# Patient Record
Sex: FEMALE | Race: BLACK OR AFRICAN AMERICAN | NOT HISPANIC OR LATINO | Employment: OTHER | URBAN - METROPOLITAN AREA
[De-identification: names, ages, dates, MRNs, and addresses within clinical notes are randomized per-mention and may not be internally consistent; named-entity substitution may affect disease eponyms.]

---

## 2017-01-10 ENCOUNTER — HOSPITAL ENCOUNTER (EMERGENCY)
Facility: HOSPITAL | Age: 42
Discharge: HOME | End: 2017-01-10
Attending: EMERGENCY MEDICINE
Payer: MEDICAID

## 2017-01-10 VITALS
HEART RATE: 91 BPM | DIASTOLIC BLOOD PRESSURE: 99 MMHG | WEIGHT: 293 LBS | SYSTOLIC BLOOD PRESSURE: 182 MMHG | BODY MASS INDEX: 41.02 KG/M2 | TEMPERATURE: 98 F | RESPIRATION RATE: 18 BRPM | OXYGEN SATURATION: 97 % | HEIGHT: 71 IN

## 2017-01-10 DIAGNOSIS — K04.7 DENTAL ABSCESS: Primary | ICD-10-CM

## 2017-01-10 PROCEDURE — 63600175 PHARM REV CODE 636 W HCPCS: Performed by: EMERGENCY MEDICINE

## 2017-01-10 PROCEDURE — 99283 EMERGENCY DEPT VISIT LOW MDM: CPT | Mod: 25

## 2017-01-10 PROCEDURE — 96372 THER/PROPH/DIAG INJ SC/IM: CPT

## 2017-01-10 PROCEDURE — 25000003 PHARM REV CODE 250: Performed by: EMERGENCY MEDICINE

## 2017-01-10 RX ORDER — HYDROCODONE BITARTRATE AND ACETAMINOPHEN 5; 325 MG/1; MG/1
1 TABLET ORAL EVERY 4 HOURS PRN
Qty: 12 TABLET | Refills: 0 | Status: SHIPPED | OUTPATIENT
Start: 2017-01-10 | End: 2017-01-20

## 2017-01-10 RX ORDER — HYDROCODONE BITARTRATE AND ACETAMINOPHEN 10; 325 MG/1; MG/1
1 TABLET ORAL
Status: COMPLETED | OUTPATIENT
Start: 2017-01-10 | End: 2017-01-10

## 2017-01-10 RX ORDER — CLINDAMYCIN HYDROCHLORIDE 150 MG/1
300 CAPSULE ORAL 4 TIMES DAILY
Qty: 80 CAPSULE | Refills: 0 | Status: SHIPPED | OUTPATIENT
Start: 2017-01-10 | End: 2017-01-20

## 2017-01-10 RX ADMIN — PENICILLIN G BENZATHINE AND PENICILLIN G PROCAINE 2.4 MILLION UNITS: 600000; 600000 INJECTION, SUSPENSION INTRAMUSCULAR at 04:01

## 2017-01-10 RX ADMIN — HYDROCODONE BITARTRATE AND ACETAMINOPHEN 1 TABLET: 10; 325 TABLET ORAL at 04:01

## 2017-01-10 NOTE — ED AVS SNAPSHOT
[facility]  [address] [facility] [address]               Jaswant Yang   1/10/2017  3:50 PM   ED    Description:  Female : 1975   Department:  [facility]            Your Care was Coordinated By:     Provider Role From To    Bryan Morales Jr., MD Attending Provider 01/10/17 1549 --      Reason for Visit     Dental Pain           Diagnoses this Visit        Comments    Dental abscess    -  Primary       ED Disposition     ED Disposition Condition Comment    Discharge             To Do List           Follow-up Information     Follow up with Your Dentist. Call in 2 days.       These Medications        Disp Refills Start End    clindamycin (CLEOCIN) 150 MG capsule 80 capsule 0 1/10/2017 2017    Take 2 capsules (300 mg total) by mouth 4 (four) times daily. - Oral    hydrocodone-acetaminophen 5-325mg (NORCO) 5-325 mg per tablet 12 tablet 0 1/10/2017 2017    Take 1 tablet by mouth every 4 (four) hours as needed. - Oral      [facility]     [facility] -  Assistance  Registered nurses in the [facility] provide clinical advisement, health education, appointment booking, and other advisory services.  Call for this free service at [phone].             Medications           Message regarding Medications     Verify the changes and/or additions to your medication regime listed below are the same as discussed with your clinician today.  If any of these changes or additions are incorrect, please notify your healthcare provider.        START taking these NEW medications        Refills    clindamycin (CLEOCIN) 150 MG capsule 0    Sig: Take 2 capsules (300 mg total) by mouth 4 (four) times daily.    Class: Print    Route: Oral    hydrocodone-acetaminophen 5-325mg (NORCO) 5-325 mg per tablet 0    Sig: Take 1 tablet by mouth every 4 (four) hours as needed.    Class: Print    Route: Oral      These  "medications were administered today        Dose Freq    penicillin G procaine-penicillin G benzathine (BICILLIN-CR) injection 2.4 Million Units 2.4 Million Units ED 1 Time    Sig: Inject 4 mLs (2.4 Million Units total) into the muscle ED 1 Time.    Class: Normal    Route: Intramuscular    hydrocodone-acetaminophen 10-325mg per tablet 1 tablet 1 tablet ED 1 Time    Sig: Take 1 tablet by mouth ED 1 Time.    Class: Normal    Route: Oral           Verify that the below list of medications is an accurate representation of the medications you are currently taking.  If none reported, the list may be blank. If incorrect, please contact your healthcare provider. Carry this list with you in case of emergency.           Current Medications     acetaminophen (TYLENOL) 500 MG tablet Take 2 tablets (1,000 mg total) by mouth 3 (three) times daily as needed for Pain.    clindamycin (CLEOCIN) 150 MG capsule Take 2 capsules (300 mg total) by mouth 4 (four) times daily.    diclofenac (VOLTAREN) 50 MG EC tablet Take 1 tablet (50 mg total) by mouth 3 (three) times daily as needed.    fluticasone (FLONASE) 50 mcg/actuation nasal spray 1 spray by Each Nare route 2 (two) times daily as needed for Rhinitis.    hydrocodone-acetaminophen 10-325mg per tablet 1 tablet Take 1 tablet by mouth ED 1 Time.    hydrocodone-acetaminophen 5-325mg (NORCO) 5-325 mg per tablet Take 1 tablet by mouth every 4 (four) hours as needed.    ibuprofen (ADVIL,MOTRIN) 200 MG tablet Take 2 tablets (400 mg total) by mouth every 6 (six) hours as needed for Pain.    penicillin G procaine-penicillin G benzathine (BICILLIN-CR) injection 2.4 Million Units Inject 4 mLs (2.4 Million Units total) into the muscle ED 1 Time.           Clinical Reference Information           Your Vitals Were     BP Pulse Temp Resp Height Weight    195/115 (BP Location: Right arm, Patient Position: Sitting) 96 98.1 °F (36.7 °C) (Oral) 16 5' 11" (1.803 m) 147.4 kg (325 lb)    Last Period SpO2 BMI "          11/24/2016 (Approximate) 96% 45.33 kg/m2        Allergies as of 1/10/2017     No Known Allergies      Immunizations Administered on Date of Encounter - 1/10/2017     None      ED Micro, Lab, POCT     None      ED Imaging Orders     None        Discharge Instructions         Dental Abscess  An abscess is a sac of pus. A dental abscess forms when a tooth or the tissue around it becomes infected with bacteria. The bacteria can enter through a cavity or a crack in a tooth. It can also infect the gum tissue or bone around a tooth. An untreated abscess can cause the loss of the tooth. It can even spread to other parts of the body and become life-threatening.    Symptoms of a dental abscess   Signs of a dental abscess include:  · Toothache, often severe  · Tooth pain with hot, cold, or pressure  · Pain in the gums, cheek, or jaw  · Bad breath or bitter taste in the mouth  · Trouble swallowing or opening the mouth  · Fever  · Swollen or enlarged glands in the neck  Diagnosing a dental abscess  An abscess is diagnosed by looking at your teeth and gums. You will be told if any tests, like dental X-rays, are needed.  Treating a dental abscess  Treatments for a dental abscess may include the following:  · Antibiotic medicines to treat the underlying infection.  · Pain relievers to help you feel more comfortable. Your health care provider may prescribe a medicine for you. Or, use over-the-counter pain relievers, like acetaminophen or ibuprofen.  · Warm saltwater rinses to soothe discomfort and help clear away pus.  · Root canal surgery if needed to save the tooth. With a root canal, the infected part of the tooth is removed. A special substance is then used to fill the empty space in the tooth.  · Drainage of the abscess if needed. Incisions are made to allow the infected material to drain from the tooth.  · Removal of the tooth in cases of severe infection that cant be treated another way.  If the infection is severe,  has spread, or doesnt respond to treatment, you may need to be admitted to a hospital.        When to call the dentist  Call your dentist right away if you have any of the following:  · Fever of 100.4°F (38°C) or higher  · Increased pain, redness, drainage, or swelling in the treated area  · Swelling of the face or jawbone  · Pain that cannot be controlled with medicines   Preventing dental abscess  To prevent another abscess in the future, keep your teeth clean and healthy. Brush twice a day and floss at least once daily. See your dentist for regular tooth cleanings. And avoid sugary foods and drinks that can lead to tooth decay.  © 7883-6960 [employer]. [address] [place] [address], [place] [address]. All rights reserved. This information is not intended as a substitute for professional medical care. Always follow your healthcare professional's instructions.          [facility] Sign-Up     Activating your [facility] account is as easy as 1-2-3!     1) Visit [email or web address], select Sign Up Now, enter this activation code and your date of birth, then select Next.  [email or web address]  Expires: 2/5/2017  8:41 PM      2) Create a username and password to use when you visit [facility] in the future and select a security question in case you lose your password and select Next.    3) Enter your e-mail address and click Sign Up!    Additional Information  If you have questions, please e-mail [email or web address] or call [phone] to talk to our [facility] staff. Remember, [facility] is NOT to be used for urgent needs. For medical emergencies, dial 911.          [facility] complies with applicable Federal civil rights laws and does not discriminate on the basis of race, color, national origin, age, disability, or sex.        Language Assistance Services     ATTENTION: Language assistance services are available, free of charge. Please call [phone].      ATENCIÓN: corie Alaniz  disposición servicios gratuitos de asistencia lingüística. Llcasey al [phone].     SENA Ý: N?u b?n nói Ti?ng Vi?t, có các d?ch v? h? tr? ngôn ng? mi?n phí dành cho b?n. G?i s? [phone].

## 2017-01-10 NOTE — DISCHARGE INSTRUCTIONS
Dental Abscess  An abscess is a sac of pus. A dental abscess forms when a tooth or the tissue around it becomes infected with bacteria. The bacteria can enter through a cavity or a crack in a tooth. It can also infect the gum tissue or bone around a tooth. An untreated abscess can cause the loss of the tooth. It can even spread to other parts of the body and become life-threatening.    Symptoms of a dental abscess   Signs of a dental abscess include:  · Toothache, often severe  · Tooth pain with hot, cold, or pressure  · Pain in the gums, cheek, or jaw  · Bad breath or bitter taste in the mouth  · Trouble swallowing or opening the mouth  · Fever  · Swollen or enlarged glands in the neck  Diagnosing a dental abscess  An abscess is diagnosed by looking at your teeth and gums. You will be told if any tests, like dental X-rays, are needed.  Treating a dental abscess  Treatments for a dental abscess may include the following:  · Antibiotic medicines to treat the underlying infection.  · Pain relievers to help you feel more comfortable. Your health care provider may prescribe a medicine for you. Or, use over-the-counter pain relievers, like acetaminophen or ibuprofen.  · Warm saltwater rinses to soothe discomfort and help clear away pus.  · Root canal surgery if needed to save the tooth. With a root canal, the infected part of the tooth is removed. A special substance is then used to fill the empty space in the tooth.  · Drainage of the abscess if needed. Incisions are made to allow the infected material to drain from the tooth.  · Removal of the tooth in cases of severe infection that cant be treated another way.  If the infection is severe, has spread, or doesnt respond to treatment, you may need to be admitted to a hospital.        When to call the dentist  Call your dentist right away if you have any of the following:  · Fever of 100.4°F (38°C) or higher  · Increased pain, redness, drainage, or swelling in the  treated area  · Swelling of the face or jawbone  · Pain that cannot be controlled with medicines   Preventing dental abscess  To prevent another abscess in the future, keep your teeth clean and healthy. Brush twice a day and floss at least once daily. See your dentist for regular tooth cleanings. And avoid sugary foods and drinks that can lead to tooth decay.  © 0358-4434 [employer]. [address] [place] [address], [place] [address]. All rights reserved. This information is not intended as a substitute for professional medical care. Always follow your healthcare professional's instructions.

## 2017-01-10 NOTE — ED PROVIDER NOTES
SCRIBE #1 NOTE: I, Elsa Cabello, am scribing for, and in the presence of, Bryan Morales Jr., MD. I have scribed the entire note.      History      Chief Complaint   Patient presents with    Dental Pain     top left tooth broke off with facial swelling. called dentist and they said to come here, has appmt on        Review of patient's allergies indicates:  No Known Allergies     HPI   HPI    1/10/2017, 3:50 PM   History obtained from the patient      History of Present Illness: Jaswant Yang is a 41 y.o. female patient who presents to the Emergency Department for left upper dental pain which onset gradually several days ago. Symptoms are constant and mild in severity. Pt reports no relief with OTC medications. Pt does not report any factors that exacerbate or improve the symptoms. Associated sxs include facial swelling. Patient denies fever, chills, difficulty swallowing, HA, drooling, and all other sxs at this time. Patient reports that she has an appointment with the dentist next month but that she is unable to present to the dentist sooner. No further complaints or concerns at this time.       Arrival mode: Personal vehicle    PCP: Primary Doctor No       Past Medical History:  History reviewed. No pertinent past medical history.    Past Surgical History:  Past Surgical History   Procedure Laterality Date     section, classic           Family History:  History reviewed. No pertinent family history.    Social History:  Social History     Social History Main Topics    Smoking status: Never Smoker    Smokeless tobacco: Unknown    Alcohol use No    Drug use: No    Sexual activity: Unknown       ROS   Review of Systems   Constitutional: Negative for chills and fever.   HENT: Positive for dental problem and facial swelling. Negative for drooling, sore throat and trouble swallowing.    Respiratory: Negative for shortness of breath.    Cardiovascular: Negative for chest pain.   Gastrointestinal:  Negative for nausea.   Genitourinary: Negative for dysuria.   Musculoskeletal: Negative for back pain.   Skin: Negative for rash.   Neurological: Negative for weakness and headaches.   Hematological: Does not bruise/bleed easily.   All other systems reviewed and are negative.      Physical Exam    Initial Vitals   BP Pulse Resp Temp SpO2   01/10/17 1546 01/10/17 1546 01/10/17 1546 01/10/17 1546 01/10/17 1546   195/115 96 16 98.1 °F (36.7 °C) 96 %      Physical Exam  Nursing Notes and Vital Signs Reviewed.  Constitutional: Patient is in no acute distress. Awake and alert. Well-developed and well-nourished.  Head: Atraumatic. Normocephalic.  Eyes: PERRL. EOM intact. Conjunctivae are not pale. No scleral icterus.  ENT: Mucous membranes are moist. Oropharynx is clear and symmetric.    Mouth/Throat: L upper maxilla premolars necrotic down to the bone with associated erythema. Mild facial swelling to L cheek. Patient handles secretions normally. No trismus.   Neck: Supple. Full ROM. No lymphadenopathy.  Cardiovascular: Regular rate. Regular rhythm. No murmurs, rubs, or gallops. Distal pulses are 2+ and symmetric.  Pulmonary/Chest: No respiratory distress. Clear to auscultation bilaterally. No wheezing, rales, or rhonchi.  Abdominal: Soft and non-distended.  There is no tenderness.  No rebound, guarding, or rigidity.  Good bowel sounds.    Genitourinary: No CVA tenderness  Musculoskeletal: Moves all extremities. No obvious deformities. No edema. No calf tenderness.  Skin: Warm and dry.  Neurological:  Alert, awake, and appropriate.  Normal speech.  No acute focal neurological deficits are appreciated.  Psychiatric: Normal affect. Good eye contact. Appropriate in content.    ED Course    Procedures  ED Vital Signs:  Vitals:    01/10/17 1546 01/10/17 1709   BP: (!) 195/115 (!) 182/99   Pulse: 96 91   Resp: 16 18   Temp: 98.1 °F (36.7 °C) 98.2 °F (36.8 °C)   TempSrc: Oral Oral   SpO2: 96% 97%   Weight: (!) 147.4 kg (325 lb)   "  Height: 5' 11" (1.803 m)      The Emergency Provider reviewed the vital signs and test results, which are outlined above.    ED Discussion     4:25 PM: Reassessed pt at this time. Pt states her condition has imprved at this time. Discussed pt dx and plan of tx. Informed pt to follow up with dentist.  All questions and concerns were addressed at this time. Pt expresses understanding of information and instructions, and is comfortable with plan to discharge. Pt is stable for discharge.    I discussed with patient that evaluation in the ED does not suggest any emergent or life threatening medical conditions requiring immediate intervention beyond what was provided in the ED, and I believe patient is safe for discharge.  Regardless, an unremarkable evaluation in the ED does not preclude the development or presence of a serious of life threatening condition. As such, patient was instructed to return immediately for any worsening or change in current symptoms.      ED Medication(s):  Medications   penicillin G procaine-penicillin G benzathine (BICILLIN-CR) injection 2.4 Million Units (2.4 Million Units Intramuscular Given 1/10/17 1424)   hydrocodone-acetaminophen 10-325mg per tablet 1 tablet (1 tablet Oral Given 1/10/17 8733)       Discharge Medication List as of 1/10/2017  4:20 PM      START taking these medications    Details   clindamycin (CLEOCIN) 150 MG capsule Take 2 capsules (300 mg total) by mouth 4 (four) times daily., Starting 1/10/2017, Until Fri 1/20/17, Print             Follow-up Information     Follow up with Your Dentist. Call in 2 days.           Medical Decision Making              Scribe Attestation:   Scribe #1: I performed the above scribed service and the documentation accurately describes the services I performed. I attest to the accuracy of the note.    Attending:   Physician Attestation Statement for Scribe #1: I, Bryan Morales Jr., MD, personally performed the services described in this " documentation, as scribed by Elsa Cabello, in my presence, and it is both accurate and complete.          Clinical Impression       ICD-10-CM ICD-9-CM   1. Dental abscess K04.7 522.5       Disposition:   Disposition: Discharged  Condition: Stable         Bryan Morales Jr., MD  01/10/17 190

## 2017-04-04 ENCOUNTER — HOSPITAL ENCOUNTER (EMERGENCY)
Facility: HOSPITAL | Age: 42
Discharge: HOME | End: 2017-04-04
Attending: EMERGENCY MEDICINE
Payer: MEDICAID

## 2017-04-04 VITALS
HEART RATE: 84 BPM | HEIGHT: 71 IN | WEIGHT: 293 LBS | TEMPERATURE: 98 F | OXYGEN SATURATION: 100 % | DIASTOLIC BLOOD PRESSURE: 88 MMHG | BODY MASS INDEX: 41.02 KG/M2 | SYSTOLIC BLOOD PRESSURE: 168 MMHG | RESPIRATION RATE: 18 BRPM

## 2017-04-04 DIAGNOSIS — K02.9 PAIN DUE TO DENTAL CARIES: Primary | ICD-10-CM

## 2017-04-04 PROCEDURE — 99283 EMERGENCY DEPT VISIT LOW MDM: CPT

## 2017-04-04 RX ORDER — PENICILLIN V POTASSIUM 500 MG/1
500 TABLET, FILM COATED ORAL EVERY 8 HOURS
Qty: 21 TABLET | Refills: 0 | Status: SHIPPED | OUTPATIENT
Start: 2017-04-04 | End: 2017-04-11

## 2017-04-04 RX ORDER — TRAMADOL HYDROCHLORIDE 50 MG/1
50 TABLET ORAL EVERY 6 HOURS PRN
Qty: 20 TABLET | Refills: 0 | Status: SHIPPED | OUTPATIENT
Start: 2017-04-04 | End: 2017-04-14

## 2017-04-04 NOTE — ED AVS SNAPSHOT
[facility]  [address] [facility] [address]               Jaswant Yang   2017  8:28 PM   ED    Description:  Female : 1975   Department:  [facility]            Your Care was Coordinated By:     Provider Role From To    Subhash Cabello Jr., MD Attending Provider 17 --    FLAKITA Jaquez Physician Assistant 17      Reason for Visit     Dental Pain           Diagnoses this Visit        Comments    Pain due to dental caries    -  Primary       ED Disposition     None           To Do List           Follow-up Information     Follow up with [facility]. Call in 1 day.    Why:  Call to see if you can schedule appt with dentist sooner than      Contact information:    [facility] [facility] [id number] [phone]         These Medications        Disp Refills Start End    penicillin v potassium (VEETID) 500 MG tablet 21 tablet 0 2017    Take 1 tablet (500 mg total) by mouth every 8 (eight) hours. - Oral    tramadol (ULTRAM) 50 mg tablet 20 tablet 0 2017    Take 1 tablet (50 mg total) by mouth every 6 (six) hours as needed for Pain. - Oral      [facility]     [facility] On [facility] Nurse Care Line -  Assistance  Unless otherwise directed by your provider, please contact [facility], our nurse care line that is available for  assistance.     Registered nurses in the [facility] provide: appointment scheduling, clinical advisement, health education, and other advisory services.  Call: [phone] (toll free)               Medications           Message regarding Medications     Verify the changes and/or additions to your medication regime listed below are the same as discussed with your clinician today.  If any of these changes or additions are incorrect, please notify your healthcare provider.        START taking  "these NEW medications        Refills    penicillin v potassium (VEETID) 500 MG tablet 0    Sig: Take 1 tablet (500 mg total) by mouth every 8 (eight) hours.    Class: Print    Route: Oral    tramadol (ULTRAM) 50 mg tablet 0    Sig: Take 1 tablet (50 mg total) by mouth every 6 (six) hours as needed for Pain.    Class: Print    Route: Oral           Verify that the below list of medications is an accurate representation of the medications you are currently taking.  If none reported, the list may be blank. If incorrect, please contact your healthcare provider. Carry this list with you in case of emergency.           Current Medications     acetaminophen (TYLENOL) 500 MG tablet Take 2 tablets (1,000 mg total) by mouth 3 (three) times daily as needed for Pain.    diclofenac (VOLTAREN) 50 MG EC tablet Take 1 tablet (50 mg total) by mouth 3 (three) times daily as needed.    fluticasone (FLONASE) 50 mcg/actuation nasal spray 1 spray by Each Nare route 2 (two) times daily as needed for Rhinitis.    ibuprofen (ADVIL,MOTRIN) 200 MG tablet Take 2 tablets (400 mg total) by mouth every 6 (six) hours as needed for Pain.    penicillin v potassium (VEETID) 500 MG tablet Take 1 tablet (500 mg total) by mouth every 8 (eight) hours.    tramadol (ULTRAM) 50 mg tablet Take 1 tablet (50 mg total) by mouth every 6 (six) hours as needed for Pain.           Clinical Reference Information           Your Vitals Were     BP Pulse Temp Resp Height Weight    168/88 (BP Location: Right arm, Patient Position: Sitting) 84 98.1 °F (36.7 °C) (Oral) 18 5' 11" (1.803 m) 143.8 kg (317 lb)    SpO2 BMI             100% 44.21 kg/m2         Allergies as of 4/4/2017     No Known Allergies      Immunizations Administered on Date of Encounter - 4/4/2017     None      ED Micro, Lab, POCT     None      ED Imaging Orders     None        Discharge Instructions         Dental Cavity    A dental cavity is a pit or crater in the surface of a tooth. This exposes the " "sensitive inner layer of the tooth and causes pain. If the cavity isnt treated, it will get bigger. It may enter the pulp and cause an infection or abscess in the bone at the root end (apex) of the tooth. An infection in the tooth is a much more serious problem than a cavity. If the tooth gets infected, you will need a root canal or the entire tooth taken out (extraction).  The pain in your tooth may be made worse by eating sweets or drinking hot or cold beverages. It may spread from the tooth to your ear or the area of your jaw on the same side.  Home care  Follow these tips when caring for yourself at home:  · Avoid sweets and hot and cold foods and drinks. Your tooth may be sensitive to changes in temperature.  · If your tooth is chipped or cracked, or if there is a large open cavity, put oil of cloves directly on the tooth to relieve pain. You can buy oil of cloves at drugstores. Some pharmacies carry an over-the-counter "toothache kit." This contains a paste that you can put on the exposed tooth to make it less sensitive.  · Put a cold pack on your jaw over the sore area to help reduce pain.  · You may use over-the-counter medicine to ease pain, unless another medicine was prescribed. If you have chronic liver or kidney disease, talk with your healthcare provider before using acetaminophen or ibuprofen. Also talk with your provider if youve had a stomach ulcer or GI bleeding.  · If you have signs of an infection, you will be given an antibiotic. Take it as directed.  Follow-up care  Follow up with your dentist, or as advised. Your pain may go away with the treatment given today. But only a dentist can fully look at and treat this problem to prevent further tooth damage.  Call 911  Call 911 if any of these occur:  · Difficulty swallowing or breathing  · Weakness or fainting  · Unusual drowsiness  · Headache or stiff neck  When to seek medical advice  Call your healthcare provider right away if any of these " occur:  · Redness or swelling of the face  · Pain gets worse or spreads to your neck  · Fever of 100.5 °F (38°C) or higher, or as directed by your healthcare provider  · Pus drains from the tooth or gum  Date Last Reviewed: 10/1/2016  © 6110-8232 [employer]. [address] [place] [address]. All rights reserved. This information is not intended as a substitute for professional medical care. Always follow your healthcare professional's instructions.          [facility] Sign-Up     Activating your [facility] account is as easy as 1-2-3!     1) Visit [email or web address], select Sign Up Now, enter this activation code and your date of birth, then select Next.  [email or web address]  Expires: 5/19/2017  8:40 PM      2) Create a username and password to use when you visit [facility] in the future and select a security question in case you lose your password and select Next.    3) Enter your e-mail address and click Sign Up!    Additional Information  If you have questions, please e-mail [email or web address] or call [phone] to talk to our [facility] staff. Remember, [facility] is NOT to be used for urgent needs. For medical emergencies, dial 911.          [facility] complies with applicable Federal civil rights laws and does not discriminate on the basis of race, color, national origin, age, disability, or sex.        Language Assistance Services     ATTENTION: Language assistance services are available, free of charge. Please call [phone].      ATENCIÓN: Si habla español, tiene a reyes disposición servicios gratuitos de asistencia lingüística. Llame al [phone].     CHÚ Ý: N?u b?n nói Ti?ng Vi?t, có các d?ch v? h? tr? ngôn ng? mi?n phí dành cho b?n. G?i s? [phone].

## 2017-04-05 NOTE — DISCHARGE INSTRUCTIONS
"  Dental Cavity    A dental cavity is a pit or crater in the surface of a tooth. This exposes the sensitive inner layer of the tooth and causes pain. If the cavity isnt treated, it will get bigger. It may enter the pulp and cause an infection or abscess in the bone at the root end (apex) of the tooth. An infection in the tooth is a much more serious problem than a cavity. If the tooth gets infected, you will need a root canal or the entire tooth taken out (extraction).  The pain in your tooth may be made worse by eating sweets or drinking hot or cold beverages. It may spread from the tooth to your ear or the area of your jaw on the same side.  Home care  Follow these tips when caring for yourself at home:  · Avoid sweets and hot and cold foods and drinks. Your tooth may be sensitive to changes in temperature.  · If your tooth is chipped or cracked, or if there is a large open cavity, put oil of cloves directly on the tooth to relieve pain. You can buy oil of cloves at drugstores. Some pharmacies carry an over-the-counter "toothache kit." This contains a paste that you can put on the exposed tooth to make it less sensitive.  · Put a cold pack on your jaw over the sore area to help reduce pain.  · You may use over-the-counter medicine to ease pain, unless another medicine was prescribed. If you have chronic liver or kidney disease, talk with your healthcare provider before using acetaminophen or ibuprofen. Also talk with your provider if youve had a stomach ulcer or GI bleeding.  · If you have signs of an infection, you will be given an antibiotic. Take it as directed.  Follow-up care  Follow up with your dentist, or as advised. Your pain may go away with the treatment given today. But only a dentist can fully look at and treat this problem to prevent further tooth damage.  Call 911  Call 911 if any of these occur:  · Difficulty swallowing or breathing  · Weakness or fainting  · Unusual drowsiness  · Headache or " stiff neck  When to seek medical advice  Call your healthcare provider right away if any of these occur:  · Redness or swelling of the face  · Pain gets worse or spreads to your neck  · Fever of 100.5 °F (38°C) or higher, or as directed by your healthcare provider  · Pus drains from the tooth or gum  Date Last Reviewed: 10/1/2016  © 5963-8228 [employer]. [address] [place] [address], [place] [address]. All rights reserved. This information is not intended as a substitute for professional medical care. Always follow your healthcare professional's instructions.

## 2017-04-05 NOTE — ED PROVIDER NOTES
SCRIBE #1 NOTE: I, Lesley Cabello, am scribing for, and in the presence of, Subhash Cabello Jr., MD. I have scribed the entire note.      History      Chief Complaint   Patient presents with    Dental Pain       Review of patient's allergies indicates:  No Known Allergies     HPI   HPI    2017, 8:40 PM   History obtained from the patient      History of Present Illness: Jaswant Yang is a 41 y.o. female patient who presents to the Emergency Department for left upper dental pain which is chronic. Symptoms are constant and moderate in severity. No mitigating or exacerbating factors reported. No associated sxs reported. Patient denies any fever,chills, n/v/d, drooling, facial swelling, jaw pain, dysphagia, and all other sxs at this time. No prior Tx reported. Pt has an appointment to see her dentist on 2017. No further complaints or concerns at this time.         Arrival mode: Personal vehicle     PCP: Primary Doctor No       Past Medical History:  No past medical history on file.    Past Surgical History:  Past Surgical History:   Procedure Laterality Date     SECTION, CLASSIC           Family History:  No family history on file.    Social History:  Social History     Social History Main Topics    Smoking status: Never Smoker    Smokeless tobacco: Not on file    Alcohol use No    Drug use: No    Sexual activity: Not on file       ROS   Review of Systems   Constitutional: Negative for chills and fever.   HENT: Positive for dental problem (left upper pain). Negative for drooling, facial swelling, sore throat and trouble swallowing.         (-) jaw pain   Respiratory: Negative for shortness of breath.    Cardiovascular: Negative for chest pain.   Gastrointestinal: Negative for diarrhea, nausea and vomiting.   Genitourinary: Negative for dysuria.   Musculoskeletal: Negative for back pain.   Skin: Negative for rash.   Neurological: Negative for weakness.   Hematological: Does not bruise/bleed  "easily.   All other systems reviewed and are negative.      Physical Exam    Initial Vitals   BP Pulse Resp Temp SpO2   04/04/17 2003 04/04/17 2003 04/04/17 2003 04/04/17 2003 04/04/17 2003   168/88 84 18 98.1 °F (36.7 °C) 100 %      Physical Exam  Nursing Notes and Vital Signs Reviewed.  Constitutional: Patient is in no acute distress. Awake and alert. Well-developed and well-nourished.  Head: Atraumatic. Normocephalic.  Eyes: PERRL. EOM intact. Conjunctivae are not pale. No scleral icterus.  ENT: Mucous membranes are moist. Oropharynx is clear and symmetric.   Mouth/Throat: No evident facial swelling. Positive for left upper canine dental caries with swelling. Negative for gingival edema. No palpable fluctuance. No evidence of periodontal or periapical abscess. No trismus.    Neck: Supple. Full ROM. No lymphadenopathy.  Cardiovascular: Regular rate. Regular rhythm. No murmurs, rubs, or gallops. Distal pulses are 2+ and symmetric.  Pulmonary/Chest: No respiratory distress. Clear to auscultation bilaterally. No wheezing, rales, or rhonchi.  Abdominal: Soft and non-distended.  There is no tenderness.  No rebound, guarding, or rigidity.  Musculoskeletal: Moves all extremities. No obvious deformities. No edema. No calf tenderness.  Skin: Warm and dry.  Neurological:  Alert, awake, and appropriate.  Normal speech.  No acute focal neurological deficits are appreciated.  Psychiatric: Normal affect. Good eye contact. Appropriate in content.    ED Course    Procedures  ED Vital Signs:  Vitals:    04/04/17 2003   BP: (!) 168/88   Pulse: 84   Resp: 18   Temp: 98.1 °F (36.7 °C)   TempSrc: Oral   SpO2: 100%   Weight: (!) 143.8 kg (317 lb)   Height: 5' 11" (1.803 m)            The Emergency Provider reviewed the vital signs and test results, which are outlined above.    ED Discussion     8:53 PM: Initial assessment of pt.  Pt is awake, alert, and in NAD at this time. Discussed with pt all pertinent ED information and results. " Discussed pt dx and plan of tx. Gave pt all f/u and return to the ED instructions. All questions and concerns were addressed at this time. Pt expresses understanding of information and instructions, and is comfortable with plan to discharge. Pt is stable for discharge.    Pre-hypertension/Hypertension: The pt has been informed that they may have pre-hypertension or hypertension based on a blood pressure reading in the ED. I recommend that the pt call the PCP listed on their discharge instructions or a physician of their choice this week to arrange f/u for further evaluation of possible pre-hypertension or hypertension.       ED Medication(s):  Medications - No data to display    New Prescriptions    PENICILLIN V POTASSIUM (VEETID) 500 MG TABLET    Take 1 tablet (500 mg total) by mouth every 8 (eight) hours.    TRAMADOL (ULTRAM) 50 MG TABLET    Take 1 tablet (50 mg total) by mouth every 6 (six) hours as needed for Pain.       Follow-up Information     Follow up with [facility]. Call in 1 day.    Why:  Call to see if you can schedule appt with dentist sooner than April 13     Contact information:    [address] [facility] [id number] [phone]              Medical Decision Making              Scribe Attestation:   Scribe #1: I performed the above scribed service and the documentation accurately describes the services I performed. I attest to the accuracy of the note.    Attending:   Physician Attestation Statement for Scribe #1: I, Subhash Cabello Jr., MD, personally performed the services described in this documentation, as scribed by Lesley Cabello, in my presence, and it is both accurate and complete.          Clinical Impression       ICD-10-CM ICD-9-CM   1. Pain due to dental caries K02.9 521.00       Disposition:   Disposition: Discharged  Condition: Stable         Subhash Cabello Jr., MD  04/05/17 8179

## 2017-07-17 VITALS
TEMPERATURE: 98 F | HEART RATE: 85 BPM | RESPIRATION RATE: 20 BRPM | DIASTOLIC BLOOD PRESSURE: 78 MMHG | BODY MASS INDEX: 41.02 KG/M2 | OXYGEN SATURATION: 100 % | WEIGHT: 293 LBS | SYSTOLIC BLOOD PRESSURE: 170 MMHG | HEIGHT: 71 IN

## 2017-07-17 PROCEDURE — 99283 EMERGENCY DEPT VISIT LOW MDM: CPT

## 2017-07-18 ENCOUNTER — HOSPITAL ENCOUNTER (EMERGENCY)
Facility: HOSPITAL | Age: 42
Discharge: HOME | End: 2017-07-18
Attending: EMERGENCY MEDICINE
Payer: MEDICAID

## 2017-07-18 DIAGNOSIS — K02.9 DENTAL CARIES: ICD-10-CM

## 2017-07-18 DIAGNOSIS — K05.30 PERIODONTITIS: Primary | ICD-10-CM

## 2017-07-18 RX ORDER — HYDROCODONE BITARTRATE AND ACETAMINOPHEN 5; 325 MG/1; MG/1
1 TABLET ORAL EVERY 4 HOURS PRN
Qty: 18 TABLET | Refills: 0 | Status: SHIPPED | OUTPATIENT
Start: 2017-07-18 | End: 2018-08-16

## 2017-07-18 RX ORDER — PENICILLIN V POTASSIUM 500 MG/1
500 TABLET, FILM COATED ORAL 4 TIMES DAILY
Qty: 40 TABLET | Refills: 0 | Status: SHIPPED | OUTPATIENT
Start: 2017-07-18 | End: 2017-07-25

## 2017-07-18 NOTE — DISCHARGE INSTRUCTIONS
DENTAL RESOURCES      [facility] [place] [facility]       [phone]     [facility] 8-4 Monday - Friday       [phone]     [facility] [place] [facility] Comp[facility]       [phone]     [facility] [place] [facility]                                 0-6 years                                                                                             7-13 years     [phone]     [facility]    [facility]     [phone]     [facility]       [phone]     [facility]    [address] [facility] [address], Mon-Fri not on Wed  8am-4pm    Over 60 years old living in [place]           [phone]     [facility]    [address] [facility] [place]     [phone]     [facility] [place]       [phone]     [facility] for HIV Patients  [address] [facility] [facility] [address] [facility]       [phone]     [facility]       [phone]     Advised patient to make an appointment with dentist for examination and treatment of their dental pain, as well as routine cleaning and disease prevention.  For prevention, patient was encouraged to: perform oral hygiene daily; have regular professional cleaning; brush teeth at least twice a day; floss daily; avoid constant sipping of sugary drinks or frequent sucking on candy and mints; and use toothpaste containing fluoride. Encouraged patient to gargle with warm salt water several times a day, continue to floss after eating, and complete full course of antibiotics.

## 2017-07-18 NOTE — ED PROVIDER NOTES
SCRIBE #1 NOTE: I, Augustina Graf/Garry Marie, am scribing for, and in the presence of, Rubén Kat Jr., MD. I have scribed the entire note.      History      Chief Complaint   Patient presents with    Dental Pain     to R lower tooth that is cracked    Back Pain     lower back pain that started after lifting a pt       Review of patient's allergies indicates:  No Known Allergies     HPI   HPI    2017, 1:07 AM   History obtained from the patient      History of Present Illness: Jaswant Yang is a 41 y.o. female patient who presents to the Emergency Department for right dental pain which onset gradually yesterday. Symptoms are constant and moderate in severity.  No mitigating or exacerbating factors reported. Associated sxs include difficulty chewing and jaw pain. Patient denies any fever, chills, difficulty swallowing, SOB, drooling, n/v, and all other sxs at this time. Patient has chronic back pain, but does not want examined because patient says it was a pulled muscle.  No further complaints or concerns at this time.         Arrival mode: Personal vehicle    PCP: Primary Doctor No       Past Medical History:  Past medical history reviewed not relevant      Past Surgical History:  Past surgical history reviewed not relevant      Family History:  Family history reviewed not relevant    Past Surgical History:  Past Surgical History:   Procedure Laterality Date     SECTION, CLASSIC           Social History:  Social History     Social History Main Topics    Smoking status: Never Smoker    Smokeless tobacco: Not given    Alcohol use No    Drug use: No    Sexual activity: Not given       ROS   Review of Systems   Constitutional: Negative for chills and fever.   HENT: Positive for dental problem. Negative for drooling and sore throat.    Respiratory: Negative for shortness of breath.    Cardiovascular: Negative for chest pain.   Gastrointestinal: Negative for nausea and vomiting.  "  Genitourinary: Negative for dysuria.   Musculoskeletal: Positive for back pain (chronic).   Skin: Negative for rash.   Neurological: Negative for weakness.   Hematological: Does not bruise/bleed easily.   All other systems reviewed and are negative.    Physical Exam      Initial Vitals [07/17/17 2333]   BP Pulse Resp Temp SpO2   (!) 170/78 85 20 98.4 °F (36.9 °C) 100 %      MAP       108.67          Physical Exam  Nursing Notes and Vital Signs Reviewed.  Constitutional: Patient is in no apparent distress. Well-developed and well-nourished.  Head: Atraumatic. Normocephalic.  Eyes: PERRL. EOM intact. Conjunctivae are not pale. No scleral icterus.  ENT: Mucous membranes are moist. Oropharynx is clear and symmetric.    Neck: Supple. Full ROM. No lymphadenopathy.  Cardiovascular: Regular rate. Regular rhythm. No murmurs, rubs, or gallops. Distal pulses are 2+ and symmetric.  Pulmonary/Chest: No respiratory distress. Clear to auscultation bilaterally. No wheezing, rales, or rhonchi.  Abdominal: Soft and non-distended.  There is no tenderness.  No rebound, guarding, or rigidity. Good bowel sounds.  Genitourinary: No CVA tenderness  Musculoskeletal: Moves all extremities. No obvious deformities. No edema. No calf tenderness.  Skin: Warm and dry.  Neurological:  Alert, awake, and appropriate.  Normal speech.  No acute focal neurological deficits are appreciated.  Psychiatric: Normal affect. Good eye contact. Appropriate in content.  Mouth/Throat: No evident facial swelling. Patient handles secretions normally. negative for dental caries. negative for gingival edema. Positive for #31 dental erythema of gum. No palpable fluctuance. No evidence of periodontal or periapical abscess. No trismus.         ED Course    Procedures  ED Vital Signs:  Vitals:    07/17/17 2333   BP: (!) 170/78   Pulse: 85   Resp: 20   Temp: 98.4 °F (36.9 °C)   TempSrc: Oral   SpO2: 100%   Weight: (!) 138.3 kg (305 lb)   Height: 5' 11" (1.803 m) "               The Emergency Provider reviewed the vital signs and test results, which are outlined above.    ED Discussion     1:27 AM: Reassessed pt at this time.  Pt states her condition has  at this time. Discussed with pt all pertinent ED information and results. Discussed pt dx and plan of tx. Gave pt all f/u and return to the ED instructions. All questions and concerns were addressed at this time. Pt expresses understanding of information and instructions, and is comfortable with plan to discharge. Pt is stable for discharge.    I discussed with patient and/or family/caretaker that evaluation in the ED does not suggest any emergent or life threatening medical conditions requiring immediate intervention beyond what was provided in the ED, and I believe patient is safe for discharge.  Regardless, an unremarkable evaluation in the ED does not preclude the development or presence of a serious of life threatening condition. As such, patient was instructed to return immediately for any worsening or change in current symptoms.    Advised patient to make an appointment with dentist for examination and treatment of their dental pain, as well as routine cleaning and disease prevention.  For prevention, patient was encouraged to: perform oral hygiene daily; have regular professional cleaning; brush teeth at least twice a day; floss daily; avoid constant sipping of sugary drinks or frequent sucking on candy and mints; and use toothpaste containing fluoride. Encouraged patient to gargle with warm salt water several times a day, continue to floss after eating, and complete full course of antibiotics.    Driving or other activities under influence of medications - Patient and/or family/caretaker was given a prescription for, or instructed to use a medicine that may impair ability to drive, operate machinery, or participate in other potentially dangerous activities.  Patient was instructed not to participate in these  activities while under the influence of these medications.      ED Medication(s):  Medications - No data to display    Discharge Medication List as of 7/18/2017 12:47 AM      START taking these medications    Details   hydrocodone-acetaminophen 5-325mg (NORCO) 5-325 mg per tablet Take 1 tablet by mouth every 4 (four) hours as needed for Pain., Starting Tue 7/18/2017, Print      penicillin v potassium (VEETID) 500 MG tablet Take 1 tablet (500 mg total) by mouth 4 (four) times daily., Starting Tue 7/18/2017, Until Tue 7/25/2017, Print             Follow-up Information     [facility]. Schedule an appointment as soon as possible for a visit in 1 week.    Why:  or with your dentits  Contact information:  [address] [facility] [address] [phone]             [facility] - .    Specialty:  Emergency Medicine  Why:  As needed, If symptoms worsen  Contact information:  [address] [facility] [address] [phone]                   Medical Decision Making              Scribe Attestation:   Scribe #1: I performed the above scribed service and the documentation accurately describes the services I performed. I attest to the accuracy of the note.    Attending:   Physician Attestation Statement for Scribe #1: I, Rubén Kat Jr., MD, personally performed the services described in this documentation, as scribed by Augustina Graf/ Garry Marie, in my presence, and it is both accurate and complete.          Clinical Impression       ICD-10-CM ICD-9-CM   1. Periodontitis K05.30 523.40   2. Dental caries K02.9 521.00       Disposition:   Disposition: Discharged  Condition: Stable         Rubén Kat Jr., MD  07/19/17 4536

## 2018-01-18 ENCOUNTER — HOSPITAL ENCOUNTER (EMERGENCY)
Facility: HOSPITAL | Age: 43
Discharge: HOME | End: 2018-01-18
Payer: MEDICAID

## 2018-01-18 VITALS
DIASTOLIC BLOOD PRESSURE: 86 MMHG | RESPIRATION RATE: 18 BRPM | BODY MASS INDEX: 41.02 KG/M2 | HEART RATE: 78 BPM | TEMPERATURE: 99 F | WEIGHT: 293 LBS | SYSTOLIC BLOOD PRESSURE: 160 MMHG | OXYGEN SATURATION: 100 % | HEIGHT: 71 IN

## 2018-01-18 DIAGNOSIS — K08.89 PAIN, DENTAL: Primary | ICD-10-CM

## 2018-01-18 PROCEDURE — 99283 EMERGENCY DEPT VISIT LOW MDM: CPT

## 2018-01-18 RX ORDER — PENICILLIN V POTASSIUM 500 MG/1
500 TABLET, FILM COATED ORAL 4 TIMES DAILY
Qty: 40 TABLET | Refills: 0 | Status: SHIPPED | OUTPATIENT
Start: 2018-01-18 | End: 2018-01-28

## 2018-01-18 RX ORDER — DICLOFENAC SODIUM 75 MG/1
75 TABLET, DELAYED RELEASE ORAL 2 TIMES DAILY
Qty: 20 TABLET | Refills: 0 | Status: SHIPPED | OUTPATIENT
Start: 2018-01-18 | End: 2018-08-16

## 2018-01-19 NOTE — ED PROVIDER NOTES
"SCRIBE #1 NOTE: I, Irineo Vang, am scribing for, and in the presence of, FLAKITA Garrido. I have scribed the entire note.      History      Chief Complaint   Patient presents with    Dental Pain     R lower dental pain; pt reports she has a dentist appointment 2/15 but is in too much pain to wait       Review of patient's allergies indicates:  No Known Allergies     HPI   HPI    2018, 8:59 PM   History obtained from the patient       History of Present Illness: Jaswant Yang is a 42 y.o. female patient who presents to the Emergency Department for right lower dental pain which onset gradually " a while ago." Symptoms are constant and moderate in severity.  No mitigating or exacerbating factors reported. No other associated sxs reported. Pt states that she has a dentist appointment on 2/15, but states that the pain is not tolerable at this time. Patient denies any fever, chills, n/v/d, sore throat, dysphagia, drooling, facial swelling, and all other sxs at this time. No further complaints or concerns at this time.         Arrival mode: Personal vehicle    PCP: Primary Doctor No       Past Medical History:  Hx reviewed, not pertinent    Past Surgical History:  Past Surgical History:   Procedure Laterality Date     SECTION, CLASSIC           Family History:  Hx reviewed, not pertinent    Social History:  Social History     Social History Main Topics    Smoking status: Never Smoker    Smokeless tobacco: Unknown    Alcohol use No    Drug use: No    Sexual activity: Unknown       ROS   Review of Systems   Constitutional: Negative for chills and fever.   HENT: Positive for dental problem. Negative for drooling, facial swelling and sore throat.    Respiratory: Negative for shortness of breath.    Cardiovascular: Negative for chest pain.   Gastrointestinal: Negative for abdominal pain, diarrhea, nausea and vomiting.   Genitourinary: Negative for difficulty urinating and urgency. " "  Neurological: Negative for dizziness, weakness, light-headedness and headaches.   All other systems reviewed and are negative.      Physical Exam      Initial Vitals [01/18/18 2017]   BP Pulse Resp Temp SpO2   (!) 160/86 78 18 99 °F (37.2 °C) 100 %      MAP       110.67          Physical Exam  Nursing Notes and Vital Signs Reviewed.  Constitutional: Patient is in no apparent distress. Well-developed and well-nourished.  Head: Atraumatic. Normocephalic.  Eyes: PERRL. EOM intact. Conjunctivae are not pale. No scleral icterus.  Mouth/Throat: Tenderness to tooth number 32. Patient handles secretions normally. No palpable fluctuance. No evidence of periodontal or periapical abscess. No trismus.   ENT: Mucous membranes are moist.  Neck: Supple. Full ROM. No lymphadenopathy.  Cardiovascular: Regular rate. Regular rhythm.   Pulmonary/Chest: No respiratory distress.  Abdominal: Soft and non-distended.    Musculoskeletal: Moves all extremities. No obvious deformities. No edema.   Skin: Warm and dry.  Neurological:  Alert, awake, and appropriate.  Normal speech.  No acute focal neurological deficits are appreciated.  Psychiatric: Normal affect. Good eye contact. Appropriate in content.    ED Course    Procedures  ED Vital Signs:  Vitals:    01/18/18 2017   BP: (!) 160/86   Pulse: 78   Resp: 18   Temp: 99 °F (37.2 °C)   TempSrc: Oral   SpO2: 100%   Weight: (!) 137.8 kg (303 lb 12.7 oz)   Height: 5' 11" (1.803 m)              The Emergency Provider reviewed the vital signs and test results, which are outlined above.    ED Discussion     9:06 PM: Discussed with pt all pertinent ED information and results. Discussed pt dx and plan of tx. Gave pt all f/u and return to the ED instructions. All questions and concerns were addressed at this time. Pt expresses understanding of information and instructions, and is comfortable with plan to discharge. Pt is stable for discharge.    I discussed with patient and/or family/caretaker that " evaluation in the ED does not suggest any emergent or life threatening medical conditions requiring immediate intervention beyond what was provided in the ED, and I believe patient is safe for discharge.  Regardless, an unremarkable evaluation in the ED does not preclude the development or presence of a serious of life threatening condition. As such, patient was instructed to return immediately for any worsening or change in current symptoms.      ED Medication(s):  Medications - No data to display    New Prescriptions    DICLOFENAC (VOLTAREN) 75 MG EC TABLET    Take 1 tablet (75 mg total) by mouth 2 (two) times daily.    PENICILLIN V POTASSIUM (VEETID) 500 MG TABLET    Take 1 tablet (500 mg total) by mouth 4 (four) times daily.       Follow-up Information     Primary Doctor No. Go in 2 days.                   Medical Decision Making              Scribe Attestation:   Scribe #1: I performed the above scribed service and the documentation accurately describes the services I performed. I attest to the accuracy of the note.    Attending:   Physician Attestation Statement for Scribe #1: I, FLAKITA Garrido personally performed the services described in this documentation, as scribed by Irineo Vang, in my presence, and it is both accurate and complete.          Clinical Impression       ICD-10-CM ICD-9-CM   1. Pain, dental K08.89 525.9       Disposition:   Disposition: Discharged  Condition: Stable         FLAKITA Garrido  01/24/18 1114

## 2018-08-16 ENCOUNTER — HOSPITAL ENCOUNTER (EMERGENCY)
Facility: HOSPITAL | Age: 43
Discharge: HOME | End: 2018-08-16
Attending: EMERGENCY MEDICINE
Payer: MEDICAID

## 2018-08-16 VITALS
BODY MASS INDEX: 41.02 KG/M2 | HEART RATE: 71 BPM | OXYGEN SATURATION: 100 % | TEMPERATURE: 98 F | HEIGHT: 71 IN | WEIGHT: 293 LBS | SYSTOLIC BLOOD PRESSURE: 119 MMHG | DIASTOLIC BLOOD PRESSURE: 54 MMHG | RESPIRATION RATE: 17 BRPM

## 2018-08-16 DIAGNOSIS — D72.819 LEUKOPENIA, UNSPECIFIED TYPE: ICD-10-CM

## 2018-08-16 DIAGNOSIS — R07.9 CHEST PAIN: ICD-10-CM

## 2018-08-16 DIAGNOSIS — D64.9 ANEMIA, UNSPECIFIED TYPE: Primary | ICD-10-CM

## 2018-08-16 DIAGNOSIS — R05.9 COUGH: ICD-10-CM

## 2018-08-16 LAB
ALBUMIN SERPL BCP-MCNC: 4.1 G/DL
ALP SERPL-CCNC: 36 U/L
ALT SERPL W/O P-5'-P-CCNC: 7 U/L
ANION GAP SERPL CALC-SCNC: 7 MMOL/L
ANISOCYTOSIS BLD QL SMEAR: SLIGHT
AST SERPL-CCNC: 12 U/L
BASOPHILS NFR BLD: 0 %
BILIRUB SERPL-MCNC: 1.2 MG/DL
BUN SERPL-MCNC: 17 MG/DL
CALCIUM SERPL-MCNC: 9.2 MG/DL
CHLORIDE SERPL-SCNC: 106 MMOL/L
CO2 SERPL-SCNC: 25 MMOL/L
CREAT SERPL-MCNC: 1.4 MG/DL
DACRYOCYTES BLD QL SMEAR: ABNORMAL
DIFFERENTIAL METHOD: ABNORMAL
EOSINOPHIL NFR BLD: 3 %
ERYTHROCYTE [DISTWIDTH] IN BLOOD BY AUTOMATED COUNT: 20.1 %
EST. GFR  (AFRICAN AMERICAN): 54 ML/MIN/1.73 M^2
EST. GFR  (NON AFRICAN AMERICAN): 47 ML/MIN/1.73 M^2
GLUCOSE SERPL-MCNC: 106 MG/DL
HCT VFR BLD AUTO: 27.5 %
HGB BLD-MCNC: 8.6 G/DL
HYPOCHROMIA BLD QL SMEAR: ABNORMAL
LYMPHOCYTES NFR BLD: 59 %
MCH RBC QN AUTO: 28.9 PG
MCHC RBC AUTO-ENTMCNC: 31.3 G/DL
MCV RBC AUTO: 92 FL
MONOCYTES NFR BLD: 1 %
NEUTROPHILS NFR BLD: 37 %
OVALOCYTES BLD QL SMEAR: ABNORMAL
PLATELET # BLD AUTO: 82 K/UL
PLATELET BLD QL SMEAR: ABNORMAL
PMV BLD AUTO: 8.8 FL
POIKILOCYTOSIS BLD QL SMEAR: SLIGHT
POLYCHROMASIA BLD QL SMEAR: ABNORMAL
POTASSIUM SERPL-SCNC: 4.6 MMOL/L
PROT SERPL-MCNC: 6.9 G/DL
RBC # BLD AUTO: 2.98 M/UL
SODIUM SERPL-SCNC: 138 MMOL/L
SPHEROCYTES BLD QL SMEAR: ABNORMAL
STOMATOCYTES BLD QL SMEAR: PRESENT
TROPONIN I SERPL DL<=0.01 NG/ML-MCNC: <0.006 NG/ML
WBC # BLD AUTO: 1.76 K/UL

## 2018-08-16 PROCEDURE — 84484 ASSAY OF TROPONIN QUANT: CPT

## 2018-08-16 PROCEDURE — 99284 EMERGENCY DEPT VISIT MOD MDM: CPT | Mod: 25

## 2018-08-16 PROCEDURE — 85007 BL SMEAR W/DIFF WBC COUNT: CPT

## 2018-08-16 PROCEDURE — 93010 ELECTROCARDIOGRAM REPORT: CPT | Mod: ,,, | Performed by: INTERNAL MEDICINE

## 2018-08-16 PROCEDURE — 85027 COMPLETE CBC AUTOMATED: CPT

## 2018-08-16 PROCEDURE — 93005 ELECTROCARDIOGRAM TRACING: CPT

## 2018-08-16 PROCEDURE — 80053 COMPREHEN METABOLIC PANEL: CPT

## 2018-08-16 RX ORDER — BENZONATATE 200 MG/1
200 CAPSULE ORAL 3 TIMES DAILY PRN
Qty: 15 CAPSULE | Refills: 0 | Status: SHIPPED | OUTPATIENT
Start: 2018-08-16 | End: 2018-08-21

## 2018-08-16 RX ORDER — GUAIFENESIN 400 MG/1
400 TABLET ORAL EVERY 4 HOURS PRN
Qty: 20 TABLET | Refills: 0 | Status: SHIPPED | OUTPATIENT
Start: 2018-08-16 | End: 2018-08-21

## 2018-08-16 NOTE — ED PROVIDER NOTES
SCRIBE #1 NOTE: I, Elsa Cabello, am scribing for, and in the presence of, Hiren Armando MD. I have scribed the entire note.      History      Chief Complaint   Patient presents with    Chest Pain     left sided chest pain worse with inhale x 1 week. non releived by tamadol ot tylenol       Review of patient's allergies indicates:  No Known Allergies     HPI   HPI    2018, 7:54 AM   History obtained from the patient      History of Present Illness: Jaswant Yang is a 42 y.o. female patient who presents to the Emergency Department for L sided CP and cough which onset gradually 1.5 weeks ago. Symptoms are intermittent, moderate in severity, and worse with deep breathing. The pain is described as tightness that she can also feel in her back. No mitigating factors reported.. Patient denies fever, chills, leg pain/swelling, palpitations, SOB, N/V, abd pain, indigestion, dizziness, extremity weakness, and all other sxs at this time. No alcohol/drug use. She is not a smoker. Pt wants to make sure she does not have lung infection, because she knows someone who had similar symptoms and was dx with a lung infection (That person has a trach collar). She said last time she had these symptoms she was dx with bronchitis. No further complaints or concerns at this time.       Arrival mode: Personal vehicle    PCP: Primary Doctor No       Past Medical History:  Past Medical History:   Diagnosis Date    Anemia        Past Surgical History:  Past Surgical History:   Procedure Laterality Date     SECTION, CLASSIC           Family History:  History reviewed. No pertinent family history.    Social History:  Social History     Tobacco Use    Smoking status: Never Smoker    Smokeless tobacco: Never Used   Substance and Sexual Activity    Alcohol use: Yes     Comment: occasional    Drug use: No    Sexual activity: Yes     Birth control/protection: Condom       ROS   Review of Systems   Constitutional: Negative for  chills, diaphoresis and fever.   HENT: Negative for sore throat.    Respiratory: Positive for cough. Negative for shortness of breath.    Cardiovascular: Positive for chest pain. Negative for palpitations and leg swelling.   Gastrointestinal: Negative for abdominal pain, nausea and vomiting.   Genitourinary: Negative for dysuria.   Musculoskeletal: Negative for back pain.   Skin: Negative for rash.   Neurological: Negative for dizziness, weakness, numbness and headaches.   Hematological: Does not bruise/bleed easily.   All other systems reviewed and are negative.      Physical Exam      Initial Vitals [08/16/18 0749]   BP Pulse Resp Temp SpO2   (!) 146/73 70 18 98.2 °F (36.8 °C) 100 %      MAP       --          Physical Exam  Nursing Notes and Vital Signs Reviewed.  Constitutional: Patient is in no acute distress. Awake and alert. Very well appearing. Well-developed and well-nourished.  Head: Atraumatic. Normocephalic.  Eyes: PERRL. EOM intact. Conjunctivae are not pale. No scleral icterus.  ENT: Mucous membranes are moist. Oropharynx is clear and symmetric.    Neck: Supple. Full ROM. No lymphadenopathy.  Cardiovascular: Regular rate. Regular rhythm. No murmurs, rubs, or gallops. Distal pulses are 2+ and symmetric.  Pulmonary/Chest: No respiratory distress. Clear to auscultation bilaterally. No wheezing, rales, or rhonchi.  Abdominal: Soft and non-distended.  There is no tenderness.  No rebound, guarding, or rigidity.  Good bowel sounds.    Musculoskeletal: Moves all extremities. No obvious deformities. No edema. No calf tenderness.  Skin: Warm and dry.  Neurological:  Alert, awake, and appropriate.  Normal speech.  No acute focal neurological deficits are appreciated.  Psychiatric: Normal affect. Good eye contact. Appropriate in content.    ED Course    Procedures  ED Vital Signs:  Vitals:    08/16/18 0749   BP: (!) 146/73   Pulse: 70   Resp: 18   Temp: 98.2 °F (36.8 °C)   TempSrc: Oral   SpO2: 100%   Weight: 136  "kg (299 lb 13.2 oz)   Height: 5' 11" (1.803 m)       Abnormal Lab Results:  Labs Reviewed   CBC W/ AUTO DIFFERENTIAL - Abnormal; Notable for the following components:       Result Value    WBC 1.76 (*)     RBC 2.98 (*)     Hemoglobin 8.6 (*)     Hematocrit 27.5 (*)     MCHC 31.3 (*)     RDW 20.1 (*)     Platelets 82 (*)     MPV 8.8 (*)     Gran% 37.0 (*)     Lymph% 59.0 (*)     Mono% 1.0 (*)     Platelet Estimate Decreased (*)     All other components within normal limits    Narrative:      WBC  critical result(s) called and verbal readback obtained from   Lynn Gonzalez RN, 08/16/2018 10:01   COMPREHENSIVE METABOLIC PANEL - Abnormal; Notable for the following components:    Total Bilirubin 1.2 (*)     Alkaline Phosphatase 36 (*)     ALT 7 (*)     Anion Gap 7 (*)     eGFR if  54 (*)     eGFR if non  47 (*)     All other components within normal limits   TROPONIN I        All Lab Results:  Results for orders placed or performed during the hospital encounter of 08/16/18   Troponin I   Result Value Ref Range    Troponin I <0.006 0.000 - 0.026 ng/mL   CBC auto differential   Result Value Ref Range    WBC 1.76 (LL) 3.90 - 12.70 K/uL    RBC 2.98 (L) 4.00 - 5.40 M/uL    Hemoglobin 8.6 (L) 12.0 - 16.0 g/dL    Hematocrit 27.5 (L) 37.0 - 48.5 %    MCV 92 82 - 98 fL    MCH 28.9 27.0 - 31.0 pg    MCHC 31.3 (L) 32.0 - 36.0 g/dL    RDW 20.1 (H) 11.5 - 14.5 %    Platelets 82 (L) 150 - 350 K/uL    MPV 8.8 (L) 9.2 - 12.9 fL    Gran% 37.0 (L) 38.0 - 73.0 %    Lymph% 59.0 (H) 18.0 - 48.0 %    Mono% 1.0 (L) 4.0 - 15.0 %    Eosinophil% 3.0 0.0 - 8.0 %    Basophil% 0.0 0.0 - 1.9 %    Platelet Estimate Decreased (A)     Aniso Slight     Poik Slight     Poly Occasional     Hypo Occasional     Ovalocytes Occasional     Tear Drop Cells Occasional     Stomatocytes Present     Spherocytes Occasional     Differential Method Manual    Comprehensive metabolic panel   Result Value Ref Range    Sodium 138 136 - " 145 mmol/L    Potassium 4.6 3.5 - 5.1 mmol/L    Chloride 106 95 - 110 mmol/L    CO2 25 23 - 29 mmol/L    Glucose 106 70 - 110 mg/dL    BUN, Bld 17 6 - 20 mg/dL    Creatinine 1.4 0.5 - 1.4 mg/dL    Calcium 9.2 8.7 - 10.5 mg/dL    Total Protein 6.9 6.0 - 8.4 g/dL    Albumin 4.1 3.5 - 5.2 g/dL    Total Bilirubin 1.2 (H) 0.1 - 1.0 mg/dL    Alkaline Phosphatase 36 (L) 55 - 135 U/L    AST 12 10 - 40 U/L    ALT 7 (L) 10 - 44 U/L    Anion Gap 7 (L) 8 - 16 mmol/L    eGFR if African American 54 (A) >60 mL/min/1.73 m^2    eGFR if non African American 47 (A) >60 mL/min/1.73 m^2     Imaging Results:  Imaging Results          X-Ray Chest PA And Lateral (Final result)  Result time 08/16/18 08:34:43    Final result by LEANA Covington Sr., MD (08/16/18 08:34:43)                 Impression:      Normal study.      Electronically signed by: Leonel Covington MD  Date:    08/16/2018  Time:    08:34             Narrative:    EXAMINATION:  XR CHEST PA AND LATERAL    CLINICAL HISTORY:  Cough    COMPARISON:  None    FINDINGS:  The size and contour of the heart are normal. The lungs are clear. There is no pneumothorax or pleural effusion.                               The EKG was ordered, reviewed, and independently interpreted by the ED provider.  Interpretation time: 7:59  Rate: 70 BPM  Rhythm: normal sinus rhythm  Interpretation: Normal interval, ST segments, and axis. No STEMI.    The Emergency Provider reviewed the vital signs and test results, which are outlined above.    ED Discussion     11:00 AM: Reassessed pt at this time. Patient is resting comfortably in NAD. Informed patient of her low hemoglobin and of her low WBC count, which patient reports is chronic. Pt states that she does not take her iron pills and she was advised to start taking them again. She says taht she was recently tested for HIV and it was negative and denies being involved in high risk HIV behavior. Discussed pt dx and plan to tx cough with cough medicine and a  mucolytic. Informed pt to follow up with PCP about low hemoglobin and WBC count. All questions and concerns were addressed at this time. Pt expresses understanding of information and instructions, and is comfortable with plan to discharge. Pt is stable for discharge.    I have discussed with patient and/or family/caretaker chest pain precautions, specifically to return for worsening chest pain, shortness of breath, fever, or any concern.  I have low suspicion for cardiopulmonary, vascular, infectious, respiratory, or other emergent medical condition based on my evaluation in the ED.    ED Medication(s):  Medications - No data to display    New Prescriptions    BENZONATATE (TESSALON) 200 MG CAPSULE    Take 1 capsule (200 mg total) by mouth 3 (three) times daily as needed for Cough.    GUAIFENESIN (HUMIBID E) 400 MG TAB    Take 1 tablet (400 mg total) by mouth every 4 (four) hours as needed.       Follow-up Information     Primary Care Physician In 2 days.           [facility] - .    Specialty:  Emergency Medicine  Why:  As needed, If symptoms worsen  Contact information:  [address] [facility] [address] [phone]                  Medical Decision Making    Medical Decision Making:   Clinical Tests:   Lab Tests: Ordered and Reviewed  Radiological Study: Ordered and Reviewed  Medical Tests: Ordered and Reviewed           Scribe Attestation:   Scribe #1: I performed the above scribed service and the documentation accurately describes the services I performed. I attest to the accuracy of the note.    Attending:   Physician Attestation Statement for Scribe #1: I, Hiren Armando MD, personally performed the services described in this documentation, as scribed by Elsa Cabello, in my presence, and it is both accurate and complete.          Clinical Impression       ICD-10-CM ICD-9-CM   1. Anemia, unspecified type D64.9 285.9   2. Chest pain R07.9 786.50   3. Cough R05 786.2    4. Leukopenia, unspecified type D72.819 288.50       Disposition:   Disposition: Discharged  Condition: Stable         Hiren Armando MD  08/17/18 1793

## 2019-12-17 ENCOUNTER — HOSPITAL ENCOUNTER (EMERGENCY)
Facility: HOSPITAL | Age: 44
Discharge: HOME | End: 2019-12-17
Attending: EMERGENCY MEDICINE
Payer: MEDICAID

## 2019-12-17 VITALS
DIASTOLIC BLOOD PRESSURE: 89 MMHG | OXYGEN SATURATION: 99 % | HEIGHT: 71 IN | HEART RATE: 79 BPM | SYSTOLIC BLOOD PRESSURE: 161 MMHG | WEIGHT: 282.63 LBS | BODY MASS INDEX: 39.57 KG/M2 | TEMPERATURE: 99 F | RESPIRATION RATE: 16 BRPM

## 2019-12-17 DIAGNOSIS — L02.412 ABSCESS OF LEFT AXILLA: Primary | ICD-10-CM

## 2019-12-17 DIAGNOSIS — K02.9 PAIN DUE TO DENTAL CARIES: ICD-10-CM

## 2019-12-17 PROCEDURE — 99284 EMERGENCY DEPT VISIT MOD MDM: CPT

## 2019-12-17 RX ORDER — DICLOFENAC SODIUM 75 MG/1
75 TABLET, DELAYED RELEASE ORAL 2 TIMES DAILY
Qty: 20 TABLET | Refills: 0 | Status: ON HOLD | OUTPATIENT
Start: 2019-12-17 | End: 2020-07-06

## 2019-12-17 RX ORDER — TRAMADOL HYDROCHLORIDE 50 MG/1
50 TABLET ORAL EVERY 6 HOURS PRN
Qty: 12 TABLET | Refills: 0 | Status: SHIPPED | OUTPATIENT
Start: 2019-12-17 | End: 2020-06-11

## 2019-12-17 RX ORDER — CLINDAMYCIN HYDROCHLORIDE 300 MG/1
300 CAPSULE ORAL EVERY 6 HOURS
Qty: 28 CAPSULE | Refills: 0 | Status: SHIPPED | OUTPATIENT
Start: 2019-12-17 | End: 2019-12-24

## 2019-12-18 NOTE — ED PROVIDER NOTES
SCRIBE #1 NOTE: I, Edgar Ashraf, am scribing for, and in the presence of, Subhash Silverio Jr., QUINTENP. I have scribed the entire note.       History     Chief Complaint   Patient presents with    Dental Pain     States toothache adn infection.    Abscess     States knots under left arm.     Review of patient's allergies indicates:  No Known Allergies      History of Present Illness     HPI    2019, 8:01 PM  History obtained from the patient      History of Present Illness: Jaswant Yang is a 44 y.o. female patient with a PMHx of anemia who presents to the Emergency Department for evaluation of dental pain which onset gradually 1 month PTA. Symptoms are constant and moderate in severity. No mitigating or exacerbating factors reported. Associated sxs include 3 abscesses under her L armpit. Patient denies any fever, chills, sore throat, cough, n/v/d, CP, SOB, HA, syncope, weakness, drooling, trouble swallowing, sore throat, and all other sxs at this time. Pt states that she has an appointment with her dentist scheduled for February. No further complaints or concerns at this time.         Arrival mode: Personal vehicle    PCP: Primary Doctor No        Past Medical History:  Past Medical History:   Diagnosis Date    Anemia        Past Surgical History:  Past Surgical History:   Procedure Laterality Date     SECTION, CLASSIC           Family History:  History reviewed. No pertinent family history.      Social History:  Social History     Tobacco Use    Smoking status: Never Smoker    Smokeless tobacco: Never Used   Substance and Sexual Activity    Alcohol use: Yes     Comment: occasional    Drug use: No    Sexual activity: Yes     Birth control/protection: Condom        Review of Systems     Review of Systems   Constitutional: Negative for activity change, appetite change, chills, diaphoresis and fever.   HENT: Positive for dental problem. Negative for congestion, drooling, ear pain, mouth  sores, rhinorrhea, sinus pain, sore throat and trouble swallowing.    Eyes: Negative for pain and discharge.   Respiratory: Negative for cough, chest tightness, shortness of breath, wheezing and stridor.    Cardiovascular: Negative for chest pain, palpitations and leg swelling.   Gastrointestinal: Negative for abdominal distention, abdominal pain, blood in stool, constipation, diarrhea, nausea and vomiting.   Genitourinary: Negative for difficulty urinating, dysuria, flank pain, frequency, hematuria and urgency.   Musculoskeletal: Negative for arthralgias, back pain and myalgias.   Skin: Negative for pallor, rash and wound.        (+) abscesses under L armpit     Neurological: Negative for dizziness, syncope, weakness, light-headedness and numbness.   All other systems reviewed and are negative.       Physical Exam     Initial Vitals [12/17/19 1938]   BP Pulse Resp Temp SpO2   (!) 161/89 79 16 98.7 °F (37.1 °C) 99 %      MAP       --          Physical Exam  Nursing Notes and Vital Signs Reviewed.  Constitutional: Patient is in no acute distress. Well-developed and well-nourished.  Head: Atraumatic. Normocephalic.  Eyes: PERRL. EOM intact. Conjunctivae are not pale. No scleral icterus.  ENT: Mucous membranes are moist. Oropharynx is clear and symmetric.    Mouth: No evident facial swelling. Patient handles secretions normally. Positive for dental caries. Positive for gingival edema around 1st and 2nd L upper molars. No palpable fluctuance. No evidence of periodontal or periapical abscess. No trismus.   Neck: Supple. Full ROM. No lymphadenopathy.  Cardiovascular: Regular rate. Regular rhythm. No murmurs, rubs, or gallops. Distal pulses are 2+ and symmetric.  Pulmonary/Chest: No respiratory distress. Clear to auscultation bilaterally. No wheezing or rales.  Abdominal: Soft and non-distended.  There is no tenderness.  No rebound, guarding, or rigidity.  Genitourinary: No CVA tenderness  Musculoskeletal: Moves all  "extremities. No obvious deformities. No edema. No calf tenderness.  Skin: Warm and dry. 3 small 1 cm abscesses noted under L axilla. Tender to touch. No fluctuance.   Neurological:  Alert, awake, and appropriate.  Normal speech.  No acute focal neurological deficits are appreciated.  Psychiatric: Normal affect. Good eye contact. Appropriate in content.     ED Course   Procedures  ED Vital Signs:  Vitals:    12/17/19 1938   BP: (!) 161/89   Pulse: 79   Resp: 16   Temp: 98.7 °F (37.1 °C)   TempSrc: Oral   SpO2: 99%   Weight: 128.2 kg (282 lb 10.1 oz)   Height: 5' 11" (1.803 m)              The Emergency Provider reviewed the vital signs and test results, which are outlined above.     ED Discussion       8:11 PM:  Discussed with pt all pertinent ED information and results. Discussed pt dx and plan of tx. Gave pt all f/u and return to the ED instructions. All questions and concerns were addressed at this time. Pt expresses understanding of information and instructions, and is comfortable with plan to discharge. Pt is stable for discharge.    I discussed with patient and/or family/caretaker that evaluation in the ED does not suggest any emergent or life threatening medical conditions requiring immediate intervention beyond what was provided in the ED, and I believe patient is safe for discharge.  Regardless, an unremarkable evaluation in the ED does not preclude the development or presence of a serious of life threatening condition. As such, patient was instructed to return immediately for any worsening or change in current symptoms.                   ED Medication(s):  Medications - No data to display    New Prescriptions    CLINDAMYCIN (CLEOCIN) 300 MG CAPSULE    Take 1 capsule (300 mg total) by mouth every 6 (six) hours. for 7 days    DICLOFENAC (VOLTAREN) 75 MG EC TABLET    Take 1 tablet (75 mg total) by mouth 2 (two) times daily.    TRAMADOL (ULTRAM) 50 MG TABLET    Take 1 tablet (50 mg total) by mouth every 6 (six) " hours as needed for Pain.       Follow-up Information     [facility]    Specialty:  Emergency Medicine  Why:  If symptoms worsen  Contact information:  [address]  [facility] [address] [phone]                     Scribe Attestation:   Scribe #1: I performed the above scribed service and the documentation accurately describes the services I performed. I attest to the accuracy of the note.     Attending:   Physician Attestation Statement for Scribe #1: I, JATINDER Lara Jr., personally performed the services described in this documentation, as scribed by Edgar Ashraf, in my presence, and it is both accurate and complete.           Clinical Impression       ICD-10-CM ICD-9-CM   1. Abscess of left axilla L02.412 682.3   2. Pain due to dental caries K02.9 521.00               JATINDER Lara Jr.  12/18/19 1144

## 2020-01-16 ENCOUNTER — HOSPITAL ENCOUNTER (EMERGENCY)
Facility: HOSPITAL | Age: 45
Discharge: HOME | End: 2020-01-16
Attending: EMERGENCY MEDICINE
Payer: MEDICAID

## 2020-01-16 VITALS
DIASTOLIC BLOOD PRESSURE: 81 MMHG | HEART RATE: 75 BPM | RESPIRATION RATE: 18 BRPM | OXYGEN SATURATION: 100 % | SYSTOLIC BLOOD PRESSURE: 143 MMHG | BODY MASS INDEX: 40.74 KG/M2 | TEMPERATURE: 99 F | HEIGHT: 71 IN | WEIGHT: 291 LBS

## 2020-01-16 DIAGNOSIS — R05.9 COUGH: ICD-10-CM

## 2020-01-16 DIAGNOSIS — J20.9 ACUTE BRONCHITIS, UNSPECIFIED ORGANISM: Primary | ICD-10-CM

## 2020-01-16 PROCEDURE — 94761 N-INVAS EAR/PLS OXIMETRY MLT: CPT

## 2020-01-16 PROCEDURE — 25000003 PHARM REV CODE 250: Performed by: NURSE PRACTITIONER

## 2020-01-16 PROCEDURE — 99284 EMERGENCY DEPT VISIT MOD MDM: CPT | Mod: 25

## 2020-01-16 RX ORDER — ALBUTEROL SULFATE 90 UG/1
1-2 AEROSOL, METERED RESPIRATORY (INHALATION) EVERY 6 HOURS PRN
Qty: 1 INHALER | Refills: 0 | Status: SHIPPED | OUTPATIENT
Start: 2020-01-16 | End: 2021-01-15

## 2020-01-16 RX ORDER — AZITHROMYCIN 250 MG/1
250 TABLET, FILM COATED ORAL DAILY
Qty: 6 TABLET | Refills: 0 | Status: ON HOLD | OUTPATIENT
Start: 2020-01-16 | End: 2020-05-16 | Stop reason: HOSPADM

## 2020-01-16 RX ORDER — METHYLPREDNISOLONE 4 MG/1
TABLET ORAL
Qty: 1 PACKAGE | Refills: 0 | Status: ON HOLD | OUTPATIENT
Start: 2020-01-16 | End: 2020-05-16 | Stop reason: HOSPADM

## 2020-01-16 RX ORDER — PROMETHAZINE HYDROCHLORIDE AND CODEINE PHOSPHATE 6.25; 1 MG/5ML; MG/5ML
5 SOLUTION ORAL
Status: COMPLETED | OUTPATIENT
Start: 2020-01-16 | End: 2020-01-16

## 2020-01-16 RX ORDER — PROMETHAZINE HYDROCHLORIDE AND DEXTROMETHORPHAN HYDROBROMIDE 6.25; 15 MG/5ML; MG/5ML
5 SYRUP ORAL 3 TIMES DAILY PRN
Qty: 180 ML | Refills: 0 | Status: SHIPPED | OUTPATIENT
Start: 2020-01-16 | End: 2020-01-26

## 2020-01-16 RX ADMIN — PROMETHAZINE HYDROCHLORIDE AND CODEINE PHOSPHATE 5 ML: 6.25; 1 SOLUTION ORAL at 10:01

## 2020-01-17 NOTE — ED PROVIDER NOTES
SCRIBE #1 NOTE: I, Emi Small, am scribing for, and in the presence of, Lavon Moore NP. I have scribed the entire note.      History      Chief Complaint   Patient presents with    Chest Congestion     pt reports cough, wheezing and chest pain from coughing. Pt reports she is unable to sleep due to the coughing       Review of patient's allergies indicates:  No Known Allergies     HPI   HPI    2020, 10:39 PM   History obtained from the patient      History of Present Illness: Jaswant Yang is a 44 y.o. female patient who presents to the Emergency Department for chest congestion which onset 10 days PTA. Symptoms are constant and moderate in severity. Coughing causes pain to worsen. Associated sxs include cough, wheezing. Patient denies any fever, chills, sore throat, rhinorrhea, weakness, numbness , and all other sxs at this time. Prior Tx includes Mucinex . No further complaints or concerns at this time.       Arrival mode: Personal vehicle    PCP: Primary Doctor No       Past Medical History:  Past Medical History:   Diagnosis Date    Anemia        Past Surgical History:  Past Surgical History:   Procedure Laterality Date     SECTION, CLASSIC           Family History:  History reviewed. No pertinent family history.     Social History:  Social History     Tobacco Use    Smoking status: Never Smoker    Smokeless tobacco: Never Used   Substance and Sexual Activity    Alcohol use: Yes     Comment: occasional    Drug use: No    Sexual activity: Yes     Birth control/protection: Condom       ROS   Review of Systems   Constitutional: Negative for chills and fever.   HENT: Negative for rhinorrhea and sore throat.    Respiratory: Positive for cough and wheezing. Negative for shortness of breath.         (+) chest congestion     Cardiovascular: Negative for chest pain.   Gastrointestinal: Negative for nausea.   Genitourinary: Negative for dysuria.   Musculoskeletal: Negative for back pain.  "  Skin: Negative for rash.   Neurological: Negative for weakness and numbness.   Hematological: Does not bruise/bleed easily.   All other systems reviewed and are negative.    Physical Exam      Initial Vitals [01/16/20 2233]   BP Pulse Resp Temp SpO2   (!) 143/81 74 19 98.7 °F (37.1 °C) 100 %      MAP       --          Physical Exam  Nursing Notes and Vital Signs Reviewed.  Constitutional: Patient is in no acute distress. Well-developed and well-nourished.  Head: Atraumatic. Normocephalic.  Eyes: PERRL. EOM intact. Conjunctivae are not pale. No scleral icterus.  ENT: Mucous membranes are moist. Oropharynx is clear and symmetric. Post nasal drip.  Neck: Supple. Full ROM. No lymphadenopathy.  Cardiovascular: Regular rate. Regular rhythm. No murmurs, rubs, or gallops. Distal pulses are 2+ and symmetric.  Pulmonary/Chest: No respiratory distress. Late expiratory wheezing bilaterally posterior.   Abdominal: Soft and non-distended.  There is no tenderness.  No rebound, guarding, or rigidity. Good bowel sounds.  Genitourinary: No CVA tenderness  Musculoskeletal: Moves all extremities. No obvious deformities. No edema. No calf tenderness.  Skin: Warm and dry.  Neurological:  Alert, awake, and appropriate.  Normal speech.  No acute focal neurological deficits are appreciated.  Psychiatric: Normal affect. Good eye contact. Appropriate in content.    ED Course    Procedures  ED Vital Signs:  Vitals:    01/16/20 2233 01/16/20 2252   BP: (!) 143/81    Pulse: 74 75   Resp: 19 18   Temp: 98.7 °F (37.1 °C)    TempSrc: Oral    SpO2: 100% 100%   Weight: 132 kg (291 lb 0.1 oz)    Height: 5' 11" (1.803 m)        Abnormal Lab Results:  Labs Reviewed - No data to display     All Lab Results:    Imaging Results:  Imaging Results          X-Ray Chest PA And Lateral (Final result)  Result time 01/16/20 23:12:57    Final result by Damion Bailey MD (01/16/20 23:12:57)                 Impression:      Negative chest " x-ray.      Electronically signed by: Damion Bailey MD  Date:    01/16/2020  Time:    23:12             Narrative:    EXAMINATION:  XR CHEST PA AND LATERAL    CLINICAL HISTORY:  Cough    FINDINGS:  Comparison study 08/16/2018.  No change.  Normal size heart.  Mild aortic tortuosity.  No congestion.  Lungs are clear.                                        The Emergency Provider reviewed the vital signs and test results, which are outlined above.    ED Discussion     I discussed with patient and/or family/caretaker that evaluation in the ED does not suggest any emergent or life threatening medical conditions requiring immediate intervention beyond what was provided in the ED, and I believe patient is safe for discharge. Regardless, an unremarkable evaluation in the ED does not preclude the development or presence of a serious of life threatening condition. As such, patient was instructed to return immediately for any worsening or change in current symptoms.    Regarding BRONCHITIS, for treatment, I encouraged patient to refrain from smoking, drink plenty of fluids, rest, take medications as prescribed, and to use a humidifier or steam in the bathroom. Patient instructed to notify primary care provider if: they have a cough most days or have a cough that returns frequently; are coughing up blood; have a high fever or shaking chills; have a low-grade fever for three or more days; develop thick, greenish mucus, especially if it has a bad smell; and feel short of breath or have chest pain. For prevention, discussed with patient the importance of not smoking, getting annual influenza vaccines, reducing exposure to air pollution, and to frequently wash hands to avoid spread of infection.  Instructed patient to return to emergency department if they experience chest pain or shortness of breath and to follow up with primary care provider for re-evaluation.         ED Medication(s):  Medications   promethazine-codeine 6.25-10  mg/5 ml syrup 5 mL (5 mLs Oral Given 1/16/20 7142)       Follow-up Information     [facility]    Specialty:  Emergency Medicine  Why:  As needed, If symptoms worsen  Contact information:  [address] [facility] [address] [phone]           Schedule an appointment as soon as possible for a visit  with PCP.                   Medical Decision Making    Medical Decision Making:   Clinical Tests:   Radiological Study: Ordered and Reviewed           Scribe Attestation:   Scribe #1: I performed the above scribed service and the documentation accurately describes the services I performed. I attest to the accuracy of the note.    Attending:   Physician Attestation Statement for Scribe #1: I, Lavon Moore NP, personally performed the services described in this documentation, as scribed by Emi Small, in my presence, and it is both accurate and complete.          Clinical Impression       ICD-10-CM ICD-9-CM   1. Acute bronchitis, unspecified organism J20.9 466.0   2. Cough R05 786.2       Disposition:   Disposition: Discharged  Condition: Stable         Lavon Moore NP  01/17/20 8611

## 2020-05-10 PROCEDURE — 99285 EMERGENCY DEPT VISIT HI MDM: CPT

## 2020-05-11 ENCOUNTER — ANESTHESIA (OUTPATIENT)
Dept: CARDIOLOGY | Facility: HOSPITAL | Age: 45
DRG: 800 | End: 2020-05-11
Payer: MEDICAID

## 2020-05-11 ENCOUNTER — HOSPITAL ENCOUNTER (INPATIENT)
Facility: HOSPITAL | Age: 45
LOS: 5 days | Discharge: HOME | DRG: 800 | End: 2020-05-16
Attending: EMERGENCY MEDICINE | Admitting: FAMILY MEDICINE
Payer: MEDICAID

## 2020-05-11 ENCOUNTER — ANESTHESIA EVENT (OUTPATIENT)
Dept: CARDIOLOGY | Facility: HOSPITAL | Age: 45
DRG: 800 | End: 2020-05-11
Payer: MEDICAID

## 2020-05-11 DIAGNOSIS — S36.029A SPLEEN HEMATOMA, INITIAL ENCOUNTER: ICD-10-CM

## 2020-05-11 DIAGNOSIS — S36.00XA INJURY OF SPLEEN, INITIAL ENCOUNTER: ICD-10-CM

## 2020-05-11 DIAGNOSIS — E88.09 HYPOALBUMINEMIA: ICD-10-CM

## 2020-05-11 DIAGNOSIS — R10.9 ABDOMINAL PAIN: ICD-10-CM

## 2020-05-11 DIAGNOSIS — D73.5: ICD-10-CM

## 2020-05-11 DIAGNOSIS — J90 PLEURAL EFFUSION ON LEFT: ICD-10-CM

## 2020-05-11 DIAGNOSIS — R10.9 LEFT SIDED ABDOMINAL PAIN: ICD-10-CM

## 2020-05-11 DIAGNOSIS — R18.8 OTHER ASCITES: Primary | ICD-10-CM

## 2020-05-11 DIAGNOSIS — I31.39 PERICARDIAL EFFUSION: ICD-10-CM

## 2020-05-11 DIAGNOSIS — R79.89 ELEVATED SERUM CREATININE: ICD-10-CM

## 2020-05-11 DIAGNOSIS — J90 PLEURAL EFFUSION: ICD-10-CM

## 2020-05-11 PROBLEM — D64.9 ANEMIA: Status: ACTIVE | Noted: 2020-05-11

## 2020-05-11 PROBLEM — N18.30 CKD (CHRONIC KIDNEY DISEASE) STAGE 3, GFR 30-59 ML/MIN: Status: ACTIVE | Noted: 2020-05-11

## 2020-05-11 PROBLEM — K59.00 CONSTIPATION: Status: ACTIVE | Noted: 2020-05-11

## 2020-05-11 PROBLEM — Z98.890 POST-OPERATIVE STATE: Status: ACTIVE | Noted: 2020-05-11

## 2020-05-11 LAB
ABO + RH BLD: NORMAL
ACANTHOCYTES BLD QL SMEAR: PRESENT
ALBUMIN SERPL BCP-MCNC: 4.4 G/DL (ref 3.5–5.2)
ALLENS TEST: ABNORMAL
ALP SERPL-CCNC: 36 U/L (ref 55–135)
ALT SERPL W/O P-5'-P-CCNC: 7 U/L (ref 10–44)
ANION GAP SERPL CALC-SCNC: 16 MMOL/L (ref 8–16)
ANISOCYTOSIS BLD QL SMEAR: SLIGHT
APTT BLDCRRT: 26.5 SEC (ref 21–32)
AST SERPL-CCNC: 13 U/L (ref 10–40)
B-HCG UR QL: NEGATIVE
BACTERIA #/AREA URNS HPF: ABNORMAL /HPF
BASOPHILS # BLD AUTO: 0.01 K/UL (ref 0–0.2)
BASOPHILS NFR BLD: 0 % (ref 0–1.9)
BASOPHILS NFR BLD: 0.3 % (ref 0–1.9)
BILIRUB SERPL-MCNC: 1.4 MG/DL (ref 0.1–1)
BILIRUB UR QL STRIP: NEGATIVE
BLD GP AB SCN CELLS X3 SERPL QL: NORMAL
BLD PROD TYP BPU: NORMAL
BLD PROD TYP BPU: NORMAL
BLOOD UNIT EXPIRATION DATE: NORMAL
BLOOD UNIT EXPIRATION DATE: NORMAL
BLOOD UNIT TYPE CODE: 5100
BLOOD UNIT TYPE CODE: 5100
BLOOD UNIT TYPE: NORMAL
BLOOD UNIT TYPE: NORMAL
BUN SERPL-MCNC: 14 MG/DL (ref 6–20)
BURR CELLS BLD QL SMEAR: ABNORMAL
CALCIUM SERPL-MCNC: 9.7 MG/DL (ref 8.7–10.5)
CHLORIDE SERPL-SCNC: 101 MMOL/L (ref 95–110)
CLARITY UR: CLEAR
CO2 SERPL-SCNC: 19 MMOL/L (ref 23–29)
CODING SYSTEM: NORMAL
CODING SYSTEM: NORMAL
COLOR UR: YELLOW
CREAT SERPL-MCNC: 1.7 MG/DL (ref 0.5–1.4)
DACRYOCYTES BLD QL SMEAR: ABNORMAL
DELSYS: ABNORMAL
DIFFERENTIAL METHOD: ABNORMAL
DIFFERENTIAL METHOD: ABNORMAL
DISPENSE STATUS: NORMAL
DISPENSE STATUS: NORMAL
EOSINOPHIL # BLD AUTO: 0 K/UL (ref 0–0.5)
EOSINOPHIL NFR BLD: 0.7 % (ref 0–8)
EOSINOPHIL NFR BLD: 2 % (ref 0–8)
ERYTHROCYTE [DISTWIDTH] IN BLOOD BY AUTOMATED COUNT: 18.9 % (ref 11.5–14.5)
ERYTHROCYTE [DISTWIDTH] IN BLOOD BY AUTOMATED COUNT: 19.9 % (ref 11.5–14.5)
ERYTHROCYTE [SEDIMENTATION RATE] IN BLOOD BY WESTERGREN METHOD: 12 MM/H
EST. GFR  (AFRICAN AMERICAN): 42 ML/MIN/1.73 M^2
EST. GFR  (NON AFRICAN AMERICAN): 36 ML/MIN/1.73 M^2
FIO2: 100
GLUCOSE SERPL-MCNC: 121 MG/DL (ref 70–110)
GLUCOSE SERPL-MCNC: 121 MG/DL (ref 70–110)
GLUCOSE UR QL STRIP: NEGATIVE
HCO3 UR-SCNC: 21.5 MMOL/L (ref 24–28)
HCT VFR BLD AUTO: 29.6 % (ref 37–48.5)
HCT VFR BLD AUTO: 30.5 % (ref 37–48.5)
HCT VFR BLD AUTO: 32 % (ref 37–48.5)
HCT VFR BLD AUTO: 40.3 % (ref 37–48.5)
HCT VFR BLD AUTO: 40.7 % (ref 37–48.5)
HCT VFR BLD CALC: 25 %PCV (ref 36–54)
HETEROPH AB SERPL QL IA: NEGATIVE
HGB BLD-MCNC: 12.2 G/DL (ref 12–16)
HGB BLD-MCNC: 13.4 G/DL (ref 12–16)
HGB BLD-MCNC: 9 G/DL (ref 12–16)
HGB BLD-MCNC: 9.5 G/DL (ref 12–16)
HGB BLD-MCNC: 9.6 G/DL (ref 12–16)
HGB UR QL STRIP: ABNORMAL
HYALINE CASTS #/AREA URNS LPF: 0 /LPF
HYPOCHROMIA BLD QL SMEAR: ABNORMAL
IMM GRANULOCYTES # BLD AUTO: 0.02 K/UL (ref 0–0.04)
IMM GRANULOCYTES # BLD AUTO: ABNORMAL K/UL (ref 0–0.04)
IMM GRANULOCYTES NFR BLD AUTO: 0.7 % (ref 0–0.5)
IMM GRANULOCYTES NFR BLD AUTO: ABNORMAL % (ref 0–0.5)
INR PPP: 1.1 (ref 0.8–1.2)
KETONES UR QL STRIP: NEGATIVE
LACTATE SERPL-SCNC: 2.1 MMOL/L (ref 0.5–2.2)
LEUKOCYTE ESTERASE UR QL STRIP: ABNORMAL
LIPASE SERPL-CCNC: 16 U/L (ref 4–60)
LYMPHOCYTES # BLD AUTO: 0.9 K/UL (ref 1–4.8)
LYMPHOCYTES NFR BLD: 29.2 % (ref 18–48)
LYMPHOCYTES NFR BLD: 38 % (ref 18–48)
MCH RBC QN AUTO: 29 PG (ref 27–31)
MCH RBC QN AUTO: 29.5 PG (ref 27–31)
MCHC RBC AUTO-ENTMCNC: 30 G/DL (ref 32–36)
MCHC RBC AUTO-ENTMCNC: 31.1 G/DL (ref 32–36)
MCV RBC AUTO: 95 FL (ref 82–98)
MCV RBC AUTO: 97 FL (ref 82–98)
MICROSCOPIC COMMENT: ABNORMAL
MODE: ABNORMAL
MONOCYTES # BLD AUTO: 0 K/UL (ref 0.3–1)
MONOCYTES NFR BLD: 1.3 % (ref 4–15)
MONOCYTES NFR BLD: 6 % (ref 4–15)
NEUTROPHILS # BLD AUTO: 2 K/UL (ref 1.8–7.7)
NEUTROPHILS NFR BLD: 37 % (ref 38–73)
NEUTROPHILS NFR BLD: 67.8 % (ref 38–73)
NEUTS BAND NFR BLD MANUAL: 17 %
NITRITE UR QL STRIP: NEGATIVE
NRBC BLD-RTO: 4 /100 WBC
NRBC BLD-RTO: 9 /100 WBC
NUM UNITS TRANS PACKED RBC: NORMAL
NUM UNITS TRANS PACKED RBC: NORMAL
OVALOCYTES BLD QL SMEAR: ABNORMAL
PCO2 BLDA: 41.2 MMHG (ref 35–45)
PH SMN: 7.33 [PH] (ref 7.35–7.45)
PH UR STRIP: 6 [PH] (ref 5–8)
PLATELET # BLD AUTO: 142 K/UL (ref 150–350)
PLATELET # BLD AUTO: 84 K/UL (ref 150–350)
PLATELET BLD QL SMEAR: ABNORMAL
PMV BLD AUTO: 9.6 FL (ref 9.2–12.9)
PMV BLD AUTO: 9.6 FL (ref 9.2–12.9)
PO2 BLDA: 263 MMHG (ref 80–100)
POC BE: -5 MMOL/L
POC IONIZED CALCIUM: 1.21 MMOL/L (ref 1.06–1.42)
POC SATURATED O2: 100 % (ref 95–100)
POIKILOCYTOSIS BLD QL SMEAR: SLIGHT
POLYCHROMASIA BLD QL SMEAR: ABNORMAL
POTASSIUM BLD-SCNC: 4.8 MMOL/L (ref 3.5–5.1)
POTASSIUM SERPL-SCNC: 3.9 MMOL/L (ref 3.5–5.1)
PROT SERPL-MCNC: 7.6 G/DL (ref 6–8.4)
PROT UR QL STRIP: ABNORMAL
PROTHROMBIN TIME: 11.3 SEC (ref 9–12.5)
RBC # BLD AUTO: 3.22 M/UL (ref 4–5.4)
RBC # BLD AUTO: 4.2 M/UL (ref 4–5.4)
RBC #/AREA URNS HPF: 0 /HPF (ref 0–4)
SAMPLE: ABNORMAL
SARS-COV-2 RDRP RESP QL NAA+PROBE: NEGATIVE
SCHISTOCYTES BLD QL SMEAR: PRESENT
SITE: ABNORMAL
SODIUM BLD-SCNC: 134 MMOL/L (ref 136–145)
SODIUM SERPL-SCNC: 136 MMOL/L (ref 136–145)
SP GR UR STRIP: 1.02 (ref 1–1.03)
SQUAMOUS #/AREA URNS HPF: 10 /HPF
TARGETS BLD QL SMEAR: ABNORMAL
URN SPEC COLLECT METH UR: ABNORMAL
UROBILINOGEN UR STRIP-ACNC: NEGATIVE EU/DL
VT: 575
WBC # BLD AUTO: 1.5 K/UL (ref 3.9–12.7)
WBC # BLD AUTO: 3.01 K/UL (ref 3.9–12.7)
WBC #/AREA URNS HPF: 10 /HPF (ref 0–5)
WBC CLUMPS URNS QL MICRO: ABNORMAL
YEAST URNS QL MICRO: ABNORMAL

## 2020-05-11 PROCEDURE — 99233 SBSQ HOSP IP/OBS HIGH 50: CPT | Mod: ,,, | Performed by: INTERNAL MEDICINE

## 2020-05-11 PROCEDURE — 63600175 PHARM REV CODE 636 W HCPCS: Performed by: ANESTHESIOLOGY

## 2020-05-11 PROCEDURE — 82330 ASSAY OF CALCIUM: CPT

## 2020-05-11 PROCEDURE — 25000003 PHARM REV CODE 250: Performed by: NURSE ANESTHETIST, CERTIFIED REGISTERED

## 2020-05-11 PROCEDURE — 83690 ASSAY OF LIPASE: CPT

## 2020-05-11 PROCEDURE — 25500020 PHARM REV CODE 255: Performed by: RADIOLOGY

## 2020-05-11 PROCEDURE — C1769 GUIDE WIRE: HCPCS | Performed by: RADIOLOGY

## 2020-05-11 PROCEDURE — 88184 FLOWCYTOMETRY/ TC 1 MARKER: CPT | Mod: 59 | Performed by: PATHOLOGY

## 2020-05-11 PROCEDURE — 25000003 PHARM REV CODE 250: Performed by: FAMILY MEDICINE

## 2020-05-11 PROCEDURE — 88331 PATH CONSLTJ SURG 1 BLK 1SPC: CPT | Mod: 26,,, | Performed by: PATHOLOGY

## 2020-05-11 PROCEDURE — 88189 FLOWCYTOMETRY/READ 16 & >: CPT | Mod: 59,,, | Performed by: PATHOLOGY

## 2020-05-11 PROCEDURE — 81025 URINE PREGNANCY TEST: CPT

## 2020-05-11 PROCEDURE — P9040 RBC LEUKOREDUCED IRRADIATED: HCPCS

## 2020-05-11 PROCEDURE — 71000033 HC RECOVERY, INTIAL HOUR: Performed by: SURGERY

## 2020-05-11 PROCEDURE — 80053 COMPREHEN METABOLIC PANEL: CPT

## 2020-05-11 PROCEDURE — 88189 PR  FLOWCYTOMETRY/READ, 16 & > MARKERS: ICD-10-PCS | Mod: 59,,, | Performed by: PATHOLOGY

## 2020-05-11 PROCEDURE — 87206 SMEAR FLUORESCENT/ACID STAI: CPT

## 2020-05-11 PROCEDURE — 88342 IMHCHEM/IMCYTCHM 1ST ANTB: CPT | Mod: 26,59,, | Performed by: PATHOLOGY

## 2020-05-11 PROCEDURE — 63600175 PHARM REV CODE 636 W HCPCS: Performed by: RADIOLOGY

## 2020-05-11 PROCEDURE — 88341 IMHCHEM/IMCYTCHM EA ADD ANTB: CPT | Mod: 59 | Performed by: PATHOLOGY

## 2020-05-11 PROCEDURE — 11000001 HC ACUTE MED/SURG PRIVATE ROOM

## 2020-05-11 PROCEDURE — 88112 CYTOPATH CELL ENHANCE TECH: CPT | Mod: 26,,, | Performed by: PATHOLOGY

## 2020-05-11 PROCEDURE — 36415 COLL VENOUS BLD VENIPUNCTURE: CPT

## 2020-05-11 PROCEDURE — 71000039 HC RECOVERY, EACH ADD'L HOUR: Performed by: SURGERY

## 2020-05-11 PROCEDURE — 88365 PR  TISSUE HYBRIDIZATION: ICD-10-PCS | Mod: 26,,, | Performed by: PATHOLOGY

## 2020-05-11 PROCEDURE — 63600175 PHARM REV CODE 636 W HCPCS: Performed by: EMERGENCY MEDICINE

## 2020-05-11 PROCEDURE — 82042 OTHER SOURCE ALBUMIN QUAN EA: CPT

## 2020-05-11 PROCEDURE — 85014 HEMATOCRIT: CPT

## 2020-05-11 PROCEDURE — 88112 PR  CYTOPATH, CELL ENHANCE TECH: ICD-10-PCS | Mod: 26,,, | Performed by: PATHOLOGY

## 2020-05-11 PROCEDURE — 88365 INSITU HYBRIDIZATION (FISH): CPT | Performed by: PATHOLOGY

## 2020-05-11 PROCEDURE — 87070 CULTURE OTHR SPECIMN AEROBIC: CPT

## 2020-05-11 PROCEDURE — 88184 FLOWCYTOMETRY/ TC 1 MARKER: CPT | Performed by: PATHOLOGY

## 2020-05-11 PROCEDURE — 88364 INSITU HYBRIDIZATION (FISH): CPT | Mod: 59 | Performed by: PATHOLOGY

## 2020-05-11 PROCEDURE — 38100 REMOVAL OF SPLEEN TOTAL: CPT | Mod: ,,, | Performed by: SURGERY

## 2020-05-11 PROCEDURE — 82150 ASSAY OF AMYLASE: CPT

## 2020-05-11 PROCEDURE — 88305 TISSUE EXAM BY PATHOLOGIST: CPT | Mod: 59 | Performed by: PATHOLOGY

## 2020-05-11 PROCEDURE — 85027 COMPLETE CBC AUTOMATED: CPT

## 2020-05-11 PROCEDURE — 63600175 PHARM REV CODE 636 W HCPCS: Performed by: SURGERY

## 2020-05-11 PROCEDURE — 37799 UNLISTED PX VASCULAR SURGERY: CPT

## 2020-05-11 PROCEDURE — 88305 TISSUE EXAM BY PATHOLOGIST: CPT | Mod: 26,,, | Performed by: PATHOLOGY

## 2020-05-11 PROCEDURE — 85730 THROMBOPLASTIN TIME PARTIAL: CPT

## 2020-05-11 PROCEDURE — U0002 COVID-19 LAB TEST NON-CDC: HCPCS

## 2020-05-11 PROCEDURE — 88342 CHG IMMUNOCYTOCHEMISTRY: ICD-10-PCS | Mod: 26,59,, | Performed by: PATHOLOGY

## 2020-05-11 PROCEDURE — 25000003 PHARM REV CODE 250: Performed by: ANESTHESIOLOGY

## 2020-05-11 PROCEDURE — 88305 TISSUE EXAM BY PATHOLOGIST: CPT | Mod: 26,59,, | Performed by: PATHOLOGY

## 2020-05-11 PROCEDURE — 84157 ASSAY OF PROTEIN OTHER: CPT

## 2020-05-11 PROCEDURE — C9290 INJ, BUPIVACAINE LIPOSOME: HCPCS | Performed by: SURGERY

## 2020-05-11 PROCEDURE — 63600175 PHARM REV CODE 636 W HCPCS: Performed by: NURSE ANESTHETIST, CERTIFIED REGISTERED

## 2020-05-11 PROCEDURE — 86850 RBC ANTIBODY SCREEN: CPT

## 2020-05-11 PROCEDURE — 37000008 HC ANESTHESIA 1ST 15 MINUTES: Performed by: SURGERY

## 2020-05-11 PROCEDURE — 84311 SPECTROPHOTOMETRY: CPT

## 2020-05-11 PROCEDURE — 88112 CYTOPATH CELL ENHANCE TECH: CPT | Performed by: PATHOLOGY

## 2020-05-11 PROCEDURE — 36000708 HC OR TIME LEV III 1ST 15 MIN: Performed by: SURGERY

## 2020-05-11 PROCEDURE — 86920 COMPATIBILITY TEST SPIN: CPT

## 2020-05-11 PROCEDURE — 25000003 PHARM REV CODE 250: Performed by: EMERGENCY MEDICINE

## 2020-05-11 PROCEDURE — 38100 PR REMOVAL SPLEEN, TOTAL: ICD-10-PCS | Mod: ,,, | Performed by: SURGERY

## 2020-05-11 PROCEDURE — 81000 URINALYSIS NONAUTO W/SCOPE: CPT

## 2020-05-11 PROCEDURE — C1894 INTRO/SHEATH, NON-LASER: HCPCS | Performed by: RADIOLOGY

## 2020-05-11 PROCEDURE — 86308 HETEROPHILE ANTIBODY SCREEN: CPT

## 2020-05-11 PROCEDURE — 88305 TISSUE EXAM BY PATHOLOGIST: ICD-10-PCS | Mod: 26,59,, | Performed by: PATHOLOGY

## 2020-05-11 PROCEDURE — 82945 GLUCOSE OTHER FLUID: CPT

## 2020-05-11 PROCEDURE — 85007 BL SMEAR W/DIFF WBC COUNT: CPT

## 2020-05-11 PROCEDURE — 87205 SMEAR GRAM STAIN: CPT

## 2020-05-11 PROCEDURE — A4216 STERILE WATER/SALINE, 10 ML: HCPCS | Performed by: ANESTHESIOLOGY

## 2020-05-11 PROCEDURE — 85018 HEMOGLOBIN: CPT | Mod: 91

## 2020-05-11 PROCEDURE — 83615 LACTATE (LD) (LDH) ENZYME: CPT

## 2020-05-11 PROCEDURE — 88342 IMHCHEM/IMCYTCHM 1ST ANTB: CPT | Mod: 91 | Performed by: PATHOLOGY

## 2020-05-11 PROCEDURE — 88185 FLOWCYTOMETRY/TC ADD-ON: CPT | Mod: 59 | Performed by: PATHOLOGY

## 2020-05-11 PROCEDURE — 85610 PROTHROMBIN TIME: CPT

## 2020-05-11 PROCEDURE — 63600175 PHARM REV CODE 636 W HCPCS: Performed by: NURSE PRACTITIONER

## 2020-05-11 PROCEDURE — 99233 PR SUBSEQUENT HOSPITAL CARE,LEVL III: ICD-10-PCS | Mod: ,,, | Performed by: INTERNAL MEDICINE

## 2020-05-11 PROCEDURE — 85014 HEMATOCRIT: CPT | Mod: 91

## 2020-05-11 PROCEDURE — 87075 CULTR BACTERIA EXCEPT BLOOD: CPT

## 2020-05-11 PROCEDURE — 82803 BLOOD GASES ANY COMBINATION: CPT

## 2020-05-11 PROCEDURE — 38100 PR REMOVAL SPLEEN, TOTAL: ICD-10-PCS | Mod: 80,,, | Performed by: SURGERY

## 2020-05-11 PROCEDURE — 88341 IMHCHEM/IMCYTCHM EA ADD ANTB: CPT | Mod: 26,59,, | Performed by: PATHOLOGY

## 2020-05-11 PROCEDURE — 99232 PR SUBSEQUENT HOSPITAL CARE,LEVL II: ICD-10-PCS | Mod: 57,,, | Performed by: SURGERY

## 2020-05-11 PROCEDURE — 88189 PR  FLOWCYTOMETRY/READ, 16 & > MARKERS: ICD-10-PCS | Mod: ,,, | Performed by: PATHOLOGY

## 2020-05-11 PROCEDURE — C1760 CLOSURE DEV, VASC: HCPCS | Performed by: RADIOLOGY

## 2020-05-11 PROCEDURE — 85018 HEMOGLOBIN: CPT

## 2020-05-11 PROCEDURE — 88341 PR IHC OR ICC EACH ADD'L SINGLE ANTIBODY  STAINPR: ICD-10-PCS | Mod: 26,59,, | Performed by: PATHOLOGY

## 2020-05-11 PROCEDURE — 25000003 PHARM REV CODE 250: Performed by: SURGERY

## 2020-05-11 PROCEDURE — 87076 CULTURE ANAEROBE IDENT EACH: CPT

## 2020-05-11 PROCEDURE — 88189 FLOWCYTOMETRY/READ 16 & >: CPT | Mod: ,,, | Performed by: PATHOLOGY

## 2020-05-11 PROCEDURE — 27201423 OPTIME MED/SURG SUP & DEVICES STERILE SUPPLY: Performed by: SURGERY

## 2020-05-11 PROCEDURE — 38100 REMOVAL OF SPLEEN TOTAL: CPT | Mod: 80,,, | Performed by: SURGERY

## 2020-05-11 PROCEDURE — 88331 PR  PATH CONSULT IN SURG,W FRZ SEC: ICD-10-PCS | Mod: 26,,, | Performed by: PATHOLOGY

## 2020-05-11 PROCEDURE — C1887 CATHETER, GUIDING: HCPCS | Performed by: RADIOLOGY

## 2020-05-11 PROCEDURE — 96375 TX/PRO/DX INJ NEW DRUG ADDON: CPT | Performed by: EMERGENCY MEDICINE

## 2020-05-11 PROCEDURE — 84295 ASSAY OF SERUM SODIUM: CPT

## 2020-05-11 PROCEDURE — 87116 MYCOBACTERIA CULTURE: CPT

## 2020-05-11 PROCEDURE — 96361 HYDRATE IV INFUSION ADD-ON: CPT

## 2020-05-11 PROCEDURE — 88342 IMHCHEM/IMCYTCHM 1ST ANTB: CPT | Performed by: PATHOLOGY

## 2020-05-11 PROCEDURE — 99900035 HC TECH TIME PER 15 MIN (STAT)

## 2020-05-11 PROCEDURE — 36000709 HC OR TIME LEV III EA ADD 15 MIN: Performed by: SURGERY

## 2020-05-11 PROCEDURE — 96376 TX/PRO/DX INJ SAME DRUG ADON: CPT | Performed by: EMERGENCY MEDICINE

## 2020-05-11 PROCEDURE — 96365 THER/PROPH/DIAG IV INF INIT: CPT | Performed by: EMERGENCY MEDICINE

## 2020-05-11 PROCEDURE — 83605 ASSAY OF LACTIC ACID: CPT

## 2020-05-11 PROCEDURE — 96375 TX/PRO/DX INJ NEW DRUG ADDON: CPT

## 2020-05-11 PROCEDURE — 88364 INSITU HYBRIDIZATION (FISH): CPT | Mod: 26,,, | Performed by: PATHOLOGY

## 2020-05-11 PROCEDURE — 88364 CHG INSITU HYBRIDIZATION (FISH: ICD-10-PCS | Mod: 26,,, | Performed by: PATHOLOGY

## 2020-05-11 PROCEDURE — 99232 SBSQ HOSP IP/OBS MODERATE 35: CPT | Mod: 57,,, | Performed by: SURGERY

## 2020-05-11 PROCEDURE — 88305 TISSUE EXAM BY PATHOLOGIST: ICD-10-PCS | Mod: 26,,, | Performed by: PATHOLOGY

## 2020-05-11 PROCEDURE — 84132 ASSAY OF SERUM POTASSIUM: CPT

## 2020-05-11 PROCEDURE — 89051 BODY FLUID CELL COUNT: CPT

## 2020-05-11 PROCEDURE — C1729 CATH, DRAINAGE: HCPCS | Performed by: SURGERY

## 2020-05-11 PROCEDURE — 96376 TX/PRO/DX INJ SAME DRUG ADON: CPT

## 2020-05-11 PROCEDURE — 37000009 HC ANESTHESIA EA ADD 15 MINS: Performed by: SURGERY

## 2020-05-11 PROCEDURE — 88305 TISSUE EXAM BY PATHOLOGIST: CPT | Performed by: PATHOLOGY

## 2020-05-11 PROCEDURE — 88331 PATH CONSLTJ SURG 1 BLK 1SPC: CPT | Performed by: PATHOLOGY

## 2020-05-11 PROCEDURE — 88365 INSITU HYBRIDIZATION (FISH): CPT | Mod: 26,,, | Performed by: PATHOLOGY

## 2020-05-11 PROCEDURE — 85025 COMPLETE CBC W/AUTO DIFF WBC: CPT

## 2020-05-11 RX ORDER — SUCCINYLCHOLINE CHLORIDE 20 MG/ML
INJECTION INTRAMUSCULAR; INTRAVENOUS
Status: DISCONTINUED | OUTPATIENT
Start: 2020-05-11 | End: 2020-05-11

## 2020-05-11 RX ORDER — NEOSTIGMINE METHYLSULFATE 1 MG/ML
INJECTION, SOLUTION INTRAVENOUS
Status: DISCONTINUED | OUTPATIENT
Start: 2020-05-11 | End: 2020-05-11

## 2020-05-11 RX ORDER — INDOMETHACIN 25 MG/1
CAPSULE ORAL
Status: DISCONTINUED | OUTPATIENT
Start: 2020-05-11 | End: 2020-05-11

## 2020-05-11 RX ORDER — SODIUM CHLORIDE, SODIUM LACTATE, POTASSIUM CHLORIDE, CALCIUM CHLORIDE 600; 310; 30; 20 MG/100ML; MG/100ML; MG/100ML; MG/100ML
INJECTION, SOLUTION INTRAVENOUS CONTINUOUS PRN
Status: DISCONTINUED | OUTPATIENT
Start: 2020-05-11 | End: 2020-05-11

## 2020-05-11 RX ORDER — FENTANYL CITRATE 50 UG/ML
INJECTION, SOLUTION INTRAMUSCULAR; INTRAVENOUS
Status: COMPLETED | OUTPATIENT
Start: 2020-05-11 | End: 2020-05-11

## 2020-05-11 RX ORDER — MORPHINE SULFATE 4 MG/ML
4 INJECTION, SOLUTION INTRAMUSCULAR; INTRAVENOUS ONCE
Status: DISCONTINUED | OUTPATIENT
Start: 2020-05-11 | End: 2020-05-16 | Stop reason: HOSPADM

## 2020-05-11 RX ORDER — ONDANSETRON 2 MG/ML
4 INJECTION INTRAMUSCULAR; INTRAVENOUS EVERY 8 HOURS PRN
Status: DISCONTINUED | OUTPATIENT
Start: 2020-05-11 | End: 2020-05-16 | Stop reason: HOSPADM

## 2020-05-11 RX ORDER — MIDAZOLAM HYDROCHLORIDE 1 MG/ML
INJECTION, SOLUTION INTRAMUSCULAR; INTRAVENOUS
Status: COMPLETED | OUTPATIENT
Start: 2020-05-11 | End: 2020-05-11

## 2020-05-11 RX ORDER — ONDANSETRON 2 MG/ML
INJECTION INTRAMUSCULAR; INTRAVENOUS
Status: DISCONTINUED | OUTPATIENT
Start: 2020-05-11 | End: 2020-05-11

## 2020-05-11 RX ORDER — HYDROMORPHONE HYDROCHLORIDE 2 MG/ML
0.2 INJECTION, SOLUTION INTRAMUSCULAR; INTRAVENOUS; SUBCUTANEOUS EVERY 5 MIN PRN
Status: DISCONTINUED | OUTPATIENT
Start: 2020-05-11 | End: 2020-05-11 | Stop reason: HOSPADM

## 2020-05-11 RX ORDER — DIPHENHYDRAMINE HYDROCHLORIDE 50 MG/ML
25 INJECTION INTRAMUSCULAR; INTRAVENOUS EVERY 6 HOURS PRN
Status: DISCONTINUED | OUTPATIENT
Start: 2020-05-11 | End: 2020-05-16 | Stop reason: HOSPADM

## 2020-05-11 RX ORDER — PROPOFOL 10 MG/ML
VIAL (ML) INTRAVENOUS
Status: DISCONTINUED | OUTPATIENT
Start: 2020-05-11 | End: 2020-05-11

## 2020-05-11 RX ORDER — MORPHINE SULFATE 4 MG/ML
2 INJECTION, SOLUTION INTRAMUSCULAR; INTRAVENOUS
Status: COMPLETED | OUTPATIENT
Start: 2020-05-11 | End: 2020-05-11

## 2020-05-11 RX ORDER — MORPHINE SULFATE 2 MG/ML
2 INJECTION, SOLUTION INTRAMUSCULAR; INTRAVENOUS EVERY 6 HOURS PRN
Status: DISCONTINUED | OUTPATIENT
Start: 2020-05-11 | End: 2020-05-12

## 2020-05-11 RX ORDER — GLYCOPYRROLATE 0.2 MG/ML
INJECTION INTRAMUSCULAR; INTRAVENOUS
Status: DISCONTINUED | OUTPATIENT
Start: 2020-05-11 | End: 2020-05-11

## 2020-05-11 RX ORDER — SODIUM CHLORIDE 9 MG/ML
INJECTION, SOLUTION INTRAVENOUS CONTINUOUS PRN
Status: DISCONTINUED | OUTPATIENT
Start: 2020-05-11 | End: 2020-05-11

## 2020-05-11 RX ORDER — LIDOCAINE HYDROCHLORIDE 10 MG/ML
INJECTION, SOLUTION EPIDURAL; INFILTRATION; INTRACAUDAL; PERINEURAL
Status: DISCONTINUED | OUTPATIENT
Start: 2020-05-11 | End: 2020-05-11

## 2020-05-11 RX ORDER — METOCLOPRAMIDE HYDROCHLORIDE 5 MG/ML
10 INJECTION INTRAMUSCULAR; INTRAVENOUS EVERY 10 MIN PRN
Status: DISCONTINUED | OUTPATIENT
Start: 2020-05-11 | End: 2020-05-11 | Stop reason: HOSPADM

## 2020-05-11 RX ORDER — BUPIVACAINE HYDROCHLORIDE 2.5 MG/ML
INJECTION, SOLUTION EPIDURAL; INFILTRATION; INTRACAUDAL
Status: DISCONTINUED | OUTPATIENT
Start: 2020-05-11 | End: 2020-05-11 | Stop reason: HOSPADM

## 2020-05-11 RX ORDER — MORPHINE SULFATE 4 MG/ML
4 INJECTION, SOLUTION INTRAMUSCULAR; INTRAVENOUS
Status: COMPLETED | OUTPATIENT
Start: 2020-05-11 | End: 2020-05-11

## 2020-05-11 RX ORDER — ONDANSETRON 2 MG/ML
4 INJECTION INTRAMUSCULAR; INTRAVENOUS
Status: COMPLETED | OUTPATIENT
Start: 2020-05-11 | End: 2020-05-11

## 2020-05-11 RX ORDER — MEPERIDINE HYDROCHLORIDE 25 MG/ML
12.5 INJECTION INTRAMUSCULAR; INTRAVENOUS; SUBCUTANEOUS ONCE AS NEEDED
Status: COMPLETED | OUTPATIENT
Start: 2020-05-11 | End: 2020-05-11

## 2020-05-11 RX ORDER — ROCURONIUM BROMIDE 10 MG/ML
INJECTION, SOLUTION INTRAVENOUS
Status: DISCONTINUED | OUTPATIENT
Start: 2020-05-11 | End: 2020-05-11

## 2020-05-11 RX ORDER — PHENYLEPHRINE HYDROCHLORIDE 10 MG/ML
INJECTION INTRAVENOUS
Status: DISCONTINUED | OUTPATIENT
Start: 2020-05-11 | End: 2020-05-11

## 2020-05-11 RX ORDER — SODIUM CHLORIDE 0.9 % (FLUSH) 0.9 %
3 SYRINGE (ML) INJECTION EVERY 8 HOURS
Status: DISCONTINUED | OUTPATIENT
Start: 2020-05-11 | End: 2020-05-13

## 2020-05-11 RX ADMIN — FENTANYL CITRATE 25 MCG: 50 INJECTION, SOLUTION INTRAMUSCULAR; INTRAVENOUS at 10:05

## 2020-05-11 RX ADMIN — IOHEXOL 70 ML: 240 INJECTION, SOLUTION INTRATHECAL; INTRAVASCULAR; INTRAVENOUS; ORAL at 10:05

## 2020-05-11 RX ADMIN — SODIUM BICARBONATE 50 MEQ: 84 INJECTION, SOLUTION INTRAVENOUS at 12:05

## 2020-05-11 RX ADMIN — PROPOFOL 150 MG: 10 INJECTION, EMULSION INTRAVENOUS at 11:05

## 2020-05-11 RX ADMIN — ROCURONIUM BROMIDE 30 MG: 10 INJECTION, SOLUTION INTRAVENOUS at 12:05

## 2020-05-11 RX ADMIN — MIDAZOLAM HYDROCHLORIDE 0.5 MG: 1 INJECTION, SOLUTION INTRAMUSCULAR; INTRAVENOUS at 10:05

## 2020-05-11 RX ADMIN — Medication 3 ML: at 09:05

## 2020-05-11 RX ADMIN — HYDROMORPHONE HYDROCHLORIDE 0.2 MG: 2 INJECTION INTRAMUSCULAR; INTRAVENOUS; SUBCUTANEOUS at 02:05

## 2020-05-11 RX ADMIN — MORPHINE SULFATE 4 MG: 4 INJECTION, SOLUTION INTRAMUSCULAR; INTRAVENOUS at 03:05

## 2020-05-11 RX ADMIN — PHENYLEPHRINE HYDROCHLORIDE 100 MCG: 10 INJECTION INTRAVENOUS at 11:05

## 2020-05-11 RX ADMIN — SODIUM CHLORIDE: 0.9 INJECTION, SOLUTION INTRAVENOUS at 12:05

## 2020-05-11 RX ADMIN — MORPHINE SULFATE 2 MG: 2 INJECTION, SOLUTION INTRAMUSCULAR; INTRAVENOUS at 11:05

## 2020-05-11 RX ADMIN — MORPHINE SULFATE 2 MG: 2 INJECTION, SOLUTION INTRAMUSCULAR; INTRAVENOUS at 05:05

## 2020-05-11 RX ADMIN — HYDROMORPHONE HYDROCHLORIDE 0.2 MG: 2 INJECTION INTRAMUSCULAR; INTRAVENOUS; SUBCUTANEOUS at 03:05

## 2020-05-11 RX ADMIN — GLYCOPYRROLATE 0.8 MG: 0.2 INJECTION INTRAMUSCULAR; INTRAVENOUS at 01:05

## 2020-05-11 RX ADMIN — CEFTRIAXONE 2 G: 2 INJECTION, SOLUTION INTRAVENOUS at 06:05

## 2020-05-11 RX ADMIN — SUCCINYLCHOLINE CHLORIDE 140 MG: 20 INJECTION, SOLUTION INTRAMUSCULAR; INTRAVENOUS at 11:05

## 2020-05-11 RX ADMIN — ROCURONIUM BROMIDE 25 MG: 10 INJECTION, SOLUTION INTRAVENOUS at 11:05

## 2020-05-11 RX ADMIN — PHENYLEPHRINE HYDROCHLORIDE 100 MCG: 10 INJECTION INTRAVENOUS at 12:05

## 2020-05-11 RX ADMIN — MORPHINE SULFATE 4 MG: 4 INJECTION, SOLUTION INTRAMUSCULAR; INTRAVENOUS at 01:05

## 2020-05-11 RX ADMIN — AZITHROMYCIN MONOHYDRATE 500 MG: 500 INJECTION, POWDER, LYOPHILIZED, FOR SOLUTION INTRAVENOUS at 07:05

## 2020-05-11 RX ADMIN — ROCURONIUM BROMIDE 10 MG: 10 INJECTION, SOLUTION INTRAVENOUS at 12:05

## 2020-05-11 RX ADMIN — ONDANSETRON 4 MG: 2 INJECTION INTRAMUSCULAR; INTRAVENOUS at 01:05

## 2020-05-11 RX ADMIN — NEOSTIGMINE METHYLSULFATE 5 MG: 1 INJECTION INTRAVENOUS at 01:05

## 2020-05-11 RX ADMIN — LIDOCAINE HYDROCHLORIDE 50 MG: 10 INJECTION, SOLUTION EPIDURAL; INFILTRATION; INTRACAUDAL; PERINEURAL at 11:05

## 2020-05-11 RX ADMIN — BUPIVACAINE 266 MG: 13.3 INJECTION, SUSPENSION, LIPOSOMAL INFILTRATION at 12:05

## 2020-05-11 RX ADMIN — SODIUM CHLORIDE, SODIUM LACTATE, POTASSIUM CHLORIDE, AND CALCIUM CHLORIDE: 600; 310; 30; 20 INJECTION, SOLUTION INTRAVENOUS at 11:05

## 2020-05-11 RX ADMIN — MIDAZOLAM HYDROCHLORIDE 1 MG: 1 INJECTION, SOLUTION INTRAMUSCULAR; INTRAVENOUS at 10:05

## 2020-05-11 RX ADMIN — ROCURONIUM BROMIDE 5 MG: 10 INJECTION, SOLUTION INTRAVENOUS at 11:05

## 2020-05-11 RX ADMIN — SODIUM CHLORIDE 1000 ML: 0.9 INJECTION, SOLUTION INTRAVENOUS at 09:05

## 2020-05-11 RX ADMIN — SODIUM CHLORIDE: 0.9 INJECTION, SOLUTION INTRAVENOUS at 11:05

## 2020-05-11 RX ADMIN — SODIUM CHLORIDE 1000 ML: 0.9 INJECTION, SOLUTION INTRAVENOUS at 01:05

## 2020-05-11 RX ADMIN — MORPHINE SULFATE 2 MG: 4 INJECTION, SOLUTION INTRAMUSCULAR; INTRAVENOUS at 06:05

## 2020-05-11 RX ADMIN — ROCURONIUM BROMIDE 10 MG: 10 INJECTION, SOLUTION INTRAVENOUS at 11:05

## 2020-05-11 RX ADMIN — MEPERIDINE HYDROCHLORIDE 12.5 MG: 25 INJECTION INTRAMUSCULAR; INTRAVENOUS; SUBCUTANEOUS at 02:05

## 2020-05-11 RX ADMIN — FENTANYL CITRATE 50 MCG: 50 INJECTION, SOLUTION INTRAMUSCULAR; INTRAVENOUS at 10:05

## 2020-05-11 NOTE — NURSING
Patient to room from ER via bed.Alert and oreinted x4. Transferred to bed w assist x 1. Ambulating

## 2020-05-11 NOTE — SUBJECTIVE & OBJECTIVE
Past Medical History:   Diagnosis Date    Anemia     Anemia     Back pain        Past Surgical History:   Procedure Laterality Date     SECTION, CLASSIC      TUBAL LIGATION         Review of patient's allergies indicates:  No Known Allergies    No current facility-administered medications on file prior to encounter.      Current Outpatient Medications on File Prior to Encounter   Medication Sig    albuterol (PROVENTIL/VENTOLIN HFA) 90 mcg/actuation inhaler Inhale 1-2 puffs into the lungs every 6 (six) hours as needed for Wheezing. Rescue    ibuprofen (ADVIL,MOTRIN) 200 MG tablet Take 2 tablets (400 mg total) by mouth every 6 (six) hours as needed for Pain.    traMADol (ULTRAM) 50 mg tablet Take 1 tablet (50 mg total) by mouth every 6 (six) hours as needed for Pain.    azithromycin (Z-MAURILIO) 250 MG tablet Take 1 tablet (250 mg total) by mouth once daily. Take first 2 tablets together, then 1 every day until finished.    diclofenac (VOLTAREN) 75 MG EC tablet Take 1 tablet (75 mg total) by mouth 2 (two) times daily.    methylPREDNISolone (MEDROL DOSEPACK) 4 mg tablet As directed     Family History     None        Tobacco Use    Smoking status: Current Every Day Smoker     Packs/day: 0.25    Smokeless tobacco: Never Used   Substance and Sexual Activity    Alcohol use: Yes     Comment: occasional    Drug use: No    Sexual activity: Yes     Birth control/protection: Condom     Review of Systems   Constitutional: Positive for activity change, appetite change and fatigue. Negative for chills and fever.   HENT: Negative for congestion, rhinorrhea and sinus pressure.    Respiratory: Negative for apnea, cough, choking, chest tightness, shortness of breath, wheezing and stridor.    Cardiovascular: Negative for chest pain, palpitations and leg swelling.   Gastrointestinal: Positive for abdominal distention, abdominal pain and constipation. Negative for diarrhea, nausea and vomiting.   Endocrine: Negative  for cold intolerance and heat intolerance.   Genitourinary: Positive for flank pain. Negative for difficulty urinating and hematuria.   Musculoskeletal: Positive for back pain. Negative for arthralgias and joint swelling.   Skin: Negative for color change, pallor and rash.   Neurological: Positive for weakness. Negative for dizziness, seizures, numbness and headaches.   Psychiatric/Behavioral: Negative for agitation. The patient is not nervous/anxious.      Objective:     Vital Signs (Most Recent):  Temp: 98 °F (36.7 °C) (05/11/20 0810)  Pulse: 107 (05/11/20 0810)  Resp: 20 (05/11/20 0810)  BP: (!) 108/56 (05/11/20 0810)  SpO2: (!) 94 % (05/11/20 0810) Vital Signs (24h Range):  Temp:  [98 °F (36.7 °C)-98.3 °F (36.8 °C)] 98 °F (36.7 °C)  Pulse:  [107-141] 107  Resp:  [18-22] 20  SpO2:  [94 %-99 %] 94 %  BP: (104-141)/(56-87) 108/56     Weight: 127.4 kg (280 lb 13.9 oz)  Body mass index is 39.17 kg/m².    Physical Exam   Constitutional: She is oriented to person, place, and time.   Neck: No JVD present.   Cardiovascular: Tachycardia present. Exam reveals no gallop and no friction rub.   No murmur heard.  Pulmonary/Chest: No stridor. No respiratory distress. She has decreased breath sounds in the left middle field and the left lower field. She has no wheezes. She has no rales. She exhibits no tenderness.   Abdominal: She exhibits distension. Bowel sounds are decreased. There is tenderness in the epigastric area and left upper quadrant.   Neurological: She is alert and oriented to person, place, and time.   Skin: She is not diaphoretic.   Nursing note and vitals reviewed.          Significant Labs:   CBC:   Recent Labs   Lab 05/11/20 0027   WBC 3.01*   HGB 12.2   HCT 40.7   *     CMP:   Recent Labs   Lab 05/11/20 0027      K 3.9      CO2 19*   *   BUN 14   CREATININE 1.7*   CALCIUM 9.7   PROT 7.6   ALBUMIN 4.4   BILITOT 1.4*   ALKPHOS 36*   AST 13   ALT 7*   ANIONGAP 16   EGFRNONAA 36*      Cardiac Markers: No results for input(s): CKMB, MYOGLOBIN, BNP, TROPISTAT in the last 48 hours.  Lactic Acid:   Recent Labs   Lab 05/11/20  0324   LACTATE 2.1     Lipase:   Recent Labs   Lab 05/11/20  0027   LIPASE 16       Significant Imaging:     Imaging Results          X-Ray Chest AP Portable (Final result)  Result time 05/11/20 07:49:56    Final result by Hiren Cabrera MD (05/11/20 07:49:56)                 Impression:      Large left-sided pleural effusion with left-sided atelectasis or airspace disease.      Electronically signed by: Hiren Cabrera MD  Date:    05/11/2020  Time:    07:49             Narrative:    EXAMINATION:  XR CHEST AP PORTABLE    CLINICAL HISTORY:  pleural effusion;    FINDINGS:  Single view of the chest.  Comparison 01/16/2020    Cardiac silhouette is enlarged.  Large left-sided pleural effusion with left-sided atelectasis or airspace disease.  Patient rotation limits evaluation for mediastinal shift.  Minimal atelectasis right lung base..  No evidence of pleural effusion or pneumothorax.  Bones appear intact.                               CT Abdomen Pelvis  Without Contrast (Final result)  Result time 05/11/20 08:43:43   Procedure changed from CT Abdomen Pelvis With Contrast     Final result by Hiren Cabrera MD (05/11/20 08:43:43)                 Impression:      Area of decreased attenuation within the lower portion of the spleen concerning for splenic laceration and surrounding perisplenic hematoma. High attenuating fluid surrounds the spleen. Active bleed is not excluded on noncontrast imaging. CTA can be obtained as clinically warranted.    Splenomegaly.    Scattered fluid within the pelvis likely reflects mild hemoperitoneum.    Stranding is seen within the mesentery of the left upper quadrant which could reflect local inflammation or reflect traumatic injury.    All CT scans at this facility use dose modulation, iterative reconstruction, and/or weight based dosing when  appropriate to reduce radiation dose to as low as reasonable achievable.      Electronically signed by: Hiren Cabrera MD  Date:    05/11/2020  Time:    08:43             Narrative:    EXAMINATION:  CT ABDOMEN PELVIS WITHOUT CONTRAST    CLINICAL HISTORY:  Abd pain, fever, abscess suspected;    TECHNIQUE:  Low dose axial images, sagittal and coronal reformations were obtained from the lung bases to the pubic symphysis.  Oral contrast was not administered.    COMPARISON:  05/11/2020    FINDINGS:  Heart: Mild cardiomegaly..  Trace effusion.    Lung Bases: Large left pleural effusion with left lower lobe atelectasis or airspace disease.    Liver: Normal size and attenuation. No focal lesions.    Gallbladder: No calcified gallstones.    Bile Ducts: No dilatation.    Pancreas: No obvious mass. No peripancreatic fat stranding.    Spleen: Splenomegaly measuring up to 17 cm..  Area of decreased attenuation within the lower portion of the spleen concerning for splenic laceration and surrounding perisplenic hematoma.  High attenuating fluid surrounds the spleen.  Active bleed is not excluded on noncontrast imaging.  CTA can be obtained as clinically warranted.    Adrenals: Normal.    Kidneys/Ureters: No mass, hydroureteronephrosis, or nephroureterolithiasis.  4 cm hypodensity within the interpolar region right kidney consistent with a cyst.    Bladder: No wall thickening.    Reproductive organs: Enlarged myomatous uterus is suspected.    GI Tract/Mesentery: No evidence of bowel obstruction or inflammation.  Stranding is seen within the mesentery of the left upper quadrant which could reflect local inflammation or posttraumatic injury.  No evidence of bowel obstruction.  Moderate constipation noted.    Peritoneal Space: Scattered fluid is seen within the pelvis anterior to the uterus as well as within the cul-de-sac which likely reflects hemoperitoneum.    Retroperitoneum: Small amount of fluid is seen tracking along the left  pericolic gutter likely reflecting extension of perisplenic hematoma.  Small amount of fluid is seen within the cul-de-sac.  Small amount of fluid also suspected within the lorrie hepatis region.    Abdominal wall: Normal.    Vasculature: No aneurysm.    Bones: No acute fracture.  No rib fractures are seen.  No suspicious lytic or sclerotic lesions.                               X-Ray Abdomen Flat And Erect (Final result)  Result time 05/11/20 07:48:12    Final result by Hiren Cabrera MD (05/11/20 07:48:12)                 Impression:      See findings above      Electronically signed by: Hiren Cabrera MD  Date:    05/11/2020  Time:    07:48             Narrative:    EXAMINATION:  XR ABDOMEN FLAT AND ERECT    CLINICAL HISTORY:  Unspecified abdominal pain    COMPARISON:  None    FINDINGS:  Large left pleural effusion left lower lobe atelectasis or consolidation.    Nonobstructive bowel gas pattern is noted. No radiopaque kidney calculus is identified.  Moderate constipation.  Multiple pelvic phleboliths.  No evidence of organomegaly.  The bones demonstrate mild degenerative changes within the lower lumbar region.  The bones are otherwise intact.

## 2020-05-11 NOTE — ED NOTES
Pt lying in bed. Pt is alert and orientated x 4. Pt has clear bilateral lung sounds. Pt has audible and active bowel sounds x 4. Pt has no edema noted. Pt c/o abdominal pain.

## 2020-05-11 NOTE — SUBJECTIVE & OBJECTIVE
No current facility-administered medications on file prior to encounter.      Current Outpatient Medications on File Prior to Encounter   Medication Sig    albuterol (PROVENTIL/VENTOLIN HFA) 90 mcg/actuation inhaler Inhale 1-2 puffs into the lungs every 6 (six) hours as needed for Wheezing. Rescue    ibuprofen (ADVIL,MOTRIN) 200 MG tablet Take 2 tablets (400 mg total) by mouth every 6 (six) hours as needed for Pain.    traMADol (ULTRAM) 50 mg tablet Take 1 tablet (50 mg total) by mouth every 6 (six) hours as needed for Pain.    azithromycin (Z-MAURILIO) 250 MG tablet Take 1 tablet (250 mg total) by mouth once daily. Take first 2 tablets together, then 1 every day until finished.    diclofenac (VOLTAREN) 75 MG EC tablet Take 1 tablet (75 mg total) by mouth 2 (two) times daily.    methylPREDNISolone (MEDROL DOSEPACK) 4 mg tablet As directed       Review of patient's allergies indicates:  No Known Allergies    Past Medical History:   Diagnosis Date    Anemia     Anemia     Back pain      Past Surgical History:   Procedure Laterality Date     SECTION, CLASSIC      TUBAL LIGATION       Family History     None        Tobacco Use    Smoking status: Current Every Day Smoker     Packs/day: 0.25    Smokeless tobacco: Never Used   Substance and Sexual Activity    Alcohol use: Yes     Comment: occasional    Drug use: No    Sexual activity: Yes     Birth control/protection: Condom     Review of Systems   Constitutional: Positive for fatigue. Negative for unexpected weight change.   HENT: Negative for congestion.    Eyes: Negative for visual disturbance.   Respiratory: Negative for shortness of breath.    Cardiovascular: Negative for chest pain.   Gastrointestinal: Positive for abdominal pain.   Genitourinary: Negative for difficulty urinating.   Skin: Negative for rash.   Allergic/Immunologic: Negative for immunocompromised state.   Neurological: Negative for weakness.   Psychiatric/Behavioral: The patient is  not nervous/anxious.      Objective:     Vital Signs (Most Recent):  Temp: 98.8 °F (37.1 °C) (05/11/20 0917)  Pulse: 103 (05/11/20 0953)  Resp: 19 (05/11/20 0953)  BP: 108/66 (05/11/20 0953)  SpO2: 95 % (05/11/20 0953) Vital Signs (24h Range):  Temp:  [98 °F (36.7 °C)-98.8 °F (37.1 °C)] 98.8 °F (37.1 °C)  Pulse:  [102-141] 103  Resp:  [18-22] 19  SpO2:  [92 %-99 %] 95 %  BP: (104-141)/(54-87) 108/66     Weight: 127.4 kg (280 lb 13.9 oz)  Body mass index is 39.17 kg/m².    Physical Exam   Constitutional: She is oriented to person, place, and time. She appears well-developed and well-nourished. No distress.   HENT:   Head: Normocephalic and atraumatic.   Eyes: EOM are normal.   Neck: Neck supple.   Cardiovascular: Normal rate and regular rhythm.   Pulmonary/Chest: Effort normal.   Abdominal: Soft. She exhibits no distension. There is tenderness.   Musculoskeletal: Normal range of motion.   Neurological: She is alert and oriented to person, place, and time.   Skin: Skin is warm.   Vitals reviewed.      Significant Labs:  CBC:   Recent Labs   Lab 05/11/20 0027 05/11/20 0914   WBC 3.01*  --    RBC 4.20  --    HGB 12.2 9.6*   HCT 40.7 32.0*   *  --    MCV 97  --    MCH 29.0  --    MCHC 30.0*  --      BMP:   Recent Labs   Lab 05/11/20 0027   *      K 3.9      CO2 19*   BUN 14   CREATININE 1.7*   CALCIUM 9.7       Significant Diagnostics:  I have reviewed and interpreted all pertinent imaging results/findings within the past 24 hours.     She proceeded to the cath lab for a mesenteric angiogram.  No definitive bleed was identified under fluoroscopy.

## 2020-05-11 NOTE — ASSESSMENT & PLAN NOTE
Abdominal X ray showed moderate constipation   Laxative or stool softness as need   Limit narcotics if possible

## 2020-05-11 NOTE — ED PROVIDER NOTES
SCRIBE #1 NOTE: I, Ericka Miranda, am scribing for, and in the presence of, Hiren Armando MD. I have scribed the entire note.       History     Chief Complaint   Patient presents with    Flank Pain     patient reports pain to left flank area that travels to front of abdomen for 2 days     Review of patient's allergies indicates:  No Known Allergies      History of Present Illness     HPI    2020, 12:53 AM  History obtained from the patient      History of Present Illness: Jaswant Yang is a 44 y.o. female patient with PMHx  section and tubal ligation who presents to the Emergency Department for evaluation of epigastric abdominal pain which onset gradually 2 days ago. Pain radiates around to L side of abdomen and L flank. Symptoms are constant and moderate in severity. No mitigating or exacerbating factors reported. Associated sxs include abdominal distention, mild dysuria, and urinary frequency. Patient denies any fever, chills, hematuria, n/v/d, constipation, blood in stool, diaphoresis, CP, SOB, and all other sxs at this time. Last BM was today. Denies alcohol abuse. No prior Tx. No further complaints or concerns at this time.       Arrival mode: Personal vehicle    PCP: Primary Doctor No        Past Medical History:  Past Medical History:   Diagnosis Date    Anemia        Past Surgical History:  Past Surgical History:   Procedure Laterality Date     SECTION, CLASSIC           Family History:  History reviewed. No pertinent family history.    Social History:  Social History     Tobacco Use    Smoking status: Never Smoker    Smokeless tobacco: Never Used   Substance and Sexual Activity    Alcohol use: Yes     Comment: occasional    Drug use: No    Sexual activity: Yes     Birth control/protection: Condom        Review of Systems     Review of Systems   Constitutional: Negative for activity change, appetite change, chills, diaphoresis, fatigue and fever.   HENT: Negative for  congestion, ear pain, nosebleeds, rhinorrhea, sinus pain, sore throat and trouble swallowing.    Eyes: Negative for pain and discharge.   Respiratory: Negative for cough, chest tightness, shortness of breath, wheezing and stridor.    Cardiovascular: Negative for chest pain, palpitations and leg swelling.   Gastrointestinal: Positive for abdominal distention and abdominal pain (epigastric). Negative for blood in stool, constipation, diarrhea, nausea and vomiting.   Genitourinary: Positive for dysuria and frequency. Negative for difficulty urinating, hematuria and urgency.   Musculoskeletal: Negative for arthralgias, back pain, myalgias and neck pain.   Skin: Negative for pallor, rash and wound.   Neurological: Negative for dizziness, syncope, weakness, light-headedness, numbness and headaches.   Hematological: Does not bruise/bleed easily.   Psychiatric/Behavioral: Negative for confusion and self-injury.   All other systems reviewed and are negative.     Physical Exam     Initial Vitals [05/11/20 0014]   BP Pulse Resp Temp SpO2   124/82 (!) 141 (!) 22 98.2 °F (36.8 °C) 97 %      MAP       --          Physical Exam  Nursing Notes and Vital Signs Reviewed.  Constitutional: Patient is in moderate distress secondary to pain. Well-developed and well-nourished.  Head: Atraumatic. Normocephalic.  Eyes: PERRL. EOM intact. Conjunctivae are not pale. No scleral icterus.  ENT: Mucous membranes are moist. Oropharynx is clear and symmetric.    Neck: Supple. Full ROM. No lymphadenopathy.  Cardiovascular: Tachycardic. Regular rhythm. No murmurs, rubs, or gallops. Distal pulses are 2+ and symmetric.  Pulmonary/Chest: No respiratory distress. Decreased breath sounds on left  Abdominal: Soft and non-distended.  There is epigastric tenderness and LUQ tenderness.  No rebound, guarding, or rigidity. Good bowel sounds.  Genitourinary: L CVA tenderness  Musculoskeletal: Moves all extremities. No obvious deformities. No edema. No calf  "tenderness.  Skin: Warm and dry.  Neurological:  Alert, awake, and appropriate.  Normal speech.  No acute focal neurological deficits are appreciated.  Psychiatric: Normal affect. Good eye contact. Appropriate in content.     ED Course   Procedures  ED Vital Signs:  Vitals:    05/11/20 0014 05/11/20 0033 05/11/20 0141 05/11/20 0232   BP: 124/82  134/75 135/87   Pulse: (!) 141 (!) 134 (!) 121 (!) 119   Resp: (!) 22 20 18 18   Temp: 98.2 °F (36.8 °C)      TempSrc: Oral      SpO2: 97% 99% 99% 99%   Weight: 127.4 kg (280 lb 13.9 oz)      Height: 5' 11" (1.803 m)       05/11/20 0300 05/11/20 0400 05/11/20 0501   BP: (!) 141/77 112/72 104/64   Pulse: (!) 117 (!) 117 109   Resp: 18 18    Temp: 98.3 °F (36.8 °C)     TempSrc: Oral     SpO2: 97% 98% 95%   Weight:      Height:          Abnormal Lab Results:  Labs Reviewed   CBC W/ AUTO DIFFERENTIAL - Abnormal; Notable for the following components:       Result Value    WBC 3.01 (*)     Mean Corpuscular Hemoglobin Conc 30.0 (*)     RDW 19.9 (*)     Platelets 142 (*)     Immature Granulocytes 0.7 (*)     Lymph # 0.9 (*)     Mono # 0.0 (*)     nRBC 4 (*)     Mono% 1.3 (*)     All other components within normal limits   COMPREHENSIVE METABOLIC PANEL - Abnormal; Notable for the following components:    CO2 19 (*)     Glucose 121 (*)     Creatinine 1.7 (*)     Total Bilirubin 1.4 (*)     Alkaline Phosphatase 36 (*)     ALT 7 (*)     eGFR if  42 (*)     eGFR if non  36 (*)     All other components within normal limits   URINALYSIS, REFLEX TO URINE CULTURE - Abnormal; Notable for the following components:    Protein, UA 1+ (*)     Occult Blood UA Trace (*)     Leukocytes, UA 1+ (*)     All other components within normal limits    Narrative:     Preferred Collection Type->Urine, Clean Catch   URINALYSIS MICROSCOPIC - Abnormal; Notable for the following components:    WBC, UA 10 (*)     Bacteria Many (*)     All other components within normal limits    " Narrative:     Preferred Collection Type->Urine, Clean Catch   PREGNANCY TEST, URINE RAPID   LIPASE   LIPASE   LACTIC ACID, PLASMA        All Lab Results:  Results for orders placed or performed during the hospital encounter of 05/11/20   CBC auto differential   Result Value Ref Range    WBC 3.01 (L) 3.90 - 12.70 K/uL    RBC 4.20 4.00 - 5.40 M/uL    Hemoglobin 12.2 12.0 - 16.0 g/dL    Hematocrit 40.7 37.0 - 48.5 %    Mean Corpuscular Volume 97 82 - 98 fL    Mean Corpuscular Hemoglobin 29.0 27.0 - 31.0 pg    Mean Corpuscular Hemoglobin Conc 30.0 (L) 32.0 - 36.0 g/dL    RDW 19.9 (H) 11.5 - 14.5 %    Platelets 142 (L) 150 - 350 K/uL    MPV 9.6 9.2 - 12.9 fL    Immature Granulocytes 0.7 (H) 0.0 - 0.5 %    Gran # (ANC) 2.0 1.8 - 7.7 K/uL    Immature Grans (Abs) 0.02 0.00 - 0.04 K/uL    Lymph # 0.9 (L) 1.0 - 4.8 K/uL    Mono # 0.0 (L) 0.3 - 1.0 K/uL    Eos # 0.0 0.0 - 0.5 K/uL    Baso # 0.01 0.00 - 0.20 K/uL    nRBC 4 (A) 0 /100 WBC    Gran% 67.8 38.0 - 73.0 %    Lymph% 29.2 18.0 - 48.0 %    Mono% 1.3 (L) 4.0 - 15.0 %    Eosinophil% 0.7 0.0 - 8.0 %    Basophil% 0.3 0.0 - 1.9 %    Differential Method Automated    Comprehensive metabolic panel   Result Value Ref Range    Sodium 136 136 - 145 mmol/L    Potassium 3.9 3.5 - 5.1 mmol/L    Chloride 101 95 - 110 mmol/L    CO2 19 (L) 23 - 29 mmol/L    Glucose 121 (H) 70 - 110 mg/dL    BUN, Bld 14 6 - 20 mg/dL    Creatinine 1.7 (H) 0.5 - 1.4 mg/dL    Calcium 9.7 8.7 - 10.5 mg/dL    Total Protein 7.6 6.0 - 8.4 g/dL    Albumin 4.4 3.5 - 5.2 g/dL    Total Bilirubin 1.4 (H) 0.1 - 1.0 mg/dL    Alkaline Phosphatase 36 (L) 55 - 135 U/L    AST 13 10 - 40 U/L    ALT 7 (L) 10 - 44 U/L    Anion Gap 16 8 - 16 mmol/L    eGFR if African American 42 (A) >60 mL/min/1.73 m^2    eGFR if non African American 36 (A) >60 mL/min/1.73 m^2   Urinalysis, Reflex to Urine Culture Urine, Clean Catch   Result Value Ref Range    Specimen UA Urine, Clean Catch     Color, UA Yellow Yellow, Straw, Lissette     Appearance, UA Clear Clear    pH, UA 6.0 5.0 - 8.0    Specific Gravity, UA 1.020 1.005 - 1.030    Protein, UA 1+ (A) Negative    Glucose, UA Negative Negative    Ketones, UA Negative Negative    Bilirubin (UA) Negative Negative    Occult Blood UA Trace (A) Negative    Nitrite, UA Negative Negative    Urobilinogen, UA Negative <2.0 EU/dL    Leukocytes, UA 1+ (A) Negative   Pregnancy, urine rapid (UPT)   Result Value Ref Range    Preg Test, Ur Negative    Urinalysis Microscopic   Result Value Ref Range    RBC, UA 0 0 - 4 /hpf    WBC, UA 10 (H) 0 - 5 /hpf    WBC Clumps, UA Rare None-Rare    Bacteria Many (A) None-Occ /hpf    Yeast, UA None None    Squam Epithel, UA 10 /hpf    Hyaline Casts, UA 0 0-1/lpf /lpf    Microscopic Comment SEE COMMENT    Lipase   Result Value Ref Range    Lipase 16 4 - 60 U/L   Lactic acid, plasma   Result Value Ref Range    Lactate (Lactic Acid) 2.1 0.5 - 2.2 mmol/L         Imaging Results:  Abdomen and CXR interpreted by ED provider as large left pleural effusion     STATRAD impression of abdominal CT:  Cardiomegaly with mild pericardial effusion.  Severe left pleural effusion with compressive atelectasis of the left lower lobe.  Mild to moderate ascites, especially around spleen and pelvis.  Splenomegaly the spleen is somewhat displaced medially due to surrounding fluid.  Large lobulated uterus with likely representing fibroid uterus         The Emergency Provider reviewed the vital signs and test results, which are outlined above.     ED Discussion     6:00 AM: Discussed case with Dr. Herbert ([facility]). Dr. Warren agrees with current care and management of pt and accepts admission.   Admitting Service: hospitals medicine  Admitting Physician: Dr. Warren  Admit to: obs med tele    6:01 AM: Re-evaluated pt. I have discussed test results, shared treatment plan, and the need for admission with patient at bedside. Pt expresses understanding at this time and agreeswith all information. All  questions answered. Pt has no further questions or concerns at this time. Pt is ready for admit.         Medical Decision Making:   Clinical Tests:   Lab Tests: Ordered and Reviewed  Radiological Study: Ordered and Reviewed  44-year-old female complaining of some upper left-sided abdominal pain; imaging reveals ascites and large left pleural effusion; patient admitted to Medicine for thoracentesis and continued evaluation/management           ED Medication(s):  Medications   cefTRIAXone (ROCEPHIN) 2 g/50 mL D5W IVPB (has no administration in time range)   azithromycin 500 mg in dextrose 5 % 250 mL IVPB (ready to mix system) (has no administration in time range)   morphine injection 4 mg (4 mg Intravenous Given 5/11/20 0113)   ondansetron injection 4 mg (4 mg Intravenous Given 5/11/20 0114)   sodium chloride 0.9% bolus 1,000 mL (0 mLs Intravenous Stopped 5/11/20 0309)   morphine injection 4 mg (4 mg Intravenous Given 5/11/20 0319)       Scribe Attestation:   Scribe #1: I performed the above scribed service and the documentation accurately describes the services I performed. I attest to the accuracy of the note.     Attending:   Physician Attestation Statement for Scribe #1: I, Hiren Armando MD, personally performed the services described in this documentation, as scribed by Ericka Miranda, in my presence, and it is both accurate and complete.           Clinical Impression     1.  Pleural effusion  2.  Left-sided abdominal pain  3.  Ascites  4.  Pericardial effusion  5.  Elevated serum creatinine  6.  Hypoalbuminemia    Disposition:   Disposition: Placed in Observation  Condition: Fair         Hiren Armando MD  05/11/20 6545

## 2020-05-11 NOTE — ASSESSMENT & PLAN NOTE
S/P Exploratory Laparotomy / Splenectomy.  No splenic bleed or hemoperitoneum noted .   Pathology pending.

## 2020-05-11 NOTE — H&P
[facility] Hospital Medicine  History & Physical    Patient Name: Jaswant Yang  MRN: [id number]  Admission Date: 2020  Attending Physician: Geoff Warren MD   Primary Care Provider: Primary Doctor No         Patient information was obtained from patient and ER records.     Subjective:     Principal Problem:Pleural effusion on left    Chief Complaint:   Chief Complaint   Patient presents with    Flank Pain     patient reports pain to left flank area that travels to front of abdomen for 2 days        HPI: Ms Yang is a 44 year female with PMHx Anemia who presented to [facility] Emergency Room with complaints of Left side abdominal pain that started two days prior to arrival. Associated symptoms include constipation, RAO, left flank and back pain. She denies any recent trauma.  Denies associated symptoms of chest pain, fever, chill, nausea, vomiting or diaphoresis. CXR showed large left-sided pleural effusion with left-sided atelectasis or airspace disease. Abdominal X ray showed nonobstructive bowel gas pattern is noted, moderate constipation. Case reviewed with pulmonary, Dr Crouch, O 2 sats stable, plan for Left  thoracentesis today. Patient is a full code, mother Elsa Yang is surrogate decision maker. She is being placed in Observation under the care of [facility].     Past Medical History:   Diagnosis Date    Anemia     Anemia     Back pain        Past Surgical History:   Procedure Laterality Date     SECTION, CLASSIC      TUBAL LIGATION         Review of patient's allergies indicates:  No Known Allergies    No current facility-administered medications on file prior to encounter.      Current Outpatient Medications on File Prior to Encounter   Medication Sig    albuterol (PROVENTIL/VENTOLIN HFA) 90 mcg/actuation inhaler Inhale 1-2 puffs into the lungs every 6 (six) hours as needed for Wheezing. Rescue    ibuprofen (ADVIL,MOTRIN) 200 MG tablet Take 2 tablets  (400 mg total) by mouth every 6 (six) hours as needed for Pain.    traMADol (ULTRAM) 50 mg tablet Take 1 tablet (50 mg total) by mouth every 6 (six) hours as needed for Pain.    azithromycin (Z-MAURILIO) 250 MG tablet Take 1 tablet (250 mg total) by mouth once daily. Take first 2 tablets together, then 1 every day until finished.    diclofenac (VOLTAREN) 75 MG EC tablet Take 1 tablet (75 mg total) by mouth 2 (two) times daily.    methylPREDNISolone (MEDROL DOSEPACK) 4 mg tablet As directed     Family History     None        Tobacco Use    Smoking status: Current Every Day Smoker     Packs/day: 0.25    Smokeless tobacco: Never Used   Substance and Sexual Activity    Alcohol use: Yes     Comment: occasional    Drug use: No    Sexual activity: Yes     Birth control/protection: Condom     Review of Systems   Constitutional: Positive for activity change, appetite change and fatigue. Negative for chills and fever.   HENT: Negative for congestion, rhinorrhea and sinus pressure.    Respiratory: Negative for apnea, cough, choking, chest tightness, shortness of breath, wheezing and stridor.    Cardiovascular: Negative for chest pain, palpitations and leg swelling.   Gastrointestinal: Positive for abdominal distention, abdominal pain and constipation. Negative for diarrhea, nausea and vomiting.   Endocrine: Negative for cold intolerance and heat intolerance.   Genitourinary: Positive for flank pain. Negative for difficulty urinating and hematuria.   Musculoskeletal: Positive for back pain. Negative for arthralgias and joint swelling.   Skin: Negative for color change, pallor and rash.   Neurological: Positive for weakness. Negative for dizziness, seizures, numbness and headaches.   Psychiatric/Behavioral: Negative for agitation. The patient is not nervous/anxious.      Objective:     Vital Signs (Most Recent):  Temp: 98 °F (36.7 °C) (05/11/20 0810)  Pulse: 107 (05/11/20 0810)  Resp: 20 (05/11/20 0810)  BP: (!) 108/56  (05/11/20 0810)  SpO2: (!) 94 % (05/11/20 0810) Vital Signs (24h Range):  Temp:  [98 °F (36.7 °C)-98.3 °F (36.8 °C)] 98 °F (36.7 °C)  Pulse:  [107-141] 107  Resp:  [18-22] 20  SpO2:  [94 %-99 %] 94 %  BP: (104-141)/(56-87) 108/56     Weight: 127.4 kg (280 lb 13.9 oz)  Body mass index is 39.17 kg/m².    Physical Exam   Constitutional: She is oriented to person, place, and time.   Neck: No JVD present.   Cardiovascular: Tachycardia present. Exam reveals no gallop and no friction rub.   No murmur heard.  Pulmonary/Chest: No stridor. No respiratory distress. She has decreased breath sounds in the left middle field and the left lower field. She has no wheezes. She has no rales. She exhibits no tenderness.   Abdominal: She exhibits distension. Bowel sounds are decreased. There is tenderness in the epigastric area and left upper quadrant.   Neurological: She is alert and oriented to person, place, and time.   Skin: She is not diaphoretic.   Nursing note and vitals reviewed.          Significant Labs:   CBC:   Recent Labs   Lab 05/11/20  0027   WBC 3.01*   HGB 12.2   HCT 40.7   *     CMP:   Recent Labs   Lab 05/11/20  0027      K 3.9      CO2 19*   *   BUN 14   CREATININE 1.7*   CALCIUM 9.7   PROT 7.6   ALBUMIN 4.4   BILITOT 1.4*   ALKPHOS 36*   AST 13   ALT 7*   ANIONGAP 16   EGFRNONAA 36*     Cardiac Markers: No results for input(s): CKMB, MYOGLOBIN, BNP, TROPISTAT in the last 48 hours.  Lactic Acid:   Recent Labs   Lab 05/11/20  0324   LACTATE 2.1     Lipase:   Recent Labs   Lab 05/11/20  0027   LIPASE 16       Significant Imaging:     Imaging Results          X-Ray Chest AP Portable (Final result)  Result time 05/11/20 07:49:56    Final result by Hiren Cabrera MD (05/11/20 07:49:56)                 Impression:      Large left-sided pleural effusion with left-sided atelectasis or airspace disease.      Electronically signed by: Hiren Cabrera MD  Date:    05/11/2020  Time:    07:49              Narrative:    EXAMINATION:  XR CHEST AP PORTABLE    CLINICAL HISTORY:  pleural effusion;    FINDINGS:  Single view of the chest.  Comparison 01/16/2020    Cardiac silhouette is enlarged.  Large left-sided pleural effusion with left-sided atelectasis or airspace disease.  Patient rotation limits evaluation for mediastinal shift.  Minimal atelectasis right lung base..  No evidence of pleural effusion or pneumothorax.  Bones appear intact.                               CT Abdomen Pelvis  Without Contrast (Final result)  Result time 05/11/20 08:43:43   Procedure changed from CT Abdomen Pelvis With Contrast     Final result by Hiren Cabrera MD (05/11/20 08:43:43)                 Impression:      Area of decreased attenuation within the lower portion of the spleen concerning for splenic laceration and surrounding perisplenic hematoma. High attenuating fluid surrounds the spleen. Active bleed is not excluded on noncontrast imaging. CTA can be obtained as clinically warranted.    Splenomegaly.    Scattered fluid within the pelvis likely reflects mild hemoperitoneum.    Stranding is seen within the mesentery of the left upper quadrant which could reflect local inflammation or reflect traumatic injury.    All CT scans at this facility use dose modulation, iterative reconstruction, and/or weight based dosing when appropriate to reduce radiation dose to as low as reasonable achievable.      Electronically signed by: Hiren Cabrera MD  Date:    05/11/2020  Time:    08:43             Narrative:    EXAMINATION:  CT ABDOMEN PELVIS WITHOUT CONTRAST    CLINICAL HISTORY:  Abd pain, fever, abscess suspected;    TECHNIQUE:  Low dose axial images, sagittal and coronal reformations were obtained from the lung bases to the pubic symphysis.  Oral contrast was not administered.    COMPARISON:  05/11/2020    FINDINGS:  Heart: Mild cardiomegaly..  Trace effusion.    Lung Bases: Large left pleural effusion with left lower lobe atelectasis or  airspace disease.    Liver: Normal size and attenuation. No focal lesions.    Gallbladder: No calcified gallstones.    Bile Ducts: No dilatation.    Pancreas: No obvious mass. No peripancreatic fat stranding.    Spleen: Splenomegaly measuring up to 17 cm..  Area of decreased attenuation within the lower portion of the spleen concerning for splenic laceration and surrounding perisplenic hematoma.  High attenuating fluid surrounds the spleen.  Active bleed is not excluded on noncontrast imaging.  CTA can be obtained as clinically warranted.    Adrenals: Normal.    Kidneys/Ureters: No mass, hydroureteronephrosis, or nephroureterolithiasis.  4 cm hypodensity within the interpolar region right kidney consistent with a cyst.    Bladder: No wall thickening.    Reproductive organs: Enlarged myomatous uterus is suspected.    GI Tract/Mesentery: No evidence of bowel obstruction or inflammation.  Stranding is seen within the mesentery of the left upper quadrant which could reflect local inflammation or posttraumatic injury.  No evidence of bowel obstruction.  Moderate constipation noted.    Peritoneal Space: Scattered fluid is seen within the pelvis anterior to the uterus as well as within the cul-de-sac which likely reflects hemoperitoneum.    Retroperitoneum: Small amount of fluid is seen tracking along the left pericolic gutter likely reflecting extension of perisplenic hematoma.  Small amount of fluid is seen within the cul-de-sac.  Small amount of fluid also suspected within the lorrie hepatis region.    Abdominal wall: Normal.    Vasculature: No aneurysm.    Bones: No acute fracture.  No rib fractures are seen.  No suspicious lytic or sclerotic lesions.                               X-Ray Abdomen Flat And Erect (Final result)  Result time 05/11/20 07:48:12    Final result by Hiren Cabrera MD (05/11/20 07:48:12)                 Impression:      See findings above      Electronically signed by: Hiren Cabrera  MD  Date:    05/11/2020  Time:    07:48             Narrative:    EXAMINATION:  XR ABDOMEN FLAT AND ERECT    CLINICAL HISTORY:  Unspecified abdominal pain    COMPARISON:  None    FINDINGS:  Large left pleural effusion left lower lobe atelectasis or consolidation.    Nonobstructive bowel gas pattern is noted. No radiopaque kidney calculus is identified.  Moderate constipation.  Multiple pelvic phleboliths.  No evidence of organomegaly.  The bones demonstrate mild degenerative changes within the lower lumbar region.  The bones are otherwise intact.                              Assessment/Plan:     * Pleural effusion on left  Place in observation   CXR shows large left pleural effusion   Consult pulmonary   Rocephin and Azithromycin given in ED   Supplemental O 2           Spleen injury  CT of abdomin concerning for splenic laceration and surrounding perisplenic hematoma.   Consult General Surgery   Transfer to ICU   Serial H & H   Type and Screen,   Ttransfuse PRBC if needed      CKD (chronic kidney disease) stage 3, GFR 30-59 ml/min    Creatinine 1.7 today   GFR 54,  one year ago   Avoid nephrotoxic agents   Monitor     Abdominal pain  Probable related to possible splenic laceration and surrounding perisplenic hematoma.   Pain and nausea control   General surgery following         Constipation  Abdominal X ray showed moderate constipation   Laxative or stool softness as need   Limit narcotics if possible       VTE Risk Mitigation (From admission, onward)         Ordered     Place sequential compression device  Until discontinued      05/11/20 6144                   Joselito Wood NP  Department of Hospital Medicine   [facility]

## 2020-05-11 NOTE — SUBJECTIVE & OBJECTIVE
Past Medical History:   Diagnosis Date    Anemia     Anemia     Back pain        Past Surgical History:   Procedure Laterality Date     SECTION, CLASSIC      TUBAL LIGATION         Review of patient's allergies indicates:  No Known Allergies          Medications:  Continuous Infusions:  Scheduled Meds:   morphine  4 mg Intravenous Once    sodium chloride 0.9%  3 mL Intravenous Q8H     PRN Meds:diphenhydrAMINE, HYDROmorphone, HYDROmorphone, metoclopramide HCl, morphine, ondansetron, promethazine (PHENERGAN) IVPB     Family History     None        Tobacco Use    Smoking status: Current Every Day Smoker     Packs/day: 0.25    Smokeless tobacco: Never Used   Substance and Sexual Activity    Alcohol use: Yes     Comment: occasional    Drug use: No    Sexual activity: Yes     Birth control/protection: Condom         Review of Systems   Constitutional: Positive for activity change, appetite change and fatigue. Negative for chills and fever.   HENT: Negative for congestion, rhinorrhea and sinus pressure.    Respiratory: Negative for apnea, cough, choking, chest tightness, shortness of breath, wheezing and stridor.    Cardiovascular: Negative for chest pain, palpitations and leg swelling.   Gastrointestinal: Positive for abdominal distention, abdominal pain and constipation. Negative for diarrhea, nausea and vomiting.   Endocrine: Negative for cold intolerance and heat intolerance.   Genitourinary: Positive for flank pain. Negative for difficulty urinating and hematuria.   Musculoskeletal: Positive for back pain. Negative for arthralgias and joint swelling.   Skin: Negative for color change, pallor and rash.   Neurological: Positive for weakness. Negative for dizziness, seizures, numbness and headaches.   Psychiatric/Behavioral: Negative for agitation. The patient is not nervous/anxious.      Objective:     Vital Signs (Most Recent):  Temp: 97 °F (36.1 °C) (20 1345)  Pulse: 77 (20 1430)  Resp:  14 (05/11/20 1430)  BP: (!) 102/59 (05/11/20 1430)  SpO2: 97 % (05/11/20 1430) Vital Signs (24h Range):  Temp:  [97 °F (36.1 °C)-98.8 °F (37.1 °C)] 97 °F (36.1 °C)  Pulse:  [] 77  Resp:  [14-43] 14  SpO2:  [92 %-100 %] 97 %  BP: ()/(54-87) 102/59     Weight: 127.4 kg (280 lb 13.9 oz)  Body mass index is 39.17 kg/m².      Intake/Output Summary (Last 24 hours) at 5/11/2020 1433  Last data filed at 5/11/2020 1346  Gross per 24 hour   Intake 3750 ml   Output 200 ml   Net 3550 ml       Physical Exam   Constitutional: She is oriented to person, place, and time.   Neck: No JVD present.   Cardiovascular: Tachycardia present. Exam reveals no gallop and no friction rub.   No murmur heard.  Pulmonary/Chest: No stridor. No respiratory distress. She has decreased breath sounds in the left middle field and the left lower field. She has no wheezes. She has no rales. She exhibits no tenderness.   Abdominal: She exhibits no distension. Bowel sounds are decreased. There is tenderness in the epigastric area and left upper quadrant.   Midline laparotomy incision CDI   Neurological: She is alert and oriented to person, place, and time.   Skin: She is not diaphoretic.   Nursing note and vitals reviewed.      Vents:  Oxygen Concentration (%): 98 (05/11/20 1415)    Lines/Drains/Airways     Drain                 Closed/Suction Drain 05/11/20 1300 Abdomen Bulb 19 Fr. less than 1 day         Urethral Catheter 05/11/20 1130 Latex;Straight-tip 16 Fr. less than 1 day          Peripheral Intravenous Line                 Peripheral IV - Single Lumen 05/11/20 0750 20 G Left Hand less than 1 day                Significant Labs:    CBC/Anemia Profile:  Recent Labs   Lab 05/11/20  0027 05/11/20  0914 05/11/20  1105 05/11/20  1228   WBC 3.01*  --   --   --    HGB 12.2 9.6* 9.0*  --    HCT 40.7 32.0* 29.6* 25*   *  --   --   --    MCV 97  --   --   --    RDW 19.9*  --   --   --         Chemistries:  Recent Labs   Lab 05/11/20  0027       K 3.9      CO2 19*   BUN 14   CREATININE 1.7*   CALCIUM 9.7   ALBUMIN 4.4   PROT 7.6   BILITOT 1.4*   ALKPHOS 36*   ALT 7*   AST 13     ABGs:   Recent Labs   Lab 05/11/20  1228   PH 7.325*   PCO2 41.2   HCO3 21.5*   POCSATURATED 100   BE -5     Bilirubin:   Recent Labs   Lab 05/11/20  0027   BILITOT 1.4*     Lactic Acid:   Recent Labs   Lab 05/11/20  0324   LACTATE 2.1     Urine Studies:   Recent Labs   Lab 05/11/20  0027   COLORU Yellow   APPEARANCEUA Clear   PHUR 6.0   SPECGRAV 1.020   PROTEINUA 1+*   GLUCUA Negative   KETONESU Negative   BILIRUBINUA Negative   OCCULTUA Trace*   NITRITE Negative   UROBILINOGEN Negative   LEUKOCYTESUR 1+*   RBCUA 0   WBCUA 10*   BACTERIA Many*   SQUAMEPITHEL 10   HYALINECASTS 0     All pertinent labs within the past 24 hours have been reviewed.    Significant Imaging:     CXR: 05/11/20.    Single view of the chest.  Comparison 01/16/2020    Cardiac silhouette is enlarged.  Large left-sided pleural effusion with left-sided atelectasis or airspace disease.  Patient rotation limits evaluation for mediastinal shift.  Minimal atelectasis right lung base..  No evidence of pleural effusion or pneumothorax.  Bones appear intact.

## 2020-05-11 NOTE — ASSESSMENT & PLAN NOTE
Place in observation   CXR shows large left pleural effusion   Consult pulmonary   Rocephin and Azithromycin given in ED   Supplemental O 2

## 2020-05-11 NOTE — OP NOTE
Pre Op Diagnosis: left pleural effusion     Post Op Diagnosis: same     Procedure:  Left thoracentesis     Procedure performed by: Rick JOHNSON, Blayne PANTOJA     Written Informed Consent Obtained: Yes     Specimen Removed:  yes     Estimated Blood Loss:  minimal     Findings: Local anesthesia and moderate sedation were used.     The patient tolerated the procedure well and there were no complications.      Sterile technique was performed in the lateral left thorax, lidocaine was used as a local anesthetic.  1300 ccs of jacqui fluid removed and sent to lab.  Pt tolerated the procedure well without immediate complications.  Please see radiologist report for details. F/u with PCP and/or ordering physician.

## 2020-05-11 NOTE — HPI
44-year-old female with evidence of splenic laceration with infarction and questionable bleeding on CT abdomen and pelvis obtained by the ER for evaluation of epigastric pain.  She was admitted to the medicine service noted to have anemia and continued pain.  Surgery was consulted for evaluation.  She denies any trauma.  She does report some malaise over the last 2 weeks.  She has COVID negative.

## 2020-05-11 NOTE — ASSESSMENT & PLAN NOTE
In successful embolization of splenic bleed.  Given that this is not traumatic rupture and has evidence of ongoing bleeding with drop in H&H, will proceed to the OR for emergent exploratory laparotomy with splenectomy.

## 2020-05-11 NOTE — TRANSFER OF CARE
"Anesthesia Transfer of Care Note    Patient: Jaswant Yang    Procedure(s) Performed: Procedure(s) (LRB):  Spleenic Bleed (N/A)    Patient location: PACU    Anesthesia Type: general    Transport from OR: Transported from OR on room air with adequate spontaneous ventilation    Post pain: adequate analgesia    Post assessment: no apparent anesthetic complications and tolerated procedure well    Post vital signs: stable    Level of consciousness: awake    Nausea/Vomiting: no nausea/vomiting    Complications: none    Transfer of care protocol was followed      Last vitals:   Visit Vitals  BP (!) 114/56 (BP Location: Left arm, Patient Position: Lying)   Pulse 99   Temp 37.1 °C (98.8 °F)   Resp (!) 21   Ht 5' 11" (1.803 m)   Wt 127.4 kg (280 lb 13.9 oz)   SpO2 (!) 94%   Breastfeeding? No   BMI 39.17 kg/m²     "

## 2020-05-11 NOTE — PLAN OF CARE
Pt remains free from falls/injuries this shift. Safety precautions maintained. Pain managed with IV meds. De León in place post op. CHRISTI drain to L mid abdomen. No s/s of acute distress noted. Will continue to monitor. Chart check completed.

## 2020-05-11 NOTE — NURSING
Patient prepped for procedure. Alert and oriented. remains on room air vitals stable see flow chart

## 2020-05-11 NOTE — OR NURSING
Patient arrived from cath lab to PACU at 10:57am. Patient was hooked up to the monitor. Patient was awake and alert. Patient had a right groin puncture site from angiogram procedure. Patient had labs drawn along with type and screen. Patient's son was called and a phone consent was received for exploratory lap. Patient was taken to surgery around 11:16am.

## 2020-05-11 NOTE — H&P
Inpatient Radiology Pre-procedure Note    History of Present Illness:  Jaswant Yang is a 44 y.o. female who presents for splenic angiogram and possible embolization for splenic laceration and hemoperitoneum.  Admission H&P reviewed.  Past Medical History:   Diagnosis Date    Anemia     Anemia     Back pain      Past Surgical History:   Procedure Laterality Date     SECTION, CLASSIC      TUBAL LIGATION         Review of Systems:   As documented in primary team H&P    Home Meds:   Prior to Admission medications    Medication Sig Start Date End Date Taking? Authorizing Provider   albuterol (PROVENTIL/VENTOLIN HFA) 90 mcg/actuation inhaler Inhale 1-2 puffs into the lungs every 6 (six) hours as needed for Wheezing. Rescue 1/16/20 1/15/21 Yes Lavon Moore NP   ibuprofen (ADVIL,MOTRIN) 200 MG tablet Take 2 tablets (400 mg total) by mouth every 6 (six) hours as needed for Pain. 16  Yes FLAKITA Garrido   traMADol (ULTRAM) 50 mg tablet Take 1 tablet (50 mg total) by mouth every 6 (six) hours as needed for Pain. 19  Yes Subhash Silverio Jr., JATINDER   azithromycin (Z-MAURILIO) 250 MG tablet Take 1 tablet (250 mg total) by mouth once daily. Take first 2 tablets together, then 1 every day until finished. 20   Lavon Moore NP   diclofenac (VOLTAREN) 75 MG EC tablet Take 1 tablet (75 mg total) by mouth 2 (two) times daily. 19   JATINDER Lara Jr.   methylPREDNISolone (MEDROL DOSEPACK) 4 mg tablet As directed 20   Lavon Moore NP     Scheduled Meds:    morphine  4 mg Intravenous Once    sodium chloride 0.9%  1,000 mL Intravenous Once     Continuous Infusions:   PRN Meds:morphine, ondansetron  Anticoagulants/Antiplatelets: no anticoagulation    Allergies: Review of patient's allergies indicates:  No Known Allergies  Sedation Hx: have not been any systemic reactions    Labs:  No results for input(s): INR in the last 168 hours.    Invalid input(s):  PT,  PTT    Recent  Labs   Lab 05/11/20 0027 05/11/20 0914   WBC 3.01*  --    HGB 12.2 9.6*   HCT 40.7 32.0*   MCV 97  --    *  --       Recent Labs   Lab 05/11/20 0027   *      K 3.9      CO2 19*   BUN 14   CREATININE 1.7*   CALCIUM 9.7   ALT 7*   AST 13   ALBUMIN 4.4   BILITOT 1.4*         Vitals:  Temp: 98.8 °F (37.1 °C) (05/11/20 0917)  Pulse: 103 (05/11/20 0953)  Resp: 19 (05/11/20 0953)  BP: 108/66 (05/11/20 0953)  SpO2: 95 % (05/11/20 0953)     Physical Exam:    General: no acute distress  Mental Status: alert and oriented to person, place and time  HEENT: normocephalic, atraumatic  Chest: unlabored breathing  Heart: regular heart rate  Abdomen: nondistended  Extremity: moves all extremities    Plan:  splenic angiogram and possible embolization for splenic laceration  Sedation Plan: moderate    Hiren Cabrera MD  Staff Radiologist  Department of Radiology  Pager: [phone]

## 2020-05-11 NOTE — Clinical Note
40 ml injected throughout the case. 110 mL total wasted during the case. 150 mL total used in the case.

## 2020-05-11 NOTE — HPI
Ms Yang is a 44 year female with PMHx Anemia who presented to [facility] Emergency Room with complaints of Left side abdominal pain that started two days prior to arrival. Associated symptoms include constipation, RAO, left flank and back pain. She denies any recent trauma.  Denies associated symptoms of chest pain, fever, chill, nausea, vomiting or diaphoresis. CXR showed large left-sided pleural effusion with left-sided atelectasis or airspace disease. Abdominal X ray showed nonobstructive bowel gas pattern is noted, moderate constipation. Case reviewed with pulmonary, Dr Crouch, O 2 sats stable, plan for Left  thoracentesis today. Patient is a full code, mother Elsa Yang is surrogate decision maker. She is being placed in Observation under the care of Hospital Medicine.

## 2020-05-11 NOTE — HPI
Ms Yang is a 44 year female with PMHx Anemia who presented to [facility] Emergency Room with complaints of Left side abdominal pain that started two days prior to arrival. Associated symptoms include constipation, RAO, left flank and back pain. She denies any recent trauma.  Denies associated symptoms of chest pain, fever, chill, nausea, vomiting or diaphoresis. CXR showed large left-sided pleural effusion with left-sided atelectasis or airspace disease. Abdominal X ray showed nonobstructive bowel gas pattern is noted, moderate constipation. Case reviewed with pulmonary, Dr Crouch, O 2 sats stable, plan for Left  thoracentesis today. IR consulted for splenic angiogram and possible embolization for splenic laceration and hemoperitoneum. Procedure unsuccessful.     Transferred to the OR from ED. Underwent Exploratory Laparotomy and Splenectomy. Findings: Splenic rupture with capsular tear at anterior superior border of the spleen. Reactive ascites. No massive hemoperitoneum. Abnormal tissue extending to the hilum, sent for frozen section. Unable to definitively determine if lymphoma on frozen section. Splenectomy was performed. No other disease noted within the abdomen. IR US guided thoracentesis performed after the operation.     Patient is a full code, mother Elsa Yang is surrogate decision maker.

## 2020-05-11 NOTE — HOSPITAL COURSE
05/11: Seen and examined at bedside in PACU. Hospital chart reviewed. Successfully extubated post op. Stable vital signs. She reports that she is in pain.  5/12 reports pain in abdomen  5/13 less discomfort today but still unable to take a deep breath  5/14 slow improvement, still not taking deep breath, needs encouragement  5/15 slow improvment

## 2020-05-11 NOTE — CONSULTS
[facility] -   General Surgery  Consult Note    Patient Name: Jaswant Yang  MRN: [id number]  Code Status: No Order  Admission Date: 5/11/2020  Hospital Length of Stay: 0 days  Attending Physician: Geoff Warren MD  Primary Care Provider: Primary Doctor No    Patient information was obtained from patient and ER records.     Inpatient consult to General Surgery  Consult performed by: Yumi Ferguson MD  Consult ordered by: Geoff Warren MD        Subjective:     Principal Problem: Pleural effusion on left    History of Present Illness: 44-year-old female with evidence of splenic laceration with infarction and questionable bleeding on CT abdomen and pelvis obtained by the ER for evaluation of epigastric pain.  She was admitted to the medicine service noted to have anemia and continued pain.  Surgery was consulted for evaluation.  She denies any trauma.  She does report some malaise over the last 2 weeks.  She has COVID negative.    No current facility-administered medications on file prior to encounter.      Current Outpatient Medications on File Prior to Encounter   Medication Sig    albuterol (PROVENTIL/VENTOLIN HFA) 90 mcg/actuation inhaler Inhale 1-2 puffs into the lungs every 6 (six) hours as needed for Wheezing. Rescue    ibuprofen (ADVIL,MOTRIN) 200 MG tablet Take 2 tablets (400 mg total) by mouth every 6 (six) hours as needed for Pain.    traMADol (ULTRAM) 50 mg tablet Take 1 tablet (50 mg total) by mouth every 6 (six) hours as needed for Pain.    azithromycin (Z-MAURILIO) 250 MG tablet Take 1 tablet (250 mg total) by mouth once daily. Take first 2 tablets together, then 1 every day until finished.    diclofenac (VOLTAREN) 75 MG EC tablet Take 1 tablet (75 mg total) by mouth 2 (two) times daily.    methylPREDNISolone (MEDROL DOSEPACK) 4 mg tablet As directed       Review of patient's allergies indicates:  No Known Allergies    Past Medical History:   Diagnosis Date    Anemia      Anemia     Back pain      Past Surgical History:   Procedure Laterality Date     SECTION, CLASSIC      TUBAL LIGATION       Family History     None        Tobacco Use    Smoking status: Current Every Day Smoker     Packs/day: 0.25    Smokeless tobacco: Never Used   Substance and Sexual Activity    Alcohol use: Yes     Comment: occasional    Drug use: No    Sexual activity: Yes     Birth control/protection: Condom     Review of Systems   Constitutional: Positive for fatigue. Negative for unexpected weight change.   HENT: Negative for congestion.    Eyes: Negative for visual disturbance.   Respiratory: Negative for shortness of breath.    Cardiovascular: Negative for chest pain.   Gastrointestinal: Positive for abdominal pain.   Genitourinary: Negative for difficulty urinating.   Skin: Negative for rash.   Allergic/Immunologic: Negative for immunocompromised state.   Neurological: Negative for weakness.   Psychiatric/Behavioral: The patient is not nervous/anxious.      Objective:     Vital Signs (Most Recent):  Temp: 98.8 °F (37.1 °C) (20)  Pulse: 103 (20)  Resp: 19 (20)  BP: 108/66 (20)  SpO2: 95 % (20) Vital Signs (24h Range):  Temp:  [98 °F (36.7 °C)-98.8 °F (37.1 °C)] 98.8 °F (37.1 °C)  Pulse:  [102-141] 103  Resp:  [18-22] 19  SpO2:  [92 %-99 %] 95 %  BP: (104-141)/(54-87) 108/66     Weight: 127.4 kg (280 lb 13.9 oz)  Body mass index is 39.17 kg/m².    Physical Exam   Constitutional: She is oriented to person, place, and time. She appears well-developed and well-nourished. No distress.   HENT:   Head: Normocephalic and atraumatic.   Eyes: EOM are normal.   Neck: Neck supple.   Cardiovascular: Normal rate and regular rhythm.   Pulmonary/Chest: Effort normal.   Abdominal: Soft. She exhibits no distension. There is tenderness.   Musculoskeletal: Normal range of motion.   Neurological: She is alert and oriented to person, place, and time.   Skin:  Skin is warm.   Vitals reviewed.      Significant Labs:  CBC:   Recent Labs   Lab 05/11/20  0027 05/11/20  0914   WBC 3.01*  --    RBC 4.20  --    HGB 12.2 9.6*   HCT 40.7 32.0*   *  --    MCV 97  --    MCH 29.0  --    MCHC 30.0*  --      BMP:   Recent Labs   Lab 05/11/20  0027   *      K 3.9      CO2 19*   BUN 14   CREATININE 1.7*   CALCIUM 9.7       Significant Diagnostics:  I have reviewed and interpreted all pertinent imaging results/findings within the past 24 hours.     She proceeded to the cath lab for a mesenteric angiogram.  No definitive bleed was identified under fluoroscopy.    Assessment/Plan:     Spleen injury  In successful embolization of splenic bleed.  Given that this is not traumatic rupture and has evidence of ongoing bleeding with drop in H&H, will proceed to the OR for emergent exploratory laparotomy with splenectomy.         VTE Risk Mitigation (From admission, onward)         Ordered     Place sequential compression device  Until discontinued      05/11/20 0946                Thank you for your consult. I will follow-up with patient. Please contact us if you have any additional questions.    Yumi Ferguson MD  General Surgery  [facility] -

## 2020-05-11 NOTE — ANESTHESIA PREPROCEDURE EVALUATION
05/11/2020  Jaswant Yang is a 44 y.o., female.    Anesthesia Evaluation    I have reviewed the Patient Summary Reports.    I have reviewed the Nursing Notes.   I have reviewed the Medications.     Review of Systems  Anesthesia Hx:  No problems with previous Anesthesia  History of prior surgery of interest to airway management or planning: Previous anesthesia: General  Denies Personal Hx of Anesthesia complications.   Social:  Non-Smoker    Hematology/Oncology:  Hematology Normal   Oncology Normal     EENT/Dental:EENT/Dental Normal   Cardiovascular:  Cardiovascular Normal  ECG has been reviewed.    Pulmonary:  Pulmonary Normal    Renal/:   Chronic Renal Disease, CRI    Hepatic/GI:  Hepatic/GI Normal    Musculoskeletal:  Musculoskeletal Normal    Neurological:  Neurology Normal    Endocrine:  Endocrine Normal    Dermatological:  Skin Normal    Psych:  Psychiatric Normal           Physical Exam  General:  Well nourished, Obesity    Airway/Jaw/Neck:  Airway Findings: Mouth Opening: Normal Tongue: Normal  Mallampati: III      Dental:  Dental Findings: In tact   Chest/Lungs:  Chest/Lungs Clear    Heart/Vascular:  Heart Findings: Normal Heart murmur: negative       Mental Status:  Mental Status Findings:  Cooperative, Alert and Oriented         Anesthesia Plan  Type of Anesthesia, risks & benefits discussed:  Anesthesia Type:  general  Patient's Preference:   Intra-op Monitoring Plan: standard ASA monitors  Intra-op Monitoring Plan Comments:   Post Op Pain Control Plan: multimodal analgesia  Post Op Pain Control Plan Comments:   Induction:   IV  Beta Blocker:  Patient is not currently on a Beta-Blocker (No further documentation required).       Informed Consent: Patient understands risks and agrees with Anesthesia plan.  Questions answered. Anesthesia consent signed with patient.  ASA Score: 4  emergent   Day  of Surgery Review of History & Physical: I have interviewed and examined the patient. I have reviewed the patient's H&P dated:    H&P update referred to the surgeon.  H&P completed by Anesthesiologist.       Ready For Surgery From Anesthesia Perspective.

## 2020-05-11 NOTE — CONSULTS
[facility]  Pulmonology  Consult Note    Patient Name: Jaswant Yang  MRN: [id number]  Admission Date: 2020  Hospital Length of Stay: 0 days  Code Status: Full Code  Attending Physician: Geoff Warren MD  Primary Care Provider: Primary Doctor No   Principal Problem: Pleural effusion on left      Subjective:     HPI:  Ms Yang is a 44 year female with PMHx Anemia who presented to [facility] Emergency Room with complaints of Left side abdominal pain that started two days prior to arrival. Associated symptoms include constipation, RAO, left flank and back pain. She denies any recent trauma.  Denies associated symptoms of chest pain, fever, chill, nausea, vomiting or diaphoresis. CXR showed large left-sided pleural effusion with left-sided atelectasis or airspace disease. Abdominal X ray showed nonobstructive bowel gas pattern is noted, moderate constipation. Case reviewed with pulmonary, Dr Crouch, O 2 sats stable, plan for Left  thoracentesis today. IR consulted for splenic angiogram and possible embolization for splenic laceration and hemoperitoneum. Procedure unsuccessful.     Transferred to the OR from ED. Underwent Exploratory Laparotomy and Splenectomy. Findings: Splenic rupture with capsular tear at anterior superior border of the spleen. Reactive ascites. No massive hemoperitoneum. Abnormal tissue extending to the hilum, sent for frozen section. Unable to definitively determine if lymphoma on frozen section. Splenectomy was performed. No other disease noted within the abdomen. IR US guided thoracentesis performed after the operation.     Patient is a full code, mother Elsa Yang is surrogate decision maker.     Past Medical History:   Diagnosis Date    Anemia     Anemia     Back pain        Past Surgical History:   Procedure Laterality Date     SECTION, CLASSIC      TUBAL LIGATION         Review of patient's allergies indicates:  No Known  Allergies          Medications:  Continuous Infusions:  Scheduled Meds:   morphine  4 mg Intravenous Once    sodium chloride 0.9%  3 mL Intravenous Q8H     PRN Meds:diphenhydrAMINE, HYDROmorphone, HYDROmorphone, metoclopramide HCl, morphine, ondansetron, promethazine (PHENERGAN) IVPB     Family History     None        Tobacco Use    Smoking status: Current Every Day Smoker     Packs/day: 0.25    Smokeless tobacco: Never Used   Substance and Sexual Activity    Alcohol use: Yes     Comment: occasional    Drug use: No    Sexual activity: Yes     Birth control/protection: Condom         Review of Systems   Constitutional: Positive for activity change, appetite change and fatigue. Negative for chills and fever.   HENT: Negative for congestion, rhinorrhea and sinus pressure.    Respiratory: Negative for apnea, cough, choking, chest tightness, shortness of breath, wheezing and stridor.    Cardiovascular: Negative for chest pain, palpitations and leg swelling.   Gastrointestinal: Positive for abdominal distention, abdominal pain and constipation. Negative for diarrhea, nausea and vomiting.   Endocrine: Negative for cold intolerance and heat intolerance.   Genitourinary: Positive for flank pain. Negative for difficulty urinating and hematuria.   Musculoskeletal: Positive for back pain. Negative for arthralgias and joint swelling.   Skin: Negative for color change, pallor and rash.   Neurological: Positive for weakness. Negative for dizziness, seizures, numbness and headaches.   Psychiatric/Behavioral: Negative for agitation. The patient is not nervous/anxious.      Objective:     Vital Signs (Most Recent):  Temp: 97 °F (36.1 °C) (05/11/20 1345)  Pulse: 77 (05/11/20 1430)  Resp: 14 (05/11/20 1430)  BP: (!) 102/59 (05/11/20 1430)  SpO2: 97 % (05/11/20 1430) Vital Signs (24h Range):  Temp:  [97 °F (36.1 °C)-98.8 °F (37.1 °C)] 97 °F (36.1 °C)  Pulse:  [] 77  Resp:  [14-43] 14  SpO2:  [92 %-100 %] 97 %  BP:  ()/(54-87) 102/59     Weight: 127.4 kg (280 lb 13.9 oz)  Body mass index is 39.17 kg/m².      Intake/Output Summary (Last 24 hours) at 5/11/2020 1433  Last data filed at 5/11/2020 1346  Gross per 24 hour   Intake 3750 ml   Output 200 ml   Net 3550 ml       Physical Exam   Constitutional: She is oriented to person, place, and time.   Neck: No JVD present.   Cardiovascular: Tachycardia present. Exam reveals no gallop and no friction rub.   No murmur heard.  Pulmonary/Chest: No stridor. No respiratory distress. She has decreased breath sounds in the left middle field and the left lower field. She has no wheezes. She has no rales. She exhibits no tenderness.   Abdominal: She exhibits no distension. Bowel sounds are decreased. There is tenderness in the epigastric area and left upper quadrant.   Midline laparotomy incision CDI   Neurological: She is alert and oriented to person, place, and time.   Skin: She is not diaphoretic.   Nursing note and vitals reviewed.      Vents:  Oxygen Concentration (%): 98 (05/11/20 1415)    Lines/Drains/Airways     Drain                 Closed/Suction Drain 05/11/20 1300 Abdomen Bulb 19 Fr. less than 1 day         Urethral Catheter 05/11/20 1130 Latex;Straight-tip 16 Fr. less than 1 day          Peripheral Intravenous Line                 Peripheral IV - Single Lumen 05/11/20 0750 20 G Left Hand less than 1 day                Significant Labs:    CBC/Anemia Profile:  Recent Labs   Lab 05/11/20  0027 05/11/20  0914 05/11/20  1105 05/11/20  1228   WBC 3.01*  --   --   --    HGB 12.2 9.6* 9.0*  --    HCT 40.7 32.0* 29.6* 25*   *  --   --   --    MCV 97  --   --   --    RDW 19.9*  --   --   --         Chemistries:  Recent Labs   Lab 05/11/20  0027      K 3.9      CO2 19*   BUN 14   CREATININE 1.7*   CALCIUM 9.7   ALBUMIN 4.4   PROT 7.6   BILITOT 1.4*   ALKPHOS 36*   ALT 7*   AST 13     ABGs:   Recent Labs   Lab 05/11/20  1228   PH 7.325*   PCO2 41.2   HCO3 21.5*    POCSATURATED 100   BE -5     Bilirubin:   Recent Labs   Lab 05/11/20  0027   BILITOT 1.4*     Lactic Acid:   Recent Labs   Lab 05/11/20  0324   LACTATE 2.1     Urine Studies:   Recent Labs   Lab 05/11/20  0027   COLORU Yellow   APPEARANCEUA Clear   PHUR 6.0   SPECGRAV 1.020   PROTEINUA 1+*   GLUCUA Negative   KETONESU Negative   BILIRUBINUA Negative   OCCULTUA Trace*   NITRITE Negative   UROBILINOGEN Negative   LEUKOCYTESUR 1+*   RBCUA 0   WBCUA 10*   BACTERIA Many*   SQUAMEPITHEL 10   HYALINECASTS 0     All pertinent labs within the past 24 hours have been reviewed.    Significant Imaging:     CXR: 05/11/20.    Single view of the chest.  Comparison 01/16/2020    Cardiac silhouette is enlarged.  Large left-sided pleural effusion with left-sided atelectasis or airspace disease.  Patient rotation limits evaluation for mediastinal shift.  Minimal atelectasis right lung base..  No evidence of pleural effusion or pneumothorax.  Bones appear intact.        ABG  Recent Labs   Lab 05/11/20  1228   PH 7.325*   PO2 263*   PCO2 41.2   HCO3 21.5*   BE -5     Assessment/Plan:     * Pleural effusion on left  S/ P thoracentesis by IR.   1300 ccs of jacqui fluid removed and sent to lab  Follow results.     Post-operative state  General Surgery consulted and assisting with management.   Appreciate input.     Routine post operative care per general surgery.        Anemia  Transfused 1 unit post op. Monitor hemogram. Transfuse as needed.    Nontraumatic splenic rupture  S/P Exploratory Laparotomy / Splenectomy.  No splenic bleed or hemoperitoneum noted .   Pathology pending.       Neuro  Neurochecks per routine.          Thank you for your consult. I will sign off. Please contact us if you have any additional questions.     Joseph Crouch MD  Pulmonology  [facility]

## 2020-05-11 NOTE — ASSESSMENT & PLAN NOTE
General Surgery consulted and assisting with management.   Appreciate input.     Routine post operative care per general surgery.

## 2020-05-11 NOTE — ASSESSMENT & PLAN NOTE
CT of abdomin concerning for splenic laceration and surrounding perisplenic hematoma.   Consult General Surgery   Transfer to ICU   Serial H & H

## 2020-05-11 NOTE — ASSESSMENT & PLAN NOTE
Probable related to possible splenic laceration and surrounding perisplenic hematoma.   Pain and nausea control   General surgery following

## 2020-05-11 NOTE — NURSING
Patient lying supine in bed. Resting with eyes closed. No s/s of distress noted. Vital remain stable see flow chart

## 2020-05-12 PROBLEM — D73.5: Status: ACTIVE | Noted: 2020-05-12

## 2020-05-12 LAB
ALBUMIN FLD-MCNC: 3 G/DL
ALBUMIN SERPL BCP-MCNC: 2.7 G/DL (ref 3.5–5.2)
ALP SERPL-CCNC: 29 U/L (ref 55–135)
ALT SERPL W/O P-5'-P-CCNC: 10 U/L (ref 10–44)
AMYLASE, BODY FLUID: 35 U/L
ANION GAP SERPL CALC-SCNC: 11 MMOL/L (ref 8–16)
ANISOCYTOSIS BLD QL SMEAR: SLIGHT
APPEARANCE FLD: NORMAL
AST SERPL-CCNC: 23 U/L (ref 10–40)
BASOPHILS # BLD AUTO: 0.01 K/UL (ref 0–0.2)
BASOPHILS # BLD AUTO: 0.01 K/UL (ref 0–0.2)
BASOPHILS NFR BLD: 0.2 % (ref 0–1.9)
BASOPHILS NFR BLD: 0.2 % (ref 0–1.9)
BILIRUB SERPL-MCNC: 1.8 MG/DL (ref 0.1–1)
BNP SERPL-MCNC: 30 PG/ML (ref 0–99)
BODY FLD TYPE: NORMAL
BODY FLUID SOURCE AMYLASE: NORMAL
BODY FLUID SOURCE, LDH: NORMAL
BUN SERPL-MCNC: 19 MG/DL (ref 6–20)
CALCIUM SERPL-MCNC: 8.5 MG/DL (ref 8.7–10.5)
CHLORIDE SERPL-SCNC: 106 MMOL/L (ref 95–110)
CO2 SERPL-SCNC: 21 MMOL/L (ref 23–29)
COLOR FLD: YELLOW
CREAT SERPL-MCNC: 1.5 MG/DL (ref 0.5–1.4)
DIFFERENTIAL METHOD: ABNORMAL
DIFFERENTIAL METHOD: ABNORMAL
EOSINOPHIL # BLD AUTO: 0 K/UL (ref 0–0.5)
EOSINOPHIL # BLD AUTO: 0 K/UL (ref 0–0.5)
EOSINOPHIL NFR BLD: 0.2 % (ref 0–8)
EOSINOPHIL NFR BLD: 0.8 % (ref 0–8)
ERYTHROCYTE [DISTWIDTH] IN BLOOD BY AUTOMATED COUNT: 20.2 % (ref 11.5–14.5)
ERYTHROCYTE [DISTWIDTH] IN BLOOD BY AUTOMATED COUNT: 21.1 % (ref 11.5–14.5)
EST. GFR  (AFRICAN AMERICAN): 49 ML/MIN/1.73 M^2
EST. GFR  (NON AFRICAN AMERICAN): 42 ML/MIN/1.73 M^2
FLOW CYTOMETRY ANTIBODIES ANALYZED - FLUID: NORMAL
FLOW CYTOMETRY COMMENT - FLUID: NORMAL
FLOW CYTOMETRY INTERPRETATION - FLUID: NORMAL
FLUID TYPE: NORMAL
GLUCOSE FLD-MCNC: 102 MG/DL
GLUCOSE SERPL-MCNC: 107 MG/DL (ref 70–110)
GRAM STN SPEC: NORMAL
GRAM STN SPEC: NORMAL
HCT VFR BLD AUTO: 32.1 % (ref 37–48.5)
HCT VFR BLD AUTO: 34.2 % (ref 37–48.5)
HCT VFR BLD AUTO: 36.4 % (ref 37–48.5)
HGB BLD-MCNC: 10.2 G/DL (ref 12–16)
HGB BLD-MCNC: 10.8 G/DL (ref 12–16)
HGB BLD-MCNC: 12.2 G/DL (ref 12–16)
HYPOCHROMIA BLD QL SMEAR: ABNORMAL
IMM GRANULOCYTES # BLD AUTO: 0.02 K/UL (ref 0–0.04)
IMM GRANULOCYTES # BLD AUTO: 0.04 K/UL (ref 0–0.04)
IMM GRANULOCYTES NFR BLD AUTO: 0.4 % (ref 0–0.5)
IMM GRANULOCYTES NFR BLD AUTO: 0.8 % (ref 0–0.5)
LDH FLD L TO P-CCNC: 197 U/L
LYMPHOCYTES # BLD AUTO: 0.5 K/UL (ref 1–4.8)
LYMPHOCYTES # BLD AUTO: 0.6 K/UL (ref 1–4.8)
LYMPHOCYTES NFR BLD: 12 % (ref 18–48)
LYMPHOCYTES NFR BLD: 9.5 % (ref 18–48)
LYMPHOCYTES NFR FLD MANUAL: 1 %
MCH RBC QN AUTO: 29.7 PG (ref 27–31)
MCH RBC QN AUTO: 29.8 PG (ref 27–31)
MCHC RBC AUTO-ENTMCNC: 31.6 G/DL (ref 32–36)
MCHC RBC AUTO-ENTMCNC: 31.8 G/DL (ref 32–36)
MCV RBC AUTO: 94 FL (ref 82–98)
MCV RBC AUTO: 94 FL (ref 82–98)
MONOCYTES # BLD AUTO: 0.1 K/UL (ref 0.3–1)
MONOCYTES # BLD AUTO: 0.1 K/UL (ref 0.3–1)
MONOCYTES NFR BLD: 1.3 % (ref 4–15)
MONOCYTES NFR BLD: 2.3 % (ref 4–15)
MONOS+MACROS NFR FLD MANUAL: 6 %
NEUTROPHILS # BLD AUTO: 4 K/UL (ref 1.8–7.7)
NEUTROPHILS # BLD AUTO: 4.2 K/UL (ref 1.8–7.7)
NEUTROPHILS NFR BLD: 85.9 % (ref 38–73)
NEUTROPHILS NFR BLD: 86.4 % (ref 38–73)
NEUTROPHILS NFR FLD MANUAL: 93 %
NRBC BLD-RTO: 16 /100 WBC
NRBC BLD-RTO: 21 /100 WBC
OVALOCYTES BLD QL SMEAR: ABNORMAL
PATH INTERP FLD-IMP: NORMAL
PLATELET # BLD AUTO: 121 K/UL (ref 150–350)
PLATELET # BLD AUTO: 125 K/UL (ref 150–350)
PLATELET BLD QL SMEAR: ABNORMAL
PLATELET BLD QL SMEAR: ABNORMAL
PMV BLD AUTO: 10.2 FL (ref 9.2–12.9)
PMV BLD AUTO: 9.9 FL (ref 9.2–12.9)
POIKILOCYTOSIS BLD QL SMEAR: SLIGHT
POLYCHROMASIA BLD QL SMEAR: ABNORMAL
POTASSIUM SERPL-SCNC: 4.2 MMOL/L (ref 3.5–5.1)
PROT FLD-MCNC: 4.4 G/DL
PROT SERPL-MCNC: 5.8 G/DL (ref 6–8.4)
RBC # BLD AUTO: 3.43 M/UL (ref 4–5.4)
RBC # BLD AUTO: 3.63 M/UL (ref 4–5.4)
SODIUM SERPL-SCNC: 138 MMOL/L (ref 136–145)
SPECIMEN SOURCE: NORMAL
TARGETS BLD QL SMEAR: ABNORMAL
WBC # BLD AUTO: 4.68 K/UL (ref 3.9–12.7)
WBC # BLD AUTO: 4.84 K/UL (ref 3.9–12.7)
WBC # FLD: 6988 /CU MM

## 2020-05-12 PROCEDURE — 63600175 PHARM REV CODE 636 W HCPCS: Performed by: FAMILY MEDICINE

## 2020-05-12 PROCEDURE — 99024 POSTOP FOLLOW-UP VISIT: CPT | Mod: ,,, | Performed by: SURGERY

## 2020-05-12 PROCEDURE — 83880 ASSAY OF NATRIURETIC PEPTIDE: CPT

## 2020-05-12 PROCEDURE — 94799 UNLISTED PULMONARY SVC/PX: CPT

## 2020-05-12 PROCEDURE — 99232 SBSQ HOSP IP/OBS MODERATE 35: CPT | Mod: ,,, | Performed by: INTERNAL MEDICINE

## 2020-05-12 PROCEDURE — 97161 PT EVAL LOW COMPLEX 20 MIN: CPT

## 2020-05-12 PROCEDURE — 94761 N-INVAS EAR/PLS OXIMETRY MLT: CPT

## 2020-05-12 PROCEDURE — 99024 PR POST-OP FOLLOW-UP VISIT: ICD-10-PCS | Mod: ,,, | Performed by: SURGERY

## 2020-05-12 PROCEDURE — A4216 STERILE WATER/SALINE, 10 ML: HCPCS | Performed by: ANESTHESIOLOGY

## 2020-05-12 PROCEDURE — 25000003 PHARM REV CODE 250: Performed by: SURGERY

## 2020-05-12 PROCEDURE — 94640 AIRWAY INHALATION TREATMENT: CPT

## 2020-05-12 PROCEDURE — 80053 COMPREHEN METABOLIC PANEL: CPT

## 2020-05-12 PROCEDURE — 36415 COLL VENOUS BLD VENIPUNCTURE: CPT

## 2020-05-12 PROCEDURE — 25000003 PHARM REV CODE 250: Performed by: ANESTHESIOLOGY

## 2020-05-12 PROCEDURE — 63600175 PHARM REV CODE 636 W HCPCS: Performed by: NURSE PRACTITIONER

## 2020-05-12 PROCEDURE — 25000003 PHARM REV CODE 250: Performed by: FAMILY MEDICINE

## 2020-05-12 PROCEDURE — 25000242 PHARM REV CODE 250 ALT 637 W/ HCPCS: Performed by: SURGERY

## 2020-05-12 PROCEDURE — 99232 PR SUBSEQUENT HOSPITAL CARE,LEVL II: ICD-10-PCS | Mod: ,,, | Performed by: INTERNAL MEDICINE

## 2020-05-12 PROCEDURE — 85025 COMPLETE CBC W/AUTO DIFF WBC: CPT | Mod: 91

## 2020-05-12 PROCEDURE — 11000001 HC ACUTE MED/SURG PRIVATE ROOM

## 2020-05-12 PROCEDURE — 97530 THERAPEUTIC ACTIVITIES: CPT

## 2020-05-12 RX ORDER — AZITHROMYCIN 250 MG/1
250 TABLET, FILM COATED ORAL DAILY
Status: COMPLETED | OUTPATIENT
Start: 2020-05-13 | End: 2020-05-16

## 2020-05-12 RX ORDER — HYDROCODONE BITARTRATE AND ACETAMINOPHEN 10; 325 MG/1; MG/1
1 TABLET ORAL EVERY 6 HOURS PRN
Status: DISCONTINUED | OUTPATIENT
Start: 2020-05-12 | End: 2020-05-16 | Stop reason: HOSPADM

## 2020-05-12 RX ORDER — IPRATROPIUM BROMIDE AND ALBUTEROL SULFATE 2.5; .5 MG/3ML; MG/3ML
3 SOLUTION RESPIRATORY (INHALATION) EVERY 6 HOURS
Status: COMPLETED | OUTPATIENT
Start: 2020-05-12 | End: 2020-05-13

## 2020-05-12 RX ORDER — HYDROCODONE BITARTRATE AND ACETAMINOPHEN 5; 325 MG/1; MG/1
1 TABLET ORAL EVERY 6 HOURS PRN
Status: DISCONTINUED | OUTPATIENT
Start: 2020-05-12 | End: 2020-05-16 | Stop reason: HOSPADM

## 2020-05-12 RX ORDER — MORPHINE SULFATE 2 MG/ML
2 INJECTION, SOLUTION INTRAMUSCULAR; INTRAVENOUS EVERY 6 HOURS PRN
Status: DISCONTINUED | OUTPATIENT
Start: 2020-05-12 | End: 2020-05-16 | Stop reason: HOSPADM

## 2020-05-12 RX ORDER — DEXTROSE MONOHYDRATE, SODIUM CHLORIDE, AND POTASSIUM CHLORIDE 50; 1.49; 4.5 G/1000ML; G/1000ML; G/1000ML
INJECTION, SOLUTION INTRAVENOUS CONTINUOUS
Status: DISCONTINUED | OUTPATIENT
Start: 2020-05-12 | End: 2020-05-13

## 2020-05-12 RX ADMIN — IPRATROPIUM BROMIDE AND ALBUTEROL SULFATE 3 ML: .5; 3 SOLUTION RESPIRATORY (INHALATION) at 01:05

## 2020-05-12 RX ADMIN — MORPHINE SULFATE 2 MG: 2 INJECTION, SOLUTION INTRAMUSCULAR; INTRAVENOUS at 05:05

## 2020-05-12 RX ADMIN — HYDROCODONE BITARTRATE AND ACETAMINOPHEN 1 TABLET: 10; 325 TABLET ORAL at 10:05

## 2020-05-12 RX ADMIN — IPRATROPIUM BROMIDE AND ALBUTEROL SULFATE 3 ML: .5; 3 SOLUTION RESPIRATORY (INHALATION) at 08:05

## 2020-05-12 RX ADMIN — POTASSIUM CHLORIDE, DEXTROSE MONOHYDRATE AND SODIUM CHLORIDE: 150; 5; 450 INJECTION, SOLUTION INTRAVENOUS at 09:05

## 2020-05-12 RX ADMIN — MORPHINE SULFATE 2 MG: 2 INJECTION, SOLUTION INTRAMUSCULAR; INTRAVENOUS at 11:05

## 2020-05-12 RX ADMIN — POTASSIUM CHLORIDE, DEXTROSE MONOHYDRATE AND SODIUM CHLORIDE: 150; 5; 450 INJECTION, SOLUTION INTRAVENOUS at 08:05

## 2020-05-12 RX ADMIN — Medication 3 ML: at 05:05

## 2020-05-12 RX ADMIN — HYDROCODONE BITARTRATE AND ACETAMINOPHEN 1 TABLET: 10; 325 TABLET ORAL at 04:05

## 2020-05-12 RX ADMIN — CEFTRIAXONE 1 G: 1 INJECTION, SOLUTION INTRAVENOUS at 11:05

## 2020-05-12 RX ADMIN — AZITHROMYCIN MONOHYDRATE 500 MG: 500 INJECTION, POWDER, LYOPHILIZED, FOR SOLUTION INTRAVENOUS at 12:05

## 2020-05-12 RX ADMIN — HYDROCODONE BITARTRATE AND ACETAMINOPHEN 1 TABLET: 10; 325 TABLET ORAL at 09:05

## 2020-05-12 NOTE — PLAN OF CARE
Pt remained free of injury during shift, pain adequately controlled, no acute distress, and will continue to monitor. 24hr chart check performed.

## 2020-05-12 NOTE — PROGRESS NOTES
"[facility] Hospital Medicine  Progress Note    Patient Name: Jaswant Yang  MRN: [id number]  Patient Class: IP- Inpatient   Admission Date: 5/11/2020  Length of Stay: 1 days  Attending Physician: Geoff Warren MD  Primary Care Provider: Primary Doctor No        Subjective:     Principal Problem:<principal problem not specified>        HPI:  Ms Yang is a 44 year female with PMHx Anemia who presented to [facility] Emergency Room with complaints of Left side abdominal pain that started two days prior to arrival. Associated symptoms include constipation, RAO, left flank and back pain. She denies any recent trauma.  Denies associated symptoms of chest pain, fever, chill, nausea, vomiting or diaphoresis. CXR showed large left-sided pleural effusion with left-sided atelectasis or airspace disease. Abdominal X ray showed nonobstructive bowel gas pattern is noted, moderate constipation. Case reviewed with pulmonary, Dr Crouch, O 2 sats stable, plan for Left  thoracentesis today. Patient is a full code, mother Elsa Yang is surrogate decision maker. She is being placed in Observation under the care of [facility].     Overview/Hospital Course:  5/12/20  Post Op day 1  Patient is significant pain.  She does report feeling somewhat better.  Reviewed xray post thoracentesis done yesterday -  5/11/20 - "  1300 ccs of jacqui fluid removed and sent to lab"  Noted patchy infiltrates - denies significant SOB.    Interval History: pod 1 - removed spleen - awaiting pathology - surgery following. Post thoracentesis day 1 .    Patient slightly improved.    Review of Systems   Constitutional: Negative for chills and fever.   HENT: Negative for trouble swallowing.    Eyes: Negative for visual disturbance.   Respiratory: Positive for shortness of breath.    Cardiovascular: Positive for chest pain.        Diffuse abdomina/ anterior chest pain after surgery   Gastrointestinal: Positive for abdominal pain. "   Genitourinary: Negative for difficulty urinating.   Skin: Negative for color change and pallor.   Neurological: Negative for dizziness.   Psychiatric/Behavioral: Negative for confusion.     Objective:     Vital Signs (Most Recent):  Temp: 100.3 °F (37.9 °C) (05/12/20 0725)  Pulse: 101 (05/12/20 0825)  Resp: 16 (05/12/20 0825)  BP: 129/70 (05/12/20 0725)  SpO2: (!) 94 % (05/12/20 0825) Vital Signs (24h Range):  Temp:  [97 °F (36.1 °C)-100.3 °F (37.9 °C)] 100.3 °F (37.9 °C)  Pulse:  [] 101  Resp:  [12-43] 16  SpO2:  [93 %-100 %] 94 %  BP: ()/(56-78) 129/70     Weight: 127.4 kg (280 lb 13.9 oz)  Body mass index is 39.17 kg/m².    Intake/Output Summary (Last 24 hours) at 5/12/2020 1015  Last data filed at 5/12/2020 0559  Gross per 24 hour   Intake 2700 ml   Output 1330 ml   Net 1370 ml      Physical Exam   Constitutional: She is oriented to person, place, and time. She appears well-developed.   Slightly diaphoretic   Eyes: Conjunctivae are normal.   Cardiovascular: Normal rate, regular rhythm and normal heart sounds.   Pulmonary/Chest: Effort normal and breath sounds normal.   Difficult to position for full auscultation of lung fields given pain limiting patients cooperation. Air movement appreciated - no respiratory distress.   Abdominal: Soft. There is tenderness.   Surgical dressing / drain in place.   Musculoskeletal: She exhibits no edema.   Lymphadenopathy:     She has no cervical adenopathy.   Neurological: She is alert and oriented to person, place, and time.   Skin: No pallor.   Psychiatric: She has a normal mood and affect. Her behavior is normal. Thought content normal.       Significant Labs: All pertinent labs within the past 24 hours have been reviewed.    Significant Imaging: I have reviewed and interpreted all pertinent imaging results/findings within the past 24 hours.      Assessment/Plan:      Non-traumatic rupture of spleen  Pod 1  Spleen removed  Cause unknown - concern of lymphoma  given op report  Path pending  Monospot negative.      Injury to spleen  Post splenectomy      Post-operative state    Pain control      Anemia    5/12/20  Continue to trend  1 unit given during surgery  Monitor closely  Repeat late today and again in am.  Transfuse as needed.    Constipation  Abdominal X ray showed moderate constipation   Laxative or stool softness as need   Limit narcotics if possible     5/12/20  monitor      Abdominal pain  Probable related to possible splenic laceration and surrounding perisplenic hematoma.   Pain and nausea control   General surgery following     5/12/20  Pod 1 -  General surgery following.  Monitor       CKD (chronic kidney disease) stage 3, GFR 30-59 ml/min    Creatinine 1.7 today   GFR 54,  one year ago   Avoid nephrotoxic agents   Monitor     5/12/20  Continue to monitor.  Slight improvement overnight.     Other ascites    Monitor -  Concern of lympoma given op report    Pleural effusion on left  Place in observation   CXR shows large left pleural effusion   Consult pulmonary   Rocephin and Azithromycin given in ED   Supplemental O 2     5/12/20  Uncertain of cause - awaiting surgery path report.  Improved xray yesterday given post thoracentesis - however still with everardo infiltrate.  BNP to be obtained to assess for CHF component.  No WBC or true fever at present - infectious cause less likely - but must be considered especially in setting of potential immunocompromised state if lymphoma present - continue rocephin and azithromycin.  Monitor and repeat CXR later today. Along with BNP - if indicated fluid overload plan to diuresis  Pulm following.             VTE Risk Mitigation (From admission, onward)         Ordered     Place sequential compression device  Until discontinued      05/11/20 2901                      Geoff Warren MD  Department of Hospital Medicine   [facility]

## 2020-05-12 NOTE — ASSESSMENT & PLAN NOTE
S/ P thoracentesis by IR.   1300 ccs of jacqui fluid removed and sent to lab  Follow results.   5/12 culture negative, post traumatic effusion   A-T Advancement Flap Text: The defect edges were debeveled with a #15 scalpel blade.  Given the location of the defect, shape of the defect and the proximity to free margins an A-T advancement flap was deemed most appropriate.  Using a sterile surgical marker, an appropriate advancement flap was drawn incorporating the defect and placing the expected incisions within the relaxed skin tension lines where possible.    The area thus outlined was incised deep to adipose tissue with a #15 scalpel blade.  The skin margins were undermined to an appropriate distance in all directions utilizing iris scissors.

## 2020-05-12 NOTE — SUBJECTIVE & OBJECTIVE
Interval History:  Complaint this incisional soreness overnight.  Low-grade temps of 100.3 this morning.  Minimal mobilization.     Medications:  Continuous Infusions:   dextrose 5 % and 0.45 % NaCl with KCl 20 mEq 50 mL/hr at 05/12/20 0837     Scheduled Meds:   morphine  4 mg Intravenous Once    sodium chloride 0.9%  3 mL Intravenous Q8H     PRN Meds:diphenhydrAMINE, HYDROcodone-acetaminophen, HYDROcodone-acetaminophen, morphine, ondansetron     Review of patient's allergies indicates:  No Known Allergies  Objective:     Vital Signs (Most Recent):  Temp: 100.3 °F (37.9 °C) (05/12/20 0725)  Pulse: 101 (05/12/20 0825)  Resp: 16 (05/12/20 0825)  BP: 129/70 (05/12/20 0725)  SpO2: (!) 94 % (05/12/20 0825) Vital Signs (24h Range):  Temp:  [97 °F (36.1 °C)-100.3 °F (37.9 °C)] 100.3 °F (37.9 °C)  Pulse:  [] 101  Resp:  [12-43] 16  SpO2:  [93 %-100 %] 94 %  BP: ()/(56-78) 129/70     Weight: 127.4 kg (280 lb 13.9 oz)  Body mass index is 39.17 kg/m².    Intake/Output - Last 3 Shifts       05/10 0700 - 05/11 0659 05/11 0700 - 05/12 0659 05/12 0700 - 05/13 0659    P.O.  0     I.V. (mL/kg)  2000 (15.7)     Blood  700     IV Piggyback 1000 50     Total Intake(mL/kg) 1000 (7.8) 2750 (21.6)     Urine (mL/kg/hr)  950 (0.3)     Drains  180     Blood  200     Total Output  1330     Net +1000 +1420                  Physical Exam   Constitutional: She is oriented to person, place, and time. She appears well-developed and well-nourished. No distress.   HENT:   Head: Normocephalic and atraumatic.   Eyes: EOM are normal.   Neck: Neck supple.   Cardiovascular: Normal rate and regular rhythm.   Pulmonary/Chest: Effort normal.   Abdominal: Soft. Bowel sounds are normal. She exhibits distension. There is tenderness (  Incisional, appropriate).   Dressing clean dry and intact   Musculoskeletal: Normal range of motion.   Neurological: She is alert and oriented to person, place, and time.   Skin: Skin is warm.   Vitals  reviewed.      Significant Labs:  CBC:   Recent Labs   Lab 05/12/20  0746   WBC 4.68   RBC 3.63*   HGB 10.8*   HCT 34.2*   *   MCV 94   MCH 29.8   MCHC 31.6*     BMP:   Recent Labs   Lab 05/12/20  0746         K 4.2      CO2 21*   BUN 19   CREATININE 1.5*   CALCIUM 8.5*       Significant Diagnostics:  I have reviewed and interpreted all pertinent imaging results/findings within the past 24 hours.   Chest x-ray improved post thoracentesis, no pneumothorax

## 2020-05-12 NOTE — ANESTHESIA POSTPROCEDURE EVALUATION
Anesthesia Post Evaluation    Patient: Jaswant Yang    Procedure(s) Performed: Procedure(s) (LRB):  Spleenic Bleed (N/A)    Final Anesthesia Type: general    Patient location during evaluation: PACU  Patient participation: Yes- Able to Participate  Level of consciousness: awake and alert  Post-procedure vital signs: reviewed and stable  Pain management: adequate  Airway patency: patent  JACKLYN mitigation strategies: Extubation while patient is awake  PONV status at discharge: No PONV  Anesthetic complications: no      Cardiovascular status: hemodynamically stable  Respiratory status: spontaneous ventilation  Hydration status: euvolemic  Follow-up not needed.          Vitals Value Taken Time   /73 5/12/2020 12:14 PM   Temp 36.9 °C (98.4 °F) 5/12/2020 12:14 PM   Pulse 94 5/12/2020  1:50 PM   Resp 18 5/12/2020  1:50 PM   SpO2 93 % 5/12/2020  1:50 PM         No case tracking events are documented in the log.      Pain/Pooja Score: Pain Rating Prior to Med Admin: 9 (5/12/2020 11:43 AM)  Pain Rating Post Med Admin: 6 (5/12/2020 12:13 PM)  Pooja Score: 8 (5/11/2020  3:15 PM)

## 2020-05-12 NOTE — HOSPITAL COURSE
Patient was admitted to [facility] for non-traumatic spleen rupture under the care of hospital medicine. Surgery was consulted and patient had splenectomy on 5/11/20. Patient was started on PRN analgesia and had thoracentesis on 5/11 with 1300cc's jacqui fluid removed and sent to lab. Patient had patchy infiltrates but denies SOB. Pulmonology following. Patient slowly improved and was encouraged to walk. Pain is controlled. Xray on 5/14 showed no interval worsening of lungs. Patient continues to improve, is walking, had BM last night and is urinating independently. Discussed with surgery who is okay with discharge today. Patient will be given flu, pneumonia, and meningococcal vaccines before discharge. She will f/u with surgery and her PCP in 2 weeks.

## 2020-05-12 NOTE — PROGRESS NOTES
[facility] -   General Surgery  Progress Note    Subjective:     History of Present Illness:  44-year-old female with evidence of splenic laceration with infarction and questionable bleeding on CT abdomen and pelvis obtained by the ER for evaluation of epigastric pain.  She was admitted to the medicine service noted to have anemia and continued pain.  Surgery was consulted for evaluation.  She denies any trauma.  She does report some malaise over the last 2 weeks.  She has COVID negative.    Post-Op Info:  Procedure(s) (LRB):  LAPAROTOMY, EXPLORATORY (N/A)  SPLENECTOMY (N/A)  THORACENTESIS (Left)   1 Day Post-Op     Interval History:  Complaint this incisional soreness overnight.  Low-grade temps of 100.3 this morning.  Minimal mobilization.     Medications:  Continuous Infusions:   dextrose 5 % and 0.45 % NaCl with KCl 20 mEq 50 mL/hr at 05/12/20 0837     Scheduled Meds:   morphine  4 mg Intravenous Once    sodium chloride 0.9%  3 mL Intravenous Q8H     PRN Meds:diphenhydrAMINE, HYDROcodone-acetaminophen, HYDROcodone-acetaminophen, morphine, ondansetron     Review of patient's allergies indicates:  No Known Allergies  Objective:     Vital Signs (Most Recent):  Temp: 100.3 °F (37.9 °C) (05/12/20 0725)  Pulse: 101 (05/12/20 0825)  Resp: 16 (05/12/20 0825)  BP: 129/70 (05/12/20 0725)  SpO2: (!) 94 % (05/12/20 0825) Vital Signs (24h Range):  Temp:  [97 °F (36.1 °C)-100.3 °F (37.9 °C)] 100.3 °F (37.9 °C)  Pulse:  [] 101  Resp:  [12-43] 16  SpO2:  [93 %-100 %] 94 %  BP: ()/(56-78) 129/70     Weight: 127.4 kg (280 lb 13.9 oz)  Body mass index is 39.17 kg/m².    Intake/Output - Last 3 Shifts       05/10 0700 - 05/11 0659 05/11 0700 - 05/12 0659 05/12 0700 - 05/13 0659    P.O.  0     I.V. (mL/kg)  2000 (15.7)     Blood  700     IV Piggyback 1000 50     Total Intake(mL/kg) 1000 (7.8) 2750 (21.6)     Urine (mL/kg/hr)  950 (0.3)     Drains  180     Blood  200     Total Output  1330     Net +1000  +1420                  Physical Exam   Constitutional: She is oriented to person, place, and time. She appears well-developed and well-nourished. No distress.   HENT:   Head: Normocephalic and atraumatic.   Eyes: EOM are normal.   Neck: Neck supple.   Cardiovascular: Normal rate and regular rhythm.   Pulmonary/Chest: Effort normal.   Abdominal: Soft. Bowel sounds are normal. She exhibits distension. There is tenderness (  Incisional, appropriate).   Dressing clean dry and intact   Musculoskeletal: Normal range of motion.   Neurological: She is alert and oriented to person, place, and time.   Skin: Skin is warm.   Vitals reviewed.      Significant Labs:  CBC:   Recent Labs   Lab 05/12/20  0746   WBC 4.68   RBC 3.63*   HGB 10.8*   HCT 34.2*   *   MCV 94   MCH 29.8   MCHC 31.6*     BMP:   Recent Labs   Lab 05/12/20  0746         K 4.2      CO2 21*   BUN 19   CREATININE 1.5*   CALCIUM 8.5*       Significant Diagnostics:  I have reviewed and interpreted all pertinent imaging results/findings within the past 24 hours.   Chest x-ray improved post thoracentesis, no pneumothorax    Assessment/Plan:     * Pleural effusion on left  Improved x-ray this morning    Anemia  H/H remains stable postop    Nontraumatic splenic rupture  Postop day 1 status post exploratory laparotomy with splenectomy    Clear liquid diet as tolerated  Physical therapy to mobilize  Incentive spirometry, aggressive pulmonary toilet given pleural effusion prior to thoracentesis  Transition oral analgesia  DC De León  SCDs and subcutaneous Lovenox for DVT prophylaxis          Yumi Ferguson MD  General Surgery  [facility] -

## 2020-05-12 NOTE — ASSESSMENT & PLAN NOTE
General Surgery consulted and assisting with management.   Appreciate input.     Routine post operative care per general surgery.  5/12 pain control

## 2020-05-12 NOTE — ASSESSMENT & PLAN NOTE
Postop day 1 status post exploratory laparotomy with splenectomy    Clear liquid diet as tolerated  Physical therapy to mobilize  Incentive spirometry, aggressive pulmonary toilet given pleural effusion prior to thoracentesis  Transition oral analgesia  DC De León  SCDs and subcutaneous Lovenox for DVT prophylaxis

## 2020-05-12 NOTE — ASSESSMENT & PLAN NOTE
Abdominal X ray showed moderate constipation   Laxative or stool softness as need   Limit narcotics if possible     5/12/20  monitor

## 2020-05-12 NOTE — PLAN OF CARE
Fall precautions maintained. IV fluids initiated. Pain is controlled with prn med. Incision intact and dry, CHRISTI drain intact and draining. De León removed still due to void. Will continue to monitor.

## 2020-05-12 NOTE — SUBJECTIVE & OBJECTIVE
Interval History: post op , extubated, s/p thoracentesis    Objective:     Vital Signs (Most Recent):  Temp: 98.2 °F (36.8 °C) (05/12/20 1618)  Pulse: 99 (05/12/20 1618)  Resp: 18 (05/12/20 1618)  BP: 126/75 (05/12/20 1618)  SpO2: 95 % (05/12/20 1618) Vital Signs (24h Range):  Temp:  [98.2 °F (36.8 °C)-100.3 °F (37.9 °C)] 98.2 °F (36.8 °C)  Pulse:  [] 99  Resp:  [16-18] 18  SpO2:  [91 %-96 %] 95 %  BP: (113-129)/(58-75) 126/75     Weight: 127.4 kg (280 lb 13.9 oz)  Body mass index is 39.17 kg/m².      Intake/Output Summary (Last 24 hours) at 5/12/2020 1708  Last data filed at 5/12/2020 1400  Gross per 24 hour   Intake 900 ml   Output 1275 ml   Net -375 ml       Physical Exam   Constitutional: She appears well-developed and well-nourished. She appears distressed.   HENT:   Head: Normocephalic.   Eyes: Pupils are equal, round, and reactive to light.   Neck: Normal range of motion.   Cardiovascular: Normal rate.   Pulmonary/Chest: Effort normal. She has decreased breath sounds in the left lower field.   Abdominal: There is tenderness.   Musculoskeletal: Normal range of motion.   Neurological: She is alert.   Skin: Skin is warm. Capillary refill takes less than 2 seconds.   Nursing note and vitals reviewed.      Vents:  Oxygen Concentration (%): 28 (05/12/20 1350)    Lines/Drains/Airways     Central Venous Catheter Line                 Percutaneous Central Line Insertion/Assessment - single lumen  05/11/20 1700 left internal jugular 1 day          Drain                 Closed/Suction Drain 05/11/20 1300 Abdomen Bulb 19 Fr. 1 day                Significant Labs:    CBC/Anemia Profile:  Recent Labs   Lab 05/11/20  0027  05/11/20  1509 05/11/20  1842 05/11/20  2355 05/12/20  0746   WBC 3.01*  --  1.50*  --   --  4.68   HGB 12.2   < > 9.5* 13.4 12.2 10.8*   HCT 40.7   < > 30.5* 40.3 36.4* 34.2*   *  --  84*  --   --  121*   MCV 97  --  95  --   --  94   RDW 19.9*  --  18.9*  --   --  21.1*    < > = values in  this interval not displayed.        Chemistries:  Recent Labs   Lab 05/11/20  0027 05/12/20  0746    138   K 3.9 4.2    106   CO2 19* 21*   BUN 14 19   CREATININE 1.7* 1.5*   CALCIUM 9.7 8.5*   ALBUMIN 4.4 2.7*   PROT 7.6 5.8*   BILITOT 1.4* 1.8*   ALKPHOS 36* 29*   ALT 7* 10   AST 13 23       BMP:   Recent Labs   Lab 05/12/20  0746         K 4.2      CO2 21*   BUN 19   CREATININE 1.5*   CALCIUM 8.5*     CMP:   Recent Labs   Lab 05/11/20  0027 05/12/20  0746    138   K 3.9 4.2    106   CO2 19* 21*   * 107   BUN 14 19   CREATININE 1.7* 1.5*   CALCIUM 9.7 8.5*   PROT 7.6 5.8*   ALBUMIN 4.4 2.7*   BILITOT 1.4* 1.8*   ALKPHOS 36* 29*   AST 13 23   ALT 7* 10   ANIONGAP 16 11   EGFRNONAA 36* 42*     All pertinent labs within the past 24 hours have been reviewed.    Significant Imaging:  I have reviewed all pertinent imaging results/findings within the past 24 hours.  I have reviewed and interpreted all pertinent imaging results/findings within the past 24 hours.

## 2020-05-12 NOTE — ASSESSMENT & PLAN NOTE
Creatinine 1.7 today   GFR 54,  one year ago   Avoid nephrotoxic agents   Monitor     5/12/20  Continue to monitor.  Slight improvement overnight.

## 2020-05-12 NOTE — ASSESSMENT & PLAN NOTE
Place in observation   CXR shows large left pleural effusion   Consult pulmonary   Rocephin and Azithromycin given in ED   Supplemental O 2     5/12/20  Uncertain of cause - awaiting surgery path report.  Improved xray yesterday given post thoracentesis - however still with everardo infiltrate.  BNP to be obtained to assess for CHF component.  No WBC or true fever at present - infectious cause less likely - but must be considered especially in setting of potential immunocompromised state if lymphoma present - continue rocephin and azithromycin.  Monitor and repeat CXR later today. Along with BNP - if indicated fluid overload plan to diuresis  Pulm following.

## 2020-05-12 NOTE — ASSESSMENT & PLAN NOTE
5/12/20  Continue to trend  1 unit given during surgery  Monitor closely  Repeat late today and again in am.  Transfuse as needed.

## 2020-05-12 NOTE — SUBJECTIVE & OBJECTIVE
Interval History: pod 1 - removed spleen - awaiting pathology - surgery following. Post thoracentesis day 1 .    Patient slightly improved.    Review of Systems   Constitutional: Negative for chills and fever.   HENT: Negative for trouble swallowing.    Eyes: Negative for visual disturbance.   Respiratory: Positive for shortness of breath.    Cardiovascular: Positive for chest pain.        Diffuse abdomina/ anterior chest pain after surgery   Gastrointestinal: Positive for abdominal pain.   Genitourinary: Negative for difficulty urinating.   Skin: Negative for color change and pallor.   Neurological: Negative for dizziness.   Psychiatric/Behavioral: Negative for confusion.     Objective:     Vital Signs (Most Recent):  Temp: 100.3 °F (37.9 °C) (05/12/20 0725)  Pulse: 101 (05/12/20 0825)  Resp: 16 (05/12/20 0825)  BP: 129/70 (05/12/20 0725)  SpO2: (!) 94 % (05/12/20 0825) Vital Signs (24h Range):  Temp:  [97 °F (36.1 °C)-100.3 °F (37.9 °C)] 100.3 °F (37.9 °C)  Pulse:  [] 101  Resp:  [12-43] 16  SpO2:  [93 %-100 %] 94 %  BP: ()/(56-78) 129/70     Weight: 127.4 kg (280 lb 13.9 oz)  Body mass index is 39.17 kg/m².    Intake/Output Summary (Last 24 hours) at 5/12/2020 1015  Last data filed at 5/12/2020 0559  Gross per 24 hour   Intake 2700 ml   Output 1330 ml   Net 1370 ml      Physical Exam   Constitutional: She is oriented to person, place, and time. She appears well-developed.   Slightly diaphoretic   Eyes: Conjunctivae are normal.   Cardiovascular: Normal rate, regular rhythm and normal heart sounds.   Pulmonary/Chest: Effort normal and breath sounds normal.   Difficult to position for full auscultation of lung fields given pain limiting patients cooperation. Air movement appreciated - no respiratory distress.   Abdominal: Soft. There is tenderness.   Surgical dressing / drain in place.   Musculoskeletal: She exhibits no edema.   Lymphadenopathy:     She has no cervical adenopathy.   Neurological: She is  alert and oriented to person, place, and time.   Skin: No pallor.   Psychiatric: She has a normal mood and affect. Her behavior is normal. Thought content normal.       Significant Labs: All pertinent labs within the past 24 hours have been reviewed.    Significant Imaging: I have reviewed and interpreted all pertinent imaging results/findings within the past 24 hours.

## 2020-05-12 NOTE — ASSESSMENT & PLAN NOTE
Pod 1  Spleen removed  Cause unknown - concern of lymphoma given op report  Path pending  Monospot negative.

## 2020-05-12 NOTE — ASSESSMENT & PLAN NOTE
Probable related to possible splenic laceration and surrounding perisplenic hematoma.   Pain and nausea control   General surgery following     5/12/20  Pod 1 -  General surgery following.  Monitor

## 2020-05-12 NOTE — PT/OT/SLP EVAL
Physical Therapy Evaluation    Patient Name:  Jaswant Yang   MRN:  [id number]    Recommendations:     Discharge Recommendations:  home   Discharge Equipment Recommendations: none   Barriers to discharge: None    Assessment:     Jaswant Yang is a 44 y.o. female admitted with a medical diagnosis of <principal problem not specified>.  She presents with the following impairments/functional limitations:  weakness, impaired endurance, impaired self care skills, impaired balance, gait instability, impaired functional mobilty, pain.    Rehab Prognosis: Good; patient would benefit from acute skilled PT services to address these deficits and reach maximum level of function.    Recent Surgery: Procedure(s) (LRB):  LAPAROTOMY, EXPLORATORY (N/A)  SPLENECTOMY (N/A)  THORACENTESIS (Left)  BLOCK, TRANSVERSUS ABDOMINIS PLANE (N/A) 1 Day Post-Op    Plan:     During this hospitalization, patient to be seen 5 x/week to address the identified rehab impairments via gait training, therapeutic activities, therapeutic exercises and progress toward the following goals:    · Plan of Care Expires:  05/19/20    Subjective     Chief Complaint: Pain  Patient/Family Comments/goals: Decrease pain and increase mobility  Pain/Comfort:  · Pain Rating 1: 8/10  · Location 1: abdomen  · Pain Addressed 1: Nurse notified, Reposition, Distraction, Cessation of Activity  · Pain Rating Post-Intervention 1: 8/10(pain meds due soon; nurse aware)    Patients cultural, spiritual, [employer] conflicts given the current situation:      Living Environment:  Patient lives home with four children in a one-story house with no stairs/steps.  Prior to admission, patients level of function was indep with amb, ADLs, driving.  Equipment used at home: none.  DME owned (not currently used): none.  Upon discharge, patient will have assistance from her children.    Objective:     Communicated with [facility] and nurse Damon prior to session.  Patient found HOB elevated with CHRISTI  drain, peripheral IV  upon PT entry to room.    General Precautions: Standard, fall   Orthopedic Precautions:N/A   Braces: N/A     Exams:  · Cognitive Exam:  Patient is oriented to Person, Place, Time and Situation  · Gross Motor Coordination:  WFL  · Postural Exam:  Patient presented with the following abnormalities:    · -       No postural abnormalities identified  · Skin Integrity/Edema:      · -       Edema: Moderate B lower legs  · RLE ROM: WFL  · RLE Strength: WFL  · LLE ROM: WFL  · LLE Strength: WFL    Functional Mobility:  · Bed Mobility:     · Rolling Right: moderate assistance  · Scooting: moderate assistance  · Supine to Sit: maximal assistance  · Sit to Supine: maximal assistance  · Transfers:     · Sit to Stand:  minimum assistance with rolling walker  · Gait: Patient able to take several steps with RW along side of bed with min assist.  Verbal/tactile cues for postural control.  · Balance: Good sitting balance; Fair standing balance      Therapeutic Activities and Exercises:  Patient participated in bed mobility training and transfer training.  Patient with significant pain and moves very slowly (nurse aware, but pain meds not yet due).  Patient able to tolerate static standing with RW for approximately 90 seconds with verbal/tactile cues for improved upright posture.    AM-PAC 6 CLICK MOBILITY  Total Score:15     Patient left HOB elevated with all lines intact, call button in reach and nurse Marisol present.    GOALS:   Multidisciplinary Problems     Physical Therapy Goals        Problem: Physical Therapy Goal    Goal Priority Disciplines Outcome Goal Variances Interventions   Physical Therapy Goal     PT, PT/OT Ongoing, Progressing     Description:  LTGs to be met within 7 days (5/19/20):  1.  Patient will perform bed mobility with min assist  2.  Patient will perform functional transfers with SPV  3.  Patient will ambulate 150 feet without AD with SBA and no gross LOB                    History:      Past Medical History:   Diagnosis Date    Anemia     Anemia     Back pain        Past Surgical History:   Procedure Laterality Date     SECTION, CLASSIC      FLUOROSCOPY N/A 2020    Procedure: Spleenic Bleed;  Surgeon: Hiren Cabrera MD;  Location: [facility] CATH LAB;  Service: General;  Laterality: N/A;    TUBAL LIGATION         Time Tracking:     PT Received On: 20  PT Start Time: 1123     PT Stop Time: 1146  PT Total Time (min): 23 min     Billable Minutes: Evaluation 14 min and Therapeutic Activity 9 min      Candy Ocasio, PT, DPT  2020

## 2020-05-12 NOTE — PLAN OF CARE
Patient currently requires max assist with bed mobility, min assist with RW transfers, and min assist to take steps with RW.

## 2020-05-12 NOTE — PROGRESS NOTES
[facility] -   Pulmonology  Progress Note    Patient Name: Jaswant Yang  MRN: [id number]  Admission Date: 5/11/2020  Hospital Length of Stay: 1 days  Code Status: Full Code  Attending Provider: Geoff Warren MD  Primary Care Provider: Primary Doctor No   Principal Problem: Non-traumatic rupture of spleen    Subjective: still in pain     Interval History: post op , extubated, s/p thoracentesis    Objective:     Vital Signs (Most Recent):  Temp: 98.2 °F (36.8 °C) (05/12/20 1618)  Pulse: 99 (05/12/20 1618)  Resp: 18 (05/12/20 1618)  BP: 126/75 (05/12/20 1618)  SpO2: 95 % (05/12/20 1618) Vital Signs (24h Range):  Temp:  [98.2 °F (36.8 °C)-100.3 °F (37.9 °C)] 98.2 °F (36.8 °C)  Pulse:  [] 99  Resp:  [16-18] 18  SpO2:  [91 %-96 %] 95 %  BP: (113-129)/(58-75) 126/75     Weight: 127.4 kg (280 lb 13.9 oz)  Body mass index is 39.17 kg/m².      Intake/Output Summary (Last 24 hours) at 5/12/2020 1708  Last data filed at 5/12/2020 1400  Gross per 24 hour   Intake 900 ml   Output 1275 ml   Net -375 ml       Physical Exam   Constitutional: She appears well-developed and well-nourished. She appears distressed.   HENT:   Head: Normocephalic.   Eyes: Pupils are equal, round, and reactive to light.   Neck: Normal range of motion.   Cardiovascular: Normal rate.   Pulmonary/Chest: Effort normal. She has decreased breath sounds in the left lower field.   Abdominal: There is tenderness.   Musculoskeletal: Normal range of motion.   Neurological: She is alert.   Skin: Skin is warm. Capillary refill takes less than 2 seconds.   Nursing note and vitals reviewed.      Vents:  Oxygen Concentration (%): 28 (05/12/20 1350)    Lines/Drains/Airways     Central Venous Catheter Line                 Percutaneous Central Line Insertion/Assessment - single lumen  05/11/20 1700 left internal jugular 1 day          Drain                 Closed/Suction Drain 05/11/20 1300 Abdomen Bulb 19 Fr. 1 day                Significant  Labs:    CBC/Anemia Profile:  Recent Labs   Lab 05/11/20  0027  05/11/20  1509 05/11/20  1842 05/11/20  2355 05/12/20  0746   WBC 3.01*  --  1.50*  --   --  4.68   HGB 12.2   < > 9.5* 13.4 12.2 10.8*   HCT 40.7   < > 30.5* 40.3 36.4* 34.2*   *  --  84*  --   --  121*   MCV 97  --  95  --   --  94   RDW 19.9*  --  18.9*  --   --  21.1*    < > = values in this interval not displayed.        Chemistries:  Recent Labs   Lab 05/11/20  0027 05/12/20  0746    138   K 3.9 4.2    106   CO2 19* 21*   BUN 14 19   CREATININE 1.7* 1.5*   CALCIUM 9.7 8.5*   ALBUMIN 4.4 2.7*   PROT 7.6 5.8*   BILITOT 1.4* 1.8*   ALKPHOS 36* 29*   ALT 7* 10   AST 13 23       BMP:   Recent Labs   Lab 05/12/20  0746         K 4.2      CO2 21*   BUN 19   CREATININE 1.5*   CALCIUM 8.5*     CMP:   Recent Labs   Lab 05/11/20  0027 05/12/20  0746    138   K 3.9 4.2    106   CO2 19* 21*   * 107   BUN 14 19   CREATININE 1.7* 1.5*   CALCIUM 9.7 8.5*   PROT 7.6 5.8*   ALBUMIN 4.4 2.7*   BILITOT 1.4* 1.8*   ALKPHOS 36* 29*   AST 13 23   ALT 7* 10   ANIONGAP 16 11   EGFRNONAA 36* 42*     All pertinent labs within the past 24 hours have been reviewed.    Significant Imaging:  I have reviewed all pertinent imaging results/findings within the past 24 hours.  I have reviewed and interpreted all pertinent imaging results/findings within the past 24 hours.      ABG  Recent Labs   Lab 05/11/20  1228   PH 7.325*   PO2 263*   PCO2 41.2   HCO3 21.5*   BE -5     Assessment/Plan:     Post-operative state  General Surgery consulted and assisting with management.   Appreciate input.     Routine post operative care per general surgery.  5/12 pain control        Anemia  Transfused 1 unit post op. Monitor hemogram. Transfuse as needed  5/12 monitor.    Pleural effusion on left  S/ P thoracentesis by IR.   1300 ccs of jacqui fluid removed and sent to lab  Follow results.   5/12 culture negative, post traumatic  effusion           Jakub Pinedo MD  Pulmonology  [facility]

## 2020-05-13 LAB
ALBUMIN SERPL BCP-MCNC: 2.4 G/DL (ref 3.5–5.2)
ALP SERPL-CCNC: 55 U/L (ref 55–135)
ALT SERPL W/O P-5'-P-CCNC: 11 U/L (ref 10–44)
ANION GAP SERPL CALC-SCNC: 8 MMOL/L (ref 8–16)
AST SERPL-CCNC: 25 U/L (ref 10–40)
BASOPHILS # BLD AUTO: 0.01 K/UL (ref 0–0.2)
BASOPHILS NFR BLD: 0.2 % (ref 0–1.9)
BILIRUB SERPL-MCNC: 0.7 MG/DL (ref 0.1–1)
BUN SERPL-MCNC: 13 MG/DL (ref 6–20)
CALCIUM SERPL-MCNC: 8.7 MG/DL (ref 8.7–10.5)
CHLORIDE SERPL-SCNC: 104 MMOL/L (ref 95–110)
CO2 SERPL-SCNC: 22 MMOL/L (ref 23–29)
CREAT SERPL-MCNC: 1 MG/DL (ref 0.5–1.4)
DACRYOCYTES BLD QL SMEAR: ABNORMAL
DIFFERENTIAL METHOD: ABNORMAL
EOSINOPHIL # BLD AUTO: 0.1 K/UL (ref 0–0.5)
EOSINOPHIL NFR BLD: 1 % (ref 0–8)
ERYTHROCYTE [DISTWIDTH] IN BLOOD BY AUTOMATED COUNT: 20 % (ref 11.5–14.5)
EST. GFR  (AFRICAN AMERICAN): >60 ML/MIN/1.73 M^2
EST. GFR  (NON AFRICAN AMERICAN): >60 ML/MIN/1.73 M^2
GLUCOSE SERPL-MCNC: 107 MG/DL (ref 70–110)
HCT VFR BLD AUTO: 31.4 % (ref 37–48.5)
HCV AB SERPL QL IA: NEGATIVE
HGB BLD-MCNC: 9.5 G/DL (ref 12–16)
IMM GRANULOCYTES # BLD AUTO: 0.06 K/UL (ref 0–0.04)
IMM GRANULOCYTES NFR BLD AUTO: 1.2 % (ref 0–0.5)
LYMPHOCYTES # BLD AUTO: 0.4 K/UL (ref 1–4.8)
LYMPHOCYTES NFR BLD: 7.9 % (ref 18–48)
MCH RBC QN AUTO: 28.8 PG (ref 27–31)
MCHC RBC AUTO-ENTMCNC: 30.3 G/DL (ref 32–36)
MCV RBC AUTO: 95 FL (ref 82–98)
MONOCYTES # BLD AUTO: 0.1 K/UL (ref 0.3–1)
MONOCYTES NFR BLD: 2.4 % (ref 4–15)
NEUTROPHILS # BLD AUTO: 4.3 K/UL (ref 1.8–7.7)
NEUTROPHILS NFR BLD: 87.3 % (ref 38–73)
NRBC BLD-RTO: 12 /100 WBC
OVALOCYTES BLD QL SMEAR: ABNORMAL
PLATELET # BLD AUTO: 126 K/UL (ref 150–350)
PMV BLD AUTO: 9.3 FL (ref 9.2–12.9)
POIKILOCYTOSIS BLD QL SMEAR: SLIGHT
POTASSIUM SERPL-SCNC: 4.6 MMOL/L (ref 3.5–5.1)
PROT SERPL-MCNC: 5.6 G/DL (ref 6–8.4)
RBC # BLD AUTO: 3.3 M/UL (ref 4–5.4)
SODIUM SERPL-SCNC: 134 MMOL/L (ref 136–145)
WBC # BLD AUTO: 4.94 K/UL (ref 3.9–12.7)

## 2020-05-13 PROCEDURE — 80053 COMPREHEN METABOLIC PANEL: CPT

## 2020-05-13 PROCEDURE — 97116 GAIT TRAINING THERAPY: CPT

## 2020-05-13 PROCEDURE — 94640 AIRWAY INHALATION TREATMENT: CPT

## 2020-05-13 PROCEDURE — 63600175 PHARM REV CODE 636 W HCPCS: Performed by: FAMILY MEDICINE

## 2020-05-13 PROCEDURE — 99232 SBSQ HOSP IP/OBS MODERATE 35: CPT | Mod: ,,, | Performed by: INTERNAL MEDICINE

## 2020-05-13 PROCEDURE — 99232 PR SUBSEQUENT HOSPITAL CARE,LEVL II: ICD-10-PCS | Mod: ,,, | Performed by: INTERNAL MEDICINE

## 2020-05-13 PROCEDURE — 25000003 PHARM REV CODE 250: Performed by: SURGERY

## 2020-05-13 PROCEDURE — 99024 POSTOP FOLLOW-UP VISIT: CPT | Mod: ,,, | Performed by: SURGERY

## 2020-05-13 PROCEDURE — 94761 N-INVAS EAR/PLS OXIMETRY MLT: CPT

## 2020-05-13 PROCEDURE — 63600175 PHARM REV CODE 636 W HCPCS: Performed by: SURGERY

## 2020-05-13 PROCEDURE — 85025 COMPLETE CBC W/AUTO DIFF WBC: CPT

## 2020-05-13 PROCEDURE — 11000001 HC ACUTE MED/SURG PRIVATE ROOM

## 2020-05-13 PROCEDURE — 25000242 PHARM REV CODE 250 ALT 637 W/ HCPCS: Performed by: SURGERY

## 2020-05-13 PROCEDURE — 99024 PR POST-OP FOLLOW-UP VISIT: ICD-10-PCS | Mod: ,,, | Performed by: SURGERY

## 2020-05-13 PROCEDURE — 97530 THERAPEUTIC ACTIVITIES: CPT

## 2020-05-13 PROCEDURE — 36415 COLL VENOUS BLD VENIPUNCTURE: CPT

## 2020-05-13 PROCEDURE — 86803 HEPATITIS C AB TEST: CPT

## 2020-05-13 PROCEDURE — 63700000 PHARM REV CODE 250 ALT 637 W/O HCPCS: Performed by: FAMILY MEDICINE

## 2020-05-13 RX ORDER — PANTOPRAZOLE SODIUM 40 MG/1
40 TABLET, DELAYED RELEASE ORAL DAILY
Status: DISCONTINUED | OUTPATIENT
Start: 2020-05-13 | End: 2020-05-16 | Stop reason: HOSPADM

## 2020-05-13 RX ADMIN — MORPHINE SULFATE 2 MG: 2 INJECTION, SOLUTION INTRAMUSCULAR; INTRAVENOUS at 12:05

## 2020-05-13 RX ADMIN — IPRATROPIUM BROMIDE AND ALBUTEROL SULFATE 3 ML: .5; 3 SOLUTION RESPIRATORY (INHALATION) at 12:05

## 2020-05-13 RX ADMIN — HYDROCODONE BITARTRATE AND ACETAMINOPHEN 1 TABLET: 10; 325 TABLET ORAL at 04:05

## 2020-05-13 RX ADMIN — AZITHROMYCIN MONOHYDRATE 250 MG: 250 TABLET ORAL at 08:05

## 2020-05-13 RX ADMIN — MORPHINE SULFATE 2 MG: 2 INJECTION, SOLUTION INTRAMUSCULAR; INTRAVENOUS at 06:05

## 2020-05-13 RX ADMIN — CEFTRIAXONE 1 G: 1 INJECTION, SOLUTION INTRAVENOUS at 11:05

## 2020-05-13 RX ADMIN — CEFTRIAXONE 1 G: 1 INJECTION, SOLUTION INTRAVENOUS at 12:05

## 2020-05-13 RX ADMIN — PANTOPRAZOLE SODIUM 40 MG: 40 TABLET, DELAYED RELEASE ORAL at 10:05

## 2020-05-13 RX ADMIN — HYDROCODONE BITARTRATE AND ACETAMINOPHEN 1 TABLET: 10; 325 TABLET ORAL at 10:05

## 2020-05-13 RX ADMIN — ONDANSETRON 4 MG: 2 INJECTION INTRAMUSCULAR; INTRAVENOUS at 06:05

## 2020-05-13 NOTE — ASSESSMENT & PLAN NOTE
Probable related to possible splenic laceration and surrounding perisplenic hematoma.   Pain and nausea control   General surgery following     5/12/20  Pod 1 -  General surgery following.  Monitor     5/13/20  Improving  Prn pain meds

## 2020-05-13 NOTE — PROGRESS NOTES
[facility] -   Pulmonology  Progress Note    Patient Name: Jaswant Yang  MRN: [id number]  Admission Date: 5/11/2020  Hospital Length of Stay: 2 days  Code Status: Full Code  Attending Provider: Geoff Warren MD  Primary Care Provider: Primary Doctor No   Principal Problem: Non-traumatic rupture of spleen    Subjective: feels a little better     Interval History: slow improvement    Objective:     Vital Signs (Most Recent):  Temp: 98.5 °F (36.9 °C) (05/13/20 1708)  Pulse: 81 (05/13/20 1708)  Resp: 20 (05/13/20 1708)  BP: 135/76 (05/13/20 1708)  SpO2: (!) 94 % (05/13/20 1708) Vital Signs (24h Range):  Temp:  [97.7 °F (36.5 °C)-99.1 °F (37.3 °C)] 98.5 °F (36.9 °C)  Pulse:  [80-98] 81  Resp:  [] 20  SpO2:  [94 %-98 %] 94 %  BP: (119-143)/(70-76) 135/76     Weight: 127.4 kg (280 lb 13.9 oz)  Body mass index is 39.17 kg/m².      Intake/Output Summary (Last 24 hours) at 5/13/2020 1818  Last data filed at 5/13/2020 1600  Gross per 24 hour   Intake 961.67 ml   Output 760 ml   Net 201.67 ml       Physical Exam   Constitutional: She is oriented to person, place, and time. She appears well-developed and well-nourished.   HENT:   Head: Normocephalic and atraumatic.   Eyes: Pupils are equal, round, and reactive to light. Conjunctivae are normal.   Neck: Neck supple. No JVD present. No tracheal deviation present. No thyromegaly present.   Cardiovascular: Normal rate, regular rhythm and normal heart sounds.   Pulmonary/Chest: Effort normal. No respiratory distress. She has decreased breath sounds in the left lower field. She has no wheezes. She has rales in the left lower field. She exhibits no tenderness.   Abdominal: Soft. Bowel sounds are normal. There is tenderness.   Musculoskeletal: Normal range of motion. She exhibits no edema.   Lymphadenopathy:     She has no cervical adenopathy.   Neurological: She is alert and oriented to person, place, and time.   Skin: Skin is warm and dry.   Nursing note and  vitals reviewed.      Vents:  Oxygen Concentration (%): 28 (05/13/20 0051)    Lines/Drains/Airways     Drain                 Closed/Suction Drain 05/11/20 1300 Abdomen Bulb 19 Fr. 2 days    Female External Urinary Catheter 05/12/20 2345 less than 1 day          Peripheral Intravenous Line                 Peripheral IV - Single Lumen 05/12/20 1700 18 G 1 day                Significant Labs:    CBC/Anemia Profile:  Recent Labs   Lab 05/12/20  0746 05/12/20  1835 05/13/20  0752   WBC 4.68 4.84 4.94   HGB 10.8* 10.2* 9.5*   HCT 34.2* 32.1* 31.4*   * 125* 126*   MCV 94 94 95   RDW 21.1* 20.2* 20.0*        Chemistries:  Recent Labs   Lab 05/12/20  0746 05/13/20  0752    134*   K 4.2 4.6    104   CO2 21* 22*   BUN 19 13   CREATININE 1.5* 1.0   CALCIUM 8.5* 8.7   ALBUMIN 2.7* 2.4*   PROT 5.8* 5.6*   BILITOT 1.8* 0.7   ALKPHOS 29* 55   ALT 10 11   AST 23 25       BMP:   Recent Labs   Lab 05/13/20  0752      *   K 4.6      CO2 22*   BUN 13   CREATININE 1.0   CALCIUM 8.7     CMP:   Recent Labs   Lab 05/12/20  0746 05/13/20  0752    134*   K 4.2 4.6    104   CO2 21* 22*    107   BUN 19 13   CREATININE 1.5* 1.0   CALCIUM 8.5* 8.7   PROT 5.8* 5.6*   ALBUMIN 2.7* 2.4*   BILITOT 1.8* 0.7   ALKPHOS 29* 55   AST 23 25   ALT 10 11   ANIONGAP 11 8   EGFRNONAA 42* >60     All pertinent labs within the past 24 hours have been reviewed.    Significant Imaging:  I have reviewed all pertinent imaging results/findings within the past 24 hours.  I have reviewed and interpreted all pertinent imaging results/findings within the past 24 hours.      ABG  Recent Labs   Lab 05/11/20  1228   PH 7.325*   PO2 263*   PCO2 41.2   HCO3 21.5*   BE -5     Assessment/Plan:     Pleural effusion on left  S/ P thoracentesis by IR.   1300 ccs of jacqui fluid removed and sent to lab  Follow results.   5/12 culture negative, post traumatic effusion  5/13 culture negative, continue incentive spirometry             Jakub Pinedo MD  Pulmonology  [facility]

## 2020-05-13 NOTE — SUBJECTIVE & OBJECTIVE
Interval History: No acute issues. Reports reflux. Tolerating clears. Pain controled with prn analgesics  (still using IV)    Medications:  Continuous Infusions:  Scheduled Meds:   azithromycin  250 mg Oral Daily    cefTRIAXone (ROCEPHIN) IVPB  1 g Intravenous Q12H    morphine  4 mg Intravenous Once    pantoprazole  40 mg Oral Daily     PRN Meds:diphenhydrAMINE, HYDROcodone-acetaminophen, HYDROcodone-acetaminophen, morphine, ondansetron     Review of patient's allergies indicates:  No Known Allergies  Objective:     Vital Signs (Most Recent):  Temp: 98.7 °F (37.1 °C) (05/13/20 0721)  Pulse: 83 (05/13/20 0839)  Resp: 16 (05/13/20 0839)  BP: 128/75 (05/13/20 0721)  SpO2: 96 % (05/13/20 0839) Vital Signs (24h Range):  Temp:  [98.2 °F (36.8 °C)-99.1 °F (37.3 °C)] 98.7 °F (37.1 °C)  Pulse:  [80-99] 83  Resp:  [] 16  SpO2:  [91 %-98 %] 96 %  BP: (116-143)/(71-76) 128/75     Weight: 127.4 kg (280 lb 13.9 oz)  Body mass index is 39.17 kg/m².    Intake/Output - Last 3 Shifts       05/11 0700 - 05/12 0659 05/12 0700 - 05/13 0659 05/13 0700 - 05/14 0659    P.O. 0 1310     I.V. (mL/kg) 2000 (15.7) 1017.5 (8)     Blood 700      IV Piggyback 50 350     Total Intake(mL/kg) 2750 (21.6) 2677.5 (21)     Urine (mL/kg/hr) 950 (0.3) 775 (0.3)     Drains 180 190     Blood 200      Total Output 1330 965     Net +1420 +1712.5            Urine Occurrence   1 x          Physical Exam   Constitutional: She is oriented to person, place, and time. She appears well-developed and well-nourished. No distress.   HENT:   Head: Normocephalic and atraumatic.   Eyes: EOM are normal.   Neck: Neck supple.   Cardiovascular: Normal rate and regular rhythm.   Pulmonary/Chest: Effort normal.   Abdominal: Soft. She exhibits no distension. There is no tenderness (appropriate incisional, improved from yesterday).   Incision intact with staples, no signs of infection  CHRISTI more serous   Musculoskeletal: Normal range of motion.   Neurological: She is  alert and oriented to person, place, and time.   Skin: Skin is warm.   Vitals reviewed.      Significant Labs:  CBC:   Recent Labs   Lab 05/13/20  0752   WBC 4.94   RBC 3.30*   HGB 9.5*   HCT 31.4*   *   MCV 95   MCH 28.8   MCHC 30.3*     BMP:   Recent Labs   Lab 05/13/20  0752      *   K 4.6      CO2 22*   BUN 13   CREATININE 1.0   CALCIUM 8.7       Significant Diagnostics:  I have reviewed and interpreted all pertinent imaging results/findings within the past 24 hours.

## 2020-05-13 NOTE — ASSESSMENT & PLAN NOTE
Place in observation   CXR shows large left pleural effusion   Consult pulmonary   Rocephin and Azithromycin given in ED   Supplemental O 2     5/12/20  Uncertain of cause - awaiting surgery path report.  Improved xray yesterday given post thoracentesis - however still with everardo infiltrate.  BNP to be obtained to assess for CHF component.  No WBC or true fever at present - infectious cause less likely - but must be considered especially in setting of potential immunocompromised state if lymphoma present - continue rocephin and azithromycin.  Monitor and repeat CXR later today. Along with BNP - if indicated fluid overload plan to diuresis  Pulm following.     5/13/20  Respiratory status improved.   Continue abx for now for potential coverage.  At present no repeated CXR ordered given clinically seems much improved.  Will monitor closely  Suspected reactive effusion  pulm following

## 2020-05-13 NOTE — ASSESSMENT & PLAN NOTE
Pod 1  Spleen removed  Cause unknown - concern of lymphoma given op report  Path pending  Monospot negative.    POD 2  Feeling improved  Awaiting pathology.  Will need outpatient follow up for cause evaluation/ potential lymphoma evaluation.  Discussing vaccine selection with pharmacy - will need vaccinations prior to discharge.

## 2020-05-13 NOTE — PLAN OF CARE
Fall precautions maintained, pt free from injuries/fall.  Repositions independtly  Ambulates w asssit x1  C/o abdominal sx incisinal pain. Controlled w ordered meds  POC and meds discussed, both verbalized understanding.  Side rails x2, bed locked and low, phone and call light w/in reach.  Chart check done. Will cont to monitor.

## 2020-05-13 NOTE — PROGRESS NOTES
[facility] -   General Surgery  Progress Note    Subjective:     History of Present Illness:  44-year-old female with evidence of splenic laceration with infarction and questionable bleeding on CT abdomen and pelvis obtained by the ER for evaluation of epigastric pain.  She was admitted to the medicine service noted to have anemia and continued pain.  Surgery was consulted for evaluation.  She denies any trauma.  She does report some malaise over the last 2 weeks.  She has COVID negative.    Post-Op Info:  Procedure(s) (LRB):  LAPAROTOMY, EXPLORATORY (N/A)  SPLENECTOMY (N/A)  THORACENTESIS (Left)  BLOCK, TRANSVERSUS ABDOMINIS PLANE (N/A)   2 Days Post-Op     Interval History: No acute issues. Reports reflux. Tolerating clears. Pain controled with prn analgesics  (still using IV)    Medications:  Continuous Infusions:  Scheduled Meds:   azithromycin  250 mg Oral Daily    cefTRIAXone (ROCEPHIN) IVPB  1 g Intravenous Q12H    morphine  4 mg Intravenous Once    pantoprazole  40 mg Oral Daily     PRN Meds:diphenhydrAMINE, HYDROcodone-acetaminophen, HYDROcodone-acetaminophen, morphine, ondansetron     Review of patient's allergies indicates:  No Known Allergies  Objective:     Vital Signs (Most Recent):  Temp: 98.7 °F (37.1 °C) (05/13/20 0721)  Pulse: 83 (05/13/20 0839)  Resp: 16 (05/13/20 0839)  BP: 128/75 (05/13/20 0721)  SpO2: 96 % (05/13/20 0839) Vital Signs (24h Range):  Temp:  [98.2 °F (36.8 °C)-99.1 °F (37.3 °C)] 98.7 °F (37.1 °C)  Pulse:  [80-99] 83  Resp:  [] 16  SpO2:  [91 %-98 %] 96 %  BP: (116-143)/(71-76) 128/75     Weight: 127.4 kg (280 lb 13.9 oz)  Body mass index is 39.17 kg/m².    Intake/Output - Last 3 Shifts       05/11 0700 - 05/12 0659 05/12 0700 - 05/13 0659 05/13 0700 - 05/14 0659    P.O. 0 1310     I.V. (mL/kg) 2000 (15.7) 1017.5 (8)     Blood 700      IV Piggyback 50 350     Total Intake(mL/kg) 2750 (21.6) 2677.5 (21)     Urine (mL/kg/hr) 950 (0.3) 775 (0.3)     Drains 180  190     Blood 200      Total Output 1330 965     Net +1420 +1712.5            Urine Occurrence   1 x          Physical Exam   Constitutional: She is oriented to person, place, and time. She appears well-developed and well-nourished. No distress.   HENT:   Head: Normocephalic and atraumatic.   Eyes: EOM are normal.   Neck: Neck supple.   Cardiovascular: Normal rate and regular rhythm.   Pulmonary/Chest: Effort normal.   Abdominal: Soft. She exhibits no distension. There is no tenderness (appropriate incisional, improved from yesterday).   Incision intact with staples, no signs of infection  CHRISTI more serous   Musculoskeletal: Normal range of motion.   Neurological: She is alert and oriented to person, place, and time.   Skin: Skin is warm.   Vitals reviewed.      Significant Labs:  CBC:   Recent Labs   Lab 05/13/20  0752   WBC 4.94   RBC 3.30*   HGB 9.5*   HCT 31.4*   *   MCV 95   MCH 28.8   MCHC 30.3*     BMP:   Recent Labs   Lab 05/13/20  0752      *   K 4.6      CO2 22*   BUN 13   CREATININE 1.0   CALCIUM 8.7       Significant Diagnostics:  I have reviewed and interpreted all pertinent imaging results/findings within the past 24 hours.    Assessment/Plan:     * Non-traumatic rupture of spleen  POD#2 s/p ex lap, splenectomy    Advance to regular diet  HLIV  Encouraged only PO analgesia use  Up with PT  SCDs  Vaccines prior to discharge    Anemia  H/H remains stable postop    Pleural effusion on left  Pulmonology on board        Yumi Ferguson MD  General Surgery  [facility]

## 2020-05-13 NOTE — SUBJECTIVE & OBJECTIVE
Interval History: slow improvement    Objective:     Vital Signs (Most Recent):  Temp: 98.5 °F (36.9 °C) (05/13/20 1708)  Pulse: 81 (05/13/20 1708)  Resp: 20 (05/13/20 1708)  BP: 135/76 (05/13/20 1708)  SpO2: (!) 94 % (05/13/20 1708) Vital Signs (24h Range):  Temp:  [97.7 °F (36.5 °C)-99.1 °F (37.3 °C)] 98.5 °F (36.9 °C)  Pulse:  [80-98] 81  Resp:  [] 20  SpO2:  [94 %-98 %] 94 %  BP: (119-143)/(70-76) 135/76     Weight: 127.4 kg (280 lb 13.9 oz)  Body mass index is 39.17 kg/m².      Intake/Output Summary (Last 24 hours) at 5/13/2020 1818  Last data filed at 5/13/2020 1600  Gross per 24 hour   Intake 961.67 ml   Output 760 ml   Net 201.67 ml       Physical Exam   Constitutional: She is oriented to person, place, and time. She appears well-developed and well-nourished.   HENT:   Head: Normocephalic and atraumatic.   Eyes: Pupils are equal, round, and reactive to light. Conjunctivae are normal.   Neck: Neck supple. No JVD present. No tracheal deviation present. No thyromegaly present.   Cardiovascular: Normal rate, regular rhythm and normal heart sounds.   Pulmonary/Chest: Effort normal. No respiratory distress. She has decreased breath sounds in the left lower field. She has no wheezes. She has rales in the left lower field. She exhibits no tenderness.   Abdominal: Soft. Bowel sounds are normal. There is tenderness.   Musculoskeletal: Normal range of motion. She exhibits no edema.   Lymphadenopathy:     She has no cervical adenopathy.   Neurological: She is alert and oriented to person, place, and time.   Skin: Skin is warm and dry.   Nursing note and vitals reviewed.      Vents:  Oxygen Concentration (%): 28 (05/13/20 0051)    Lines/Drains/Airways     Drain                 Closed/Suction Drain 05/11/20 1300 Abdomen Bulb 19 Fr. 2 days    Female External Urinary Catheter 05/12/20 2345 less than 1 day          Peripheral Intravenous Line                 Peripheral IV - Single Lumen 05/12/20 1700 18 G 1 day                 Significant Labs:    CBC/Anemia Profile:  Recent Labs   Lab 05/12/20  0746 05/12/20  1835 05/13/20  0752   WBC 4.68 4.84 4.94   HGB 10.8* 10.2* 9.5*   HCT 34.2* 32.1* 31.4*   * 125* 126*   MCV 94 94 95   RDW 21.1* 20.2* 20.0*        Chemistries:  Recent Labs   Lab 05/12/20  0746 05/13/20  0752    134*   K 4.2 4.6    104   CO2 21* 22*   BUN 19 13   CREATININE 1.5* 1.0   CALCIUM 8.5* 8.7   ALBUMIN 2.7* 2.4*   PROT 5.8* 5.6*   BILITOT 1.8* 0.7   ALKPHOS 29* 55   ALT 10 11   AST 23 25       BMP:   Recent Labs   Lab 05/13/20  0752      *   K 4.6      CO2 22*   BUN 13   CREATININE 1.0   CALCIUM 8.7     CMP:   Recent Labs   Lab 05/12/20  0746 05/13/20  0752    134*   K 4.2 4.6    104   CO2 21* 22*    107   BUN 19 13   CREATININE 1.5* 1.0   CALCIUM 8.5* 8.7   PROT 5.8* 5.6*   ALBUMIN 2.7* 2.4*   BILITOT 1.8* 0.7   ALKPHOS 29* 55   AST 23 25   ALT 10 11   ANIONGAP 11 8   EGFRNONAA 42* >60     All pertinent labs within the past 24 hours have been reviewed.    Significant Imaging:  I have reviewed all pertinent imaging results/findings within the past 24 hours.  I have reviewed and interpreted all pertinent imaging results/findings within the past 24 hours.

## 2020-05-13 NOTE — PROGRESS NOTES
Clinical Pharmacy Brief Med Info Note     © 2020 [employer] and/or its affiliates. All Rights Reserved.  Vaccinations for adults (age >19 years) with impaired splenic function and adults undergoing splenectomy in the [place]*  Vaccine Primary series Revaccination (booster)   Pneumococcal vaccine  PCV13 (Prevnar) plus PPSV23 (Pneumovax) 1 dose of PCV13 followed by 1 dose of PPSV23 ?8 weeks later¶ PPSV23 every 5 years??   Haemophilus influenzae type b vaccine  Hib 1 dose§ Not applicable   Meningococcal serotype ACWY vaccine  MenACWY (Menactra or Menveo) 2 doses at least 8 weeks apart¥ Every 5 years   Meningococcal serotype B  MenB-FHbp (Trumemba) or MenB-4C (Bexsero) 2 doses of MenB-4C at least 1 month apart or 3 doses of MenB-FHbp at 0, 1 to 2, and 6 months Not applicable   Seasonal influenza virus 1 dose annually at the start of influenza season Repeat annually at start of influenza season   This table should be used in conjunction with the [facility] topic on prevention of sepsis in patients with impaired splenic function. In addition to the vaccines above, patients with impaired splenic function should also receive all routinely recommended age-appropriate vaccines. For patients undergoing elective splenectomy, vaccinations should be given at least 14 days prior to the procedure and ideally 10 to 12 weeks prior. For patients undergoing emergency splenectomy, vaccine series should be started 14 days after splenectomy. For patients with nonsurgical asplenia or hyposplenism, vaccinations should be given as soon as impaired splenic function is recognized.  * Available vaccine formulations and recommendations may differ outside of the [place].  ¶ If primary series already received (eg, due to concurrent immunocompromising condition or age >65 years), revaccinate with PPSV23 every 5 years. If only a single vaccine in the series has been received, refer to the [facility] topic on prevention of sepsis in  patients with impaired splenic function.  ? Some experts prefer to give the second dose of PPSV23 3 years after the first dose of PPSV in patients with sickle cell disease.  ? This recommendation differs from the Advisory Committee on Immunization Practices, which recommends a single revaccination dose of PPSV23 5 years after the first PPSV23 dose.  § Hib vaccination is recommended for all adults who have not been previously vaccinated or if vaccination status is unknown.  ¥ If primary series already received, continue revaccinating every 5 years.   If not already received.

## 2020-05-13 NOTE — PT/OT/SLP PROGRESS
Physical Therapy  Treatment    Jaswant Yang   MRN: [id number]   Admitting Diagnosis: Non-traumatic rupture of spleen    PT Received On: 05/13/20  PT Start Time: 0845     PT Stop Time: 0910    PT Total Time (min): 25 min       Billable Minutes:  Gait Training 15 and Therapeutic Activity 10    Treatment Type: Treatment  PT/PTA: PT     PTA Visit Number: 0       General Precautions: Standard, fall  Orthopedic Precautions: N/A   Braces: N/A         Subjective:  Communicated with NURSE IVORY AND Epic CHART REVIEW  prior to session.   PT AGREED TO TX     Pain/Comfort  Pain Rating 1: 3/10  Location 1: abdomen  Pain Rating Post-Intervention 1: 3/10    Objective:   Patient found with: peripheral IV, CHRISTI drain    Functional Mobility:  PT MET IN RM SUP.SIT EOB WITH ANDREW . PT SCOOTED TO EOB AND STOOD WITH RW AND CGA. PT GT TRAINED X 120' WITH CGA >SBA. PT RETURNED TO RM T/F TO COMMODE WITH SBA. PT TOILETED IND. PT RETURNED TO RM AND T/F TO CHAIR WITH RW AND SBA. PT LEFT SEATED WITH ALL NEEDS MET AND CALL BELL IN REACH.      AM-PAC 6 CLICK MOBILITY  How much help from another person does this patient currently need?   1 = Unable, Total/Dependent Assistance  2 = A lot, Maximum/Moderate Assistance  3 = A little, Minimum/Contact Guard/Supervision  4 = None, Modified Jones/Independent    Turning over in bed (including adjusting bedclothes, sheets and blankets)?: 3  Sitting down on and standing up from a chair with arms (e.g., wheelchair, bedside commode, etc.): 3  Moving from lying on back to sitting on the side of the bed?: 3  Moving to and from a bed to a chair (including a wheelchair)?: 3  Need to walk in hospital room?: 3  Climbing 3-5 steps with a railing?: 1  Basic Mobility Total Score: 16    AM-PAC Raw Score CMS G-Code Modifier Level of Impairment Assistance   6 % Total / Unable   7 - 9 CM 80 - 100% Maximal Assist   10 - 14 CL 60 - 80% Moderate Assist   15 - 19 CK 40 - 60% Moderate Assist   20 - 22 CJ 20 - 40%  Minimal Assist   23 CI 1-20% SBA / CGA   24 CH 0% Independent/ Mod I     Patient left up in chair with call button in reach and chair alarm on.    Assessment:  PT PROGRESSING WITH GT.     Rehab identified problem list/impairments: Rehab identified problem list/impairments: weakness, gait instability, impaired balance, impaired endurance, impaired functional mobilty, decreased lower extremity function, impaired self care skills, pain    Rehab potential is good.    Activity tolerance: Good    Discharge recommendations: Discharge Facility/Level of Care Needs: home     Barriers to discharge:      Equipment recommendations: Equipment Needed After Discharge: none     GOALS:   Multidisciplinary Problems     Physical Therapy Goals        Problem: Physical Therapy Goal    Goal Priority Disciplines Outcome Goal Variances Interventions   Physical Therapy Goal     PT, PT/OT Ongoing, Progressing     Description:  LTGs to be met within 7 days (5/19/20):  1.  Patient will perform bed mobility with min assist  2.  Patient will perform functional transfers with SPV  3.  Patient will ambulate 150 feet without AD with SBA and no gross LOB                    PLAN:    Patient to be seen 5 x/week  to address the above listed problems via gait training, therapeutic activities, therapeutic exercises  Plan of Care expires: 05/19/20  Plan of Care reviewed with: patient         Romina Mak, PT  05/13/2020

## 2020-05-13 NOTE — PROGRESS NOTES
"[facility] Hospital Medicine  Progress Note    Patient Name: Jaswant Yang  MRN: [id number]  Patient Class: IP- Inpatient   Admission Date: 5/11/2020  Length of Stay: 2 days  Attending Physician: Geoff Warren MD  Primary Care Provider: Primary Doctor No        Subjective:     Principal Problem:Non-traumatic rupture of spleen        HPI:  Ms Yang is a 44 year female with PMHx Anemia who presented to [facility] Emergency Room with complaints of Left side abdominal pain that started two days prior to arrival. Associated symptoms include constipation, RAO, left flank and back pain. She denies any recent trauma.  Denies associated symptoms of chest pain, fever, chill, nausea, vomiting or diaphoresis. CXR showed large left-sided pleural effusion with left-sided atelectasis or airspace disease. Abdominal X ray showed nonobstructive bowel gas pattern is noted, moderate constipation. Case reviewed with pulmonary, Dr Crouch, O 2 sats stable, plan for Left  thoracentesis today. Patient is a full code, mother Elsa Yang is surrogate decision maker. She is being placed in Observation under the care of [facility].     Overview/Hospital Course:  5/12/20  Post Op day 1  Patient is significant pain.  She does report feeling somewhat better.  Reviewed xray post thoracentesis done yesterday -  5/11/20 - "  1300 ccs of jacqui fluid removed and sent to lab"  Noted patchy infiltrates - denies significant SOB.    5/13/20  POD 2  Doing much better.  More alert, awake, breathing room air - no respiratory distress, no struggles speaking.  Pain controlled.      Interval History: no significant change    Review of Systems   Constitutional: Negative for chills and fever.   HENT: Negative for trouble swallowing.    Eyes: Negative for visual disturbance.   Respiratory: Negative for shortness of breath.    Cardiovascular: Negative for chest pain.   Gastrointestinal: Positive for abdominal pain.   Skin: Negative for " color change and pallor.   Neurological: Negative for dizziness.   Psychiatric/Behavioral: Negative for confusion.     Objective:     Vital Signs (Most Recent):  Temp: 98.7 °F (37.1 °C) (05/13/20 0721)  Pulse: 83 (05/13/20 0839)  Resp: 16 (05/13/20 0839)  BP: 128/75 (05/13/20 0721)  SpO2: 96 % (05/13/20 0839) Vital Signs (24h Range):  Temp:  [98.2 °F (36.8 °C)-99.1 °F (37.3 °C)] 98.7 °F (37.1 °C)  Pulse:  [80-99] 83  Resp:  [] 16  SpO2:  [91 %-98 %] 96 %  BP: (116-143)/(71-76) 128/75     Weight: 127.4 kg (280 lb 13.9 oz)  Body mass index is 39.17 kg/m².    Intake/Output Summary (Last 24 hours) at 5/13/2020 1129  Last data filed at 5/13/2020 0458  Gross per 24 hour   Intake 2387.5 ml   Output 915 ml   Net 1472.5 ml      Physical Exam   Constitutional: She is oriented to person, place, and time. She appears well-developed.   HENT:   Head: Normocephalic and atraumatic.   Eyes: Conjunctivae are normal.   Cardiovascular: Normal rate, regular rhythm and normal heart sounds.   Pulmonary/Chest: Effort normal and breath sounds normal. No respiratory distress.   Breathing room air   Musculoskeletal: She exhibits no edema.   Lymphadenopathy:     She has no cervical adenopathy.   Neurological: She is alert and oriented to person, place, and time.   Skin: Skin is warm. No pallor.   Psychiatric: She has a normal mood and affect. Her behavior is normal.       Significant Labs: All pertinent labs within the past 24 hours have been reviewed.    Significant Imaging: I have reviewed and interpreted all pertinent imaging results/findings within the past 24 hours.      Assessment/Plan:      * Non-traumatic rupture of spleen  Pod 1  Spleen removed  Cause unknown - concern of lymphoma given op report  Path pending  Monospot negative.    5/13/20  POD 2  Feeling improved  Awaiting pathology.  Will need outpatient follow up for cause evaluation/ potential lymphoma evaluation.  Discussing vaccine selection with pharmacy - will need  vaccinations prior to discharge.    Addendum to this point:  Discussed with Pharmacy. Vaccines to be given 14 days post surgery. Will need to be arranged as outpatient.       Injury to spleen  Post splenectomy      Post-operative state    Pain control      Anemia    5/12/20  Continue to trend  1 unit given during surgery  Monitor closely  Repeat late today and again in am.  Transfuse as needed.    5/13/20  Continue to monitor    Constipation  Abdominal X ray showed moderate constipation   Laxative or stool softness as need   Limit narcotics if possible     5/12/20  monitor      Abdominal pain  Probable related to possible splenic laceration and surrounding perisplenic hematoma.   Pain and nausea control   General surgery following     5/12/20  Pod 1 -  General surgery following.  Monitor     5/13/20  Improving  Prn pain meds      CKD (chronic kidney disease) stage 3, GFR 30-59 ml/min    Creatinine 1.7 today   GFR 54,  one year ago   Avoid nephrotoxic agents   Monitor     5/12/20  Continue to monitor.  Slight improvement overnight.     5/13/20   Renal function normalized.      Other ascites    Monitor -  Concern of lympoma given op report    Pleural effusion on left  Place in observation   CXR shows large left pleural effusion   Consult pulmonary   Rocephin and Azithromycin given in ED   Supplemental O 2     5/12/20  Uncertain of cause - awaiting surgery path report.  Improved xray yesterday given post thoracentesis - however still with everardo infiltrate.  BNP to be obtained to assess for CHF component.  No WBC or true fever at present - infectious cause less likely - but must be considered especially in setting of potential immunocompromised state if lymphoma present - continue rocephin and azithromycin.  Monitor and repeat CXR later today. Along with BNP - if indicated fluid overload plan to diuresis  Pulm following.     5/13/20  Respiratory status improved.   Continue abx for now for potential coverage.  At present  no repeated CXR ordered given clinically seems much improved.  Will monitor closely  Suspected reactive effusion  pulm following            VTE Risk Mitigation (From admission, onward)         Ordered     Place sequential compression device  Until discontinued      05/11/20 3069                      Geoff Warren MD  Department of Hospital Medicine   [facility]

## 2020-05-13 NOTE — PLAN OF CARE
SW spoke with patient by phone to assess for discharge planning. Patient sounded alert and oriented.  Patient denied the use of any medical/respiratory assistive equipment or home health services before coming to the hospital.  Patient reported that she is now living with her son, and identified him as her help at home.  Patient stated that she manages her own healthcare.  Patient denied the need for any assistive equipment, home health services, but stated that she needed assistance finding her a primary care physican.  Patient anticipates discharging with no addditional needs. SW discussed information on advanced directives, information on pharmacy bedside delivery, and discharge planning begins on admission with contact information for any needs/questions.     D/C Plan:  Home  PCP:  None  Preferred Pharmacy:  [facility] ([address]  Discharge transportation:  Son  [facility]:  Pharmacy Bedside Delivery: Yes       05/13/20 1025   Discharge Assessment   Assessment Type Discharge Planning Assessment   Confirmed/corrected address and phone number on facesheet? Yes   Assessment information obtained from? Patient   Expected Length of Stay (days)   (TBD)   Communicated expected length of stay with patient/caregiver yes   Prior to hospitilization cognitive status: Alert/Oriented   Prior to hospitalization functional status: Independent   Current cognitive status: Alert/Oriented   Current Functional Status: Independent   Facility Arrived From: Home   Lives With child(isaias), adult   Able to Return to Prior Arrangements yes   Is patient able to care for self after discharge? Yes   Who are your caregiver(s) and their phone number(s)? Elsa Yang (mother) [phone] and Dmitri Yang (son) [phone]   Patient's perception of discharge disposition home or selfcare   Readmission Within the Last 30 Days no previous admission in last 30 days   Patient currently being followed by outpatient case management? No    Patient currently receives any other outside agency services? No   Equipment Currently Used at Home none   Do you have any problems affording any of your prescribed medications? No   Is the patient taking medications as prescribed? yes   Does the patient have transportation home? Yes   Transportation Anticipated family or friend will provide  (son will tranport patient home)   Dialysis Name and Scheduled days N/A   Does the patient receive services at the Coumadin Clinic? No   Discharge Plan A Home with family   Discharge Plan B Home with family   DME Needed Upon Discharge  none   Patient/Family in Agreement with Plan yes

## 2020-05-13 NOTE — ASSESSMENT & PLAN NOTE
Creatinine 1.7 today   GFR 54,  one year ago   Avoid nephrotoxic agents   Monitor     5/12/20  Continue to monitor.  Slight improvement overnight.     5/13/20   Renal function normalized.

## 2020-05-13 NOTE — ASSESSMENT & PLAN NOTE
5/12/20  Continue to trend  1 unit given during surgery  Monitor closely  Repeat late today and again in am.  Transfuse as needed.    5/13/20  Continue to monitor

## 2020-05-13 NOTE — ASSESSMENT & PLAN NOTE
POD#2 s/p ex lap, splenectomy    Advance to regular diet  HLIV  Encouraged only PO analgesia use  Up with PT  SCDs  Vaccines prior to discharge

## 2020-05-13 NOTE — ASSESSMENT & PLAN NOTE
S/ P thoracentesis by IR.   1300 ccs of jacqui fluid removed and sent to lab  Follow results.   5/12 culture negative, post traumatic effusion  5/13 culture negative, continue incentive spirometry

## 2020-05-13 NOTE — SUBJECTIVE & OBJECTIVE
Interval History: no significant change    Review of Systems   Constitutional: Negative for chills and fever.   HENT: Negative for trouble swallowing.    Eyes: Negative for visual disturbance.   Respiratory: Negative for shortness of breath.    Cardiovascular: Negative for chest pain.   Gastrointestinal: Positive for abdominal pain.   Skin: Negative for color change and pallor.   Neurological: Negative for dizziness.   Psychiatric/Behavioral: Negative for confusion.     Objective:     Vital Signs (Most Recent):  Temp: 98.7 °F (37.1 °C) (05/13/20 0721)  Pulse: 83 (05/13/20 0839)  Resp: 16 (05/13/20 0839)  BP: 128/75 (05/13/20 0721)  SpO2: 96 % (05/13/20 0839) Vital Signs (24h Range):  Temp:  [98.2 °F (36.8 °C)-99.1 °F (37.3 °C)] 98.7 °F (37.1 °C)  Pulse:  [80-99] 83  Resp:  [] 16  SpO2:  [91 %-98 %] 96 %  BP: (116-143)/(71-76) 128/75     Weight: 127.4 kg (280 lb 13.9 oz)  Body mass index is 39.17 kg/m².    Intake/Output Summary (Last 24 hours) at 5/13/2020 1129  Last data filed at 5/13/2020 0458  Gross per 24 hour   Intake 2387.5 ml   Output 915 ml   Net 1472.5 ml      Physical Exam   Constitutional: She is oriented to person, place, and time. She appears well-developed.   HENT:   Head: Normocephalic and atraumatic.   Eyes: Conjunctivae are normal.   Cardiovascular: Normal rate, regular rhythm and normal heart sounds.   Pulmonary/Chest: Effort normal and breath sounds normal. No respiratory distress.   Breathing room air   Musculoskeletal: She exhibits no edema.   Lymphadenopathy:     She has no cervical adenopathy.   Neurological: She is alert and oriented to person, place, and time.   Skin: Skin is warm. No pallor.   Psychiatric: She has a normal mood and affect. Her behavior is normal.       Significant Labs: All pertinent labs within the past 24 hours have been reviewed.    Significant Imaging: I have reviewed and interpreted all pertinent imaging results/findings within the past 24 hours.

## 2020-05-14 PROBLEM — K21.9 GERD (GASTROESOPHAGEAL REFLUX DISEASE): Status: ACTIVE | Noted: 2020-05-14

## 2020-05-14 LAB
ANION GAP SERPL CALC-SCNC: 7 MMOL/L (ref 8–16)
BASOPHILS # BLD AUTO: 0.01 K/UL (ref 0–0.2)
BASOPHILS NFR BLD: 0.4 % (ref 0–1.9)
BUN SERPL-MCNC: 13 MG/DL (ref 6–20)
CALCIUM SERPL-MCNC: 8.7 MG/DL (ref 8.7–10.5)
CHLORIDE SERPL-SCNC: 103 MMOL/L (ref 95–110)
CHOLEST FLD-MCNC: 83 MG/DL
CO2 SERPL-SCNC: 25 MMOL/L (ref 23–29)
CREAT SERPL-MCNC: 0.9 MG/DL (ref 0.5–1.4)
DIFFERENTIAL METHOD: ABNORMAL
EOSINOPHIL # BLD AUTO: 0.1 K/UL (ref 0–0.5)
EOSINOPHIL NFR BLD: 3.3 % (ref 0–8)
ERYTHROCYTE [DISTWIDTH] IN BLOOD BY AUTOMATED COUNT: 18.9 % (ref 11.5–14.5)
EST. GFR  (AFRICAN AMERICAN): >60 ML/MIN/1.73 M^2
EST. GFR  (NON AFRICAN AMERICAN): >60 ML/MIN/1.73 M^2
FINAL PATHOLOGIC DIAGNOSIS: NORMAL
GLUCOSE SERPL-MCNC: 89 MG/DL (ref 70–110)
HCT VFR BLD AUTO: 31.8 % (ref 37–48.5)
HGB BLD-MCNC: 9.9 G/DL (ref 12–16)
IMM GRANULOCYTES # BLD AUTO: 0.09 K/UL (ref 0–0.04)
IMM GRANULOCYTES NFR BLD AUTO: 3.3 % (ref 0–0.5)
LYMPHOCYTES # BLD AUTO: 0.5 K/UL (ref 1–4.8)
LYMPHOCYTES NFR BLD: 19.6 % (ref 18–48)
MCH RBC QN AUTO: 29.3 PG (ref 27–31)
MCHC RBC AUTO-ENTMCNC: 31.1 G/DL (ref 32–36)
MCV RBC AUTO: 94 FL (ref 82–98)
MONOCYTES # BLD AUTO: 0.1 K/UL (ref 0.3–1)
MONOCYTES NFR BLD: 3.6 % (ref 4–15)
NEUTROPHILS # BLD AUTO: 1.9 K/UL (ref 1.8–7.7)
NEUTROPHILS NFR BLD: 69.8 % (ref 38–73)
NRBC BLD-RTO: 13 /100 WBC
PLATELET # BLD AUTO: 144 K/UL (ref 150–350)
PMV BLD AUTO: 9.9 FL (ref 9.2–12.9)
POTASSIUM SERPL-SCNC: 4.1 MMOL/L (ref 3.5–5.1)
RBC # BLD AUTO: 3.38 M/UL (ref 4–5.4)
SODIUM SERPL-SCNC: 135 MMOL/L (ref 136–145)
SPECIMEN SOURCE: NORMAL
WBC # BLD AUTO: 2.75 K/UL (ref 3.9–12.7)

## 2020-05-14 PROCEDURE — 80048 BASIC METABOLIC PNL TOTAL CA: CPT

## 2020-05-14 PROCEDURE — 11000001 HC ACUTE MED/SURG PRIVATE ROOM

## 2020-05-14 PROCEDURE — 99024 POSTOP FOLLOW-UP VISIT: CPT | Mod: ,,, | Performed by: SURGERY

## 2020-05-14 PROCEDURE — 85025 COMPLETE CBC W/AUTO DIFF WBC: CPT

## 2020-05-14 PROCEDURE — 97530 THERAPEUTIC ACTIVITIES: CPT

## 2020-05-14 PROCEDURE — 36415 COLL VENOUS BLD VENIPUNCTURE: CPT

## 2020-05-14 PROCEDURE — 99231 SBSQ HOSP IP/OBS SF/LOW 25: CPT | Mod: ,,, | Performed by: INTERNAL MEDICINE

## 2020-05-14 PROCEDURE — 63700000 PHARM REV CODE 250 ALT 637 W/O HCPCS: Performed by: FAMILY MEDICINE

## 2020-05-14 PROCEDURE — 99231 PR SUBSEQUENT HOSPITAL CARE,LEVL I: ICD-10-PCS | Mod: ,,, | Performed by: INTERNAL MEDICINE

## 2020-05-14 PROCEDURE — 99024 PR POST-OP FOLLOW-UP VISIT: ICD-10-PCS | Mod: ,,, | Performed by: SURGERY

## 2020-05-14 PROCEDURE — 63600175 PHARM REV CODE 636 W HCPCS: Performed by: FAMILY MEDICINE

## 2020-05-14 PROCEDURE — 97116 GAIT TRAINING THERAPY: CPT

## 2020-05-14 PROCEDURE — 25000003 PHARM REV CODE 250: Performed by: SURGERY

## 2020-05-14 RX ADMIN — HYDROCODONE BITARTRATE AND ACETAMINOPHEN 1 TABLET: 10; 325 TABLET ORAL at 05:05

## 2020-05-14 RX ADMIN — AZITHROMYCIN MONOHYDRATE 250 MG: 250 TABLET ORAL at 09:05

## 2020-05-14 RX ADMIN — MORPHINE SULFATE 2 MG: 2 INJECTION, SOLUTION INTRAMUSCULAR; INTRAVENOUS at 01:05

## 2020-05-14 RX ADMIN — MORPHINE SULFATE 2 MG: 2 INJECTION, SOLUTION INTRAMUSCULAR; INTRAVENOUS at 12:05

## 2020-05-14 RX ADMIN — HYDROCODONE BITARTRATE AND ACETAMINOPHEN 1 TABLET: 10; 325 TABLET ORAL at 11:05

## 2020-05-14 RX ADMIN — HYDROCODONE BITARTRATE AND ACETAMINOPHEN 1 TABLET: 10; 325 TABLET ORAL at 04:05

## 2020-05-14 RX ADMIN — MORPHINE SULFATE 2 MG: 2 INJECTION, SOLUTION INTRAMUSCULAR; INTRAVENOUS at 07:05

## 2020-05-14 RX ADMIN — MORPHINE SULFATE 2 MG: 2 INJECTION, SOLUTION INTRAMUSCULAR; INTRAVENOUS at 06:05

## 2020-05-14 RX ADMIN — PANTOPRAZOLE SODIUM 40 MG: 40 TABLET, DELAYED RELEASE ORAL at 09:05

## 2020-05-14 RX ADMIN — CEFTRIAXONE 1 G: 1 INJECTION, SOLUTION INTRAVENOUS at 11:05

## 2020-05-14 NOTE — ASSESSMENT & PLAN NOTE
5/12/20  Continue to trend  1 unit given during surgery  Monitor closely  Repeat late today and again in am.  Transfuse as needed.    5/13/20  Continue to monitor    5/14/20  Stable.

## 2020-05-14 NOTE — ASSESSMENT & PLAN NOTE
POD#3 s/p ex lap, splenectomy    Continue diet as tolerated  Encouraged only PO analgesia use  Up with PT  SCDs  Continue CHRISTI drain  Vaccines prior to discharge

## 2020-05-14 NOTE — PROGRESS NOTES
[facility] -   General Surgery  Progress Note    Subjective:     History of Present Illness:  44-year-old female with evidence of splenic laceration with infarction and questionable bleeding on CT abdomen and pelvis obtained by the ER for evaluation of epigastric pain.  She was admitted to the medicine service noted to have anemia and continued pain.  Surgery was consulted for evaluation.  She denies any trauma.  She does report some malaise over the last 2 weeks.  She has COVID negative.    Post-Op Info:  Procedure(s) (LRB):  LAPAROTOMY, EXPLORATORY (N/A)  SPLENECTOMY (N/A)  THORACENTESIS (Left)  BLOCK, TRANSVERSUS ABDOMINIS PLANE (N/A)   3 Days Post-Op     Interval History: Doing well. Still with incisional pain. CHRISTI output downtrending. Up with PT this AM. No flatus or BM yet. Tolerating regular diet.     Medications:  Continuous Infusions:  Scheduled Meds:   azithromycin  250 mg Oral Daily    cefTRIAXone (ROCEPHIN) IVPB  1 g Intravenous Q12H    morphine  4 mg Intravenous Once    pantoprazole  40 mg Oral Daily     PRN Meds:diphenhydrAMINE, HYDROcodone-acetaminophen, HYDROcodone-acetaminophen, morphine, ondansetron     Review of patient's allergies indicates:  No Known Allergies  Objective:     Vital Signs (Most Recent):  Temp: 97.7 °F (36.5 °C) (05/14/20 0726)  Pulse: 81 (05/14/20 0726)  Resp: 16 (05/14/20 0726)  BP: 139/74 (05/14/20 0726)  SpO2: 95 % (05/14/20 0726) Vital Signs (24h Range):  Temp:  [97.7 °F (36.5 °C)-98.9 °F (37.2 °C)] 97.7 °F (36.5 °C)  Pulse:  [81-90] 81  Resp:  [16-20] 16  SpO2:  [92 %-96 %] 95 %  BP: (119-140)/(67-78) 139/74     Weight: 127.4 kg (280 lb 13.9 oz)  Body mass index is 39.17 kg/m².    Intake/Output - Last 3 Shifts       05/12 0700 - 05/13 0659 05/13 0700 - 05/14 0659 05/14 0700 - 05/15 0659    P.O. 1310 480     I.V. (mL/kg) 1017.5 (8)      Blood       IV Piggyback 350 100     Total Intake(mL/kg) 2677.5 (21) 580 (4.6)     Urine (mL/kg/hr) 775 (0.3) 700 (0.2)      Drains 190 60     Blood       Total Output 965 760     Net +1712.5 -180            Urine Occurrence  1 x           Physical Exam   Constitutional: She is oriented to person, place, and time. She appears well-developed and well-nourished. No distress.   HENT:   Head: Normocephalic and atraumatic.   Eyes: EOM are normal.   Neck: Neck supple.   Cardiovascular: Normal rate and regular rhythm.   Pulmonary/Chest: Effort normal.   Abdominal: Soft. She exhibits no distension. There is tenderness (appropriate incisional).   Incision healing, no signs of infection   Musculoskeletal: Normal range of motion.   Neurological: She is alert and oriented to person, place, and time.   Skin: Skin is warm.   Vitals reviewed.      Significant Labs:  CBC:   Recent Labs   Lab 05/14/20  0521   WBC 2.75*   RBC 3.38*   HGB 9.9*   HCT 31.8*   *   MCV 94   MCH 29.3   MCHC 31.1*     BMP:   Recent Labs   Lab 05/14/20  0521   GLU 89   *   K 4.1      CO2 25   BUN 13   CREATININE 0.9   CALCIUM 8.7       Significant Diagnostics:  I have reviewed and interpreted all pertinent imaging results/findings within the past 24 hours. AM CXR pending    Assessment/Plan:     * Non-traumatic rupture of spleen  POD#3 s/p ex lap, splenectomy    Continue diet as tolerated  Encouraged only PO analgesia use  Up with PT  SCDs  Continue CHRISTI drain  Vaccines prior to discharge    Anemia  H/H remains stable postop    Pleural effusion on left  Pulmonology on board        Yumi Ferguson MD  General Surgery  [facility]

## 2020-05-14 NOTE — PLAN OF CARE
Problem: Adult Inpatient Plan of Care  Goal: Plan of Care Review  Outcome: Ongoing, Progressing  Flowsheets (Taken 5/14/2020 0234)  Plan of Care Reviewed With: patient   Pt had no adverse events during shift. Pt free of falls. Call light in reach. Side rails x 2. Pain well controlled w/ prn meds. Pt repositions independently. IV abx administered as ordered. Incision CDI. Encouraged mobility, I/S use, turn cough and deep breathe. Pt very resistant and wants to rest. VSS. Chart reviewed, will continue to monitor.

## 2020-05-14 NOTE — SUBJECTIVE & OBJECTIVE
Interval History: Doing well. Still with incisional pain. CHRISTI output downtrending. Up with PT this AM. No flatus or BM yet. Tolerating regular diet.     Medications:  Continuous Infusions:  Scheduled Meds:   azithromycin  250 mg Oral Daily    cefTRIAXone (ROCEPHIN) IVPB  1 g Intravenous Q12H    morphine  4 mg Intravenous Once    pantoprazole  40 mg Oral Daily     PRN Meds:diphenhydrAMINE, HYDROcodone-acetaminophen, HYDROcodone-acetaminophen, morphine, ondansetron     Review of patient's allergies indicates:  No Known Allergies  Objective:     Vital Signs (Most Recent):  Temp: 97.7 °F (36.5 °C) (05/14/20 0726)  Pulse: 81 (05/14/20 0726)  Resp: 16 (05/14/20 0726)  BP: 139/74 (05/14/20 0726)  SpO2: 95 % (05/14/20 0726) Vital Signs (24h Range):  Temp:  [97.7 °F (36.5 °C)-98.9 °F (37.2 °C)] 97.7 °F (36.5 °C)  Pulse:  [81-90] 81  Resp:  [16-20] 16  SpO2:  [92 %-96 %] 95 %  BP: (119-140)/(67-78) 139/74     Weight: 127.4 kg (280 lb 13.9 oz)  Body mass index is 39.17 kg/m².    Intake/Output - Last 3 Shifts       05/12 0700 - 05/13 0659 05/13 0700 - 05/14 0659 05/14 0700 - 05/15 0659    P.O. 1310 480     I.V. (mL/kg) 1017.5 (8)      Blood       IV Piggyback 350 100     Total Intake(mL/kg) 2677.5 (21) 580 (4.6)     Urine (mL/kg/hr) 775 (0.3) 700 (0.2)     Drains 190 60     Blood       Total Output 965 760     Net +1712.5 -180            Urine Occurrence  1 x           Physical Exam   Constitutional: She is oriented to person, place, and time. She appears well-developed and well-nourished. No distress.   HENT:   Head: Normocephalic and atraumatic.   Eyes: EOM are normal.   Neck: Neck supple.   Cardiovascular: Normal rate and regular rhythm.   Pulmonary/Chest: Effort normal.   Abdominal: Soft. She exhibits no distension. There is tenderness (appropriate incisional).   Incision healing, no signs of infection   Musculoskeletal: Normal range of motion.   Neurological: She is alert and oriented to person, place, and time.   Skin:  Skin is warm.   Vitals reviewed.      Significant Labs:  CBC:   Recent Labs   Lab 05/14/20  0521   WBC 2.75*   RBC 3.38*   HGB 9.9*   HCT 31.8*   *   MCV 94   MCH 29.3   MCHC 31.1*     BMP:   Recent Labs   Lab 05/14/20  0521   GLU 89   *   K 4.1      CO2 25   BUN 13   CREATININE 0.9   CALCIUM 8.7       Significant Diagnostics:  I have reviewed and interpreted all pertinent imaging results/findings within the past 24 hours. AM CXR pending

## 2020-05-14 NOTE — ASSESSMENT & PLAN NOTE
Probable related to possible splenic laceration and surrounding perisplenic hematoma.   Pain and nausea control   General surgery following     5/12/20  Pod 1 -  General surgery following.  Monitor     5/13/20  Improving  Prn pain meds      5/14/20  PRN pain med

## 2020-05-14 NOTE — SUBJECTIVE & OBJECTIVE
Interval History: no significant change.      Review of Systems   Constitutional: Negative for chills and fever.   HENT: Negative for trouble swallowing.    Eyes: Negative for visual disturbance.   Respiratory: Negative for shortness of breath.    Cardiovascular: Negative for chest pain.   Gastrointestinal: Positive for abdominal pain.   Skin: Negative for color change and pallor.   Neurological: Negative for dizziness.   Psychiatric/Behavioral: Negative for confusion.     Objective:     Vital Signs (Most Recent):  Temp: 98.1 °F (36.7 °C) (05/14/20 1224)  Pulse: 83 (05/14/20 1224)  Resp: 18 (05/14/20 1224)  BP: (!) 145/87 (05/14/20 1224)  SpO2: 98 % (05/14/20 1224) Vital Signs (24h Range):  Temp:  [97.7 °F (36.5 °C)-98.9 °F (37.2 °C)] 98.1 °F (36.7 °C)  Pulse:  [81-90] 83  Resp:  [16-20] 18  SpO2:  [92 %-98 %] 98 %  BP: (120-145)/(67-87) 145/87     Weight: 127.4 kg (280 lb 13.9 oz)  Body mass index is 39.17 kg/m².    Intake/Output Summary (Last 24 hours) at 5/14/2020 1312  Last data filed at 5/14/2020 0600  Gross per 24 hour   Intake 580 ml   Output 760 ml   Net -180 ml      Physical Exam   Constitutional: She is oriented to person, place, and time. She appears well-developed.   HENT:   Head: Normocephalic and atraumatic.   Eyes: Conjunctivae are normal.   Cardiovascular: Normal rate, regular rhythm and normal heart sounds.   Pulmonary/Chest: Effort normal and breath sounds normal. No respiratory distress.   Breathing room air   Musculoskeletal: She exhibits no edema.   Lymphadenopathy:     She has no cervical adenopathy.   Neurological: She is alert and oriented to person, place, and time.   Skin: Skin is warm. No pallor.   Psychiatric: She has a normal mood and affect. Her behavior is normal.       Significant Labs: All pertinent labs within the past 24 hours have been reviewed.    Significant Imaging: I have reviewed and interpreted all pertinent imaging results/findings within the past 24 hours.

## 2020-05-14 NOTE — PROGRESS NOTES
[facility] -   Pulmonology  Progress Note    Patient Name: Jaswant Yang  MRN: [id number]  Admission Date: 5/11/2020  Hospital Length of Stay: 3 days  Code Status: Full Code  Attending Provider: Geoff Warren MD  Primary Care Provider: Primary Doctor No   Principal Problem: Non-traumatic rupture of spleen    Subjective:     Interval History: slow improvement    Objective:     Vital Signs (Most Recent):  Temp: 98.1 °F (36.7 °C) (05/14/20 1224)  Pulse: 83 (05/14/20 1224)  Resp: 18 (05/14/20 1224)  BP: (!) 145/87 (05/14/20 1224)  SpO2: 98 % (05/14/20 1224) Vital Signs (24h Range):  Temp:  [97.7 °F (36.5 °C)-98.9 °F (37.2 °C)] 98.1 °F (36.7 °C)  Pulse:  [81-90] 83  Resp:  [16-20] 18  SpO2:  [92 %-98 %] 98 %  BP: (120-145)/(67-87) 145/87     Weight: 127.4 kg (280 lb 13.9 oz)  Body mass index is 39.17 kg/m².      Intake/Output Summary (Last 24 hours) at 5/14/2020 1621  Last data filed at 5/14/2020 0600  Gross per 24 hour   Intake 580 ml   Output 730 ml   Net -150 ml       Physical Exam   Constitutional: She is oriented to person, place, and time. She appears well-developed and well-nourished.   HENT:   Head: Normocephalic and atraumatic.   Eyes: Pupils are equal, round, and reactive to light. Conjunctivae are normal.   Neck: Neck supple. No JVD present. No tracheal deviation present. No thyromegaly present.   Cardiovascular: Normal rate, regular rhythm and normal heart sounds.   Pulmonary/Chest: Effort normal. No respiratory distress. She has decreased breath sounds in the left lower field. She has no wheezes. She has rales in the left lower field. She exhibits no tenderness.   Abdominal: Soft. Bowel sounds are normal. There is tenderness.   Musculoskeletal: Normal range of motion. She exhibits no edema.   Lymphadenopathy:     She has no cervical adenopathy.   Neurological: She is alert and oriented to person, place, and time.   Skin: Skin is warm and dry.   Nursing note and vitals  reviewed.      Vents:  Oxygen Concentration (%): 28 (05/13/20 0051)    Lines/Drains/Airways     Drain                 Closed/Suction Drain 05/11/20 1300 Abdomen Bulb 19 Fr. 3 days          Peripheral Intravenous Line                 Peripheral IV - Single Lumen 05/13/20 1900 Left  less than 1 day                Significant Labs:    CBC/Anemia Profile:  Recent Labs   Lab 05/12/20  1835 05/13/20  0752 05/14/20  0521   WBC 4.84 4.94 2.75*   HGB 10.2* 9.5* 9.9*   HCT 32.1* 31.4* 31.8*   * 126* 144*   MCV 94 95 94   RDW 20.2* 20.0* 18.9*        Chemistries:  Recent Labs   Lab 05/13/20  0752 05/14/20  0521   * 135*   K 4.6 4.1    103   CO2 22* 25   BUN 13 13   CREATININE 1.0 0.9   CALCIUM 8.7 8.7   ALBUMIN 2.4*  --    PROT 5.6*  --    BILITOT 0.7  --    ALKPHOS 55  --    ALT 11  --    AST 25  --        BMP:   Recent Labs   Lab 05/14/20  0521   GLU 89   *   K 4.1      CO2 25   BUN 13   CREATININE 0.9   CALCIUM 8.7     CMP:   Recent Labs   Lab 05/13/20  0752 05/14/20  0521   * 135*   K 4.6 4.1    103   CO2 22* 25    89   BUN 13 13   CREATININE 1.0 0.9   CALCIUM 8.7 8.7   PROT 5.6*  --    ALBUMIN 2.4*  --    BILITOT 0.7  --    ALKPHOS 55  --    AST 25  --    ALT 11  --    ANIONGAP 8 7*   EGFRNONAA >60 >60     All pertinent labs within the past 24 hours have been reviewed.    Significant Imaging:  I have reviewed all pertinent imaging results/findings within the past 24 hours.  I have reviewed and interpreted all pertinent imaging results/findings within the past 24 hours.      ABG  Recent Labs   Lab 05/11/20  1228   PH 7.325*   PO2 263*   PCO2 41.2   HCO3 21.5*   BE -5     Assessment/Plan:     * Non-traumatic rupture of spleen  5/14/2020 awaiting path    Pleural effusion on left  S/ P thoracentesis by IR.   1300 ccs of jacqui fluid removed and sent to lab  Follow results.   5/12 culture negative, post traumatic effusion  5/13 culture negative, continue incentive spirometry    5/14 stable on Chest X Ray - small effusion           Jakub Pinedo MD  Pulmonology  [facility]

## 2020-05-14 NOTE — ASSESSMENT & PLAN NOTE
Pod 1  Spleen removed  Cause unknown - concern of lymphoma given op report  Path pending  Monospot negative.    POD 2  Feeling improved  Awaiting pathology.  Will need outpatient follow up for cause evaluation/ potential lymphoma evaluation.  Discussing vaccine selection with pharmacy - will need vaccinations prior to discharge.    POD 3  Pharmacy reviewed and discussed with me - vaccines to be given 14 days post emergent splenectomy.  Reviewed up - to - date.   Discussed and emphasized importance of obtaining on day 14 post op.    Will arrange with patient. She will need to establish with a PCP.

## 2020-05-14 NOTE — PT/OT/SLP PROGRESS
Physical Therapy  Treatment    Jaswant Yang   MRN: [id number]   Admitting Diagnosis: Non-traumatic rupture of spleen    PT Received On: 05/14/20  PT Start Time: 0745     PT Stop Time: 0810    PT Total Time (min): 25 min       Billable Minutes:  Gait Training 15 and Therapeutic Activity 10    Treatment Type: Treatment  PT/PTA: PT     PTA Visit Number: 0       General Precautions: Standard, fall  Orthopedic Precautions: N/A   Braces: N/A         Subjective:  Communicated with NURSE MEDLEY AND Epic CHART REVIEW  prior to session.  PT AGREED TO TX    Pain/Comfort  Pain Rating 1: 3/10  Location 1: abdomen  Pain Rating Post-Intervention 1: 3/10    Objective:   Patient found with: peripheral IV, CHRISTI drain    Functional Mobility:  PT MET IN RM LOG ROLLED TO LEFT AND SEATED EOB WITH SBA PT SCOOTED AND STOOD WITH RW AND SBA. PT GT TRAINED TO BATHROOM FOR TOILETING WITH S ON AND OFF COMMODE. PT GT TRAINED X 80' WITH STEP TO GT WITH SLOW PACE. PT RETURNED TO RM T/F TO EOB AND SUP IN BED WITH SBA. PT LEFT WITH HOB ELEVATED AND ALL NEEDS MET     AM-PAC 6 CLICK MOBILITY  How much help from another person does this patient currently need?   1 = Unable, Total/Dependent Assistance  2 = A lot, Maximum/Moderate Assistance  3 = A little, Minimum/Contact Guard/Supervision  4 = None, Modified [place]/Independent    Turning over in bed (including adjusting bedclothes, sheets and blankets)?: 4  Sitting down on and standing up from a chair with arms (e.g., wheelchair, bedside commode, etc.): 4  Moving from lying on back to sitting on the side of the bed?: 4  Moving to and from a bed to a chair (including a wheelchair)?: 4  Need to walk in hospital room?: 4  Climbing 3-5 steps with a railing?: 1  Basic Mobility Total Score: 21    AM-PAC Raw Score CMS G-Code Modifier Level of Impairment Assistance   6 % Total / Unable   7 - 9 CM 80 - 100% Maximal Assist   10 - 14 CL 60 - 80% Moderate Assist   15 - 19 CK 40 - 60% Moderate Assist    20 - 22 CJ 20 - 40% Minimal Assist   23 CI 1-20% SBA / CGA   24 CH 0% Independent/ Mod I     Patient left HOB elevated with call button in reach and chair alarm on.    Assessment:  PT RON MIN TX WITH INC FATIGUE NOTED TODAY.     Rehab identified problem list/impairments: Rehab identified problem list/impairments: weakness, gait instability, impaired endurance, impaired balance, impaired functional mobilty, decreased upper extremity function, decreased lower extremity function, decreased ROM    Rehab potential is good.    Activity tolerance: Fair    Discharge recommendations: Discharge Facility/Level of Care Needs: home     Barriers to discharge:      Equipment recommendations: Equipment Needed After Discharge: none     GOALS:   Multidisciplinary Problems     Physical Therapy Goals        Problem: Physical Therapy Goal    Goal Priority Disciplines Outcome Goal Variances Interventions   Physical Therapy Goal     PT, PT/OT Ongoing, Progressing     Description:  LTGs to be met within 7 days (5/19/20):  1.  Patient will perform bed mobility with min assist  2.  Patient will perform functional transfers with SPV  3.  Patient will ambulate 150 feet without AD with SBA and no gross LOB                    PLAN:    Patient to be seen 5 x/week  to address the above listed problems via gait training, therapeutic activities, therapeutic exercises  Plan of Care expires: 05/19/20  Plan of Care reviewed with: patient         Romina Mak, PT  05/14/2020

## 2020-05-14 NOTE — PROGRESS NOTES
"[facility] Hospital Medicine  Progress Note    Patient Name: Jaswant Yang  MRN: [id number]  Patient Class: IP- Inpatient   Admission Date: 5/11/2020  Length of Stay: 3 days  Attending Physician: Geoff Warren MD  Primary Care Provider: Primary Doctor No        Subjective:     Principal Problem:Non-traumatic rupture of spleen        HPI:  Ms Yang is a 44 year female with PMHx Anemia who presented to [facility] Emergency Room with complaints of Left side abdominal pain that started two days prior to arrival. Associated symptoms include constipation, RAO, left flank and back pain. She denies any recent trauma.  Denies associated symptoms of chest pain, fever, chill, nausea, vomiting or diaphoresis. CXR showed large left-sided pleural effusion with left-sided atelectasis or airspace disease. Abdominal X ray showed nonobstructive bowel gas pattern is noted, moderate constipation. Case reviewed with pulmonary, Dr Crouch, O 2 sats stable, plan for Left  thoracentesis today. Patient is a full code, mother Elsa Yang is surrogate decision maker. She is being placed in Observation under the care of [facility].     Overview/Hospital Course:  5/12/20  Post Op day 1  Patient is significant pain.  She does report feeling somewhat better.  Reviewed xray post thoracentesis done yesterday -  5/11/20 - "  1300 ccs of jacqui fluid removed and sent to lab"  Noted patchy infiltrates - denies significant SOB.    5/13/20  POD 2  Doing much better.  More alert, awake, breathing room air - no respiratory distress, no struggles speaking.  Pain controlled.    5/14/20  POD 3  Continues to improve.  Breathing comfortable.  Xray obtained this am - no interval worsening.        Interval History: no significant change.      Review of Systems   Constitutional: Negative for chills and fever.   HENT: Negative for trouble swallowing.    Eyes: Negative for visual disturbance.   Respiratory: Negative for shortness of " breath.    Cardiovascular: Negative for chest pain.   Gastrointestinal: Positive for abdominal pain.   Skin: Negative for color change and pallor.   Neurological: Negative for dizziness.   Psychiatric/Behavioral: Negative for confusion.     Objective:     Vital Signs (Most Recent):  Temp: 98.1 °F (36.7 °C) (05/14/20 1224)  Pulse: 83 (05/14/20 1224)  Resp: 18 (05/14/20 1224)  BP: (!) 145/87 (05/14/20 1224)  SpO2: 98 % (05/14/20 1224) Vital Signs (24h Range):  Temp:  [97.7 °F (36.5 °C)-98.9 °F (37.2 °C)] 98.1 °F (36.7 °C)  Pulse:  [81-90] 83  Resp:  [16-20] 18  SpO2:  [92 %-98 %] 98 %  BP: (120-145)/(67-87) 145/87     Weight: 127.4 kg (280 lb 13.9 oz)  Body mass index is 39.17 kg/m².    Intake/Output Summary (Last 24 hours) at 5/14/2020 1312  Last data filed at 5/14/2020 0600  Gross per 24 hour   Intake 580 ml   Output 760 ml   Net -180 ml      Physical Exam   Constitutional: She is oriented to person, place, and time. She appears well-developed.   HENT:   Head: Normocephalic and atraumatic.   Eyes: Conjunctivae are normal.   Cardiovascular: Normal rate, regular rhythm and normal heart sounds.   Pulmonary/Chest: Effort normal and breath sounds normal. No respiratory distress.   Breathing room air   Musculoskeletal: She exhibits no edema.   Lymphadenopathy:     She has no cervical adenopathy.   Neurological: She is alert and oriented to person, place, and time.   Skin: Skin is warm. No pallor.   Psychiatric: She has a normal mood and affect. Her behavior is normal.       Significant Labs: All pertinent labs within the past 24 hours have been reviewed.    Significant Imaging: I have reviewed and interpreted all pertinent imaging results/findings within the past 24 hours.      Assessment/Plan:      * Non-traumatic rupture of spleen  Pod 1  Spleen removed  Cause unknown - concern of lymphoma given op report  Path pending  Monospot negative.    POD 2  Feeling improved  Awaiting pathology.  Will need outpatient follow up  for cause evaluation/ potential lymphoma evaluation.  Discussing vaccine selection with pharmacy - will need vaccinations prior to discharge.    POD 3  Pharmacy reviewed and discussed with me - vaccines to be given 14 days post emergent splenectomy.  Reviewed up - to - date.   Discussed and emphasized importance of obtaining on day 14 post op.    Will arrange with patient. She will need to establish with a PCP.      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  Transfuse as needed.    5/13/20  Continue to monitor    5/14/20  Stable.      Constipation  Abdominal X ray showed moderate constipation   Laxative or stool softness as need   Limit narcotics if possible     5/12/20  monitor      Abdominal pain  Probable related to possible splenic laceration and surrounding perisplenic hematoma.   Pain and nausea control   General surgery following     5/12/20  Pod 1 -  General surgery following.  Monitor     5/13/20  Improving  Prn pain meds      5/14/20  PRN pain med    CKD (chronic kidney disease) stage 3, GFR 30-59 ml/min    Creatinine 1.7 today   GFR 54,  one year ago   Avoid nephrotoxic agents   Monitor     5/12/20  Continue to monitor.  Slight improvement overnight.     5/13/20  Renal function normal.      5/13/20   Renal function normalized.        Other ascites    Monitor -  Concern of lympoma given op report    Pleural effusion on left  Place in observation   CXR shows large left pleural effusion   Consult pulmonary   Rocephin and Azithromycin given in ED   Supplemental O 2     5/12/20  Uncertain of cause - awaiting surgery path report.  Improved xray yesterday given post thoracentesis - however still with everardo infiltrate.  BNP to be obtained to assess for CHF component.  No WBC or true fever at  present - infectious cause less likely - but must be considered especially in setting of potential immunocompromised state if lymphoma present - continue rocephin and azithromycin.  Monitor and repeat CXR later today. Along with BNP - if indicated fluid overload plan to diuresis  Pulm following.     5/13/20  Respiratory status improved.   Continue abx for now for potential coverage.  At present no repeated CXR ordered given clinically seems much improved.  Will monitor closely  Suspected reactive effusion  pulm following    5/14/20  No interval worsening  Continue abx for now  Continue to monitor            VTE Risk Mitigation (From admission, onward)         Ordered     Place sequential compression device  Until discontinued      05/11/20 0925                      Geoff Warren MD  Department of Hospital Medicine   [facility]

## 2020-05-14 NOTE — ASSESSMENT & PLAN NOTE
Place in observation   CXR shows large left pleural effusion   Consult pulmonary   Rocephin and Azithromycin given in ED   Supplemental O 2     5/12/20  Uncertain of cause - awaiting surgery path report.  Improved xray yesterday given post thoracentesis - however still with everardo infiltrate.  BNP to be obtained to assess for CHF component.  No WBC or true fever at present - infectious cause less likely - but must be considered especially in setting of potential immunocompromised state if lymphoma present - continue rocephin and azithromycin.  Monitor and repeat CXR later today. Along with BNP - if indicated fluid overload plan to diuresis  Pulm following.     5/13/20  Respiratory status improved.   Continue abx for now for potential coverage.  At present no repeated CXR ordered given clinically seems much improved.  Will monitor closely  Suspected reactive effusion  pulm following    5/14/20  No interval worsening  Continue abx for now  Continue to monitor

## 2020-05-14 NOTE — PLAN OF CARE
CHRISTI drain to ABD, draining serosanguinous fluid. Max encouragement to ambulate and to get OOB. Ambulated in membreno x2. Staples to midline incision. PRN pain meds adm to manage pain level. Remains free from injury/incident. Call light in reach.

## 2020-05-14 NOTE — ASSESSMENT & PLAN NOTE
S/ P thoracentesis by IR.   1300 ccs of jacqui fluid removed and sent to lab  Follow results.   5/12 culture negative, post traumatic effusion  5/13 culture negative, continue incentive spirometry   5/14 stable on Chest X Ray - small effusion

## 2020-05-14 NOTE — ASSESSMENT & PLAN NOTE
Creatinine 1.7 today   GFR 54,  one year ago   Avoid nephrotoxic agents   Monitor     5/12/20  Continue to monitor.  Slight improvement overnight.     5/13/20  Renal function normal.      5/13/20   Renal function normalized.

## 2020-05-14 NOTE — SUBJECTIVE & OBJECTIVE
Interval History: slow improvement    Objective:     Vital Signs (Most Recent):  Temp: 98.1 °F (36.7 °C) (05/14/20 1224)  Pulse: 83 (05/14/20 1224)  Resp: 18 (05/14/20 1224)  BP: (!) 145/87 (05/14/20 1224)  SpO2: 98 % (05/14/20 1224) Vital Signs (24h Range):  Temp:  [97.7 °F (36.5 °C)-98.9 °F (37.2 °C)] 98.1 °F (36.7 °C)  Pulse:  [81-90] 83  Resp:  [16-20] 18  SpO2:  [92 %-98 %] 98 %  BP: (120-145)/(67-87) 145/87     Weight: 127.4 kg (280 lb 13.9 oz)  Body mass index is 39.17 kg/m².      Intake/Output Summary (Last 24 hours) at 5/14/2020 1621  Last data filed at 5/14/2020 0600  Gross per 24 hour   Intake 580 ml   Output 730 ml   Net -150 ml       Physical Exam   Constitutional: She is oriented to person, place, and time. She appears well-developed and well-nourished.   HENT:   Head: Normocephalic and atraumatic.   Eyes: Pupils are equal, round, and reactive to light. Conjunctivae are normal.   Neck: Neck supple. No JVD present. No tracheal deviation present. No thyromegaly present.   Cardiovascular: Normal rate, regular rhythm and normal heart sounds.   Pulmonary/Chest: Effort normal. No respiratory distress. She has decreased breath sounds in the left lower field. She has no wheezes. She has rales in the left lower field. She exhibits no tenderness.   Abdominal: Soft. Bowel sounds are normal. There is tenderness.   Musculoskeletal: Normal range of motion. She exhibits no edema.   Lymphadenopathy:     She has no cervical adenopathy.   Neurological: She is alert and oriented to person, place, and time.   Skin: Skin is warm and dry.   Nursing note and vitals reviewed.      Vents:  Oxygen Concentration (%): 28 (05/13/20 0051)    Lines/Drains/Airways     Drain                 Closed/Suction Drain 05/11/20 1300 Abdomen Bulb 19 Fr. 3 days          Peripheral Intravenous Line                 Peripheral IV - Single Lumen 05/13/20 1900 Left  less than 1 day                Significant Labs:    CBC/Anemia Profile:  Recent Labs    Lab 05/12/20  1835 05/13/20 0752 05/14/20  0521   WBC 4.84 4.94 2.75*   HGB 10.2* 9.5* 9.9*   HCT 32.1* 31.4* 31.8*   * 126* 144*   MCV 94 95 94   RDW 20.2* 20.0* 18.9*        Chemistries:  Recent Labs   Lab 05/13/20 0752 05/14/20  0521   * 135*   K 4.6 4.1    103   CO2 22* 25   BUN 13 13   CREATININE 1.0 0.9   CALCIUM 8.7 8.7   ALBUMIN 2.4*  --    PROT 5.6*  --    BILITOT 0.7  --    ALKPHOS 55  --    ALT 11  --    AST 25  --        BMP:   Recent Labs   Lab 05/14/20  0521   GLU 89   *   K 4.1      CO2 25   BUN 13   CREATININE 0.9   CALCIUM 8.7     CMP:   Recent Labs   Lab 05/13/20 0752 05/14/20 0521   * 135*   K 4.6 4.1    103   CO2 22* 25    89   BUN 13 13   CREATININE 1.0 0.9   CALCIUM 8.7 8.7   PROT 5.6*  --    ALBUMIN 2.4*  --    BILITOT 0.7  --    ALKPHOS 55  --    AST 25  --    ALT 11  --    ANIONGAP 8 7*   EGFRNONAA >60 >60     All pertinent labs within the past 24 hours have been reviewed.    Significant Imaging:  I have reviewed all pertinent imaging results/findings within the past 24 hours.  I have reviewed and interpreted all pertinent imaging results/findings within the past 24 hours.

## 2020-05-15 LAB
ACANTHOCYTES BLD QL SMEAR: PRESENT
ALBUMIN SERPL BCP-MCNC: 2.5 G/DL (ref 3.5–5.2)
ALP SERPL-CCNC: 53 U/L (ref 55–135)
ALT SERPL W/O P-5'-P-CCNC: 16 U/L (ref 10–44)
ANION GAP SERPL CALC-SCNC: 9 MMOL/L (ref 8–16)
ANISOCYTOSIS BLD QL SMEAR: SLIGHT
AST SERPL-CCNC: 20 U/L (ref 10–40)
BACTERIA SPEC AEROBE CULT: NO GROWTH
BASOPHILS # BLD AUTO: ABNORMAL K/UL (ref 0–0.2)
BASOPHILS NFR BLD: 0 % (ref 0–1.9)
BILIRUB SERPL-MCNC: 0.4 MG/DL (ref 0.1–1)
BUN SERPL-MCNC: 13 MG/DL (ref 6–20)
BURR CELLS BLD QL SMEAR: ABNORMAL
CALCIUM SERPL-MCNC: 8.9 MG/DL (ref 8.7–10.5)
CHLORIDE SERPL-SCNC: 103 MMOL/L (ref 95–110)
CO2 SERPL-SCNC: 26 MMOL/L (ref 23–29)
CREAT SERPL-MCNC: 0.9 MG/DL (ref 0.5–1.4)
DACRYOCYTES BLD QL SMEAR: ABNORMAL
DIFFERENTIAL METHOD: ABNORMAL
EOSINOPHIL # BLD AUTO: ABNORMAL K/UL (ref 0–0.5)
EOSINOPHIL NFR BLD: 2 % (ref 0–8)
ERYTHROCYTE [DISTWIDTH] IN BLOOD BY AUTOMATED COUNT: 18.2 % (ref 11.5–14.5)
EST. GFR  (AFRICAN AMERICAN): >60 ML/MIN/1.73 M^2
EST. GFR  (NON AFRICAN AMERICAN): >60 ML/MIN/1.73 M^2
GLUCOSE SERPL-MCNC: 97 MG/DL (ref 70–110)
HCT VFR BLD AUTO: 31.8 % (ref 37–48.5)
HGB BLD-MCNC: 9.9 G/DL (ref 12–16)
HYPOCHROMIA BLD QL SMEAR: ABNORMAL
IMM GRANULOCYTES # BLD AUTO: ABNORMAL K/UL (ref 0–0.04)
IMM GRANULOCYTES NFR BLD AUTO: ABNORMAL % (ref 0–0.5)
LYMPHOCYTES # BLD AUTO: ABNORMAL K/UL (ref 1–4.8)
LYMPHOCYTES NFR BLD: 35 % (ref 18–48)
MCH RBC QN AUTO: 29.2 PG (ref 27–31)
MCHC RBC AUTO-ENTMCNC: 31.1 G/DL (ref 32–36)
MCV RBC AUTO: 94 FL (ref 82–98)
METAMYELOCYTES NFR BLD MANUAL: 2 %
MONOCYTES # BLD AUTO: ABNORMAL K/UL (ref 0.3–1)
MONOCYTES NFR BLD: 8 % (ref 4–15)
NEUTROPHILS NFR BLD: 42 % (ref 38–73)
NEUTS BAND NFR BLD MANUAL: 11 %
NRBC BLD-RTO: 17 /100 WBC
OVALOCYTES BLD QL SMEAR: ABNORMAL
PLATELET # BLD AUTO: 186 K/UL (ref 150–350)
PLATELET BLD QL SMEAR: ABNORMAL
PMV BLD AUTO: 10 FL (ref 9.2–12.9)
POIKILOCYTOSIS BLD QL SMEAR: SLIGHT
POLYCHROMASIA BLD QL SMEAR: ABNORMAL
POTASSIUM SERPL-SCNC: 4 MMOL/L (ref 3.5–5.1)
PROT SERPL-MCNC: 5.7 G/DL (ref 6–8.4)
RBC # BLD AUTO: 3.39 M/UL (ref 4–5.4)
SCHISTOCYTES BLD QL SMEAR: PRESENT
SODIUM SERPL-SCNC: 138 MMOL/L (ref 136–145)
TARGETS BLD QL SMEAR: ABNORMAL
WBC # BLD AUTO: 2.01 K/UL (ref 3.9–12.7)

## 2020-05-15 PROCEDURE — 85027 COMPLETE CBC AUTOMATED: CPT

## 2020-05-15 PROCEDURE — 97530 THERAPEUTIC ACTIVITIES: CPT

## 2020-05-15 PROCEDURE — 99231 PR SUBSEQUENT HOSPITAL CARE,LEVL I: ICD-10-PCS | Mod: ,,, | Performed by: INTERNAL MEDICINE

## 2020-05-15 PROCEDURE — 25000003 PHARM REV CODE 250: Performed by: FAMILY MEDICINE

## 2020-05-15 PROCEDURE — 85007 BL SMEAR W/DIFF WBC COUNT: CPT

## 2020-05-15 PROCEDURE — 63700000 PHARM REV CODE 250 ALT 637 W/O HCPCS: Performed by: FAMILY MEDICINE

## 2020-05-15 PROCEDURE — 25000003 PHARM REV CODE 250: Performed by: SURGERY

## 2020-05-15 PROCEDURE — 99024 POSTOP FOLLOW-UP VISIT: CPT | Mod: ,,, | Performed by: SURGERY

## 2020-05-15 PROCEDURE — 97116 GAIT TRAINING THERAPY: CPT

## 2020-05-15 PROCEDURE — 36415 COLL VENOUS BLD VENIPUNCTURE: CPT

## 2020-05-15 PROCEDURE — 80053 COMPREHEN METABOLIC PANEL: CPT

## 2020-05-15 PROCEDURE — 99024 PR POST-OP FOLLOW-UP VISIT: ICD-10-PCS | Mod: ,,, | Performed by: SURGERY

## 2020-05-15 PROCEDURE — 11000001 HC ACUTE MED/SURG PRIVATE ROOM

## 2020-05-15 PROCEDURE — 99231 SBSQ HOSP IP/OBS SF/LOW 25: CPT | Mod: ,,, | Performed by: INTERNAL MEDICINE

## 2020-05-15 PROCEDURE — 63600175 PHARM REV CODE 636 W HCPCS: Performed by: FAMILY MEDICINE

## 2020-05-15 RX ORDER — POLYETHYLENE GLYCOL 3350 17 G/17G
17 POWDER, FOR SOLUTION ORAL DAILY
Status: DISCONTINUED | OUTPATIENT
Start: 2020-05-15 | End: 2020-05-16 | Stop reason: HOSPADM

## 2020-05-15 RX ADMIN — PANTOPRAZOLE SODIUM 40 MG: 40 TABLET, DELAYED RELEASE ORAL at 08:05

## 2020-05-15 RX ADMIN — HYDROCODONE BITARTRATE AND ACETAMINOPHEN 1 TABLET: 10; 325 TABLET ORAL at 05:05

## 2020-05-15 RX ADMIN — HYDROCODONE BITARTRATE AND ACETAMINOPHEN 1 TABLET: 10; 325 TABLET ORAL at 06:05

## 2020-05-15 RX ADMIN — HYDROCODONE BITARTRATE AND ACETAMINOPHEN 1 TABLET: 10; 325 TABLET ORAL at 11:05

## 2020-05-15 RX ADMIN — MORPHINE SULFATE 2 MG: 2 INJECTION, SOLUTION INTRAMUSCULAR; INTRAVENOUS at 01:05

## 2020-05-15 RX ADMIN — MORPHINE SULFATE 2 MG: 2 INJECTION, SOLUTION INTRAMUSCULAR; INTRAVENOUS at 09:05

## 2020-05-15 RX ADMIN — POLYETHYLENE GLYCOL 3350 17 G: 17 POWDER, FOR SOLUTION ORAL at 11:05

## 2020-05-15 RX ADMIN — AZITHROMYCIN MONOHYDRATE 250 MG: 250 TABLET ORAL at 08:05

## 2020-05-15 RX ADMIN — CEFTRIAXONE 1 G: 1 INJECTION, SOLUTION INTRAVENOUS at 11:05

## 2020-05-15 RX ADMIN — MORPHINE SULFATE 2 MG: 2 INJECTION, SOLUTION INTRAMUSCULAR; INTRAVENOUS at 07:05

## 2020-05-15 RX ADMIN — MORPHINE SULFATE 2 MG: 2 INJECTION, SOLUTION INTRAMUSCULAR; INTRAVENOUS at 03:05

## 2020-05-15 NOTE — PLAN OF CARE
Encouraged pt to ambulate in membreno during shift. Pt reported walking in membreno during daytime. VSS. Incision c,d,I. Pt ambulated to bathroom during shift. Pain controlled with PO pain meds and Morphine for breakthrough.   Fall precautions in place. Call light and personal items within reach. Educated patient on side effects of medication administered. Pt verbalized understanding. 24 hour order check done.  Will continue to monitor.

## 2020-05-15 NOTE — ASSESSMENT & PLAN NOTE
POD#4 s/p ex lap, splenectomy    Continue diet as tolerated  Encouraged only PO analgesia use  Bowel regimen  Up with PT  SCDs  Discontinue CHRISTI drain  Vaccines prior to discharge

## 2020-05-15 NOTE — SUBJECTIVE & OBJECTIVE
Interval History: No acute issues. AVFSS. Still without BM. C/o abdominal soreness overnight. CHRISTI output minimal.  Tolerating PO without nausea or emesis.     Medications:  Continuous Infusions:  Scheduled Meds:   azithromycin  250 mg Oral Daily    cefTRIAXone (ROCEPHIN) IVPB  1 g Intravenous Q12H    morphine  4 mg Intravenous Once    pantoprazole  40 mg Oral Daily    polyethylene glycol  17 g Oral Daily     PRN Meds:diphenhydrAMINE, HYDROcodone-acetaminophen, HYDROcodone-acetaminophen, morphine, ondansetron     Review of patient's allergies indicates:  No Known Allergies  Objective:     Vital Signs (Most Recent):  Temp: 98.3 °F (36.8 °C) (05/15/20 0717)  Pulse: 81 (05/15/20 0717)  Resp: 19 (05/15/20 0717)  BP: (!) 150/83 (05/15/20 0717)  SpO2: 95 % (05/15/20 0717) Vital Signs (24h Range):  Temp:  [97.9 °F (36.6 °C)-98.3 °F (36.8 °C)] 98.3 °F (36.8 °C)  Pulse:  [76-83] 81  Resp:  [16-19] 19  SpO2:  [92 %-98 %] 95 %  BP: (130-158)/(73-90) 150/83     Weight: 127.4 kg (280 lb 13.9 oz)  Body mass index is 39.17 kg/m².    Intake/Output - Last 3 Shifts       05/13 0700 - 05/14 0659 05/14 0700 - 05/15 0659 05/15 0700 - 05/16 0659    P.O. 480 480 180    I.V. (mL/kg)       IV Piggyback 100 100     Total Intake(mL/kg) 580 (4.6) 580 (4.6) 180 (1.4)    Urine (mL/kg/hr) 700 (0.2) 0 (0)     Drains 60 30     Total Output 760 30     Net -180 +550 +180           Urine Occurrence 1 x 2 x 1 x    Stool Occurrence   0 x          Physical Exam   Constitutional: She is oriented to person, place, and time. She appears well-developed and well-nourished. No distress.   HENT:   Head: Normocephalic and atraumatic.   Eyes: EOM are normal.   Neck: Neck supple.   Cardiovascular: Normal rate and regular rhythm.   Pulmonary/Chest: Effort normal.   Abdominal: Soft. She exhibits no distension. There is no tenderness (approrpiate incisional).   Incision intact with staples, no signs of infection  CHRISTI with minimal serous drainage   Musculoskeletal:  Normal range of motion.   Neurological: She is alert and oriented to person, place, and time.   Skin: Skin is warm.   Vitals reviewed.      Significant Labs:  CBC:   Recent Labs   Lab 05/15/20  0729   WBC 2.01*   RBC 3.39*   HGB 9.9*   HCT 31.8*      MCV 94   MCH 29.2   MCHC 31.1*     BMP:   Recent Labs   Lab 05/15/20  0729   GLU 97      K 4.0      CO2 26   BUN 13   CREATININE 0.9   CALCIUM 8.9       Significant Diagnostics:  I have reviewed and interpreted all pertinent imaging results/findings within the past 24 hours.

## 2020-05-15 NOTE — PROGRESS NOTES
[facility] -   Pulmonology  Progress Note    Patient Name: Jaswant Yang  MRN: [id number]  Admission Date: 5/11/2020  Hospital Length of Stay: 4 days  Code Status: Full Code  Attending Provider: Geoff Warren MD  Primary Care Provider: Primary Doctor No   Principal Problem: Non-traumatic rupture of spleen    Subjective:     Interval History: slow improvement    Objective:     Vital Signs (Most Recent):  Temp: 98.4 °F (36.9 °C) (05/15/20 1604)  Pulse: 76 (05/15/20 1604)  Resp: 18 (05/15/20 1604)  BP: 137/76 (05/15/20 1604)  SpO2: (!) 94 % (05/15/20 1604) Vital Signs (24h Range):  Temp:  [98 °F (36.7 °C)-98.5 °F (36.9 °C)] 98.4 °F (36.9 °C)  Pulse:  [76-82] 76  Resp:  [16-19] 18  SpO2:  [92 %-96 %] 94 %  BP: (130-158)/(73-83) 137/76     Weight: 127.4 kg (280 lb 13.9 oz)  Body mass index is 39.17 kg/m².      Intake/Output Summary (Last 24 hours) at 5/15/2020 1747  Last data filed at 5/15/2020 1403  Gross per 24 hour   Intake 1120 ml   Output 60 ml   Net 1060 ml       Physical Exam   Constitutional: She is oriented to person, place, and time. She appears well-developed and well-nourished.   HENT:   Head: Normocephalic and atraumatic.   Eyes: Pupils are equal, round, and reactive to light. Conjunctivae are normal.   Neck: Neck supple. No JVD present. No tracheal deviation present. No thyromegaly present.   Cardiovascular: Normal rate, regular rhythm and normal heart sounds.   Pulmonary/Chest: Effort normal. No respiratory distress. She has decreased breath sounds in the left lower field. She has no wheezes. She has rales in the left lower field. She exhibits no tenderness.   Abdominal: Soft. Bowel sounds are normal. There is tenderness.   Musculoskeletal: Normal range of motion. She exhibits no edema.   Lymphadenopathy:     She has no cervical adenopathy.   Neurological: She is alert and oriented to person, place, and time.   Skin: Skin is warm and dry.   Nursing note and vitals  reviewed.      Vents:  Oxygen Concentration (%): 28 (05/13/20 0051)    Lines/Drains/Airways     Peripheral Intravenous Line                 Peripheral IV - Single Lumen 05/13/20 1900 Left  1 day                Significant Labs:    CBC/Anemia Profile:  Recent Labs   Lab 05/14/20  0521 05/15/20  0729   WBC 2.75* 2.01*   HGB 9.9* 9.9*   HCT 31.8* 31.8*   * 186   MCV 94 94   RDW 18.9* 18.2*        Chemistries:  Recent Labs   Lab 05/14/20  0521 05/15/20  0729   * 138   K 4.1 4.0    103   CO2 25 26   BUN 13 13   CREATININE 0.9 0.9   CALCIUM 8.7 8.9   ALBUMIN  --  2.5*   PROT  --  5.7*   BILITOT  --  0.4   ALKPHOS  --  53*   ALT  --  16   AST  --  20       BMP:   Recent Labs   Lab 05/15/20  0729   GLU 97      K 4.0      CO2 26   BUN 13   CREATININE 0.9   CALCIUM 8.9     CMP:   Recent Labs   Lab 05/14/20  0521 05/15/20  0729   * 138   K 4.1 4.0    103   CO2 25 26   GLU 89 97   BUN 13 13   CREATININE 0.9 0.9   CALCIUM 8.7 8.9   PROT  --  5.7*   ALBUMIN  --  2.5*   BILITOT  --  0.4   ALKPHOS  --  53*   AST  --  20   ALT  --  16   ANIONGAP 7* 9   EGFRNONAA >60 >60     All pertinent labs within the past 24 hours have been reviewed.    Significant Imaging:  I have reviewed all pertinent imaging results/findings within the past 24 hours.  I have reviewed and interpreted all pertinent imaging results/findings within the past 24 hours.      ABG  Recent Labs   Lab 05/11/20  1228   PH 7.325*   PO2 263*   PCO2 41.2   HCO3 21.5*   BE -5     Assessment/Plan:     Post-operative state  General Surgery consulted and assisting with management.   Appreciate input.     Routine post operative care per general surgery.  5/12 pain control  5/14 pain control and incentive spirometry          Pleural effusion on left  S/ P thoracentesis by IR.   1300 ccs of jacqui fluid removed and sent to lab  Follow results.   5/12 culture negative, post traumatic effusion  5/13 culture negative, continue incentive  spirometry   5/14 stable on Chest X Ray - small effusion  5/15 stable      Will sign off  Thanks     Jakub Pinedo MD  Pulmonology  [facility]

## 2020-05-15 NOTE — ASSESSMENT & PLAN NOTE
Place in observation   CXR shows large left pleural effusion   Consult pulmonary   Rocephin and Azithromycin given in ED   Supplemental O 2     5/12/20  Uncertain of cause - awaiting surgery path report.  Improved xray yesterday given post thoracentesis - however still with everardo infiltrate.  BNP to be obtained to assess for CHF component.  No WBC or true fever at present - infectious cause less likely - but must be considered especially in setting of potential immunocompromised state if lymphoma present - continue rocephin and azithromycin.  Monitor and repeat CXR later today. Along with BNP - if indicated fluid overload plan to diuresis  Pulm following.     5/13/20  Respiratory status improved.   Continue abx for now for potential coverage.  At present no repeated CXR ordered given clinically seems much improved.  Will monitor closely  Suspected reactive effusion  pulm following    5/14/20  No interval worsening  Continue abx for now  Continue to monitor    5/15/20  No worsening in respiratory status.  Continue to monitor.  No repeat imaging planned given clinical status  pulm following

## 2020-05-15 NOTE — ASSESSMENT & PLAN NOTE
S/ P thoracentesis by IR.   1300 ccs of jacqui fluid removed and sent to lab  Follow results.   5/12 culture negative, post traumatic effusion  5/13 culture negative, continue incentive spirometry   5/14 stable on Chest X Ray - small effusion  5/15 stable

## 2020-05-15 NOTE — ASSESSMENT & PLAN NOTE
Abdominal X ray showed moderate constipation   Laxative or stool softness as need   Limit narcotics if possible     5/12/20  monitor      5/15/20  Reports no BM, but credits to not having eaten much.  No pain, distress, distension  Will prescribe miralax

## 2020-05-15 NOTE — SUBJECTIVE & OBJECTIVE
Interval History: slow improvement    Objective:     Vital Signs (Most Recent):  Temp: 98.4 °F (36.9 °C) (05/15/20 1604)  Pulse: 76 (05/15/20 1604)  Resp: 18 (05/15/20 1604)  BP: 137/76 (05/15/20 1604)  SpO2: (!) 94 % (05/15/20 1604) Vital Signs (24h Range):  Temp:  [98 °F (36.7 °C)-98.5 °F (36.9 °C)] 98.4 °F (36.9 °C)  Pulse:  [76-82] 76  Resp:  [16-19] 18  SpO2:  [92 %-96 %] 94 %  BP: (130-158)/(73-83) 137/76     Weight: 127.4 kg (280 lb 13.9 oz)  Body mass index is 39.17 kg/m².      Intake/Output Summary (Last 24 hours) at 5/15/2020 1747  Last data filed at 5/15/2020 1403  Gross per 24 hour   Intake 1120 ml   Output 60 ml   Net 1060 ml       Physical Exam   Constitutional: She is oriented to person, place, and time. She appears well-developed and well-nourished.   HENT:   Head: Normocephalic and atraumatic.   Eyes: Pupils are equal, round, and reactive to light. Conjunctivae are normal.   Neck: Neck supple. No JVD present. No tracheal deviation present. No thyromegaly present.   Cardiovascular: Normal rate, regular rhythm and normal heart sounds.   Pulmonary/Chest: Effort normal. No respiratory distress. She has decreased breath sounds in the left lower field. She has no wheezes. She has rales in the left lower field. She exhibits no tenderness.   Abdominal: Soft. Bowel sounds are normal. There is tenderness.   Musculoskeletal: Normal range of motion. She exhibits no edema.   Lymphadenopathy:     She has no cervical adenopathy.   Neurological: She is alert and oriented to person, place, and time.   Skin: Skin is warm and dry.   Nursing note and vitals reviewed.      Vents:  Oxygen Concentration (%): 28 (05/13/20 0051)    Lines/Drains/Airways     Peripheral Intravenous Line                 Peripheral IV - Single Lumen 05/13/20 1900 Left  1 day                Significant Labs:    CBC/Anemia Profile:  Recent Labs   Lab 05/14/20  0521 05/15/20  0729   WBC 2.75* 2.01*   HGB 9.9* 9.9*   HCT 31.8* 31.8*   * 186    MCV 94 94   RDW 18.9* 18.2*        Chemistries:  Recent Labs   Lab 05/14/20  0521 05/15/20  0729   * 138   K 4.1 4.0    103   CO2 25 26   BUN 13 13   CREATININE 0.9 0.9   CALCIUM 8.7 8.9   ALBUMIN  --  2.5*   PROT  --  5.7*   BILITOT  --  0.4   ALKPHOS  --  53*   ALT  --  16   AST  --  20       BMP:   Recent Labs   Lab 05/15/20  0729   GLU 97      K 4.0      CO2 26   BUN 13   CREATININE 0.9   CALCIUM 8.9     CMP:   Recent Labs   Lab 05/14/20  0521 05/15/20  0729   * 138   K 4.1 4.0    103   CO2 25 26   GLU 89 97   BUN 13 13   CREATININE 0.9 0.9   CALCIUM 8.7 8.9   PROT  --  5.7*   ALBUMIN  --  2.5*   BILITOT  --  0.4   ALKPHOS  --  53*   AST  --  20   ALT  --  16   ANIONGAP 7* 9   EGFRNONAA >60 >60     All pertinent labs within the past 24 hours have been reviewed.    Significant Imaging:  I have reviewed all pertinent imaging results/findings within the past 24 hours.  I have reviewed and interpreted all pertinent imaging results/findings within the past 24 hours.

## 2020-05-15 NOTE — ASSESSMENT & PLAN NOTE
5/12/20  Continue to trend  1 unit given during surgery  Monitor closely  Repeat late today and again in am.  Transfuse as needed.    5/13/20  Continue to monitor    5/14/20  Stable.    5/15/20  Stable  Chronic  Will ensure has oncology/ hematology follow up.

## 2020-05-15 NOTE — ASSESSMENT & PLAN NOTE
General Surgery consulted and assisting with management.   Appreciate input.     Routine post operative care per general surgery.  5/12 pain control  5/14 pain control and incentive spirometry

## 2020-05-15 NOTE — PLAN OF CARE
CHRISTI drain removed from ABD, tolerated well.  Max encouragement to ambulate and to get OOB. Staples to midline incision. IV ABT therapy ADM as ordered. No adverse reactions noted, remains afebrile. PRN pain meds adm to manage pain level. Remains free from injury/incident. Call light in reach.

## 2020-05-15 NOTE — ASSESSMENT & PLAN NOTE
Probable related to possible splenic laceration and surrounding perisplenic hematoma.   Pain and nausea control   General surgery following     5/12/20  Pod 1 -  General surgery following.  Monitor     5/13/20  Improving  Prn pain meds      5/14/20  PRN pain med    5/15/20  Able to do PT with improved effort  Continue PRN meds

## 2020-05-15 NOTE — PROGRESS NOTES
"[facility] Hospital Medicine  Progress Note    Patient Name: Jaswant Yang  MRN: [id number]  Patient Class: IP- Inpatient   Admission Date: 5/11/2020  Length of Stay: 4 days  Attending Physician: Geoff Warren MD  Primary Care Provider: Primary Doctor No        Subjective:     Principal Problem:Non-traumatic rupture of spleen        HPI:  Ms Yang is a 44 year female with PMHx Anemia who presented to [facility] Emergency Room with complaints of Left side abdominal pain that started two days prior to arrival. Associated symptoms include constipation, RAO, left flank and back pain. She denies any recent trauma.  Denies associated symptoms of chest pain, fever, chill, nausea, vomiting or diaphoresis. CXR showed large left-sided pleural effusion with left-sided atelectasis or airspace disease. Abdominal X ray showed nonobstructive bowel gas pattern is noted, moderate constipation. Case reviewed with pulmonary, Dr Crouch, O 2 sats stable, plan for Left  thoracentesis today. Patient is a full code, mother Elsa Yang is surrogate decision maker. She is being placed in Observation under the care of Hospital Medicine.     Overview/Hospital Course:  5/12/20  Post Op day 1  Patient is significant pain.  She does report feeling somewhat better.  Reviewed xray post thoracentesis done yesterday -  5/11/20 - "  1300 ccs of jacqui fluid removed and sent to lab"  Noted patchy infiltrates - denies significant SOB.    5/13/20  POD 2  Doing much better.  More alert, awake, breathing room air - no respiratory distress, no struggles speaking.  Pain controlled.    5/14/20  POD 3  Continues to improve.  Breathing comfortable.  Xray obtained this am - no interval worsening.        No new subjective & objective note has been filed under this hospital service since the last note was generated.      Assessment/Plan:      * Non-traumatic rupture of spleen  Pod 1  Spleen removed  Cause unknown - concern of lymphoma given " op report  Path pending  Monospot negative.    POD 2  Feeling improved  Awaiting pathology.  Will need outpatient follow up for cause evaluation/ potential lymphoma evaluation.  Discussing vaccine selection with pharmacy - will need vaccinations prior to discharge.    POD 3  Pharmacy reviewed and discussed with me - vaccines to be given 14 days post emergent splenectomy.  Reviewed up - to - date.   Discussed and emphasized importance of obtaining on day 14 post op.    Will arrange with patient. She will need to establish with a PCP.    POD 4  Patient aware of need for vaccines. Will set her up with PCP upon discharge.  Consider outpatient PT to get strength back upon discharge.      GERD (gastroesophageal reflux disease)  5/14/20  Continue PPI      Injury to spleen  Post splenectomy      Post-operative state    Pain control  Will discuss w/ surgery discharge planning    Anemia    5/12/20  Continue to trend  1 unit given during surgery  Monitor closely  Repeat late today and again in am.  Transfuse as needed.    5/13/20  Continue to monitor    5/14/20  Stable.    5/15/20  Stable  Chronic  Will ensure has oncology/ hematology follow up.           Constipation  Abdominal X ray showed moderate constipation   Laxative or stool softness as need   Limit narcotics if possible     5/12/20  monitor      5/15/20  Reports no BM, but credits to not having eaten much.  No pain, distress, distension  Will prescribe miralax  Would like to see BM prior to discharge.     Abdominal pain  Probable related to possible splenic laceration and surrounding perisplenic hematoma.   Pain and nausea control   General surgery following    5/12/20  Pod 1 -  General surgery following.  Monitor     5/13/20  Improving  Prn pain meds      5/14/20  PRN pain med    5/15/20  Able to do PT with improved effort  Continue PRN meds      CKD (chronic kidney disease) stage 3, GFR 30-59 ml/min    Creatinine 1.7 today   GFR 54,  one year ago   Avoid nephrotoxic  agents   Monitor     5/12/20  Continue to monitor.  Slight improvement overnight.     5/13/20  Renal function normal.      5/13/20   Renal function normalized.        Other ascites    Monitor -  Concern of lympoma given op report    Pleural effusion on left  Place in observation   CXR shows large left pleural effusion   Consult pulmonary   Rocephin and Azithromycin given in ED   Supplemental O 2     5/12/20  Uncertain of cause - awaiting surgery path report.  Improved xray yesterday given post thoracentesis - however still with everardo infiltrate.  BNP to be obtained to assess for CHF component.  No WBC or true fever at present - infectious cause less likely - but must be considered especially in setting of potential immunocompromised state if lymphoma present - continue rocephin and azithromycin.  Monitor and repeat CXR later today. Along with BNP - if indicated fluid overload plan to diuresis  Pulm following.     5/13/20  Respiratory status improved.   Continue abx for now for potential coverage.  At present no repeated CXR ordered given clinically seems much improved.  Will monitor closely  Suspected reactive effusion  pulm following    5/14/20  No interval worsening  Continue abx for now  Continue to monitor    5/15/20  No worsening in respiratory status.  Continue to monitor.  No repeat imaging planned given clinical status  pulm following          VTE Risk Mitigation (From admission, onward)         Ordered     Place sequential compression device  Until discontinued      05/11/20 4961                      Geoff Warren MD  Department of Hospital Medicine   [facility]

## 2020-05-15 NOTE — PT/OT/SLP PROGRESS
Physical Therapy  Treatment    Jaswant Yang   MRN: [id number]   Admitting Diagnosis: Non-traumatic rupture of spleen    PT Received On: 05/15/20  PT Start Time: 0900     PT Stop Time: 0925    PT Total Time (min): 25 min       Billable Minutes:  Gait Training 15 and Therapeutic Activity 10    Treatment Type: Treatment  PT/PTA: PT     PTA Visit Number: 0       General Precautions: Standard, fall  Orthopedic Precautions: N/A   Braces: N/A         Subjective:  Communicated with NURSE GALINDO AND Epic CHART REVIEW  prior to session.   PT AGREED TO TX    Pain/Comfort  Pain Rating 1: 2/10  Location 1: abdomen  Pain Rating Post-Intervention 1: 2/10    Objective:   Patient found with: peripheral IV, CHRISTI drain    Functional Mobility:  PT MET IN RM SUP>SIT EOB IND. PT STOOD WITH NO AD AND GT TRAINED X 150' WITH NO LOB. PT RETURNED TO RM T/F ON AND OFF COMMODE IND. PT TO BEDSIDE SUP IN BED IND. PT LEFT WITH ALL NEEDS MET PT WILL BE D/C TO MOBILITY PROGRAM FOR MAINTENANCE WALKING.     AM-PAC 6 CLICK MOBILITY  How much help from another person does this patient currently need?   1 = Unable, Total/Dependent Assistance  2 = A lot, Maximum/Moderate Assistance  3 = A little, Minimum/Contact Guard/Supervision  4 = None, Modified [place]/Independent    Turning over in bed (including adjusting bedclothes, sheets and blankets)?: 4  Sitting down on and standing up from a chair with arms (e.g., wheelchair, bedside commode, etc.): 4  Moving from lying on back to sitting on the side of the bed?: 4  Moving to and from a bed to a chair (including a wheelchair)?: 4  Need to walk in hospital room?: 4  Climbing 3-5 steps with a railing?: 1  Basic Mobility Total Score: 21    AM-PAC Raw Score CMS G-Code Modifier Level of Impairment Assistance   6 % Total / Unable   7 - 9 CM 80 - 100% Maximal Assist   10 - 14 CL 60 - 80% Moderate Assist   15 - 19 CK 40 - 60% Moderate Assist   20 - 22 CJ 20 - 40% Minimal Assist   23 CI 1-20% SBA / CGA    24 CH 0% Independent/ Mod I     Patient left HOB elevated with call button in reach.    Assessment:  PT GOALS MET AND WILL BE D/C TO MOBILITY PROGRAM FOR MAINTENANCE.     Rehab identified problem list/impairments: Rehab identified problem list/impairments: weakness, gait instability, impaired endurance, impaired balance, impaired functional mobilty, decreased upper extremity function, decreased lower extremity function, decreased ROM    Discharge recommendations: Discharge Facility/Level of Care Needs: home     Barriers to discharge:      Equipment recommendations: Equipment Needed After Discharge: none     GOALS:   Multidisciplinary Problems     Physical Therapy Goals     Not on file          Multidisciplinary Problems (Resolved)        Problem: Physical Therapy Goal    Goal Priority Disciplines Outcome Goal Variances Interventions   Physical Therapy Goal   (Resolved)     PT, PT/OT Met     Description:  LTGs to be met within 7 days (5/19/20):  1.  Patient will perform bed mobility with min assist  2.  Patient will perform functional transfers with SPV  3.  Patient will ambulate 150 feet without AD with SBA and no gross LOB                    PLAN:    PT WILL BE D/C FROM P.T. TO MOBILITY PROGRAM  Plan of Care reviewed with: patient         Romina Mak, PT  05/15/2020

## 2020-05-15 NOTE — ASSESSMENT & PLAN NOTE
Pod 1  Spleen removed  Cause unknown - concern of lymphoma given op report  Path pending  Monospot negative.    POD 2  Feeling improved  Awaiting pathology.  Will need outpatient follow up for cause evaluation/ potential lymphoma evaluation.  Discussing vaccine selection with pharmacy - will need vaccinations prior to discharge.    POD 3  Pharmacy reviewed and discussed with me - vaccines to be given 14 days post emergent splenectomy.  Reviewed up - to - date.   Discussed and emphasized importance of obtaining on day 14 post op.    Will arrange with patient. She will need to establish with a PCP.    POD 4  Patient aware of need for vaccines. Will set her up with PCP upon discharge.  Consider outpatient PT to get strength back upon discharge.

## 2020-05-16 VITALS
TEMPERATURE: 98 F | HEART RATE: 80 BPM | SYSTOLIC BLOOD PRESSURE: 136 MMHG | BODY MASS INDEX: 39.32 KG/M2 | HEIGHT: 71 IN | OXYGEN SATURATION: 98 % | WEIGHT: 280.88 LBS | DIASTOLIC BLOOD PRESSURE: 73 MMHG | RESPIRATION RATE: 16 BRPM

## 2020-05-16 PROBLEM — D64.9 ANEMIA: Status: RESOLVED | Noted: 2020-05-11 | Resolved: 2020-05-16

## 2020-05-16 PROBLEM — J90 PLEURAL EFFUSION ON LEFT: Status: RESOLVED | Noted: 2020-05-11 | Resolved: 2020-05-16

## 2020-05-16 PROBLEM — D73.5: Status: RESOLVED | Noted: 2020-05-12 | Resolved: 2020-05-16

## 2020-05-16 PROBLEM — R18.8 OTHER ASCITES: Status: RESOLVED | Noted: 2020-05-11 | Resolved: 2020-05-16

## 2020-05-16 PROBLEM — K59.00 CONSTIPATION: Status: RESOLVED | Noted: 2020-05-11 | Resolved: 2020-05-16

## 2020-05-16 PROBLEM — Z98.890 POST-OPERATIVE STATE: Status: RESOLVED | Noted: 2020-05-11 | Resolved: 2020-05-16

## 2020-05-16 PROBLEM — R10.9 ABDOMINAL PAIN: Status: RESOLVED | Noted: 2020-05-11 | Resolved: 2020-05-16

## 2020-05-16 LAB
ALBUMIN SERPL BCP-MCNC: 2.8 G/DL (ref 3.5–5.2)
ALP SERPL-CCNC: 72 U/L (ref 55–135)
ALT SERPL W/O P-5'-P-CCNC: 18 U/L (ref 10–44)
ANION GAP SERPL CALC-SCNC: 7 MMOL/L (ref 8–16)
ANISOCYTOSIS BLD QL SMEAR: SLIGHT
AST SERPL-CCNC: 24 U/L (ref 10–40)
BASOPHILS NFR BLD: 0 % (ref 0–1.9)
BILIRUB SERPL-MCNC: 0.4 MG/DL (ref 0.1–1)
BUN SERPL-MCNC: 10 MG/DL (ref 6–20)
CALCIUM SERPL-MCNC: 8.8 MG/DL (ref 8.7–10.5)
CHLORIDE SERPL-SCNC: 103 MMOL/L (ref 95–110)
CO2 SERPL-SCNC: 28 MMOL/L (ref 23–29)
CREAT SERPL-MCNC: 0.9 MG/DL (ref 0.5–1.4)
DACRYOCYTES BLD QL SMEAR: ABNORMAL
DIFFERENTIAL METHOD: ABNORMAL
EOSINOPHIL NFR BLD: 3 % (ref 0–8)
ERYTHROCYTE [DISTWIDTH] IN BLOOD BY AUTOMATED COUNT: 17.7 % (ref 11.5–14.5)
EST. GFR  (AFRICAN AMERICAN): >60 ML/MIN/1.73 M^2
EST. GFR  (NON AFRICAN AMERICAN): >60 ML/MIN/1.73 M^2
GLUCOSE SERPL-MCNC: 96 MG/DL (ref 70–110)
HCT VFR BLD AUTO: 32.5 % (ref 37–48.5)
HGB BLD-MCNC: 10.3 G/DL (ref 12–16)
HYPOCHROMIA BLD QL SMEAR: ABNORMAL
IMM GRANULOCYTES # BLD AUTO: ABNORMAL K/UL (ref 0–0.04)
IMM GRANULOCYTES NFR BLD AUTO: ABNORMAL % (ref 0–0.5)
LYMPHOCYTES NFR BLD: 24 % (ref 18–48)
MCH RBC QN AUTO: 29.6 PG (ref 27–31)
MCHC RBC AUTO-ENTMCNC: 31.7 G/DL (ref 32–36)
MCV RBC AUTO: 93 FL (ref 82–98)
MONOCYTES NFR BLD: 8 % (ref 4–15)
NEUTROPHILS NFR BLD: 60 % (ref 38–73)
NEUTS BAND NFR BLD MANUAL: 5 %
NRBC BLD-RTO: 17 /100 WBC
OVALOCYTES BLD QL SMEAR: ABNORMAL
PLATELET # BLD AUTO: 230 K/UL (ref 150–350)
PMV BLD AUTO: 9.9 FL (ref 9.2–12.9)
POIKILOCYTOSIS BLD QL SMEAR: SLIGHT
POLYCHROMASIA BLD QL SMEAR: ABNORMAL
POTASSIUM SERPL-SCNC: 3.7 MMOL/L (ref 3.5–5.1)
PROT SERPL-MCNC: 5.9 G/DL (ref 6–8.4)
RBC # BLD AUTO: 3.48 M/UL (ref 4–5.4)
SODIUM SERPL-SCNC: 138 MMOL/L (ref 136–145)
STOMATOCYTES BLD QL SMEAR: PRESENT
TARGETS BLD QL SMEAR: ABNORMAL
WBC # BLD AUTO: 2.78 K/UL (ref 3.9–12.7)

## 2020-05-16 PROCEDURE — 99024 PR POST-OP FOLLOW-UP VISIT: ICD-10-PCS | Mod: ,,, | Performed by: SURGERY

## 2020-05-16 PROCEDURE — 63700000 PHARM REV CODE 250 ALT 637 W/O HCPCS: Performed by: FAMILY MEDICINE

## 2020-05-16 PROCEDURE — 80053 COMPREHEN METABOLIC PANEL: CPT

## 2020-05-16 PROCEDURE — 90686 IIV4 VACC NO PRSV 0.5 ML IM: CPT | Performed by: NURSE PRACTITIONER

## 2020-05-16 PROCEDURE — 85027 COMPLETE CBC AUTOMATED: CPT

## 2020-05-16 PROCEDURE — 85007 BL SMEAR W/DIFF WBC COUNT: CPT

## 2020-05-16 PROCEDURE — 90471 IMMUNIZATION ADMIN: CPT | Performed by: NURSE PRACTITIONER

## 2020-05-16 PROCEDURE — 63600175 PHARM REV CODE 636 W HCPCS: Performed by: FAMILY MEDICINE

## 2020-05-16 PROCEDURE — 96372 THER/PROPH/DIAG INJ SC/IM: CPT

## 2020-05-16 PROCEDURE — 99024 POSTOP FOLLOW-UP VISIT: CPT | Mod: ,,, | Performed by: SURGERY

## 2020-05-16 PROCEDURE — 36415 COLL VENOUS BLD VENIPUNCTURE: CPT

## 2020-05-16 PROCEDURE — 25000003 PHARM REV CODE 250: Performed by: FAMILY MEDICINE

## 2020-05-16 PROCEDURE — 63600175 PHARM REV CODE 636 W HCPCS: Performed by: NURSE PRACTITIONER

## 2020-05-16 PROCEDURE — 90734 MENACWYD/MENACWYCRM VACC IM: CPT | Performed by: NURSE PRACTITIONER

## 2020-05-16 PROCEDURE — 90670 PCV13 VACCINE IM: CPT | Performed by: NURSE PRACTITIONER

## 2020-05-16 PROCEDURE — 90472 IMMUNIZATION ADMIN EACH ADD: CPT | Performed by: NURSE PRACTITIONER

## 2020-05-16 PROCEDURE — 25000003 PHARM REV CODE 250: Performed by: SURGERY

## 2020-05-16 RX ORDER — HYDROCODONE BITARTRATE AND ACETAMINOPHEN 10; 325 MG/1; MG/1
1 TABLET ORAL EVERY 6 HOURS PRN
Qty: 20 TABLET | Refills: 0 | Status: SHIPPED | OUTPATIENT
Start: 2020-05-16 | End: 2020-05-25 | Stop reason: SDUPTHER

## 2020-05-16 RX ADMIN — NEISSERIA MENINGITIDIS GROUP A CAPSULAR POLYSACCHARIDE DIPHTHERIA TOXOID CONJUGATE ANTIGEN, NEISSERIA MENINGITIDIS GROUP C CAPSULAR POLYSACCHARIDE DIPHTHERIA TOXOID CONJUGATE ANTIGEN, NEISSERIA MENINGITIDIS GROUP Y CAPSULAR POLYSACCHARIDE DIPHTHERIA TOXOID CONJUGATE ANTIGEN, AND NEISSERIA MENINGITIDIS GROUP W-135 CAPSULAR POLYSACCHARIDE DIPHTHERIA TOXOID CONJUGATE ANTIGEN 0.5 ML: 4; 4; 4; 4 INJECTION, SOLUTION INTRAMUSCULAR at 01:05

## 2020-05-16 RX ADMIN — INFLUENZA VIRUS VACCINE 0.5 ML: 15; 15; 15; 15 SUSPENSION INTRAMUSCULAR at 01:05

## 2020-05-16 RX ADMIN — HYDROCODONE BITARTRATE AND ACETAMINOPHEN 1 TABLET: 10; 325 TABLET ORAL at 07:05

## 2020-05-16 RX ADMIN — PNEUMOCOCCAL 13-VALENT CONJUGATE VACCINE 0.5 ML: 2.2; 2.2; 2.2; 2.2; 2.2; 4.4; 2.2; 2.2; 2.2; 2.2; 2.2; 2.2; 2.2 INJECTION, SUSPENSION INTRAMUSCULAR at 01:05

## 2020-05-16 RX ADMIN — AZITHROMYCIN MONOHYDRATE 250 MG: 250 TABLET ORAL at 08:05

## 2020-05-16 RX ADMIN — POLYETHYLENE GLYCOL 3350 17 G: 17 POWDER, FOR SOLUTION ORAL at 08:05

## 2020-05-16 RX ADMIN — PANTOPRAZOLE SODIUM 40 MG: 40 TABLET, DELAYED RELEASE ORAL at 08:05

## 2020-05-16 RX ADMIN — MORPHINE SULFATE 2 MG: 2 INJECTION, SOLUTION INTRAMUSCULAR; INTRAVENOUS at 04:05

## 2020-05-16 RX ADMIN — HYDROCODONE BITARTRATE AND ACETAMINOPHEN 1 TABLET: 10; 325 TABLET ORAL at 12:05

## 2020-05-16 RX ADMIN — HYDROCODONE BITARTRATE AND ACETAMINOPHEN 1 TABLET: 10; 325 TABLET ORAL at 01:05

## 2020-05-16 RX ADMIN — CEFTRIAXONE 1 G: 1 INJECTION, SOLUTION INTRAVENOUS at 12:05

## 2020-05-16 NOTE — PLAN OF CARE
Problem: Adult Inpatient Plan of Care  Goal: Plan of Care Review  Outcome: Ongoing, Progressing     Pt remained free of injury during shift, stable condition, pain adequately controlled with prn medication, NAD, VSS, receiving antibiotics per orders, abdominal incision clean and dry, staples intact, pt states she passed one small BM during shift, passing flatus, ambulating with assist, and will continue to monitor. 24hr chart review performed.

## 2020-05-16 NOTE — DISCHARGE SUMMARY
[facility] Hospital Medicine  Discharge Summary      Patient Name: Jaswant Yang  MRN: [id number]  Admission Date: 5/11/2020  Hospital Length of Stay: 5 days  Discharge Date and Time:  05/16/2020 12:03 PM  Attending Physician: Rohit Rodriguez, *   Discharging Provider: VIKTORIA Green  Primary Care Provider: Primary Doctor No      HPI:   Ms Yang is a 44 year female with PMHx Anemia who presented to [facility] Emergency Room with complaints of Left side abdominal pain that started two days prior to arrival. Associated symptoms include constipation, RAO, left flank and back pain. She denies any recent trauma.  Denies associated symptoms of chest pain, fever, chill, nausea, vomiting or diaphoresis. CXR showed large left-sided pleural effusion with left-sided atelectasis or airspace disease. Abdominal X ray showed nonobstructive bowel gas pattern is noted, moderate constipation. Case reviewed with pulmonary, Dr Crouch, O 2 sats stable, plan for Left  thoracentesis today. Patient is a full code, mother Elsa Yang is surrogate decision maker. She is being placed in Observation under the care of [facility].     Procedure(s) (LRB):  LAPAROTOMY, EXPLORATORY (N/A)  SPLENECTOMY (N/A)  THORACENTESIS (Left)  BLOCK, TRANSVERSUS ABDOMINIS PLANE (N/A)      Hospital Course:   Patient was admitted to [facility]/S for non-traumatic spleen rupture under the care of [facility] medicine. Surgery was consulted and patient had splenectomy on 5/11/20. Patient was started on PRN analgesia and had thoracentesis on 5/11 with 1300cc's jacqui fluid removed and sent to lab. Patient had patchy infiltrates but denies SOB. Pulmonology following. Patient slowly improved and was encouraged to walk. Pain is controlled. Xray on 5/14 showed no interval worsening of lungs. Patient continues to improve, is walking, had BM last night and is urinating independently. Discussed with surgery who is okay with discharge today.  Patient will be given flu, pneumonia, and meningococcal vaccines before discharge. She will f/u with surgery and her PCP in 2 weeks.       Consults:   Consults (From admission, onward)        Status Ordering Provider     Inpatient consult to General Surgery  Once     Provider:  Yumi Ferguson MD    Completed ISAAC JAFFE     Inpatient consult to Pulmonology  Once     Provider:  Joseph Crouch MD    Acknowledged MAYI MONTENEGRO     Inpatient consult to Pulmonology  Once     Provider:  (Not yet assigned)    Acknowledged MAYI MONTENEGRO     Inpatient consult to Respiratory Care  Once     Provider:  (Not yet assigned)    Acknowledged YUMI FERGUSON new Assessment & Plan notes have been filed under this hospital service since the last note was generated.  Service: Hospital Medicine    Final Active Diagnoses:    Diagnosis Date Noted POA    GERD (gastroesophageal reflux disease) [K21.9] 05/14/2020 Unknown    CKD (chronic kidney disease) stage 3, GFR 30-59 ml/min [N18.3] 05/11/2020 Yes      Problems Resolved During this Admission:    Diagnosis Date Noted Date Resolved POA    PRINCIPAL PROBLEM:  Non-traumatic rupture of spleen [D73.5] 05/12/2020 05/16/2020 Yes    Injury to spleen [S36.00XA]  05/16/2020 Yes    Pleural effusion on left [J90] 05/11/2020 05/16/2020 Yes    Abdominal pain [R10.9] 05/11/2020 05/16/2020 Yes    Constipation [K59.00] 05/11/2020 05/16/2020 Yes    Other ascites [R18.8] 05/11/2020 05/16/2020 Yes    Anemia [D64.9] 05/11/2020 05/16/2020 Yes    Post-operative state [Z98.890] 05/11/2020 05/16/2020 Not Applicable       Discharged Condition: stable    Disposition: Home or Self Care    Follow Up:  Follow-up Information     Yumi Ferguson MD In 2 weeks.    Specialty:  General Surgery  Why:  For wound re-check  Contact information:  [id number]  [place] [address] [phone]             Primary Doctor No In 2 weeks.               Patient Instructions:       Diet Adult Regular     Notify your health care provider if you experience any of the following:  temperature >100.4     Notify your health care provider if you experience any of the following:  severe uncontrolled pain     Notify your health care provider if you experience any of the following:  redness, tenderness, or signs of infection (pain, swelling, redness, odor or green/yellow discharge around incision site)     No dressing needed     Activity as tolerated       Significant Diagnostic Studies: Labs:   BMP:   Recent Labs   Lab 05/15/20  0729 05/16/20  0855   GLU 97 96    138   K 4.0 3.7    103   CO2 26 28   BUN 13 10   CREATININE 0.9 0.9   CALCIUM 8.9 8.8   , CMP   Recent Labs   Lab 05/15/20  0729 05/16/20  0855    138   K 4.0 3.7    103   CO2 26 28   GLU 97 96   BUN 13 10   CREATININE 0.9 0.9   CALCIUM 8.9 8.8   PROT 5.7* 5.9*   ALBUMIN 2.5* 2.8*   BILITOT 0.4 0.4   ALKPHOS 53* 72   AST 20 24   ALT 16 18   ANIONGAP 9 7*   ESTGFRAFRICA >60 >60   EGFRNONAA >60 >60   , CBC   Recent Labs   Lab 05/15/20  0729 05/16/20  0855   WBC 2.01* 2.78*   HGB 9.9* 10.3*   HCT 31.8* 32.5*    230    and All labs within the past 24 hours have been reviewed    Pending Diagnostic Studies:     Procedure Component Value Units Date/Time    Cytology, Pulmonary [[id number]] Collected:  05/11/20 1412    Order Status:  Sent Lab Status:  In process Updated:  05/12/20 1548    Specimen to Pathology, Surgery General Surgery [[id number]] Collected:  05/11/20 1226    Order Status:  Sent Lab Status:  In process Updated:  05/11/20 1404         Medications:  Reconciled Home Medications:      Medication List      CONTINUE taking these medications    albuterol 90 mcg/actuation inhaler  Commonly known as:  PROVENTIL/VENTOLIN HFA  Inhale 1-2 puffs into the lungs every 6 (six) hours as needed for Wheezing. Rescue     diclofenac 75 MG EC tablet  Commonly known as:  VOLTAREN  Take 1 tablet (75 mg total) by mouth 2 (two)  times daily.     ibuprofen 200 MG tablet  Commonly known as:  ADVIL,MOTRIN  Take 2 tablets (400 mg total) by mouth every 6 (six) hours as needed for Pain.     traMADoL 50 mg tablet  Commonly known as:  ULTRAM  Take 1 tablet (50 mg total) by mouth every 6 (six) hours as needed for Pain.        STOP taking these medications    azithromycin 250 MG tablet  Commonly known as:  Z-MAURILIO     methylPREDNISolone 4 mg tablet  Commonly known as:  MEDROL DOSEPACK            Indwelling Lines/Drains at time of discharge:   Lines/Drains/Airways     None                 Time spent on the discharge of patient: 50 minutes  Patient was seen and examined on the date of discharge and determined to be suitable for discharge.         JATINDER Green-ALTA  Department of Hospital Medicine  [facility]

## 2020-05-16 NOTE — SUBJECTIVE & OBJECTIVE
Interval History: AFVSS. No acute issues. Tolerating diet. + BM and flatus. No nausea or emesis.     Medications:  Continuous Infusions:  Scheduled Meds:   cefTRIAXone (ROCEPHIN) IVPB  1 g Intravenous Q12H    morphine  4 mg Intravenous Once    pantoprazole  40 mg Oral Daily    polyethylene glycol  17 g Oral Daily     PRN Meds:diphenhydrAMINE, HYDROcodone-acetaminophen, HYDROcodone-acetaminophen, morphine, ondansetron     Review of patient's allergies indicates:  No Known Allergies  Objective:     Vital Signs (Most Recent):  Temp: 98.4 °F (36.9 °C) (05/16/20 0715)  Pulse: 80 (05/16/20 0715)  Resp: 16 (05/16/20 0715)  BP: 136/73 (05/16/20 0715)  SpO2: 98 % (05/16/20 0715) Vital Signs (24h Range):  Temp:  [98.4 °F (36.9 °C)-98.7 °F (37.1 °C)] 98.4 °F (36.9 °C)  Pulse:  [69-82] 80  Resp:  [16-20] 16  SpO2:  [94 %-98 %] 98 %  BP: (136-159)/(73-89) 136/73     Weight: 127.4 kg (280 lb 13.9 oz)  Body mass index is 39.17 kg/m².    Intake/Output - Last 3 Shifts       05/14 0700 - 05/15 0659 05/15 0700 - 05/16 0659 05/16 0700 - 05/17 0659    P.O. 480 960 180    IV Piggyback 100 100     Total Intake(mL/kg) 580 (4.6) 1060 (8.3) 180 (1.4)    Urine (mL/kg/hr) 0 (0) 0 (0)     Drains 30 30     Stool  0     Total Output 30 30     Net +550 +1030 +180           Urine Occurrence 2 x 2 x 1 x    Stool Occurrence  2 x           Physical Exam   Constitutional: She is oriented to person, place, and time. She appears well-developed and well-nourished. No distress.   HENT:   Head: Normocephalic and atraumatic.   Eyes: EOM are normal.   Neck: Neck supple.   Cardiovascular: Normal rate and regular rhythm.   Pulmonary/Chest: Effort normal.   Abdominal: Soft. She exhibits no distension. There is tenderness (appropriate incisional).   Incision healing, staples intact, no signs of infection   Musculoskeletal: Normal range of motion.   Neurological: She is alert and oriented to person, place, and time.   Skin: Skin is warm.   Vitals  reviewed.      Significant Labs:  CBC:   Recent Labs   Lab 05/16/20  0855   WBC 2.78*   RBC 3.48*   HGB 10.3*   HCT 32.5*      MCV 93   MCH 29.6   MCHC 31.7*     BMP:   Recent Labs   Lab 05/16/20  0855   GLU 96      K 3.7      CO2 28   BUN 10   CREATININE 0.9   CALCIUM 8.8       Significant Diagnostics:  I have reviewed and interpreted all pertinent imaging results/findings within the past 24 hours.

## 2020-05-16 NOTE — ASSESSMENT & PLAN NOTE
POD#5 s/p ex lap, splenectomy    OK for discharge from surgical standpoint  Vaccines prior to discharge recommended  F/u in 2 weeks with me

## 2020-05-16 NOTE — PROGRESS NOTES
[facility]   General Surgery  Progress Note    Subjective:     History of Present Illness:  44-year-old female with evidence of splenic laceration with infarction and questionable bleeding on CT abdomen and pelvis obtained by the ER for evaluation of epigastric pain.  She was admitted to the medicine service noted to have anemia and continued pain.  Surgery was consulted for evaluation.  She denies any trauma.  She does report some malaise over the last 2 weeks.  She has COVID negative.    Post-Op Info:  Procedure(s) (LRB):  LAPAROTOMY, EXPLORATORY (N/A)  SPLENECTOMY (N/A)  THORACENTESIS (Left)  BLOCK, TRANSVERSUS ABDOMINIS PLANE (N/A)   5 Days Post-Op     Interval History: AFVSS. No acute issues. Tolerating diet. + BM and flatus. No nausea or emesis.     Medications:  Continuous Infusions:  Scheduled Meds:   cefTRIAXone (ROCEPHIN) IVPB  1 g Intravenous Q12H    morphine  4 mg Intravenous Once    pantoprazole  40 mg Oral Daily    polyethylene glycol  17 g Oral Daily     PRN Meds:diphenhydrAMINE, HYDROcodone-acetaminophen, HYDROcodone-acetaminophen, morphine, ondansetron     Review of patient's allergies indicates:  No Known Allergies  Objective:     Vital Signs (Most Recent):  Temp: 98.4 °F (36.9 °C) (05/16/20 0715)  Pulse: 80 (05/16/20 0715)  Resp: 16 (05/16/20 0715)  BP: 136/73 (05/16/20 0715)  SpO2: 98 % (05/16/20 0715) Vital Signs (24h Range):  Temp:  [98.4 °F (36.9 °C)-98.7 °F (37.1 °C)] 98.4 °F (36.9 °C)  Pulse:  [69-82] 80  Resp:  [16-20] 16  SpO2:  [94 %-98 %] 98 %  BP: (136-159)/(73-89) 136/73     Weight: 127.4 kg (280 lb 13.9 oz)  Body mass index is 39.17 kg/m².    Intake/Output - Last 3 Shifts       05/14 0700 - 05/15 0659 05/15 0700 - 05/16 0659 05/16 0700 - 05/17 0659    P.O. 480 960 180    IV Piggyback 100 100     Total Intake(mL/kg) 580 (4.6) 1060 (8.3) 180 (1.4)    Urine (mL/kg/hr) 0 (0) 0 (0)     Drains 30 30     Stool  0     Total Output 30 30     Net +550 +1030 +180            Urine Occurrence 2 x 2 x 1 x    Stool Occurrence  2 x           Physical Exam   Constitutional: She is oriented to person, place, and time. She appears well-developed and well-nourished. No distress.   HENT:   Head: Normocephalic and atraumatic.   Eyes: EOM are normal.   Neck: Neck supple.   Cardiovascular: Normal rate and regular rhythm.   Pulmonary/Chest: Effort normal.   Abdominal: Soft. She exhibits no distension. There is tenderness (appropriate incisional).   Incision healing, staples intact, no signs of infection   Musculoskeletal: Normal range of motion.   Neurological: She is alert and oriented to person, place, and time.   Skin: Skin is warm.   Vitals reviewed.      Significant Labs:  CBC:   Recent Labs   Lab 05/16/20  0855   WBC 2.78*   RBC 3.48*   HGB 10.3*   HCT 32.5*      MCV 93   MCH 29.6   MCHC 31.7*     BMP:   Recent Labs   Lab 05/16/20  0855   GLU 96      K 3.7      CO2 28   BUN 10   CREATININE 0.9   CALCIUM 8.8       Significant Diagnostics:  I have reviewed and interpreted all pertinent imaging results/findings within the past 24 hours.    Assessment/Plan:     * Non-traumatic rupture of spleen  POD#5 s/p ex lap, splenectomy    OK for discharge from surgical standpoint  Vaccines prior to discharge recommended  F/u in 2 weeks with me    Anemia  H/H remains stable postop    Pleural effusion on left  Pulmonology on board        Yumi Ferguson MD  General Surgery  [facility]

## 2020-05-16 NOTE — PLAN OF CARE
05/16/20 1238   Final Note   Assessment Type Final Discharge Note   Anticipated Discharge Disposition Home   Right Care Referral Info   Post Acute Recommendation No Care   Post-Acute Status   Discharge Delays None known at this time

## 2020-05-18 LAB — BACTERIA SPEC ANAEROBE CULT: NORMAL

## 2020-05-21 LAB
FLOW CYTOMETRY ANTIBODIES ANALYZED - TISSUE: NORMAL
FLOW CYTOMETRY COMMENT - TISSUE: NORMAL
FLOW CYTOMETRY INTERPRETATION - TISSUE: NORMAL
SPECIMEN TYPE - TISSUE: NORMAL

## 2020-05-22 ENCOUNTER — TELEPHONE (OUTPATIENT)
Dept: INTERNAL MEDICINE | Facility: CLINIC | Age: 45
End: 2020-05-22

## 2020-05-22 DIAGNOSIS — C91.40 HAIRY CELL LEUKEMIA NOT HAVING ACHIEVED REMISSION: Primary | ICD-10-CM

## 2020-05-22 LAB
FINAL PATHOLOGIC DIAGNOSIS: NORMAL
FROZEN SECTION DIAGNOSIS: NORMAL
GROSS: NORMAL
Lab: NORMAL
SUPPLEMENTAL DIAGNOSIS: NORMAL

## 2020-05-22 NOTE — TELEPHONE ENCOUNTER
----- Message from Geoff Warren MD sent at 5/22/2020  4:42 PM CDT -----  Please arrange for Oncology ASAP  This is a patient I saw in hospital

## 2020-05-25 ENCOUNTER — OFFICE VISIT (OUTPATIENT)
Dept: HEMATOLOGY/ONCOLOGY | Facility: CLINIC | Age: 45
End: 2020-05-25
Payer: MEDICAID

## 2020-05-25 ENCOUNTER — LAB VISIT (OUTPATIENT)
Dept: LAB | Facility: HOSPITAL | Age: 45
End: 2020-05-25
Attending: INTERNAL MEDICINE
Payer: MEDICAID

## 2020-05-25 ENCOUNTER — TELEPHONE (OUTPATIENT)
Dept: SURGERY | Facility: CLINIC | Age: 45
End: 2020-05-25

## 2020-05-25 VITALS
TEMPERATURE: 98 F | DIASTOLIC BLOOD PRESSURE: 92 MMHG | HEART RATE: 82 BPM | HEIGHT: 71 IN | BODY MASS INDEX: 37.69 KG/M2 | OXYGEN SATURATION: 98 % | SYSTOLIC BLOOD PRESSURE: 143 MMHG | WEIGHT: 269.19 LBS

## 2020-05-25 DIAGNOSIS — C91.40 HAIRY CELL LEUKEMIA NOT HAVING ACHIEVED REMISSION: Primary | ICD-10-CM

## 2020-05-25 DIAGNOSIS — D64.9 ANEMIA, UNSPECIFIED TYPE: ICD-10-CM

## 2020-05-25 DIAGNOSIS — D70.8 OTHER NEUTROPENIA: ICD-10-CM

## 2020-05-25 DIAGNOSIS — C91.40 HAIRY CELL LEUKEMIA NOT HAVING ACHIEVED REMISSION: ICD-10-CM

## 2020-05-25 LAB
BASOPHILS # BLD AUTO: 0 K/UL (ref 0–0.2)
BASOPHILS NFR BLD: 0 % (ref 0–1.9)
DIFFERENTIAL METHOD: ABNORMAL
EOSINOPHIL # BLD AUTO: 0.1 K/UL (ref 0–0.5)
EOSINOPHIL NFR BLD: 3.3 % (ref 0–8)
ERYTHROCYTE [DISTWIDTH] IN BLOOD BY AUTOMATED COUNT: 18.2 % (ref 11.5–14.5)
FERRITIN SERPL-MCNC: 71 NG/ML (ref 20–300)
HCT VFR BLD AUTO: 37 % (ref 37–48.5)
HGB BLD-MCNC: 11.3 G/DL (ref 12–16)
IMM GRANULOCYTES # BLD AUTO: 0.02 K/UL (ref 0–0.04)
IMM GRANULOCYTES NFR BLD AUTO: 0.7 % (ref 0–0.5)
IRON SERPL-MCNC: 119 UG/DL (ref 30–160)
LYMPHOCYTES # BLD AUTO: 1.1 K/UL (ref 1–4.8)
LYMPHOCYTES NFR BLD: 41.4 % (ref 18–48)
MCH RBC QN AUTO: 29.4 PG (ref 27–31)
MCHC RBC AUTO-ENTMCNC: 30.5 G/DL (ref 32–36)
MCV RBC AUTO: 96 FL (ref 82–98)
MONOCYTES # BLD AUTO: 0.1 K/UL (ref 0.3–1)
MONOCYTES NFR BLD: 2.6 % (ref 4–15)
NEUTROPHILS # BLD AUTO: 1.4 K/UL (ref 1.8–7.7)
NEUTROPHILS NFR BLD: 52 % (ref 38–73)
NRBC BLD-RTO: 4 /100 WBC
PATH REV BLD -IMP: NORMAL
PLATELET # BLD AUTO: 476 K/UL (ref 150–350)
PMV BLD AUTO: 9.2 FL (ref 9.2–12.9)
RBC # BLD AUTO: 3.84 M/UL (ref 4–5.4)
SATURATED IRON: 31 % (ref 20–50)
TOTAL IRON BINDING CAPACITY: 389 UG/DL (ref 250–450)
TRANSFERRIN SERPL-MCNC: 263 MG/DL (ref 200–375)
WBC # BLD AUTO: 2.73 K/UL (ref 3.9–12.7)

## 2020-05-25 PROCEDURE — 99214 OFFICE O/P EST MOD 30 MIN: CPT | Mod: PBBFAC | Performed by: INTERNAL MEDICINE

## 2020-05-25 PROCEDURE — 82728 ASSAY OF FERRITIN: CPT

## 2020-05-25 PROCEDURE — 99999 PR PBB SHADOW E&M-EST. PATIENT-LVL IV: CPT | Mod: PBBFAC,,, | Performed by: INTERNAL MEDICINE

## 2020-05-25 PROCEDURE — 36415 COLL VENOUS BLD VENIPUNCTURE: CPT

## 2020-05-25 PROCEDURE — 83540 ASSAY OF IRON: CPT

## 2020-05-25 PROCEDURE — 99999 PR PBB SHADOW E&M-EST. PATIENT-LVL IV: ICD-10-PCS | Mod: PBBFAC,,, | Performed by: INTERNAL MEDICINE

## 2020-05-25 PROCEDURE — 85025 COMPLETE CBC W/AUTO DIFF WBC: CPT

## 2020-05-25 PROCEDURE — 99205 PR OFFICE/OUTPT VISIT, NEW, LEVL V, 60-74 MIN: ICD-10-PCS | Mod: S$PBB,,, | Performed by: INTERNAL MEDICINE

## 2020-05-25 PROCEDURE — 99205 OFFICE O/P NEW HI 60 MIN: CPT | Mod: S$PBB,,, | Performed by: INTERNAL MEDICINE

## 2020-05-25 RX ORDER — HYDROCODONE BITARTRATE AND ACETAMINOPHEN 10; 325 MG/1; MG/1
1 TABLET ORAL EVERY 6 HOURS PRN
Qty: 20 TABLET | Refills: 0 | Status: SHIPPED | OUTPATIENT
Start: 2020-05-25 | End: 2020-05-28 | Stop reason: SDUPTHER

## 2020-05-25 RX ORDER — CYCLOBENZAPRINE HCL 5 MG
1 TABLET ORAL 4 TIMES DAILY
COMMUNITY
Start: 2018-09-18 | End: 2020-05-25

## 2020-05-25 NOTE — PATIENT INSTRUCTIONS
- labs today  - bmbx in [facility]  - rv 1 week after bmbx with checking for results day prior

## 2020-05-25 NOTE — H&P (VIEW-ONLY)
"  Subjective:      DATE OF VISIT: 5/25/20     ?  Patient ID:?Jaswant Yang is a 44 y.o. female.?? MR#: [id number]   ?   REFERRING PROVIDER: Geoff Warren MD  [address] [place] [id number]     ? Primary Care Providers:  Primary Doctor No (General)     CHIEF COMPLAINT: ??  Newly diagnosed hairy cell leukemia??   ?   ONCOLOGIC DIAGNOSIS:  Hairy cell leukemia  ?   CURRENT TREATMENT: TBD    PAST TREATMENT:  05/11/2020 splenectomy  ?   ONCOLOGIC HISTORY:   ?   Ms Yang is a 44 year old woman with obesity and longstanding history of anemia, without treatment or further evaluation.  She does not follow with primary care and no other known diagnoses.  She presented to emergency room with left-sided abdominal pain for 2 days which she thought initially due to gas/indigestion.  She denies any associated symptoms.  CT scan revealed splenomegaly to 17 cm and concern for splenic laceration.  She was taken to the operating room on 05/11/20 where she was noted to have "Splenic rupture with capsular tear at anterior superior border of the spleen. Reactive ascites. No massive hemoperitoneum. Abnormal tissue extending to the hilum, sent for frozen section. Unable to definitively determine if lymphoma on frozen section. Splenectomy performed. No other disease noted within the abdomen."    05/11/2020 splenic lymph node excisional biopsy with pathology months consistent with hairy cell leukemia.    HPI    During her visit me today she notes incisional pain in central abdomen and some anxiety about new diagnosis but otherwise asymptomatic.  She denies any fevers, chills, night sweats, unintentional weight loss, rash, edema, nausea, vomiting, diarrhea, constipation.  No history of infections.    Review of Systems    ?   A comprehensive 14-point review of systems was reviewed with patient and was negative other than as specified above.   ?   PAST MEDICAL HISTORY:   Past Medical History:   Diagnosis Date    Anemia "     Anemia     Back pain     GERD (gastroesophageal reflux disease) 2020    ?     PAST SURGICAL HISTORY:   Past Surgical History:   Procedure Laterality Date     SECTION, CLASSIC      FLUOROSCOPY N/A 2020    Procedure: Spleenic Bleed;  Surgeon: Hiren Cabrera MD;  Location: [facility] CATH LAB;  Service: General;  Laterality: N/A;    INJECTION OF ANESTHETIC AGENT INTO TISSUE PLANE DEFINED BY TRANSVERSUS ABDOMINIS MUSCLE N/A 2020    Procedure: BLOCK, TRANSVERSUS ABDOMINIS PLANE;  Surgeon: Yumi Ferguson MD;  Location: [facility] OR;  Service: General;  Laterality: N/A;    SPLENECTOMY N/A 2020    Procedure: SPLENECTOMY;  Surgeon: Yumi Ferguson MD;  Location: [facility] OR;  Service: General;  Laterality: N/A;    THORACENTESIS Left 2020    Procedure: THORACENTESIS;  Surgeon: Yumi Ferguson MD;  Location: [facility] OR;  Service: General;  Laterality: Left;    TUBAL LIGATION        ?   ALLERGIES:   Allergies as of 2020    (No Known Allergies)      ?   MEDICATIONS:?   Outpatient Medications Marked as Taking for the 20 encounter (Office Visit) with Guerda Hernandez MD   Medication Sig Dispense Refill    [DISCONTINUED] cyclobenzaprine (FLEXERIL) 5 MG tablet Take 1 tablet by mouth 4 (four) times daily.        ?   SOCIAL HISTORY:?   Social History     Tobacco Use    Smoking status: Current Every Day Smoker     Packs/day: 0.25    Smokeless tobacco: Never Used   Substance Use Topics    Alcohol use: Yes     Comment: occasional      ?      ?   FAMILY HISTORY:   family history is not on file.   ?   She notes several family members with cancer but does not know details.      Objective:      Physical Exam      ?   Vitals:    20 1528   BP: (!) 143/92   Pulse: 82   Temp: 98 °F (36.7 °C)      ?   ECOG:?0   General appearance: Generally well appearing, in no acute distress.   Head, eyes, ears, nose, and throat: Pupils round and equally reactive to light. Oropharynx clear with  moist mucous membranes.   Lymph: No palpable cervical or supraclavicular lymphadenopathy.   Cardiovascular: Regular rate and rhythm, S1, S2, no audible murmurs.   Respiratory: Lungs clear to auscultation bilaterally.   Abdomen:  Obese, vertical midline incisional scar with staples, tender, did not palpate.  Extremities: Warm, without edema.   Neurologic: Alert and oriented. Grossly normal strength, coordination, and gait.   Skin: No rashes, ecchymoses or petechial lesion.  Healed excoriations on forearms.  ?   Laboratory:  ?   Lab Visit on 05/25/2020   Component Date Value Ref Range Status    WBC 05/25/2020 2.73* 3.90 - 12.70 K/uL Final    RBC 05/25/2020 3.84* 4.00 - 5.40 M/uL Final    Hemoglobin 05/25/2020 11.3* 12.0 - 16.0 g/dL Final    Hematocrit 05/25/2020 37.0  37.0 - 48.5 % Final    Mean Corpuscular Volume 05/25/2020 96  82 - 98 fL Final    Mean Corpuscular Hemoglobin 05/25/2020 29.4  27.0 - 31.0 pg Final    Mean Corpuscular Hemoglobin Conc 05/25/2020 30.5* 32.0 - 36.0 g/dL Final    RDW 05/25/2020 18.2* 11.5 - 14.5 % Final    Platelets 05/25/2020 476* 150 - 350 K/uL Final    MPV 05/25/2020 9.2  9.2 - 12.9 fL Final    Immature Granulocytes 05/25/2020 0.7* 0.0 - 0.5 % Final    Gran # (ANC) 05/25/2020 1.4* 1.8 - 7.7 K/uL Final    Immature Grans (Abs) 05/25/2020 0.02  0.00 - 0.04 K/uL Final    Lymph # 05/25/2020 1.1  1.0 - 4.8 K/uL Final    Mono # 05/25/2020 0.1* 0.3 - 1.0 K/uL Final    Eos # 05/25/2020 0.1  0.0 - 0.5 K/uL Final    Baso # 05/25/2020 0.00  0.00 - 0.20 K/uL Final    nRBC 05/25/2020 4* 0 /100 WBC Final    Gran% 05/25/2020 52.0  38.0 - 73.0 % Final    Lymph% 05/25/2020 41.4  18.0 - 48.0 % Final    Mono% 05/25/2020 2.6* 4.0 - 15.0 % Final    Eosinophil% 05/25/2020 3.3  0.0 - 8.0 % Final    Basophil% 05/25/2020 0.0  0.0 - 1.9 % Final    Differential Method 05/25/2020 Automated   Final    Pathologist Review 05/25/2020 Review required   Final      ?     Chemistry        Component  Value Date/Time     05/16/2020 0855    K 3.7 05/16/2020 0855     05/16/2020 0855    CO2 28 05/16/2020 0855    BUN 10 05/16/2020 0855    CREATININE 0.9 05/16/2020 0855    GLU 96 05/16/2020 0855        Component Value Date/Time    CALCIUM 8.8 05/16/2020 0855    ALKPHOS 72 05/16/2020 0855    AST 24 05/16/2020 0855    ALT 18 05/16/2020 0855    BILITOT 0.4 05/16/2020 0855    ESTGFRAFRICA >60 05/16/2020 0855    EGFRNONAA >60 05/16/2020 0855          ?   ?   Imaging:  ?  5/11/20  CT ABDOMEN PELVIS WITHOUT CONTRAST    CLINICAL HISTORY:  Abd pain, fever, abscess suspected;    TECHNIQUE:  Low dose axial images, sagittal and coronal reformations were obtained from the lung bases to the pubic symphysis.  Oral contrast was not administered.    COMPARISON:  05/11/2020    FINDINGS:  Heart: Mild cardiomegaly..  Trace effusion.    Lung Bases: Large left pleural effusion with left lower lobe atelectasis or airspace disease.    Liver: Normal size and attenuation. No focal lesions.    Gallbladder: No calcified gallstones.    Bile Ducts: No dilatation.    Pancreas: No obvious mass. No peripancreatic fat stranding.    Spleen: Splenomegaly measuring up to 17 cm..  Area of decreased attenuation within the lower portion of the spleen concerning for splenic laceration and surrounding perisplenic hematoma.  High attenuating fluid surrounds the spleen.  Active bleed is not excluded on noncontrast imaging.  CTA can be obtained as clinically warranted.    Adrenals: Normal.    Kidneys/Ureters: No mass, hydroureteronephrosis, or nephroureterolithiasis.  4 cm hypodensity within the interpolar region right kidney consistent with a cyst.    Bladder: No wall thickening.    Reproductive organs: Enlarged myomatous uterus is suspected.    GI Tract/Mesentery: No evidence of bowel obstruction or inflammation.  Stranding is seen within the mesentery of the left upper quadrant which could reflect local inflammation or posttraumatic injury.  No  evidence of bowel obstruction.  Moderate constipation noted.    Peritoneal Space: Scattered fluid is seen within the pelvis anterior to the uterus as well as within the cul-de-sac which likely reflects hemoperitoneum.    Retroperitoneum: Small amount of fluid is seen tracking along the left pericolic gutter likely reflecting extension of perisplenic hematoma.  Small amount of fluid is seen within the cul-de-sac.  Small amount of fluid also suspected within the lorrie hepatis region.    Abdominal wall: Normal.    Vasculature: No aneurysm.    Bones: No acute fracture.  No rib fractures are seen.  No suspicious lytic or sclerotic lesions.      Impression       Area of decreased attenuation within the lower portion of the spleen concerning for splenic laceration and surrounding perisplenic hematoma. High attenuating fluid surrounds the spleen. Active bleed is not excluded on noncontrast imaging. CTA can be obtained as clinically warranted.    Splenomegaly.    Scattered fluid within the pelvis likely reflects mild hemoperitoneum.    Stranding is seen within the mesentery of the left upper quadrant which could reflect local inflammation or reflect traumatic injury.           Pathology:    1.  Splenic lymph node (excision):   - Negative for carcinoma   - See separately submitted flow cytometry report   - Review by a hematopathologist will be performed and results issued in a   supplemental report   2.  Spleen (splenectomy):   - Benign spleen with hemorrhage and associated reactive changes  VC       Comment: Interpreted by: Jose Silva M.D., Signed on 05/22/2020 at 11:49   Supplemental Diagnosis 1. SPLENIC LYMPH NODE, EXCISION:   --HAIRY CELL LEUKEMIA (SEE COMMENT).   2. SPLEEN, SPLENECTOMY:   --HAIRY CELL LEUKEMIA (SEE COMMENT).   COMMENT:   1.  Splenic lymph node:   Histologic sections of the specimen show lymph node partially effaced by   diffuse infiltrates of atypical lymphoid cells displaying abundant cytoplasm   and  "irregular nuclear contour.  Lymphoid follicles are seen.   Flow cytometric analysis of the lymph node detects a kappa light chain   restricted B lymphocyte population expressing CD19, CD20, .   CD10 and   CD5 are negative. Flow differential:  Lymphocytes 70.2%, Monocytes 13.4%,   Granulocytes  12.9%, Blast  0.3%, Debris/nRBC 1.9%,  Viability 84.5%.   2.  Spleen:   Histologic sections of the spleen show atypical lymphoid infiltrates in the   right pulp.  The lymphoid cells display abundant cytoplasm and irregular   nuclear contour.  Red blood cell lakes are seen.  A lymph node is identified,   displaying morphologic features cellular to part 1.   Immunohistochemical stains are performed with adequate controls for greater   sensitivity and further architectural assessment.   1).  The atypical lymphoid cells in the lymph nodes are positive for CD20,   BCL2, and cyclin D1 (weak); negative for CD5, CD10, CD23, CD30, and BCL6.   Proliferation index (Ki-67) is moderate.  CD3-positive T lymphocytes are   seen.  -positive plasma cells are polytypic by immunoglobulin kappa and   lambda light chain in situ hybridization study.   2).  The atypical lymphoid cells in the spleen are positive for CD20, cyclin   D1 (weak),  ([facility]), CD25 ([facility]),   BRAF V600E ([facility]); negative for CD5, CD10, and CD23.   Proliferation index (Ki-67) is moderate.  CD3-positive T lymphocytes are seen.   Taken together, the overall findings are consistent with hairy cell leukemia   involving spleen and lymph node.  VC      Gross 1:  Surgery ID:  [id number];  Pathology ID:  [id number]   1.  Received fresh for frozen section labeled "splenic lymph node" is a 1.5 x   0.3 x 0.3 cm piece of yellow fatty tissue.  A portion is submitted in RPMI   for flow cytometry, and a portion is submitted for frozen section diagnosis.   Frozen section remnant is embedded in cassette 1790, 1 FSA.  The " "remainder is   entirely embedded in cassette 1790, 1B.   Grossed by: Ricardo Wilson    2: Surgery ID:  [id number];  Pathology ID:  [id number]   2.  Received in formalin labeled "spleen" is an 892 g, 17.8 by 12 x 6.5 cm   spleen.  The capsule is purple blue, smooth, and loosely adhered to spleen,   with moderate amounts of adhered clotted blood. There is a 16 x 13.5 region   on the diaphragmatic surface without capsule. Sectioning reveals subcapsular   hemorrhage, which impacts the splenic parenchyma. The parenchyma displays a   beefy red cut surface.  Within the hilar fat is a 0.7 cm candidate lymph   node.  No distinct masses grossly identified.  Representative sections are   embedded in cassette 1790, 2A-2D.   2A-2B:  Splenic parenchyma with capsule and subcapsular hemorrhage   2C:  Hilar fat and splenic parenchyma without capsule   2D:  Lymph node, bisected            ?   Assessment/Plan:       1. Hairy cell leukemia not having achieved remission    2. Other neutropenia    3. Anemia, unspecified type          Plan:     # Hairy cell leukemia:  Acute splenic rupture but otherwise asymptomatic.  Labs notable for progressive leukopenia.  She does have some anemia which is mild and may be chronic, see below.  I discussed natural history of hairy cell leukemia which typically has a more indolent course with indications for treatment based on symptomatology, constitutional symptoms, symptomatic splenomegaly/lymph node enlargement for cytopenias.  While she did have splenomegaly she is now status post splenectomy and without current symptoms or cytopenia of note aside from neutropenia ANC 1.4.  She denies infection complications.  I recommended further workup at this time including bone marrow biopsy and flow cytometry.    # anemia:  Notes longstanding history of anemia and menorrhagia possible iron deficiency.  Will evaluate labs today and replete p.r.n.        Follow-Up:   Bone marrow biopsy, peripheral blood smear, flow " cytometry, iron indices  Revisit after bone marrow biopsy pathology returns.

## 2020-05-25 NOTE — PROGRESS NOTES
"  Subjective:      DATE OF VISIT: 5/25/20     ?  Patient ID:?Jaswant Yang is a 44 y.o. female.?? MR#: [id number]   ?   REFERRING PROVIDER: Geoff Warren MD  [address] [place] [id number]     ? Primary Care Providers:  Primary Doctor No (General)     CHIEF COMPLAINT: ??  Newly diagnosed hairy cell leukemia??   ?   ONCOLOGIC DIAGNOSIS:  Hairy cell leukemia  ?   CURRENT TREATMENT: TBD    PAST TREATMENT:  05/11/2020 splenectomy  ?   ONCOLOGIC HISTORY:   ?   Ms Yang is a 44 year old woman with obesity and longstanding history of anemia, without treatment or further evaluation.  She does not follow with primary care and no other known diagnoses.  She presented to emergency room with left-sided abdominal pain for 2 days which she thought initially due to gas/indigestion.  She denies any associated symptoms.  CT scan revealed splenomegaly to 17 cm and concern for splenic laceration.  She was taken to the operating room on 05/11/20 where she was noted to have "Splenic rupture with capsular tear at anterior superior border of the spleen. Reactive ascites. No massive hemoperitoneum. Abnormal tissue extending to the hilum, sent for frozen section. Unable to definitively determine if lymphoma on frozen section. Splenectomy performed. No other disease noted within the abdomen."    05/11/2020 splenic lymph node excisional biopsy with pathology months consistent with hairy cell leukemia.    HPI    During her visit me today she notes incisional pain in central abdomen and some anxiety about new diagnosis but otherwise asymptomatic.  She denies any fevers, chills, night sweats, unintentional weight loss, rash, edema, nausea, vomiting, diarrhea, constipation.  No history of infections.    Review of Systems    ?   A comprehensive 14-point review of systems was reviewed with patient and was negative other than as specified above.   ?   PAST MEDICAL HISTORY:   Past Medical History:   Diagnosis Date    Anemia "     Anemia     Back pain     GERD (gastroesophageal reflux disease) 2020    ?     PAST SURGICAL HISTORY:   Past Surgical History:   Procedure Laterality Date     SECTION, CLASSIC      FLUOROSCOPY N/A 2020    Procedure: Spleenic Bleed;  Surgeon: Hiren Cabrera MD;  Location: [facility] CATH LAB;  Service: General;  Laterality: N/A;    INJECTION OF ANESTHETIC AGENT INTO TISSUE PLANE DEFINED BY TRANSVERSUS ABDOMINIS MUSCLE N/A 2020    Procedure: BLOCK, TRANSVERSUS ABDOMINIS PLANE;  Surgeon: Yumi Ferguson MD;  Location: [facility] OR;  Service: General;  Laterality: N/A;    SPLENECTOMY N/A 2020    Procedure: SPLENECTOMY;  Surgeon: Yumi Ferguson MD;  Location: [facility] OR;  Service: General;  Laterality: N/A;    THORACENTESIS Left 2020    Procedure: THORACENTESIS;  Surgeon: Yumi Ferguson MD;  Location: [facility] OR;  Service: General;  Laterality: Left;    TUBAL LIGATION        ?   ALLERGIES:   Allergies as of 2020    (No Known Allergies)      ?   MEDICATIONS:?   Outpatient Medications Marked as Taking for the 20 encounter (Office Visit) with Guerda Hernandez MD   Medication Sig Dispense Refill    [DISCONTINUED] cyclobenzaprine (FLEXERIL) 5 MG tablet Take 1 tablet by mouth 4 (four) times daily.        ?   SOCIAL HISTORY:?   Social History     Tobacco Use    Smoking status: Current Every Day Smoker     Packs/day: 0.25    Smokeless tobacco: Never Used   Substance Use Topics    Alcohol use: Yes     Comment: occasional      ?      ?   FAMILY HISTORY:   family history is not on file.   ?   She notes several family members with cancer but does not know details.      Objective:      Physical Exam      ?   Vitals:    20 1528   BP: (!) 143/92   Pulse: 82   Temp: 98 °F (36.7 °C)      ?   ECOG:?0   General appearance: Generally well appearing, in no acute distress.   Head, eyes, ears, nose, and throat: Pupils round and equally reactive to light. Oropharynx clear with  moist mucous membranes.   Lymph: No palpable cervical or supraclavicular lymphadenopathy.   Cardiovascular: Regular rate and rhythm, S1, S2, no audible murmurs.   Respiratory: Lungs clear to auscultation bilaterally.   Abdomen:  Obese, vertical midline incisional scar with staples, tender, did not palpate.  Extremities: Warm, without edema.   Neurologic: Alert and oriented. Grossly normal strength, coordination, and gait.   Skin: No rashes, ecchymoses or petechial lesion.  Healed excoriations on forearms.  ?   Laboratory:  ?   Lab Visit on 05/25/2020   Component Date Value Ref Range Status    WBC 05/25/2020 2.73* 3.90 - 12.70 K/uL Final    RBC 05/25/2020 3.84* 4.00 - 5.40 M/uL Final    Hemoglobin 05/25/2020 11.3* 12.0 - 16.0 g/dL Final    Hematocrit 05/25/2020 37.0  37.0 - 48.5 % Final    Mean Corpuscular Volume 05/25/2020 96  82 - 98 fL Final    Mean Corpuscular Hemoglobin 05/25/2020 29.4  27.0 - 31.0 pg Final    Mean Corpuscular Hemoglobin Conc 05/25/2020 30.5* 32.0 - 36.0 g/dL Final    RDW 05/25/2020 18.2* 11.5 - 14.5 % Final    Platelets 05/25/2020 476* 150 - 350 K/uL Final    MPV 05/25/2020 9.2  9.2 - 12.9 fL Final    Immature Granulocytes 05/25/2020 0.7* 0.0 - 0.5 % Final    Gran # (ANC) 05/25/2020 1.4* 1.8 - 7.7 K/uL Final    Immature Grans (Abs) 05/25/2020 0.02  0.00 - 0.04 K/uL Final    Lymph # 05/25/2020 1.1  1.0 - 4.8 K/uL Final    Mono # 05/25/2020 0.1* 0.3 - 1.0 K/uL Final    Eos # 05/25/2020 0.1  0.0 - 0.5 K/uL Final    Baso # 05/25/2020 0.00  0.00 - 0.20 K/uL Final    nRBC 05/25/2020 4* 0 /100 WBC Final    Gran% 05/25/2020 52.0  38.0 - 73.0 % Final    Lymph% 05/25/2020 41.4  18.0 - 48.0 % Final    Mono% 05/25/2020 2.6* 4.0 - 15.0 % Final    Eosinophil% 05/25/2020 3.3  0.0 - 8.0 % Final    Basophil% 05/25/2020 0.0  0.0 - 1.9 % Final    Differential Method 05/25/2020 Automated   Final    Pathologist Review 05/25/2020 Review required   Final      ?     Chemistry        Component  Value Date/Time     05/16/2020 0855    K 3.7 05/16/2020 0855     05/16/2020 0855    CO2 28 05/16/2020 0855    BUN 10 05/16/2020 0855    CREATININE 0.9 05/16/2020 0855    GLU 96 05/16/2020 0855        Component Value Date/Time    CALCIUM 8.8 05/16/2020 0855    ALKPHOS 72 05/16/2020 0855    AST 24 05/16/2020 0855    ALT 18 05/16/2020 0855    BILITOT 0.4 05/16/2020 0855    ESTGFRAFRICA >60 05/16/2020 0855    EGFRNONAA >60 05/16/2020 0855          ?   ?   Imaging:  ?  5/11/20  CT ABDOMEN PELVIS WITHOUT CONTRAST    CLINICAL HISTORY:  Abd pain, fever, abscess suspected;    TECHNIQUE:  Low dose axial images, sagittal and coronal reformations were obtained from the lung bases to the pubic symphysis.  Oral contrast was not administered.    COMPARISON:  05/11/2020    FINDINGS:  Heart: Mild cardiomegaly..  Trace effusion.    Lung Bases: Large left pleural effusion with left lower lobe atelectasis or airspace disease.    Liver: Normal size and attenuation. No focal lesions.    Gallbladder: No calcified gallstones.    Bile Ducts: No dilatation.    Pancreas: No obvious mass. No peripancreatic fat stranding.    Spleen: Splenomegaly measuring up to 17 cm..  Area of decreased attenuation within the lower portion of the spleen concerning for splenic laceration and surrounding perisplenic hematoma.  High attenuating fluid surrounds the spleen.  Active bleed is not excluded on noncontrast imaging.  CTA can be obtained as clinically warranted.    Adrenals: Normal.    Kidneys/Ureters: No mass, hydroureteronephrosis, or nephroureterolithiasis.  4 cm hypodensity within the interpolar region right kidney consistent with a cyst.    Bladder: No wall thickening.    Reproductive organs: Enlarged myomatous uterus is suspected.    GI Tract/Mesentery: No evidence of bowel obstruction or inflammation.  Stranding is seen within the mesentery of the left upper quadrant which could reflect local inflammation or posttraumatic injury.  No  evidence of bowel obstruction.  Moderate constipation noted.    Peritoneal Space: Scattered fluid is seen within the pelvis anterior to the uterus as well as within the cul-de-sac which likely reflects hemoperitoneum.    Retroperitoneum: Small amount of fluid is seen tracking along the left pericolic gutter likely reflecting extension of perisplenic hematoma.  Small amount of fluid is seen within the cul-de-sac.  Small amount of fluid also suspected within the lorrie hepatis region.    Abdominal wall: Normal.    Vasculature: No aneurysm.    Bones: No acute fracture.  No rib fractures are seen.  No suspicious lytic or sclerotic lesions.      Impression       Area of decreased attenuation within the lower portion of the spleen concerning for splenic laceration and surrounding perisplenic hematoma. High attenuating fluid surrounds the spleen. Active bleed is not excluded on noncontrast imaging. CTA can be obtained as clinically warranted.    Splenomegaly.    Scattered fluid within the pelvis likely reflects mild hemoperitoneum.    Stranding is seen within the mesentery of the left upper quadrant which could reflect local inflammation or reflect traumatic injury.           Pathology:    1.  Splenic lymph node (excision):   - Negative for carcinoma   - See separately submitted flow cytometry report   - Review by a hematopathologist will be performed and results issued in a   supplemental report   2.  Spleen (splenectomy):   - Benign spleen with hemorrhage and associated reactive changes  VC       Comment: Interpreted by: Jose Silva M.D., Signed on 05/22/2020 at 11:49   Supplemental Diagnosis 1. SPLENIC LYMPH NODE, EXCISION:   --HAIRY CELL LEUKEMIA (SEE COMMENT).   2. SPLEEN, SPLENECTOMY:   --HAIRY CELL LEUKEMIA (SEE COMMENT).   COMMENT:   1.  Splenic lymph node:   Histologic sections of the specimen show lymph node partially effaced by   diffuse infiltrates of atypical lymphoid cells displaying abundant cytoplasm   and  "irregular nuclear contour.  Lymphoid follicles are seen.   Flow cytometric analysis of the lymph node detects a kappa light chain   restricted B lymphocyte population expressing CD19, CD20, .   CD10 and   CD5 are negative. Flow differential:  Lymphocytes 70.2%, Monocytes 13.4%,   Granulocytes  12.9%, Blast  0.3%, Debris/nRBC 1.9%,  Viability 84.5%.   2.  Spleen:   Histologic sections of the spleen show atypical lymphoid infiltrates in the   right pulp.  The lymphoid cells display abundant cytoplasm and irregular   nuclear contour.  Red blood cell lakes are seen.  A lymph node is identified,   displaying morphologic features cellular to part 1.   Immunohistochemical stains are performed with adequate controls for greater   sensitivity and further architectural assessment.   1).  The atypical lymphoid cells in the lymph nodes are positive for CD20,   BCL2, and cyclin D1 (weak); negative for CD5, CD10, CD23, CD30, and BCL6.   Proliferation index (Ki-67) is moderate.  CD3-positive T lymphocytes are   seen.  -positive plasma cells are polytypic by immunoglobulin kappa and   lambda light chain in situ hybridization study.   2).  The atypical lymphoid cells in the spleen are positive for CD20, cyclin   D1 (weak),  ([facility]), CD25 ([facility]),   BRAF V600E ([facility]); negative for CD5, CD10, and CD23.   Proliferation index (Ki-67) is moderate.  CD3-positive T lymphocytes are seen.   Taken together, the overall findings are consistent with hairy cell leukemia   involving spleen and lymph node.  VC      Gross 1:  Surgery ID:  [id number];  Pathology ID:  [id number]   1.  Received fresh for frozen section labeled "splenic lymph node" is a 1.5 x   0.3 x 0.3 cm piece of yellow fatty tissue.  A portion is submitted in RPMI   for flow cytometry, and a portion is submitted for frozen section diagnosis.   Frozen section remnant is embedded in cassette 1790, 1 FSA.  The " "remainder is   entirely embedded in cassette 1790, 1B.   Grossed by: Rciardo Wilson    2: Surgery ID:  [id number];  Pathology ID:  [id number]   2.  Received in formalin labeled "spleen" is an 892 g, 17.8 by 12 x 6.5 cm   spleen.  The capsule is purple blue, smooth, and loosely adhered to spleen,   with moderate amounts of adhered clotted blood. There is a 16 x 13.5 region   on the diaphragmatic surface without capsule. Sectioning reveals subcapsular   hemorrhage, which impacts the splenic parenchyma. The parenchyma displays a   beefy red cut surface.  Within the hilar fat is a 0.7 cm candidate lymph   node.  No distinct masses grossly identified.  Representative sections are   embedded in cassette 1790, 2A-2D.   2A-2B:  Splenic parenchyma with capsule and subcapsular hemorrhage   2C:  Hilar fat and splenic parenchyma without capsule   2D:  Lymph node, bisected            ?   Assessment/Plan:       1. Hairy cell leukemia not having achieved remission    2. Other neutropenia    3. Anemia, unspecified type          Plan:     # Hairy cell leukemia:  Acute splenic rupture but otherwise asymptomatic.  Labs notable for progressive leukopenia.  She does have some anemia which is mild and may be chronic, see below.  I discussed natural history of hairy cell leukemia which typically has a more indolent course with indications for treatment based on symptomatology, constitutional symptoms, symptomatic splenomegaly/lymph node enlargement for cytopenias.  While she did have splenomegaly she is now status post splenectomy and without current symptoms or cytopenia of note aside from neutropenia ANC 1.4.  She denies infection complications.  I recommended further workup at this time including bone marrow biopsy and flow cytometry.    # anemia:  Notes longstanding history of anemia and menorrhagia possible iron deficiency.  Will evaluate labs today and replete p.r.n.        Follow-Up:   Bone marrow biopsy, peripheral blood smear, flow " cytometry, iron indices  Revisit after bone marrow biopsy pathology returns.

## 2020-05-25 NOTE — LETTER
May 25, 2020      Geoff Warren MD  [address] [facility] [address]            [facility] Oncology  [address] [facility] [address]  Phone: [phone]  Fax: [phone]          Patient: Jaswant Yang   MR Number: [id number]   YOB: 1975   Date of Visit: 5/25/2020       Dear Dr. Geoff Warren:    Thank you for referring Jaswant Yang to me for evaluation. Attached you will find relevant portions of my assessment and plan of care.    If you have questions, please do not hesitate to call me. I look forward to following Jaswant Yang along with you.    Sincerely,    Guerda Hernandez MD    Enclosure  CC:  No Recipients    If you would like to receive this communication electronically, please contact [email or web address] or [phone] to request more information on [employer] access.    For providers and/or their staff who would like to refer a patient to [facility], please contact us through our one-stop-shop provider referral line, [facility], at [phone].    If you feel you have received this communication in error or would no longer like to receive these types of communications, please e-mail [email or web address]

## 2020-05-25 NOTE — TELEPHONE ENCOUNTER
Tried multiple times today to reach pt to notify her Dr Ferguson sent her script in to her pharmacy and to confirm her appt for Thur. - There has been no answer each time and voicemail is not set up to leave a message    ----- Message from Yumi Ferguson MD sent at 5/25/2020 10:43 AM CDT -----  Contact: pt  Can you call her and tell her I refilled hte medication, please make sure she is coming to her appt thursday  ----- Message -----  From: Carole Tolliver LPN  Sent: 5/22/2020  12:32 PM CDT  To: Yumi Ferguson MD        ----- Message -----  From: Krista Sahu  Sent: 5/22/2020  11:35 AM CDT  To: Marty Eason Staff    Type:  RX Refill Request    Who Called: pt  Refill or New Rx:refill  RX Name and Strength:HYDROcodone-acetaminophen (NORCO)  mg per tablet  How is the patient currently taking it? (ex. 1XDay):every 6 hours as needed  Is this a 30 day or 90 day RX:n/a  Preferred Pharmacy with phone number:  [id number] - [place], [place] - [address] [facility] [address]  Phone: [phone] Fax: [phone]  Local or Mail Order:local   Ordering Provider:marty  Would the patient rather a call back or a response via [facility]? Call back   Best Call Back Number:[phone] (home)   Additional Information: patient stated that she is still in pain after her surgery

## 2020-05-28 ENCOUNTER — OFFICE VISIT (OUTPATIENT)
Dept: SURGERY | Facility: CLINIC | Age: 45
End: 2020-05-28
Payer: MEDICAID

## 2020-05-28 VITALS
SYSTOLIC BLOOD PRESSURE: 113 MMHG | HEART RATE: 85 BPM | TEMPERATURE: 98 F | WEIGHT: 272.94 LBS | DIASTOLIC BLOOD PRESSURE: 82 MMHG | BODY MASS INDEX: 38.07 KG/M2

## 2020-05-28 DIAGNOSIS — Z90.81 S/P SPLENECTOMY: ICD-10-CM

## 2020-05-28 DIAGNOSIS — C91.40 HAIRY CELL LEUKEMIA OF SPLEEN: Primary | ICD-10-CM

## 2020-05-28 PROBLEM — K02.9 DENTAL CARIES: Status: RESOLVED | Noted: 2017-07-18 | Resolved: 2020-05-28

## 2020-05-28 PROBLEM — K05.30 PERIODONTITIS: Status: RESOLVED | Noted: 2017-07-18 | Resolved: 2020-05-28

## 2020-05-28 PROBLEM — N18.30 CKD (CHRONIC KIDNEY DISEASE) STAGE 3, GFR 30-59 ML/MIN: Status: RESOLVED | Noted: 2020-05-11 | Resolved: 2020-05-28

## 2020-05-28 PROCEDURE — 99213 OFFICE O/P EST LOW 20 MIN: CPT | Mod: PBBFAC | Performed by: SURGERY

## 2020-05-28 PROCEDURE — 99999 PR PBB SHADOW E&M-EST. PATIENT-LVL III: CPT | Mod: PBBFAC,,, | Performed by: SURGERY

## 2020-05-28 PROCEDURE — 99024 POSTOP FOLLOW-UP VISIT: CPT | Mod: ,,, | Performed by: SURGERY

## 2020-05-28 PROCEDURE — 99999 PR PBB SHADOW E&M-EST. PATIENT-LVL III: ICD-10-PCS | Mod: PBBFAC,,, | Performed by: SURGERY

## 2020-05-28 PROCEDURE — 99024 PR POST-OP FOLLOW-UP VISIT: ICD-10-PCS | Mod: ,,, | Performed by: SURGERY

## 2020-05-28 RX ORDER — HYDROCODONE BITARTRATE AND ACETAMINOPHEN 10; 325 MG/1; MG/1
1 TABLET ORAL EVERY 6 HOURS PRN
Qty: 20 TABLET | Refills: 0 | Status: SHIPPED | OUTPATIENT
Start: 2020-05-28 | End: 2020-06-11 | Stop reason: SDUPTHER

## 2020-05-28 RX ORDER — HYDROCORTISONE 25 MG/G
CREAM TOPICAL 2 TIMES DAILY
Qty: 28 G | Refills: 0 | Status: SHIPPED | OUTPATIENT
Start: 2020-05-28 | End: 2020-07-06

## 2020-05-28 NOTE — PROGRESS NOTES
General Surgery     Postop Visit Note      Jaswant Yang  44 y.o.    Review of patient's allergies indicates:  No Known Allergies    Vitals:    05/28/20 1349   BP: 113/82   Pulse: 85   Temp: 98.3 °F (36.8 °C)       SUBJECTIVE:  44 y.o. female presents s/p splenectomy for spontaneous rupture of the spleen secondary to hairy cell leukemia.  Reports continued generalized malaise.  Received vaccinations prior to discharge as an inpatient.  Has been evaluated by Oncology.  Pending bone marrow biopsy.    OBJECTIVE:  Upper midline incision healing well, no erythema, no drainage, no tenderness with palpation.   Staples removed    Pathology- reviewed; consistent with hairy cell leukemia    ASSESSMENT:  Hairy cell leukemia of spleen    S/P splenectomy    Other orders  -     hydrocortisone 2.5 % cream; Apply topically 2 (two) times daily.  Dispense: 28 g; Refill: 0  -     HYDROcodone-acetaminophen (NORCO)  mg per tablet; Take 1 tablet by mouth every 6 (six) hours as needed.  Dispense: 20 tablet; Refill: 0     Pain medication refilled  Topical cortisone to incision for itching  Follow up with Dr. Hernandez after bone marrow biopsy  Establish care with Dr. Sy    Follow up for Return if determined to need [facility] by Oncology.    Yumi Ferguson

## 2020-06-01 DIAGNOSIS — C91.40 HAIRY CELL LEUKEMIA NOT HAVING ACHIEVED REMISSION: Primary | ICD-10-CM

## 2020-06-02 ENCOUNTER — TELEPHONE (OUTPATIENT)
Dept: RADIOLOGY | Facility: HOSPITAL | Age: 45
End: 2020-06-02

## 2020-06-02 ENCOUNTER — DOCUMENTATION ONLY (OUTPATIENT)
Dept: HEMATOLOGY/ONCOLOGY | Facility: CLINIC | Age: 45
End: 2020-06-02

## 2020-06-02 NOTE — PROGRESS NOTES
Nurse called pt regarding her CT Bx. Pt is aware of date/time/location/ she understands not to eat after midnight, and arrive to [facility] on [address] for 7:30am she knows to contact this office with any additional questions

## 2020-06-03 ENCOUNTER — TELEPHONE (OUTPATIENT)
Dept: SURGERY | Facility: CLINIC | Age: 45
End: 2020-06-03

## 2020-06-03 ENCOUNTER — HOSPITAL ENCOUNTER (OUTPATIENT)
Dept: RADIOLOGY | Facility: HOSPITAL | Age: 45
Discharge: HOME | End: 2020-06-03
Attending: INTERNAL MEDICINE
Payer: MEDICAID

## 2020-06-03 VITALS
HEART RATE: 61 BPM | RESPIRATION RATE: 16 BRPM | WEIGHT: 262 LBS | BODY MASS INDEX: 36.68 KG/M2 | SYSTOLIC BLOOD PRESSURE: 114 MMHG | DIASTOLIC BLOOD PRESSURE: 77 MMHG | HEIGHT: 71 IN | OXYGEN SATURATION: 100 %

## 2020-06-03 DIAGNOSIS — C91.40 HAIRY CELL LEUKEMIA NOT HAVING ACHIEVED REMISSION: ICD-10-CM

## 2020-06-03 PROCEDURE — 88342 IMHCHEM/IMCYTCHM 1ST ANTB: CPT | Mod: 26,59,, | Performed by: PATHOLOGY

## 2020-06-03 PROCEDURE — 85097 BONE MARROW INTERPRETATION: CPT | Mod: ,,, | Performed by: PATHOLOGY

## 2020-06-03 PROCEDURE — 88342 IMHCHEM/IMCYTCHM 1ST ANTB: CPT | Mod: 59 | Performed by: PATHOLOGY

## 2020-06-03 PROCEDURE — 88311 DECALCIFY TISSUE: CPT | Performed by: PATHOLOGY

## 2020-06-03 PROCEDURE — 88341 IMHCHEM/IMCYTCHM EA ADD ANTB: CPT | Performed by: PATHOLOGY

## 2020-06-03 PROCEDURE — 88313 PR  SPECIAL STAINS,GROUP II: ICD-10-PCS | Mod: 26,,, | Performed by: PATHOLOGY

## 2020-06-03 PROCEDURE — 88184 FLOWCYTOMETRY/ TC 1 MARKER: CPT | Performed by: PATHOLOGY

## 2020-06-03 PROCEDURE — 88313 SPECIAL STAINS GROUP 2: CPT | Mod: 26,,, | Performed by: PATHOLOGY

## 2020-06-03 PROCEDURE — 88185 FLOWCYTOMETRY/TC ADD-ON: CPT | Mod: 59 | Performed by: PATHOLOGY

## 2020-06-03 PROCEDURE — 88305 TISSUE EXAM BY PATHOLOGIST: CPT | Mod: 26,,, | Performed by: PATHOLOGY

## 2020-06-03 PROCEDURE — 88305 TISSUE EXAM BY PATHOLOGIST: ICD-10-PCS | Mod: 26,,, | Performed by: PATHOLOGY

## 2020-06-03 PROCEDURE — 63600175 PHARM REV CODE 636 W HCPCS: Performed by: RADIOLOGY

## 2020-06-03 PROCEDURE — 88237 TISSUE CULTURE BONE MARROW: CPT

## 2020-06-03 PROCEDURE — 88189 PR  FLOWCYTOMETRY/READ, 16 & > MARKERS: ICD-10-PCS | Mod: ,,, | Performed by: PATHOLOGY

## 2020-06-03 PROCEDURE — 88311 DECALCIFY TISSUE: CPT | Mod: 26,,, | Performed by: PATHOLOGY

## 2020-06-03 PROCEDURE — 88341 IMHCHEM/IMCYTCHM EA ADD ANTB: CPT | Mod: 26,59,, | Performed by: PATHOLOGY

## 2020-06-03 PROCEDURE — 88341 PR IHC OR ICC EACH ADD'L SINGLE ANTIBODY  STAINPR: ICD-10-PCS | Mod: 26,59,, | Performed by: PATHOLOGY

## 2020-06-03 PROCEDURE — 88342 IMHCHEM/IMCYTCHM 1ST ANTB: CPT | Mod: 91 | Performed by: PATHOLOGY

## 2020-06-03 PROCEDURE — 88189 FLOWCYTOMETRY/READ 16 & >: CPT | Mod: ,,, | Performed by: PATHOLOGY

## 2020-06-03 PROCEDURE — 88311 PR  DECALCIFY TISSUE: ICD-10-PCS | Mod: 26,,, | Performed by: PATHOLOGY

## 2020-06-03 PROCEDURE — 77012 CT SCAN FOR NEEDLE BIOPSY: CPT | Mod: TC

## 2020-06-03 PROCEDURE — 88305 TISSUE EXAM BY PATHOLOGIST: CPT | Performed by: PATHOLOGY

## 2020-06-03 PROCEDURE — 88313 SPECIAL STAINS GROUP 2: CPT | Mod: 59 | Performed by: PATHOLOGY

## 2020-06-03 PROCEDURE — 88342 CHG IMMUNOCYTOCHEMISTRY: ICD-10-PCS | Mod: 26,59,, | Performed by: PATHOLOGY

## 2020-06-03 PROCEDURE — 85097 PR  BONE MARROW,SMEAR INTERPRETATION: ICD-10-PCS | Mod: ,,, | Performed by: PATHOLOGY

## 2020-06-03 RX ORDER — MIDAZOLAM HYDROCHLORIDE 1 MG/ML
INJECTION INTRAMUSCULAR; INTRAVENOUS CODE/TRAUMA/SEDATION MEDICATION
Status: DISCONTINUED | OUTPATIENT
Start: 2020-06-03 | End: 2020-06-04 | Stop reason: HOSPADM

## 2020-06-03 RX ORDER — FENTANYL CITRATE 50 UG/ML
INJECTION, SOLUTION INTRAMUSCULAR; INTRAVENOUS CODE/TRAUMA/SEDATION MEDICATION
Status: DISCONTINUED | OUTPATIENT
Start: 2020-06-03 | End: 2020-06-04 | Stop reason: HOSPADM

## 2020-06-03 RX ORDER — HYDROCODONE BITARTRATE AND ACETAMINOPHEN 10; 325 MG/1; MG/1
1 TABLET ORAL EVERY 6 HOURS PRN
Qty: 20 TABLET | Refills: 0 | Status: CANCELLED | OUTPATIENT
Start: 2020-06-03

## 2020-06-03 RX ORDER — HYDROCODONE BITARTRATE AND ACETAMINOPHEN 10; 325 MG/1; MG/1
1 TABLET ORAL EVERY 6 HOURS PRN
Qty: 20 TABLET | Refills: 0 | OUTPATIENT
Start: 2020-06-03

## 2020-06-03 RX ADMIN — FENTANYL CITRATE 50 MCG: 50 INJECTION, SOLUTION INTRAMUSCULAR; INTRAVENOUS at 09:06

## 2020-06-03 RX ADMIN — MIDAZOLAM HYDROCHLORIDE 1 MG: 1 INJECTION, SOLUTION INTRAMUSCULAR; INTRAVENOUS at 08:06

## 2020-06-03 RX ADMIN — FENTANYL CITRATE 50 MCG: 50 INJECTION, SOLUTION INTRAMUSCULAR; INTRAVENOUS at 08:06

## 2020-06-03 RX ADMIN — MIDAZOLAM HYDROCHLORIDE 1 MG: 1 INJECTION, SOLUTION INTRAMUSCULAR; INTRAVENOUS at 09:06

## 2020-06-03 NOTE — DISCHARGE INSTRUCTIONS
Recovery After Procedural Sedation (Adult)   You have been given medicine by vein to make you sleep during your surgery. This may have included both a pain medicine and sleeping medicine. Most of the effects have worn off. But you may still have some drowsiness for the next 6 to 8 hours.  Home care  Follow these guidelines when you get home:  · For the next 8 hours, you should be watched by a responsible adult. This person should make sure your condition is not getting worse.  · Don't drink any alcohol for the next 24 hours.  · Don't drive, operate dangerous machinery, or make important business or personal decisions during the next 24 hours.  · To prevent injury or falls, use caution when standing and walking for at least 24 hours after your procedure.  Note: Your healthcare provider may tell you not to take any medicine by mouth for pain or sleep in the next 4 hours. These medicines may react with the medicines you were given in the hospital. This could cause a much stronger response than usual.  Follow-up care  Follow up with your healthcare provider if you are not alert and back to your usual level of activity within 12 hours.  When to seek medical advice  Call your healthcare provider right away if any of these occur:  · Drowsiness gets worse  · Weakness or dizziness gets worse  · Repeated vomiting  · You can't be awakened  · Fever  · New rash  [employer] last reviewed this educational content on 9/1/2019  © 7257-6240 [employer] [address] [place] [address]. All rights reserved. This information is not intended as a substitute for professional medical care. Always follow your healthcare professional's instructions.        Bone Marrow Aspiration and Biopsy    Bone marrow is the soft, spongy part inside bones. It makes most of the bodys blood cells. Aspiration and biopsy are procedures done to take a sample of bone marrow out of the body for examination. To perform either procedure,  a needle is inserted into one of your bones, usually the back of the hip bone. Then a sample of bone marrow is removed.   If a sample of fluid and cells is taken, it is called bone marrow aspiration. If a solid sample of bone marrow tissue is removed, it is called bone marrow biopsy. In either case, the samples are sent to a lab and studied. The procedures can be done alone, but are most often done together. This sheet tells you more about what to expect.  Why the procedures are done  The procedures may be done for a number of reasons. They can help diagnose certain blood or bone marrow disorders or infections. They may help find certain cancers, such as leukemia. They can show if cancer in other areas of the body has spread to the bone marrow. They can be used during cancer treatment, such as chemotherapy, to monitor treatment progress. And they may be done before certain treatments, such as a stem cell transplant, which require a bone marrow sample. Your healthcare provider will explain why you need the procedure and answer any questions you have.  Preparing for the procedure  Prepare for the procedure as told. In addition:  · Tell your healthcare provider:  ¨ What medicines you take. This includes blood thinners, such as aspirin. This also includes over-the-counter medicines, herbs, and other supplements. You may need to stop taking some or all of them before the procedure.  ¨ If you are allergic to any medicines. Also mention if you have ever had a reaction to medicines used during other tests or procedures in the past.  ¨ If you have a history of bleeding problems.  ¨ If you are pregnant or may be pregnant.  · Follow any directions youre given for not eating or drinking before the procedure.  The day of the procedure  The procedures can be done at a hospital, clinic, or healthcare providers office. They are performed by a healthcare provider or trained healthcare provider. Whether youre having one or both  procedures, plan to be at the facility for 1 to 2 hours. Youll likely go home the same day.  Before the procedure begins  What to expect before the procedure:  · Youll change into a patient gown.  · An IV line may be put into a vein in your arm or hand. This line supplies fluids and medicines.  · Youll be given a sedative to help you relax, if needed. This medicine is given by pill, injection, or through the IV line.  During the procedure  What to expect during the procedure:  · Youll lie on your side or your stomach.  · The site to be used for the bone marrow samples is marked and cleaned. The most common site is the back of the hip bone. Less common sites include the front of the hip bone or breastbone.  · Numbing medicine (local anesthesia) is injected at the site.  · A small cut is made through the numbed skin.  · One or both procedures are then done. Be sure to lie still for each procedure. It is normal to feel some pressure or pain during each procedure.  ¨ For the bone marrow aspiration, a thin needle is put through the cut and into the bone. A syringe is then attached to the needle and used to remove a sample of bone marrow fluid and cells.  ¨ For the bone marrow biopsy, a different needle is put through the same cut and into the bone. A small amount of bone marrow tissue is then removed.  · The samples are sent to a lab to be evaluated.  · When the procedure is complete, pressure is applied to the site for 10 to 15 minutes to help stop bleeding. The site is then bandaged.  After the procedure  Most people can go home after a short period of observation. If you need it, youll be given medicine to manage pain. If you were given a sedative, you may be taken to a recovery room to rest until the medicine wears off. An adult family member or friend must drive you home afterward.  Recovering at home  Once at home, follow any instructions youre given. Be sure to:  · Take all medicines as directed.  · Care for  the procedure site as instructed.  · Check for signs of infection at the procedure site (see below).  · Avoid getting the procedure site wet. Do not bathe or shower until your healthcare providers say it is OK to do so. If you wish, you may wash with a sponge or washcloth.  · Avoid heavy lifting and other strenuous activities as directed.     Call the healthcare provider  Call your healthcare provider if you have any of the following:  · Fever of 100.4 ºF (38 ºC) or higher, or as directed by your healthcare provider  · Signs of infection at the procedure site, such as increased redness or swelling, warmth, worsening pain, bleeding, or foul-smelling drainage   Follow-up  Your healthcare provider will discuss the results with you when they are ready. This is usually within a week after the procedure.     Risks and possible complications of these procedures  These include:  · Severe bleeding or bruising at the procedure site  · Infection at the procedure site  · Bone fracture  · Bone infection   Date Last Reviewed: 10/8/2015  © 8973-8190 [employer]. [address] [place] [address]. All rights reserved. This information is not intended as a substitute for professional medical care. Always follow your healthcare professional's instructions.

## 2020-06-03 NOTE — TELEPHONE ENCOUNTER
Pt arrived at the , 2nd floor [facility], this morning stating she just had Sx with Dr Ferguson and she is in severe pain -  Stated Dr Ferguson refilled her pain medication on Thursday but she had to double up taking it due to the amount of pain she is in - Requested Dr Ferguson refill her NORCO  again - I explained to pt that Dr Ferguson is not in clinic today, that she is at the hospital in procedures today but a message would be sent to her and I would reach out to her as soon as I hear back -   After talking to Dr Ferguson I called and spoke to pt - Advised her per Dr Ferguson since her NORCO was just refilled on Thursday she will need to be assessed by someone - Advised I can make her an appt to see Dr Ferguson tomorrow or she can go to ED today - Pt declined making an appt with Dr Ferguson for tomorrow or going to the ED today - She stated she will call back tomorrow if she changes her mind

## 2020-06-03 NOTE — PLAN OF CARE
Pt d/c home in stable condition via wheelchair with mother.  Verbalized understanding of d/c instructions,handout given.  Encouraged to f/up with ordering provider.  Pt voiced no complaints at this time.

## 2020-06-03 NOTE — DISCHARGE SUMMARY
Pre Op Diagnosis: CLL     Post Op Diagnosis: same     Procedure:  Bone marrow biopsy     Procedure performed by: Rick JOHNSON, Blayne PANTOJA     Written Informed Consent Obtained: Yes     Specimen Removed:  yes     Estimated Blood Loss:  minimal     Findings: Local anesthesia and moderate sedation were used.     The patient tolerated the procedure well and there were no complications.      Sterile technique was performed in the posterior right iliac, lidocaine was used as a local anesthetic.  Multiple samples taken from the right iliac bone.  Pt tolerated the procedure well without immediate complications.  Please see radiologist report for details. F/u with PCP and/or ordering physician.

## 2020-06-03 NOTE — SEDATION DOCUMENTATION
Pt in ct on table prone with bilateral arms above head, cm in place, vss. Pt verbalized understanding of procedure.

## 2020-06-03 NOTE — SEDATION DOCUMENTATION
Procedure complete, pt tolerated well.  Vss.  Band aid placed to R posterior iliac crest puncture site,site WDL.  Pt transferred to stretcher supine with hob elevated.  Pt awake and able to follow simple commands.  Pt denied pain or discomfort at this time.

## 2020-06-08 LAB
BODY SITE - BONE MARROW: NORMAL
CLINICAL DIAGNOSIS - BONE MARROW: NORMAL
FLOW CYTOMETRY ANTIBODIES ANALYZED - BONE MARROW: NORMAL
FLOW CYTOMETRY COMMENT - BONE MARROW: NORMAL
FLOW CYTOMETRY INTERPRETATION - BONE MARROW: NORMAL

## 2020-06-09 ENCOUNTER — TELEPHONE (OUTPATIENT)
Dept: HEMATOLOGY/ONCOLOGY | Facility: CLINIC | Age: 45
End: 2020-06-09

## 2020-06-09 NOTE — TELEPHONE ENCOUNTER
----- Message from Mckenzie Bowie sent at 6/9/2020  1:03 PM CDT -----  Contact: Patient  Patient would like Concepción to give her a call back at Ph .[phone] (home)

## 2020-06-09 NOTE — TELEPHONE ENCOUNTER
----- Message from Usha Finley sent at 6/9/2020 12:17 PM CDT -----  Contact: [phone]  Patient is returning a phone call.  Who left a message for the patient: Mirian  Does patient know what this is regarding:  Mirian  Comments:

## 2020-06-10 ENCOUNTER — TELEPHONE (OUTPATIENT)
Dept: HEMATOLOGY/ONCOLOGY | Facility: CLINIC | Age: 45
End: 2020-06-10

## 2020-06-10 NOTE — TELEPHONE ENCOUNTER
----- Message from Fatih Francis sent at 6/10/2020  9:34 AM CDT -----  Contact: self, [phone]  Patient called in returning your call about appointment tomorrow. Please advise.

## 2020-06-10 NOTE — TELEPHONE ENCOUNTER
----- Message from Guerda Hernandez MD sent at 6/9/2020  5:23 PM CDT -----  If possible would prefer earlier follow-up since I have results back.  Can you please arrange?

## 2020-06-10 NOTE — TELEPHONE ENCOUNTER
Nurse spoke with pt regardng a phone call to come in tomorrow from [facility], apt was scheduled by this nurse for tomorrow with dr marina. Pt is aware of date/time/location. No other questions or concerns prior to call ending.

## 2020-06-10 NOTE — TELEPHONE ENCOUNTER
"Called patient about moving appt up to this week. Explained that Dr. Hernandez is at the [place] location this week. Patient states "I don't think I can go there." Discussed option of virtual visit to allow her to have appt sooner without travel. She requests I send her the address and my contact information so she can decide what she wants to do. Held spot at 1pm tomorrow for her, explained I will hold spot just for the morning then release it if I don't hear from her. Verbalized understanding.   "

## 2020-06-11 ENCOUNTER — LAB VISIT (OUTPATIENT)
Dept: LAB | Facility: HOSPITAL | Age: 45
End: 2020-06-11
Attending: INTERNAL MEDICINE
Payer: MEDICAID

## 2020-06-11 ENCOUNTER — OFFICE VISIT (OUTPATIENT)
Dept: HEMATOLOGY/ONCOLOGY | Facility: CLINIC | Age: 45
End: 2020-06-11
Payer: MEDICAID

## 2020-06-11 VITALS
WEIGHT: 274.94 LBS | TEMPERATURE: 98 F | BODY MASS INDEX: 38.49 KG/M2 | RESPIRATION RATE: 18 BRPM | HEIGHT: 71 IN | OXYGEN SATURATION: 98 % | DIASTOLIC BLOOD PRESSURE: 99 MMHG | SYSTOLIC BLOOD PRESSURE: 144 MMHG | HEART RATE: 72 BPM

## 2020-06-11 DIAGNOSIS — D64.9 ANEMIA, UNSPECIFIED TYPE: ICD-10-CM

## 2020-06-11 DIAGNOSIS — C91.40 HAIRY CELL LEUKEMIA NOT HAVING ACHIEVED REMISSION: Primary | ICD-10-CM

## 2020-06-11 DIAGNOSIS — C91.40 HAIRY CELL LEUKEMIA NOT HAVING ACHIEVED REMISSION: ICD-10-CM

## 2020-06-11 DIAGNOSIS — D70.8 OTHER NEUTROPENIA: ICD-10-CM

## 2020-06-11 DIAGNOSIS — R10.32 LEFT LOWER QUADRANT ABDOMINAL PAIN: ICD-10-CM

## 2020-06-11 LAB
ALBUMIN SERPL BCP-MCNC: 4.3 G/DL (ref 3.5–5.2)
ALP SERPL-CCNC: 52 U/L (ref 55–135)
ALT SERPL W/O P-5'-P-CCNC: 11 U/L (ref 10–44)
ANION GAP SERPL CALC-SCNC: 8 MMOL/L (ref 8–16)
ANISOCYTOSIS BLD QL SMEAR: ABNORMAL
AST SERPL-CCNC: 13 U/L (ref 10–40)
BASOPHILS # BLD AUTO: 0.02 K/UL (ref 0–0.2)
BASOPHILS NFR BLD: 0.5 % (ref 0–1.9)
BILIRUB SERPL-MCNC: 0.6 MG/DL (ref 0.1–1)
BUN SERPL-MCNC: 13 MG/DL (ref 6–20)
CALCIUM SERPL-MCNC: 9.2 MG/DL (ref 8.7–10.5)
CHLORIDE SERPL-SCNC: 107 MMOL/L (ref 95–110)
CHROM BANDING METHOD: NORMAL
CHROMOSOME ANALYSIS BM ADDITIONAL INFORMATION: NORMAL
CHROMOSOME ANALYSIS BM RELEASED BY: NORMAL
CHROMOSOME ANALYSIS BM RESULT SUMMARY: NORMAL
CLINICAL CYTOGENETICIST REVIEW: NORMAL
CO2 SERPL-SCNC: 24 MMOL/L (ref 23–29)
CREAT SERPL-MCNC: 0.9 MG/DL (ref 0.5–1.4)
DACRYOCYTES BLD QL SMEAR: ABNORMAL
DIFFERENTIAL METHOD: ABNORMAL
EOSINOPHIL # BLD AUTO: 0.1 K/UL (ref 0–0.5)
EOSINOPHIL NFR BLD: 2.4 % (ref 0–8)
ERYTHROCYTE [DISTWIDTH] IN BLOOD BY AUTOMATED COUNT: 19.5 % (ref 11.5–14.5)
EST. GFR  (AFRICAN AMERICAN): >60 ML/MIN/1.73 M^2
EST. GFR  (NON AFRICAN AMERICAN): >60 ML/MIN/1.73 M^2
GLUCOSE SERPL-MCNC: 115 MG/DL (ref 70–110)
HCT VFR BLD AUTO: 36.6 % (ref 37–48.5)
HGB BLD-MCNC: 11.5 G/DL (ref 12–16)
HYPOCHROMIA BLD QL SMEAR: ABNORMAL
IMM GRANULOCYTES # BLD AUTO: 0.01 K/UL (ref 0–0.04)
IMM GRANULOCYTES NFR BLD AUTO: 0.3 % (ref 0–0.5)
KARYOTYP MAR: NORMAL
LYMPHOCYTES # BLD AUTO: 1.5 K/UL (ref 1–4.8)
LYMPHOCYTES NFR BLD: 40.1 % (ref 18–48)
MCH RBC QN AUTO: 29.8 PG (ref 27–31)
MCHC RBC AUTO-ENTMCNC: 31.4 G/DL (ref 32–36)
MCV RBC AUTO: 95 FL (ref 82–98)
MONOCYTES # BLD AUTO: 0.1 K/UL (ref 0.3–1)
MONOCYTES NFR BLD: 3.8 % (ref 4–15)
NEUTROPHILS # BLD AUTO: 2 K/UL (ref 1.8–7.7)
NEUTROPHILS NFR BLD: 53.2 % (ref 38–73)
NRBC BLD-RTO: 2 /100 WBC
PLATELET # BLD AUTO: 338 K/UL (ref 150–350)
PLATELET BLD QL SMEAR: ABNORMAL
PMV BLD AUTO: 9.1 FL (ref 9.2–12.9)
POIKILOCYTOSIS BLD QL SMEAR: SLIGHT
POLYCHROMASIA BLD QL SMEAR: ABNORMAL
POTASSIUM SERPL-SCNC: 4.2 MMOL/L (ref 3.5–5.1)
PROT SERPL-MCNC: 7.4 G/DL (ref 6–8.4)
RBC # BLD AUTO: 3.86 M/UL (ref 4–5.4)
REASON FOR REFERRAL (NARRATIVE): NORMAL
REF LAB TEST METHOD: NORMAL
SODIUM SERPL-SCNC: 139 MMOL/L (ref 136–145)
SPECIMEN SOURCE: NORMAL
SPECIMEN: NORMAL
WBC # BLD AUTO: 3.69 K/UL (ref 3.9–12.7)

## 2020-06-11 PROCEDURE — 85025 COMPLETE CBC W/AUTO DIFF WBC: CPT

## 2020-06-11 PROCEDURE — 99999 PR PBB SHADOW E&M-EST. PATIENT-LVL IV: ICD-10-PCS | Mod: PBBFAC,,, | Performed by: INTERNAL MEDICINE

## 2020-06-11 PROCEDURE — 88189 FLOWCYTOMETRY/READ 16 & >: CPT | Mod: ,,, | Performed by: PATHOLOGY

## 2020-06-11 PROCEDURE — 88185 FLOWCYTOMETRY/TC ADD-ON: CPT | Mod: 59 | Performed by: PATHOLOGY

## 2020-06-11 PROCEDURE — 99214 OFFICE O/P EST MOD 30 MIN: CPT | Mod: PBBFAC | Performed by: INTERNAL MEDICINE

## 2020-06-11 PROCEDURE — 88189 PR  FLOWCYTOMETRY/READ, 16 & > MARKERS: ICD-10-PCS | Mod: ,,, | Performed by: PATHOLOGY

## 2020-06-11 PROCEDURE — 80053 COMPREHEN METABOLIC PANEL: CPT

## 2020-06-11 PROCEDURE — 99999 PR PBB SHADOW E&M-EST. PATIENT-LVL IV: CPT | Mod: PBBFAC,,, | Performed by: INTERNAL MEDICINE

## 2020-06-11 PROCEDURE — 88184 FLOWCYTOMETRY/ TC 1 MARKER: CPT | Performed by: PATHOLOGY

## 2020-06-11 PROCEDURE — 99215 OFFICE O/P EST HI 40 MIN: CPT | Mod: S$PBB,,, | Performed by: INTERNAL MEDICINE

## 2020-06-11 PROCEDURE — 99215 PR OFFICE/OUTPT VISIT, EST, LEVL V, 40-54 MIN: ICD-10-PCS | Mod: S$PBB,,, | Performed by: INTERNAL MEDICINE

## 2020-06-11 PROCEDURE — 36415 COLL VENOUS BLD VENIPUNCTURE: CPT

## 2020-06-11 RX ORDER — HYDROCODONE BITARTRATE AND ACETAMINOPHEN 10; 325 MG/1; MG/1
1 TABLET ORAL EVERY 6 HOURS PRN
Qty: 20 TABLET | Refills: 0 | Status: SHIPPED | OUTPATIENT
Start: 2020-06-11 | End: 2020-06-16 | Stop reason: SDUPTHER

## 2020-06-11 NOTE — PROGRESS NOTES
"  Subjective:      DATE OF VISIT: 6/11/20     ?  Patient ID:?Jaswant Yang is a 44 y.o. female.?? MR#: [id number]   ?   REFERRING PROVIDER: No referring provider defined for this encounter.     ? Primary Care Providers:  Primary Doctor No (General)     CHIEF COMPLAINT: ??  Newly diagnosed hairy cell leukemia??   ?   ONCOLOGIC DIAGNOSIS:  Hairy cell leukemia  ?   CURRENT TREATMENT: TBD    PAST TREATMENT:  05/11/2020 splenectomy  ?   ONCOLOGIC HISTORY:   ?   Ms Yang is a 44 year old woman with obesity and longstanding history of anemia, without treatment or further evaluation.  She does not follow with primary care and no other known diagnoses.  She presented to emergency room with left-sided abdominal pain for 2 days which she thought initially due to gas/indigestion.  She denies any associated symptoms.  CT scan revealed splenomegaly to 17 cm and concern for splenic laceration.  She was taken to the operating room on 05/11/20 where she was noted to have "Splenic rupture with capsular tear at anterior superior border of the spleen. Reactive ascites. No massive hemoperitoneum. Abnormal tissue extending to the hilum, sent for frozen section. Unable to definitively determine if lymphoma on frozen section. Splenectomy performed. No other disease noted within the abdomen."    05/11/2020 splenic lymph node excisional biopsy with pathology months consistent with hairy cell leukemia.    6/3/20 bone marrow biopsy/aspirate with hypercellular marrow % with extensive involvement by hairy cell leukemia    HPI    She presents today with her mother to discuss results of bone marrow biopsy and next steps in treatment planning.  She continues to have left lower quadrant abdominal pain using every 6 hr Norco but has run out of medication ask for refill today.  She notes scant serosanguineous drainage from left lower quadrant abdominal wound per no fevers or chills.  She denies night sweats or unintentional weight loss.  She " does note possible dental issue but has yet to see dentist.    Review of Systems    ?   A comprehensive 14-point review of systems was reviewed with patient and was negative other than as specified above.   ?   PAST MEDICAL HISTORY:   Past Medical History:   Diagnosis Date    Anemia     Anemia     Back pain     GERD (gastroesophageal reflux disease) 2020    ?     PAST SURGICAL HISTORY:   Past Surgical History:   Procedure Laterality Date     SECTION, CLASSIC      FLUOROSCOPY N/A 2020    Procedure: Spleenic Bleed;  Surgeon: Hiren Cabrera MD;  Location: [facility] CATH LAB;  Service: General;  Laterality: N/A;    INJECTION OF ANESTHETIC AGENT INTO TISSUE PLANE DEFINED BY TRANSVERSUS ABDOMINIS MUSCLE N/A 2020    Procedure: BLOCK, TRANSVERSUS ABDOMINIS PLANE;  Surgeon: Yumi Ferguson MD;  Location: [facility] OR;  Service: General;  Laterality: N/A;    SPLENECTOMY N/A 2020    Procedure: SPLENECTOMY;  Surgeon: Yumi Ferguson MD;  Location: [facility] OR;  Service: General;  Laterality: N/A;    THORACENTESIS Left 2020    Procedure: THORACENTESIS;  Surgeon: Yumi Ferguson MD;  Location: [facility] OR;  Service: General;  Laterality: Left;    TUBAL LIGATION        ?   ALLERGIES:   Allergies as of 2020    (No Known Allergies)      ?   MEDICATIONS:?   Outpatient Medications Marked as Taking for the 20 encounter (Office Visit) with Guerda Hernandez MD   Medication Sig Dispense Refill    albuterol (PROVENTIL/VENTOLIN HFA) 90 mcg/actuation inhaler Inhale 1-2 puffs into the lungs every 6 (six) hours as needed for Wheezing. Rescue 1 Inhaler 0    diclofenac (VOLTAREN) 75 MG EC tablet Take 1 tablet (75 mg total) by mouth 2 (two) times daily. 20 tablet 0    HYDROcodone-acetaminophen (NORCO)  mg per tablet Take 1 tablet by mouth every 6 (six) hours as needed. 20 tablet 0    hydrocortisone 2.5 % cream Apply topically 2 (two) times daily. 28 g 0    [DISCONTINUED]  HYDROcodone-acetaminophen (NORCO)  mg per tablet Take 1 tablet by mouth every 6 (six) hours as needed. 20 tablet 0      ?   SOCIAL HISTORY:?   Social History     Tobacco Use    Smoking status: Former Smoker     Packs/day: 0.25     Types: Cigarettes    Smokeless tobacco: Never Used    Tobacco comment: no longer smoker   Substance Use Topics    Alcohol use: Yes     Comment: occasional      ?      ?   FAMILY HISTORY:   family history is not on file.   ?   She notes several family members with cancer but does not know details.      Objective:      Physical Exam      ?   Vitals:    06/11/20 1101   BP: (!) 144/99   Pulse: 72   Resp: 18   Temp: 98.3 °F (36.8 °C)      ?   ECOG:?0   General appearance: Generally well appearing, in no acute distress.   Head, eyes, ears, nose, and throat: Pupils round and equally reactive to light. Oropharynx clear with moist mucous membranes.   Lymph: No palpable cervical or supraclavicular lymphadenopathy.   Cardiovascular: Regular rate and rhythm, S1, S2, no audible murmurs.   Respiratory: Lungs clear to auscultation bilaterally.   Abdomen:  Obese, vertical midline incisional scar well-healing, left lower quadrant with small drainage serosanguineous fluid, tenderness palpation, no notable erythema or fluctuance.  Extremities: Warm, without edema.   Neurologic: Alert and oriented. Grossly normal strength, coordination, and gait.   Skin: No rashes, ecchymoses or petechial lesion.  Healed excoriations on forearms.  ?   Laboratory:  ?   Lab Results   Component Value Date    WBC 2.73 (L) 05/25/2020    HGB 11.3 (L) 05/25/2020    HCT 37.0 05/25/2020    MCV 96 05/25/2020     (H) 05/25/2020         Lab Visit on 06/11/2020   Component Date Value Ref Range Status    WBC 06/11/2020 3.69* 3.90 - 12.70 K/uL Final    RBC 06/11/2020 3.86* 4.00 - 5.40 M/uL Final    Hemoglobin 06/11/2020 11.5* 12.0 - 16.0 g/dL Final    Hematocrit 06/11/2020 36.6* 37.0 - 48.5 % Final    Mean Corpuscular  Volume 06/11/2020 95  82 - 98 fL Final    Mean Corpuscular Hemoglobin 06/11/2020 29.8  27.0 - 31.0 pg Final    Mean Corpuscular Hemoglobin Conc 06/11/2020 31.4* 32.0 - 36.0 g/dL Final    RDW 06/11/2020 19.5* 11.5 - 14.5 % Final    Platelets 06/11/2020 338  150 - 350 K/uL Final    MPV 06/11/2020 9.1* 9.2 - 12.9 fL Final    Sodium 06/11/2020 139  136 - 145 mmol/L Final    Potassium 06/11/2020 4.2  3.5 - 5.1 mmol/L Final    Chloride 06/11/2020 107  95 - 110 mmol/L Final    CO2 06/11/2020 24  23 - 29 mmol/L Final    Glucose 06/11/2020 115* 70 - 110 mg/dL Final    BUN, Bld 06/11/2020 13  6 - 20 mg/dL Final    Creatinine 06/11/2020 0.9  0.5 - 1.4 mg/dL Final    Calcium 06/11/2020 9.2  8.7 - 10.5 mg/dL Final    Total Protein 06/11/2020 7.4  6.0 - 8.4 g/dL Final    Albumin 06/11/2020 4.3  3.5 - 5.2 g/dL Final    Total Bilirubin 06/11/2020 0.6  0.1 - 1.0 mg/dL Final    Alkaline Phosphatase 06/11/2020 52* 55 - 135 U/L Final    AST 06/11/2020 13  10 - 40 U/L Final    ALT 06/11/2020 11  10 - 44 U/L Final    Anion Gap 06/11/2020 8  8 - 16 mmol/L Final    eGFR if African American 06/11/2020 >60  >60 mL/min/1.73 m^2 Final    eGFR if non African American 06/11/2020 >60  >60 mL/min/1.73 m^2 Final      ?     Chemistry        Component Value Date/Time     06/11/2020 1230    K 4.2 06/11/2020 1230     06/11/2020 1230    CO2 24 06/11/2020 1230    BUN 13 06/11/2020 1230    CREATININE 0.9 06/11/2020 1230     (H) 06/11/2020 1230        Component Value Date/Time    CALCIUM 9.2 06/11/2020 1230    ALKPHOS 52 (L) 06/11/2020 1230    AST 13 06/11/2020 1230    ALT 11 06/11/2020 1230    BILITOT 0.6 06/11/2020 1230    ESTGFRAFRICA >60 06/11/2020 1230    EGFRNONAA >60 06/11/2020 1230          ?   ?   Imaging:  ?  5/11/20  CT ABDOMEN PELVIS WITHOUT CONTRAST    CLINICAL HISTORY:  Abd pain, fever, abscess suspected;    TECHNIQUE:  Low dose axial images, sagittal and coronal reformations were obtained from the  lung bases to the pubic symphysis.  Oral contrast was not administered.    COMPARISON:  05/11/2020    FINDINGS:  Heart: Mild cardiomegaly..  Trace effusion.    Lung Bases: Large left pleural effusion with left lower lobe atelectasis or airspace disease.    Liver: Normal size and attenuation. No focal lesions.    Gallbladder: No calcified gallstones.    Bile Ducts: No dilatation.    Pancreas: No obvious mass. No peripancreatic fat stranding.    Spleen: Splenomegaly measuring up to 17 cm..  Area of decreased attenuation within the lower portion of the spleen concerning for splenic laceration and surrounding perisplenic hematoma.  High attenuating fluid surrounds the spleen.  Active bleed is not excluded on noncontrast imaging.  CTA can be obtained as clinically warranted.    Adrenals: Normal.    Kidneys/Ureters: No mass, hydroureteronephrosis, or nephroureterolithiasis.  4 cm hypodensity within the interpolar region right kidney consistent with a cyst.    Bladder: No wall thickening.    Reproductive organs: Enlarged myomatous uterus is suspected.    GI Tract/Mesentery: No evidence of bowel obstruction or inflammation.  Stranding is seen within the mesentery of the left upper quadrant which could reflect local inflammation or posttraumatic injury.  No evidence of bowel obstruction.  Moderate constipation noted.    Peritoneal Space: Scattered fluid is seen within the pelvis anterior to the uterus as well as within the cul-de-sac which likely reflects hemoperitoneum.    Retroperitoneum: Small amount of fluid is seen tracking along the left pericolic gutter likely reflecting extension of perisplenic hematoma.  Small amount of fluid is seen within the cul-de-sac.  Small amount of fluid also suspected within the lorrie hepatis region.    Abdominal wall: Normal.    Vasculature: No aneurysm.    Bones: No acute fracture.  No rib fractures are seen.  No suspicious lytic or sclerotic lesions.      Impression       Area of  decreased attenuation within the lower portion of the spleen concerning for splenic laceration and surrounding perisplenic hematoma. High attenuating fluid surrounds the spleen. Active bleed is not excluded on noncontrast imaging. CTA can be obtained as clinically warranted.    Splenomegaly.    Scattered fluid within the pelvis likely reflects mild hemoperitoneum.    Stranding is seen within the mesentery of the left upper quadrant which could reflect local inflammation or reflect traumatic injury.           Pathology:    1.  Splenic lymph node (excision):   - Negative for carcinoma   - See separately submitted flow cytometry report   - Review by a hematopathologist will be performed and results issued in a   supplemental report   2.  Spleen (splenectomy):   - Benign spleen with hemorrhage and associated reactive changes  VC       Comment: Interpreted by: Jose Silva M.D., Signed on 05/22/2020 at 11:49   Supplemental Diagnosis 1. SPLENIC LYMPH NODE, EXCISION:   --HAIRY CELL LEUKEMIA (SEE COMMENT).   2. SPLEEN, SPLENECTOMY:   --HAIRY CELL LEUKEMIA (SEE COMMENT).   COMMENT:   1.  Splenic lymph node:   Histologic sections of the specimen show lymph node partially effaced by   diffuse infiltrates of atypical lymphoid cells displaying abundant cytoplasm   and irregular nuclear contour.  Lymphoid follicles are seen.   Flow cytometric analysis of the lymph node detects a kappa light chain   restricted B lymphocyte population expressing CD19, CD20, .   CD10 and   CD5 are negative. Flow differential:  Lymphocytes 70.2%, Monocytes 13.4%,   Granulocytes  12.9%, Blast  0.3%, Debris/nRBC 1.9%,  Viability 84.5%.   2.  Spleen:   Histologic sections of the spleen show atypical lymphoid infiltrates in the   right pulp.  The lymphoid cells display abundant cytoplasm and irregular   nuclear contour.  Red blood cell lakes are seen.  A lymph node is identified,   displaying morphologic features cellular to part 1.  "  Immunohistochemical stains are performed with adequate controls for greater   sensitivity and further architectural assessment.   1).  The atypical lymphoid cells in the lymph nodes are positive for CD20,   BCL2, and cyclin D1 (weak); negative for CD5, CD10, CD23, CD30, and BCL6.   Proliferation index (Ki-67) is moderate.  CD3-positive T lymphocytes are   seen.  -positive plasma cells are polytypic by immunoglobulin kappa and   lambda light chain in situ hybridization study.   2).  The atypical lymphoid cells in the spleen are positive for CD20, cyclin   D1 (weak),  ([facility]), CD25 ([facility]),   BRAF V600E ([facility]); negative for CD5, CD10, and CD23.   Proliferation index (Ki-67) is moderate.  CD3-positive T lymphocytes are seen.   Taken together, the overall findings are consistent with hairy cell leukemia   involving spleen and lymph node.  VC      Gross 1:  Surgery ID:  [id number];  Pathology ID:  [id number]   1.  Received fresh for frozen section labeled "splenic lymph node" is a 1.5 x   0.3 x 0.3 cm piece of yellow fatty tissue.  A portion is submitted in RPMI   for flow cytometry, and a portion is submitted for frozen section diagnosis.   Frozen section remnant is embedded in cassette 1790, 1 FSA.  The remainder is   entirely embedded in cassette 1790, 1B.   Grossed by: Ricardo Wilson    2: Surgery ID:  [id number];  Pathology ID:  [id number]   2.  Received in formalin labeled "spleen" is an 892 g, 17.8 by 12 x 6.5 cm   spleen.  The capsule is purple blue, smooth, and loosely adhered to spleen,   with moderate amounts of adhered clotted blood. There is a 16 x 13.5 region   on the diaphragmatic surface without capsule. Sectioning reveals subcapsular   hemorrhage, which impacts the splenic parenchyma. The parenchyma displays a   beefy red cut surface.  Within the hilar fat is a 0.7 cm candidate lymph   node.  No distinct masses grossly identified.  Representative " sections are   embedded in cassette 1790, 2A-2D.   2A-2B:  Splenic parenchyma with capsule and subcapsular hemorrhage   2C:  Hilar fat and splenic parenchyma without capsule   2D:  Lymph node, bisected            ?   Assessment/Plan:       1. Hairy cell leukemia not having achieved remission    2. Left lower quadrant abdominal pain    3. Other neutropenia    4. Anemia, unspecified type          Plan:     # Hairy cell leukemia:  Bone marrow hypercellular (%) with extensive involvement by hairy cell leukemia; flow cytometry from bone marrow consistent with this diagnosis.  I discussed at length natural history of her cell leukemia.  Given her presentation with splenic rupture and extensive involvement with bone marrow with borderline cytopenias (ANC 1.4, hgb 11, plt 120K) I dicussed recommendation to initiate treatment at this time. Per recent study there was demonstrated benefit to addition of rituximab to cladribine (Tacho et al. J Clin Oncol 3.2020) and will pursue this regimen: cladribine 0.15 mg/kg by 2 hour intravenous infusion days 1-5 concurrently with rituximab 375 mg/m2/wk × 8.   - I have briefly reviewed potential toxicities but will arrange for formal chemotherapy teaching/consent with nurse practitioner  - note: discussed with pharmacy and no port necessary for this regimen  -  Flow cytometry on peripheral blood pending and would like this has baseline prior to initiation of treatment.  - would plan on repeat flow cytometry to assess for response in 4 weeks and quarterly for 1st year.  May consider 2nd round of rituximab pending response at 6 months per trial.    # dental issue:  Recommended she follow up as soon as possible with dentist to clear of infection prior to initiation of therapy    # Left lower quadrant abdominal pain, wound drainage, no signs or symptoms of active infection but did recommend she follow-up with surgery and if necessary will also need to discuss port placement per above.   I refilled limited supply of Norco although postsurgical pain should be improving.    # anemia:  Likely disease associated.  Iron indices within normal limits.  Continue to monitor.      Follow-Up:   Follow-up with Dr. Ferguson, note: no port needed for this regimen  Ensure dental follow-up arranged  Chemotherapy teaching/consent; please get hep B test and peripheral blood flow cytometry at this time  RV on C1D1 chemotherapy with CBC and CMP

## 2020-06-11 NOTE — Clinical Note
Want to start he on cladribine and wanted to know if you have experience with 5 vs 7 day CI formulation and your recommendation.

## 2020-06-12 ENCOUNTER — DOCUMENTATION ONLY (OUTPATIENT)
Dept: HEMATOLOGY/ONCOLOGY | Facility: CLINIC | Age: 45
End: 2020-06-12

## 2020-06-12 DIAGNOSIS — Z03.818 ENCNTR FOR OBS FOR SUSP EXPSR TO OTH BIOLG AGENTS RULED OUT: Primary | ICD-10-CM

## 2020-06-12 DIAGNOSIS — Z51.11 ENCOUNTER FOR ANTINEOPLASTIC CHEMOTHERAPY: ICD-10-CM

## 2020-06-12 NOTE — NURSING
Spoke with patient over the phone today and introduced myself as her nurse navigator and gave her my direct contact information as well as the after hours numbers. We reviewed all upcoming appts to go along with her new chemotherapy start.  She will have chemotherapy education with NP 6/16/20 along with lab appt for Hep B screening. She has already seen Dr. Ferguson in follow up from her recent surgery and was told to call if she needed anything in the future. She will not need a mediport for this treatment per dr. Hernandez's notes. She will have her covid 19 screening test 6/20/20 and her chemo labs that day as well.  Her MD and chemo appts are set for 6/22/20-6/26/20 at the cancer center. She is to call her dentist to arrange for appt prior to starting chemo and will call me back with that appt date. I have notified social work team, , and pharmacy of new start . I have also notified the referral center to process her insurance authorization. I will follow this and her dental appt. Prior to her chemo appt. She will call me with any other concerns as well. Pt will need rx for acyclovir at md appt 6/22  Oncology Navigation   Intake  Cancer Type: Malignant hem (hairy cell leukemia)  MD Assigned: laina  Initial Nurse Navigator Contact: 06/11/20  Contact Method: Pool basket  Date Worked: 06/12/20  Multiple appointments: Yes  Start of Treatment: 06/22/20  Reason for Treatment Delay: Dental needs     Treatment  Current Status: Active  Pending: Other  Other Pending: dental care  Treatment Type(s): Chemotherapy  Chemotherapy Regimen: Rituxan cladribine x 5 days           General Referrals: Dentist;Social work  Dentist Referral Date: 06/11/20  Social Work Referral Date: 06/12/20    Barriers of Care: Other  Other Barriers: dental needs      Acuity  Systemic Treatment - predicted or initiated: Chemotherapy regimen with multiple drugs (+1)  Treatment Tolerability: Has not started treatment yet/treatment  fully completed and side effects resolved  ECO  Comorbidities in Medical History: 1   Needed: 0  Verbalizes the need for more education: 1  Navigation Acuity: 6     Follow Up  Follow up in about 1 week (around 2020).

## 2020-06-15 LAB
COMMENT: NORMAL
FINAL PATHOLOGIC DIAGNOSIS: NORMAL
FLOW CYTOMETRY ANTIBODIES ANALYZED - BLOOD: NORMAL
FLOW CYTOMETRY COMMENT - BLOOD: NORMAL
FLOW CYTOMETRY INTERPRETATION - BLOOD: NORMAL
GROSS: NORMAL
Lab: NORMAL
MICROSCOPIC EXAM: NORMAL
SUPPLEMENTAL DIAGNOSIS: NORMAL

## 2020-06-16 ENCOUNTER — LAB VISIT (OUTPATIENT)
Dept: LAB | Facility: HOSPITAL | Age: 45
End: 2020-06-16
Attending: INTERNAL MEDICINE
Payer: MEDICAID

## 2020-06-16 ENCOUNTER — OFFICE VISIT (OUTPATIENT)
Dept: HEMATOLOGY/ONCOLOGY | Facility: CLINIC | Age: 45
End: 2020-06-16
Payer: MEDICAID

## 2020-06-16 DIAGNOSIS — Z90.81 S/P SPLENECTOMY: ICD-10-CM

## 2020-06-16 DIAGNOSIS — C91.40 HAIRY CELL LEUKEMIA NOT HAVING ACHIEVED REMISSION: ICD-10-CM

## 2020-06-16 DIAGNOSIS — C91.40 HAIRY CELL LEUKEMIA NOT HAVING ACHIEVED REMISSION: Primary | ICD-10-CM

## 2020-06-16 DIAGNOSIS — C91.40 HAIRY CELL LEUKEMIA OF SPLEEN: ICD-10-CM

## 2020-06-16 DIAGNOSIS — R10.32 LEFT LOWER QUADRANT ABDOMINAL PAIN: ICD-10-CM

## 2020-06-16 DIAGNOSIS — K21.9 GASTROESOPHAGEAL REFLUX DISEASE, ESOPHAGITIS PRESENCE NOT SPECIFIED: ICD-10-CM

## 2020-06-16 PROCEDURE — 86704 HEP B CORE ANTIBODY TOTAL: CPT

## 2020-06-16 PROCEDURE — 87340 HEPATITIS B SURFACE AG IA: CPT

## 2020-06-16 PROCEDURE — 99215 OFFICE O/P EST HI 40 MIN: CPT | Mod: S$PBB,,, | Performed by: NURSE PRACTITIONER

## 2020-06-16 PROCEDURE — 99215 PR OFFICE/OUTPT VISIT, EST, LEVL V, 40-54 MIN: ICD-10-PCS | Mod: S$PBB,,, | Performed by: NURSE PRACTITIONER

## 2020-06-16 PROCEDURE — 86706 HEP B SURFACE ANTIBODY: CPT

## 2020-06-16 PROCEDURE — 99999 PR PBB SHADOW E&M-EST. PATIENT-LVL II: ICD-10-PCS | Mod: PBBFAC,,, | Performed by: NURSE PRACTITIONER

## 2020-06-16 PROCEDURE — 36415 COLL VENOUS BLD VENIPUNCTURE: CPT

## 2020-06-16 PROCEDURE — 99212 OFFICE O/P EST SF 10 MIN: CPT | Mod: PBBFAC | Performed by: NURSE PRACTITIONER

## 2020-06-16 PROCEDURE — 99999 PR PBB SHADOW E&M-EST. PATIENT-LVL II: CPT | Mod: PBBFAC,,, | Performed by: NURSE PRACTITIONER

## 2020-06-16 RX ORDER — LOPERAMIDE HYDROCHLORIDE 2 MG/1
2 CAPSULE ORAL 4 TIMES DAILY PRN
Qty: 30 CAPSULE | Refills: 2 | Status: SHIPPED | OUTPATIENT
Start: 2020-06-16 | End: 2020-06-26

## 2020-06-16 RX ORDER — PROCHLORPERAZINE MALEATE 10 MG
10 TABLET ORAL EVERY 6 HOURS PRN
Qty: 30 TABLET | Refills: 1 | Status: SHIPPED | OUTPATIENT
Start: 2020-06-16 | End: 2020-06-17 | Stop reason: SDUPTHER

## 2020-06-16 RX ORDER — HYDROCODONE BITARTRATE AND ACETAMINOPHEN 10; 325 MG/1; MG/1
1 TABLET ORAL EVERY 6 HOURS PRN
Qty: 30 TABLET | Refills: 0 | Status: SHIPPED | OUTPATIENT
Start: 2020-06-16 | End: 2020-06-22 | Stop reason: SDUPTHER

## 2020-06-16 RX ORDER — POLYETHYLENE GLYCOL 3350 17 G/17G
17 POWDER, FOR SOLUTION ORAL DAILY
Qty: 510 G | Refills: 3 | Status: SHIPPED | OUTPATIENT
Start: 2020-06-16

## 2020-06-16 RX ORDER — HYDROCODONE BITARTRATE AND ACETAMINOPHEN 10; 325 MG/1; MG/1
1 TABLET ORAL EVERY 6 HOURS PRN
Qty: 30 TABLET | Refills: 0 | Status: CANCELLED | OUTPATIENT
Start: 2020-06-16

## 2020-06-16 RX ORDER — HYDROCODONE BITARTRATE AND ACETAMINOPHEN 10; 325 MG/1; MG/1
1 TABLET ORAL EVERY 6 HOURS PRN
Qty: 20 TABLET | Refills: 0 | Status: CANCELLED | OUTPATIENT
Start: 2020-06-16

## 2020-06-16 RX ORDER — HYDROCODONE BITARTRATE AND ACETAMINOPHEN 10; 325 MG/1; MG/1
1 TABLET ORAL EVERY 6 HOURS PRN
Qty: 20 TABLET | Refills: 0 | Status: SHIPPED | OUTPATIENT
Start: 2020-06-16 | End: 2020-06-16 | Stop reason: SDUPTHER

## 2020-06-16 RX ORDER — ONDANSETRON 8 MG/1
8 TABLET, ORALLY DISINTEGRATING ORAL EVERY 12 HOURS PRN
Qty: 30 TABLET | Refills: 1 | Status: SHIPPED | OUTPATIENT
Start: 2020-06-16 | End: 2020-11-19 | Stop reason: SDUPTHER

## 2020-06-16 NOTE — PATIENT INSTRUCTIONS
COVID-19 and Cancer Patients    What is COVID-19?  COVID-19 is a new type of virus that can cause mild to severe infections in the lungs. Like other viruses, it can lead to serious infections for people with weakened immune systems. COVID-19 may cause more severe infections than other viruses. We do not have a vaccine to help control its spread, but experts are working to make a vaccine.    How does COVID-19 spread?  The virus can spread easily, just like the common cold or flu. It spreads when an infected person coughs or sneezes droplets that can get into the eyes, nose, or mouth of people nearby. Droplets also land on surfaces that people touch before touching their own eyes, nose, or mouth.    How can I protect myself?  These are some of the best ways to protect yourself and others from the virus:  - Wash your hands often with soap and water for at least 20 seconds.  - Use hand  with 60% or more alcohol until you can wash your hands with soap and water.  - Avoid touching your eyes, nose, and mouth without washing your hands first.  - Clean and disinfect surfaces often. Regular household wipes and sprays will kill the virus. Be sure to  clean places that people touch a lot, such as door handles, phones, keyboards, and light switches.  - Avoid handshakes, hugging, and standing or sitting close to people who are coughing or sneezing.  - Be as healthy as you can. Get plenty of sleep, eat healthy, exercise, and manage your stress.  If you are sick, follow these steps:  - Stay home.  - Cover your nose and mouth when you cough or sneeze. If you use a tissue, put it in the garbage right  away. If you do not have a tissue, cough or sneeze into your elbow crease.  - Call before going to your medical appointments. Let them know about recent travel or if you have had  contact with a person with COVID-19.    What should I do if I have cancer?  Some people with cancer might have a higher risk of getting COVID-19 or  having a serious infection from it. Do  your best to follow the steps listed above to protect yourself. Ask your doctor or nurse if they have special  recommendations based on your health or type of treatment.  Call your doctor right away if any of these happen to you:  - You have a fever higher than 100.4 degrees F.  - You feel short of breath.  - You develop a cough, runny nose, or congestion.    Call anytime [phone]--day, night, or weekend.     As of 3/12/2020  Should I wear a mask?  Experts don't believe that wearing a mask is helpful for the general public. Some people should use certain types of masks because of their own health or the type of work they do. Talk to your doctor or nurse to see if you would benefit from wearing a mask. If you arrive at the hospital with respiratory symptoms, please ask for a mask.    What if I care for or live with a cancer patient?  If you are caring for or living with someone with cancer, do your best to keep them from getting the virus.  Follow the steps to protect yourself listed on this sheet.  If you become sick yourself, call your doctor to see what more you should do to protect your loved one.    What about people visiting?   If you are sick or have been exposed to COVID-19, we ask that you not come to [facility].    If patients have symptoms, call the [facility] Covid-19 Line ([phone])    We ask that you find someone who isn't sick to join your loved one at their appointments.  To protect all patients, we may limit the number of people who can visit someone staying in the hospital.  Please check with your care team.    How will [facility] protect me from getting COVID-19?  Our hospital and clinics are taking steps to keep infected patients separate from those who may be at risk. At every appointment, your care team will ask questions about overall health and recent travel. We may ask some patients to wait in a separate room or to  reschedule until they are feeling better if they have symptoms.  We are also taking extra steps to clean and disinfect surfaces throughout our hospital and clinics.     Will you still care for me if I get sick?  Yes. Your care is our top priority. Although we may change some ways we care for you, we will never put your care or health at risk.            CALL BEFORE YOU ACT   Dial 211 or Text keyword LACOVID to [phone] for general questions and information.   If patients have symptoms, call the [facility] Covid[facility] Line ([phone])               [facility] ([email or web address])    [email or web address]

## 2020-06-16 NOTE — PROGRESS NOTES
43 y/o female for chemotherapy teaching. Pt given the Navigation Notebook. Explained how to use notebook. Discussed with pt  rationale for chemotherapy, how it works, the process of treatment, potential side effects and symptoms to report.     Reviewed the specific medication and gave them a handout describing the side effects of Cladribine, Rituxan.     Dx: Hairy Cell Leukemia     Discussed potential side effects such as:  Nausea and vomiting  Myelosuppression  Fatigue  Anorexia  Alopecia  Stomatitis  Diarrhea  Cystitis  Gastritis  Fever > 100.5  Antiemetics instructions  Skin care  Constipation  Rash  Hyperpigmentation  Rash  Photosensitivity  Sunscreen  Small freq meals  Increased protein  Increased calories  Vitamin support   Taste alterations  Neuropathys  Hydration  Renal toxicity  [facility] resources  Thrombocytopenia precautions  Hand and foot syndrome- symptoms and self care tips  Importance of monitoring blood sugars if diabetic and reporting elevations or lows    Face to Face time spent with patient: >60  Minutes, time spent in education and support.     Advance Care Planning   Patient would prefer her mother be HCPOA, documents given to patient to fill out and return to clinic for filing. Referall placed for PM for ACP and for pain management.

## 2020-06-16 NOTE — TELEPHONE ENCOUNTER
Returned pt's call back. Informed her that I received her refill request for norco, and I sent to Dr. Hernandez to auth. Pt verbalized understanding      ----- Message from Winter Alejo sent at 6/16/2020  2:32 PM CDT -----  Type:  RX Refill Request    Who Called: Jaswant  Refill or New Rx:refill  RX Name and Strength:HYDROcodone-acetaminophen (NORCO)  mg per tablet  How is the patient currently taking it? (ex. 1XDay):  Is this a 30 day or 90 day RX:  Preferred Pharmacy with phone number:  [id number] - [place], [place] - [address] AT [address] [facility]  [address] [address]  Phone: [phone] Fax: [phone]      Local or Mail Order:local  Ordering Provider:Mary  Would the patient rather a call back or a response via [facility]? call  Best Call Back Number:[phone]    Additional Information:

## 2020-06-17 DIAGNOSIS — C91.40 HAIRY CELL LEUKEMIA NOT HAVING ACHIEVED REMISSION: ICD-10-CM

## 2020-06-17 LAB — HBV SURFACE AB SER-ACNC: POSITIVE M[IU]/ML

## 2020-06-17 RX ORDER — PROCHLORPERAZINE MALEATE 10 MG
10 TABLET ORAL EVERY 6 HOURS PRN
Qty: 30 TABLET | Refills: 1 | Status: SHIPPED | OUTPATIENT
Start: 2020-06-17 | End: 2021-06-17

## 2020-06-19 LAB
HEPATITIS B CORE  AB, DONOR EVAL, (BLOOD CENTER): NORMAL
HEPATITIS B SURFACE AG, DONOR EVAL, (BLOOD CENTER): NORMAL

## 2020-06-20 ENCOUNTER — LAB VISIT (OUTPATIENT)
Dept: LAB | Facility: HOSPITAL | Age: 45
End: 2020-06-20
Attending: INTERNAL MEDICINE
Payer: MEDICAID

## 2020-06-20 DIAGNOSIS — C91.40 HAIRY CELL LEUKEMIA NOT HAVING ACHIEVED REMISSION: ICD-10-CM

## 2020-06-20 LAB
ALBUMIN SERPL BCP-MCNC: 3.8 G/DL (ref 3.5–5.2)
ALP SERPL-CCNC: 56 U/L (ref 55–135)
ALT SERPL W/O P-5'-P-CCNC: 16 U/L (ref 10–44)
ANION GAP SERPL CALC-SCNC: 8 MMOL/L (ref 8–16)
ANISOCYTOSIS BLD QL SMEAR: ABNORMAL
AST SERPL-CCNC: 17 U/L (ref 10–40)
BASOPHILS # BLD AUTO: 0.01 K/UL (ref 0–0.2)
BASOPHILS NFR BLD: 0.3 % (ref 0–1.9)
BILIRUB SERPL-MCNC: 0.6 MG/DL (ref 0.1–1)
BUN SERPL-MCNC: 15 MG/DL (ref 6–20)
CALCIUM SERPL-MCNC: 8.9 MG/DL (ref 8.7–10.5)
CHLORIDE SERPL-SCNC: 105 MMOL/L (ref 95–110)
CO2 SERPL-SCNC: 25 MMOL/L (ref 23–29)
CREAT SERPL-MCNC: 0.9 MG/DL (ref 0.5–1.4)
DACRYOCYTES BLD QL SMEAR: ABNORMAL
DIFFERENTIAL METHOD: ABNORMAL
EOSINOPHIL # BLD AUTO: 0.1 K/UL (ref 0–0.5)
EOSINOPHIL NFR BLD: 3.8 % (ref 0–8)
ERYTHROCYTE [DISTWIDTH] IN BLOOD BY AUTOMATED COUNT: 19.7 % (ref 11.5–14.5)
EST. GFR  (AFRICAN AMERICAN): >60 ML/MIN/1.73 M^2
EST. GFR  (NON AFRICAN AMERICAN): >60 ML/MIN/1.73 M^2
GLUCOSE SERPL-MCNC: 97 MG/DL (ref 70–110)
HCT VFR BLD AUTO: 35.4 % (ref 37–48.5)
HGB BLD-MCNC: 11 G/DL (ref 12–16)
HOWELL-JOLLY BOD BLD QL SMEAR: ABNORMAL
IMM GRANULOCYTES # BLD AUTO: 0.01 K/UL (ref 0–0.04)
IMM GRANULOCYTES NFR BLD AUTO: 0.3 % (ref 0–0.5)
LYMPHOCYTES # BLD AUTO: 1.6 K/UL (ref 1–4.8)
LYMPHOCYTES NFR BLD: 54.1 % (ref 18–48)
MCH RBC QN AUTO: 29.8 PG (ref 27–31)
MCHC RBC AUTO-ENTMCNC: 31.1 G/DL (ref 32–36)
MCV RBC AUTO: 96 FL (ref 82–98)
MONOCYTES # BLD AUTO: 0.2 K/UL (ref 0.3–1)
MONOCYTES NFR BLD: 5.1 % (ref 4–15)
NEUTROPHILS # BLD AUTO: 1.1 K/UL (ref 1.8–7.7)
NEUTROPHILS NFR BLD: 36.7 % (ref 38–73)
NRBC BLD-RTO: 2 /100 WBC
PLATELET # BLD AUTO: 423 K/UL (ref 150–350)
PLATELET BLD QL SMEAR: ABNORMAL
PMV BLD AUTO: 8.7 FL (ref 9.2–12.9)
POIKILOCYTOSIS BLD QL SMEAR: ABNORMAL
POLYCHROMASIA BLD QL SMEAR: ABNORMAL
POTASSIUM SERPL-SCNC: 4.2 MMOL/L (ref 3.5–5.1)
PROT SERPL-MCNC: 7 G/DL (ref 6–8.4)
RBC # BLD AUTO: 3.69 M/UL (ref 4–5.4)
SODIUM SERPL-SCNC: 138 MMOL/L (ref 136–145)
TARGETS BLD QL SMEAR: ABNORMAL
WBC # BLD AUTO: 2.92 K/UL (ref 3.9–12.7)

## 2020-06-20 PROCEDURE — 80053 COMPREHEN METABOLIC PANEL: CPT

## 2020-06-20 PROCEDURE — 85025 COMPLETE CBC W/AUTO DIFF WBC: CPT

## 2020-06-20 PROCEDURE — 36415 COLL VENOUS BLD VENIPUNCTURE: CPT

## 2020-06-22 ENCOUNTER — INFUSION (OUTPATIENT)
Dept: INFUSION THERAPY | Facility: HOSPITAL | Age: 45
End: 2020-06-22
Attending: INTERNAL MEDICINE
Payer: MEDICAID

## 2020-06-22 ENCOUNTER — OFFICE VISIT (OUTPATIENT)
Dept: HEMATOLOGY/ONCOLOGY | Facility: CLINIC | Age: 45
End: 2020-06-22
Payer: MEDICAID

## 2020-06-22 VITALS
TEMPERATURE: 98 F | RESPIRATION RATE: 18 BRPM | HEART RATE: 73 BPM | DIASTOLIC BLOOD PRESSURE: 96 MMHG | SYSTOLIC BLOOD PRESSURE: 148 MMHG | OXYGEN SATURATION: 98 %

## 2020-06-22 VITALS
WEIGHT: 276.44 LBS | TEMPERATURE: 98 F | HEIGHT: 71 IN | HEART RATE: 86 BPM | DIASTOLIC BLOOD PRESSURE: 98 MMHG | BODY MASS INDEX: 38.7 KG/M2 | SYSTOLIC BLOOD PRESSURE: 144 MMHG

## 2020-06-22 DIAGNOSIS — C91.40 HAIRY CELL LEUKEMIA OF SPLEEN: ICD-10-CM

## 2020-06-22 DIAGNOSIS — Z51.11 ENCOUNTER FOR ANTINEOPLASTIC CHEMOTHERAPY: ICD-10-CM

## 2020-06-22 DIAGNOSIS — C91.40 HAIRY CELL LEUKEMIA NOT HAVING ACHIEVED REMISSION: Primary | ICD-10-CM

## 2020-06-22 DIAGNOSIS — Z90.81 S/P SPLENECTOMY: ICD-10-CM

## 2020-06-22 DIAGNOSIS — C91.40 HAIRY CELL LEUKEMIA NOT HAVING ACHIEVED REMISSION: ICD-10-CM

## 2020-06-22 PROCEDURE — 99999 PR PBB SHADOW E&M-EST. PATIENT-LVL III: ICD-10-PCS | Mod: PBBFAC,,, | Performed by: INTERNAL MEDICINE

## 2020-06-22 PROCEDURE — 25000003 PHARM REV CODE 250: Performed by: INTERNAL MEDICINE

## 2020-06-22 PROCEDURE — 96367 TX/PROPH/DG ADDL SEQ IV INF: CPT

## 2020-06-22 PROCEDURE — 63600175 PHARM REV CODE 636 W HCPCS: Mod: JG | Performed by: INTERNAL MEDICINE

## 2020-06-22 PROCEDURE — 99999 PR PBB SHADOW E&M-EST. PATIENT-LVL III: CPT | Mod: PBBFAC,,, | Performed by: INTERNAL MEDICINE

## 2020-06-22 PROCEDURE — 99215 PR OFFICE/OUTPT VISIT, EST, LEVL V, 40-54 MIN: ICD-10-PCS | Mod: 25,S$PBB,, | Performed by: INTERNAL MEDICINE

## 2020-06-22 PROCEDURE — S0028 INJECTION, FAMOTIDINE, 20 MG: HCPCS | Performed by: INTERNAL MEDICINE

## 2020-06-22 PROCEDURE — 96415 CHEMO IV INFUSION ADDL HR: CPT

## 2020-06-22 PROCEDURE — 96413 CHEMO IV INFUSION 1 HR: CPT

## 2020-06-22 PROCEDURE — 99213 OFFICE O/P EST LOW 20 MIN: CPT | Mod: PBBFAC,25 | Performed by: INTERNAL MEDICINE

## 2020-06-22 PROCEDURE — 96375 TX/PRO/DX INJ NEW DRUG ADDON: CPT

## 2020-06-22 PROCEDURE — 99215 OFFICE O/P EST HI 40 MIN: CPT | Mod: 25,S$PBB,, | Performed by: INTERNAL MEDICINE

## 2020-06-22 RX ORDER — MEPERIDINE HYDROCHLORIDE 50 MG/ML
25 INJECTION INTRAMUSCULAR; INTRAVENOUS; SUBCUTANEOUS
Status: CANCELLED | OUTPATIENT
Start: 2020-06-22

## 2020-06-22 RX ORDER — SODIUM CHLORIDE 0.9 % (FLUSH) 0.9 %
10 SYRINGE (ML) INJECTION
Status: CANCELLED | OUTPATIENT
Start: 2020-06-23

## 2020-06-22 RX ORDER — ACETAMINOPHEN 325 MG/1
650 TABLET ORAL
Status: COMPLETED | OUTPATIENT
Start: 2020-06-22 | End: 2020-06-22

## 2020-06-22 RX ORDER — HYDROCODONE BITARTRATE AND ACETAMINOPHEN 10; 325 MG/1; MG/1
1 TABLET ORAL EVERY 6 HOURS PRN
Qty: 40 TABLET | Refills: 0 | OUTPATIENT
Start: 2020-06-22 | End: 2020-06-22

## 2020-06-22 RX ORDER — ACETAMINOPHEN 325 MG/1
650 TABLET ORAL
Status: CANCELLED | OUTPATIENT
Start: 2020-06-22

## 2020-06-22 RX ORDER — SODIUM CHLORIDE 0.9 % (FLUSH) 0.9 %
10 SYRINGE (ML) INJECTION
Status: CANCELLED | OUTPATIENT
Start: 2020-06-22

## 2020-06-22 RX ORDER — SODIUM CHLORIDE 0.9 % (FLUSH) 0.9 %
10 SYRINGE (ML) INJECTION
Status: CANCELLED | OUTPATIENT
Start: 2020-06-25

## 2020-06-22 RX ORDER — SODIUM CHLORIDE 0.9 % (FLUSH) 0.9 %
10 SYRINGE (ML) INJECTION
Status: CANCELLED | OUTPATIENT
Start: 2020-06-26

## 2020-06-22 RX ORDER — HEPARIN 100 UNIT/ML
500 SYRINGE INTRAVENOUS
Status: CANCELLED | OUTPATIENT
Start: 2020-06-23

## 2020-06-22 RX ORDER — HEPARIN 100 UNIT/ML
500 SYRINGE INTRAVENOUS
Status: CANCELLED | OUTPATIENT
Start: 2020-06-25

## 2020-06-22 RX ORDER — FAMOTIDINE 10 MG/ML
20 INJECTION INTRAVENOUS
Status: CANCELLED | OUTPATIENT
Start: 2020-06-22

## 2020-06-22 RX ORDER — AMOXICILLIN 500 MG/1
CAPSULE ORAL
COMMUNITY
Start: 2020-06-17 | End: 2020-06-20

## 2020-06-22 RX ORDER — SODIUM CHLORIDE 0.9 % (FLUSH) 0.9 %
10 SYRINGE (ML) INJECTION
Status: CANCELLED | OUTPATIENT
Start: 2020-06-24

## 2020-06-22 RX ORDER — HYDROCODONE BITARTRATE AND ACETAMINOPHEN 10; 325 MG/1; MG/1
1 TABLET ORAL EVERY 6 HOURS PRN
Qty: 30 TABLET | Refills: 0 | Status: CANCELLED | OUTPATIENT
Start: 2020-06-22

## 2020-06-22 RX ORDER — ONDANSETRON 8 MG/1
8 TABLET, ORALLY DISINTEGRATING ORAL ONCE
Status: CANCELLED
Start: 2020-06-26

## 2020-06-22 RX ORDER — HEPARIN 100 UNIT/ML
500 SYRINGE INTRAVENOUS
Status: CANCELLED | OUTPATIENT
Start: 2020-06-22

## 2020-06-22 RX ORDER — HEPARIN 100 UNIT/ML
500 SYRINGE INTRAVENOUS
Status: CANCELLED | OUTPATIENT
Start: 2020-06-26

## 2020-06-22 RX ORDER — HYDROCODONE BITARTRATE AND ACETAMINOPHEN 10; 325 MG/1; MG/1
1 TABLET ORAL EVERY 6 HOURS PRN
Qty: 40 TABLET | Refills: 0 | OUTPATIENT
Start: 2020-06-22

## 2020-06-22 RX ORDER — ONDANSETRON 8 MG/1
8 TABLET, ORALLY DISINTEGRATING ORAL ONCE
Status: CANCELLED
Start: 2020-06-25

## 2020-06-22 RX ORDER — ONDANSETRON 8 MG/1
8 TABLET, ORALLY DISINTEGRATING ORAL ONCE
Status: DISCONTINUED | OUTPATIENT
Start: 2020-06-22 | End: 2020-06-22 | Stop reason: HOSPADM

## 2020-06-22 RX ORDER — ONDANSETRON 8 MG/1
8 TABLET, ORALLY DISINTEGRATING ORAL ONCE
Status: CANCELLED
Start: 2020-06-24

## 2020-06-22 RX ORDER — CYCLOBENZAPRINE HCL 10 MG
TABLET ORAL
COMMUNITY
Start: 2020-06-17 | End: 2020-06-22

## 2020-06-22 RX ORDER — ONDANSETRON 8 MG/1
8 TABLET, ORALLY DISINTEGRATING ORAL ONCE
Status: CANCELLED
Start: 2020-06-23

## 2020-06-22 RX ORDER — HEPARIN 100 UNIT/ML
500 SYRINGE INTRAVENOUS
Status: CANCELLED | OUTPATIENT
Start: 2020-06-24

## 2020-06-22 RX ORDER — FAMOTIDINE 10 MG/ML
20 INJECTION INTRAVENOUS
Status: COMPLETED | OUTPATIENT
Start: 2020-06-22 | End: 2020-06-22

## 2020-06-22 RX ADMIN — ACETAMINOPHEN 650 MG: 325 TABLET ORAL at 08:06

## 2020-06-22 RX ADMIN — FAMOTIDINE 20 MG: 10 INJECTION, SOLUTION INTRAVENOUS at 08:06

## 2020-06-22 RX ADMIN — RITUXIMAB 900 MG: 10 INJECTION, SOLUTION INTRAVENOUS at 09:06

## 2020-06-22 RX ADMIN — Medication 50 MG: at 08:06

## 2020-06-22 NOTE — PROGRESS NOTES
Pharmacist Patient Education Note    Patient and her mother were counseled and given information regarding the chemotherapy regimen: Cladribine + rituximab  Patient has already received counseling from NP - pharmacy education for reinforcement on day of therapy.      Chemotherapy regimen was explained in detail.  Medication specific and supportive care hand outs were provided to the patient.  The chemotherapy medications and how they will be administered, relevant labs, and take home medications were reviewed.       The following side effects were discussed:  Nausea/vomiting  Diarrhea  Constipation  Fever  Neutropenia  Infectious risk  Anemia  Thrombocytopenia  Nephropathy  Kidney dysfunction (and importance of hydration)       The patient's home medications were reviewed for drug interactions.  The following drug interactions were found: no new interactions       The patient concerns of fever likelihood were addressed and all current questions were answered.  Pharmacy will be available throughout the patient's treatment for further questions.     Isaura Pagan PharmMarlenD., [facility]  Pharmacy Clinical Specialist  - Hematology/Oncology  [facility]  Ext: [phone]

## 2020-06-22 NOTE — PROGRESS NOTES
Subjective:       Patient ID: Jaswant Yang is a 44 y.o. female.    Chief Complaint: Results and Chemotherapy    HPI 44-year-old female newly diagnosed hairy cell leukemia initiation of systemic therapy.  Reviewed recent note by primary oncologist patient's has seen a dentist states that tooth does not need to be pulled the present time ECOG status 1 request refill of pain medicine for pain in abdomen with large spleen    Past Medical History:   Diagnosis Date    Anemia     Anemia     Back pain     GERD (gastroesophageal reflux disease) 5/14/2020     History reviewed. No pertinent family history.  Social History     Socioeconomic History    Marital status: Single     Spouse name: Not on file    Number of children: Not on file    Years of education: Not on file    Highest education level: Not on file   Occupational History    Not on file   Social Needs    Financial resource strain: Not on file    Food insecurity     Worry: Not on file     Inability: Not on file    Transportation needs     Medical: Not on file     Non-medical: Not on file   Tobacco Use    Smoking status: Former Smoker     Packs/day: 0.25     Types: Cigarettes    Smokeless tobacco: Never Used    Tobacco comment: no longer smoker   Substance and Sexual Activity    Alcohol use: Yes     Comment: occasional    Drug use: No    Sexual activity: Yes     Birth control/protection: Condom   Lifestyle    Physical activity     Days per week: Not on file     Minutes per session: Not on file    Stress: Not on file   Relationships    Social connections     Talks on phone: Not on file     Gets together: Not on file     Attends Latter day service: Not on file     Active member of club or organization: Not on file     Attends meetings of clubs or organizations: Not on file     Relationship status: Not on file   Other Topics Concern    Not on file   Social History Narrative    Not on file     Past Surgical History:   Procedure Laterality Date      SECTION, CLASSIC      FLUOROSCOPY N/A 2020    Procedure: Spleenic Bleed;  Surgeon: Hiren Cabrera MD;  Location: [facility] CATH LAB;  Service: General;  Laterality: N/A;    INJECTION OF ANESTHETIC AGENT INTO TISSUE PLANE DEFINED BY TRANSVERSUS ABDOMINIS MUSCLE N/A 2020    Procedure: BLOCK, TRANSVERSUS ABDOMINIS PLANE;  Surgeon: Yumi Ferguson MD;  Location: [facility] OR;  Service: General;  Laterality: N/A;    SPLENECTOMY N/A 2020    Procedure: SPLENECTOMY;  Surgeon: Yumi Ferguson MD;  Location: [facility] OR;  Service: General;  Laterality: N/A;    THORACENTESIS Left 2020    Procedure: THORACENTESIS;  Surgeon: Yumi Ferguson MD;  Location: [facility] OR;  Service: General;  Laterality: Left;    TUBAL LIGATION         Labs:  Lab Results   Component Value Date    WBC 2.92 (L) 2020    HGB 11.0 (L) 2020    HCT 35.4 (L) 2020    MCV 96 2020     (H) 2020     BMP  Lab Results   Component Value Date     2020    K 4.2 2020     2020    CO2 25 2020    BUN 15 2020    CREATININE 0.9 2020    CALCIUM 8.9 2020    ANIONGAP 8 2020    ESTGFRAFRICA >60 2020    EGFRNONAA >60 2020     Lab Results   Component Value Date    ALT 16 2020    AST 17 2020    ALKPHOS 56 2020    BILITOT 0.6 2020       Lab Results   Component Value Date    IRON 119 2020    TIBC 389 2020    FERRITIN 71 2020     No results found for: OTOFROCH17  No results found for: FOLATE  No results found for: TSH      Review of Systems   Constitutional: Positive for fatigue. Negative for activity change, appetite change, chills, diaphoresis, fever and unexpected weight change.   HENT: Positive for dental problem. Negative for congestion, drooling, ear discharge, ear pain, facial swelling, hearing loss, mouth sores, nosebleeds, postnasal drip, rhinorrhea, sinus pressure, sneezing, sore throat,  tinnitus, trouble swallowing and voice change.    Eyes: Negative for photophobia, pain, discharge, redness, itching and visual disturbance.   Respiratory: Negative for cough, choking, chest tightness, shortness of breath, wheezing and stridor.    Cardiovascular: Negative for chest pain, palpitations and leg swelling.   Gastrointestinal: Positive for abdominal pain. Negative for abdominal distention, anal bleeding, blood in stool, constipation, diarrhea, nausea, rectal pain and vomiting.   Endocrine: Negative for cold intolerance, heat intolerance, polydipsia, polyphagia and polyuria.   Genitourinary: Negative for decreased urine volume, difficulty urinating, dyspareunia, dysuria, enuresis, flank pain, frequency, genital sores, hematuria, menstrual problem, pelvic pain, urgency, vaginal bleeding, vaginal discharge and vaginal pain.   Musculoskeletal: Negative for arthralgias, back pain, gait problem, joint swelling, myalgias, neck pain and neck stiffness.   Skin: Negative for color change, pallor and rash.   Allergic/Immunologic: Negative for environmental allergies, food allergies and immunocompromised state.   Neurological: Positive for weakness. Negative for dizziness, tremors, seizures, syncope, facial asymmetry, speech difficulty, light-headedness, numbness and headaches.   Hematological: Negative for adenopathy. Does not bruise/bleed easily.   Psychiatric/Behavioral: Positive for dysphoric mood. Negative for agitation, behavioral problems, confusion, decreased concentration, hallucinations, self-injury, sleep disturbance and suicidal ideas. The patient is nervous/anxious. The patient is not hyperactive.        Objective:      Physical Exam  Vitals signs reviewed.   Constitutional:       General: She is not in acute distress.     Appearance: She is well-developed. She is ill-appearing. She is not diaphoretic.   HENT:      Head: Normocephalic and atraumatic.      Right Ear: External ear normal.      Left Ear:  External ear normal.      Nose: Nose normal.      Right Sinus: No maxillary sinus tenderness or frontal sinus tenderness.      Left Sinus: No maxillary sinus tenderness or frontal sinus tenderness.      Mouth/Throat:      Pharynx: No oropharyngeal exudate.   Eyes:      General: Lids are normal. No scleral icterus.        Right eye: No discharge.         Left eye: No discharge.      Conjunctiva/sclera: Conjunctivae normal.      Right eye: Right conjunctiva is not injected. No hemorrhage.     Left eye: Left conjunctiva is not injected. No hemorrhage.     Pupils: Pupils are equal, round, and reactive to light.   Neck:      Musculoskeletal: Normal range of motion and neck supple.      Thyroid: No thyromegaly.      Vascular: No JVD.      Trachea: No tracheal deviation.   Cardiovascular:      Rate and Rhythm: Normal rate.   Pulmonary:      Effort: Pulmonary effort is normal. No respiratory distress.      Breath sounds: No stridor.   Chest:      Chest wall: No tenderness.   Abdominal:      General: Bowel sounds are normal. There is no distension.      Palpations: Abdomen is soft. There is no hepatomegaly, splenomegaly or mass.      Tenderness: There is no abdominal tenderness. There is no rebound.   Musculoskeletal: Normal range of motion.         General: No tenderness.   Lymphadenopathy:      Cervical: No cervical adenopathy.      Upper Body:      Right upper body: No supraclavicular adenopathy.      Left upper body: No supraclavicular adenopathy.   Skin:     General: Skin is dry.      Findings: No erythema or rash.   Neurological:      Mental Status: She is alert and oriented to person, place, and time.      Cranial Nerves: No cranial nerve deficit.      Coordination: Coordination normal.   Psychiatric:         Behavior: Behavior normal.         Thought Content: Thought content normal.         Judgment: Judgment normal.             Assessment:      1. Hairy cell leukemia not having achieved remission    2. Hairy cell  leukemia of spleen    3. S/P splenectomy    4. Encounter for antineoplastic chemotherapy           Plan:     Initiation of cycle 1 day 1 of systemic chemotherapy orders written reviewed fever precautions reviewed discussed dental care issues.  At this point will proceed with her cycle of treatment will be seen in 10 days with repeat CBC CMP patient is instructed reviewed precautions if temperature greater than 100.5 to he has been seen and evaluated emergency room [facility]; 1045 cold to the infusion suite patient states that she has been having lower abdominal pain she states that she had above movement but is having lower cramping pain discontinue her Rituxan at present time and will observe to see whether not can resume if not will may be need to be seen in the emergency room for further evaluation; 1100 patient had Rituxan discontinued increasing abdominal pain for the spoke tumors room physician Dr. Corey Pino who will see and evaluate the patient for progressive lower abdominal pain        Cirilo Whittaker Jr, MD FACP

## 2020-06-22 NOTE — NURSING
1043: Dr. Whittaker at chairside assessing patient. Patient c/o abdominal pain, feeling flush, sweating, and feeling lightheaded. /96, HR 73, RR 18, pulse ox 98%. Dr. Whittaker gave orders to hold Rituxan for 30 minutes to see if condition will improve. Will continue to monitor.

## 2020-06-22 NOTE — NURSING
1103: Patient reports worsening abdominal pain and would like to go the ER. Dr. Whittaker ok to discharge patient to ER. Patient accompanied by nurse and family to ER. Report given to BECKI Whipple, charge nurse.

## 2020-06-22 NOTE — DISCHARGE INSTRUCTIONS
[facility]  [address]  [address]  [phone] phone     [phone] fax  Hours of Operation: Monday- Friday 8:00am- 5:00pm  After hours phone  [phone]  Hematology / Oncology Physicians on call      HILLARY Pierce Dr., Dr., Dr., Dr., NP Sydney Prescott, NP Tyesha Taylor, NP    Please call with any concerns regarding your appointment today.    HOME CARE AFTER CHEMOTHERAPY   Meals   Many patients feel sick and lose their appetites during treatment. Eat small meals several times a day. Choose bland foods with little taste or smell if you have problems with nausea. Be sure to cook all food thoroughly. This kills bacteria and helps you avoid intestinal infection. Soft foods are easier to swallow and digest.   Activity   Exercise keeps you strong and keeps your heart and lungs active. Talk to your doctor about an appropriate exercise program for you.   Skin Care   To prevent a skin infection, bathe or shower once a day. Use a moisturizing soap and wash with warm water. Avoid very hot or cold water. Chemotherapy can make your skin dry . Apply moisturizing lotion to help relieve dry skin. Some drugs used in high doses can cause slight burns to appear (like sunburn). Ask for a special cream to help relieve the burn and protect your skin.   Prevent Mouth Sores   During chemotherapy, many people get mouth sores. Do the following to help prevent mouth sores or to ease discomfort.   Brush your teeth with a soft-bristle toothbrush after every meal.  Don't use dental floss if your platelet count is below 50,000. Your doctor or nurse will tell you if this is the case.  Use an oral swab or special soft toothbrush if your gums bleed during regular brushing.  Use mouthwash as directed. If you can't tolerate commercial mouthwash, use salt and baking soda to clean your mouth. Mix 1 teaspoon of salt and 1  teaspoon of baking soda into a glass of water. Swish and spit.  Call your doctor or return to this facility if you develop any of the following:   Sore throat   White patches in the mouth or throat   Fever of 100.4ºF (38ºC) or higher, or as directed by your healthcare provider  © 2000-2011 [facility], [address] [place] [address]. All rights reserved. This information is not intended as a substitute for professional medical care. Always follow your healthcare professional's   FALL PREVENTION   Falls often occur due to slipping, tripping or losing your balance. Here are ways to reduce your risk of falling again.   Was there anything that caused your fall that can be fixed, removed or replaced?   Make your home safe by keeping walkways clear of objects you may trip over.   Use non-slip pads under rugs.   Do not walk in poorly lit areas.   Do not stand on chairs or wobbly ladders.   Use caution when reaching overhead or looking upward. This position can cause a loss of balance.   Be sure your shoes fit properly, have non-slip bottoms and are in good condition.   Be cautious when going up and down stairs, curbs, and when walking on uneven sidewalks.   If your balance is poor, consider using a cane or walker.   If your fall was related to alcohol use, stop or limit alcohol intake.   If your fall was related to use of sleeping medicines, talk to your doctor about this. You may need to reduce your dosage at bedtime if you awaken during the night to go to the bathroom.   To reduce the need for nighttime bathroom trips:   Avoid drinking fluids for several hours before going to bed   Empty your bladder before going to bed   Men can keep a urinal at the bedside   © 2000-2011 [facility], [address] [place] [address]. All rights reserved. This information is not intended as a substitute for professional medical care. Always follow your healthcare professional's instructions.  WAYS TO HELP  PREVENT INFECTION         WASH YOUR HANDS OFTEN DURING THE DAY, ESPECIALLY BEFORE YOU EAT, AFTER USING THE BATHROOM, AND AFTER TOUCHING ANIMALS     STAY AWAY FROM PEOPLE WHO HAVE ILLNESSES YOU CAN CATCH; SUCH AS COLDS, FLU, CHICKEN POX     TRY TO AVOID CROWDS     STAY AWAY FROM CHILDREN WHO RECENTLY HAVE RECEIVED LIVE VIRUS VACCINES     MAINTAIN GOOD MOUTH CARE     DO NOT SQUEEZE OR SCRATCH PIMPLES     CLEAN CUTS & SCRAPES RIGHT AWAY AND DAILY UNTIL HEALED WITH WARM WATER, SOAP & AN ANTISEPTIC     AVOID CONTACT WITH LITTER BOXES, BIRD CAGES, & FISH TANKS     AVOID STANDING WATER, IE., BIRD BATHS, FLOWER POTS/VASES, OR HUMIDIFIERS     WEAR GLOVES WHEN GARDENING OR CLEANING UP AFTER OTHERS, ESPECIALLY BABIES & SMALL CHILDREN     DO NOT EAT RAW FISH, SEAFOOD, MEAT, OR EGGS

## 2020-06-23 ENCOUNTER — INFUSION (OUTPATIENT)
Dept: INFUSION THERAPY | Facility: HOSPITAL | Age: 45
End: 2020-06-23
Attending: INTERNAL MEDICINE
Payer: MEDICAID

## 2020-06-23 ENCOUNTER — SOCIAL WORK (OUTPATIENT)
Dept: HEMATOLOGY/ONCOLOGY | Facility: CLINIC | Age: 45
End: 2020-06-23

## 2020-06-23 VITALS
HEART RATE: 73 BPM | RESPIRATION RATE: 16 BRPM | TEMPERATURE: 98 F | OXYGEN SATURATION: 98 % | SYSTOLIC BLOOD PRESSURE: 126 MMHG | DIASTOLIC BLOOD PRESSURE: 82 MMHG

## 2020-06-23 DIAGNOSIS — C91.40 HAIRY CELL LEUKEMIA NOT HAVING ACHIEVED REMISSION: Primary | ICD-10-CM

## 2020-06-23 DIAGNOSIS — C91.40 HAIRY CELL LEUKEMIA OF SPLEEN: ICD-10-CM

## 2020-06-23 PROCEDURE — 63600175 PHARM REV CODE 636 W HCPCS: Mod: JW,JG | Performed by: INTERNAL MEDICINE

## 2020-06-23 PROCEDURE — 25000003 PHARM REV CODE 250: Performed by: INTERNAL MEDICINE

## 2020-06-23 PROCEDURE — 96413 CHEMO IV INFUSION 1 HR: CPT

## 2020-06-23 PROCEDURE — 96415 CHEMO IV INFUSION ADDL HR: CPT

## 2020-06-23 RX ORDER — ONDANSETRON 8 MG/1
8 TABLET, ORALLY DISINTEGRATING ORAL ONCE
Status: COMPLETED | OUTPATIENT
Start: 2020-06-23 | End: 2020-06-23

## 2020-06-23 RX ADMIN — ONDANSETRON 8 MG: 8 TABLET, ORALLY DISINTEGRATING ORAL at 01:06

## 2020-06-23 RX ADMIN — CLADRIBINE 23.4 MG: 1 INJECTION, SOLUTION INTRAVENOUS at 02:06

## 2020-06-23 NOTE — PROGRESS NOTES
SW met with pt as she was a new start to chemo. SW provided brief introduction to oncology social work. SW provided pt with the  supportive care package. SW provided explanations on the  services list. SW introduced external resources such as [employer] and ACS. Pt explained that she had suffered a reaction to the Rituxan. SW listened and provided emotional support.Pt provided written consent for [employer]. Pt explained that she would try to apply for disability on her own and would enlist in SW help if needed. SW informed pt of [facility] Covid-19 assistance and pt verbalized consent.     F/u as treatment continues.

## 2020-06-23 NOTE — DISCHARGE INSTRUCTIONS
[facility]  [address]  [address]  [phone] phone     [phone] fax  Hours of Operation: Monday- Friday 8:00am- 5:00pm  After hours phone  [phone]  Hematology / Oncology Physicians on call      HILLARY Pierce Dr., Dr., Dr., Dr., NP Sydney Prescott, NP Tyesha Taylor, NP    Please call with any concerns regarding your appointment today.    HOME CARE AFTER CHEMOTHERAPY   Meals   Many patients feel sick and lose their appetites during treatment. Eat small meals several times a day. Choose bland foods with little taste or smell if you have problems with nausea. Be sure to cook all food thoroughly. This kills bacteria and helps you avoid intestinal infection. Soft foods are easier to swallow and digest.   Activity   Exercise keeps you strong and keeps your heart and lungs active. Talk to your doctor about an appropriate exercise program for you.   Skin Care   To prevent a skin infection, bathe or shower once a day. Use a moisturizing soap and wash with warm water. Avoid very hot or cold water. Chemotherapy can make your skin dry . Apply moisturizing lotion to help relieve dry skin. Some drugs used in high doses can cause slight burns to appear (like sunburn). Ask for a special cream to help relieve the burn and protect your skin.   Prevent Mouth Sores   During chemotherapy, many people get mouth sores. Do the following to help prevent mouth sores or to ease discomfort.   Brush your teeth with a soft-bristle toothbrush after every meal.  Don't use dental floss if your platelet count is below 50,000. Your doctor or nurse will tell you if this is the case.  Use an oral swab or special soft toothbrush if your gums bleed during regular brushing.  Use mouthwash as directed. If you can't tolerate commercial mouthwash, use salt and baking soda to clean your mouth. Mix 1 teaspoon of salt and 1  teaspoon of baking soda into a glass of water. Swish and spit.  Call your doctor or return to this facility if you develop any of the following:   Sore throat   White patches in the mouth or throat   Fever of 100.4ºF (38ºC) or higher, or as directed by your healthcare provider  © 2000-2011 [facility], [address] [place] [address]. All rights reserved. This information is not intended as a substitute for professional medical care. Always follow your healthcare professional's   FALL PREVENTION   Falls often occur due to slipping, tripping or losing your balance. Here are ways to reduce your risk of falling again.   Was there anything that caused your fall that can be fixed, removed or replaced?   Make your home safe by keeping walkways clear of objects you may trip over.   Use non-slip pads under rugs.   Do not walk in poorly lit areas.   Do not stand on chairs or wobbly ladders.   Use caution when reaching overhead or looking upward. This position can cause a loss of balance.   Be sure your shoes fit properly, have non-slip bottoms and are in good condition.   Be cautious when going up and down stairs, curbs, and when walking on uneven sidewalks.   If your balance is poor, consider using a cane or walker.   If your fall was related to alcohol use, stop or limit alcohol intake.   If your fall was related to use of sleeping medicines, talk to your doctor about this. You may need to reduce your dosage at bedtime if you awaken during the night to go to the bathroom.   To reduce the need for nighttime bathroom trips:   Avoid drinking fluids for several hours before going to bed   Empty your bladder before going to bed   Men can keep a urinal at the bedside   © 2000-2011 [facility], [address] [place] [address]. All rights reserved. This information is not intended as a substitute for professional medical care. Always follow your healthcare professional's instructions.  WAYS TO HELP  PREVENT INFECTION         WASH YOUR HANDS OFTEN DURING THE DAY, ESPECIALLY BEFORE YOU EAT, AFTER USING THE BATHROOM, AND AFTER TOUCHING ANIMALS     STAY AWAY FROM PEOPLE WHO HAVE ILLNESSES YOU CAN CATCH; SUCH AS COLDS, FLU, CHICKEN POX     TRY TO AVOID CROWDS     STAY AWAY FROM CHILDREN WHO RECENTLY HAVE RECEIVED LIVE VIRUS VACCINES     MAINTAIN GOOD MOUTH CARE     DO NOT SQUEEZE OR SCRATCH PIMPLES     CLEAN CUTS & SCRAPES RIGHT AWAY AND DAILY UNTIL HEALED WITH WARM WATER, SOAP & AN ANTISEPTIC     AVOID CONTACT WITH LITTER BOXES, BIRD CAGES, & FISH TANKS     AVOID STANDING WATER, IE., BIRD BATHS, FLOWER POTS/VASES, OR HUMIDIFIERS     WEAR GLOVES WHEN GARDENING OR CLEANING UP AFTER OTHERS, ESPECIALLY BABIES & SMALL CHILDREN     DO NOT EAT RAW FISH, SEAFOOD, MEAT, OR EGGS

## 2020-06-24 ENCOUNTER — INFUSION (OUTPATIENT)
Dept: INFUSION THERAPY | Facility: HOSPITAL | Age: 45
End: 2020-06-24
Attending: INTERNAL MEDICINE
Payer: MEDICAID

## 2020-06-24 VITALS
OXYGEN SATURATION: 98 % | SYSTOLIC BLOOD PRESSURE: 123 MMHG | DIASTOLIC BLOOD PRESSURE: 85 MMHG | TEMPERATURE: 98 F | HEART RATE: 77 BPM | RESPIRATION RATE: 18 BRPM

## 2020-06-24 DIAGNOSIS — C91.40 HAIRY CELL LEUKEMIA OF SPLEEN: ICD-10-CM

## 2020-06-24 DIAGNOSIS — C91.40 HAIRY CELL LEUKEMIA NOT HAVING ACHIEVED REMISSION: Primary | ICD-10-CM

## 2020-06-24 PROCEDURE — A4216 STERILE WATER/SALINE, 10 ML: HCPCS | Performed by: INTERNAL MEDICINE

## 2020-06-24 PROCEDURE — 25000003 PHARM REV CODE 250: Performed by: INTERNAL MEDICINE

## 2020-06-24 PROCEDURE — 96413 CHEMO IV INFUSION 1 HR: CPT

## 2020-06-24 PROCEDURE — 96415 CHEMO IV INFUSION ADDL HR: CPT

## 2020-06-24 PROCEDURE — 63600175 PHARM REV CODE 636 W HCPCS: Mod: JG | Performed by: INTERNAL MEDICINE

## 2020-06-24 RX ORDER — ONDANSETRON 8 MG/1
8 TABLET, ORALLY DISINTEGRATING ORAL ONCE
Status: COMPLETED | OUTPATIENT
Start: 2020-06-24 | End: 2020-06-24

## 2020-06-24 RX ORDER — SODIUM CHLORIDE 0.9 % (FLUSH) 0.9 %
10 SYRINGE (ML) INJECTION
Status: DISCONTINUED | OUTPATIENT
Start: 2020-06-24 | End: 2020-06-24 | Stop reason: HOSPADM

## 2020-06-24 RX ORDER — HYDROCODONE BITARTRATE AND ACETAMINOPHEN 10; 325 MG/1; MG/1
10 TABLET ORAL 4 TIMES DAILY
Status: ON HOLD | COMMUNITY
Start: 2020-06-22 | End: 2020-07-18 | Stop reason: HOSPADM

## 2020-06-24 RX ADMIN — ONDANSETRON 8 MG: 8 TABLET, ORALLY DISINTEGRATING ORAL at 01:06

## 2020-06-24 RX ADMIN — Medication 10 ML: at 03:06

## 2020-06-24 RX ADMIN — CLADRIBINE 23.4 MG: 1 INJECTION INTRAVENOUS at 01:06

## 2020-06-24 NOTE — PLAN OF CARE
"  Problem: Adult Inpatient Plan of Care  Goal: Plan of Care Review  Outcome: Ongoing, Progressing  Flowsheets (Taken 6/24/2020 1340)  Plan of Care Reviewed With: patient  Goal: Patient-Specific Goal (Individualization)  Outcome: Ongoing, Progressing  Flowsheets (Taken 6/24/2020 1340)  Individualized Care Needs: likes blanket, feet elevated, IV in right ac  Anxieties, Fears or Concerns: none  Patient-Specific Goals (Include Timeframe): Patient will tolerate Cladribine infusion today.     Problem: Anemia (Chemotherapy Effects)  Goal: Anemia Symptom Improvement  Outcome: Ongoing, Progressing     Problem: Urinary Bleeding Risk or Actual (Chemotherapy Effects)  Goal: Absence of Hematuria  Outcome: Ongoing, Progressing     Problem: Nausea and Vomiting (Chemotherapy Effects)  Goal: Fluid and Electrolyte Balance  Outcome: Ongoing, Progressing     Problem: Neurotoxicity (Chemotherapy Effects)  Goal: Neurotoxicity Symptom Control  Outcome: Ongoing, Progressing     Problem: Neutropenia (Chemotherapy Effects)  Goal: Absence of Infection  Outcome: Ongoing, Progressing     Problem: Oral Mucositis (Chemotherapy Effects)  Goal: Improved Oral Mucous Membrane Integrity  Outcome: Ongoing, Progressing     Problem: Thrombocytopenia Bleeding Risk (Chemotherapy Effects)  Goal: Absence of Bleeding  Outcome: Ongoing, Progressing    Patient states, "I feel good today." Patient denies having nausea after yesterday's infusion, also denies pain today. Patient stable. Received Cladribine today.      "

## 2020-06-24 NOTE — DISCHARGE INSTRUCTIONS
[facility]  [address] [facility]  [address] [address]  [phone] phone     [phone] fax  Hours of Operation: Monday- Friday 8:00am- 5:00pm  After hours phone  [phone]  Hematology / Oncology Physicians on call      Dr. Panfilo Tran, HILLARY Neumann NP Tyesha Taylor, NP    Please call with any concerns regarding your appointment today.          FALL PREVENTION   Falls often occur due to slipping, tripping or losing your balance. Here are ways to reduce your risk of falling again.   Was there anything that caused your fall that can be fixed, removed or replaced?   Make your home safe by keeping walkways clear of objects you may trip over.   Use non-slip pads under rugs.   Do not walk in poorly lit areas.   Do not stand on chairs or wobbly ladders.   Use caution when reaching overhead or looking upward. This position can cause a loss of balance.   Be sure your shoes fit properly, have non-slip bottoms and are in good condition.   Be cautious when going up and down stairs, curbs, and when walking on uneven sidewalks.   If your balance is poor, consider using a cane or walker.   If your fall was related to alcohol use, stop or limit alcohol intake.   If your fall was related to use of sleeping medicines, talk to your doctor about this. You may need to reduce your dosage at bedtime if you awaken during the night to go to the bathroom.   To reduce the need for nighttime bathroom trips:   Avoid drinking fluids for several hours before going to bed   Empty your bladder before going to bed   Men can keep a urinal at the bedside   © 4030-4530 [facility], [address] [place] [address], [place] [address]. All rights reserved. This information is not intended as a substitute for professional medical care. Always follow your healthcare professional's instructions.

## 2020-06-25 ENCOUNTER — INFUSION (OUTPATIENT)
Dept: INFUSION THERAPY | Facility: HOSPITAL | Age: 45
End: 2020-06-25
Attending: INTERNAL MEDICINE
Payer: MEDICAID

## 2020-06-25 VITALS
RESPIRATION RATE: 18 BRPM | HEART RATE: 73 BPM | SYSTOLIC BLOOD PRESSURE: 125 MMHG | DIASTOLIC BLOOD PRESSURE: 80 MMHG | TEMPERATURE: 98 F | OXYGEN SATURATION: 99 %

## 2020-06-25 DIAGNOSIS — C91.40 HAIRY CELL LEUKEMIA OF SPLEEN: ICD-10-CM

## 2020-06-25 DIAGNOSIS — C91.40 HAIRY CELL LEUKEMIA NOT HAVING ACHIEVED REMISSION: Primary | ICD-10-CM

## 2020-06-25 PROCEDURE — 96415 CHEMO IV INFUSION ADDL HR: CPT

## 2020-06-25 PROCEDURE — 25000003 PHARM REV CODE 250: Performed by: INTERNAL MEDICINE

## 2020-06-25 PROCEDURE — 63600175 PHARM REV CODE 636 W HCPCS: Mod: JG | Performed by: INTERNAL MEDICINE

## 2020-06-25 PROCEDURE — 96413 CHEMO IV INFUSION 1 HR: CPT

## 2020-06-25 RX ORDER — ONDANSETRON 8 MG/1
8 TABLET, ORALLY DISINTEGRATING ORAL ONCE
Status: COMPLETED | OUTPATIENT
Start: 2020-06-25 | End: 2020-06-25

## 2020-06-25 RX ADMIN — ONDANSETRON 8 MG: 8 TABLET, ORALLY DISINTEGRATING ORAL at 01:06

## 2020-06-25 RX ADMIN — CLADRIBINE 23.4 MG: 1 INJECTION INTRAVENOUS at 01:06

## 2020-06-25 NOTE — PLAN OF CARE
Problem: Anemia (Chemotherapy Effects)  Goal: Anemia Symptom Improvement  Outcome: Ongoing, Progressing   Pt reported a little tingling on her left foot . She verbalized feeling tired today .

## 2020-06-26 ENCOUNTER — TELEPHONE (OUTPATIENT)
Dept: PALLIATIVE MEDICINE | Facility: CLINIC | Age: 45
End: 2020-06-26

## 2020-06-26 ENCOUNTER — INFUSION (OUTPATIENT)
Dept: INFUSION THERAPY | Facility: HOSPITAL | Age: 45
End: 2020-06-26
Attending: INTERNAL MEDICINE
Payer: MEDICAID

## 2020-06-26 VITALS
RESPIRATION RATE: 20 BRPM | OXYGEN SATURATION: 99 % | TEMPERATURE: 98 F | DIASTOLIC BLOOD PRESSURE: 84 MMHG | SYSTOLIC BLOOD PRESSURE: 128 MMHG | HEART RATE: 81 BPM

## 2020-06-26 DIAGNOSIS — C91.40 HAIRY CELL LEUKEMIA OF SPLEEN: ICD-10-CM

## 2020-06-26 DIAGNOSIS — C91.40 HAIRY CELL LEUKEMIA NOT HAVING ACHIEVED REMISSION: Primary | ICD-10-CM

## 2020-06-26 PROCEDURE — 96413 CHEMO IV INFUSION 1 HR: CPT

## 2020-06-26 PROCEDURE — 96415 CHEMO IV INFUSION ADDL HR: CPT

## 2020-06-26 PROCEDURE — 25000003 PHARM REV CODE 250: Performed by: INTERNAL MEDICINE

## 2020-06-26 PROCEDURE — 63600175 PHARM REV CODE 636 W HCPCS: Mod: JG | Performed by: INTERNAL MEDICINE

## 2020-06-26 RX ORDER — ONDANSETRON 8 MG/1
8 TABLET, ORALLY DISINTEGRATING ORAL ONCE
Status: COMPLETED | OUTPATIENT
Start: 2020-06-26 | End: 2020-06-26

## 2020-06-26 RX ADMIN — ONDANSETRON 8 MG: 8 TABLET, ORALLY DISINTEGRATING ORAL at 01:06

## 2020-06-26 RX ADMIN — CLADRIBINE 23.4 MG: 1 INJECTION INTRAVENOUS at 01:06

## 2020-06-26 NOTE — PLAN OF CARE
Problem: Adult Inpatient Plan of Care  Goal: Plan of Care Review  Flowsheets (Taken 6/26/2020 1303)  Plan of Care Reviewed With: patient   Pt reported feeling weak and tired today . She denies any pain on her left foot.

## 2020-06-26 NOTE — TELEPHONE ENCOUNTER
[facility] - Palliative Care  Medical Specialty       Patient Name: Jaswant Yang  MRN: [id number]  Primary Care Physician: Primary Doctor No    Spoke to patient about Palliative Med referral and schedule appt for 7/21.      Consuelo Lacey, MSW, LCSW  [phone]

## 2020-06-26 NOTE — DISCHARGE INSTRUCTIONS
[facility]  [address] [facility] [address]  [address]  [phone] phone     [phone] fax  Hours of Operation: Monday- Friday 8:00am- 5:00pm  After hours phone  [phone]  Hematology / Oncology Physicians on call      HILLARY Pierce Dr., Dr., Dr., Dr., NP Sydney Prescott, NP Tyesha Taylor, NP    Please call with any concerns regarding your appointment today.HOME CARE AFTER CHEMOTHERAPY   Meals   Many patients feel sick and lose their appetites during treatment. Eat small meals several times a day. Choose bland foods with little taste or smell if you have problems with nausea. Be sure to cook all food thoroughly. This kills bacteria and helps you avoid intestinal infection. Soft foods are easier to swallow and digest.   Activity   Exercise keeps you strong and keeps your heart and lungs active. Talk to your doctor about an appropriate exercise program for you.   Skin Care   To prevent a skin infection, bathe or shower once a day. Use a moisturizing soap and wash with warm water. Avoid very hot or cold water. Chemotherapy can make your skin dry . Apply moisturizing lotion to help relieve dry skin. Some drugs used in high doses can cause slight burns to appear (like sunburn). Ask for a special cream to help relieve the burn and protect your skin.   Prevent Mouth Sores   During chemotherapy, many people get mouth sores. Do the following to help prevent mouth sores or to ease discomfort.   Brush your teeth with a soft-bristle toothbrush after every meal.  Don't use dental floss if your platelet count is below 50,000. Your doctor or nurse will tell you if this is the case.  Use an oral swab or special soft toothbrush if your gums bleed during regular brushing.  Use mouthwash as directed. If you can't tolerate commercial mouthwash, use salt and baking soda to clean your mouth. Mix 1 teaspoon of salt and 1  teaspoon of baking soda into a glass of water. Swish and spit.  Call your doctor or return to this facility if you develop any of the following:   Sore throat   White patches in the mouth or throat   Fever of 100.4ºF (38ºC) or higher, or as directed by your healthcare provider  © 2000-2011 [facility], [address] [place] [address]. All rights reserved. This information is not intended as a substitute for professional medical care. Always follow your healthcare professional's   FALL PREVENTION   Falls often occur due to slipping, tripping or losing your balance. Here are ways to reduce your risk of falling again.   Was there anything that caused your fall that can be fixed, removed or replaced?   Make your home safe by keeping walkways clear of objects you may trip over.   Use non-slip pads under rugs.   Do not walk in poorly lit areas.   Do not stand on chairs or wobbly ladders.   Use caution when reaching overhead or looking upward. This position can cause a loss of balance.   Be sure your shoes fit properly, have non-slip bottoms and are in good condition.   Be cautious when going up and down stairs, curbs, and when walking on uneven sidewalks.   If your balance is poor, consider using a cane or walker.   If your fall was related to alcohol use, stop or limit alcohol intake.   If your fall was related to use of sleeping medicines, talk to your doctor about this. You may need to reduce your dosage at bedtime if you awaken during the night to go to the bathroom.   To reduce the need for nighttime bathroom trips:   Avoid drinking fluids for several hours before going to bed   Empty your bladder before going to bed   Men can keep a urinal at the bedside   © 2000-2011 [facility], [address] [place] [address]. All rights reserved. This information is not intended as a substitute for professional medical care. Always follow your healthcare professional's instructions.

## 2020-07-06 ENCOUNTER — HOSPITAL ENCOUNTER (INPATIENT)
Facility: HOSPITAL | Age: 45
LOS: 12 days | Discharge: HOME | DRG: 809 | End: 2020-07-18
Attending: EMERGENCY MEDICINE | Admitting: EMERGENCY MEDICINE
Payer: MEDICAID

## 2020-07-06 ENCOUNTER — OFFICE VISIT (OUTPATIENT)
Dept: HEMATOLOGY/ONCOLOGY | Facility: CLINIC | Age: 45
DRG: 809 | End: 2020-07-06
Payer: MEDICAID

## 2020-07-06 VITALS
HEIGHT: 71 IN | WEIGHT: 273.56 LBS | BODY MASS INDEX: 38.3 KG/M2 | HEART RATE: 112 BPM | OXYGEN SATURATION: 98 % | DIASTOLIC BLOOD PRESSURE: 114 MMHG | TEMPERATURE: 100 F | SYSTOLIC BLOOD PRESSURE: 174 MMHG

## 2020-07-06 DIAGNOSIS — A59.9 TRICHOMONAS INFECTION: ICD-10-CM

## 2020-07-06 DIAGNOSIS — K04.7 DENTAL INFECTION: ICD-10-CM

## 2020-07-06 DIAGNOSIS — E87.5 HYPERKALEMIA: ICD-10-CM

## 2020-07-06 DIAGNOSIS — N18.6 ESRD (END STAGE RENAL DISEASE): ICD-10-CM

## 2020-07-06 DIAGNOSIS — C91.40 HAIRY CELL LEUKEMIA OF SPLEEN: Primary | ICD-10-CM

## 2020-07-06 DIAGNOSIS — C91.40 HAIRY CELL LEUKEMIA NOT HAVING ACHIEVED REMISSION: Primary | ICD-10-CM

## 2020-07-06 DIAGNOSIS — T45.1X5A CHEMOTHERAPY-INDUCED NEUTROPENIA: ICD-10-CM

## 2020-07-06 DIAGNOSIS — D70.1 CHEMOTHERAPY-INDUCED NEUTROPENIA: ICD-10-CM

## 2020-07-06 DIAGNOSIS — N17.9 AKI (ACUTE KIDNEY INJURY): ICD-10-CM

## 2020-07-06 DIAGNOSIS — Z51.11 ENCOUNTER FOR ANTINEOPLASTIC CHEMOTHERAPY: ICD-10-CM

## 2020-07-06 DIAGNOSIS — E87.20 METABOLIC ACIDOSIS: ICD-10-CM

## 2020-07-06 PROBLEM — E87.6 HYPOKALEMIA: Status: ACTIVE | Noted: 2020-07-06

## 2020-07-06 PROBLEM — R50.81 NEUTROPENIC FEVER: Status: ACTIVE | Noted: 2020-07-06

## 2020-07-06 PROBLEM — D70.9 NEUTROPENIC FEVER: Status: ACTIVE | Noted: 2020-07-06

## 2020-07-06 LAB
ALBUMIN SERPL BCP-MCNC: 4.3 G/DL (ref 3.5–5.2)
ALP SERPL-CCNC: 51 U/L (ref 55–135)
ALT SERPL W/O P-5'-P-CCNC: 18 U/L (ref 10–44)
ANION GAP SERPL CALC-SCNC: 9 MMOL/L (ref 8–16)
AST SERPL-CCNC: 17 U/L (ref 10–40)
BASOPHILS # BLD AUTO: 0 K/UL (ref 0–0.2)
BASOPHILS NFR BLD: 0 % (ref 0–1.9)
BILIRUB SERPL-MCNC: 0.9 MG/DL (ref 0.1–1)
BUN SERPL-MCNC: 7 MG/DL (ref 6–20)
CALCIUM SERPL-MCNC: 8.8 MG/DL (ref 8.7–10.5)
CHLORIDE SERPL-SCNC: 105 MMOL/L (ref 95–110)
CO2 SERPL-SCNC: 23 MMOL/L (ref 23–29)
CREAT SERPL-MCNC: 0.8 MG/DL (ref 0.5–1.4)
DACRYOCYTES BLD QL SMEAR: ABNORMAL
DIFFERENTIAL METHOD: ABNORMAL
EOSINOPHIL # BLD AUTO: 0 K/UL (ref 0–0.5)
EOSINOPHIL NFR BLD: 5 % (ref 0–8)
ERYTHROCYTE [DISTWIDTH] IN BLOOD BY AUTOMATED COUNT: 18.7 % (ref 11.5–14.5)
EST. GFR  (AFRICAN AMERICAN): >60 ML/MIN/1.73 M^2
EST. GFR  (NON AFRICAN AMERICAN): >60 ML/MIN/1.73 M^2
GLUCOSE SERPL-MCNC: 107 MG/DL (ref 70–110)
HCT VFR BLD AUTO: 34 % (ref 37–48.5)
HGB BLD-MCNC: 11.4 G/DL (ref 12–16)
IMM GRANULOCYTES # BLD AUTO: 0.01 K/UL (ref 0–0.04)
IMM GRANULOCYTES NFR BLD AUTO: 1.3 % (ref 0–0.5)
LYMPHOCYTES # BLD AUTO: 0.3 K/UL (ref 1–4.8)
LYMPHOCYTES NFR BLD: 40 % (ref 18–48)
MCH RBC QN AUTO: 30.4 PG (ref 27–31)
MCHC RBC AUTO-ENTMCNC: 33.5 G/DL (ref 32–36)
MCV RBC AUTO: 91 FL (ref 82–98)
MONOCYTES # BLD AUTO: 0 K/UL (ref 0.3–1)
MONOCYTES NFR BLD: 2.5 % (ref 4–15)
NEUTROPHILS # BLD AUTO: 0.4 K/UL (ref 1.8–7.7)
NEUTROPHILS NFR BLD: 51.2 % (ref 38–73)
NRBC BLD-RTO: 5 /100 WBC
OVALOCYTES BLD QL SMEAR: ABNORMAL
PLATELET # BLD AUTO: 248 K/UL (ref 150–350)
PMV BLD AUTO: 9.9 FL (ref 9.2–12.9)
POLYCHROMASIA BLD QL SMEAR: ABNORMAL
POTASSIUM SERPL-SCNC: 3.4 MMOL/L (ref 3.5–5.1)
PROT SERPL-MCNC: 7.8 G/DL (ref 6–8.4)
RBC # BLD AUTO: 3.75 M/UL (ref 4–5.4)
SARS-COV-2 RDRP RESP QL NAA+PROBE: NEGATIVE
SODIUM SERPL-SCNC: 137 MMOL/L (ref 136–145)
STOMATOCYTES BLD QL SMEAR: PRESENT
TARGETS BLD QL SMEAR: ABNORMAL
WBC # BLD AUTO: 0.8 K/UL (ref 3.9–12.7)

## 2020-07-06 PROCEDURE — 36415 COLL VENOUS BLD VENIPUNCTURE: CPT

## 2020-07-06 PROCEDURE — 99213 OFFICE O/P EST LOW 20 MIN: CPT | Mod: PBBFAC | Performed by: INTERNAL MEDICINE

## 2020-07-06 PROCEDURE — 63600175 PHARM REV CODE 636 W HCPCS: Performed by: EMERGENCY MEDICINE

## 2020-07-06 PROCEDURE — 99999 PR PBB SHADOW E&M-EST. PATIENT-LVL III: CPT | Mod: PBBFAC,,, | Performed by: INTERNAL MEDICINE

## 2020-07-06 PROCEDURE — 99999 PR PBB SHADOW E&M-EST. PATIENT-LVL III: ICD-10-PCS | Mod: PBBFAC,,, | Performed by: INTERNAL MEDICINE

## 2020-07-06 PROCEDURE — 87040 BLOOD CULTURE FOR BACTERIA: CPT | Mod: 59

## 2020-07-06 PROCEDURE — 99215 OFFICE O/P EST HI 40 MIN: CPT | Mod: S$PBB,,, | Performed by: INTERNAL MEDICINE

## 2020-07-06 PROCEDURE — 80053 COMPREHEN METABOLIC PANEL: CPT | Mod: 91

## 2020-07-06 PROCEDURE — U0002 COVID-19 LAB TEST NON-CDC: HCPCS

## 2020-07-06 PROCEDURE — 85025 COMPLETE CBC W/AUTO DIFF WBC: CPT

## 2020-07-06 PROCEDURE — 11000001 HC ACUTE MED/SURG PRIVATE ROOM

## 2020-07-06 PROCEDURE — 99291 CRITICAL CARE FIRST HOUR: CPT | Mod: 25

## 2020-07-06 PROCEDURE — 25000003 PHARM REV CODE 250: Performed by: EMERGENCY MEDICINE

## 2020-07-06 PROCEDURE — 63600175 PHARM REV CODE 636 W HCPCS: Performed by: NURSE PRACTITIONER

## 2020-07-06 PROCEDURE — 25000003 PHARM REV CODE 250: Performed by: NURSE PRACTITIONER

## 2020-07-06 PROCEDURE — 96365 THER/PROPH/DIAG IV INF INIT: CPT

## 2020-07-06 PROCEDURE — 96375 TX/PRO/DX INJ NEW DRUG ADDON: CPT

## 2020-07-06 PROCEDURE — 99215 PR OFFICE/OUTPT VISIT, EST, LEVL V, 40-54 MIN: ICD-10-PCS | Mod: S$PBB,,, | Performed by: INTERNAL MEDICINE

## 2020-07-06 RX ORDER — PROMETHAZINE HYDROCHLORIDE 25 MG/1
25 TABLET ORAL EVERY 6 HOURS PRN
Status: DISCONTINUED | OUTPATIENT
Start: 2020-07-06 | End: 2020-07-18 | Stop reason: HOSPADM

## 2020-07-06 RX ORDER — DIPHENHYDRAMINE HCL 25 MG
25 CAPSULE ORAL EVERY 6 HOURS PRN
Status: DISCONTINUED | OUTPATIENT
Start: 2020-07-06 | End: 2020-07-07

## 2020-07-06 RX ORDER — DIPHENHYDRAMINE HYDROCHLORIDE 50 MG/ML
25 INJECTION INTRAMUSCULAR; INTRAVENOUS EVERY 6 HOURS PRN
Status: DISCONTINUED | OUTPATIENT
Start: 2020-07-06 | End: 2020-07-06

## 2020-07-06 RX ORDER — MORPHINE SULFATE 2 MG/ML
2 INJECTION, SOLUTION INTRAMUSCULAR; INTRAVENOUS EVERY 6 HOURS PRN
Status: DISCONTINUED | OUTPATIENT
Start: 2020-07-06 | End: 2020-07-07

## 2020-07-06 RX ORDER — HYDROCODONE BITARTRATE AND ACETAMINOPHEN 10; 325 MG/1; MG/1
1 TABLET ORAL EVERY 6 HOURS PRN
Status: DISCONTINUED | OUTPATIENT
Start: 2020-07-06 | End: 2020-07-07

## 2020-07-06 RX ORDER — MORPHINE SULFATE 4 MG/ML
4 INJECTION, SOLUTION INTRAMUSCULAR; INTRAVENOUS
Status: COMPLETED | OUTPATIENT
Start: 2020-07-06 | End: 2020-07-06

## 2020-07-06 RX ORDER — POTASSIUM CHLORIDE 20 MEQ/1
40 TABLET, EXTENDED RELEASE ORAL ONCE
Status: COMPLETED | OUTPATIENT
Start: 2020-07-06 | End: 2020-07-06

## 2020-07-06 RX ORDER — ONDANSETRON 2 MG/ML
4 INJECTION INTRAMUSCULAR; INTRAVENOUS EVERY 8 HOURS PRN
Status: DISCONTINUED | OUTPATIENT
Start: 2020-07-06 | End: 2020-07-18 | Stop reason: HOSPADM

## 2020-07-06 RX ORDER — CEFEPIME HYDROCHLORIDE 1 G/50ML
2 INJECTION, SOLUTION INTRAVENOUS
Status: DISCONTINUED | OUTPATIENT
Start: 2020-07-06 | End: 2020-07-07

## 2020-07-06 RX ORDER — CLINDAMYCIN PHOSPHATE 900 MG/50ML
900 INJECTION, SOLUTION INTRAVENOUS
Status: DISCONTINUED | OUTPATIENT
Start: 2020-07-06 | End: 2020-07-06

## 2020-07-06 RX ORDER — DIPHENHYDRAMINE HCL 25 MG
50 CAPSULE ORAL
Status: CANCELLED
Start: 2020-07-06

## 2020-07-06 RX ORDER — SODIUM CHLORIDE 0.9 % (FLUSH) 0.9 %
10 SYRINGE (ML) INJECTION
Status: CANCELLED | OUTPATIENT
Start: 2020-07-06

## 2020-07-06 RX ORDER — POLYETHYLENE GLYCOL 3350 17 G/17G
17 POWDER, FOR SOLUTION ORAL DAILY PRN
Status: DISCONTINUED | OUTPATIENT
Start: 2020-07-06 | End: 2020-07-07

## 2020-07-06 RX ORDER — THIAMINE HCL 100 MG
100 TABLET ORAL DAILY
Status: DISCONTINUED | OUTPATIENT
Start: 2020-07-06 | End: 2020-07-18 | Stop reason: HOSPADM

## 2020-07-06 RX ORDER — HEPARIN 100 UNIT/ML
500 SYRINGE INTRAVENOUS
Status: CANCELLED | OUTPATIENT
Start: 2020-07-06

## 2020-07-06 RX ORDER — MEPERIDINE HYDROCHLORIDE 50 MG/ML
25 INJECTION INTRAMUSCULAR; INTRAVENOUS; SUBCUTANEOUS
Status: CANCELLED | OUTPATIENT
Start: 2020-07-06

## 2020-07-06 RX ORDER — ACETAMINOPHEN 325 MG/1
650 TABLET ORAL
Status: CANCELLED | OUTPATIENT
Start: 2020-07-06

## 2020-07-06 RX ORDER — ACETAMINOPHEN 325 MG/1
650 TABLET ORAL EVERY 6 HOURS PRN
Status: DISCONTINUED | OUTPATIENT
Start: 2020-07-06 | End: 2020-07-18 | Stop reason: HOSPADM

## 2020-07-06 RX ORDER — FAMOTIDINE 10 MG/ML
20 INJECTION INTRAVENOUS
Status: CANCELLED | OUTPATIENT
Start: 2020-07-06

## 2020-07-06 RX ADMIN — MORPHINE SULFATE 2 MG: 2 INJECTION, SOLUTION INTRAMUSCULAR; INTRAVENOUS at 09:07

## 2020-07-06 RX ADMIN — MORPHINE SULFATE 4 MG: 4 INJECTION INTRAVENOUS at 01:07

## 2020-07-06 RX ADMIN — POTASSIUM CHLORIDE 40 MEQ: 1500 TABLET, EXTENDED RELEASE ORAL at 05:07

## 2020-07-06 RX ADMIN — Medication 100 MG: at 04:07

## 2020-07-06 RX ADMIN — PIPERACILLIN AND TAZOBACTAM 4.5 G: 4; .5 INJECTION, POWDER, LYOPHILIZED, FOR SOLUTION INTRAVENOUS; PARENTERAL at 01:07

## 2020-07-06 RX ADMIN — MORPHINE SULFATE 4 MG: 4 INJECTION INTRAVENOUS at 03:07

## 2020-07-06 RX ADMIN — THERA TABS 1 TABLET: TAB at 04:07

## 2020-07-06 RX ADMIN — VANCOMYCIN HYDROCHLORIDE 2500 MG: 100 INJECTION, POWDER, LYOPHILIZED, FOR SOLUTION INTRAVENOUS at 01:07

## 2020-07-06 RX ADMIN — DIPHENHYDRAMINE HYDROCHLORIDE 25 MG: 25 CAPSULE ORAL at 05:07

## 2020-07-06 RX ADMIN — CEFEPIME HYDROCHLORIDE 2 G: 2 INJECTION, SOLUTION INTRAVENOUS at 09:07

## 2020-07-06 NOTE — HPI
"45 y/o AA F with hx of hairy cell leukemia (diagnosed 05/2020), anemia, chronic pain to ED from Dr. Whittaker' office after being found to have a low grade temperature (99.6) with a visible dental infection in the R lower jaw and L upper jaw, in the setting of chemotherapy induced neutropenia (WBC 0.89); mild hypokalemia (3.4) also noted in ED, but labs otherwise largely unremarkable - admission for IV abx; BC x2 pending. Pt reports associated severe stabbing pain in the affected jaw areas, and mild facial edema, worsening over the past 2 days but states no drainage/bleeding. Pt's last chemo cycle ended on 06/26/20 - reports pain improved with IV morphine in ED, states she feels "a little better now" - palliative care consulted per ED physician at patient's request, to assist with symptom management and pain control. Denies CP, edema, palpitations, SOB, wheezing, orthopnea, PND, abdominal pain, N/V/D, dysuria, flank pain, HA, dizziness, fever, cough, chills, falls/trauma, blurred vision, focal deficits. No recent dietary changes, travel, sick contacts or viral illness - pt is typically active and independent with ADLs and ambulation at home. Pt is full code. Surrogate decision makers are son (Dmitri Yang) and Mother (Elsa Yang). Admitted for chemotherapy induced neutropenic fever with a jaw/dental infection.   "

## 2020-07-06 NOTE — PLAN OF CARE
Fall precautions maintained. Pain is well controlled. On neutropenic isolation. All needs met. Will continue to monitor.

## 2020-07-06 NOTE — H&P
"[facility] Hospital Medicine  History & Physical    Patient Name: Jaswant Yang  MRN: [id number]  Admission Date: 7/6/2020  Attending Physician: Shantel Carlson MD   Primary Care Provider: Primary Doctor No         Patient information was obtained from patient, relative(s), past medical records and ER records.     Subjective:     Principal Problem:Dental infection    Chief Complaint:   Chief Complaint   Patient presents with    jaw infection     pt sent from Dr. Whittaker for jaw infection and IV antibiotics        HPI: 43 y/o AA F with hx of hairy cell leukemia (diagnosed 05/2020), anemia, chronic pain to ED from Dr. Whittaker' office after being found to have a low grade temperature (99.6) with a visible dental infection in the R lower jaw and L upper jaw, in the setting of chemotherapy induced neutropenia (WBC 0.89); mild hypokalemia (3.4) also noted in ED, but labs otherwise largely unremarkable - admission for IV abx; BC x2 pending. Pt reports associated severe stabbing pain in the affected jaw areas, and mild facial edema, worsening over the past 2 days but states no drainage/bleeding. Pt's last chemo cycle ended on 06/26/20 - reports pain improved with IV morphine in ED, states she feels "a little better now" - palliative care consulted per ED physician at patient's request, to assist with symptom management and pain control. Denies CP, edema, palpitations, SOB, wheezing, orthopnea, PND, abdominal pain, N/V/D, dysuria, flank pain, HA, dizziness, fever, cough, chills, falls/trauma, blurred vision, focal deficits. No recent dietary changes, travel, sick contacts or viral illness - pt is typically active and independent with ADLs and ambulation at home. Pt is full code. Surrogate decision makers are son (Dmitri Yang) and Mother (Elsa Yang). Admitted for chemotherapy induced neutropenic fever with a jaw/dental infection.     No new subjective & objective note has been filed under this " hospital service since the last note was generated.    Assessment/Plan:     * Dental infection  ID consulted per hem/onc request  Continue IV vanc/zosyn for now  Follow fever curve  BC x2 pending  Dental soft diet for now  Morphine/Norco prn for pain         Neutropenic fever  Consult hematology/oncology  Maintain neutropenic precautions  Tylenol prn fever  UA with reflex culture pending        Chemotherapy-induced neutropenia  Maintain neutropenic precautions  Labs in AM  Continue supplemental thiamine, Folbic, MVI    Hypokalemia  Replace K+  Recheck in AM  Replete as needed      Hairy cell leukemia of spleen  Consult hematology/oncology   Consult palliative care for symptom/pain management        VTE Risk Mitigation (From admission, onward)         Ordered     Place sequential compression device  Until discontinued      07/06/20 4835                   Radha Wilson NP  Department of Hospital Medicine   [facility]

## 2020-07-06 NOTE — ASSESSMENT & PLAN NOTE
Consult hematology/oncology  Maintain neutropenic precautions  Tylenol prn fever  UA with reflex culture pending

## 2020-07-06 NOTE — ED NOTES
Pt lying in bed comfortably. AAO x 4. Skin warm and dry. Resp even and unlabored with equal chest rise and fall. Pt complains of bilateral jaw pain. Dr. Pino aware of patient's pain. Will continue to monitor.

## 2020-07-06 NOTE — ED PROVIDER NOTES
SCRIBE #1 NOTE: I, Azam Covington, am scribing for, and in the presence of, Corey Pino MD. I have scribed the entire note.       History     Chief Complaint   Patient presents with    jaw infection     pt sent from Dr. Whittaker for jaw infection and IV antibiotics     Review of patient's allergies indicates:   Allergen Reactions    Tramadol Hives         History of Present Illness     HPI    2020, 11:33 AM  History obtained from the patient      History of Present Illness: Jaswant Yang is a 44 y.o. female patient with a history of hairy cell leukemia who presents to the Emergency Department for evaluation of bilateral jaw pain which onset several days ago. Symptoms are constant and moderate in severity. No mitigating or exacerbating factors reported. Associated sxs include none. Patient denies any fever, chills, trouble swallowing, drooling, SOB, and all other sxs at this time. Prior Tx includes none. No further complaints or concerns at this time. Patient referred to ED by Dr. Whittaker (Hem/Onc) for low WBC to ED for IV abx.      Arrival mode: Personal transportation      PCP: Primary Doctor No      Past Medical History:  Past Medical History:   Diagnosis Date    Anemia     Anemia     Back pain     GERD (gastroesophageal reflux disease) 2020       Past Surgical History:  Past Surgical History:   Procedure Laterality Date     SECTION, CLASSIC      FLUOROSCOPY N/A 2020    Procedure: Spleenic Bleed;  Surgeon: Hiren Cabrera MD;  Location: [facility] CATH LAB;  Service: General;  Laterality: N/A;    INJECTION OF ANESTHETIC AGENT INTO TISSUE PLANE DEFINED BY TRANSVERSUS ABDOMINIS MUSCLE N/A 2020    Procedure: BLOCK, TRANSVERSUS ABDOMINIS PLANE;  Surgeon: Yumi Ferguson MD;  Location: [facility] OR;  Service: General;  Laterality: N/A;    SPLENECTOMY N/A 2020    Procedure: SPLENECTOMY;  Surgeon: Yumi Ferguson MD;  Location: [facility] OR;  Service: General;  Laterality: N/A;     THORACENTESIS Left 5/11/2020    Procedure: THORACENTESIS;  Surgeon: Yumi Ferguson MD;  Location: [facility] OR;  Service: General;  Laterality: Left;    TUBAL LIGATION           Family History:  History reviewed. No pertinent family history.       Social History:   Social History     Tobacco Use    Smoking status: Former Smoker     Packs/day: 0.25     Types: Cigarettes    Smokeless tobacco: Never Used    Tobacco comment: no longer smoker   Substance and Sexual Activity    Alcohol use: Yes     Comment: occasional    Drug use: No    Sexual activity: Yes     Birth control/protection: Condom        Review of Systems     Review of Systems   Constitutional: Negative for chills and fever.   HENT: Negative for drooling, sore throat and trouble swallowing.         + jaw pain   Respiratory: Negative for shortness of breath.    Cardiovascular: Negative for chest pain.   Gastrointestinal: Negative for nausea.   Genitourinary: Negative for dysuria.   Musculoskeletal: Negative for back pain.   Skin: Negative for rash.   Neurological: Negative for weakness.   Hematological: Does not bruise/bleed easily.   All other systems reviewed and are negative.       Physical Exam     Initial Vitals   BP Pulse Resp Temp SpO2   07/06/20 1316 07/06/20 1316 07/06/20 1309 07/06/20 1515 07/06/20 1316   (!) 140/81 90 20 98.6 °F (37 °C) 100 %      MAP       --                 Physical Exam  Nursing Notes and Vital Signs Reviewed.  Constitutional: Well-developed and well-nourished. Patient is in NAD.  Head: Atraumatic. Normocephalic.  Eyes: PERRL. EOM intact. Conjunctivae are not pale. No scleral icterus.  ENT: Mucous membranes are moist. Oropharynx is clear and symmetric.    Neck: Supple. Full ROM. No lymphadenopathy.  Cardiovascular: Regular rate. Regular rhythm. No murmurs, rubs, or gallops. Distal pulses are 2+ and symmetric.  Pulmonary/Chest: No respiratory distress. Clear to auscultation bilaterally. No wheezing or rales.  Abdominal:  Soft and non-distended.  There is no tenderness.  No rebound, guarding, or rigidity. Good bowel sounds.  Genitourinary: No CVA tenderness  Musculoskeletal: Moves all extremities. No obvious deformities. No calf tenderness.  Skin: Warm and dry.  Neurological:  Alert, awake, and appropriate.  Normal speech.  No acute focal neurological deficits are appreciated.  Psychiatric: Normal affect. Good eye contact. Appropriate in content.  Mouth: Poor dentition. Mild gum swelling and erythema to right lower jaw. Multiple missing teeth. Patient handles secretions normally. No trismus.        ED Course   Critical Care    Date/Time: 7/6/2020 3:05 PM  Performed by: Corey Pino MD  Authorized by: Corey Pino MD   Direct patient critical care time: 25 minutes  Additional history critical care time: 5 minutes  Ordering / reviewing critical care time: 10 minutes  Documentation critical care time: 5 minutes  Total critical care time (exclusive of procedural time) : 45 minutes  Critical care time was exclusive of separately billable procedures and treating other patients and teaching time.  Critical care was necessary to treat or prevent imminent or life-threatening deterioration of the following conditions: neutropenia.  Critical care was time spent personally by me on the following activities: blood draw for specimens, development of treatment plan with patient or surrogate, interpretation of cardiac output measurements, evaluation of patient's response to treatment, examination of patient, obtaining history from patient or surrogate, ordering and performing treatments and interventions, ordering and review of laboratory studies, pulse oximetry, re-evaluation of patient's condition and review of old charts.        ED Vital Signs:  Vitals:    07/06/20 1140 07/06/20 1309 07/06/20 1316 07/06/20 1346   BP:   (!) 140/81 (!) 143/82   Pulse:   90 84   Resp:  20 (!) 23 18   Temp:       TempSrc:       SpO2:   100% 100%   Weight:  124.3 kg (274 lb)       07/06/20 1515 07/06/20 1520   BP:     Pulse:     Resp:  16   Temp: 98.6 °F (37 °C)    TempSrc: Oral    SpO2:     Weight:         Abnormal Lab Results:  Labs Reviewed   CBC W/ AUTO DIFFERENTIAL - Abnormal; Notable for the following components:       Result Value    WBC 0.80 (*)     RBC 3.75 (*)     Hemoglobin 11.4 (*)     Hematocrit 34.0 (*)     RDW 18.7 (*)     Immature Granulocytes 1.3 (*)     Gran # (ANC) 0.4 (*)     Lymph # 0.3 (*)     Mono # 0.0 (*)     nRBC 5 (*)     Mono% 2.5 (*)     All other components within normal limits    Narrative:     WBC critical result(s) called and verbal readback obtained from Alina Evans by KATIE 07/06/2020 13:15   COMPREHENSIVE METABOLIC PANEL - Abnormal; Notable for the following components:    Potassium 3.4 (*)     Alkaline Phosphatase 51 (*)     All other components within normal limits   CULTURE, BLOOD   CULTURE, BLOOD   SARS-COV-2 RNA AMPLIFICATION, QUAL   URINALYSIS, REFLEX TO URINE CULTURE        All Lab Results:  Results for orders placed or performed during the hospital encounter of 07/06/20   CBC auto differential   Result Value Ref Range    WBC 0.80 (LL) 3.90 - 12.70 K/uL    RBC 3.75 (L) 4.00 - 5.40 M/uL    Hemoglobin 11.4 (L) 12.0 - 16.0 g/dL    Hematocrit 34.0 (L) 37.0 - 48.5 %    Mean Corpuscular Volume 91 82 - 98 fL    Mean Corpuscular Hemoglobin 30.4 27.0 - 31.0 pg    Mean Corpuscular Hemoglobin Conc 33.5 32.0 - 36.0 g/dL    RDW 18.7 (H) 11.5 - 14.5 %    Platelets 248 150 - 350 K/uL    MPV 9.9 9.2 - 12.9 fL    Immature Granulocytes 1.3 (H) 0.0 - 0.5 %    Gran # (ANC) 0.4 (L) 1.8 - 7.7 K/uL    Immature Grans (Abs) 0.01 0.00 - 0.04 K/uL    Lymph # 0.3 (L) 1.0 - 4.8 K/uL    Mono # 0.0 (L) 0.3 - 1.0 K/uL    Eos # 0.0 0.0 - 0.5 K/uL    Baso # 0.00 0.00 - 0.20 K/uL    nRBC 5 (A) 0 /100 WBC    Gran% 51.2 38.0 - 73.0 %    Lymph% 40.0 18.0 - 48.0 %    Mono% 2.5 (L) 4.0 - 15.0 %    Eosinophil% 5.0 0.0 - 8.0 %    Basophil% 0.0 0.0 - 1.9 %    Poly  Occasional     Ovalocytes Occasional     Target Cells Occasional     Tear Drop Cells Occasional     Stomatocytes Present     Differential Method Automated    Comprehensive metabolic panel   Result Value Ref Range    Sodium 137 136 - 145 mmol/L    Potassium 3.4 (L) 3.5 - 5.1 mmol/L    Chloride 105 95 - 110 mmol/L    CO2 23 23 - 29 mmol/L    Glucose 107 70 - 110 mg/dL    BUN, Bld 7 6 - 20 mg/dL    Creatinine 0.8 0.5 - 1.4 mg/dL    Calcium 8.8 8.7 - 10.5 mg/dL    Total Protein 7.8 6.0 - 8.4 g/dL    Albumin 4.3 3.5 - 5.2 g/dL    Total Bilirubin 0.9 0.1 - 1.0 mg/dL    Alkaline Phosphatase 51 (L) 55 - 135 U/L    AST 17 10 - 40 U/L    ALT 18 10 - 44 U/L    Anion Gap 9 8 - 16 mmol/L    eGFR if African American >60 >60 mL/min/1.73 m^2    eGFR if non African American >60 >60 mL/min/1.73 m^2   COVID-19 Rapid Screening   Result Value Ref Range    SARS-CoV-2 RNA, Amplification, Qual Negative Negative              The Emergency Provider reviewed the vital signs and test results, which are outlined above.     ED Discussion       3:05 PM: Discussed case with Jada Herman NP (Hospital Medicine). Dr. Carlson agrees with current care and management of pt and accepts admission.   Admitting Service: Hospital Medicine  Admit to: inpatient med surg    Re-evaluated pt. I have discussed test results, shared treatment plan, and the need for admission with patient and family at bedside. Pt and family express understanding at this time and agree with all information. All questions answered. Pt and family have no further questions or concerns at this time. Pt is ready for admit.       MDM     Contains critical data CBC auto differential  Order: [id number]  Status:  Final result   Visible to patient:  Yes (Patient Portal) Next appt:  07/21/2020 at 01:00 PM in Palliative Medicine (Savi Pino MD) Dx:  Encounter for antineoplastic chemothe...   Ref Range & Units 09:34  (7/6/20) 2wk ago  (6/22/20) 2wk ago  (6/20/20)   WBC 3.90 - 12.70 K/uL  0.89Low Panic   2.58Low   2.92Low     Comment: WBC critical result(s) called and verbal readback obtained from   Mally Tayler by JL2 07/06/2020 09:58    RBC 4.00 - 5.40 M/uL 3.84Low   3.94Low   3.69Low     Hemoglobin 12.0 - 16.0 g/dL 11.7Low   11.7Low   11.0Low     Hematocrit 37.0 - 48.5 % 35.4Low   36.9Low   35.4Low     Mean Corpuscular Volume 82 - 98 fL 92  94  96    Mean Corpuscular Hemoglobin 27.0 - 31.0 pg 30.5  29.7  29.8    Mean Corpuscular Hemoglobin Conc 32.0 - 36.0 g/dL 33.1  31.7Low   31.1Low     RDW 11.5 - 14.5 % 19.7High   19.0High   19.7High     Platelets 150 - 350 K/uL 238  398High   423High     MPV 9.2 - 12.9 fL 8.5Low   8.9Low   8.7Low     Immature Granulocytes 0.0 - 0.5 % CANCELED  0.4  0.3    Comment: Result canceled by the ancillary.   Immature Grans (Abs) 0.00 - 0.04 K/uL CANCELED  0.01 CM  0.01 CM    Comment: Mild elevation in immature granulocytes is non specific and   can be seen in a variety of conditions including stress response,   acute inflammation, trauma and pregnancy. Correlation with other   laboratory and clinical findings is essential.     Result canceled by the ancillary.    nRBC 0 /100 WBC 3Abnormal   3Abnormal   2Abnormal     Gran% 38.0 - 73.0 % 36.0Low   91.8High   36.7Low     Lymph% 18.0 - 48.0 % 44.0  7.0Low   54.1High     Mono% 4.0 - 15.0 % 8.0  0.4Low   5.1    Eosinophil% 0.0 - 8.0 % 8.0  0.4  3.8    Basophil% 0.0 - 1.9 % 0.0  0.0  0.3    Metamyelocytes % 4.0      Platelet Estimate  Appears normal   IncreasedAbnormal     Aniso  Moderate   Moderate    Poik  Moderate   Moderate    Poly  Occasional   Moderate    Target Cells  Occasional   Occasional    Tear Drop Cells  Occasional   Occasional    Differential Method  Manual  Automated  Automated    Gran # (ANC)   2.4 R  1.1Low  R    Lymph #   0.2Low  R  1.6 R    Mono #   0.0Low  R  0.2Low  R    Eos #   0.0 R  0.1 R    Baso #   0.00 R  0.01 R    Maik-Neli Cronin    Occasional    Resulting Agency  [facility]       Narrative  Performed by: [facility]     WBC critical result(s) called and verbal readback obtained from   Mally Lester by [email or web address] 07/06/2020 09:58                Medical Decision Making:   Clinical Tests:   Lab Tests: Ordered and Reviewed           ED Medication(s):  Medications   vancomycin - pharmacy to dose (has no administration in time range)   vancomycin (VANCOCIN) 2,500 mg in dextrose 5 % 500 mL IVPB (2,500 mg Intravenous New Bag 7/6/20 1357)   piperacillin-tazobactam 4.5 g in dextrose 5 % 100 mL IVPB (ready to mix system) (0 g Intravenous Stopped 7/6/20 1340)   morphine injection 4 mg (4 mg Intravenous Given 7/6/20 1309)   morphine injection 4 mg (4 mg Intravenous Given 7/6/20 1520)       New Prescriptions    No medications on file               Scribe Attestation:   Scribe #1: I performed the above scribed service and the documentation accurately describes the services I performed. I attest to the accuracy of the note.     Attending:   Physician Attestation Statement for Scribe #1: I, Corey Pino MD, personally performed the services described in this documentation, as scribed by Azam Covington, in my presence, and it is both accurate and complete.           Clinical Impression       ICD-10-CM ICD-9-CM   1. Chemotherapy-induced neutropenia  D70.1 288.03    T45.1X5A E933.1   2. Dental infection  K04.7 522.4       Disposition:   Disposition: Admitted  Condition: Fair         Corey Pino MD  07/06/20 1521

## 2020-07-06 NOTE — ASSESSMENT & PLAN NOTE
Continue IV vanc/zosyn for now  Follow fever curve  BC x2 pending  Dental soft diet for now  Morphine prn for pain

## 2020-07-06 NOTE — PROGRESS NOTES
Subjective:       Patient ID: Jaswant Yang is a 44 y.o. female.    Chief Complaint: Results, Chemotherapy, and Leukemia    HPI 44-year-old female presents cycle 1 day 5 of cladarabine for hairy cell leukemia patient is temperature of 99.6° and swollen right jaw..  White count 0.89.  Patient was requesting pain medicines in states that she was going to see a dentist ECOG status 2    Past Medical History:   Diagnosis Date    Anemia     Anemia     Back pain     GERD (gastroesophageal reflux disease) 2020     History reviewed. No pertinent family history.  Social History     Socioeconomic History    Marital status: Single     Spouse name: Not on file    Number of children: Not on file    Years of education: Not on file    Highest education level: Not on file   Occupational History    Not on file   Social Needs    Financial resource strain: Not on file    Food insecurity     Worry: Not on file     Inability: Not on file    Transportation needs     Medical: Not on file     Non-medical: Not on file   Tobacco Use    Smoking status: Former Smoker     Packs/day: 0.25     Types: Cigarettes    Smokeless tobacco: Never Used    Tobacco comment: no longer smoker   Substance and Sexual Activity    Alcohol use: Yes     Comment: occasional    Drug use: No    Sexual activity: Yes     Birth control/protection: Condom   Lifestyle    Physical activity     Days per week: Not on file     Minutes per session: Not on file    Stress: Not on file   Relationships    Social connections     Talks on phone: Not on file     Gets together: Not on file     Attends Religion service: Not on file     Active member of club or organization: Not on file     Attends meetings of clubs or organizations: Not on file     Relationship status: Not on file   Other Topics Concern    Not on file   Social History Narrative    Not on file     Past Surgical History:   Procedure Laterality Date     SECTION, CLASSIC       FLUOROSCOPY N/A 5/11/2020    Procedure: Spleenic Bleed;  Surgeon: Hiren Cabrera MD;  Location: [facility] CATH LAB;  Service: General;  Laterality: N/A;    INJECTION OF ANESTHETIC AGENT INTO TISSUE PLANE DEFINED BY TRANSVERSUS ABDOMINIS MUSCLE N/A 5/11/2020    Procedure: BLOCK, TRANSVERSUS ABDOMINIS PLANE;  Surgeon: Yumi Ferguson MD;  Location: [facility] OR;  Service: General;  Laterality: N/A;    SPLENECTOMY N/A 5/11/2020    Procedure: SPLENECTOMY;  Surgeon: Yumi Ferguson MD;  Location: [facility] OR;  Service: General;  Laterality: N/A;    THORACENTESIS Left 5/11/2020    Procedure: THORACENTESIS;  Surgeon: Yumi Ferguson MD;  Location: [facility] OR;  Service: General;  Laterality: Left;    TUBAL LIGATION         Labs:  Lab Results   Component Value Date    WBC 0.89 (LL) 07/06/2020    HGB 11.7 (L) 07/06/2020    HCT 35.4 (L) 07/06/2020    MCV 92 07/06/2020     07/06/2020     BMP  Lab Results   Component Value Date     07/06/2020    K 3.6 07/06/2020     07/06/2020    CO2 24 07/06/2020    BUN 7 07/06/2020    CREATININE 0.9 07/06/2020    CALCIUM 9.1 07/06/2020    ANIONGAP 7 (L) 07/06/2020    ESTGFRAFRICA >60 07/06/2020    EGFRNONAA >60 07/06/2020     Lab Results   Component Value Date    ALT 18 07/06/2020    AST 19 07/06/2020    ALKPHOS 53 (L) 07/06/2020    BILITOT 1.0 07/06/2020       Lab Results   Component Value Date    IRON 119 05/25/2020    TIBC 389 05/25/2020    FERRITIN 71 05/25/2020     No results found for: CNSRYDSK39  No results found for: FOLATE  No results found for: TSH      Review of Systems   Constitutional: Negative for activity change, appetite change, chills, diaphoresis, fatigue, fever and unexpected weight change.   HENT: Positive for dental problem and facial swelling. Negative for congestion, drooling, ear discharge, ear pain, hearing loss, mouth sores, nosebleeds, postnasal drip, rhinorrhea, sinus pressure, sneezing, sore throat, tinnitus, trouble swallowing and voice change.     Eyes: Negative for photophobia, pain, discharge, redness, itching and visual disturbance.   Respiratory: Negative for cough, choking, chest tightness, shortness of breath, wheezing and stridor.    Cardiovascular: Negative for chest pain, palpitations and leg swelling.   Gastrointestinal: Negative for abdominal distention, abdominal pain, anal bleeding, blood in stool, constipation, diarrhea, nausea, rectal pain and vomiting.   Endocrine: Negative for cold intolerance, heat intolerance, polydipsia, polyphagia and polyuria.   Genitourinary: Negative for decreased urine volume, difficulty urinating, dyspareunia, dysuria, enuresis, flank pain, frequency, genital sores, hematuria, menstrual problem, pelvic pain, urgency, vaginal bleeding, vaginal discharge and vaginal pain.   Musculoskeletal: Positive for back pain. Negative for arthralgias, gait problem, joint swelling, myalgias, neck pain and neck stiffness.   Skin: Negative for color change, pallor and rash.   Allergic/Immunologic: Negative for environmental allergies, food allergies and immunocompromised state.   Neurological: Positive for weakness. Negative for dizziness, tremors, seizures, syncope, facial asymmetry, speech difficulty, light-headedness, numbness and headaches.   Hematological: Negative for adenopathy. Does not bruise/bleed easily.   Psychiatric/Behavioral: Positive for dysphoric mood. Negative for agitation, behavioral problems, confusion, decreased concentration, hallucinations, self-injury, sleep disturbance and suicidal ideas. The patient is nervous/anxious. The patient is not hyperactive.        Objective:      Physical Exam  Vitals signs reviewed.   Constitutional:       General: She is not in acute distress.     Appearance: She is well-developed. She is ill-appearing. She is not diaphoretic.   HENT:      Head: Normocephalic and atraumatic.      Right Ear: External ear normal.      Left Ear: External ear normal.      Nose: Nose normal.       Right Sinus: No maxillary sinus tenderness or frontal sinus tenderness.      Left Sinus: No maxillary sinus tenderness or frontal sinus tenderness.      Mouth/Throat:      Pharynx: No oropharyngeal exudate.   Eyes:      General: Lids are normal. No scleral icterus.        Right eye: No discharge.         Left eye: No discharge.      Conjunctiva/sclera: Conjunctivae normal.      Right eye: Right conjunctiva is not injected. No hemorrhage.     Left eye: Left conjunctiva is not injected. No hemorrhage.     Pupils: Pupils are equal, round, and reactive to light.   Neck:      Musculoskeletal: Normal range of motion and neck supple.      Thyroid: No thyromegaly.      Vascular: No JVD.      Trachea: No tracheal deviation.   Cardiovascular:      Rate and Rhythm: Normal rate.   Pulmonary:      Effort: Pulmonary effort is normal. No respiratory distress.      Breath sounds: No stridor.   Chest:      Chest wall: No tenderness.   Abdominal:      General: Bowel sounds are normal. There is no distension.      Palpations: Abdomen is soft. There is no hepatomegaly, splenomegaly or mass.      Tenderness: There is no abdominal tenderness. There is no rebound.   Musculoskeletal: Normal range of motion.         General: No tenderness.   Lymphadenopathy:      Cervical: No cervical adenopathy.      Upper Body:      Right upper body: No supraclavicular adenopathy.      Left upper body: No supraclavicular adenopathy.   Skin:     General: Skin is dry.      Findings: No erythema or rash.   Neurological:      Mental Status: She is alert and oriented to person, place, and time.      Cranial Nerves: No cranial nerve deficit.      Coordination: Coordination normal.   Psychiatric:         Behavior: Behavior normal.         Thought Content: Thought content normal.         Judgment: Judgment normal.             Assessment:      1. Hairy cell leukemia not having achieved remission    2. Encounter for antineoplastic chemotherapy           Plan:      Patient has pancytopenia with fever and  tooth abscess.  I have spoken with the patient at this particular point she was scheduled to receive Rituxan in outpatient setting but will be held would recommend that patient be admitted through the emergency room [facility] of already spoken to Eli charge nurse the patient will need broad-spectrum antibiotics with penicillin type medication as well as treatment for pancytopenia secondary to chemotherapy.  Would recommend that patient be treated with growth factor because of impending anastasiia.  Will need probably ENT consultation if none responsive.  And will ultimately need dental evaluation for extraction.        Cirilo Whittaker Jr, MD FACP

## 2020-07-06 NOTE — SUBJECTIVE & OBJECTIVE
Past Medical History:   Diagnosis Date    Anemia     Anemia     Back pain     Dental infection     GERD (gastroesophageal reflux disease) 2020    Hairy-cell leukemia of spleen        Past Surgical History:   Procedure Laterality Date     SECTION, CLASSIC      FLUOROSCOPY N/A 2020    Procedure: Spleenic Bleed;  Surgeon: Hiren Cabrera MD;  Location: [facility] CATH LAB;  Service: General;  Laterality: N/A;    INJECTION OF ANESTHETIC AGENT INTO TISSUE PLANE DEFINED BY TRANSVERSUS ABDOMINIS MUSCLE N/A 2020    Procedure: BLOCK, TRANSVERSUS ABDOMINIS PLANE;  Surgeon: Yumi Ferguson MD;  Location: [facility] OR;  Service: General;  Laterality: N/A;    SPLENECTOMY N/A 2020    Procedure: SPLENECTOMY;  Surgeon: Yumi Ferguson MD;  Location: [facility] OR;  Service: General;  Laterality: N/A;    THORACENTESIS Left 2020    Procedure: THORACENTESIS;  Surgeon: Yumi Ferguson MD;  Location: [facility] OR;  Service: General;  Laterality: Left;    TUBAL LIGATION         Review of patient's allergies indicates:   Allergen Reactions    Tramadol Hives       No current facility-administered medications on file prior to encounter.      Current Outpatient Medications on File Prior to Encounter   Medication Sig    albuterol (PROVENTIL/VENTOLIN HFA) 90 mcg/actuation inhaler Inhale 1-2 puffs into the lungs every 6 (six) hours as needed for Wheezing. Rescue    diclofenac (VOLTAREN) 75 MG EC tablet Take 1 tablet (75 mg total) by mouth 2 (two) times daily.    HYDROcodone-acetaminophen (NORCO)  mg per tablet Take 10 mg by mouth 4 (four) times daily.    ondansetron (ZOFRAN-ODT) 8 MG TbDL Take 1 tablet (8 mg total) by mouth every 12 (twelve) hours as needed.    polyethylene glycol (GLYCOLAX) 17 gram/dose powder Take 17 g by mouth once daily.    prochlorperazine (COMPAZINE) 10 MG tablet Take 1 tablet (10 mg total) by mouth every 6 (six) hours as needed.    promethazine (PHENERGAN) 25 MG tablet  "Take 1 tablet (25 mg total) by mouth every 6 (six) hours as needed for Nausea.    [DISCONTINUED] hydrocortisone 2.5 % cream Apply topically 2 (two) times daily. (Patient not taking: Reported on 7/6/2020)     Family History     None        Tobacco Use    Smoking status: Former Smoker     Packs/day: 0.25     Types: Cigarettes    Smokeless tobacco: Never Used    Tobacco comment: no longer smoker   Substance and Sexual Activity    Alcohol use: Yes     Comment: occasional    Drug use: No    Sexual activity: Yes     Birth control/protection: Condom     Review of Systems   Constitutional: Positive for fever ("low-grade"). Negative for activity change, chills, diaphoresis and fatigue.   HENT: Positive for dental problem (R lower jaw, L upper jaw) and facial swelling. Negative for congestion, trouble swallowing and voice change. Mouth sores: R lower jaw, L upper jaw.    Eyes: Negative for photophobia and discharge.   Respiratory: Negative for cough, chest tightness, shortness of breath and wheezing.    Cardiovascular: Negative for chest pain, palpitations and leg swelling.   Gastrointestinal: Negative for abdominal pain, blood in stool, constipation, diarrhea, nausea and vomiting.   Endocrine: Negative for cold intolerance, heat intolerance, polydipsia, polyphagia and polyuria.   Genitourinary: Negative for difficulty urinating, dysuria, flank pain, frequency and urgency.   Musculoskeletal: Positive for back pain (chronic). Negative for joint swelling and myalgias.   Skin: Negative for rash and wound.   Neurological: Positive for weakness (generalized). Negative for dizziness, seizures, syncope, facial asymmetry, light-headedness, numbness and headaches.   Psychiatric/Behavioral: Negative for confusion and hallucinations.     Objective:     Vital Signs (Most Recent):  Temp: 98.6 °F (37 °C) (07/06/20 1515)  Pulse: 92 (07/06/20 1603)  Resp: 20 (07/06/20 1603)  BP: 125/68 (07/06/20 1602)  SpO2: 98 % (07/06/20 1603) Vital " Signs (24h Range):  Temp:  [98.6 °F (37 °C)-99.6 °F (37.6 °C)] 98.6 °F (37 °C)  Pulse:  [] 92  Resp:  [16-23] 20  SpO2:  [97 %-100 %] 98 %  BP: (125-174)/() 125/68     Weight: 124.3 kg (274 lb)  Body mass index is 38.22 kg/m².    Physical Exam  Vitals signs reviewed.   Constitutional:       General: She is not in acute distress.     Appearance: Normal appearance. She is obese. She is not toxic-appearing.   HENT:      Head: Normocephalic and atraumatic.      Jaw: Tenderness (intra-oral R lower, L upper red/edema), swelling (gums R lower, L upper) and pain on movement (R lower, L upper) present.      Salivary Glands: Right salivary gland is not diffusely enlarged or tender. Left salivary gland is not diffusely enlarged or tender.      Nose: Nose normal.      Mouth/Throat:      Mouth: Mucous membranes are moist.   Eyes:      Pupils: Pupils are equal, round, and reactive to light.   Neck:      Musculoskeletal: Normal range of motion and neck supple.   Cardiovascular:      Rate and Rhythm: Normal rate and regular rhythm.      Pulses: Normal pulses.      Heart sounds: Normal heart sounds.   Pulmonary:      Effort: Pulmonary effort is normal. No respiratory distress.      Breath sounds: Normal breath sounds. No wheezing.   Abdominal:      General: Bowel sounds are normal. There is no distension.      Palpations: Abdomen is soft.      Tenderness: There is no abdominal tenderness.   Musculoskeletal: Normal range of motion.      Right lower leg: No edema.      Left lower leg: No edema.   Skin:     General: Skin is warm and dry.      Capillary Refill: Capillary refill takes less than 2 seconds.      Findings: Bruising (scattered BUE, BLE) present. No erythema or rash.   Neurological:      General: No focal deficit present.      Mental Status: She is alert and oriented to person, place, and time.   Psychiatric:         Mood and Affect: Mood normal.         Behavior: Behavior normal.         Thought Content: Thought  content normal.         Judgment: Judgment normal.          Significant Labs:   BMP:   Recent Labs   Lab 07/06/20  1230         K 3.4*      CO2 23   BUN 7   CREATININE 0.8   CALCIUM 8.8     CBC:   Recent Labs   Lab 07/06/20  0934 07/06/20  1230   WBC 0.89* 0.80*   HGB 11.7* 11.4*   HCT 35.4* 34.0*    248     Cardiac Markers: No results for input(s): CKMB, MYOGLOBIN, BNP, TROPISTAT in the last 48 hours.  Magnesium: No results for input(s): MG in the last 48 hours.  Troponin: No results for input(s): TROPONINI in the last 48 hours.  TSH: No results for input(s): TSH in the last 4320 hours.    Significant Imaging:   Imaging Results    None

## 2020-07-07 PROBLEM — T40.2X5A THERAPEUTIC OPIOID-INDUCED CONSTIPATION (OIC): Status: ACTIVE | Noted: 2020-07-07

## 2020-07-07 PROBLEM — G89.3 ACUTE NEOPLASM-RELATED PAIN: Status: ACTIVE | Noted: 2020-07-07

## 2020-07-07 PROBLEM — Z51.5 ENCOUNTER FOR PALLIATIVE CARE: Status: ACTIVE | Noted: 2020-07-07

## 2020-07-07 PROBLEM — K59.03 THERAPEUTIC OPIOID-INDUCED CONSTIPATION (OIC): Status: ACTIVE | Noted: 2020-07-07

## 2020-07-07 LAB
ANION GAP SERPL CALC-SCNC: 8 MMOL/L (ref 8–16)
ANISOCYTOSIS BLD QL SMEAR: ABNORMAL
BACTERIA #/AREA URNS HPF: ABNORMAL /HPF
BASOPHILS # BLD AUTO: 0 K/UL (ref 0–0.2)
BASOPHILS NFR BLD: 0 % (ref 0–1.9)
BILIRUB UR QL STRIP: NEGATIVE
BUN SERPL-MCNC: 13 MG/DL (ref 6–20)
CALCIUM SERPL-MCNC: 8.4 MG/DL (ref 8.7–10.5)
CHLORIDE SERPL-SCNC: 105 MMOL/L (ref 95–110)
CLARITY UR: CLEAR
CO2 SERPL-SCNC: 23 MMOL/L (ref 23–29)
COLOR UR: YELLOW
CREAT SERPL-MCNC: 2 MG/DL (ref 0.5–1.4)
DIFFERENTIAL METHOD: ABNORMAL
EOSINOPHIL # BLD AUTO: 0 K/UL (ref 0–0.5)
EOSINOPHIL NFR BLD: 1 % (ref 0–8)
ERYTHROCYTE [DISTWIDTH] IN BLOOD BY AUTOMATED COUNT: 19.8 % (ref 11.5–14.5)
EST. GFR  (AFRICAN AMERICAN): 34 ML/MIN/1.73 M^2
EST. GFR  (NON AFRICAN AMERICAN): 30 ML/MIN/1.73 M^2
GLUCOSE SERPL-MCNC: 102 MG/DL (ref 70–110)
GLUCOSE UR QL STRIP: NEGATIVE
HCT VFR BLD AUTO: 31.4 % (ref 37–48.5)
HGB BLD-MCNC: 10.2 G/DL (ref 12–16)
HGB UR QL STRIP: ABNORMAL
HYALINE CASTS #/AREA URNS LPF: 0 /LPF
HYPOCHROMIA BLD QL SMEAR: ABNORMAL
IMM GRANULOCYTES # BLD AUTO: 0.01 K/UL (ref 0–0.04)
IMM GRANULOCYTES NFR BLD AUTO: 1 % (ref 0–0.5)
KETONES UR QL STRIP: ABNORMAL
LEUKOCYTE ESTERASE UR QL STRIP: ABNORMAL
LYMPHOCYTES # BLD AUTO: 0.3 K/UL (ref 1–4.8)
LYMPHOCYTES NFR BLD: 26.8 % (ref 18–48)
MAGNESIUM SERPL-MCNC: 1.8 MG/DL (ref 1.6–2.6)
MCH RBC QN AUTO: 30.1 PG (ref 27–31)
MCHC RBC AUTO-ENTMCNC: 32.5 G/DL (ref 32–36)
MCV RBC AUTO: 93 FL (ref 82–98)
MICROSCOPIC COMMENT: ABNORMAL
MONOCYTES # BLD AUTO: 0 K/UL (ref 0.3–1)
MONOCYTES NFR BLD: 4.1 % (ref 4–15)
NEUTROPHILS # BLD AUTO: 0.7 K/UL (ref 1.8–7.7)
NEUTROPHILS NFR BLD: 67.1 % (ref 38–73)
NITRITE UR QL STRIP: POSITIVE
NRBC BLD-RTO: 4 /100 WBC
PH UR STRIP: 6 [PH] (ref 5–8)
PLATELET # BLD AUTO: 205 K/UL (ref 150–350)
PMV BLD AUTO: 9.6 FL (ref 9.2–12.9)
POIKILOCYTOSIS BLD QL SMEAR: ABNORMAL
POLYCHROMASIA BLD QL SMEAR: ABNORMAL
POTASSIUM SERPL-SCNC: 4.1 MMOL/L (ref 3.5–5.1)
PROT UR QL STRIP: ABNORMAL
RBC # BLD AUTO: 3.39 M/UL (ref 4–5.4)
RBC #/AREA URNS HPF: 10 /HPF (ref 0–4)
SODIUM SERPL-SCNC: 136 MMOL/L (ref 136–145)
SP GR UR STRIP: 1.02 (ref 1–1.03)
SQUAMOUS #/AREA URNS HPF: 30 /HPF
TARGETS BLD QL SMEAR: ABNORMAL
TRICHOMONAS UR QL MICRO: ABNORMAL
URN SPEC COLLECT METH UR: ABNORMAL
UROBILINOGEN UR STRIP-ACNC: NEGATIVE EU/DL
WBC # BLD AUTO: 0.97 K/UL (ref 3.9–12.7)
WBC #/AREA URNS HPF: 50 /HPF (ref 0–5)

## 2020-07-07 PROCEDURE — 63600175 PHARM REV CODE 636 W HCPCS: Performed by: PHYSICIAN ASSISTANT

## 2020-07-07 PROCEDURE — 25000003 PHARM REV CODE 250: Performed by: NURSE PRACTITIONER

## 2020-07-07 PROCEDURE — 11000001 HC ACUTE MED/SURG PRIVATE ROOM

## 2020-07-07 PROCEDURE — 80048 BASIC METABOLIC PNL TOTAL CA: CPT

## 2020-07-07 PROCEDURE — 63600175 PHARM REV CODE 636 W HCPCS: Performed by: EMERGENCY MEDICINE

## 2020-07-07 PROCEDURE — 94761 N-INVAS EAR/PLS OXIMETRY MLT: CPT

## 2020-07-07 PROCEDURE — 99233 SBSQ HOSP IP/OBS HIGH 50: CPT | Mod: ,,, | Performed by: INTERNAL MEDICINE

## 2020-07-07 PROCEDURE — 83735 ASSAY OF MAGNESIUM: CPT

## 2020-07-07 PROCEDURE — 99497 PR ADVNCD CARE PLAN 30 MIN: ICD-10-PCS | Mod: ,,, | Performed by: PHYSICIAN ASSISTANT

## 2020-07-07 PROCEDURE — 99233 SBSQ HOSP IP/OBS HIGH 50: CPT | Mod: 25,,, | Performed by: PHYSICIAN ASSISTANT

## 2020-07-07 PROCEDURE — 99233 PR SUBSEQUENT HOSPITAL CARE,LEVL III: ICD-10-PCS | Mod: ,,, | Performed by: INTERNAL MEDICINE

## 2020-07-07 PROCEDURE — 25000003 PHARM REV CODE 250: Performed by: PHYSICIAN ASSISTANT

## 2020-07-07 PROCEDURE — 99497 ADVNCD CARE PLAN 30 MIN: CPT | Mod: ,,, | Performed by: PHYSICIAN ASSISTANT

## 2020-07-07 PROCEDURE — 87086 URINE CULTURE/COLONY COUNT: CPT

## 2020-07-07 PROCEDURE — 99233 PR SUBSEQUENT HOSPITAL CARE,LEVL III: ICD-10-PCS | Mod: 25,,, | Performed by: PHYSICIAN ASSISTANT

## 2020-07-07 PROCEDURE — 63600175 PHARM REV CODE 636 W HCPCS: Performed by: NURSE PRACTITIONER

## 2020-07-07 PROCEDURE — 81000 URINALYSIS NONAUTO W/SCOPE: CPT

## 2020-07-07 PROCEDURE — 25000003 PHARM REV CODE 250: Performed by: EMERGENCY MEDICINE

## 2020-07-07 PROCEDURE — 85025 COMPLETE CBC W/AUTO DIFF WBC: CPT

## 2020-07-07 PROCEDURE — 36415 COLL VENOUS BLD VENIPUNCTURE: CPT

## 2020-07-07 RX ORDER — HYDROCODONE BITARTRATE AND ACETAMINOPHEN 10; 325 MG/1; MG/1
1 TABLET ORAL EVERY 4 HOURS PRN
Status: DISCONTINUED | OUTPATIENT
Start: 2020-07-07 | End: 2020-07-08

## 2020-07-07 RX ORDER — HYDROXYZINE HYDROCHLORIDE 25 MG/1
25 TABLET, FILM COATED ORAL 3 TIMES DAILY PRN
Status: DISCONTINUED | OUTPATIENT
Start: 2020-07-07 | End: 2020-07-16

## 2020-07-07 RX ORDER — POLYETHYLENE GLYCOL 3350 17 G/17G
17 POWDER, FOR SOLUTION ORAL DAILY
Status: DISCONTINUED | OUTPATIENT
Start: 2020-07-07 | End: 2020-07-18 | Stop reason: HOSPADM

## 2020-07-07 RX ORDER — SENNOSIDES 8.6 MG/1
8.6 TABLET ORAL NIGHTLY
Status: DISCONTINUED | OUTPATIENT
Start: 2020-07-07 | End: 2020-07-10

## 2020-07-07 RX ORDER — FENTANYL 25 UG/1
1 PATCH TRANSDERMAL
Status: DISCONTINUED | OUTPATIENT
Start: 2020-07-07 | End: 2020-07-09 | Stop reason: SDUPTHER

## 2020-07-07 RX ADMIN — SENNOSIDES 8.6 MG: 8.6 TABLET, FILM COATED ORAL at 08:07

## 2020-07-07 RX ADMIN — HYDROCODONE BITARTRATE AND ACETAMINOPHEN 1 TABLET: 10; 325 TABLET ORAL at 09:07

## 2020-07-07 RX ADMIN — POLYETHYLENE GLYCOL 3350 17 G: 17 POWDER, FOR SOLUTION ORAL at 11:07

## 2020-07-07 RX ADMIN — PIPERACILLIN AND TAZOBACTAM 4.5 G: 4; .5 INJECTION, POWDER, LYOPHILIZED, FOR SOLUTION INTRAVENOUS; PARENTERAL at 05:07

## 2020-07-07 RX ADMIN — PIPERACILLIN AND TAZOBACTAM 4.5 G: 4; .5 INJECTION, POWDER, LYOPHILIZED, FOR SOLUTION INTRAVENOUS; PARENTERAL at 08:07

## 2020-07-07 RX ADMIN — HYDROXYZINE HYDROCHLORIDE 25 MG: 25 TABLET, FILM COATED ORAL at 04:07

## 2020-07-07 RX ADMIN — MORPHINE SULFATE 2 MG: 2 INJECTION, SOLUTION INTRAMUSCULAR; INTRAVENOUS at 03:07

## 2020-07-07 RX ADMIN — FENTANYL 1 PATCH: 25 PATCH, EXTENDED RELEASE TRANSDERMAL at 11:07

## 2020-07-07 RX ADMIN — Medication 1 TABLET: at 09:07

## 2020-07-07 RX ADMIN — HYDROCODONE BITARTRATE AND ACETAMINOPHEN 1 TABLET: 10; 325 TABLET ORAL at 11:07

## 2020-07-07 RX ADMIN — VANCOMYCIN HYDROCHLORIDE 1750 MG: 750 INJECTION, POWDER, LYOPHILIZED, FOR SOLUTION INTRAVENOUS at 02:07

## 2020-07-07 RX ADMIN — PIPERACILLIN AND TAZOBACTAM 4.5 G: 4; .5 INJECTION, POWDER, LYOPHILIZED, FOR SOLUTION INTRAVENOUS; PARENTERAL at 02:07

## 2020-07-07 RX ADMIN — HYDROCODONE BITARTRATE AND ACETAMINOPHEN 1 TABLET: 10; 325 TABLET ORAL at 03:07

## 2020-07-07 RX ADMIN — DIPHENHYDRAMINE HYDROCHLORIDE 25 MG: 25 CAPSULE ORAL at 12:07

## 2020-07-07 RX ADMIN — THERA TABS 1 TABLET: TAB at 09:07

## 2020-07-07 RX ADMIN — Medication 100 MG: at 09:07

## 2020-07-07 NOTE — ASSESSMENT & PLAN NOTE
--chemotherapy will remain on hold pending resolution of current clinical situation. Will arrange outpatient follow up with Primary Oncologist upon discharge

## 2020-07-07 NOTE — NURSING
Patient c/o itching an hour into vancomycin infusion even with administering benadryl an hour before vancomycin infused. Infusion stopped. No rash noted. Denies SOB, dyspnea, difficulty swallowing. Informed S. D'Aquin. New orders noted. Will continue to monitor.

## 2020-07-07 NOTE — PROGRESS NOTES
Therapy with vancomycin complete and/or consult discontinued by provider.  Pharmacy will sign off, please re-consult as needed.    Malena Preciado [facility] 7/7/2020 7:57 AM

## 2020-07-07 NOTE — ASSESSMENT & PLAN NOTE
Continue IV vanc/zosyn for now  Follow fever curve  BC x2 pending  Dental soft diet for now  Morphine prn for pain   7/7/20 The patient reports pain is not well controlled. Palliative care was consulted. The patient remained afebrile overnight. CT maxiollofacial CT was negative for a dental abscess but showed multiple dental caries. The patient remains neutropenic, Hem/onc following. Continue current management with IV ABX.

## 2020-07-07 NOTE — H&P
"[facility] Hospital Medicine  History & Physical    Patient Name: Jaswant Yang  MRN: [id number]  Admission Date: 7/6/2020  Attending Physician: Shantel Carlson MD   Primary Care Provider: Primary Doctor No         Patient information was obtained from patient, relative(s) and ER records.     Subjective:     Principal Problem:Dental infection    Chief Complaint:   Chief Complaint   Patient presents with    jaw infection     pt sent from Dr. Whittaker for jaw infection and IV antibiotics        HPI: 45 y/o AA F with hx of hairy cell leukemia (diagnosed 05/2020), anemia, chronic pain to ED from Dr. Whittaker' office after being found to have a low grade temperature (99.6) with a visible dental infection in the R lower jaw and L upper jaw, in the setting of chemotherapy induced neutropenia (WBC 0.89); mild hypokalemia (3.4) also noted in ED, but labs otherwise largely unremarkable - admission for IV abx; BC x2 pending. Pt reports associated severe stabbing pain in the affected jaw areas, and mild facial edema, worsening over the past 2 days but states no drainage/bleeding. Pt's last chemo cycle ended on 06/26/20 - reports pain improved with IV morphine in ED, states she feels "a little better now" - palliative care consulted per ED physician at patient's request, to assist with symptom management and pain control. Denies CP, edema, palpitations, SOB, wheezing, orthopnea, PND, abdominal pain, N/V/D, dysuria, flank pain, HA, dizziness, fever, cough, chills, falls/trauma, blurred vision, focal deficits. No recent dietary changes, travel, sick contacts or viral illness - pt is typically active and independent with ADLs and ambulation at home. Pt is full code. Surrogate decision makers are son (Dmitri Yang) and Mother (Elsa Yang). Admitted for chemotherapy induced neutropenic fever with a jaw/dental infection.     Past Medical History:   Diagnosis Date    Anemia     Anemia     Back pain     Dental " infection     GERD (gastroesophageal reflux disease) 2020    Hairy-cell leukemia of spleen        Past Surgical History:   Procedure Laterality Date     SECTION, CLASSIC      FLUOROSCOPY N/A 2020    Procedure: Spleenic Bleed;  Surgeon: Hiren Cabrera MD;  Location: [facility] CATH LAB;  Service: General;  Laterality: N/A;    INJECTION OF ANESTHETIC AGENT INTO TISSUE PLANE DEFINED BY TRANSVERSUS ABDOMINIS MUSCLE N/A 2020    Procedure: BLOCK, TRANSVERSUS ABDOMINIS PLANE;  Surgeon: Yumi Ferguson MD;  Location: [facility] OR;  Service: General;  Laterality: N/A;    SPLENECTOMY N/A 2020    Procedure: SPLENECTOMY;  Surgeon: Yumi Ferguson MD;  Location: [facility] OR;  Service: General;  Laterality: N/A;    THORACENTESIS Left 2020    Procedure: THORACENTESIS;  Surgeon: Yumi Ferguson MD;  Location: [facility] OR;  Service: General;  Laterality: Left;    TUBAL LIGATION         Review of patient's allergies indicates:   Allergen Reactions    Tramadol Hives       No current facility-administered medications on file prior to encounter.      Current Outpatient Medications on File Prior to Encounter   Medication Sig    albuterol (PROVENTIL/VENTOLIN HFA) 90 mcg/actuation inhaler Inhale 1-2 puffs into the lungs every 6 (six) hours as needed for Wheezing. Rescue    HYDROcodone-acetaminophen (NORCO)  mg per tablet Take 10 mg by mouth 4 (four) times daily.    ondansetron (ZOFRAN-ODT) 8 MG TbDL Take 1 tablet (8 mg total) by mouth every 12 (twelve) hours as needed.    polyethylene glycol (GLYCOLAX) 17 gram/dose powder Take 17 g by mouth once daily.    prochlorperazine (COMPAZINE) 10 MG tablet Take 1 tablet (10 mg total) by mouth every 6 (six) hours as needed.    promethazine (PHENERGAN) 25 MG tablet Take 1 tablet (25 mg total) by mouth every 6 (six) hours as needed for Nausea.     Family History     None        Tobacco Use    Smoking status: Former Smoker     Packs/day: 0.25     Types:  "Cigarettes    Smokeless tobacco: Never Used    Tobacco comment: no longer smoker   Substance and Sexual Activity    Alcohol use: Yes     Comment: occasional    Drug use: No    Sexual activity: Yes     Birth control/protection: Condom     Review of Systems   Constitutional: Positive for fever ("low-grade"). Negative for activity change, chills, diaphoresis and fatigue.   HENT: Positive for dental problem and facial swelling (R lower jaw, L upper jaw). Negative for congestion, trouble swallowing and voice change.    Eyes: Negative for photophobia and discharge.   Respiratory: Negative for cough, chest tightness, shortness of breath and wheezing.    Cardiovascular: Negative for chest pain, palpitations and leg swelling.   Gastrointestinal: Negative for abdominal pain, blood in stool, constipation, diarrhea, nausea and vomiting.   Endocrine: Negative for cold intolerance, heat intolerance, polydipsia, polyphagia and polyuria.   Genitourinary: Negative for difficulty urinating, dysuria, flank pain, frequency and urgency.   Musculoskeletal: Positive for back pain (chronic). Negative for joint swelling and myalgias.   Skin: Negative for rash and wound.   Neurological: Positive for weakness (generalized). Negative for dizziness, seizures, syncope, facial asymmetry, light-headedness, numbness and headaches.   Psychiatric/Behavioral: Negative for confusion and hallucinations.     Objective:     Vital Signs (Most Recent):  Temp: 98.3 °F (36.8 °C) (07/07/20 1139)  Pulse: 83 (07/07/20 1139)  Resp: 16 (07/07/20 1139)  BP: (!) 116/56 (07/07/20 1139)  SpO2: 99 % (07/07/20 1139) Vital Signs (24h Range):  Temp:  [98.2 °F (36.8 °C)-98.7 °F (37.1 °C)] 98.3 °F (36.8 °C)  Pulse:  [82-97] 83  Resp:  [14-23] 16  SpO2:  [93 %-100 %] 99 %  BP: (103-143)/(54-82) 116/56     Weight: 124.3 kg (274 lb)  Body mass index is 38.22 kg/m².    Physical Exam  Vitals signs reviewed.   Constitutional:       General: She is not in acute distress.     " Appearance: Normal appearance. She is obese. She is toxic-appearing.   HENT:      Head: Normocephalic and atraumatic.      Nose: Nose normal.      Mouth/Throat:      Mouth: Mucous membranes are moist.      Comments: R lower jaw, L lower jaw redness, swelling of gums  Eyes:      Pupils: Pupils are equal, round, and reactive to light.   Neck:      Musculoskeletal: Normal range of motion and neck supple.   Cardiovascular:      Rate and Rhythm: Normal rate and regular rhythm.      Pulses: Normal pulses.      Heart sounds: Normal heart sounds.   Pulmonary:      Effort: Respiratory distress present.      Breath sounds: Normal breath sounds. No wheezing.   Abdominal:      General: Abdomen is flat. Bowel sounds are normal.      Palpations: Abdomen is soft.      Tenderness: There is no abdominal tenderness. There is no guarding or rebound.   Musculoskeletal: Normal range of motion.         General: No signs of injury.   Skin:     General: Skin is warm and dry.      Capillary Refill: Capillary refill takes less than 2 seconds.      Coloration: Skin is not jaundiced.   Neurological:      General: No focal deficit present.      Mental Status: She is alert and oriented to person, place, and time.   Psychiatric:         Mood and Affect: Mood normal.         Behavior: Behavior normal.         Thought Content: Thought content normal.         Judgment: Judgment normal.          Significant Labs:   CBC:   Recent Labs   Lab 07/06/20  0934 07/06/20  1230 07/07/20  0515   WBC 0.89* 0.80* 0.97*   HGB 11.7* 11.4* 10.2*   HCT 35.4* 34.0* 31.4*    248 205     CMP:   Recent Labs   Lab 07/06/20  0934 07/06/20  1230 07/07/20  0515    137 136   K 3.6 3.4* 4.1    105 105   CO2 24 23 23   * 107 102   BUN 7 7 13   CREATININE 0.9 0.8 2.0*   CALCIUM 9.1 8.8 8.4*   PROT 8.0 7.8  --    ALBUMIN 4.4 4.3  --    BILITOT 1.0 0.9  --    ALKPHOS 53* 51*  --    AST 19 17  --    ALT 18 18  --    ANIONGAP 7* 9 8   EGFRNONAA >60 >60 30*      Cardiac Markers: No results for input(s): CKMB, MYOGLOBIN, BNP, TROPISTAT in the last 48 hours.  Coagulation: No results for input(s): PT, INR, APTT in the last 48 hours.  Lactic Acid: No results for input(s): LACTATE in the last 48 hours.  Magnesium:   Recent Labs   Lab 07/07/20  0515   MG 1.8     Troponin: No results for input(s): TROPONINI in the last 48 hours.    Significant Imaging:   Imaging Results    None           Assessment/Plan:     * Dental infection  Continue IV vanc/zosyn for now  Follow fever curve  BC x2 pending  Dental soft diet for now  Morphine prn for pain         Neutropenic fever  Consult hematology/oncology  Maintain neutropenic precautions  Tylenol prn fever  UA with reflex culture pending        Chemotherapy-induced neutropenia  Maintain neutropenic precautions  Labs in AM  Continue supplemental thiamine, Folbic, MVI    Hypokalemia  Replace K+  Recheck in AM  Replete as needed      Hairy cell leukemia of spleen  Consult hematology/oncology   Consult palliative care for symptom/pain management        VTE Risk Mitigation (From admission, onward)         Ordered     Place sequential compression device  Until discontinued      07/06/20 4580                   Radha Wilson NP  Department of Hospital Medicine   [facility]

## 2020-07-07 NOTE — PROGRESS NOTES
Pharmacokinetic Initial Assessment: IV Vancomycin    Assessment/Plan:    Initiate intravenous vancomycin with loading dose of 2500 mg once followed by a maintenance dose of vancomycin 1750mg IV every 12 hours  Desired empiric serum trough concentration is 10 to 20 mcg/mL  Draw vancomycin trough level 30 min prior to fourth dose on 7/8 at approximately 0230  Pharmacy will continue to follow and monitor vancomycin.      Please contact pharmacy at extension [phone] with any questions regarding this assessment.     Thank you for the consult,   Lynn Watkins       Patient brief summary:  Jaswant Yang is a 44 y.o. female initiated on antimicrobial therapy with IV Vancomycin for treatment of suspected neutropenic fever    Drug Allergies:   Review of patient's allergies indicates:   Allergen Reactions    Tramadol Hives       Actual Body Weight:   124.3kg    Renal Function:   Estimated Creatinine Clearance: 132 mL/min (based on SCr of 0.8 mg/dL).,     Dialysis Method (if applicable):  N/A    CBC (last 72 hours):  Recent Labs   Lab Result Units 07/06/20  0934 07/06/20  1230   WBC K/uL 0.89* 0.80*   Hemoglobin g/dL 11.7* 11.4*   Hematocrit % 35.4* 34.0*   Platelets K/uL 238 248   Gran% % 36.0* 51.2   Lymph% % 44.0 40.0   Mono% % 8.0 2.5*   Eosinophil% % 8.0 5.0   Basophil% % 0.0 0.0   Differential Method  Manual Automated       Metabolic Panel (last 72 hours):  Recent Labs   Lab Result Units 07/06/20  0934 07/06/20  1230   Sodium mmol/L 137 137   Potassium mmol/L 3.6 3.4*   Chloride mmol/L 106 105   CO2 mmol/L 24 23   Glucose mg/dL 126* 107   BUN, Bld mg/dL 7 7   Creatinine mg/dL 0.9 0.8   Albumin g/dL 4.4 4.3   Total Bilirubin mg/dL 1.0 0.9   Alkaline Phosphatase U/L 53* 51*   AST U/L 19 17   ALT U/L 18 18       Drug levels (last 3 results):  No results for input(s): VANCOMYCINRA, VANCOMYCINPE, VANCOMYCINTR in the last 72 hours.    Microbiologic Results:  Microbiology Results (last 7 days)     Procedure Component  Value Units Date/Time    Blood culture [[id number]] Collected: 07/06/20 1238    Order Status: Sent Specimen: Blood from Peripheral, Antecubital, Right Updated: 07/06/20 1305    Blood culture [[id number]] Collected: 07/06/20 1230    Order Status: Sent Specimen: Blood from Peripheral, Antecubital, Right Updated: 07/06/20 1309

## 2020-07-07 NOTE — ASSESSMENT & PLAN NOTE
July 7, 2020  --ANC 0.7 improving. Antibiotics per hospital team. Would continue IV antibiotics until ANC > 1000 then consider PO antibiotics. Patient needs outpatient dental consult for any necessary tooth extractions. ANC improving. Patient afebrile. Hold off on G-CSF for now.

## 2020-07-07 NOTE — HOSPITAL COURSE
Pt admitted to Medical Surgical Unit in there setting of hairy cell leukemia of spleen.  Heme/Onc consulted.  Pt reports pain not well controlled. Palliative care consulted. CT maxiollofacial was negative for a dental abscess but showed multiple dental caries. The patient remains neutropenic, Hem/onc following. Continue current management with IV ABX. On 7/8/20, patient remained afebrile.  WBCs improved to 1.41K.  Hem/onc following.  Renal function worsened with Creatinine increased from 2 to 4.9 likely medication induced. IV fluids initiated, medications adjusted, and Nephrology consulted. On 7/9/20, pt remained stable with no acute issues reported.  Creatinine increased to 6.8.  ANC is improving. On 7/10/20, pt verbalized symptom improvement. WBCs continue to recover.  Creatinine worsened with increased to 8.1 with adequate UOP continued.  On 7/11/20, pt denies any complaints upon exam.  Kidney function monitored closely with Nephrology following.  On 7/12/20, kidney function continued to worsen with K of 5.5 and Co2 of 16 noted.  Chest xray is clear.  On  7/13/20,  case was d/c nephrology and Vas cath placed with initiation of HD planned.  On 7/14/2020, HD tolerated and kidney function stable.  On 7/15/20, HD not performed urgently and kidney function monitored. On 7/16/2020, creatinine trending down and kidney function improved.  Hyperkalemia stable. Blood pressure initially uncontrolled, but improved once IVFs were discontinued.  By 7/18/2020, kidney function continued to improve.  Generalized rash noted likely due to medications.  Antihistamines and hydrocortisone cream ordered.  Vas Cath removed per Vascular Surgery.  Pt denies any additional complaints with no acute issues witnessed or reported.  Pt seen and examined and deemed suitable for discharge to home accompanied by sister.  Current medications resumed with Tylenol, Folbic, Neurontin, Roxicodone, Senokot, and Thiamine prescribed.  Pt instructed to  follow up with Nephrology, Palliative Care, and Heme/Onc upon discharge for further evaluation.

## 2020-07-07 NOTE — SIGNIFICANT EVENT
Notified by Nursing that patient reported itching with Vancomycin. No rash noted. Due to dental source of infection, Vancomycin will be discontinued. Additionally, Zosyn will be used in favor of Cefepime for improved anaerobic coverage.

## 2020-07-07 NOTE — SUBJECTIVE & OBJECTIVE
Past Medical History:   Diagnosis Date    Anemia     Anemia     Back pain     Dental infection     GERD (gastroesophageal reflux disease) 2020    Hairy-cell leukemia of spleen        Past Surgical History:   Procedure Laterality Date     SECTION, CLASSIC      FLUOROSCOPY N/A 2020    Procedure: Spleenic Bleed;  Surgeon: Hiren Cabrera MD;  Location: [facility] CATH LAB;  Service: General;  Laterality: N/A;    INJECTION OF ANESTHETIC AGENT INTO TISSUE PLANE DEFINED BY TRANSVERSUS ABDOMINIS MUSCLE N/A 2020    Procedure: BLOCK, TRANSVERSUS ABDOMINIS PLANE;  Surgeon: Yumi Ferguson MD;  Location: [facility] OR;  Service: General;  Laterality: N/A;    SPLENECTOMY N/A 2020    Procedure: SPLENECTOMY;  Surgeon: Yumi Ferguson MD;  Location: [facility] OR;  Service: General;  Laterality: N/A;    THORACENTESIS Left 2020    Procedure: THORACENTESIS;  Surgeon: Yumi Ferguson MD;  Location: [facility] OR;  Service: General;  Laterality: Left;    TUBAL LIGATION         Review of patient's allergies indicates:   Allergen Reactions    Tramadol Hives       No current facility-administered medications on file prior to encounter.      Current Outpatient Medications on File Prior to Encounter   Medication Sig    albuterol (PROVENTIL/VENTOLIN HFA) 90 mcg/actuation inhaler Inhale 1-2 puffs into the lungs every 6 (six) hours as needed for Wheezing. Rescue    HYDROcodone-acetaminophen (NORCO)  mg per tablet Take 10 mg by mouth 4 (four) times daily.    ondansetron (ZOFRAN-ODT) 8 MG TbDL Take 1 tablet (8 mg total) by mouth every 12 (twelve) hours as needed.    polyethylene glycol (GLYCOLAX) 17 gram/dose powder Take 17 g by mouth once daily.    prochlorperazine (COMPAZINE) 10 MG tablet Take 1 tablet (10 mg total) by mouth every 6 (six) hours as needed.    promethazine (PHENERGAN) 25 MG tablet Take 1 tablet (25 mg total) by mouth every 6 (six) hours as needed for Nausea.     Family History      "None        Tobacco Use    Smoking status: Former Smoker     Packs/day: 0.25     Types: Cigarettes    Smokeless tobacco: Never Used    Tobacco comment: no longer smoker   Substance and Sexual Activity    Alcohol use: Yes     Comment: occasional    Drug use: No    Sexual activity: Yes     Birth control/protection: Condom     Review of Systems   Constitutional: Positive for fever ("low-grade"). Negative for activity change, chills, diaphoresis and fatigue.   HENT: Positive for dental problem and facial swelling (R lower jaw, L upper jaw). Negative for congestion, trouble swallowing and voice change.    Eyes: Negative for photophobia and discharge.   Respiratory: Negative for cough, chest tightness, shortness of breath and wheezing.    Cardiovascular: Negative for chest pain, palpitations and leg swelling.   Gastrointestinal: Negative for abdominal pain, blood in stool, constipation, diarrhea, nausea and vomiting.   Endocrine: Negative for cold intolerance, heat intolerance, polydipsia, polyphagia and polyuria.   Genitourinary: Negative for difficulty urinating, dysuria, flank pain, frequency and urgency.   Musculoskeletal: Positive for back pain (chronic). Negative for joint swelling and myalgias.   Skin: Negative for rash and wound.   Neurological: Positive for weakness (generalized). Negative for dizziness, seizures, syncope, facial asymmetry, light-headedness, numbness and headaches.   Psychiatric/Behavioral: Negative for confusion and hallucinations.     Objective:     Vital Signs (Most Recent):  Temp: 98.3 °F (36.8 °C) (07/07/20 1139)  Pulse: 83 (07/07/20 1139)  Resp: 16 (07/07/20 1139)  BP: (!) 116/56 (07/07/20 1139)  SpO2: 99 % (07/07/20 1139) Vital Signs (24h Range):  Temp:  [98.2 °F (36.8 °C)-98.7 °F (37.1 °C)] 98.3 °F (36.8 °C)  Pulse:  [82-97] 83  Resp:  [14-23] 16  SpO2:  [93 %-100 %] 99 %  BP: (103-143)/(54-82) 116/56     Weight: 124.3 kg (274 lb)  Body mass index is 38.22 kg/m².    Physical " Exam  Vitals signs reviewed.   Constitutional:       General: She is not in acute distress.     Appearance: Normal appearance. She is obese. She is toxic-appearing.   HENT:      Head: Normocephalic and atraumatic.      Nose: Nose normal.      Mouth/Throat:      Mouth: Mucous membranes are moist.      Comments: R lower jaw, L lower jaw redness, swelling of gums  Eyes:      Pupils: Pupils are equal, round, and reactive to light.   Neck:      Musculoskeletal: Normal range of motion and neck supple.   Cardiovascular:      Rate and Rhythm: Normal rate and regular rhythm.      Pulses: Normal pulses.      Heart sounds: Normal heart sounds.   Pulmonary:      Effort: Respiratory distress present.      Breath sounds: Normal breath sounds. No wheezing.   Abdominal:      General: Abdomen is flat. Bowel sounds are normal.      Palpations: Abdomen is soft.      Tenderness: There is no abdominal tenderness. There is no guarding or rebound.   Musculoskeletal: Normal range of motion.         General: No signs of injury.   Skin:     General: Skin is warm and dry.      Capillary Refill: Capillary refill takes less than 2 seconds.      Coloration: Skin is not jaundiced.   Neurological:      General: No focal deficit present.      Mental Status: She is alert and oriented to person, place, and time.   Psychiatric:         Mood and Affect: Mood normal.         Behavior: Behavior normal.         Thought Content: Thought content normal.         Judgment: Judgment normal.          Significant Labs:   CBC:   Recent Labs   Lab 07/06/20  0934 07/06/20  1230 07/07/20  0515   WBC 0.89* 0.80* 0.97*   HGB 11.7* 11.4* 10.2*   HCT 35.4* 34.0* 31.4*    248 205     CMP:   Recent Labs   Lab 07/06/20  0934 07/06/20  1230 07/07/20  0515    137 136   K 3.6 3.4* 4.1    105 105   CO2 24 23 23   * 107 102   BUN 7 7 13   CREATININE 0.9 0.8 2.0*   CALCIUM 9.1 8.8 8.4*   PROT 8.0 7.8  --    ALBUMIN 4.4 4.3  --    BILITOT 1.0 0.9  --     ALKPHOS 53* 51*  --    AST 19 17  --    ALT 18 18  --    ANIONGAP 7* 9 8   EGFRNONAA >60 >60 30*     Cardiac Markers: No results for input(s): CKMB, MYOGLOBIN, BNP, TROPISTAT in the last 48 hours.  Coagulation: No results for input(s): PT, INR, APTT in the last 48 hours.  Lactic Acid: No results for input(s): LACTATE in the last 48 hours.  Magnesium:   Recent Labs   Lab 07/07/20  0515   MG 1.8     Troponin: No results for input(s): TROPONINI in the last 48 hours.    Significant Imaging:   Imaging Results    None

## 2020-07-07 NOTE — SUBJECTIVE & OBJECTIVE
Oncology Treatment Plan:   OP CLADRIBINE (5 DAY) + RITUXIMAB - Hairy Cell Leukemia    Medications:  Continuous Infusions:  Scheduled Meds:   fentaNYL  1 patch Transdermal Q72H    folic acid-vit B6-vit B12 2.5-25-2 mg  1 tablet Oral Daily    multivitamin  1 tablet Oral Daily    piperacillin-tazobactam (ZOSYN) IVPB  4.5 g Intravenous Q8H    polyethylene glycol  17 g Oral Daily    thiamine  100 mg Oral Daily     PRN Meds:acetaminophen, HYDROcodone-acetaminophen, hydrOXYzine HCL, ondansetron, promethazine     Review of patient's allergies indicates:   Allergen Reactions    Tramadol Hives        Past Medical History:   Diagnosis Date    Anemia     Anemia     Back pain     Dental infection     GERD (gastroesophageal reflux disease) 2020    Hairy-cell leukemia of spleen      Past Surgical History:   Procedure Laterality Date     SECTION, CLASSIC      FLUOROSCOPY N/A 2020    Procedure: Spleenic Bleed;  Surgeon: Hiren Cabrera MD;  Location: [facility] CATH LAB;  Service: General;  Laterality: N/A;    INJECTION OF ANESTHETIC AGENT INTO TISSUE PLANE DEFINED BY TRANSVERSUS ABDOMINIS MUSCLE N/A 2020    Procedure: BLOCK, TRANSVERSUS ABDOMINIS PLANE;  Surgeon: Yumi Ferguson MD;  Location: [facility] OR;  Service: General;  Laterality: N/A;    SPLENECTOMY N/A 2020    Procedure: SPLENECTOMY;  Surgeon: Yumi Ferguson MD;  Location: [facility] OR;  Service: General;  Laterality: N/A;    THORACENTESIS Left 2020    Procedure: THORACENTESIS;  Surgeon: Yumi Ferguson MD;  Location: [facility];  Service: General;  Laterality: Left;    TUBAL LIGATION       Family History     None        Tobacco Use    Smoking status: Former Smoker     Packs/day: 0.25     Types: Cigarettes    Smokeless tobacco: Never Used    Tobacco comment: no longer smoker   Substance and Sexual Activity    Alcohol use: Yes     Comment: occasional    Drug use: No    Sexual activity: Yes     Birth control/protection:  Condom       Review of Systems   Constitutional: Positive for fatigue and fever. Negative for appetite change, chills and unexpected weight change.   HENT: Positive for dental problem. Negative for congestion, mouth sores, nosebleeds, sore throat, trouble swallowing and voice change.    Eyes: Negative for photophobia and visual disturbance.   Respiratory: Negative for cough, chest tightness, shortness of breath and wheezing.    Cardiovascular: Negative for chest pain, palpitations and leg swelling.   Gastrointestinal: Negative for abdominal distention, abdominal pain, anal bleeding, blood in stool, constipation, diarrhea, nausea and vomiting.   Genitourinary: Negative for difficulty urinating, dysuria and hematuria.   Musculoskeletal: Negative for arthralgias, back pain and myalgias.   Skin: Negative for pallor, rash and wound.   Allergic/Immunologic: Positive for immunocompromised state.   Neurological: Negative for dizziness, syncope, weakness and headaches.   Hematological: Negative for adenopathy. Does not bruise/bleed easily.   Psychiatric/Behavioral: The patient is not nervous/anxious.      Objective:     Vital Signs (Most Recent):  Temp: 98.3 °F (36.8 °C) (07/07/20 1139)  Pulse: 83 (07/07/20 1139)  Resp: 16 (07/07/20 1139)  BP: (!) 116/56 (07/07/20 1139)  SpO2: 99 % (07/07/20 1139) Vital Signs (24h Range):  Temp:  [98.2 °F (36.8 °C)-98.7 °F (37.1 °C)] 98.3 °F (36.8 °C)  Pulse:  [82-97] 83  Resp:  [14-23] 16  SpO2:  [93 %-100 %] 99 %  BP: (103-143)/(54-82) 116/56     Weight: 124.3 kg (274 lb)  Body mass index is 38.22 kg/m².  Body surface area is 2.5 meters squared.      Intake/Output Summary (Last 24 hours) at 7/7/2020 1229  Last data filed at 7/7/2020 0600  Gross per 24 hour   Intake 750 ml   Output --   Net 750 ml       Physical Exam  Vitals signs reviewed.   Constitutional:       Appearance: She is well-developed.   HENT:      Head: Normocephalic.      Right Ear: External ear normal.      Left Ear:  External ear normal.      Nose: Nose normal.      Mouth/Throat:      Dentition: Dental tenderness, gingival swelling and dental caries present.      Tonsils: No tonsillar exudate or tonsillar abscesses.   Eyes:      General: Lids are normal. No scleral icterus.        Right eye: No discharge.         Left eye: No discharge.      Conjunctiva/sclera: Conjunctivae normal.   Neck:      Musculoskeletal: Normal range of motion.      Thyroid: No thyroid mass.   Cardiovascular:      Rate and Rhythm: Normal rate.   Pulmonary:      Effort: Pulmonary effort is normal.   Abdominal:      Palpations: Abdomen is soft.   Genitourinary:     Comments: deferred  Musculoskeletal: Normal range of motion.   Lymphadenopathy:      Head:      Right side of head: No submandibular, preauricular or posterior auricular adenopathy.      Left side of head: No submandibular, preauricular or posterior auricular adenopathy.      Cervical:      Right cervical: No superficial cervical adenopathy.     Left cervical: No superficial cervical adenopathy.   Skin:     General: Skin is warm and dry.   Neurological:      Mental Status: She is alert and oriented to person, place, and time.   Psychiatric:         Speech: Speech normal.         Behavior: Behavior normal. Behavior is cooperative.         Thought Content: Thought content normal.         Significant Labs:   BMP:   Recent Labs   Lab 07/06/20  0934 07/06/20  1230 07/07/20  0515   * 107 102    137 136   K 3.6 3.4* 4.1    105 105   CO2 24 23 23   BUN 7 7 13   CREATININE 0.9 0.8 2.0*   CALCIUM 9.1 8.8 8.4*   MG  --   --  1.8   , CBC:   Recent Labs   Lab 07/06/20  0934 07/06/20  1230 07/07/20  0515   WBC 0.89* 0.80* 0.97*   HGB 11.7* 11.4* 10.2*   HCT 35.4* 34.0* 31.4*    248 205   , LFTs:   Recent Labs   Lab 07/06/20  0934 07/06/20  1230   ALT 18 18   AST 19 17   ALKPHOS 53* 51*   BILITOT 1.0 0.9   PROT 8.0 7.8   ALBUMIN 4.4 4.3    and All pertinent labs from the last 24 hours  have been reviewed.    Diagnostic Results:  I have reviewed all pertinent imaging results/findings within the past 24 hours.

## 2020-07-07 NOTE — ASSESSMENT & PLAN NOTE
Will do Ct scan of the maxillary region , will continue empiric zosyn for now .  Will follow cultures

## 2020-07-07 NOTE — PLAN OF CARE
Remains injury free. Pain managed with oral medication and fentanyl patch. Tolerating diet.  Receiving IV abx. No s/s acute distress.

## 2020-07-07 NOTE — CONSULTS
[facility] -   Hematology/Oncology  Consult Note    Patient Name: Jaswant Yang  MRN: [id number]  Admission Date: 2020  Hospital Length of Stay: 1 days  Code Status: Prior   Attending Provider: Shantel Carlson MD  Consulting Provider: Ayaka Middleton NP  Primary Care Physician: Primary Doctor No  Principal Problem:Dental infection    Consults  Subjective:     HPI:  44 y.o female with hairy cell lymphoma currently being treated with CLADRIBINE sent from Dr. Whittaker office on yesterday for fever and presumed right sided tooth infection. Associated symptoms include facial swelling, jaw pain, fever. Patient asked to report to ED for IV antibiotics for neutropenic fever in setting of tooth infection. Patient was started on Vancomycin but had allergic reaction and was placed on Zoysn.     Oncology Treatment Plan:   OP CLADRIBINE (5 DAY) + RITUXIMAB - Hairy Cell Leukemia    Medications:  Continuous Infusions:  Scheduled Meds:   fentaNYL  1 patch Transdermal Q72H    folic acid-vit B6-vit B12 2.5-25-2 mg  1 tablet Oral Daily    multivitamin  1 tablet Oral Daily    piperacillin-tazobactam (ZOSYN) IVPB  4.5 g Intravenous Q8H    polyethylene glycol  17 g Oral Daily    thiamine  100 mg Oral Daily     PRN Meds:acetaminophen, HYDROcodone-acetaminophen, hydrOXYzine HCL, ondansetron, promethazine     Review of patient's allergies indicates:   Allergen Reactions    Tramadol Hives        Past Medical History:   Diagnosis Date    Anemia     Anemia     Back pain     Dental infection     GERD (gastroesophageal reflux disease) 2020    Hairy-cell leukemia of spleen      Past Surgical History:   Procedure Laterality Date     SECTION, CLASSIC      FLUOROSCOPY N/A 2020    Procedure: Spleenic Bleed;  Surgeon: Hiren Cabrera MD;  Location: [facility] CATH LAB;  Service: General;  Laterality: N/A;    INJECTION OF ANESTHETIC AGENT INTO TISSUE PLANE DEFINED BY TRANSVERSUS ABDOMINIS MUSCLE N/A  5/11/2020    Procedure: BLOCK, TRANSVERSUS ABDOMINIS PLANE;  Surgeon: Yumi Ferguson MD;  Location: [facility] OR;  Service: General;  Laterality: N/A;    SPLENECTOMY N/A 5/11/2020    Procedure: SPLENECTOMY;  Surgeon: Yumi Ferguson MD;  Location: [facility] OR;  Service: General;  Laterality: N/A;    THORACENTESIS Left 5/11/2020    Procedure: THORACENTESIS;  Surgeon: Yumi Ferguson MD;  Location: [facility] OR;  Service: General;  Laterality: Left;    TUBAL LIGATION       Family History     None        Tobacco Use    Smoking status: Former Smoker     Packs/day: 0.25     Types: Cigarettes    Smokeless tobacco: Never Used    Tobacco comment: no longer smoker   Substance and Sexual Activity    Alcohol use: Yes     Comment: occasional    Drug use: No    Sexual activity: Yes     Birth control/protection: Condom       Review of Systems   Constitutional: Positive for fatigue and fever. Negative for appetite change, chills and unexpected weight change.   HENT: Positive for dental problem. Negative for congestion, mouth sores, nosebleeds, sore throat, trouble swallowing and voice change.    Eyes: Negative for photophobia and visual disturbance.   Respiratory: Negative for cough, chest tightness, shortness of breath and wheezing.    Cardiovascular: Negative for chest pain, palpitations and leg swelling.   Gastrointestinal: Negative for abdominal distention, abdominal pain, anal bleeding, blood in stool, constipation, diarrhea, nausea and vomiting.   Genitourinary: Negative for difficulty urinating, dysuria and hematuria.   Musculoskeletal: Negative for arthralgias, back pain and myalgias.   Skin: Negative for pallor, rash and wound.   Allergic/Immunologic: Positive for immunocompromised state.   Neurological: Negative for dizziness, syncope, weakness and headaches.   Hematological: Negative for adenopathy. Does not bruise/bleed easily.   Psychiatric/Behavioral: The patient is not nervous/anxious.      Objective:      Vital Signs (Most Recent):  Temp: 98.3 °F (36.8 °C) (07/07/20 1139)  Pulse: 83 (07/07/20 1139)  Resp: 16 (07/07/20 1139)  BP: (!) 116/56 (07/07/20 1139)  SpO2: 99 % (07/07/20 1139) Vital Signs (24h Range):  Temp:  [98.2 °F (36.8 °C)-98.7 °F (37.1 °C)] 98.3 °F (36.8 °C)  Pulse:  [82-97] 83  Resp:  [14-23] 16  SpO2:  [93 %-100 %] 99 %  BP: (103-143)/(54-82) 116/56     Weight: 124.3 kg (274 lb)  Body mass index is 38.22 kg/m².  Body surface area is 2.5 meters squared.      Intake/Output Summary (Last 24 hours) at 7/7/2020 1229  Last data filed at 7/7/2020 0600  Gross per 24 hour   Intake 750 ml   Output --   Net 750 ml       Physical Exam  Vitals signs reviewed.   Constitutional:       Appearance: She is well-developed.   HENT:      Head: Normocephalic.      Right Ear: External ear normal.      Left Ear: External ear normal.      Nose: Nose normal.      Mouth/Throat:      Dentition: Dental tenderness, gingival swelling and dental caries present.      Tonsils: No tonsillar exudate or tonsillar abscesses.   Eyes:      General: Lids are normal. No scleral icterus.        Right eye: No discharge.         Left eye: No discharge.      Conjunctiva/sclera: Conjunctivae normal.   Neck:      Musculoskeletal: Normal range of motion.      Thyroid: No thyroid mass.   Cardiovascular:      Rate and Rhythm: Normal rate.   Pulmonary:      Effort: Pulmonary effort is normal.   Abdominal:      Palpations: Abdomen is soft.   Genitourinary:     Comments: deferred  Musculoskeletal: Normal range of motion.   Lymphadenopathy:      Head:      Right side of head: No submandibular, preauricular or posterior auricular adenopathy.      Left side of head: No submandibular, preauricular or posterior auricular adenopathy.      Cervical:      Right cervical: No superficial cervical adenopathy.     Left cervical: No superficial cervical adenopathy.   Skin:     General: Skin is warm and dry.   Neurological:      Mental Status: She is alert and  oriented to person, place, and time.   Psychiatric:         Speech: Speech normal.         Behavior: Behavior normal. Behavior is cooperative.         Thought Content: Thought content normal.         Significant Labs:   BMP:   Recent Labs   Lab 07/06/20  0934 07/06/20  1230 07/07/20  0515   * 107 102    137 136   K 3.6 3.4* 4.1    105 105   CO2 24 23 23   BUN 7 7 13   CREATININE 0.9 0.8 2.0*   CALCIUM 9.1 8.8 8.4*   MG  --   --  1.8   , CBC:   Recent Labs   Lab 07/06/20  0934 07/06/20  1230 07/07/20  0515   WBC 0.89* 0.80* 0.97*   HGB 11.7* 11.4* 10.2*   HCT 35.4* 34.0* 31.4*    248 205   , LFTs:   Recent Labs   Lab 07/06/20  0934 07/06/20  1230   ALT 18 18   AST 19 17   ALKPHOS 53* 51*   BILITOT 1.0 0.9   PROT 8.0 7.8   ALBUMIN 4.4 4.3    and All pertinent labs from the last 24 hours have been reviewed.    Diagnostic Results:  I have reviewed all pertinent imaging results/findings within the past 24 hours.    Assessment/Plan:     Chemotherapy-induced neutropenia  July 7, 2020  --ANC 0.7 improving. Antibiotics per hospital team. Would continue IV antibiotics until ANC > 1000 then consider PO antibiotics. Patient needs outpatient dental consult for any necessary tooth extractions. ANC improving. Patient afebrile. Hold off on G-CSF for now.     Hairy cell leukemia of spleen  --chemotherapy will remain on hold pending resolution of current clinical situation. Will arrange outpatient follow up with Primary Oncologist upon discharge        Thank you for your consult. I will follow-up with patient. Please contact us if you have any additional questions.    Ayaka Middleton NP  Hematology/Oncology  [facility]

## 2020-07-07 NOTE — SUBJECTIVE & OBJECTIVE
Past Medical History:   Diagnosis Date    Anemia     Anemia     Back pain     Dental infection     GERD (gastroesophageal reflux disease) 2020    Hairy-cell leukemia of spleen        Past Surgical History:   Procedure Laterality Date     SECTION, CLASSIC      FLUOROSCOPY N/A 2020    Procedure: Spleenic Bleed;  Surgeon: Hiren Cabrera MD;  Location: [facility] CATH LAB;  Service: General;  Laterality: N/A;    INJECTION OF ANESTHETIC AGENT INTO TISSUE PLANE DEFINED BY TRANSVERSUS ABDOMINIS MUSCLE N/A 2020    Procedure: BLOCK, TRANSVERSUS ABDOMINIS PLANE;  Surgeon: Yumi Ferguson MD;  Location: [facility] OR;  Service: General;  Laterality: N/A;    SPLENECTOMY N/A 2020    Procedure: SPLENECTOMY;  Surgeon: Yumi Ferguson MD;  Location: [facility] OR;  Service: General;  Laterality: N/A;    THORACENTESIS Left 2020    Procedure: THORACENTESIS;  Surgeon: Yumi Fergusno MD;  Location: [facility] OR;  Service: General;  Laterality: Left;    TUBAL LIGATION         Review of patient's allergies indicates:   Allergen Reactions    Tramadol Hives       Medications:  Medications Prior to Admission   Medication Sig    albuterol (PROVENTIL/VENTOLIN HFA) 90 mcg/actuation inhaler Inhale 1-2 puffs into the lungs every 6 (six) hours as needed for Wheezing. Rescue    HYDROcodone-acetaminophen (NORCO)  mg per tablet Take 10 mg by mouth 4 (four) times daily.    ondansetron (ZOFRAN-ODT) 8 MG TbDL Take 1 tablet (8 mg total) by mouth every 12 (twelve) hours as needed.    polyethylene glycol (GLYCOLAX) 17 gram/dose powder Take 17 g by mouth once daily.    prochlorperazine (COMPAZINE) 10 MG tablet Take 1 tablet (10 mg total) by mouth every 6 (six) hours as needed.    promethazine (PHENERGAN) 25 MG tablet Take 1 tablet (25 mg total) by mouth every 6 (six) hours as needed for Nausea.     Antibiotics (From admission, onward)    Start     Stop Route Frequency Ordered    20 0500   piperacillin-tazobactam 4.5 g in dextrose 5 % 100 mL IVPB (ready to mix system)      -- IV Every 8 hours (non-standard times) 07/07/20 0351        Antifungals (From admission, onward)    None        Antivirals (From admission, onward)    None           Immunization History   Administered Date(s) Administered    DTP 1976, 03/04/1977, 05/06/1977, 05/26/1978, 12/05/1980    Influenza - Quadrivalent - PF (6 months and older) 09/21/2019, 05/16/2020    MMR 10/28/1977    Meningococcal Conjugate (MCV4P) 05/16/2020    Pneumococcal Conjugate - 13 Valent 05/16/2020    Poliovirus 1976, 03/04/1977, 05/06/1977, 05/26/1978, 12/05/1980       Family History     None        Social History     Socioeconomic History    Marital status: Single     Spouse name: Not on file    Number of children: Not on file    Years of education: Not on file    Highest education level: Not on file   Occupational History    Not on file   Social Needs    Financial resource strain: Not on file    Food insecurity     Worry: Not on file     Inability: Not on file    Transportation needs     Medical: Not on file     Non-medical: Not on file   Tobacco Use    Smoking status: Former Smoker     Packs/day: 0.25     Types: Cigarettes    Smokeless tobacco: Never Used    Tobacco comment: no longer smoker   Substance and Sexual Activity    Alcohol use: Yes     Comment: occasional    Drug use: No    Sexual activity: Yes     Birth control/protection: Condom   Lifestyle    Physical activity     Days per week: Not on file     Minutes per session: Not on file    Stress: Not on file   Relationships    Social connections     Talks on phone: Not on file     Gets together: Not on file     Attends Taoism service: Not on file     Active member of club or organization: Not on file     Attends meetings of clubs or organizations: Not on file     Relationship status: Not on file   Other Topics Concern    Not on file   Social History Narrative     Not on file     Review of Systems   Constitutional: Negative for chills and fatigue.   HENT: Positive for dental problem. Negative for congestion, ear pain, facial swelling, sinus pressure and sore throat.    Eyes: Negative for pain.   Respiratory: Negative for apnea, chest tightness, shortness of breath and stridor.    Cardiovascular: Negative for chest pain, palpitations and leg swelling.   Gastrointestinal: Negative for abdominal distention, abdominal pain, diarrhea and nausea.   Endocrine: Negative for polydipsia and polyphagia.   Genitourinary: Negative for decreased urine volume, difficulty urinating, frequency and genital sores.   Musculoskeletal: Negative for arthralgias and gait problem.   Neurological: Negative for light-headedness and headaches.   Hematological: Negative for adenopathy.   Psychiatric/Behavioral: Negative for agitation, confusion and decreased concentration.     Objective:     Vital Signs (Most Recent):  Temp: 98.6 °F (37 °C) (07/07/20 0745)  Pulse: 82 (07/07/20 0745)  Resp: 14 (07/07/20 0745)  BP: (!) 120/56 (07/07/20 0745)  SpO2: 99 % (07/07/20 0745) Vital Signs (24h Range):  Temp:  [98.2 °F (36.8 °C)-99.6 °F (37.6 °C)] 98.6 °F (37 °C)  Pulse:  [] 82  Resp:  [14-23] 14  SpO2:  [93 %-100 %] 99 %  BP: (103-174)/() 120/56     Weight: 124.3 kg (274 lb)  Body mass index is 38.22 kg/m².    Estimated Creatinine Clearance: 52.8 mL/min (A) (based on SCr of 2 mg/dL (H)).    Physical Exam  Vitals signs and nursing note reviewed.   Constitutional:       Appearance: She is well-developed.   HENT:      Head: Normocephalic and atraumatic.      Mouth/Throat:      Comments: Dental caries  Eyes:      Conjunctiva/sclera: Conjunctivae normal.      Pupils: Pupils are equal, round, and reactive to light.   Neck:      Musculoskeletal: Normal range of motion and neck supple.      Thyroid: No thyroid mass or thyromegaly.   Cardiovascular:      Rate and Rhythm: Normal rate.      Heart sounds: Normal  heart sounds.   Pulmonary:      Effort: Pulmonary effort is normal. No accessory muscle usage or respiratory distress.      Breath sounds: Normal breath sounds.   Abdominal:      General: Bowel sounds are normal.      Palpations: Abdomen is soft. There is no mass.      Tenderness: There is no abdominal tenderness.   Musculoskeletal: Normal range of motion.   Skin:     Findings: No rash.   Neurological:      Mental Status: She is alert and oriented to person, place, and time.         Significant Labs:   Blood Culture:   Recent Labs   Lab 07/06/20  1230 07/06/20  1238   LABBLOO No Growth to date No Growth to date     CBC:   Recent Labs   Lab 07/06/20  0934 07/06/20  1230 07/07/20  0515   WBC 0.89* 0.80* 0.97*   HGB 11.7* 11.4* 10.2*   HCT 35.4* 34.0* 31.4*    248 205     CMP:   Recent Labs   Lab 07/06/20  0934 07/06/20  1230 07/07/20  0515    137 136   K 3.6 3.4* 4.1    105 105   CO2 24 23 23   * 107 102   BUN 7 7 13   CREATININE 0.9 0.8 2.0*   CALCIUM 9.1 8.8 8.4*   PROT 8.0 7.8  --    ALBUMIN 4.4 4.3  --    BILITOT 1.0 0.9  --    ALKPHOS 53* 51*  --    AST 19 17  --    ALT 18 18  --    ANIONGAP 7* 9 8   EGFRNONAA >60 >60 30*     All pertinent labs within the past 24 hours have been reviewed.    Significant Imaging: I have reviewed all pertinent imaging results/findings within the past 24 hours.

## 2020-07-07 NOTE — PROGRESS NOTES
"[facility] Hospital Medicine  Progress Note    Patient Name: Jaswant Yang  MRN: [id number]  Patient Class: IP- Inpatient   Admission Date: 7/6/2020  Length of Stay: 1 days  Attending Physician: Shantel Carlson MD  Primary Care Provider: Primary Doctor No        Subjective:     Principal Problem:Dental infection        HPI:  45 y/o AA F with hx of hairy cell leukemia (diagnosed 05/2020), anemia, chronic pain to ED from Dr. Whittaker' office after being found to have a low grade temperature (99.6) with a visible dental infection in the R lower jaw and L upper jaw, in the setting of chemotherapy induced neutropenia (WBC 0.89); mild hypokalemia (3.4) also noted in ED, but labs otherwise largely unremarkable - admission for IV abx; BC x2 pending. Pt reports associated severe stabbing pain in the affected jaw areas, and mild facial edema, worsening over the past 2 days but states no drainage/bleeding. Pt's last chemo cycle ended on 06/26/20 - reports pain improved with IV morphine in ED, states she feels "a little better now" - palliative care consulted per ED physician at patient's request, to assist with symptom management and pain control. Denies CP, edema, palpitations, SOB, wheezing, orthopnea, PND, abdominal pain, N/V/D, dysuria, flank pain, HA, dizziness, fever, cough, chills, falls/trauma, blurred vision, focal deficits. No recent dietary changes, travel, sick contacts or viral illness - pt is typically active and independent with ADLs and ambulation at home. Pt is full code. Surrogate decision makers are son (Dmitri Yang) and Mother (Elsa Yang). Admitted for chemotherapy induced neutropenic fever with a jaw/dental infection.     Overview/Hospital Course:  7/7/20 The patient reports pain is not well controlled. Palliative care was consulted. The patient remained afebrile overnight. CT maxiollofacial CT was negative for a dental abscess but showed multiple dental caries. The patient " "remains neutropenic, Hem/onc following. Continue current management with IV ABX.     Interval History: The patient reports pain is not well controlled. Palliative care was consulted. The patient remained afebrile overnight. CT maxiollofacial CT was negative for a dental abscess but showed multiple dental caries. The patient remains neutropenic, Hem/onc following. Continue current management with IV ABX.       Review of Systems   Constitutional: Positive for fever ("low-grade"). Negative for activity change, appetite change, chills, diaphoresis, fatigue and unexpected weight change.   HENT: Positive for dental problem and facial swelling (R lower jaw, L upper jaw). Negative for congestion, drooling, rhinorrhea, sinus pressure, sneezing, sore throat, trouble swallowing and voice change.    Eyes: Negative for photophobia, discharge, redness, itching and visual disturbance.   Respiratory: Negative for apnea, cough, choking, chest tightness, shortness of breath, wheezing and stridor.    Cardiovascular: Negative for chest pain, palpitations and leg swelling.   Gastrointestinal: Negative for abdominal distention, abdominal pain, anal bleeding, blood in stool, constipation, diarrhea, nausea and vomiting.   Endocrine: Negative for cold intolerance, heat intolerance, polydipsia, polyphagia and polyuria.   Genitourinary: Negative for decreased urine volume, difficulty urinating, dysuria, flank pain, frequency, hematuria, pelvic pain, urgency, vaginal bleeding and vaginal discharge.   Musculoskeletal: Positive for back pain (chronic). Negative for arthralgias, gait problem, joint swelling, myalgias, neck pain and neck stiffness.   Skin: Negative for color change, pallor, rash and wound.   Neurological: Positive for weakness (generalized). Negative for dizziness, seizures, syncope, facial asymmetry, speech difficulty, light-headedness, numbness and headaches.   Psychiatric/Behavioral: Negative for agitation, confusion, " hallucinations and suicidal ideas.   All other systems reviewed and are negative.    Objective:     Vital Signs (Most Recent):  Temp: 98.3 °F (36.8 °C) (07/07/20 1558)  Pulse: 84 (07/07/20 1558)  Resp: 18 (07/07/20 1559)  BP: (!) 118/58 (07/07/20 1558)  SpO2: 98 % (07/07/20 1558) Vital Signs (24h Range):  Temp:  [98.3 °F (36.8 °C)-98.7 °F (37.1 °C)] 98.3 °F (36.8 °C)  Pulse:  [82-97] 84  Resp:  [14-18] 18  SpO2:  [93 %-99 %] 98 %  BP: (103-123)/(54-68) 118/58     Weight: 124.3 kg (274 lb)  Body mass index is 38.22 kg/m².    Intake/Output Summary (Last 24 hours) at 7/7/2020 1731  Last data filed at 7/7/2020 1421  Gross per 24 hour   Intake 1200 ml   Output --   Net 1200 ml      Physical Exam  Vitals signs and nursing note reviewed.   Constitutional:       General: She is not in acute distress.     Appearance: Normal appearance. She is well-developed. She is obese. She is toxic-appearing.   HENT:      Head: Normocephalic and atraumatic.      Nose: Nose normal.      Mouth/Throat:      Mouth: Mucous membranes are moist.      Comments: R lower jaw, L lower jaw redness, swelling of gums  Eyes:      Conjunctiva/sclera: Conjunctivae normal.      Pupils: Pupils are equal, round, and reactive to light.   Neck:      Musculoskeletal: Normal range of motion and neck supple.   Cardiovascular:      Rate and Rhythm: Normal rate and regular rhythm.      Pulses: Normal pulses.      Heart sounds: Normal heart sounds. No murmur.   Pulmonary:      Effort: Pulmonary effort is normal. No respiratory distress.      Breath sounds: Normal breath sounds. No wheezing.   Abdominal:      General: Abdomen is flat. Bowel sounds are normal. There is no distension.      Palpations: Abdomen is soft.      Tenderness: There is no abdominal tenderness. There is no guarding or rebound.   Musculoskeletal: Normal range of motion.         General: No tenderness, deformity or signs of injury.   Skin:     General: Skin is warm and dry.      Capillary Refill:  Capillary refill takes less than 2 seconds.      Coloration: Skin is not jaundiced.      Findings: No erythema.   Neurological:      General: No focal deficit present.      Mental Status: She is alert and oriented to person, place, and time.      Deep Tendon Reflexes: Reflexes are normal and symmetric.   Psychiatric:         Mood and Affect: Mood normal.         Behavior: Behavior normal.         Thought Content: Thought content normal.         Judgment: Judgment normal.         Significant Labs: All pertinent labs within the past 24 hours have been reviewed.    Significant Imaging:   Imaging Results    None             Assessment/Plan:      * Dental infection  Continue IV vanc/zosyn for now  Follow fever curve  BC x2 pending  Dental soft diet for now  Morphine prn for pain   7/7/20 The patient reports pain is not well controlled. Palliative care was consulted. The patient remained afebrile overnight. CT maxiollofacial CT was negative for a dental abscess but showed multiple dental caries. The patient remains neutropenic, Hem/onc following. Continue current management with IV ABX.         Acute neoplasm-related pain  - Continue PRN Analgesia - Palliative care consulted       Hypokalemia  Replace K+  Recheck in AM  Replete as needed      Neutropenic fever  Consult hematology/oncology  Maintain neutropenic precautions  Tylenol prn fever  UA with reflex culture pending  IV zosyn   Cultures pending       Chemotherapy-induced neutropenia  Maintain neutropenic precautions  Labs in AM  Continue supplemental thiamine, Folbic, MVI    Hairy cell leukemia of spleen  Consult hematology/oncology   Consult palliative care for symptom/pain management        VTE Risk Mitigation (From admission, onward)         Ordered     Place sequential compression device  Until discontinued      07/06/20 0958                      Edgar Ashraf NP  Department of Hospital Medicine   [facility]

## 2020-07-07 NOTE — HPI
44 year old  AA F with hx of hairy cell leukemia (diagnosed 05/2020), anemia, chronic pain to ED from Dr. Whittaker' office after being found to have a low grade temperature (99.6) with a visible dental infection in the R lower jaw and L upper jaw, in the setting of chemotherapy induced neutropenia (WBC 0.89).  She has prior dental infection .  All previous cultures reviewed -06/22- E coli -urine.  Blood culture -negative

## 2020-07-07 NOTE — CONSULTS
Consult Note  Palliative Medicine      Consult Requested By: Dr. BRIDGER Pino  Reason for Consult: Symptom management      SUBJECTIVE:     History of Present Illness:  Jaswant Yang is a 44 y.o. 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 Whittaker due to fever. She was complaining of jaw pain and edema and was admitted for antibiotics and monitoring. Ms. Yang has an outpatient referral for Palliative Medicine for pain and symptom management but her appointment was at the end of the month so we were asked to see while admitted. I met Ms. Yang in her room without family present. She reports her pain is under better control since starting antibiotics but still quite severe. At home she was taking hydrocodone 10mg QID with modest control of her pain however was waking up in the middle of the night to take another dose. She says she has low back and leg pain that she initially thought was related to arthritis but now thinks it might have been related to her cancer. She says that her jaw pain is worse on the left and radiates down her neck. She rates the pain 7/10 currently and the best she has achieved since admission is 3/10. She has received a pain free state at home but this is short lived. She endorses constipation but has not eaten much due to jaw pain. We discussed starting a long acting pain medication (Fentanyl in light of renal dysfunction) in addition to the hydrocodone as well as a bowel regimen. She was in favor of this regimen and understands she will follow up in clinic for ongoing management.    In the last 24hrs she has received the following pain medications (7a-7a):  - Morphine 2mg IV x 2  - Morphine 4mg IV x 2 (now d/c)  - Hydrocodone 10mg x none used    Past Medical History:   Diagnosis Date    Anemia     Anemia     Back pain     Dental infection     GERD (gastroesophageal reflux disease) 5/14/2020     Hairy-cell leukemia of spleen      Past Surgical History:   Procedure Laterality Date     SECTION, CLASSIC      FLUOROSCOPY N/A 2020    Procedure: Spleenic Bleed;  Surgeon: Hiren Cabrera MD;  Location: [facility] CATH LAB;  Service: General;  Laterality: N/A;    INJECTION OF ANESTHETIC AGENT INTO TISSUE PLANE DEFINED BY TRANSVERSUS ABDOMINIS MUSCLE N/A 2020    Procedure: BLOCK, TRANSVERSUS ABDOMINIS PLANE;  Surgeon: Yumi Ferguson MD;  Location: [facility] OR;  Service: General;  Laterality: N/A;    SPLENECTOMY N/A 2020    Procedure: SPLENECTOMY;  Surgeon: Yumi Ferguson MD;  Location: [facility] OR;  Service: General;  Laterality: N/A;    THORACENTESIS Left 2020    Procedure: THORACENTESIS;  Surgeon: Yumi Ferguson MD;  Location: [facility] OR;  Service: General;  Laterality: Left;    TUBAL LIGATION       History reviewed. No pertinent family history.  Social History     Socioeconomic History    Marital status: Single     Spouse name: Not on file    Number of children: Not on file    Years of education: Not on file    Highest education level: Not on file   Occupational History    Not on file   Social Needs    Financial resource strain: Not on file    Food insecurity     Worry: Not on file     Inability: Not on file    Transportation needs     Medical: Not on file     Non-medical: Not on file   Tobacco Use    Smoking status: Former Smoker     Packs/day: 0.25     Types: Cigarettes    Smokeless tobacco: Never Used    Tobacco comment: no longer smoker   Substance and Sexual Activity    Alcohol use: Yes     Comment: occasional    Drug use: No    Sexual activity: Yes     Birth control/protection: Condom   Lifestyle    Physical activity     Days per week: Not on file     Minutes per session: Not on file    Stress: Not on file   Relationships    Social connections     Talks on phone: Not on file     Gets together: Not on file     Attends Restorationism service: Not on file      Active member of club or organization: Not on file     Attends meetings of clubs or organizations: Not on file     Relationship status: Not on file   Other Topics Concern    Not on file   Social History Narrative    Not on file      Review of patient's allergies indicates:   Allergen Reactions    Tramadol Hives       Medications:    Current Facility-Administered Medications:     acetaminophen tablet 650 mg, 650 mg, Oral, Q6H PRN, Radha Wilson NP    fentaNYL 25 mcg/hr 1 patch, 1 patch, Transdermal, Q72H, Tatyana Gupta PA-C, 1 patch at 07/07/20 1127    folic acid-vit B6-vit B12 2.5-25-2 mg tablet 1 tablet, 1 tablet, Oral, Daily, Shantel Carlson MD, 1 tablet at 07/07/20 0943    HYDROcodone-acetaminophen  mg per tablet 1 tablet, 1 tablet, Oral, Q6H PRN, Radha Wilson NP, 1 tablet at 07/07/20 1110    hydrOXYzine HCL tablet 25 mg, 25 mg, Oral, TID PRN, FLAKITA Boswell, 25 mg at 07/07/20 0409    multivitamin tablet, 1 tablet, Oral, Daily, Shantel Carlson MD, 1 tablet at 07/07/20 0943    ondansetron injection 4 mg, 4 mg, Intravenous, Q8H PRN, Radha Wilson NP    piperacillin-tazobactam 4.5 g in dextrose 5 % 100 mL IVPB (ready to mix system), 4.5 g, Intravenous, Q8H, FLAKITA Boswell, Last Rate: 25 mL/hr at 07/07/20 1421, 4.5 g at 07/07/20 1421    polyethylene glycol packet 17 g, 17 g, Oral, Daily, Tatyana Gupta PA-C, 17 g at 07/07/20 1110    promethazine tablet 25 mg, 25 mg, Oral, Q6H PRN, Radha Wilson NP    thiamine tablet 100 mg, 100 mg, Oral, Daily, Shantel Carlson MD, 100 mg at 07/07/20 0943    ROS:  Review of Systems   Constitutional: Positive for appetite change and fever. Negative for activity change.   HENT: Positive for dental problem and facial swelling. Negative for mouth sores, sore throat and trouble swallowing.    Eyes: Negative for photophobia and visual disturbance.   Respiratory: Negative for cough and shortness of breath.    Cardiovascular:  Negative for chest pain and leg swelling.   Gastrointestinal: Positive for constipation. Negative for abdominal pain, diarrhea and nausea.   Endocrine: Negative for polydipsia and polyphagia.   Genitourinary: Negative for difficulty urinating and dysuria.   Musculoskeletal: Positive for arthralgias, myalgias and neck pain. Negative for back pain and neck stiffness.   Skin: Negative for rash and wound.   Allergic/Immunologic: Positive for immunocompromised state.   Neurological: Negative for syncope, weakness and light-headedness.   Psychiatric/Behavioral: Positive for sleep disturbance (d/t pain). Negative for confusion. The patient is not nervous/anxious.        OBJECTIVE:     Physical Exam:  Vitals: Temp: 98.3 °F (36.8 °C) (07/07/20 1139)  Pulse: 83 (07/07/20 1139)  Resp: 16 (07/07/20 1139)  BP: (!) 116/56 (07/07/20 1139)  SpO2: 99 % (07/07/20 1139)    Gen: well-developed, well-nourished, NAD  Head: atraumatic, normocephalic  Eyes: conjuctiva and sclera clear  Ears: hearing grossly intact with no external abnormality  Mouth: no mucositis, good dentition, left jaw line is edematous, unable to open her mouth fully d/t pain  Respiratory: CTAB, no wheezing or rhonchi  Heart: RRR  Abdomen: firm, NTTP, nondistended, normoactive BS  Pulses: 2+ in DP  Extremities: no edema, full ROM of all joints, no mottling  Neurologic: no focal deficits, CN II-XII grossly intact, normal coordination  Skin: no rashes or lesions  Psych: cooperative, normal mood and affect, normal attention span and concentration    Review of Symptoms    Symptom Assessment (ESAS 0-10 Scale)  Pain:  7  Dyspnea:  0  Anxiety:  0  Nausea:  0  Depression:  0  Anorexia:  5  Fatigue:  5  Insomnia:  0  Restlessness:  0  Agitation:  0     CAM / Delirium:  Negative  Constipation:  Positive  Diarrhea:  Negative    Bowel Management Plan (BMP):  Yes      Pain Assessment:  Location(s): face    Face       Face Location:  Left       Quality:  Pressure-like        Quantity:  7/10 in intensity       Chronicity:  Onset 1 week(s) ago, gradually worsening       Aggravating Factors:  Eating       Alleviating Factors (massage and rubbing alcohol topically):        Associated Symptoms:  Anorexia    ECOG Performance Status Grade:  0 - Fully Active    Advanced Directives:  Living Will: No    LaPOST: No    Do Not Resuscitate Status: No    Medical Power of : Yes     Agent's Name:  Elsa Yang.  Agent's Contact Number:  [phone]    Decision Making:  Patient answered questions    Psychosocial/Cultural:  See PM SW note      Labs:  WBC   Date Value Ref Range Status   07/07/2020 0.97 (LL) 3.90 - 12.70 K/uL Final     Comment:     wbc critical result(s) called and verbal readback obtained from marianne turner rn by KARLA 07/07/2020 06:43       Hemoglobin   Date Value Ref Range Status   07/07/2020 10.2 (L) 12.0 - 16.0 g/dL Final     POC Hematocrit   Date Value Ref Range Status   05/11/2020 25 (L) 36 - 54 %PCV Final     Hematocrit   Date Value Ref Range Status   07/07/2020 31.4 (L) 37.0 - 48.5 % Final     Mean Corpuscular Volume   Date Value Ref Range Status   07/07/2020 93 82 - 98 fL Final     Platelets   Date Value Ref Range Status   07/07/2020 205 150 - 350 K/uL Final       Lab Results   Component Value Date     07/07/2020    K 4.1 07/07/2020     07/07/2020    CO2 23 07/07/2020    BUN 13 07/07/2020    CREATININE 2.0 (H) 07/07/2020    CALCIUM 8.4 (L) 07/07/2020    ANIONGAP 8 07/07/2020    ESTGFRAFRICA 34 (A) 07/07/2020    EGFRNONAA 30 (A) 07/07/2020       Lab Results   Component Value Date    AST 17 07/06/2020    ALKPHOS 51 (L) 07/06/2020    BILITOT 0.9 07/06/2020       Albumin   Date Value Ref Range Status   07/06/2020 4.3 3.5 - 5.2 g/dL Final       Radiology:I have reviewed all pertinent imaging results/findings within the past 24 hours.  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 Differential is wide for this appearance, and includes cocaine abuse, rhinitis medicamentosa (chronic Afrin-type medication abuse), complication related to antiangiogenesis chemotherapy, sinonasal granulomatosis with polyangiitis, sinonasal sarcoid, or less likely but possible invasive fungal sinusitis.     Multiple dental caries are noted.     Exam limited by lack of IV contrast.  No appreciable oropharyngeal or dental abscess. Recommend further evaluation with direct visualization.    ASSESSMENT   Jaswant Yang is a 44 y.o. 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 Whittaker due to fever. She was complaining of jaw pain and edema and was admitted for antibiotics and monitoring. Ms. Yang has an outpatient referral for Palliative Medicine for pain and symptom management but her appointment was at the end of the month so we were asked to see while admitted.      PLAN   1. Neoplasm related pain  - In light of uncontrolled pain and frequent PRN dosing at home we will start a long acting opioid  - Due to renal dysfunction will avoid morphine  - Start Fentanyl 25 mcg/ hr patch q72hr  - Continue hydrocodone 10mg but increase frequency to q4hr PRN    2. Opioid induced constipation  - Start daily Miralax and Senna 1 tab nightly    3. Hairy cell lymphoma  - Defer to oncology  - Currently receiving chemotherapy    4. Encounter for Palliative Care  - Code status: FULL  - HCPOA: mom Elsa Yang, alternate: gilberto Yang  - PM SW assisted with ACP and scanned in to the computer  - Primary goal is to continue cancer targeted therapy  - We will manage pain medication going forth and she already has f/u on 7/21 scheduled    5. ADAMARIS  - Opioid selection driven by elevated Cr  - Continue to monitor    Discussed case and visit details with Dr. RAYSA Pino who was present during rounds.     Thank you for allowing  Palliative Medicine to be involved in the care of Jaswant Yang.       Medical decision making: HIGH based on high risk of death, untreated symptoms, high risk medications, management of more than one chronic illness in exacerbation, managing side effects of medications or polypharmacy     Plan required increased review of medication choice, interaction, dosing, frequency, and route due to patient complexity. Patient complexity increased by: Presence of renal insufficiency    16 min ACP time spent discussing HCPOA/ ACP documents and goals of therapy.      ROGELIO LinC  Palliative Medicine

## 2020-07-07 NOTE — PLAN OF CARE
Problem: Adult Inpatient Plan of Care  Goal: Plan of Care Review  Outcome: Ongoing, Progressing     POC reviewed, including indications and possible side effects of administered medications. Patient verbalized understanding and teach back. No adverse reactions noted. Patient c/o jaw pain and itching. Administered medications per order. VSS. Patient remains free of falls and injuries during shift. Will continue to monitor.    Chart check complete.

## 2020-07-07 NOTE — H&P
"[facility] Hospital Medicine  History & Physical    Patient Name: Jaswant Yang  MRN: [id number]  Admission Date: 7/6/2020  Attending Physician: Shantel Carlson MD   Primary Care Provider: Primary Doctor No         Patient information was obtained from patient, relative(s), past medical records and ER records.     Subjective:     Principal Problem:Dental infection    Chief Complaint:   Chief Complaint   Patient presents with    jaw infection     pt sent from Dr. Whittaker for jaw infection and IV antibiotics        HPI: 45 y/o AA F with hx of hairy cell leukemia (diagnosed 05/2020), anemia, chronic pain to ED from Dr. Whittaker' office after being found to have a low grade temperature (99.6) with a visible dental infection in the R lower jaw and L upper jaw, in the setting of chemotherapy induced neutropenia (WBC 0.89); mild hypokalemia (3.4) also noted in ED, but labs otherwise largely unremarkable - admission for IV abx; BC x2 pending. Pt reports associated severe stabbing pain in the affected jaw areas, and mild facial edema, worsening over the past 2 days but states no drainage/bleeding. Pt's last chemo cycle ended on 06/26/20 - reports pain improved with IV morphine in ED, states she feels "a little better now" - palliative care consulted per ED physician at patient's request, to assist with symptom management and pain control. Denies CP, edema, palpitations, SOB, wheezing, orthopnea, PND, abdominal pain, N/V/D, dysuria, flank pain, HA, dizziness, fever, cough, chills, falls/trauma, blurred vision, focal deficits. No recent dietary changes, travel, sick contacts or viral illness - pt is typically active and independent with ADLs and ambulation at home. Pt is full code. Surrogate decision makers are son (Dmitri Yang) and Mother (Elsa Yang). Admitted for chemotherapy induced neutropenic fever with a jaw/dental infection.     Past Medical History:   Diagnosis Date    Anemia     Anemia     " Back pain     Dental infection     GERD (gastroesophageal reflux disease) 2020    Hairy-cell leukemia of spleen        Past Surgical History:   Procedure Laterality Date     SECTION, CLASSIC      FLUOROSCOPY N/A 2020    Procedure: Spleenic Bleed;  Surgeon: Hiren Cabrera MD;  Location: [facility] CATH LAB;  Service: General;  Laterality: N/A;    INJECTION OF ANESTHETIC AGENT INTO TISSUE PLANE DEFINED BY TRANSVERSUS ABDOMINIS MUSCLE N/A 2020    Procedure: BLOCK, TRANSVERSUS ABDOMINIS PLANE;  Surgeon: Yumi Ferguson MD;  Location: [facility] OR;  Service: General;  Laterality: N/A;    SPLENECTOMY N/A 2020    Procedure: SPLENECTOMY;  Surgeon: Yumi Ferguson MD;  Location: [facility] OR;  Service: General;  Laterality: N/A;    THORACENTESIS Left 2020    Procedure: THORACENTESIS;  Surgeon: Yumi Ferguson MD;  Location: [facility] OR;  Service: General;  Laterality: Left;    TUBAL LIGATION         Review of patient's allergies indicates:   Allergen Reactions    Tramadol Hives       No current facility-administered medications on file prior to encounter.      Current Outpatient Medications on File Prior to Encounter   Medication Sig    albuterol (PROVENTIL/VENTOLIN HFA) 90 mcg/actuation inhaler Inhale 1-2 puffs into the lungs every 6 (six) hours as needed for Wheezing. Rescue    HYDROcodone-acetaminophen (NORCO)  mg per tablet Take 10 mg by mouth 4 (four) times daily.    ondansetron (ZOFRAN-ODT) 8 MG TbDL Take 1 tablet (8 mg total) by mouth every 12 (twelve) hours as needed.    polyethylene glycol (GLYCOLAX) 17 gram/dose powder Take 17 g by mouth once daily.    prochlorperazine (COMPAZINE) 10 MG tablet Take 1 tablet (10 mg total) by mouth every 6 (six) hours as needed.    promethazine (PHENERGAN) 25 MG tablet Take 1 tablet (25 mg total) by mouth every 6 (six) hours as needed for Nausea.     Family History     None        Tobacco Use    Smoking status: Former Smoker      "Packs/day: 0.25     Types: Cigarettes    Smokeless tobacco: Never Used    Tobacco comment: no longer smoker   Substance and Sexual Activity    Alcohol use: Yes     Comment: occasional    Drug use: No    Sexual activity: Yes     Birth control/protection: Condom     Review of Systems   Constitutional: Positive for fever ("low-grade"). Negative for activity change, chills, diaphoresis and fatigue.   HENT: Positive for dental problem and facial swelling (R lower jaw, L upper jaw). Negative for congestion, trouble swallowing and voice change.    Eyes: Negative for photophobia and discharge.   Respiratory: Negative for cough, chest tightness, shortness of breath and wheezing.    Cardiovascular: Negative for chest pain, palpitations and leg swelling.   Gastrointestinal: Negative for abdominal pain, blood in stool, constipation, diarrhea, nausea and vomiting.   Endocrine: Negative for cold intolerance, heat intolerance, polydipsia, polyphagia and polyuria.   Genitourinary: Negative for difficulty urinating, dysuria, flank pain, frequency and urgency.   Musculoskeletal: Positive for back pain (chronic). Negative for joint swelling and myalgias.   Skin: Negative for rash and wound.   Neurological: Positive for weakness (generalized). Negative for dizziness, seizures, syncope, facial asymmetry, light-headedness, numbness and headaches.   Psychiatric/Behavioral: Negative for confusion and hallucinations.     Objective:     Vital Signs (Most Recent):  Temp: 98.3 °F (36.8 °C) (07/07/20 1139)  Pulse: 83 (07/07/20 1139)  Resp: 16 (07/07/20 1139)  BP: (!) 116/56 (07/07/20 1139)  SpO2: 99 % (07/07/20 1139) Vital Signs (24h Range):  Temp:  [98.2 °F (36.8 °C)-98.7 °F (37.1 °C)] 98.3 °F (36.8 °C)  Pulse:  [82-97] 83  Resp:  [14-23] 16  SpO2:  [93 %-100 %] 99 %  BP: (103-143)/(54-82) 116/56     Weight: 124.3 kg (274 lb)  Body mass index is 38.22 kg/m².    Physical Exam  Vitals signs reviewed.   Constitutional:       General: She is " not in acute distress.     Appearance: Normal appearance. She is obese. She is toxic-appearing.   HENT:      Head: Normocephalic and atraumatic.      Nose: Nose normal.      Mouth/Throat:      Mouth: Mucous membranes are moist.      Comments: R lower jaw, L lower jaw redness, swelling of gums  Eyes:      Pupils: Pupils are equal, round, and reactive to light.   Neck:      Musculoskeletal: Normal range of motion and neck supple.   Cardiovascular:      Rate and Rhythm: Normal rate and regular rhythm.      Pulses: Normal pulses.      Heart sounds: Normal heart sounds.   Pulmonary:      Effort: No respiratory distress.      Breath sounds: Normal breath sounds. No wheezing.   Abdominal:      General: Abdomen is flat. Bowel sounds are normal.      Palpations: Abdomen is soft.      Tenderness: There is no abdominal tenderness. There is no guarding or rebound.   Musculoskeletal: Normal range of motion.         General: No signs of injury.   Skin:     General: Skin is warm and dry.      Capillary Refill: Capillary refill takes less than 2 seconds.      Coloration: Skin is not jaundiced.   Neurological:      General: No focal deficit present.      Mental Status: She is alert and oriented to person, place, and time.   Psychiatric:         Mood and Affect: Mood normal.         Behavior: Behavior normal.         Thought Content: Thought content normal.         Judgment: Judgment normal.           CRANIAL NERVES     CN III, IV, VI   Pupils are equal, round, and reactive to light.       Significant Labs:   CBC:   Recent Labs   Lab 07/06/20  0934 07/06/20  1230    WBC 0.89* 0.80*    HGB 11.7* 11.4*    HCT 35.4* 34.0*     248      CMP:   Recent Labs   Lab 07/06/20  0934 07/06/20  1230     137    K 3.6 3.4*     105    CO2 24 23    * 107    BUN 7 7    CREATININE 0.9 0.8    CALCIUM 9.1 8.8    PROT 8.0 7.8    ALBUMIN 4.4 4.3    BILITOT 1.0 0.9    ALKPHOS 53* 51*    AST 19 17    ALT 18 18    ANIONGAP 7* 9     EGFRNONAA >60 >60      Magnesium:   Recent Labs             POCT Glucose: No results for input(s): POCTGLUCOSE in the last 48 hours.  All pertinent labs within the past 24 hours have been reviewed.    Significant Imaging: I have reviewed and interpreted all pertinent imaging results/findings within the past 24 hours.    Assessment/Plan:     * Dental infection  Continue IV vanc/zosyn for now  Follow fever curve  BC x2 pending  Dental soft diet for now  Morphine prn for pain         Neutropenic fever  Consult hematology/oncology  Maintain neutropenic precautions  Tylenol prn fever  UA with reflex culture pending        Chemotherapy-induced neutropenia  Maintain neutropenic precautions  Labs in AM  Continue supplemental thiamine, Folbic, MVI    Hypokalemia  Replace K+  Recheck in AM  Replete as needed      Hairy cell leukemia of spleen  Consult hematology/oncology   Consult palliative care for symptom/pain management        VTE Risk Mitigation (From admission, onward)         Ordered     Place sequential compression device  Until discontinued      07/06/20 0375                   Radha Wilson NP  Department of Hospital Medicine   [facility]

## 2020-07-07 NOTE — CHAPLAIN
Initial visit with patient.  Provided support though listening and presence.  Pt was currently doing okay and had no spiritual needs.  I will follow up as needed.    Chaplain Irineo Gallego M.Div., [facility]

## 2020-07-07 NOTE — ASSESSMENT & PLAN NOTE
Consult hematology/oncology  Maintain neutropenic precautions  Tylenol prn fever  UA with reflex culture pending  IV zosyn   Cultures pending

## 2020-07-07 NOTE — CONSULTS
[facility]  Infectious Disease  Consult Note    Patient Name: Jaswant Yang  MRN: [id number]  Admission Date: 2020  Hospital Length of Stay: 1 days  Attending Physician: Shantel Carlson MD  Primary Care Provider: Primary Doctor No     Isolation Status: Neutropenic    Patient information was obtained from patient, past medical records and ER records.      Consults  Assessment/Plan:     * Dental infection  Will do Ct scan of the maxillary region , will continue empiric zosyn for now .  Will follow cultures     Neutropenic fever  Will continue zosyn for now     Chemotherapy-induced neutropenia  Will follow oncology     Hairy cell leukemia of spleen  Oncology follow up         Thank you for your consult. I will follow-up with patient. Please contact us if you have any additional questions.    Edgard Velasco MD  Infectious Disease  [facility]    Subjective:     Principal Problem: Dental infection    HPI:  44 year old  AA F with hx of hairy cell leukemia (diagnosed 2020), anemia, chronic pain to ED from Dr. Whittaker' office after being found to have a low grade temperature (99.6) with a visible dental infection in the R lower jaw and L upper jaw, in the setting of chemotherapy induced neutropenia (WBC 0.89).  She has prior dental infection .  All previous cultures reviewed -- E coli -urine.  Blood culture -negative    Past Medical History:   Diagnosis Date    Anemia     Anemia     Back pain     Dental infection     GERD (gastroesophageal reflux disease) 2020    Hairy-cell leukemia of spleen        Past Surgical History:   Procedure Laterality Date     SECTION, CLASSIC      FLUOROSCOPY N/A 2020    Procedure: Spleenic Bleed;  Surgeon: Hiren Cabrera MD;  Location: [facility] CATH LAB;  Service: General;  Laterality: N/A;    INJECTION OF ANESTHETIC AGENT INTO TISSUE PLANE DEFINED BY TRANSVERSUS ABDOMINIS MUSCLE N/A 2020    Procedure: BLOCK, TRANSVERSUS  ABDOMINIS PLANE;  Surgeon: Yumi Ferguson MD;  Location: [facility] OR;  Service: General;  Laterality: N/A;    SPLENECTOMY N/A 5/11/2020    Procedure: SPLENECTOMY;  Surgeon: Yumi Ferguson MD;  Location: [facility] OR;  Service: General;  Laterality: N/A;    THORACENTESIS Left 5/11/2020    Procedure: THORACENTESIS;  Surgeon: Yumi Ferguson MD;  Location: [facility] OR;  Service: General;  Laterality: Left;    TUBAL LIGATION         Review of patient's allergies indicates:   Allergen Reactions    Tramadol Hives       Medications:  Medications Prior to Admission   Medication Sig    albuterol (PROVENTIL/VENTOLIN HFA) 90 mcg/actuation inhaler Inhale 1-2 puffs into the lungs every 6 (six) hours as needed for Wheezing. Rescue    HYDROcodone-acetaminophen (NORCO)  mg per tablet Take 10 mg by mouth 4 (four) times daily.    ondansetron (ZOFRAN-ODT) 8 MG TbDL Take 1 tablet (8 mg total) by mouth every 12 (twelve) hours as needed.    polyethylene glycol (GLYCOLAX) 17 gram/dose powder Take 17 g by mouth once daily.    prochlorperazine (COMPAZINE) 10 MG tablet Take 1 tablet (10 mg total) by mouth every 6 (six) hours as needed.    promethazine (PHENERGAN) 25 MG tablet Take 1 tablet (25 mg total) by mouth every 6 (six) hours as needed for Nausea.     Antibiotics (From admission, onward)    Start     Stop Route Frequency Ordered    07/07/20 0500  piperacillin-tazobactam 4.5 g in dextrose 5 % 100 mL IVPB (ready to mix system)      -- IV Every 8 hours (non-standard times) 07/07/20 0351        Antifungals (From admission, onward)    None        Antivirals (From admission, onward)    None           Immunization History   Administered Date(s) Administered    DTP 1976, 03/04/1977, 05/06/1977, 05/26/1978, 12/05/1980    Influenza - Quadrivalent - PF (6 months and older) 09/21/2019, 05/16/2020    MMR 10/28/1977    Meningococcal Conjugate (MCV4P) 05/16/2020    Pneumococcal Conjugate - 13 Valent 05/16/2020     Poliovirus 1976, 03/04/1977, 05/06/1977, 05/26/1978, 12/05/1980       Family History     None        Social History     Socioeconomic History    Marital status: Single     Spouse name: Not on file    Number of children: Not on file    Years of education: Not on file    Highest education level: Not on file   Occupational History    Not on file   Social Needs    Financial resource strain: Not on file    Food insecurity     Worry: Not on file     Inability: Not on file    Transportation needs     Medical: Not on file     Non-medical: Not on file   Tobacco Use    Smoking status: Former Smoker     Packs/day: 0.25     Types: Cigarettes    Smokeless tobacco: Never Used    Tobacco comment: no longer smoker   Substance and Sexual Activity    Alcohol use: Yes     Comment: occasional    Drug use: No    Sexual activity: Yes     Birth control/protection: Condom   Lifestyle    Physical activity     Days per week: Not on file     Minutes per session: Not on file    Stress: Not on file   Relationships    Social connections     Talks on phone: Not on file     Gets together: Not on file     Attends Hindu service: Not on file     Active member of club or organization: Not on file     Attends meetings of clubs or organizations: Not on file     Relationship status: Not on file   Other Topics Concern    Not on file   Social History Narrative    Not on file     Review of Systems   Constitutional: Negative for chills and fatigue.   HENT: Positive for dental problem. Negative for congestion, ear pain, facial swelling, sinus pressure and sore throat.    Eyes: Negative for pain.   Respiratory: Negative for apnea, chest tightness, shortness of breath and stridor.    Cardiovascular: Negative for chest pain, palpitations and leg swelling.   Gastrointestinal: Negative for abdominal distention, abdominal pain, diarrhea and nausea.   Endocrine: Negative for polydipsia and polyphagia.   Genitourinary: Negative for  decreased urine volume, difficulty urinating, frequency and genital sores.   Musculoskeletal: Negative for arthralgias and gait problem.   Neurological: Negative for light-headedness and headaches.   Hematological: Negative for adenopathy.   Psychiatric/Behavioral: Negative for agitation, confusion and decreased concentration.     Objective:     Vital Signs (Most Recent):  Temp: 98.6 °F (37 °C) (07/07/20 0745)  Pulse: 82 (07/07/20 0745)  Resp: 14 (07/07/20 0745)  BP: (!) 120/56 (07/07/20 0745)  SpO2: 99 % (07/07/20 0745) Vital Signs (24h Range):  Temp:  [98.2 °F (36.8 °C)-99.6 °F (37.6 °C)] 98.6 °F (37 °C)  Pulse:  [] 82  Resp:  [14-23] 14  SpO2:  [93 %-100 %] 99 %  BP: (103-174)/() 120/56     Weight: 124.3 kg (274 lb)  Body mass index is 38.22 kg/m².    Estimated Creatinine Clearance: 52.8 mL/min (A) (based on SCr of 2 mg/dL (H)).    Physical Exam  Vitals signs and nursing note reviewed.   Constitutional:       Appearance: She is well-developed.   HENT:      Head: Normocephalic and atraumatic.      Mouth/Throat:      Comments: Dental caries  Eyes:      Conjunctiva/sclera: Conjunctivae normal.      Pupils: Pupils are equal, round, and reactive to light.   Neck:      Musculoskeletal: Normal range of motion and neck supple.      Thyroid: No thyroid mass or thyromegaly.   Cardiovascular:      Rate and Rhythm: Normal rate.      Heart sounds: Normal heart sounds.   Pulmonary:      Effort: Pulmonary effort is normal. No accessory muscle usage or respiratory distress.      Breath sounds: Normal breath sounds.   Abdominal:      General: Bowel sounds are normal.      Palpations: Abdomen is soft. There is no mass.      Tenderness: There is no abdominal tenderness.   Musculoskeletal: Normal range of motion.   Skin:     Findings: No rash.   Neurological:      Mental Status: She is alert and oriented to person, place, and time.         Significant Labs:   Blood Culture:   Recent Labs   Lab 07/06/20  1230  07/06/20  1238   LABBLOO No Growth to date No Growth to date     CBC:   Recent Labs   Lab 07/06/20  0934 07/06/20  1230 07/07/20  0515   WBC 0.89* 0.80* 0.97*   HGB 11.7* 11.4* 10.2*   HCT 35.4* 34.0* 31.4*    248 205     CMP:   Recent Labs   Lab 07/06/20  0934 07/06/20  1230 07/07/20  0515    137 136   K 3.6 3.4* 4.1    105 105   CO2 24 23 23   * 107 102   BUN 7 7 13   CREATININE 0.9 0.8 2.0*   CALCIUM 9.1 8.8 8.4*   PROT 8.0 7.8  --    ALBUMIN 4.4 4.3  --    BILITOT 1.0 0.9  --    ALKPHOS 53* 51*  --    AST 19 17  --    ALT 18 18  --    ANIONGAP 7* 9 8   EGFRNONAA >60 >60 30*     All pertinent labs within the past 24 hours have been reviewed.    Significant Imaging: I have reviewed all pertinent imaging results/findings within the past 24 hours.

## 2020-07-07 NOTE — SUBJECTIVE & OBJECTIVE
"Interval History: The patient reports pain is not well controlled. Palliative care was consulted. The patient remained afebrile overnight. CT maxiollofacial CT was negative for a dental abscess but showed multiple dental caries. The patient remains neutropenic, Hem/onc following. Continue current management with IV ABX.       Review of Systems   Constitutional: Positive for fever ("low-grade"). Negative for activity change, appetite change, chills, diaphoresis, fatigue and unexpected weight change.   HENT: Positive for dental problem and facial swelling (R lower jaw, L upper jaw). Negative for congestion, drooling, rhinorrhea, sinus pressure, sneezing, sore throat, trouble swallowing and voice change.    Eyes: Negative for photophobia, discharge, redness, itching and visual disturbance.   Respiratory: Negative for apnea, cough, choking, chest tightness, shortness of breath, wheezing and stridor.    Cardiovascular: Negative for chest pain, palpitations and leg swelling.   Gastrointestinal: Negative for abdominal distention, abdominal pain, anal bleeding, blood in stool, constipation, diarrhea, nausea and vomiting.   Endocrine: Negative for cold intolerance, heat intolerance, polydipsia, polyphagia and polyuria.   Genitourinary: Negative for decreased urine volume, difficulty urinating, dysuria, flank pain, frequency, hematuria, pelvic pain, urgency, vaginal bleeding and vaginal discharge.   Musculoskeletal: Positive for back pain (chronic). Negative for arthralgias, gait problem, joint swelling, myalgias, neck pain and neck stiffness.   Skin: Negative for color change, pallor, rash and wound.   Neurological: Positive for weakness (generalized). Negative for dizziness, seizures, syncope, facial asymmetry, speech difficulty, light-headedness, numbness and headaches.   Psychiatric/Behavioral: Negative for agitation, confusion, hallucinations and suicidal ideas.   All other systems reviewed and are " negative.    Objective:     Vital Signs (Most Recent):  Temp: 98.3 °F (36.8 °C) (07/07/20 1558)  Pulse: 84 (07/07/20 1558)  Resp: 18 (07/07/20 1559)  BP: (!) 118/58 (07/07/20 1558)  SpO2: 98 % (07/07/20 1558) Vital Signs (24h Range):  Temp:  [98.3 °F (36.8 °C)-98.7 °F (37.1 °C)] 98.3 °F (36.8 °C)  Pulse:  [82-97] 84  Resp:  [14-18] 18  SpO2:  [93 %-99 %] 98 %  BP: (103-123)/(54-68) 118/58     Weight: 124.3 kg (274 lb)  Body mass index is 38.22 kg/m².    Intake/Output Summary (Last 24 hours) at 7/7/2020 1731  Last data filed at 7/7/2020 1421  Gross per 24 hour   Intake 1200 ml   Output --   Net 1200 ml      Physical Exam  Vitals signs and nursing note reviewed.   Constitutional:       General: She is not in acute distress.     Appearance: Normal appearance. She is well-developed. She is obese. She is toxic-appearing.   HENT:      Head: Normocephalic and atraumatic.      Nose: Nose normal.      Mouth/Throat:      Mouth: Mucous membranes are moist.      Comments: R lower jaw, L lower jaw redness, swelling of gums  Eyes:      Conjunctiva/sclera: Conjunctivae normal.      Pupils: Pupils are equal, round, and reactive to light.   Neck:      Musculoskeletal: Normal range of motion and neck supple.   Cardiovascular:      Rate and Rhythm: Normal rate and regular rhythm.      Pulses: Normal pulses.      Heart sounds: Normal heart sounds. No murmur.   Pulmonary:      Effort: Pulmonary effort is normal. No respiratory distress.      Breath sounds: Normal breath sounds. No wheezing.   Abdominal:      General: Abdomen is flat. Bowel sounds are normal. There is no distension.      Palpations: Abdomen is soft.      Tenderness: There is no abdominal tenderness. There is no guarding or rebound.   Musculoskeletal: Normal range of motion.         General: No tenderness, deformity or signs of injury.   Skin:     General: Skin is warm and dry.      Capillary Refill: Capillary refill takes less than 2 seconds.      Coloration: Skin is not  jaundiced.      Findings: No erythema.   Neurological:      General: No focal deficit present.      Mental Status: She is alert and oriented to person, place, and time.      Deep Tendon Reflexes: Reflexes are normal and symmetric.   Psychiatric:         Mood and Affect: Mood normal.         Behavior: Behavior normal.         Thought Content: Thought content normal.         Judgment: Judgment normal.         Significant Labs: All pertinent labs within the past 24 hours have been reviewed.    Significant Imaging:   Imaging Results    None

## 2020-07-07 NOTE — PROGRESS NOTES
"[facility]   Palliative Medicine   Psychosocial Assessment    Patient Name: Jaswant Yang  MRN: [id number]  Admission Date: 7/6/2020  Hospital Length of Stay: 1 days  Code Status: Prior   Attending Provider: Shantel Carlson MD  Palliative Care Provider: Tatyana Gupta PA-C  Primary Care Physician: Primary Doctor No  Principal Problem:Dental infection    Reason for Referral: symptom mgmt  Consult Order Date: 07/06/2020  Primary CM/SW:     Present during Interview: patient.      Primary Language:English   Needed: no      Past Medical Situation:   PMH:   Past Medical History:   Diagnosis Date    Anemia     Anemia     Back pain     Dental infection     GERD (gastroesophageal reflux disease) 5/14/2020    Hairy-cell leukemia of spleen      Mental Health/Substance Use History: patient denies any history of either  Risk of Abuse, neglect or exploitation:   Current or Previous Trauma and/or evidence of PTSD:   Non-traditional Health practices:     Understanding of diagnosis and prognosis: Patient able to explain why she is in the hospital, her diagnosis/prognosis, and plan of care  Experience/Comfort level with health care system:     Patients Mental Status: alert and oriented     Socio-Economic Factors/Resources:  Address: [address] [place] [address]  Phone Number: [phone] (home)     Marital Status: in a relationship (S/o Mazin Kaiser), never been   Perceived impact on household composition: Patient reports her 6 children are all coping appropriately with her diagnosis. Patient is expecting her first grandchild soon, so her children have been encouraging compliance with treatment so she "can be around for the babies". We discussed available supports for her children should they need any counseling or support groups.    Children: Dmitri (28), Cheri (18, has sickle cell anemia), Venus (15) live in the home with patient and her s/o, Mazin. Her other 3 " daughters do not live in the home but live in [facility] (22), [facility] (20 - pregnant daughter), and [facility] (24).    Patient/Family perceptions about Caregiving Needs; availability and capacity: Patient reports her family is very supportive and available to assist her when needed.    Family Dynamics/Relationships:     Patient/Family Strengths/Resilience:  Patient/Family Coping Strategies:     Activities of Daily Living: independent with ADLs  Support Systems-Family & Community (Home Health, HME etc): n/a    Transportation:  yes    Work/Education History: unemployed and has applied for SSI/Disability (November will make 6 months since she has been out of work, so she is hoping to her from SSI then)  Self-Care Activities/Hobbies:      History: no    Financial Resources:Medicaid. 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 She reports she has not struggled financially yet, but has applied for SSI and is aware of financial resources available for patients with cancer      Advanced Care Planning & Legal Concerns:   Advanced Directives/Living Will: We discussed LW today, but patient was not ready to complete. Encouraged her to ready  over LW and make her wishes know to her HCPOA  [place]POST/POLST: no   Planning:  no    Power of : yes  1st choice: Elsa Yang, mother [phone]  2nd choice: Dmitri Yang, son [phone]    Emergency Contacts: same as HCPOA    Spirituality, Culture & Coping Mechanisms:  F- Capri and Belief: Non-Bahai     I - Importance:     C - Community/Culture Values:     A - Address in Care: patient denies any spiritual needs at this time      Goals/Hopes/Expectations: pain control, continue chemo  Fears/Anxiety/Concerns: patient was a bit guarded with discussing EOL wishes/Living will. She reports this is difficult to thing about at this time      Preferences about EOL Environment: (own bed, family nearby, pets, music, etc)      Complicated  "Bereavement Risk Assessment Tool (CBRAT)  Reference:  [facility] Clinical Practice Group (May 2016). Bereavement Risk Screening and Management Guidelines.  Retrieved from: http://www.[employer]ak[employer].com.au/wp-content/uploads//YFMWF-Pkdqepbtowi-Pdpzuawgo-and-Management-Guideline-2016.pdf      Bereaved Client Characteristics   ? Under 18      yes  ? Was a Twin   no  ? Young Spouse   no  ? Elderly Spouse    no  ? Isolated    no  ? Lacks Meaningful Social Support   no  ? Dissatisfied with help available during illness   no  ? New to Financial [place] no  ? New to Decision-Making   no    Illness  ? Inherited Disorder   no  ? Stigmatized Disease in the family/community   no  ? Lengthy/Burdensome   no     Bereaved Client's History of Loss   ? Cumulative Multiple Losses   no  ? Previous Mental Health Illnesses   no  ? Current Mental Health Illness   no  ? Other Significant Health Issues   no   ? Migrant/Refugee   no Death  ? Sudden or Unexpected   no  ? Traumatic Circumstances Associated with Death   no  ? Significant Cultural/Social Burdens as a result of Death   no   Relationship with   ? Profound Lifelong Partner   no  ? Highly Dependent    no  ? Antagonistic   no  ? Ambivalent   no  ? Deeply Connected   no  ? Culturally Defined   no   Risk Factors Scores  0-2  Low  3-5  Moderate  5+  High  All persons scoring moderate to high presume to be at risk**    (** It is acknowledged that protective factors and resilience may outweigh apparent risk factors.      Total Risk Factors Score:   Moderate  Patient's family was not present during visit, but she has 6 children aged 14-28. She reports they are all very close, and they are "on her" about compliance with treatment and medical care. Patient is aware of support available for her children and would benefit from continued SW support. Patient reports Brooke is her oncology SW and is aware she is available PRN for support " in the clinic. Patient is scheduled to see Dr. Pino in the palliative med clinic on 7/21 and has my direct contact information for any needs that may arise.      Discharge Planning Needs/Plan of Care: CM to follow for d/c planning      RHEA Marcano, [facility]  Palliative Medicine  [phone]

## 2020-07-07 NOTE — SUBJECTIVE & OBJECTIVE
Past Medical History:   Diagnosis Date    Anemia     Anemia     Back pain     Dental infection     GERD (gastroesophageal reflux disease) 2020    Hairy-cell leukemia of spleen        Past Surgical History:   Procedure Laterality Date     SECTION, CLASSIC      FLUOROSCOPY N/A 2020    Procedure: Spleenic Bleed;  Surgeon: Hiren Cabrera MD;  Location: [facility] CATH LAB;  Service: General;  Laterality: N/A;    INJECTION OF ANESTHETIC AGENT INTO TISSUE PLANE DEFINED BY TRANSVERSUS ABDOMINIS MUSCLE N/A 2020    Procedure: BLOCK, TRANSVERSUS ABDOMINIS PLANE;  Surgeon: Yumi Ferguson MD;  Location: [facility] OR;  Service: General;  Laterality: N/A;    SPLENECTOMY N/A 2020    Procedure: SPLENECTOMY;  Surgeon: Yumi Ferguson MD;  Location: [facility] OR;  Service: General;  Laterality: N/A;    THORACENTESIS Left 2020    Procedure: THORACENTESIS;  Surgeon: Yumi Ferguson MD;  Location: [facility] OR;  Service: General;  Laterality: Left;    TUBAL LIGATION         Review of patient's allergies indicates:   Allergen Reactions    Tramadol Hives       No current facility-administered medications on file prior to encounter.      Current Outpatient Medications on File Prior to Encounter   Medication Sig    albuterol (PROVENTIL/VENTOLIN HFA) 90 mcg/actuation inhaler Inhale 1-2 puffs into the lungs every 6 (six) hours as needed for Wheezing. Rescue    HYDROcodone-acetaminophen (NORCO)  mg per tablet Take 10 mg by mouth 4 (four) times daily.    ondansetron (ZOFRAN-ODT) 8 MG TbDL Take 1 tablet (8 mg total) by mouth every 12 (twelve) hours as needed.    polyethylene glycol (GLYCOLAX) 17 gram/dose powder Take 17 g by mouth once daily.    prochlorperazine (COMPAZINE) 10 MG tablet Take 1 tablet (10 mg total) by mouth every 6 (six) hours as needed.    promethazine (PHENERGAN) 25 MG tablet Take 1 tablet (25 mg total) by mouth every 6 (six) hours as needed for Nausea.     Family History      "None        Tobacco Use    Smoking status: Former Smoker     Packs/day: 0.25     Types: Cigarettes    Smokeless tobacco: Never Used    Tobacco comment: no longer smoker   Substance and Sexual Activity    Alcohol use: Yes     Comment: occasional    Drug use: No    Sexual activity: Yes     Birth control/protection: Condom     Review of Systems   Constitutional: Positive for fever ("low-grade"). Negative for activity change, chills, diaphoresis and fatigue.   HENT: Positive for dental problem and facial swelling (R lower jaw, L upper jaw). Negative for congestion, trouble swallowing and voice change.    Eyes: Negative for photophobia and discharge.   Respiratory: Negative for cough, chest tightness, shortness of breath and wheezing.    Cardiovascular: Negative for chest pain, palpitations and leg swelling.   Gastrointestinal: Negative for abdominal pain, blood in stool, constipation, diarrhea, nausea and vomiting.   Endocrine: Negative for cold intolerance, heat intolerance, polydipsia, polyphagia and polyuria.   Genitourinary: Negative for difficulty urinating, dysuria, flank pain, frequency and urgency.   Musculoskeletal: Positive for back pain (chronic). Negative for joint swelling and myalgias.   Skin: Negative for rash and wound.   Neurological: Positive for weakness (generalized). Negative for dizziness, seizures, syncope, facial asymmetry, light-headedness, numbness and headaches.   Psychiatric/Behavioral: Negative for confusion and hallucinations.     Objective:     Vital Signs (Most Recent):  Temp: 98.3 °F (36.8 °C) (07/07/20 1139)  Pulse: 83 (07/07/20 1139)  Resp: 16 (07/07/20 1139)  BP: (!) 116/56 (07/07/20 1139)  SpO2: 99 % (07/07/20 1139) Vital Signs (24h Range):  Temp:  [98.2 °F (36.8 °C)-98.7 °F (37.1 °C)] 98.3 °F (36.8 °C)  Pulse:  [82-97] 83  Resp:  [14-23] 16  SpO2:  [93 %-100 %] 99 %  BP: (103-143)/(54-82) 116/56     Weight: 124.3 kg (274 lb)  Body mass index is 38.22 kg/m².    Physical " Exam  Vitals signs reviewed.   Constitutional:       General: She is not in acute distress.     Appearance: Normal appearance. She is obese. She is toxic-appearing.   HENT:      Head: Normocephalic and atraumatic.      Nose: Nose normal.      Mouth/Throat:      Mouth: Mucous membranes are moist.      Comments: R lower jaw, L lower jaw redness, swelling of gums  Eyes:      Pupils: Pupils are equal, round, and reactive to light.   Neck:      Musculoskeletal: Normal range of motion and neck supple.   Cardiovascular:      Rate and Rhythm: Normal rate and regular rhythm.      Pulses: Normal pulses.      Heart sounds: Normal heart sounds.   Pulmonary:      Effort: No respiratory distress.      Breath sounds: Normal breath sounds. No wheezing.   Abdominal:      General: Abdomen is flat. Bowel sounds are normal.      Palpations: Abdomen is soft.      Tenderness: There is no abdominal tenderness. There is no guarding or rebound.   Musculoskeletal: Normal range of motion.         General: No signs of injury.   Skin:     General: Skin is warm and dry.      Capillary Refill: Capillary refill takes less than 2 seconds.      Coloration: Skin is not jaundiced.   Neurological:      General: No focal deficit present.      Mental Status: She is alert and oriented to person, place, and time.   Psychiatric:         Mood and Affect: Mood normal.         Behavior: Behavior normal.         Thought Content: Thought content normal.         Judgment: Judgment normal.           CRANIAL NERVES     CN III, IV, VI   Pupils are equal, round, and reactive to light.       Significant Labs:   CBC:   Recent Labs   Lab 07/06/20  0934 07/06/20  1230    WBC 0.89* 0.80*    HGB 11.7* 11.4*    HCT 35.4* 34.0*     248      CMP:   Recent Labs   Lab 07/06/20  0934 07/06/20  1230     137    K 3.6 3.4*     105    CO2 24 23    * 107    BUN 7 7    CREATININE 0.9 0.8    CALCIUM 9.1 8.8    PROT 8.0 7.8    ALBUMIN 4.4 4.3    BILITOT 1.0 0.9     ALKPHOS 53* 51*    AST 19 17    ALT 18 18    ANIONGAP 7* 9    EGFRNONAA >60 >60      Magnesium:   Recent Labs             POCT Glucose: No results for input(s): POCTGLUCOSE in the last 48 hours.  All pertinent labs within the past 24 hours have been reviewed.    Significant Imaging: I have reviewed and interpreted all pertinent imaging results/findings within the past 24 hours.

## 2020-07-08 PROBLEM — N17.9 AKI (ACUTE KIDNEY INJURY): Status: ACTIVE | Noted: 2020-07-08

## 2020-07-08 PROBLEM — E83.51 HYPOCALCEMIA: Status: ACTIVE | Noted: 2020-07-08

## 2020-07-08 LAB
ANION GAP SERPL CALC-SCNC: 8 MMOL/L (ref 8–16)
ANISOCYTOSIS BLD QL SMEAR: SLIGHT
BACTERIA UR CULT: NO GROWTH
BASOPHILS # BLD AUTO: 0.01 K/UL (ref 0–0.2)
BASOPHILS NFR BLD: 0.7 % (ref 0–1.9)
BUN SERPL-MCNC: 22 MG/DL (ref 6–20)
CALCIUM SERPL-MCNC: 8.3 MG/DL (ref 8.7–10.5)
CHLORIDE SERPL-SCNC: 106 MMOL/L (ref 95–110)
CO2 SERPL-SCNC: 22 MMOL/L (ref 23–29)
CREAT SERPL-MCNC: 4.9 MG/DL (ref 0.5–1.4)
DACRYOCYTES BLD QL SMEAR: ABNORMAL
DIFFERENTIAL METHOD: ABNORMAL
EOSINOPHIL # BLD AUTO: 0.1 K/UL (ref 0–0.5)
EOSINOPHIL NFR BLD: 4.3 % (ref 0–8)
ERYTHROCYTE [DISTWIDTH] IN BLOOD BY AUTOMATED COUNT: 20.1 % (ref 11.5–14.5)
EST. GFR  (AFRICAN AMERICAN): 12 ML/MIN/1.73 M^2
EST. GFR  (NON AFRICAN AMERICAN): 10 ML/MIN/1.73 M^2
GLUCOSE SERPL-MCNC: 105 MG/DL (ref 70–110)
HCT VFR BLD AUTO: 31.6 % (ref 37–48.5)
HGB BLD-MCNC: 10 G/DL (ref 12–16)
HYPOCHROMIA BLD QL SMEAR: ABNORMAL
IMM GRANULOCYTES # BLD AUTO: 0.02 K/UL (ref 0–0.04)
IMM GRANULOCYTES NFR BLD AUTO: 1.4 % (ref 0–0.5)
LYMPHOCYTES # BLD AUTO: 0.3 K/UL (ref 1–4.8)
LYMPHOCYTES NFR BLD: 22.7 % (ref 18–48)
MAGNESIUM SERPL-MCNC: 2.2 MG/DL (ref 1.6–2.6)
MCH RBC QN AUTO: 30 PG (ref 27–31)
MCHC RBC AUTO-ENTMCNC: 31.6 G/DL (ref 32–36)
MCV RBC AUTO: 95 FL (ref 82–98)
MONOCYTES # BLD AUTO: 0.1 K/UL (ref 0.3–1)
MONOCYTES NFR BLD: 8.5 % (ref 4–15)
NEUTROPHILS # BLD AUTO: 0.9 K/UL (ref 1.8–7.7)
NEUTROPHILS NFR BLD: 62.4 % (ref 38–73)
NRBC BLD-RTO: 4 /100 WBC
OVALOCYTES BLD QL SMEAR: ABNORMAL
PLATELET # BLD AUTO: 189 K/UL (ref 150–350)
PLATELET BLD QL SMEAR: ABNORMAL
PMV BLD AUTO: 9.7 FL (ref 9.2–12.9)
POIKILOCYTOSIS BLD QL SMEAR: SLIGHT
POLYCHROMASIA BLD QL SMEAR: ABNORMAL
POTASSIUM SERPL-SCNC: 4.4 MMOL/L (ref 3.5–5.1)
RBC # BLD AUTO: 3.33 M/UL (ref 4–5.4)
SODIUM SERPL-SCNC: 136 MMOL/L (ref 136–145)
STOMATOCYTES BLD QL SMEAR: PRESENT
TARGETS BLD QL SMEAR: ABNORMAL
WBC # BLD AUTO: 1.41 K/UL (ref 3.9–12.7)

## 2020-07-08 PROCEDURE — 83735 ASSAY OF MAGNESIUM: CPT

## 2020-07-08 PROCEDURE — 25000003 PHARM REV CODE 250: Performed by: NURSE PRACTITIONER

## 2020-07-08 PROCEDURE — 80048 BASIC METABOLIC PNL TOTAL CA: CPT

## 2020-07-08 PROCEDURE — 99233 SBSQ HOSP IP/OBS HIGH 50: CPT | Mod: ,,, | Performed by: INTERNAL MEDICINE

## 2020-07-08 PROCEDURE — 11000001 HC ACUTE MED/SURG PRIVATE ROOM

## 2020-07-08 PROCEDURE — 99233 PR SUBSEQUENT HOSPITAL CARE,LEVL III: ICD-10-PCS | Mod: ,,, | Performed by: INTERNAL MEDICINE

## 2020-07-08 PROCEDURE — 25000003 PHARM REV CODE 250: Performed by: PHYSICIAN ASSISTANT

## 2020-07-08 PROCEDURE — 36415 COLL VENOUS BLD VENIPUNCTURE: CPT

## 2020-07-08 PROCEDURE — 99233 PR SUBSEQUENT HOSPITAL CARE,LEVL III: ICD-10-PCS | Mod: ,,, | Performed by: PHYSICIAN ASSISTANT

## 2020-07-08 PROCEDURE — 63600175 PHARM REV CODE 636 W HCPCS: Performed by: PHYSICIAN ASSISTANT

## 2020-07-08 PROCEDURE — 25000003 PHARM REV CODE 250: Performed by: EMERGENCY MEDICINE

## 2020-07-08 PROCEDURE — 85025 COMPLETE CBC W/AUTO DIFF WBC: CPT

## 2020-07-08 PROCEDURE — 99233 SBSQ HOSP IP/OBS HIGH 50: CPT | Mod: ,,, | Performed by: PHYSICIAN ASSISTANT

## 2020-07-08 RX ORDER — SODIUM CHLORIDE 9 MG/ML
INJECTION, SOLUTION INTRAVENOUS CONTINUOUS
Status: DISCONTINUED | OUTPATIENT
Start: 2020-07-08 | End: 2020-07-18 | Stop reason: HOSPADM

## 2020-07-08 RX ORDER — GABAPENTIN 300 MG/1
300 CAPSULE ORAL NIGHTLY
Status: DISCONTINUED | OUTPATIENT
Start: 2020-07-08 | End: 2020-07-18 | Stop reason: HOSPADM

## 2020-07-08 RX ORDER — OXYCODONE HYDROCHLORIDE 5 MG/1
10 TABLET ORAL EVERY 4 HOURS PRN
Status: DISCONTINUED | OUTPATIENT
Start: 2020-07-08 | End: 2020-07-18 | Stop reason: HOSPADM

## 2020-07-08 RX ADMIN — PIPERACILLIN AND TAZOBACTAM 4.5 G: 4; .5 INJECTION, POWDER, LYOPHILIZED, FOR SOLUTION INTRAVENOUS; PARENTERAL at 04:07

## 2020-07-08 RX ADMIN — HYDROCODONE BITARTRATE AND ACETAMINOPHEN 1 TABLET: 10; 325 TABLET ORAL at 09:07

## 2020-07-08 RX ADMIN — HYDROCODONE BITARTRATE AND ACETAMINOPHEN 1 TABLET: 10; 325 TABLET ORAL at 01:07

## 2020-07-08 RX ADMIN — SODIUM CHLORIDE: 0.9 INJECTION, SOLUTION INTRAVENOUS at 08:07

## 2020-07-08 RX ADMIN — OXYCODONE 10 MG: 5 TABLET ORAL at 05:07

## 2020-07-08 RX ADMIN — OXYCODONE 10 MG: 5 TABLET ORAL at 10:07

## 2020-07-08 RX ADMIN — HYDROXYZINE HYDROCHLORIDE 25 MG: 25 TABLET, FILM COATED ORAL at 07:07

## 2020-07-08 RX ADMIN — HYDROCODONE BITARTRATE AND ACETAMINOPHEN 1 TABLET: 10; 325 TABLET ORAL at 05:07

## 2020-07-08 RX ADMIN — GABAPENTIN 300 MG: 300 CAPSULE ORAL at 10:07

## 2020-07-08 RX ADMIN — Medication 1 TABLET: at 08:07

## 2020-07-08 RX ADMIN — Medication 100 MG: at 08:07

## 2020-07-08 RX ADMIN — THERA TABS 1 TABLET: TAB at 08:07

## 2020-07-08 RX ADMIN — SENNOSIDES 8.6 MG: 8.6 TABLET, FILM COATED ORAL at 10:07

## 2020-07-08 RX ADMIN — POLYETHYLENE GLYCOL 3350 17 G: 17 POWDER, FOR SOLUTION ORAL at 08:07

## 2020-07-08 NOTE — SUBJECTIVE & OBJECTIVE
Past Medical History:   Diagnosis Date    Anemia     Anemia     Back pain     Dental infection     GERD (gastroesophageal reflux disease) 2020    Hairy-cell leukemia of spleen        Past Surgical History:   Procedure Laterality Date     SECTION, CLASSIC      FLUOROSCOPY N/A 2020    Procedure: Spleenic Bleed;  Surgeon: Hiren Cabrera MD;  Location: [facility] CATH LAB;  Service: General;  Laterality: N/A;    INJECTION OF ANESTHETIC AGENT INTO TISSUE PLANE DEFINED BY TRANSVERSUS ABDOMINIS MUSCLE N/A 2020    Procedure: BLOCK, TRANSVERSUS ABDOMINIS PLANE;  Surgeon: Yumi Ferguson MD;  Location: [facility] OR;  Service: General;  Laterality: N/A;    SPLENECTOMY N/A 2020    Procedure: SPLENECTOMY;  Surgeon: Yumi Ferguson MD;  Location: [facility] OR;  Service: General;  Laterality: N/A;    THORACENTESIS Left 2020    Procedure: THORACENTESIS;  Surgeon: Yumi Ferguson MD;  Location: [facility] OR;  Service: General;  Laterality: Left;    TUBAL LIGATION         Review of patient's allergies indicates:   Allergen Reactions    Tramadol Hives     Current Facility-Administered Medications   Medication Frequency    0.9%  NaCl infusion Continuous    acetaminophen tablet 650 mg Q6H PRN    fentaNYL 25 mcg/hr 1 patch Q72H    folic acid-vit B6-vit B12 2.5-25-2 mg tablet 1 tablet Daily    HYDROcodone-acetaminophen  mg per tablet 1 tablet Q4H PRN    hydrOXYzine HCL tablet 25 mg TID PRN    multivitamin tablet Daily    ondansetron injection 4 mg Q8H PRN    polyethylene glycol packet 17 g Daily    promethazine tablet 25 mg Q6H PRN    senna tablet 8.6 mg QHS    thiamine tablet 100 mg Daily     Family History     None        Tobacco Use    Smoking status: Former Smoker     Packs/day: 0.25     Types: Cigarettes    Smokeless tobacco: Never Used    Tobacco comment: no longer smoker   Substance and Sexual Activity    Alcohol use: Yes     Comment: occasional    Drug use: No     Sexual activity: Yes     Birth control/protection: Condom     Review of Systems   Constitutional: Negative for fatigue and fever.   HENT: Negative for congestion.    Eyes: Negative for visual disturbance.   Respiratory: Negative for cough, shortness of breath and wheezing.    Cardiovascular: Negative for chest pain and palpitations.   Gastrointestinal: Positive for vomiting. Negative for abdominal pain, diarrhea and nausea.   Genitourinary: Negative for difficulty urinating and dysuria.   Musculoskeletal: Negative for joint swelling.   Skin: Negative for rash.   Neurological: Positive for weakness. Negative for headaches.     Objective:     Vital Signs (Most Recent):  Temp: 97.9 °F (36.6 °C) (07/08/20 0728)  Pulse: 84 (07/08/20 0728)  Resp: 15 (07/08/20 0947)  BP: (!) 98/57 (07/08/20 0728)  SpO2: 99 % (07/08/20 0728)  O2 Device (Oxygen Therapy): room air (07/07/20 0743) Vital Signs (24h Range):  Temp:  [97.9 °F (36.6 °C)-98.3 °F (36.8 °C)] 97.9 °F (36.6 °C)  Pulse:  [77-84] 84  Resp:  [14-18] 15  SpO2:  [98 %-100 %] 99 %  BP: ()/(56-65) 98/57     Weight: 124.3 kg (274 lb) (07/06/20 1700)  Body mass index is 38.22 kg/m².  Body surface area is 2.5 meters squared.    I/O last 3 completed shifts:  In: 1500 [P.O.:550; IV Piggyback:950]  Out: -     Physical Exam  Constitutional:       Appearance: She is well-developed.   HENT:      Head: Normocephalic.   Eyes:      Pupils: Pupils are equal, round, and reactive to light.   Neck:      Thyroid: No thyromegaly.   Cardiovascular:      Rate and Rhythm: Normal rate and regular rhythm.      Heart sounds: No friction rub.   Pulmonary:      Effort: Pulmonary effort is normal.      Breath sounds: Normal breath sounds. No wheezing.   Chest:      Chest wall: No tenderness.   Abdominal:      General: Bowel sounds are normal. There is no distension.      Palpations: Abdomen is soft.      Tenderness: There is no abdominal tenderness.   Musculoskeletal:         General: No swelling.    Lymphadenopathy:      Cervical: No cervical adenopathy.   Skin:     General: Skin is warm and dry.      Findings: No rash.   Neurological:      Mental Status: She is alert and oriented to person, place, and time.         Significant Labs:  Lab Results   Component Value Date    CREATININE 4.9 (H) 07/08/2020    BUN 22 (H) 07/08/2020     07/08/2020    K 4.4 07/08/2020     07/08/2020    CO2 22 (L) 07/08/2020     Lab Results   Component Value Date    CALCIUM 8.3 (L) 07/08/2020     Lab Results   Component Value Date    ALBUMIN 4.3 07/06/2020     Lab Results   Component Value Date    WBC 1.41 (LL) 07/08/2020    HGB 10.0 (L) 07/08/2020    HCT 31.6 (L) 07/08/2020    MCV 95 07/08/2020     07/08/2020       Recent Labs   Lab 07/08/20  0501   MG 2.2     Urinalysis: + 1 protein, + 1 LE, 10 RBC, 50 WBC (7/7/20).       Significant Imaging:  Imaging Results    None

## 2020-07-08 NOTE — CONSULTS
[facility] -   Nephrology  Consult Note          Patient Name: Jaswant Yang  MRN: [id number]  Admission Date: 2020  Hospital Length of Stay: 2 days  Attending Provider: Shantel Carlson MD   Primary Care Physician: Primary Doctor No  Principal Problem:Dental infection    Consults  Subjective:     HPI: 44 year old female with anemia, GERD, hairy-cell leukemia presents to [facility] with low grade fever, leukopenia and dental pain. Patient was started on antibiotics including vancomycin and received 4.2 g of Vancomycin in a 15 h period. UOP has not been recorded since admission.     Nephrology was consulted to help with patient's renal care while she is admitted at [facility]. I saw and examined patient in her hospital room. Patient reports generalized weakness and vomiting x 1 today. No other symptoms at present. She reports NSAID use in past (last use of ibuprofen about 3 weeks ago).     Chart review revealed that her baseline creatinine is about 0.9.     Past Medical History:   Diagnosis Date    Anemia     Anemia     Back pain     Dental infection     GERD (gastroesophageal reflux disease) 2020    Hairy-cell leukemia of spleen        Past Surgical History:   Procedure Laterality Date     SECTION, CLASSIC      FLUOROSCOPY N/A 2020    Procedure: Spleenic Bleed;  Surgeon: Hiren Cabrera MD;  Location: [facility] [place] CATH LAB;  Service: General;  Laterality: N/A;    INJECTION OF ANESTHETIC AGENT INTO TISSUE PLANE DEFINED BY TRANSVERSUS ABDOMINIS MUSCLE N/A 2020    Procedure: BLOCK, TRANSVERSUS ABDOMINIS PLANE;  Surgeon: Yumi Ferguson MD;  Location: [facility] [place] OR;  Service: General;  Laterality: N/A;    SPLENECTOMY N/A 2020    Procedure: SPLENECTOMY;  Surgeon: Yumi Ferguson MD;  Location: [facility] [place] OR;  Service: General;  Laterality: N/A;    THORACENTESIS Left 2020    Procedure: THORACENTESIS;  Surgeon: Yumi Ferguson MD;  Location: [facility] [place] OR;  Service: General;  Laterality:  Left;    TUBAL LIGATION         Review of patient's allergies indicates:   Allergen Reactions    Tramadol Hives     Current Facility-Administered Medications   Medication Frequency    0.9%  NaCl infusion Continuous    acetaminophen tablet 650 mg Q6H PRN    fentaNYL 25 mcg/hr 1 patch Q72H    folic acid-vit B6-vit B12 2.5-25-2 mg tablet 1 tablet Daily    HYDROcodone-acetaminophen  mg per tablet 1 tablet Q4H PRN    hydrOXYzine HCL tablet 25 mg TID PRN    multivitamin tablet Daily    ondansetron injection 4 mg Q8H PRN    polyethylene glycol packet 17 g Daily    promethazine tablet 25 mg Q6H PRN    senna tablet 8.6 mg QHS    thiamine tablet 100 mg Daily     Family History     None        Tobacco Use    Smoking status: Former Smoker     Packs/day: 0.25     Types: Cigarettes    Smokeless tobacco: Never Used    Tobacco comment: no longer smoker   Substance and Sexual Activity    Alcohol use: Yes     Comment: occasional    Drug use: No    Sexual activity: Yes     Birth control/protection: Condom     Review of Systems   Constitutional: Negative for fatigue and fever.   HENT: Negative for congestion.    Eyes: Negative for visual disturbance.   Respiratory: Negative for cough, shortness of breath and wheezing.    Cardiovascular: Negative for chest pain and palpitations.   Gastrointestinal: Positive for vomiting. Negative for abdominal pain, diarrhea and nausea.   Genitourinary: Negative for difficulty urinating and dysuria.   Musculoskeletal: Negative for joint swelling.   Skin: Negative for rash.   Neurological: Positive for weakness. Negative for headaches.     Objective:     Vital Signs (Most Recent):  Temp: 97.9 °F (36.6 °C) (07/08/20 0728)  Pulse: 84 (07/08/20 0728)  Resp: 15 (07/08/20 0947)  BP: (!) 98/57 (07/08/20 0728)  SpO2: 99 % (07/08/20 0728)  O2 Device (Oxygen Therapy): room air (07/07/20 0743) Vital Signs (24h Range):  Temp:  [97.9 °F (36.6 °C)-98.3 °F (36.8 °C)] 97.9 °F (36.6 °C)  Pulse:   [77-84] 84  Resp:  [14-18] 15  SpO2:  [98 %-100 %] 99 %  BP: ()/(56-65) 98/57     Weight: 124.3 kg (274 lb) (07/06/20 1700)  Body mass index is 38.22 kg/m².  Body surface area is 2.5 meters squared.    I/O last 3 completed shifts:  In: 1500 [P.O.:550; IV Piggyback:950]  Out: -     Physical Exam  Constitutional:       Appearance: She is well-developed.   HENT:      Head: Normocephalic.   Eyes:      Pupils: Pupils are equal, round, and reactive to light.   Neck:      Thyroid: No thyromegaly.   Cardiovascular:      Rate and Rhythm: Normal rate and regular rhythm.      Heart sounds: No friction rub.   Pulmonary:      Effort: Pulmonary effort is normal.      Breath sounds: Normal breath sounds. No wheezing.   Chest:      Chest wall: No tenderness.   Abdominal:      General: Bowel sounds are normal. There is no distension.      Palpations: Abdomen is soft.      Tenderness: There is no abdominal tenderness.   Musculoskeletal:         General: No swelling.   Lymphadenopathy:      Cervical: No cervical adenopathy.   Skin:     General: Skin is warm and dry.      Findings: No rash.   Neurological:      Mental Status: She is alert and oriented to person, place, and time.         Significant Labs:  Lab Results   Component Value Date    CREATININE 4.9 (H) 07/08/2020    BUN 22 (H) 07/08/2020     07/08/2020    K 4.4 07/08/2020     07/08/2020    CO2 22 (L) 07/08/2020     Lab Results   Component Value Date    CALCIUM 8.3 (L) 07/08/2020     Lab Results   Component Value Date    ALBUMIN 4.3 07/06/2020     Lab Results   Component Value Date    WBC 1.41 (LL) 07/08/2020    HGB 10.0 (L) 07/08/2020    HCT 31.6 (L) 07/08/2020    MCV 95 07/08/2020     07/08/2020       Recent Labs   Lab 07/08/20  0501   MG 2.2     Urinalysis: + 1 protein, + 1 LE, 10 RBC, 50 WBC (7/7/20).       Significant Imaging:  Imaging Results    None           Assessment/Plan:     Hypocalcemia  Mild, monitor for now.     ADAMARIS (acute kidney  injury)  Patient presents with ADAMARIS and creatinine has increased from 0.8 on 7/6 to 4.9 on 7/8/20. ADAMARIS likely due to Vancomycin use and patient recrived 4.2 g of Vancomycin in 15 hour period. Vancomycin has been discontinued. Patient reports NSAID use 3 weeks ago. This is unlikely related to current ADAMARIS episode. Urinalysis shows +1 protein which is chronic and is consistent with mild UTI. Avoid NSAIDs, ACE-I, ARB. No need for dialysis at present. Will monitor closely. Continue IV fluids. Will monitor UOP.     Hypokalemia  Has resolved.     Chemotherapy-induced neutropenia  As per Heme/Onc service.     Hairy cell leukemia of spleen  Heme/Onc is following.         Thank you for your consult. I will follow-up with patient. Please contact us if you have any additional questions.    Aaron Ndiaye MD   Nephrology  [facility]

## 2020-07-08 NOTE — ASSESSMENT & PLAN NOTE
Patient presents with ADAMARIS and creatinine has increased from 0.8 on 7/6 to 4.9 on 7/8/20. ADAMARIS likely due to Vancomycin use and patient recrived 4.2 g of Vancomycin in 15 hour period. Vancomycin has been discontinued. Patient reports NSAID use 3 weeks ago. This is unlikely related to current ADAMARIS episode. Urinalysis shows +1 protein which is chronic and is consistent with mild UTI. Avoid NSAIDs, ACE-I, ARB. No need for dialysis at present. Will monitor closely. Continue IV fluids. Will monitor UOP.

## 2020-07-08 NOTE — ASSESSMENT & PLAN NOTE
Dental referral/participating dentist list to be provided prior to discharge. Advised patient to notify dentist that dental work is required prior to resuming Chemotherapy.

## 2020-07-08 NOTE — HPI
44 year old female with anemia, GERD, hairy-cell leukemia presents to [facility] with low grade fever, leukopenia and dental pain. Patient was started on antibiotics including vancomycin and received 4.2 g of Vancomycin in a 15 h period. UOP has not been recorded since admission.     Nephrology was consulted to help with patient's renal care while she is admitted at [facility]. I saw and examined patient in her hospital room. Patient reports generalized weakness and vomiting x 1 today. No other symptoms at present. She reports NSAID use in past (last use of ibuprofen about 3 weeks ago).     Chart review revealed that her baseline creatinine is about 0.9.

## 2020-07-08 NOTE — SUBJECTIVE & OBJECTIVE
"Interval History: The patient remained afebrile overnight. WBCs improved to 1.41K, Hem/onc following. The patients renal function worsened overnight, Cr. Increased from 2 to 4.9 overnight. This is likely 2/2 vanc. IV fluids started and Nephrology consulted. Will monitor renal function.       Review of Systems   Constitutional: Positive for fever ("low-grade"). Negative for activity change, appetite change, chills, diaphoresis, fatigue and unexpected weight change.   HENT: Positive for dental problem and facial swelling (R lower jaw, L upper jaw). Negative for congestion, drooling, rhinorrhea, sinus pressure, sneezing, sore throat, trouble swallowing and voice change.    Eyes: Negative for photophobia, discharge, redness, itching and visual disturbance.   Respiratory: Negative for apnea, cough, choking, chest tightness, shortness of breath, wheezing and stridor.    Cardiovascular: Negative for chest pain, palpitations and leg swelling.   Gastrointestinal: Negative for abdominal distention, abdominal pain, anal bleeding, blood in stool, constipation, diarrhea, nausea and vomiting.   Endocrine: Negative for cold intolerance, heat intolerance, polydipsia, polyphagia and polyuria.   Genitourinary: Negative for decreased urine volume, difficulty urinating, dysuria, flank pain, frequency, hematuria, pelvic pain, urgency, vaginal bleeding and vaginal discharge.   Musculoskeletal: Positive for back pain (chronic). Negative for arthralgias, gait problem, joint swelling, myalgias, neck pain and neck stiffness.   Skin: Negative for color change, pallor, rash and wound.   Neurological: Positive for weakness (generalized). Negative for dizziness, seizures, syncope, facial asymmetry, speech difficulty, light-headedness, numbness and headaches.   Psychiatric/Behavioral: Negative for agitation, confusion, hallucinations and suicidal ideas.   All other systems reviewed and are negative.    Objective:     Vital Signs (Most " Recent):  Temp: 98.1 °F (36.7 °C) (07/08/20 1143)  Pulse: 90 (07/08/20 1143)  Resp: 18 (07/08/20 1143)  BP: 118/61 (07/08/20 1143)  SpO2: (!) 92 % (07/08/20 1143) Vital Signs (24h Range):  Temp:  [97.9 °F (36.6 °C)-98.3 °F (36.8 °C)] 98.1 °F (36.7 °C)  Pulse:  [77-90] 90  Resp:  [14-18] 18  SpO2:  [92 %-100 %] 92 %  BP: ()/(57-65) 118/61     Weight: 124.3 kg (274 lb)  Body mass index is 38.22 kg/m².    Intake/Output Summary (Last 24 hours) at 7/8/2020 1232  Last data filed at 7/8/2020 0532  Gross per 24 hour   Intake 500 ml   Output --   Net 500 ml      Physical Exam  Vitals signs and nursing note reviewed.   Constitutional:       General: She is not in acute distress.     Appearance: Normal appearance. She is well-developed. She is obese. She is toxic-appearing.   HENT:      Head: Normocephalic and atraumatic.      Nose: Nose normal.      Mouth/Throat:      Mouth: Mucous membranes are moist.      Comments: R lower jaw, L lower jaw redness, swelling of gums  Eyes:      Conjunctiva/sclera: Conjunctivae normal.      Pupils: Pupils are equal, round, and reactive to light.   Neck:      Musculoskeletal: Normal range of motion and neck supple.   Cardiovascular:      Rate and Rhythm: Normal rate and regular rhythm.      Pulses: Normal pulses.      Heart sounds: Normal heart sounds. No murmur.   Pulmonary:      Effort: Pulmonary effort is normal. No respiratory distress.      Breath sounds: Normal breath sounds. No wheezing.   Abdominal:      General: Abdomen is flat. Bowel sounds are normal. There is no distension.      Palpations: Abdomen is soft.      Tenderness: There is no abdominal tenderness. There is no guarding or rebound.   Musculoskeletal: Normal range of motion.         General: No tenderness, deformity or signs of injury.   Skin:     General: Skin is warm and dry.      Capillary Refill: Capillary refill takes less than 2 seconds.      Coloration: Skin is not jaundiced.      Findings: No erythema.    Neurological:      General: No focal deficit present.      Mental Status: She is alert and oriented to person, place, and time.      Deep Tendon Reflexes: Reflexes are normal and symmetric.   Psychiatric:         Mood and Affect: Mood normal.         Behavior: Behavior normal.         Thought Content: Thought content normal.         Judgment: Judgment normal.         Significant Labs: All pertinent labs within the past 24 hours have been reviewed.    Significant Imaging:   Imaging Results    None

## 2020-07-08 NOTE — PROGRESS NOTES
Progress Note   Palliative Medicine        SUBJECTIVE:     History of Present Illness:  Patient seen and examined without family present. She reports her pain is under better control today and rates 5/10. She feels the fentanyl patch has made a noticeable difference in her overall pain. She has been able to eat more due to decreased jaw pain but still complains of bilateral shoulder pain. She also complains of tingling and numbness in her legs and arms. This can sometimes wake her up from sleep but also occurs throughout the day requiring her to change position. She urinated this morning which was the first time since yesterday early evening. She has not had a BM but is passing gas. We discussed rotating to oxycodone to eliminate the APAP component and also increasing potency due to her frequent PRN usage. She was in favor of this change and open to starting gabapentin this evening.     In the last 24hrs she has received the following pain medications (7a-7a):  - Fentanyl 25mcg/ hr patch  - Hydrocodone/ APAP 10mg x 5      Past Medical History:   Diagnosis Date    Anemia     Anemia     Back pain     Dental infection     GERD (gastroesophageal reflux disease) 2020    Hairy-cell leukemia of spleen      Past Surgical History:   Procedure Laterality Date     SECTION, CLASSIC      FLUOROSCOPY N/A 2020    Procedure: Spleenic Bleed;  Surgeon: Hiren Cabrera MD;  Location: [facility] CATH LAB;  Service: General;  Laterality: N/A;    INJECTION OF ANESTHETIC AGENT INTO TISSUE PLANE DEFINED BY TRANSVERSUS ABDOMINIS MUSCLE N/A 2020    Procedure: BLOCK, TRANSVERSUS ABDOMINIS PLANE;  Surgeon: Yumi Ferguson MD;  Location: [facility] OR;  Service: General;  Laterality: N/A;    SPLENECTOMY N/A 2020    Procedure: SPLENECTOMY;  Surgeon: Yumi Ferguson MD;  Location: [facility] OR;  Service: General;  Laterality: N/A;    THORACENTESIS Left 2020    Procedure: THORACENTESIS;  Surgeon: Yumi DUEÑAS  MD Marty;  Location: [facility];  Service: General;  Laterality: Left;    TUBAL LIGATION       History reviewed. No pertinent family history.  Review of patient's allergies indicates:   Allergen Reactions    Tramadol Hives       Medications:    Current Facility-Administered Medications:     0.9%  NaCl infusion, , Intravenous, Continuous, Edgar Ashraf NP, Last Rate: 100 mL/hr at 07/08/20 0851    acetaminophen tablet 650 mg, 650 mg, Oral, Q6H PRN, Radha Wilson NP    fentaNYL 25 mcg/hr 1 patch, 1 patch, Transdermal, Q72H, Tatyana Gupta PA-C, 1 patch at 07/07/20 1127    folic acid-vit B6-vit B12 2.5-25-2 mg tablet 1 tablet, 1 tablet, Oral, Daily, Shantel Carlson MD, 1 tablet at 07/08/20 0846    gabapentin capsule 300 mg, 300 mg, Oral, QHS, Tatyana Gupta PA-C    hydrOXYzine HCL tablet 25 mg, 25 mg, Oral, TID PRN, FLAKITA Boswell 25 mg at 07/08/20 0735    multivitamin tablet, 1 tablet, Oral, Daily, Shantel Carlson MD, 1 tablet at 07/08/20 0846    ondansetron injection 4 mg, 4 mg, Intravenous, Q8H PRN, Radha Wilson NP    oxyCODONE immediate release tablet 10 mg, 10 mg, Oral, Q4H PRN, Tatyana Gupta PA-C    polyethylene glycol packet 17 g, 17 g, Oral, Daily, Tatyana Gupta PA-C, 17 g at 07/08/20 0846    promethazine tablet 25 mg, 25 mg, Oral, Q6H PRN, Radha Wilson NP    senna tablet 8.6 mg, 8.6 mg, Oral, QHS, Tatyana Gupta PA-C, 8.6 mg at 07/07/20 2006    thiamine tablet 100 mg, 100 mg, Oral, Daily, Shantel Carlson MD, 100 mg at 07/08/20 0846      Review of Symptoms    Symptom Assessment (ESAS 0-10 Scale)  Pain:  5  Dyspnea:  0  Anxiety:  0  Nausea:  0  Depression:  0  Anorexia:  2  Fatigue:  0  Insomnia:  0  Restlessness:  0  Agitation:  0     CAM / Delirium:  Negative  Constipation:  Positive  Diarrhea:  Negative    Bowel Management Plan (BMP):  Yes      Pain Assessment:  Location(s): torso    Torso       Torso Location:  Posterior and bilateral        Quality:  Aching       Quantity:  5/10 in intensity    ECOG Performance Status Grade:  0 - Fully Active    Advanced Directives:  Living Will: No    [place]POST: No    Do Not Resuscitate Status: No    Medical Power of : Yes     Agent's Name:  Elsa Yang.  Agent's Contact Number:  [phone]    Decision Making:  Patient answered questions      ROS:  Review of Systems   Constitutional: Positive for appetite change and fever. Negative for activity change.   HENT: Positive for dental problem. Negative for facial swelling, mouth sores, sore throat and trouble swallowing.    Eyes: Negative for photophobia and visual disturbance.   Respiratory: Negative for cough and shortness of breath.    Cardiovascular: Negative for chest pain and leg swelling.   Gastrointestinal: Positive for constipation. Negative for abdominal pain, diarrhea and nausea.   Endocrine: Negative for polydipsia and polyphagia.   Genitourinary: Negative for difficulty urinating and dysuria.   Musculoskeletal: Positive for arthralgias, myalgias and neck pain. Negative for back pain and neck stiffness.   Skin: Negative for rash and wound.   Allergic/Immunologic: Positive for immunocompromised state.   Neurological: Negative for syncope, weakness and light-headedness.   Psychiatric/Behavioral: Positive for sleep disturbance (d/t pain). Negative for confusion. The patient is not nervous/anxious.      OBJECTIVE:     Physical Exam:  Vitals: Temp: 97.9 °F (36.6 °C) (07/08/20 0728)  Pulse: 84 (07/08/20 0728)  Resp: 15 (07/08/20 0947)  BP: (!) 98/57 (07/08/20 0728)  SpO2: 99 % (07/08/20 0728)    Gen: well-developed, well-nourished, NAD  Head: atraumatic, normocephalic  Eyes: conjuctiva and sclera clear  Ears: hearing grossly intact with no external abnormality  Mouth: no mucositis, good dentition, more mobility in jaw today, edema resolved  Respiratory: CTAB, no wheezing or rhonchi  Heart: RRR  Abdomen: soft, NTTP, nondistended, normoactive BS  Pulses: 2+  in DP  Extremities: no edema, full ROM of all joints, no mottling  Neurologic: no focal deficits, CN II-XII grossly intact, normal coordination  Skin: no rashes or lesions  Psych: cooperative, normal mood and affect, normal attention span and concentration     Labs:  WBC   Date Value Ref Range Status   07/08/2020 1.41 (LL) 3.90 - 12.70 K/uL Final     Comment:     WBC critical result(s) called and verbal readback obtained from   JINNY VILLALPANDO RN  by CD5 07/08/2020 05:40       Hemoglobin   Date Value Ref Range Status   07/08/2020 10.0 (L) 12.0 - 16.0 g/dL Final     POC Hematocrit   Date Value Ref Range Status   05/11/2020 25 (L) 36 - 54 %PCV Final     Hematocrit   Date Value Ref Range Status   07/08/2020 31.6 (L) 37.0 - 48.5 % Final     Mean Corpuscular Volume   Date Value Ref Range Status   07/08/2020 95 82 - 98 fL Final     Platelets   Date Value Ref Range Status   07/08/2020 189 150 - 350 K/uL Final       BMP  Lab Results   Component Value Date     07/08/2020    K 4.4 07/08/2020     07/08/2020    CO2 22 (L) 07/08/2020    BUN 22 (H) 07/08/2020    CREATININE 4.9 (H) 07/08/2020    CALCIUM 8.3 (L) 07/08/2020    ANIONGAP 8 07/08/2020    ESTGFRAFRICA 12 (A) 07/08/2020    EGFRNONAA 10 (A) 07/08/2020       Lab Results   Component Value Date    AST 17 07/06/2020    ALKPHOS 51 (L) 07/06/2020    BILITOT 0.9 07/06/2020       Albumin   Date Value Ref Range Status   07/06/2020 4.3 3.5 - 5.2 g/dL Final       Radiology:I have reviewed all pertinent imaging results/findings within the past 24 hours.  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 Differential is wide for this appearance, and includes cocaine abuse, rhinitis medicamentosa (chronic Afrin-type medication abuse), complication related to antiangiogenesis chemotherapy, sinonasal granulomatosis with polyangiitis, sinonasal sarcoid, or less likely but possible invasive  fungal sinusitis.     Multiple dental caries are noted.     Exam limited by lack of IV contrast.  No appreciable oropharyngeal or dental abscess. Recommend further evaluation with direct visualization.     ASSESSMENT   Jaswant Yang is a 44 y.o. year old who was recently diagnosed with hairy cell leukemia currently on chemotherapy who was sent to the emergency department by Dr. Whittaker due to fever. She was complaining of jaw pain and edema and was admitted for antibiotics and monitoring. Ms. Yang has an outpatient referral for Palliative Medicine for pain and symptom management but her appointment was at the end of the month so we were asked to see while admitted.        PLAN   1. Neoplasm related pain  - Due to renal dysfunction will avoid morphine  - Continue Fentanyl 25 mcg/ hr patch q72hr  - Rotate from hydrocodone/ APAP to oxycodone 10mg PO q4hr PRN pain  - Start gabapentin 300mg HS for neuropathic component- will titrate once renal function allows  - I will monitor usage and adjust as needed  - The risks, side effects, benefits of chronic opioid therapy have been discussed and the opportunity for questions provided.  - A record of the controlled substances prescribed and dispensed to the patient in [place] was reviewed in  by me (or my delegate). Last received hydrocodone 10mg #40 tabs and 5mg #11 tabs on 6/22/20, hydrocodone 10mg #20 on 6/16 and on 6/11.    2. Opioid induced constipation  - Continue daily Miralax and Senna 1 tab nightly     3. Hairy cell leukemia  - Defer to oncology  - Currently receiving chemotherapy     4. Encounter for Palliative Care  - Code status: FULL  - HCPOA: ama Yang, alternate: gilberto Yang  - PM SW assisted with ACP and scanned in to the computer  - Primary goal is to continue cancer targeted therapy  - We will manage pain medication going forth and she already has f/u on 7/21 scheduled     5. ADAMARIS  - Opioid selection driven by elevated Cr  - Renally  dosed gabapentin and will titrate once renal function improves  - Nephrology has been consulted  - Continue to monitor    Thank you for allowing Palliative Medicine to be involved in the care of Jaswant Yang.      Medical decision making: HIGH based on high risk of death, untreated symptoms, high risk medications, management of more than one chronic illness in exacerbation, managing side effects of medications or polypharmacy      Plan required increased review of medication choice, interaction, dosing, frequency, and route due to patient complexity. Patient complexity increased by: Presence of renal insufficiency    > 50% of 37 min visit spent in chart review, face to face discussion symptom assessment, coordination of care and emotional support, exploring burden/ benefit of various approaches of treatment.       Tatyana Gupta PA-C  Palliative Medicine

## 2020-07-08 NOTE — PLAN OF CARE
No adverse events during shift.  Remained free of falls. Call light in reach. Side rails x 2. Pain well tolerated w/ prn meds. Pt repositions independently. IVF administered as ordered. VSS. Chart reviewed, will continue to monitor.

## 2020-07-08 NOTE — ASSESSMENT & PLAN NOTE
The patients renal function worsened overnight, Cr. Increased from 2 to 4.9 overnight. This is likely 2/2 vanc. IV fluids started and Nephrology consulted. Will monitor renal function.

## 2020-07-08 NOTE — PROGRESS NOTES
"[facility] Hospital Medicine  Progress Note    Patient Name: Jaswant Yang  MRN: [id number]  Patient Class: IP- Inpatient   Admission Date: 7/6/2020  Length of Stay: 2 days  Attending Physician: Shantel Carlson MD  Primary Care Provider: Primary Doctor No        Subjective:     Principal Problem:Dental infection        HPI:  45 y/o AA F with hx of hairy cell leukemia (diagnosed 05/2020), anemia, chronic pain to ED from Dr. Whittaker' office after being found to have a low grade temperature (99.6) with a visible dental infection in the R lower jaw and L upper jaw, in the setting of chemotherapy induced neutropenia (WBC 0.89); mild hypokalemia (3.4) also noted in ED, but labs otherwise largely unremarkable - admission for IV abx; BC x2 pending. Pt reports associated severe stabbing pain in the affected jaw areas, and mild facial edema, worsening over the past 2 days but states no drainage/bleeding. Pt's last chemo cycle ended on 06/26/20 - reports pain improved with IV morphine in ED, states she feels "a little better now" - palliative care consulted per ED physician at patient's request, to assist with symptom management and pain control. Denies CP, edema, palpitations, SOB, wheezing, orthopnea, PND, abdominal pain, N/V/D, dysuria, flank pain, HA, dizziness, fever, cough, chills, falls/trauma, blurred vision, focal deficits. No recent dietary changes, travel, sick contacts or viral illness - pt is typically active and independent with ADLs and ambulation at home. Pt is full code. Surrogate decision makers are son (Dmitri Yang) and Mother (Elsa Yang). Admitted for chemotherapy induced neutropenic fever with a jaw/dental infection.     Overview/Hospital Course:  7/7/20 The patient reports pain is not well controlled. Palliative care was consulted. The patient remained afebrile overnight. CT maxiollofacial CT was negative for a dental abscess but showed multiple dental caries. The patient " "remains neutropenic, Hem/onc following. Continue current management with IV ABX. 7/8/20 The patient remained afebrile overnight. WBCs improved to 1.41K, Hem/onc following. The patients renal function worsened overnight, Cr. Increased from 2 to 4.9 overnight. This is likely 2/2 vanc. IV fluids started and Nephrology consulted. Will monitor renal function.     Interval History: The patient remained afebrile overnight. WBCs improved to 1.41K, Hem/onc following. The patients renal function worsened overnight, Cr. Increased from 2 to 4.9 overnight. This is likely 2/2 vanc. IV fluids started and Nephrology consulted. Will monitor renal function.       Review of Systems   Constitutional: Positive for fever ("low-grade"). Negative for activity change, appetite change, chills, diaphoresis, fatigue and unexpected weight change.   HENT: Positive for dental problem and facial swelling (R lower jaw, L upper jaw). Negative for congestion, drooling, rhinorrhea, sinus pressure, sneezing, sore throat, trouble swallowing and voice change.    Eyes: Negative for photophobia, discharge, redness, itching and visual disturbance.   Respiratory: Negative for apnea, cough, choking, chest tightness, shortness of breath, wheezing and stridor.    Cardiovascular: Negative for chest pain, palpitations and leg swelling.   Gastrointestinal: Negative for abdominal distention, abdominal pain, anal bleeding, blood in stool, constipation, diarrhea, nausea and vomiting.   Endocrine: Negative for cold intolerance, heat intolerance, polydipsia, polyphagia and polyuria.   Genitourinary: Negative for decreased urine volume, difficulty urinating, dysuria, flank pain, frequency, hematuria, pelvic pain, urgency, vaginal bleeding and vaginal discharge.   Musculoskeletal: Positive for back pain (chronic). Negative for arthralgias, gait problem, joint swelling, myalgias, neck pain and neck stiffness.   Skin: Negative for color change, pallor, rash and wound. "   Neurological: Positive for weakness (generalized). Negative for dizziness, seizures, syncope, facial asymmetry, speech difficulty, light-headedness, numbness and headaches.   Psychiatric/Behavioral: Negative for agitation, confusion, hallucinations and suicidal ideas.   All other systems reviewed and are negative.    Objective:     Vital Signs (Most Recent):  Temp: 98.1 °F (36.7 °C) (07/08/20 1143)  Pulse: 90 (07/08/20 1143)  Resp: 18 (07/08/20 1143)  BP: 118/61 (07/08/20 1143)  SpO2: (!) 92 % (07/08/20 1143) Vital Signs (24h Range):  Temp:  [97.9 °F (36.6 °C)-98.3 °F (36.8 °C)] 98.1 °F (36.7 °C)  Pulse:  [77-90] 90  Resp:  [14-18] 18  SpO2:  [92 %-100 %] 92 %  BP: ()/(57-65) 118/61     Weight: 124.3 kg (274 lb)  Body mass index is 38.22 kg/m².    Intake/Output Summary (Last 24 hours) at 7/8/2020 1232  Last data filed at 7/8/2020 0532  Gross per 24 hour   Intake 500 ml   Output --   Net 500 ml      Physical Exam  Vitals signs and nursing note reviewed.   Constitutional:       General: She is not in acute distress.     Appearance: Normal appearance. She is well-developed. She is obese. She is toxic-appearing.   HENT:      Head: Normocephalic and atraumatic.      Nose: Nose normal.      Mouth/Throat:      Mouth: Mucous membranes are moist.      Comments: R lower jaw, L lower jaw redness, swelling of gums  Eyes:      Conjunctiva/sclera: Conjunctivae normal.      Pupils: Pupils are equal, round, and reactive to light.   Neck:      Musculoskeletal: Normal range of motion and neck supple.   Cardiovascular:      Rate and Rhythm: Normal rate and regular rhythm.      Pulses: Normal pulses.      Heart sounds: Normal heart sounds. No murmur.   Pulmonary:      Effort: Pulmonary effort is normal. No respiratory distress.      Breath sounds: Normal breath sounds. No wheezing.   Abdominal:      General: Abdomen is flat. Bowel sounds are normal. There is no distension.      Palpations: Abdomen is soft.      Tenderness:  There is no abdominal tenderness. There is no guarding or rebound.   Musculoskeletal: Normal range of motion.         General: No tenderness, deformity or signs of injury.   Skin:     General: Skin is warm and dry.      Capillary Refill: Capillary refill takes less than 2 seconds.      Coloration: Skin is not jaundiced.      Findings: No erythema.   Neurological:      General: No focal deficit present.      Mental Status: She is alert and oriented to person, place, and time.      Deep Tendon Reflexes: Reflexes are normal and symmetric.   Psychiatric:         Mood and Affect: Mood normal.         Behavior: Behavior normal.         Thought Content: Thought content normal.         Judgment: Judgment normal.         Significant Labs: All pertinent labs within the past 24 hours have been reviewed.    Significant Imaging:   Imaging Results    None         Assessment/Plan:      * Dental infection  Continue IV vanc/zosyn for now  Follow fever curve  BC x2 pending  Dental soft diet for now  Morphine prn for pain   7/7/20 The patient reports pain is not well controlled. Palliative care was consulted. The patient remained afebrile overnight. CT maxiollofacial CT was negative for a dental abscess but showed multiple dental caries. The patient remains neutropenic, Hem/onc following. Continue current management with IV ABX.   7/8/20 Will need dental follow up after discharge.         ADAMARIS (acute kidney injury)  The patients renal function worsened overnight, Cr. Increased from 2 to 4.9 overnight. This is likely 2/2 vanc. IV fluids started and Nephrology consulted. Will monitor renal function.         Acute neoplasm-related pain  - Continue PRN Analgesia - Palliative care consulted       Hypokalemia  Replace K+  Recheck in AM  Replete as needed  7/8/20 Resolved     Neutropenic fever  Consult hematology/oncology  Maintain neutropenic precautions  Tylenol prn fever  UA with reflex culture pending  IV zosyn   Cultures pending        Chemotherapy-induced neutropenia  Maintain neutropenic precautions  Labs in AM  Continue supplemental thiamine, Folbic, MVI    Hairy cell leukemia of spleen  Consult hematology/oncology   Consult palliative care for symptom/pain management        VTE Risk Mitigation (From admission, onward)         Ordered     Place sequential compression device  Until discontinued      07/06/20 1784                      Edgar Ashraf NP  Department of Hospital Medicine   [facility]

## 2020-07-08 NOTE — PROGRESS NOTES
Clinical Pharmacy Progress Note: Discharge Medication Education     Patient was counseled by pharmacist on new medication fentanyl patch  and its indications, side effects, and reasons for taking this medication.   Patient was given educational drug card/handout.   Patient/caregiver expressed understanding and had no further questions.  Offered bedside delivery of medications to patient.     Thank you for allowing us to participate in this patient's care.    Malena Preciado [facility] 7/8/2020 2:50 PM

## 2020-07-08 NOTE — ASSESSMENT & PLAN NOTE
S/P splenectomy, did not tolerate Rituxan, will evaluate treatment regimen as outpatient. Chemotherapy is on hold pending resolution of acute issues, patient will need to see a dentist prior to continuing on chemotherapy.     --Will arrange outpatient follow up with Primary Oncologist upon discharge  --Daily CBC, CMP

## 2020-07-08 NOTE — SUBJECTIVE & OBJECTIVE
Interval History: Patient reports overall improvement, pain improved in jaw, reports continued bone pain in BLE, left shoulder and neck. Fentanyl pain patch started, patient reports mild improvement, continues to require breakthrough pain medication.     Oncology Treatment Plan:   OP CLADRIBINE (5 DAY) + RITUXIMAB - Hairy Cell Leukemia    Medications:  Continuous Infusions:   sodium chloride 0.9% 100 mL/hr at 07/08/20 0851     Scheduled Meds:   fentaNYL  1 patch Transdermal Q72H    folic acid-vit B6-vit B12 2.5-25-2 mg  1 tablet Oral Daily    multivitamin  1 tablet Oral Daily    polyethylene glycol  17 g Oral Daily    senna  8.6 mg Oral QHS    thiamine  100 mg Oral Daily     PRN Meds:acetaminophen, HYDROcodone-acetaminophen, hydrOXYzine HCL, ondansetron, promethazine     Review of Systems   Constitutional: Positive for activity change, appetite change and fatigue. Negative for chills, diaphoresis, fever and unexpected weight change.   HENT: Negative for congestion, hearing loss, nosebleeds, postnasal drip, sore throat and trouble swallowing.    Eyes: Negative for pain, discharge and visual disturbance.   Respiratory: Negative for cough, chest tightness and shortness of breath.    Cardiovascular: Negative for chest pain and palpitations.   Gastrointestinal: Negative for blood in stool, constipation, diarrhea, nausea and vomiting.   Endocrine: Negative for cold intolerance and heat intolerance.   Genitourinary: Negative for difficulty urinating, dyspareunia, flank pain and hematuria.   Musculoskeletal: Positive for arthralgias, back pain, gait problem, myalgias and neck pain. Negative for joint swelling.   Skin: Negative.    Neurological: Positive for headaches. Negative for dizziness, weakness and light-headedness.   Hematological: Negative for adenopathy. Does not bruise/bleed easily.   Psychiatric/Behavioral: Negative for agitation, behavioral problems and confusion. The patient is nervous/anxious.       Objective:     Vital Signs (Most Recent):  Temp: 97.9 °F (36.6 °C) (07/08/20 0728)  Pulse: 84 (07/08/20 0728)  Resp: 15 (07/08/20 0947)  BP: (!) 98/57 (07/08/20 0728)  SpO2: 99 % (07/08/20 0728) Vital Signs (24h Range):  Temp:  [97.9 °F (36.6 °C)-98.3 °F (36.8 °C)] 97.9 °F (36.6 °C)  Pulse:  [77-84] 84  Resp:  [14-18] 15  SpO2:  [98 %-100 %] 99 %  BP: ()/(56-65) 98/57     Weight: 124.3 kg (274 lb)  Body mass index is 38.22 kg/m².  Body surface area is 2.5 meters squared.      Intake/Output Summary (Last 24 hours) at 7/8/2020 1010  Last data filed at 7/8/2020 0532  Gross per 24 hour   Intake 500 ml   Output --   Net 500 ml       Physical Exam  Vitals signs and nursing note reviewed.   Constitutional:       General: She is not in acute distress.     Appearance: She is well-developed.   HENT:      Head: Normocephalic and atraumatic.      Right Ear: Hearing and external ear normal.      Left Ear: Hearing and external ear normal.      Nose: No rhinorrhea.      Right Sinus: No maxillary sinus tenderness or frontal sinus tenderness.      Left Sinus: No maxillary sinus tenderness or frontal sinus tenderness.      Mouth/Throat:      Mouth: No oral lesions.      Pharynx: Uvula midline.   Eyes:      General:         Right eye: No discharge.         Left eye: No discharge.      Conjunctiva/sclera: Conjunctivae normal.      Pupils: Pupils are equal, round, and reactive to light.   Neck:      Musculoskeletal: Normal range of motion.      Thyroid: No thyromegaly.      Vascular: No carotid bruit.      Trachea: No tracheal deviation.   Cardiovascular:      Rate and Rhythm: Normal rate and regular rhythm.      Pulses:           Dorsalis pedis pulses are 2+ on the right side and 2+ on the left side.      Heart sounds: Normal heart sounds, S1 normal and S2 normal. No murmur.   Pulmonary:      Effort: Pulmonary effort is normal. No respiratory distress.      Breath sounds: Normal breath sounds.   Abdominal:      General: Bowel  sounds are normal. There is distension.      Palpations: Abdomen is soft. There is no mass.      Tenderness: There is no abdominal tenderness.   Musculoskeletal: Normal range of motion.      Left shoulder: She exhibits tenderness.      Right upper leg: She exhibits tenderness.      Left upper leg: She exhibits tenderness.   Lymphadenopathy:      Cervical: No cervical adenopathy.      Upper Body:      Right upper body: No supraclavicular adenopathy.      Left upper body: No supraclavicular adenopathy.   Skin:     General: Skin is warm and dry.      Capillary Refill: Capillary refill takes less than 2 seconds.      Coloration: Skin is pale.      Findings: No rash.   Neurological:      Mental Status: She is alert and oriented to person, place, and time.      Sensory: No sensory deficit.      Coordination: Coordination normal.      Gait: Gait normal.   Psychiatric:         Mood and Affect: Mood is anxious. Mood is not depressed.         Speech: Speech normal.         Behavior: Behavior normal.         Thought Content: Thought content normal.         Judgment: Judgment normal.         Significant Labs:   CBC:   Recent Labs   Lab 07/06/20  1230 07/07/20  0515 07/08/20  0501   WBC 0.80* 0.97* 1.41*   HGB 11.4* 10.2* 10.0*   HCT 34.0* 31.4* 31.6*    205 189    and CMP:   Recent Labs   Lab 07/06/20  1230 07/07/20  0515 07/08/20  0501    136 136   K 3.4* 4.1 4.4    105 106   CO2 23 23 22*    102 105   BUN 7 13 22*   CREATININE 0.8 2.0* 4.9*   CALCIUM 8.8 8.4* 8.3*   PROT 7.8  --   --    ALBUMIN 4.3  --   --    BILITOT 0.9  --   --    ALKPHOS 51*  --   --    AST 17  --   --    ALT 18  --   --    ANIONGAP 9 8 8   EGFRNONAA >60 30* 10*       Diagnostic Results:  I have reviewed all pertinent imaging results/findings within the past 24 hours.

## 2020-07-08 NOTE — ASSESSMENT & PLAN NOTE
Goal is ANC >1000 prior to discharge, will need IV ABT prophylaxis until ANC recovers.   --Would continue IV antibiotics until ANC > 1000 then consider PO antibiotics.   --Patient needs outpatient dental consult for any necessary tooth extractions.

## 2020-07-08 NOTE — PLAN OF CARE
Problem: Adult Inpatient Plan of Care  Goal: Plan of Care Review  Outcome: Ongoing, Progressing     POC reviewed, including indications and possible side effects of administered medications. Patient verbalized understanding and teach back. No adverse reactions noted. Patient c/o jaw and leg pain. Administered medications per order. VSS. Patient remains free of falls and injuries during shift. Will continue to monitor.     Chart check complete.

## 2020-07-08 NOTE — PROGRESS NOTES
[facility] -   Hematology/Oncology  Progress Note    Patient Name: Jaswant aYng  Admission Date: 7/6/2020  Hospital Length of Stay: 2 days  Code Status: Prior     Subjective:     HPI:  44 y.o female with hairy cell lymphoma currently being treated with CLADRIBINE sent from Dr. Whittaker office on yesterday for fever and presumed right sided tooth infection. Associated symptoms include facial swelling, jaw pain, fever. Patient asked to report to ED for IV antibiotics for neutropenic fever in setting of tooth infection. Patient was started on Vancomycin but had allergic reaction and was placed on Zoysn.     Interval History: Patient reports overall improvement, pain improved in jaw, reports continued bone pain in BLE, left shoulder and neck. Fentanyl pain patch started, patient reports mild improvement, continues to require breakthrough pain medication.     Oncology Treatment Plan:   OP CLADRIBINE (5 DAY) + RITUXIMAB - Hairy Cell Leukemia    Medications:  Continuous Infusions:   sodium chloride 0.9% 100 mL/hr at 07/08/20 0851     Scheduled Meds:   fentaNYL  1 patch Transdermal Q72H    folic acid-vit B6-vit B12 2.5-25-2 mg  1 tablet Oral Daily    multivitamin  1 tablet Oral Daily    polyethylene glycol  17 g Oral Daily    senna  8.6 mg Oral QHS    thiamine  100 mg Oral Daily     PRN Meds:acetaminophen, HYDROcodone-acetaminophen, hydrOXYzine HCL, ondansetron, promethazine     Review of Systems   Constitutional: Positive for activity change, appetite change and fatigue. Negative for chills, diaphoresis, fever and unexpected weight change.   HENT: Negative for congestion, hearing loss, nosebleeds, postnasal drip, sore throat and trouble swallowing.    Eyes: Negative for pain, discharge and visual disturbance.   Respiratory: Negative for cough, chest tightness and shortness of breath.    Cardiovascular: Negative for chest pain and palpitations.   Gastrointestinal: Negative for blood in stool,  constipation, diarrhea, nausea and vomiting.   Endocrine: Negative for cold intolerance and heat intolerance.   Genitourinary: Negative for difficulty urinating, dyspareunia, flank pain and hematuria.   Musculoskeletal: Positive for arthralgias, back pain, gait problem, myalgias and neck pain. Negative for joint swelling.   Skin: Negative.    Neurological: Positive for headaches. Negative for dizziness, weakness and light-headedness.   Hematological: Negative for adenopathy. Does not bruise/bleed easily.   Psychiatric/Behavioral: Negative for agitation, behavioral problems and confusion. The patient is nervous/anxious.      Objective:     Vital Signs (Most Recent):  Temp: 97.9 °F (36.6 °C) (07/08/20 0728)  Pulse: 84 (07/08/20 0728)  Resp: 15 (07/08/20 0947)  BP: (!) 98/57 (07/08/20 0728)  SpO2: 99 % (07/08/20 0728) Vital Signs (24h Range):  Temp:  [97.9 °F (36.6 °C)-98.3 °F (36.8 °C)] 97.9 °F (36.6 °C)  Pulse:  [77-84] 84  Resp:  [14-18] 15  SpO2:  [98 %-100 %] 99 %  BP: ()/(56-65) 98/57     Weight: 124.3 kg (274 lb)  Body mass index is 38.22 kg/m².  Body surface area is 2.5 meters squared.      Intake/Output Summary (Last 24 hours) at 7/8/2020 1010  Last data filed at 7/8/2020 0532  Gross per 24 hour   Intake 500 ml   Output --   Net 500 ml       Physical Exam  Vitals signs and nursing note reviewed.   Constitutional:       General: She is not in acute distress.     Appearance: She is well-developed.   HENT:      Head: Normocephalic and atraumatic.      Right Ear: Hearing and external ear normal.      Left Ear: Hearing and external ear normal.      Nose: No rhinorrhea.      Right Sinus: No maxillary sinus tenderness or frontal sinus tenderness.      Left Sinus: No maxillary sinus tenderness or frontal sinus tenderness.      Mouth/Throat:      Mouth: No oral lesions.      Pharynx: Uvula midline.   Eyes:      General:         Right eye: No discharge.         Left eye: No discharge.      Conjunctiva/sclera:  Conjunctivae normal.      Pupils: Pupils are equal, round, and reactive to light.   Neck:      Musculoskeletal: Normal range of motion.      Thyroid: No thyromegaly.      Vascular: No carotid bruit.      Trachea: No tracheal deviation.   Cardiovascular:      Rate and Rhythm: Normal rate and regular rhythm.      Pulses:           Dorsalis pedis pulses are 2+ on the right side and 2+ on the left side.      Heart sounds: Normal heart sounds, S1 normal and S2 normal. No murmur.   Pulmonary:      Effort: Pulmonary effort is normal. No respiratory distress.      Breath sounds: Normal breath sounds.   Abdominal:      General: Bowel sounds are normal. There is distension.      Palpations: Abdomen is soft. There is no mass.      Tenderness: There is no abdominal tenderness.   Musculoskeletal: Normal range of motion.      Left shoulder: She exhibits tenderness.      Right upper leg: She exhibits tenderness.      Left upper leg: She exhibits tenderness.   Lymphadenopathy:      Cervical: No cervical adenopathy.      Upper Body:      Right upper body: No supraclavicular adenopathy.      Left upper body: No supraclavicular adenopathy.   Skin:     General: Skin is warm and dry.      Capillary Refill: Capillary refill takes less than 2 seconds.      Coloration: Skin is pale.      Findings: No rash.   Neurological:      Mental Status: She is alert and oriented to person, place, and time.      Sensory: No sensory deficit.      Coordination: Coordination normal.      Gait: Gait normal.   Psychiatric:         Mood and Affect: Mood is anxious. Mood is not depressed.         Speech: Speech normal.         Behavior: Behavior normal.         Thought Content: Thought content normal.         Judgment: Judgment normal.         Significant Labs:   CBC:   Recent Labs   Lab 07/06/20  1230 07/07/20  0515 07/08/20  0501   WBC 0.80* 0.97* 1.41*   HGB 11.4* 10.2* 10.0*   HCT 34.0* 31.4* 31.6*    205 189    and CMP:   Recent Labs   Lab  07/06/20  1230 07/07/20  0515 07/08/20  0501    136 136   K 3.4* 4.1 4.4    105 106   CO2 23 23 22*    102 105   BUN 7 13 22*   CREATININE 0.8 2.0* 4.9*   CALCIUM 8.8 8.4* 8.3*   PROT 7.8  --   --    ALBUMIN 4.3  --   --    BILITOT 0.9  --   --    ALKPHOS 51*  --   --    AST 17  --   --    ALT 18  --   --    ANIONGAP 9 8 8   EGFRNONAA >60 30* 10*       Diagnostic Results:  I have reviewed all pertinent imaging results/findings within the past 24 hours.    Assessment/Plan:     * Dental infection  Dental referral/participating dentist list to be provided prior to discharge. Advised patient to notify dentist that dental work is required prior to resuming Chemotherapy.     Chemotherapy-induced neutropenia  Goal is ANC >1000 prior to discharge, will need IV ABT prophylaxis until ANC recovers.   --Would continue IV antibiotics until ANC > 1000 then consider PO antibiotics.   --Patient needs outpatient dental consult for any necessary tooth extractions.       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 Chemotherapy is on hold pending resolution of acute issues, patient will need to see a dentist prior to continuing on chemotherapy.     --Will arrange outpatient follow up with Primary Oncologist upon discharge  --Daily CBC, CMP        Thank you for your consult. I will follow-up with patient. Please contact us if you have any additional questions.     Shanda Couch NP  Hematology/Oncology  [facility]

## 2020-07-08 NOTE — ASSESSMENT & PLAN NOTE
Continue IV vanc/zosyn for now  Follow fever curve  BC x2 pending  Dental soft diet for now  Morphine prn for pain   7/7/20 The patient reports pain is not well controlled. Palliative care was consulted. The patient remained afebrile overnight. CT maxiollofacial CT was negative for a dental abscess but showed multiple dental caries. The patient remains neutropenic, Hem/onc following. Continue current management with IV ABX.   7/8/20 Will need dental follow up after discharge.

## 2020-07-09 LAB
ANION GAP SERPL CALC-SCNC: 10 MMOL/L (ref 8–16)
BASOPHILS # BLD AUTO: 0.01 K/UL (ref 0–0.2)
BASOPHILS NFR BLD: 0.5 % (ref 0–1.9)
BUN SERPL-MCNC: 28 MG/DL (ref 6–20)
CALCIUM SERPL-MCNC: 8.1 MG/DL (ref 8.7–10.5)
CHLORIDE SERPL-SCNC: 106 MMOL/L (ref 95–110)
CO2 SERPL-SCNC: 20 MMOL/L (ref 23–29)
CREAT SERPL-MCNC: 6.8 MG/DL (ref 0.5–1.4)
DIFFERENTIAL METHOD: ABNORMAL
EOSINOPHIL # BLD AUTO: 0.1 K/UL (ref 0–0.5)
EOSINOPHIL NFR BLD: 3.1 % (ref 0–8)
ERYTHROCYTE [DISTWIDTH] IN BLOOD BY AUTOMATED COUNT: 20.3 % (ref 11.5–14.5)
EST. GFR  (AFRICAN AMERICAN): 8 ML/MIN/1.73 M^2
EST. GFR  (NON AFRICAN AMERICAN): 7 ML/MIN/1.73 M^2
GLUCOSE SERPL-MCNC: 86 MG/DL (ref 70–110)
HCT VFR BLD AUTO: 31.5 % (ref 37–48.5)
HGB BLD-MCNC: 10 G/DL (ref 12–16)
IMM GRANULOCYTES # BLD AUTO: 0.02 K/UL (ref 0–0.04)
IMM GRANULOCYTES NFR BLD AUTO: 1 % (ref 0–0.5)
LYMPHOCYTES # BLD AUTO: 0.7 K/UL (ref 1–4.8)
LYMPHOCYTES NFR BLD: 33.3 % (ref 18–48)
MAGNESIUM SERPL-MCNC: 2.3 MG/DL (ref 1.6–2.6)
MCH RBC QN AUTO: 30.1 PG (ref 27–31)
MCHC RBC AUTO-ENTMCNC: 31.7 G/DL (ref 32–36)
MCV RBC AUTO: 95 FL (ref 82–98)
MONOCYTES # BLD AUTO: 0.1 K/UL (ref 0.3–1)
MONOCYTES NFR BLD: 3.6 % (ref 4–15)
NEUTROPHILS # BLD AUTO: 1.1 K/UL (ref 1.8–7.7)
NEUTROPHILS NFR BLD: 58.5 % (ref 38–73)
NRBC BLD-RTO: 2 /100 WBC
PLATELET # BLD AUTO: 181 K/UL (ref 150–350)
PMV BLD AUTO: 10.3 FL (ref 9.2–12.9)
POTASSIUM SERPL-SCNC: 4.7 MMOL/L (ref 3.5–5.1)
RBC # BLD AUTO: 3.32 M/UL (ref 4–5.4)
SODIUM SERPL-SCNC: 136 MMOL/L (ref 136–145)
WBC # BLD AUTO: 1.95 K/UL (ref 3.9–12.7)

## 2020-07-09 PROCEDURE — 99233 SBSQ HOSP IP/OBS HIGH 50: CPT | Mod: ,,, | Performed by: PHYSICIAN ASSISTANT

## 2020-07-09 PROCEDURE — 11000001 HC ACUTE MED/SURG PRIVATE ROOM

## 2020-07-09 PROCEDURE — 25000003 PHARM REV CODE 250: Performed by: EMERGENCY MEDICINE

## 2020-07-09 PROCEDURE — 85025 COMPLETE CBC W/AUTO DIFF WBC: CPT

## 2020-07-09 PROCEDURE — 80048 BASIC METABOLIC PNL TOTAL CA: CPT

## 2020-07-09 PROCEDURE — 36415 COLL VENOUS BLD VENIPUNCTURE: CPT

## 2020-07-09 PROCEDURE — 83735 ASSAY OF MAGNESIUM: CPT

## 2020-07-09 PROCEDURE — 99233 PR SUBSEQUENT HOSPITAL CARE,LEVL III: ICD-10-PCS | Mod: ,,, | Performed by: INTERNAL MEDICINE

## 2020-07-09 PROCEDURE — 25000003 PHARM REV CODE 250: Performed by: PHYSICIAN ASSISTANT

## 2020-07-09 PROCEDURE — 99233 SBSQ HOSP IP/OBS HIGH 50: CPT | Mod: ,,, | Performed by: INTERNAL MEDICINE

## 2020-07-09 PROCEDURE — 99233 PR SUBSEQUENT HOSPITAL CARE,LEVL III: ICD-10-PCS | Mod: ,,, | Performed by: PHYSICIAN ASSISTANT

## 2020-07-09 RX ORDER — FENTANYL 25 UG/1
1 PATCH TRANSDERMAL
Status: DISCONTINUED | OUTPATIENT
Start: 2020-07-09 | End: 2020-07-17

## 2020-07-09 RX ADMIN — OXYCODONE 10 MG: 5 TABLET ORAL at 05:07

## 2020-07-09 RX ADMIN — POLYETHYLENE GLYCOL 3350 17 G: 17 POWDER, FOR SOLUTION ORAL at 08:07

## 2020-07-09 RX ADMIN — GABAPENTIN 300 MG: 300 CAPSULE ORAL at 08:07

## 2020-07-09 RX ADMIN — SENNOSIDES 8.6 MG: 8.6 TABLET, FILM COATED ORAL at 08:07

## 2020-07-09 RX ADMIN — Medication 1 TABLET: at 08:07

## 2020-07-09 RX ADMIN — Medication 100 MG: at 08:07

## 2020-07-09 RX ADMIN — THERA TABS 1 TABLET: TAB at 08:07

## 2020-07-09 RX ADMIN — FENTANYL 1 PATCH: 25 PATCH, EXTENDED RELEASE TRANSDERMAL at 06:07

## 2020-07-09 NOTE — PROGRESS NOTES
[facility] -   Nephrology  Progress Note    Patient Name: Jaswant Yang  MRN: [id number]  Admission Date: 7/6/2020  Hospital Length of Stay: 3 days  Attending Provider: Shantel Carlson MD   Primary Care Physician: Primary Doctor No  Principal Problem:Dental infection    Subjective:     HPI: 44 year old female with anemia, GERD, hairy-cell leukemia presents to [facility] with low grade fever, leukopenia and dental pain. Patient was started on antibiotics including vancomycin and received 4.2 g of Vancomycin in a 15 h period. UOP has not been recorded since admission.     Nephrology was consulted to help with patient's renal care while she is admitted at [facility]. I saw and examined patient in her hospital room. Patient reports generalized weakness and vomiting x 1 today. No other symptoms at present. She reports NSAID use in past (last use of ibuprofen about 3 weeks ago).     Chart review revealed that her baseline creatinine is about 0.9.     Interval History:     7/9/20: Creatinine has increased to 6.8 today. Patient has no complaints.       Review of patient's allergies indicates:   Allergen Reactions    Tramadol Hives     Current Facility-Administered Medications   Medication Frequency    0.9%  NaCl infusion Continuous    acetaminophen tablet 650 mg Q6H PRN    fentaNYL 25 mcg/hr 1 patch Q72H    folic acid-vit B6-vit B12 2.5-25-2 mg tablet 1 tablet Daily    gabapentin capsule 300 mg QHS    hydrOXYzine HCL tablet 25 mg TID PRN    multivitamin tablet Daily    ondansetron injection 4 mg Q8H PRN    oxyCODONE immediate release tablet 10 mg Q4H PRN    polyethylene glycol packet 17 g Daily    promethazine tablet 25 mg Q6H PRN    senna tablet 8.6 mg QHS    thiamine tablet 100 mg Daily       Objective:     Vital Signs (Most Recent):  Temp: 98.5 °F (36.9 °C) (07/09/20 0716)  Pulse: 79 (07/09/20 0716)  Resp: 18 (07/09/20 0716)  BP: 123/66 (07/09/20 0716)  SpO2: 97 % (07/09/20 0716)  O2 Device (Oxygen  Therapy): room air (07/09/20 0721) Vital Signs (24h Range):  Temp:  [98 °F (36.7 °C)-98.6 °F (37 °C)] 98.5 °F (36.9 °C)  Pulse:  [77-90] 79  Resp:  [16-18] 18  SpO2:  [92 %-100 %] 97 %  BP: (113-123)/(56-69) 123/66     Weight: 124.3 kg (274 lb) (07/06/20 1700)  Body mass index is 38.22 kg/m².  Body surface area is 2.5 meters squared.    I/O last 3 completed shifts:  In: 1350 [I.V.:1150; IV Piggyback:200]  Out: 700 [Urine:700]    Physical Exam  Constitutional:       Appearance: She is well-developed.   HENT:      Head: Normocephalic.   Eyes:      Pupils: Pupils are equal, round, and reactive to light.   Neck:      Thyroid: No thyromegaly.   Cardiovascular:      Rate and Rhythm: Normal rate and regular rhythm.      Heart sounds: No friction rub.   Pulmonary:      Effort: Pulmonary effort is normal.      Breath sounds: Normal breath sounds. No wheezing.   Chest:      Chest wall: No tenderness.   Abdominal:      General: Bowel sounds are normal. There is no distension.      Palpations: Abdomen is soft.      Tenderness: There is no abdominal tenderness.   Musculoskeletal:         General: No swelling.   Lymphadenopathy:      Cervical: No cervical adenopathy.   Skin:     General: Skin is warm and dry.      Findings: No rash.   Neurological:      Mental Status: She is alert and oriented to person, place, and time.         Significant Labs:  Lab Results   Component Value Date    CREATININE 6.8 (H) 07/09/2020    BUN 28 (H) 07/09/2020     07/09/2020    K 4.7 07/09/2020     07/09/2020    CO2 20 (L) 07/09/2020     Lab Results   Component Value Date    CALCIUM 8.1 (L) 07/09/2020     Lab Results   Component Value Date    ALBUMIN 4.3 07/06/2020     Lab Results   Component Value Date    WBC 1.95 (LL) 07/09/2020    HGB 10.0 (L) 07/09/2020    HCT 31.5 (L) 07/09/2020    MCV 95 07/09/2020     07/09/2020       Recent Labs   Lab 07/09/20  0510   MG 2.3         Significant Imaging:  Imaging Results    None            Assessment/Plan:     Hypocalcemia  Mild, monitor for now.     ADAMARIS (acute kidney injury)  Patient presents with ADAMARIS and creatinine has increased from 0.8 on 7/6 to 4.9 on 7/8/20. ADAMARIS likely due to Vancomycin use and patient recrived 4.2 g of Vancomycin in 15 hour period. Vancomycin has been discontinued. Patient reports NSAID use 3 weeks ago. This is unlikely related to current ADAMARIS episode. Urinalysis shows +1 protein which is chronic and is consistent with mild UTI. Avoid NSAIDs, ACE-I, ARB. No need for dialysis at present. Creatinine has increased to 6.8 today. Continue IV fluids. Recorded  ml overnight.     Hypokalemia  Has resolved.     Chemotherapy-induced neutropenia  As per Heme/Onc service.     Hairy cell leukemia of spleen  Heme/Onc is following.         Thank you for your consult. I will follow-up with patient. Please contact us if you have any additional questions.    Aaron Ndiaye MD  Nephrology  [facility]

## 2020-07-09 NOTE — SUBJECTIVE & OBJECTIVE
Interval History:     7/9/20: Creatinine has increased to 6.8 today. Patient has no complaints.       Review of patient's allergies indicates:   Allergen Reactions    Tramadol Hives     Current Facility-Administered Medications   Medication Frequency    0.9%  NaCl infusion Continuous    acetaminophen tablet 650 mg Q6H PRN    fentaNYL 25 mcg/hr 1 patch Q72H    folic acid-vit B6-vit B12 2.5-25-2 mg tablet 1 tablet Daily    gabapentin capsule 300 mg QHS    hydrOXYzine HCL tablet 25 mg TID PRN    multivitamin tablet Daily    ondansetron injection 4 mg Q8H PRN    oxyCODONE immediate release tablet 10 mg Q4H PRN    polyethylene glycol packet 17 g Daily    promethazine tablet 25 mg Q6H PRN    senna tablet 8.6 mg QHS    thiamine tablet 100 mg Daily       Objective:     Vital Signs (Most Recent):  Temp: 98.5 °F (36.9 °C) (07/09/20 0716)  Pulse: 79 (07/09/20 0716)  Resp: 18 (07/09/20 0716)  BP: 123/66 (07/09/20 0716)  SpO2: 97 % (07/09/20 0716)  O2 Device (Oxygen Therapy): room air (07/09/20 0721) Vital Signs (24h Range):  Temp:  [98 °F (36.7 °C)-98.6 °F (37 °C)] 98.5 °F (36.9 °C)  Pulse:  [77-90] 79  Resp:  [16-18] 18  SpO2:  [92 %-100 %] 97 %  BP: (113-123)/(56-69) 123/66     Weight: 124.3 kg (274 lb) (07/06/20 1700)  Body mass index is 38.22 kg/m².  Body surface area is 2.5 meters squared.    I/O last 3 completed shifts:  In: 1350 [I.V.:1150; IV Piggyback:200]  Out: 700 [Urine:700]    Physical Exam  Constitutional:       Appearance: She is well-developed.   HENT:      Head: Normocephalic.   Eyes:      Pupils: Pupils are equal, round, and reactive to light.   Neck:      Thyroid: No thyromegaly.   Cardiovascular:      Rate and Rhythm: Normal rate and regular rhythm.      Heart sounds: No friction rub.   Pulmonary:      Effort: Pulmonary effort is normal.      Breath sounds: Normal breath sounds. No wheezing.   Chest:      Chest wall: No tenderness.   Abdominal:      General: Bowel sounds are normal. There is no  distension.      Palpations: Abdomen is soft.      Tenderness: There is no abdominal tenderness.   Musculoskeletal:         General: No swelling.   Lymphadenopathy:      Cervical: No cervical adenopathy.   Skin:     General: Skin is warm and dry.      Findings: No rash.   Neurological:      Mental Status: She is alert and oriented to person, place, and time.         Significant Labs:  Lab Results   Component Value Date    CREATININE 6.8 (H) 07/09/2020    BUN 28 (H) 07/09/2020     07/09/2020    K 4.7 07/09/2020     07/09/2020    CO2 20 (L) 07/09/2020     Lab Results   Component Value Date    CALCIUM 8.1 (L) 07/09/2020     Lab Results   Component Value Date    ALBUMIN 4.3 07/06/2020     Lab Results   Component Value Date    WBC 1.95 (LL) 07/09/2020    HGB 10.0 (L) 07/09/2020    HCT 31.5 (L) 07/09/2020    MCV 95 07/09/2020     07/09/2020       Recent Labs   Lab 07/09/20  0510   MG 2.3         Significant Imaging:  Imaging Results    None

## 2020-07-09 NOTE — ASSESSMENT & PLAN NOTE
Goal is ANC >1000 prior to discharge, will need IV ABT prophylaxis until ANC recovers.   --Would continue IV antibiotics until ANC > 1000 then consider PO antibiotics.   --Patient needs outpatient dental consult for any necessary tooth extractions.   --Initiate oral ABT for prophylaxis, appreciate Nephrology input for ABT selection.

## 2020-07-09 NOTE — PLAN OF CARE
Problem: Adult Inpatient Plan of Care  Goal: Plan of Care Review  Outcome: Ongoing, Progressing     Problem: Fall Injury Risk  Goal: Absence of Fall and Fall-Related Injury  Outcome: Ongoing, Progressing     Problem: Neutropenia  Goal: Absence of Infection  Outcome: Ongoing, Progressing     Problem: Fluid Imbalance (Acute Kidney Injury/Impairment)  Goal: Optimal Fluid Balance  Outcome: Ongoing, Progressing

## 2020-07-09 NOTE — ASSESSMENT & PLAN NOTE
Maintain neutropenic precautions  Labs in AM  Continue supplemental thiamine, Folbic, MVI  7/9/20 Improving, Hem/onc following

## 2020-07-09 NOTE — PROGRESS NOTES
[facility] -   Hematology/Oncology  Progress Note    Patient Name: Jaswant Yang  Admission Date: 7/6/2020  Hospital Length of Stay: 3 days  Code Status: Prior     Subjective:     HPI:  44 y.o female with hairy cell lymphoma currently being treated with CLADRIBINE sent from Dr. Whittaker office on yesterday for fever and presumed right sided tooth infection. Associated symptoms include facial swelling, jaw pain, fever. Patient asked to report to ED for IV antibiotics for neutropenic fever in setting of tooth infection. Patient was started on Vancomycin but had allergic reaction and was placed on Zoysn.     Interval History:  Patient reports overall improvement.  Pain well controlled with current regimen.  ADAMARIS likely related to vancomycin, nephrology following.  Vancomycin has been discontinued.  ANC:  1100    Oncology Treatment Plan:   OP CLADRIBINE (5 DAY) + RITUXIMAB - Hairy Cell Leukemia    Medications:  Continuous Infusions:   sodium chloride 0.9% 100 mL/hr at 07/08/20 0851     Scheduled Meds:   fentaNYL  1 patch Transdermal Q72H    folic acid-vit B6-vit B12 2.5-25-2 mg  1 tablet Oral Daily    gabapentin  300 mg Oral QHS    multivitamin  1 tablet Oral Daily    polyethylene glycol  17 g Oral Daily    senna  8.6 mg Oral QHS    thiamine  100 mg Oral Daily     PRN Meds:acetaminophen, hydrOXYzine HCL, ondansetron, oxyCODONE, promethazine     Review of Systems   Constitutional: Positive for activity change and fatigue. Negative for appetite change, chills, diaphoresis, fever and unexpected weight change.   HENT: Positive for dental problem. Negative for congestion, hearing loss, nosebleeds, postnasal drip, sore throat and trouble swallowing.    Eyes: Negative for pain, discharge and visual disturbance.   Respiratory: Negative for cough, chest tightness and shortness of breath.    Cardiovascular: Negative for chest pain and palpitations.   Gastrointestinal: Negative for blood in stool,  constipation, diarrhea, nausea and vomiting.   Endocrine: Negative for cold intolerance and heat intolerance.   Genitourinary: Negative for difficulty urinating, dyspareunia, flank pain and hematuria.   Musculoskeletal: Positive for arthralgias and myalgias. Negative for back pain, gait problem, joint swelling and neck pain.   Skin: Negative.    Neurological: Negative for dizziness, weakness, light-headedness and headaches.   Hematological: Negative for adenopathy. Does not bruise/bleed easily.   Psychiatric/Behavioral: Negative for agitation, behavioral problems and confusion. The patient is nervous/anxious.      Objective:     Vital Signs (Most Recent):  Temp: 98.5 °F (36.9 °C) (07/09/20 0716)  Pulse: 79 (07/09/20 0716)  Resp: 18 (07/09/20 0716)  BP: 123/66 (07/09/20 0716)  SpO2: 97 % (07/09/20 0716) Vital Signs (24h Range):  Temp:  [98 °F (36.7 °C)-98.6 °F (37 °C)] 98.5 °F (36.9 °C)  Pulse:  [77-90] 79  Resp:  [16-18] 18  SpO2:  [92 %-100 %] 97 %  BP: (113-123)/(56-69) 123/66     Weight: 124.3 kg (274 lb)  Body mass index is 38.22 kg/m².  Body surface area is 2.5 meters squared.      Intake/Output Summary (Last 24 hours) at 7/9/2020 1035  Last data filed at 7/9/2020 0546  Gross per 24 hour   Intake 1150 ml   Output 700 ml   Net 450 ml       Physical Exam  Vitals signs and nursing note reviewed.   Constitutional:       General: She is not in acute distress.     Appearance: She is well-developed.   HENT:      Head: Normocephalic and atraumatic.      Right Ear: Hearing and external ear normal.      Left Ear: Hearing and external ear normal.      Nose: No rhinorrhea.      Right Sinus: No maxillary sinus tenderness or frontal sinus tenderness.      Left Sinus: No maxillary sinus tenderness or frontal sinus tenderness.      Mouth/Throat:      Mouth: No oral lesions.      Pharynx: Uvula midline.   Eyes:      General:         Right eye: No discharge.         Left eye: No discharge.      Conjunctiva/sclera: Conjunctivae  normal.      Pupils: Pupils are equal, round, and reactive to light.   Neck:      Musculoskeletal: Normal range of motion.      Thyroid: No thyromegaly.      Vascular: No carotid bruit.      Trachea: No tracheal deviation.   Cardiovascular:      Rate and Rhythm: Normal rate and regular rhythm.      Pulses:           Dorsalis pedis pulses are 2+ on the right side and 2+ on the left side.      Heart sounds: Normal heart sounds, S1 normal and S2 normal. No murmur.   Pulmonary:      Effort: Pulmonary effort is normal. No respiratory distress.      Breath sounds: Normal breath sounds.   Abdominal:      General: Bowel sounds are normal. There is no distension.      Palpations: Abdomen is soft. There is no mass.      Tenderness: There is no abdominal tenderness.   Musculoskeletal: Normal range of motion.      Left shoulder: She exhibits no tenderness.      Right upper leg: She exhibits no tenderness.      Left upper leg: She exhibits no tenderness.   Lymphadenopathy:      Cervical: No cervical adenopathy.      Upper Body:      Right upper body: No supraclavicular adenopathy.      Left upper body: No supraclavicular adenopathy.   Skin:     General: Skin is warm and dry.      Capillary Refill: Capillary refill takes less than 2 seconds.      Coloration: Skin is pale.      Findings: No rash.   Neurological:      Mental Status: She is alert and oriented to person, place, and time.      Sensory: No sensory deficit.      Coordination: Coordination normal.      Gait: Gait normal.   Psychiatric:         Mood and Affect: Mood is anxious. Mood is not depressed.         Speech: Speech normal.         Behavior: Behavior normal.         Thought Content: Thought content normal.         Judgment: Judgment normal.         Significant Labs:   CBC:   Recent Labs   Lab 07/08/20  0501 07/09/20  0510   WBC 1.41* 1.95*   HGB 10.0* 10.0*   HCT 31.6* 31.5*    181    and CMP:   Recent Labs   Lab 07/08/20  0501 07/09/20  0510    136    K 4.4 4.7    106   CO2 22* 20*    86   BUN 22* 28*   CREATININE 4.9* 6.8*   CALCIUM 8.3* 8.1*   ANIONGAP 8 10   EGFRNONAA 10* 7*       Diagnostic Results:  I have reviewed all pertinent imaging results/findings within the past 24 hours.    Assessment/Plan:     * Dental infection  Dental referral/participating dentist list to be provided prior to discharge. Advised patient to notify dentist that dental work is required prior to resuming Chemotherapy.     ADAMARIS (acute kidney injury)  Management per Nephrology, patient does need oral antibiotic prophylaxis for dental issues, appreciate input from Nephrology.       Chemotherapy-induced neutropenia  Goal is ANC >1000 prior to discharge, will need IV ABT prophylaxis until ANC recovers.   --Would continue IV antibiotics until ANC > 1000 then consider PO antibiotics.   --Patient needs outpatient dental consult for any necessary tooth extractions.   --Initiate oral ABT for prophylaxis, appreciate Nephrology input for ABT selection.       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 Chemotherapy is on hold pending resolution of acute issues, patient will need to see a dentist prior to continuing on chemotherapy.     --Will arrange outpatient follow up with Primary Oncologist upon discharge  --Daily CBC, CMP          Thank you for your consult. I will follow-up with patient. Please contact us if you have any additional questions.     Shanda Couch NP  Hematology/Oncology  [facility]

## 2020-07-09 NOTE — ASSESSMENT & PLAN NOTE
Patient presents with ADAMARIS and creatinine has increased from 0.8 on 7/6 to 4.9 on 7/8/20. ADAMARIS likely due to Vancomycin use and patient recrived 4.2 g of Vancomycin in 15 hour period. Vancomycin has been discontinued. Patient reports NSAID use 3 weeks ago. This is unlikely related to current ADAMARIS episode. Urinalysis shows +1 protein which is chronic and is consistent with mild UTI. Avoid NSAIDs, ACE-I, ARB. No need for dialysis at present. Creatinine has increased to 6.8 today. Continue IV fluids. Recorded  ml overnight.

## 2020-07-09 NOTE — PLAN OF CARE
Pt remained free of injury during shift, stable condition, pain adequately controlled, no acute distress, receiving IV fluids, Fentanyl patch to R chest, and will continue to monitor. 12hr chart review performed.

## 2020-07-09 NOTE — PROGRESS NOTES
"[facility] Hospital Medicine  Progress Note    Patient Name: Jaswant Yang  MRN: [id number]  Patient Class: IP- Inpatient   Admission Date: 7/6/2020  Length of Stay: 3 days  Attending Physician: Shantel Carlson MD  Primary Care Provider: Primary Doctor No        Subjective:     Principal Problem:Dental infection        HPI:  43 y/o AA F with hx of hairy cell leukemia (diagnosed 05/2020), anemia, chronic pain to ED from Dr. Whittaker' office after being found to have a low grade temperature (99.6) with a visible dental infection in the R lower jaw and L upper jaw, in the setting of chemotherapy induced neutropenia (WBC 0.89); mild hypokalemia (3.4) also noted in ED, but labs otherwise largely unremarkable - admission for IV abx; BC x2 pending. Pt reports associated severe stabbing pain in the affected jaw areas, and mild facial edema, worsening over the past 2 days but states no drainage/bleeding. Pt's last chemo cycle ended on 06/26/20 - reports pain improved with IV morphine in ED, states she feels "a little better now" - palliative care consulted per ED physician at patient's request, to assist with symptom management and pain control. Denies CP, edema, palpitations, SOB, wheezing, orthopnea, PND, abdominal pain, N/V/D, dysuria, flank pain, HA, dizziness, fever, cough, chills, falls/trauma, blurred vision, focal deficits. No recent dietary changes, travel, sick contacts or viral illness - pt is typically active and independent with ADLs and ambulation at home. Pt is full code. Surrogate decision makers are son (Dmitri Yang) and Mother (Elsa Yang). Admitted for chemotherapy induced neutropenic fever with a jaw/dental infection.     Overview/Hospital Course:  7/7/20 The patient reports pain is not well controlled. Palliative care was consulted. The patient remained afebrile overnight. CT maxiollofacial CT was negative for a dental abscess but showed multiple dental caries. The patient " "remains neutropenic, Hem/onc following. Continue current management with IV ABX. 7/8/20 The patient remained afebrile overnight. WBCs improved to 1.41K, Hem/onc following. The patients renal function worsened overnight, Cr. Increased from 2 to 4.9 overnight. This is likely 2/2 vanc. IV fluids started and Nephrology consulted. Will monitor renal function. 7/9/20 No acute issues overnight. The patient continues to have good UOP. Creatinine increased to 6.8. Nephrology following, will continue to monitor renal function. ANC is improving, Hem/onc following.     Interval History: No acute issues overnight. The patient continues to have good UOP. Creatinine increased to 6.8. Nephrology following, will continue to monitor renal function. ANC is improving, Hem/onc following.       Review of Systems   Constitutional: Positive for fever ("low-grade"). Negative for activity change, appetite change, chills, diaphoresis, fatigue and unexpected weight change.   HENT: Positive for dental problem and facial swelling (R lower jaw, L upper jaw). Negative for congestion, drooling, rhinorrhea, sinus pressure, sneezing, sore throat, trouble swallowing and voice change.    Eyes: Negative for photophobia, discharge, redness, itching and visual disturbance.   Respiratory: Negative for apnea, cough, choking, chest tightness, shortness of breath, wheezing and stridor.    Cardiovascular: Negative for chest pain, palpitations and leg swelling.   Gastrointestinal: Negative for abdominal distention, abdominal pain, anal bleeding, blood in stool, constipation, diarrhea, nausea and vomiting.   Endocrine: Negative for cold intolerance, heat intolerance, polydipsia, polyphagia and polyuria.   Genitourinary: Negative for decreased urine volume, difficulty urinating, dysuria, flank pain, frequency, hematuria, pelvic pain, urgency, vaginal bleeding and vaginal discharge.   Musculoskeletal: Positive for back pain (chronic). Negative for arthralgias, " gait problem, joint swelling, myalgias, neck pain and neck stiffness.   Skin: Negative for color change, pallor, rash and wound.   Neurological: Positive for weakness (generalized). Negative for dizziness, seizures, syncope, facial asymmetry, speech difficulty, light-headedness, numbness and headaches.   Psychiatric/Behavioral: Negative for agitation, confusion, hallucinations and suicidal ideas.   All other systems reviewed and are negative.    Objective:     Vital Signs (Most Recent):  Temp: 97.2 °F (36.2 °C) (07/09/20 1601)  Pulse: 83 (07/09/20 1601)  Resp: 16 (07/09/20 1601)  BP: 114/63 (07/09/20 1601)  SpO2: 99 % (07/09/20 1601) Vital Signs (24h Range):  Temp:  [97.2 °F (36.2 °C)-98.5 °F (36.9 °C)] 97.2 °F (36.2 °C)  Pulse:  [79-84] 83  Resp:  [16-18] 16  SpO2:  [97 %-100 %] 99 %  BP: (113-123)/(60-70) 114/63     Weight: 124.3 kg (274 lb)  Body mass index is 38.22 kg/m².    Intake/Output Summary (Last 24 hours) at 7/9/2020 1735  Last data filed at 7/9/2020 0546  Gross per 24 hour   Intake 1150 ml   Output 700 ml   Net 450 ml      Physical Exam  Vitals signs and nursing note reviewed.   Constitutional:       General: She is not in acute distress.     Appearance: Normal appearance. She is well-developed. She is obese. She is toxic-appearing.   HENT:      Head: Normocephalic and atraumatic.      Nose: Nose normal.      Mouth/Throat:      Mouth: Mucous membranes are moist.      Comments: R lower jaw, L lower jaw redness, swelling of gums  Eyes:      Conjunctiva/sclera: Conjunctivae normal.      Pupils: Pupils are equal, round, and reactive to light.   Neck:      Musculoskeletal: Normal range of motion and neck supple.   Cardiovascular:      Rate and Rhythm: Normal rate and regular rhythm.      Pulses: Normal pulses.      Heart sounds: Normal heart sounds. No murmur.   Pulmonary:      Effort: Pulmonary effort is normal. No respiratory distress.      Breath sounds: Normal breath sounds. No wheezing.   Abdominal:       General: Abdomen is flat. Bowel sounds are normal. There is no distension.      Palpations: Abdomen is soft.      Tenderness: There is no abdominal tenderness. There is no guarding or rebound.   Musculoskeletal: Normal range of motion.         General: No tenderness, deformity or signs of injury.   Skin:     General: Skin is warm and dry.      Capillary Refill: Capillary refill takes less than 2 seconds.      Coloration: Skin is not jaundiced.      Findings: No erythema.   Neurological:      General: No focal deficit present.      Mental Status: She is alert and oriented to person, place, and time.      Deep Tendon Reflexes: Reflexes are normal and symmetric.   Psychiatric:         Mood and Affect: Mood normal.         Behavior: Behavior normal.         Thought Content: Thought content normal.         Judgment: Judgment normal.         Significant Labs: All pertinent labs within the past 24 hours have been reviewed.    Significant Imaging:   Imaging Results    None             Assessment/Plan:      * Dental infection  Continue IV vanc/zosyn for now  Follow fever curve  BC x2 pending  Dental soft diet for now  Morphine prn for pain   7/7/20 The patient reports pain is not well controlled. Palliative care was consulted. The patient remained afebrile overnight. CT maxiollofacial CT was negative for a dental abscess but showed multiple dental caries. The patient remains neutropenic, Hem/onc following. Continue current management with IV ABX.   7/8/20 Will need dental follow up after discharge.   7/9/20 Afebrile overnight, Continue ABX, will need dental follow up after discharge.       ADAMARIS (acute kidney injury)  The patients renal function worsened overnight, Cr. Increased from 2 to 4.9 overnight. This is likely 2/2 vanc. IV fluids started and Nephrology consulted. Will monitor renal function.   7/9/20 The patient continues to have good UOP. Creatinine increased to 6.8. Nephrology following, will continue to monitor  renal function.       Acute neoplasm-related pain  - Continue PRN Analgesia - Palliative care consulted       Hypokalemia  Replace K+  Recheck in AM  Replete as needed  7/8/20 Resolved     Neutropenic fever  Consult hematology/oncology  Maintain neutropenic precautions  Tylenol prn fever  UA with reflex culture pending  IV zosyn   Cultures pending       Chemotherapy-induced neutropenia  Maintain neutropenic precautions  Labs in AM  Continue supplemental thiamine, Folbic, MVI  7/9/20 Improving, Hem/onc following    Hairy cell leukemia of spleen  Consult hematology/oncology   Consult palliative care for symptom/pain management        VTE Risk Mitigation (From admission, onward)         Ordered     Place sequential compression device  Until discontinued      07/06/20 7139                      Edgar Ashraf NP  Department of Hospital Medicine   [facility]

## 2020-07-09 NOTE — PROGRESS NOTES
Progress Note   Palliative Medicine        SUBJECTIVE:     History of Present Illness:  Patient seen and examined without family present. She reports her pain is under good control today and rates 4/10. The oxycodone has been more effective and allowed her to go longer between doses. She was able to rest well last night and her legs were not twitching or aching which we attribute to gabapentin. She says that she feels well but knows she must stay inpatient while we monitor her renal function.     In the last 24hrs she has received the following pain medications (7a-7a):  - Fentanyl 25mcg/ hr patch  - Oxycodone 10mg x 3  - Hydrocodone/ APAP 10mg x 1 (now d/c)      Past Medical History:   Diagnosis Date    Anemia     Anemia     Back pain     Dental infection     GERD (gastroesophageal reflux disease) 2020    Hairy-cell leukemia of spleen      Past Surgical History:   Procedure Laterality Date     SECTION, CLASSIC      FLUOROSCOPY N/A 2020    Procedure: Spleenic Bleed;  Surgeon: Hiren Cabrera MD;  Location: [facility] CATH LAB;  Service: General;  Laterality: N/A;    INJECTION OF ANESTHETIC AGENT INTO TISSUE PLANE DEFINED BY TRANSVERSUS ABDOMINIS MUSCLE N/A 2020    Procedure: BLOCK, TRANSVERSUS ABDOMINIS PLANE;  Surgeon: Yumi Ferguson MD;  Location: [facility] OR;  Service: General;  Laterality: N/A;    SPLENECTOMY N/A 2020    Procedure: SPLENECTOMY;  Surgeon: Yumi Ferguson MD;  Location: [facility] OR;  Service: General;  Laterality: N/A;    THORACENTESIS Left 2020    Procedure: THORACENTESIS;  Surgeon: Yumi Ferguson MD;  Location: [facility] OR;  Service: General;  Laterality: Left;    TUBAL LIGATION       History reviewed. No pertinent family history.  Review of patient's allergies indicates:   Allergen Reactions    Tramadol Hives       Medications:    Current Facility-Administered Medications:     0.9%  NaCl infusion, , Intravenous, Continuous, Edgar Ashraf NP, Last  Rate: 100 mL/hr at 07/08/20 0851    acetaminophen tablet 650 mg, 650 mg, Oral, Q6H PRN, Radha Wilson NP    fentaNYL 25 mcg/hr 1 patch, 1 patch, Transdermal, Q72H, Tatyana Gupta PA-C, 1 patch at 07/07/20 1127    folic acid-vit B6-vit B12 2.5-25-2 mg tablet 1 tablet, 1 tablet, Oral, Daily, Shantel Carlson MD, 1 tablet at 07/09/20 0816    gabapentin capsule 300 mg, 300 mg, Oral, QHS, Tatyana Gupta PA-C, 300 mg at 07/08/20 2236    hydrOXYzine HCL tablet 25 mg, 25 mg, Oral, TID PRN, FLAKITA Boswell 25 mg at 07/08/20 0735    multivitamin tablet, 1 tablet, Oral, Daily, Shantel Carlson MD, 1 tablet at 07/09/20 0816    ondansetron injection 4 mg, 4 mg, Intravenous, Q8H PRN, Radha Wilson NP    oxyCODONE immediate release tablet 10 mg, 10 mg, Oral, Q4H PRN, Tatyana Gupta PA-C, 10 mg at 07/09/20 0521    polyethylene glycol packet 17 g, 17 g, Oral, Daily, Tatyana Gupta PA-C, 17 g at 07/09/20 0816    promethazine tablet 25 mg, 25 mg, Oral, Q6H PRN, Radha Wilson NP    senna tablet 8.6 mg, 8.6 mg, Oral, QHS, Tatyana Gupta PA-C, 8.6 mg at 07/08/20 2237    thiamine tablet 100 mg, 100 mg, Oral, Daily, Shantel Carlson MD, 100 mg at 07/09/20 0816      Review of Symptoms    Symptom Assessment (ESAS 0-10 Scale)  Pain:  4  Dyspnea:  0  Anxiety:  0  Nausea:  0  Depression:  0  Anorexia:  1  Fatigue:  0  Insomnia:  0  Restlessness:  0  Agitation:  0     CAM / Delirium:  Negative  Constipation:  Positive  Diarrhea:  Negative    Bowel Management Plan (BMP):  Yes      Pain Assessment:  Location(s): torso    Torso       Torso Location:  Posterior and bilateral       Quality:  Aching       Quantity:  5/10 in intensity    OME in 24 hours:  80    ECOG Performance Status Grade:  0 - Fully Active    Advanced Directives:  Living Will: No    LaPOST: No    Do Not Resuscitate Status: No    Medical Power of : Yes     Agent's Name:  Elsa Yang.  Agent's Contact Number:   [phone]    Decision Making:  Patient answered questions      ROS:  Review of Systems   Constitutional: Positive for appetite change and fever. Negative for activity change.   HENT: Positive for dental problem. Negative for facial swelling, mouth sores, sore throat and trouble swallowing.    Eyes: Negative for photophobia and visual disturbance.   Respiratory: Negative for cough and shortness of breath.    Cardiovascular: Negative for chest pain and leg swelling.   Gastrointestinal: Positive for constipation. Negative for abdominal pain, diarrhea and nausea.   Endocrine: Negative for polydipsia and polyphagia.   Genitourinary: Negative for difficulty urinating and dysuria.   Musculoskeletal: Positive for arthralgias, myalgias and neck pain. Negative for back pain and neck stiffness.   Skin: Negative for rash and wound.   Allergic/Immunologic: Positive for immunocompromised state.   Neurological: Negative for syncope, weakness and light-headedness.   Psychiatric/Behavioral: Positive for sleep disturbance (d/t pain). Negative for confusion. The patient is not nervous/anxious.      OBJECTIVE:     Physical Exam:  Vitals: Temp: 98.2 °F (36.8 °C) (07/09/20 1143)  Pulse: 82 (07/09/20 1143)  Resp: 18 (07/09/20 1143)  BP: 120/70 (07/09/20 1143)  SpO2: 98 % (07/09/20 1143)    Gen: well-developed, well-nourished, NAD  Head: atraumatic, normocephalic  Eyes: conjuctiva and sclera clear  Ears: hearing grossly intact with no external abnormality  Mouth: no mucositis, good dentition  Respiratory: CTAB, no wheezing or rhonchi  Heart: RRR  Abdomen: soft, NTTP, nondistended, normoactive BS  Pulses: 2+ in DP  Extremities: no edema, full ROM of all joints  Neurologic: no focal deficits, CN II-XII grossly intact, normal coordination  Skin: no rashes or lesions  Psych: cooperative, normal mood and affect, normal attention span and concentration     Labs:  WBC   Date Value Ref Range Status   07/09/2020 1.95 (LL) 3.90 - 12.70 K/uL Final      Comment:     WBC COIUNT critical result(s) called and verbal readback obtained   from NURYS MORRISSEY RN by ELLE 07/09/2020 06:01         Hemoglobin   Date Value Ref Range Status   07/09/2020 10.0 (L) 12.0 - 16.0 g/dL Final       POC Hematocrit   Date Value Ref Range Status   05/11/2020 25 (L) 36 - 54 %PCV Final     Hematocrit   Date Value Ref Range Status   07/09/2020 31.5 (L) 37.0 - 48.5 % Final       Mean Corpuscular Volume   Date Value Ref Range Status   07/09/2020 95 82 - 98 fL Final       Platelets   Date Value Ref Range Status   07/09/2020 181 150 - 350 K/uL Final       BMP  Lab Results   Component Value Date     07/09/2020    K 4.7 07/09/2020     07/09/2020    CO2 20 (L) 07/09/2020    BUN 28 (H) 07/09/2020    CREATININE 6.8 (H) 07/09/2020    CALCIUM 8.1 (L) 07/09/2020    ANIONGAP 10 07/09/2020    ESTGFRAFRICA 8 (A) 07/09/2020    EGFRNONAA 7 (A) 07/09/2020       Lab Results   Component Value Date    AST 17 07/06/2020    ALKPHOS 51 (L) 07/06/2020    BILITOT 0.9 07/06/2020       Albumin   Date Value Ref Range Status   07/06/2020 4.3 3.5 - 5.2 g/dL Final       Radiology:I have reviewed all pertinent imaging results/findings within the past 24 hours.  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 Differential is wide for this appearance, and includes cocaine abuse, rhinitis medicamentosa (chronic Afrin-type medication abuse), complication related to antiangiogenesis chemotherapy, sinonasal granulomatosis with polyangiitis, sinonasal sarcoid, or less likely but possible invasive fungal sinusitis.     Multiple dental caries are noted.     Exam limited by lack of IV contrast.  No appreciable oropharyngeal or dental abscess. Recommend further evaluation with direct visualization.     ASSESSMENT   Jaswant Yang is a 44 y.o. 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 Whittaker due to fever. She was complaining of jaw pain and edema and was admitted for antibiotics and monitoring. Ms. Yang has an outpatient referral for Palliative Medicine for pain and symptom management but her appointment was at the end of the month so we were asked to see while admitted.        PLAN   1. Neoplasm related pain  - Due to renal dysfunction will avoid morphine  - Continue Fentanyl 25 mcg/ hr patch q72hr  - Continue oxycodone 10mg PO q4hr PRN pain  - Continue gabapentin 300mg HS for neuropathic pain- will titrate once renal function allows  - I will monitor usage and adjust as needed  - The risks, side effects, benefits of chronic opioid therapy have been discussed and the opportunity for questions provided.  - A record of the controlled substances prescribed and dispensed to the patient in [place] was reviewed in  by me (or my delegate). Last received hydrocodone 10mg #40 tabs and 5mg #11 tabs on 6/22/20, hydrocodone 10mg #20 on 6/16 and on 6/11.    2. Opioid induced constipation  - Continue daily Miralax and Senna 1 tab nightly     3. Hairy cell leukemia  - Defer to oncology  - Currently receiving chemotherapy     4. Encounter for Palliative Care  - Code status: FULL  - HCPOA: mom Elsa Yang, alternate: son Dmitri Yang  - PM SW assisted with ACP and scanned in to the computer  - Primary goal is to continue cancer targeted therapy  - We will manage pain medication going forth and she already has f/u on 7/21 scheduled     5. ADAMARIS  - Opioid selection driven by elevated Cr  - Renally dosed gabapentin and will titrate once renal function improves  - Nephrology is following  - Continue to monitor    Thank you for allowing Palliative Medicine to be involved in the care of Jaswant Yang.      Medical decision making: HIGH based on high risk of death, untreated symptoms, high risk medications, management of more than one chronic  illness in exacerbation, managing side effects of medications or polypharmacy      Plan required increased review of medication choice, interaction, dosing, frequency, and route due to patient complexity. Patient complexity increased by: Presence of renal insufficiency    > 50% of 33 min visit spent in chart review, face to face discussion symptom assessment, coordination of care and emotional support, exploring burden/ benefit of various approaches of treatment.       Tatyana Gupta PA-C  Palliative Medicine

## 2020-07-09 NOTE — SUBJECTIVE & OBJECTIVE
"Interval History: No acute issues overnight. The patient continues to have good UOP. Creatinine increased to 6.8. Nephrology following, will continue to monitor renal function. ANC is improving, Hem/onc following.       Review of Systems   Constitutional: Positive for fever ("low-grade"). Negative for activity change, appetite change, chills, diaphoresis, fatigue and unexpected weight change.   HENT: Positive for dental problem and facial swelling (R lower jaw, L upper jaw). Negative for congestion, drooling, rhinorrhea, sinus pressure, sneezing, sore throat, trouble swallowing and voice change.    Eyes: Negative for photophobia, discharge, redness, itching and visual disturbance.   Respiratory: Negative for apnea, cough, choking, chest tightness, shortness of breath, wheezing and stridor.    Cardiovascular: Negative for chest pain, palpitations and leg swelling.   Gastrointestinal: Negative for abdominal distention, abdominal pain, anal bleeding, blood in stool, constipation, diarrhea, nausea and vomiting.   Endocrine: Negative for cold intolerance, heat intolerance, polydipsia, polyphagia and polyuria.   Genitourinary: Negative for decreased urine volume, difficulty urinating, dysuria, flank pain, frequency, hematuria, pelvic pain, urgency, vaginal bleeding and vaginal discharge.   Musculoskeletal: Positive for back pain (chronic). Negative for arthralgias, gait problem, joint swelling, myalgias, neck pain and neck stiffness.   Skin: Negative for color change, pallor, rash and wound.   Neurological: Positive for weakness (generalized). Negative for dizziness, seizures, syncope, facial asymmetry, speech difficulty, light-headedness, numbness and headaches.   Psychiatric/Behavioral: Negative for agitation, confusion, hallucinations and suicidal ideas.   All other systems reviewed and are negative.    Objective:     Vital Signs (Most Recent):  Temp: 97.2 °F (36.2 °C) (07/09/20 1601)  Pulse: 83 (07/09/20 1601)  Resp: " 16 (07/09/20 1601)  BP: 114/63 (07/09/20 1601)  SpO2: 99 % (07/09/20 1601) Vital Signs (24h Range):  Temp:  [97.2 °F (36.2 °C)-98.5 °F (36.9 °C)] 97.2 °F (36.2 °C)  Pulse:  [79-84] 83  Resp:  [16-18] 16  SpO2:  [97 %-100 %] 99 %  BP: (113-123)/(60-70) 114/63     Weight: 124.3 kg (274 lb)  Body mass index is 38.22 kg/m².    Intake/Output Summary (Last 24 hours) at 7/9/2020 1733  Last data filed at 7/9/2020 0546  Gross per 24 hour   Intake 1150 ml   Output 700 ml   Net 450 ml      Physical Exam  Vitals signs and nursing note reviewed.   Constitutional:       General: She is not in acute distress.     Appearance: Normal appearance. She is well-developed. She is obese. She is toxic-appearing.   HENT:      Head: Normocephalic and atraumatic.      Nose: Nose normal.      Mouth/Throat:      Mouth: Mucous membranes are moist.      Comments: R lower jaw, L lower jaw redness, swelling of gums  Eyes:      Conjunctiva/sclera: Conjunctivae normal.      Pupils: Pupils are equal, round, and reactive to light.   Neck:      Musculoskeletal: Normal range of motion and neck supple.   Cardiovascular:      Rate and Rhythm: Normal rate and regular rhythm.      Pulses: Normal pulses.      Heart sounds: Normal heart sounds. No murmur.   Pulmonary:      Effort: Pulmonary effort is normal. No respiratory distress.      Breath sounds: Normal breath sounds. No wheezing.   Abdominal:      General: Abdomen is flat. Bowel sounds are normal. There is no distension.      Palpations: Abdomen is soft.      Tenderness: There is no abdominal tenderness. There is no guarding or rebound.   Musculoskeletal: Normal range of motion.         General: No tenderness, deformity or signs of injury.   Skin:     General: Skin is warm and dry.      Capillary Refill: Capillary refill takes less than 2 seconds.      Coloration: Skin is not jaundiced.      Findings: No erythema.   Neurological:      General: No focal deficit present.      Mental Status: She is alert  and oriented to person, place, and time.      Deep Tendon Reflexes: Reflexes are normal and symmetric.   Psychiatric:         Mood and Affect: Mood normal.         Behavior: Behavior normal.         Thought Content: Thought content normal.         Judgment: Judgment normal.         Significant Labs: All pertinent labs within the past 24 hours have been reviewed.    Significant Imaging:   Imaging Results    None

## 2020-07-09 NOTE — ASSESSMENT & PLAN NOTE
Continue IV vanc/zosyn for now  Follow fever curve  BC x2 pending  Dental soft diet for now  Morphine prn for pain   7/7/20 The patient reports pain is not well controlled. Palliative care was consulted. The patient remained afebrile overnight. CT maxiollofacial CT was negative for a dental abscess but showed multiple dental caries. The patient remains neutropenic, Hem/onc following. Continue current management with IV ABX.   7/8/20 Will need dental follow up after discharge.   7/9/20 Afebrile overnight, Continue ABX, will need dental follow up after discharge.

## 2020-07-09 NOTE — ASSESSMENT & PLAN NOTE
Management per Nephrology, patient does need oral antibiotic prophylaxis for dental issues, appreciate input from Nephrology.

## 2020-07-09 NOTE — SUBJECTIVE & OBJECTIVE
Interval History:  Patient reports overall improvement.  Pain well controlled with current regimen.  ADAMARIS likely related to vancomycin, nephrology following.  Vancomycin has been discontinued.  ANC:  1100    Oncology Treatment Plan:   OP CLADRIBINE (5 DAY) + RITUXIMAB - Hairy Cell Leukemia    Medications:  Continuous Infusions:   sodium chloride 0.9% 100 mL/hr at 07/08/20 0851     Scheduled Meds:   fentaNYL  1 patch Transdermal Q72H    folic acid-vit B6-vit B12 2.5-25-2 mg  1 tablet Oral Daily    gabapentin  300 mg Oral QHS    multivitamin  1 tablet Oral Daily    polyethylene glycol  17 g Oral Daily    senna  8.6 mg Oral QHS    thiamine  100 mg Oral Daily     PRN Meds:acetaminophen, hydrOXYzine HCL, ondansetron, oxyCODONE, promethazine     Review of Systems   Constitutional: Positive for activity change and fatigue. Negative for appetite change, chills, diaphoresis, fever and unexpected weight change.   HENT: Positive for dental problem. Negative for congestion, hearing loss, nosebleeds, postnasal drip, sore throat and trouble swallowing.    Eyes: Negative for pain, discharge and visual disturbance.   Respiratory: Negative for cough, chest tightness and shortness of breath.    Cardiovascular: Negative for chest pain and palpitations.   Gastrointestinal: Negative for blood in stool, constipation, diarrhea, nausea and vomiting.   Endocrine: Negative for cold intolerance and heat intolerance.   Genitourinary: Negative for difficulty urinating, dyspareunia, flank pain and hematuria.   Musculoskeletal: Positive for arthralgias and myalgias. Negative for back pain, gait problem, joint swelling and neck pain.   Skin: Negative.    Neurological: Negative for dizziness, weakness, light-headedness and headaches.   Hematological: Negative for adenopathy. Does not bruise/bleed easily.   Psychiatric/Behavioral: Negative for agitation, behavioral problems and confusion. The patient is nervous/anxious.      Objective:      Vital Signs (Most Recent):  Temp: 98.5 °F (36.9 °C) (07/09/20 0716)  Pulse: 79 (07/09/20 0716)  Resp: 18 (07/09/20 0716)  BP: 123/66 (07/09/20 0716)  SpO2: 97 % (07/09/20 0716) Vital Signs (24h Range):  Temp:  [98 °F (36.7 °C)-98.6 °F (37 °C)] 98.5 °F (36.9 °C)  Pulse:  [77-90] 79  Resp:  [16-18] 18  SpO2:  [92 %-100 %] 97 %  BP: (113-123)/(56-69) 123/66     Weight: 124.3 kg (274 lb)  Body mass index is 38.22 kg/m².  Body surface area is 2.5 meters squared.      Intake/Output Summary (Last 24 hours) at 7/9/2020 1035  Last data filed at 7/9/2020 0546  Gross per 24 hour   Intake 1150 ml   Output 700 ml   Net 450 ml       Physical Exam  Vitals signs and nursing note reviewed.   Constitutional:       General: She is not in acute distress.     Appearance: She is well-developed.   HENT:      Head: Normocephalic and atraumatic.      Right Ear: Hearing and external ear normal.      Left Ear: Hearing and external ear normal.      Nose: No rhinorrhea.      Right Sinus: No maxillary sinus tenderness or frontal sinus tenderness.      Left Sinus: No maxillary sinus tenderness or frontal sinus tenderness.      Mouth/Throat:      Mouth: No oral lesions.      Pharynx: Uvula midline.   Eyes:      General:         Right eye: No discharge.         Left eye: No discharge.      Conjunctiva/sclera: Conjunctivae normal.      Pupils: Pupils are equal, round, and reactive to light.   Neck:      Musculoskeletal: Normal range of motion.      Thyroid: No thyromegaly.      Vascular: No carotid bruit.      Trachea: No tracheal deviation.   Cardiovascular:      Rate and Rhythm: Normal rate and regular rhythm.      Pulses:           Dorsalis pedis pulses are 2+ on the right side and 2+ on the left side.      Heart sounds: Normal heart sounds, S1 normal and S2 normal. No murmur.   Pulmonary:      Effort: Pulmonary effort is normal. No respiratory distress.      Breath sounds: Normal breath sounds.   Abdominal:      General: Bowel sounds are  normal. There is no distension.      Palpations: Abdomen is soft. There is no mass.      Tenderness: There is no abdominal tenderness.   Musculoskeletal: Normal range of motion.      Left shoulder: She exhibits no tenderness.      Right upper leg: She exhibits no tenderness.      Left upper leg: She exhibits no tenderness.   Lymphadenopathy:      Cervical: No cervical adenopathy.      Upper Body:      Right upper body: No supraclavicular adenopathy.      Left upper body: No supraclavicular adenopathy.   Skin:     General: Skin is warm and dry.      Capillary Refill: Capillary refill takes less than 2 seconds.      Coloration: Skin is pale.      Findings: No rash.   Neurological:      Mental Status: She is alert and oriented to person, place, and time.      Sensory: No sensory deficit.      Coordination: Coordination normal.      Gait: Gait normal.   Psychiatric:         Mood and Affect: Mood is anxious. Mood is not depressed.         Speech: Speech normal.         Behavior: Behavior normal.         Thought Content: Thought content normal.         Judgment: Judgment normal.         Significant Labs:   CBC:   Recent Labs   Lab 07/08/20  0501 07/09/20  0510   WBC 1.41* 1.95*   HGB 10.0* 10.0*   HCT 31.6* 31.5*    181    and CMP:   Recent Labs   Lab 07/08/20  0501 07/09/20  0510    136   K 4.4 4.7    106   CO2 22* 20*    86   BUN 22* 28*   CREATININE 4.9* 6.8*   CALCIUM 8.3* 8.1*   ANIONGAP 8 10   EGFRNONAA 10* 7*       Diagnostic Results:  I have reviewed all pertinent imaging results/findings within the past 24 hours.

## 2020-07-09 NOTE — NURSING
Pt stated that fentanyl patch came off when bathing. Pt handed used patch to nurse. Wasted fentanyl patch with Rhonda in [place].

## 2020-07-10 LAB
ANION GAP SERPL CALC-SCNC: 8 MMOL/L (ref 8–16)
ANISOCYTOSIS BLD QL SMEAR: SLIGHT
BACTERIA #/AREA URNS HPF: ABNORMAL /HPF
BASOPHILS # BLD AUTO: 0.01 K/UL (ref 0–0.2)
BASOPHILS NFR BLD: 0.4 % (ref 0–1.9)
BILIRUB UR QL STRIP: NEGATIVE
BUN SERPL-MCNC: 38 MG/DL (ref 6–20)
CALCIUM SERPL-MCNC: 7.8 MG/DL (ref 8.7–10.5)
CHLORIDE SERPL-SCNC: 109 MMOL/L (ref 95–110)
CLARITY UR: CLEAR
CO2 SERPL-SCNC: 18 MMOL/L (ref 23–29)
COLOR UR: YELLOW
CREAT SERPL-MCNC: 8.1 MG/DL (ref 0.5–1.4)
CREAT UR-MCNC: 43.9 MG/DL (ref 15–325)
CREAT UR-MCNC: 43.9 MG/DL (ref 15–325)
DACRYOCYTES BLD QL SMEAR: ABNORMAL
DIFFERENTIAL METHOD: ABNORMAL
EOSINOPHIL # BLD AUTO: 0.1 K/UL (ref 0–0.5)
EOSINOPHIL NFR BLD: 2.6 % (ref 0–8)
ERYTHROCYTE [DISTWIDTH] IN BLOOD BY AUTOMATED COUNT: 20.4 % (ref 11.5–14.5)
EST. GFR  (AFRICAN AMERICAN): 6 ML/MIN/1.73 M^2
EST. GFR  (NON AFRICAN AMERICAN): 6 ML/MIN/1.73 M^2
GLUCOSE SERPL-MCNC: 85 MG/DL (ref 70–110)
GLUCOSE UR QL STRIP: NEGATIVE
HCT VFR BLD AUTO: 28.5 % (ref 37–48.5)
HGB BLD-MCNC: 9.3 G/DL (ref 12–16)
HGB UR QL STRIP: ABNORMAL
HYPOCHROMIA BLD QL SMEAR: ABNORMAL
IMM GRANULOCYTES # BLD AUTO: 0.02 K/UL (ref 0–0.04)
IMM GRANULOCYTES NFR BLD AUTO: 0.9 % (ref 0–0.5)
KETONES UR QL STRIP: NEGATIVE
LEUKOCYTE ESTERASE UR QL STRIP: ABNORMAL
LYMPHOCYTES # BLD AUTO: 0.9 K/UL (ref 1–4.8)
LYMPHOCYTES NFR BLD: 40.7 % (ref 18–48)
MAGNESIUM SERPL-MCNC: 2.3 MG/DL (ref 1.6–2.6)
MCH RBC QN AUTO: 30.8 PG (ref 27–31)
MCHC RBC AUTO-ENTMCNC: 32.6 G/DL (ref 32–36)
MCV RBC AUTO: 94 FL (ref 82–98)
MICROSCOPIC COMMENT: ABNORMAL
MONOCYTES # BLD AUTO: 0.1 K/UL (ref 0.3–1)
MONOCYTES NFR BLD: 3.5 % (ref 4–15)
NEUTROPHILS # BLD AUTO: 1.2 K/UL (ref 1.8–7.7)
NEUTROPHILS NFR BLD: 51.9 % (ref 38–73)
NITRITE UR QL STRIP: NEGATIVE
NRBC BLD-RTO: 1 /100 WBC
OVALOCYTES BLD QL SMEAR: ABNORMAL
PH UR STRIP: 6 [PH] (ref 5–8)
PLATELET # BLD AUTO: 160 K/UL (ref 150–350)
PLATELET BLD QL SMEAR: ABNORMAL
PMV BLD AUTO: 10 FL (ref 9.2–12.9)
POIKILOCYTOSIS BLD QL SMEAR: SLIGHT
POLYCHROMASIA BLD QL SMEAR: ABNORMAL
POTASSIUM SERPL-SCNC: 5.1 MMOL/L (ref 3.5–5.1)
PROT UR QL STRIP: NEGATIVE
PROT UR-MCNC: 17 MG/DL (ref 0–15)
PROT/CREAT UR: 0.39 MG/G{CREAT} (ref 0–0.2)
RBC # BLD AUTO: 3.02 M/UL (ref 4–5.4)
RBC #/AREA URNS HPF: >100 /HPF (ref 0–4)
SODIUM SERPL-SCNC: 135 MMOL/L (ref 136–145)
SODIUM UR-SCNC: 71 MMOL/L (ref 20–250)
SP GR UR STRIP: 1.01 (ref 1–1.03)
TARGETS BLD QL SMEAR: ABNORMAL
TRICHOMONAS UR QL MICRO: ABNORMAL
URN SPEC COLLECT METH UR: ABNORMAL
UROBILINOGEN UR STRIP-ACNC: NEGATIVE EU/DL
VANCOMYCIN SERPL-MCNC: 29.6 UG/ML
WBC # BLD AUTO: 2.31 K/UL (ref 3.9–12.7)
WBC #/AREA URNS HPF: 4 /HPF (ref 0–5)

## 2020-07-10 PROCEDURE — 99233 PR SUBSEQUENT HOSPITAL CARE,LEVL III: ICD-10-PCS | Mod: ,,, | Performed by: INTERNAL MEDICINE

## 2020-07-10 PROCEDURE — 81000 URINALYSIS NONAUTO W/SCOPE: CPT

## 2020-07-10 PROCEDURE — 85025 COMPLETE CBC W/AUTO DIFF WBC: CPT

## 2020-07-10 PROCEDURE — 84156 ASSAY OF PROTEIN URINE: CPT

## 2020-07-10 PROCEDURE — 25000003 PHARM REV CODE 250: Performed by: EMERGENCY MEDICINE

## 2020-07-10 PROCEDURE — 99233 PR SUBSEQUENT HOSPITAL CARE,LEVL III: ICD-10-PCS | Mod: ,,, | Performed by: PHYSICIAN ASSISTANT

## 2020-07-10 PROCEDURE — 80202 ASSAY OF VANCOMYCIN: CPT

## 2020-07-10 PROCEDURE — 83735 ASSAY OF MAGNESIUM: CPT

## 2020-07-10 PROCEDURE — 84300 ASSAY OF URINE SODIUM: CPT

## 2020-07-10 PROCEDURE — 25000003 PHARM REV CODE 250: Performed by: NURSE PRACTITIONER

## 2020-07-10 PROCEDURE — 99233 SBSQ HOSP IP/OBS HIGH 50: CPT | Mod: ,,, | Performed by: INTERNAL MEDICINE

## 2020-07-10 PROCEDURE — 11000001 HC ACUTE MED/SURG PRIVATE ROOM

## 2020-07-10 PROCEDURE — 25000003 PHARM REV CODE 250: Performed by: PHYSICIAN ASSISTANT

## 2020-07-10 PROCEDURE — 80048 BASIC METABOLIC PNL TOTAL CA: CPT

## 2020-07-10 PROCEDURE — 36415 COLL VENOUS BLD VENIPUNCTURE: CPT

## 2020-07-10 PROCEDURE — 99233 SBSQ HOSP IP/OBS HIGH 50: CPT | Mod: ,,, | Performed by: PHYSICIAN ASSISTANT

## 2020-07-10 RX ORDER — GABAPENTIN 300 MG/1
300 CAPSULE ORAL NIGHTLY
Qty: 30 CAPSULE | Refills: 11 | Status: SHIPPED | OUTPATIENT
Start: 2020-07-10 | End: 2020-08-10 | Stop reason: SDUPTHER

## 2020-07-10 RX ORDER — SENNOSIDES 8.6 MG/1
8.6 TABLET ORAL 2 TIMES DAILY
Status: DISCONTINUED | OUTPATIENT
Start: 2020-07-10 | End: 2020-07-13

## 2020-07-10 RX ORDER — SENNOSIDES 8.6 MG/1
1 TABLET ORAL 2 TIMES DAILY
COMMUNITY
Start: 2020-07-10

## 2020-07-10 RX ORDER — FENTANYL 25 UG/1
1 PATCH TRANSDERMAL
Qty: 10 PATCH | Refills: 0 | Status: SHIPPED | OUTPATIENT
Start: 2020-07-12 | End: 2020-07-17 | Stop reason: CLARIF

## 2020-07-10 RX ORDER — GABAPENTIN 300 MG/1
300 CAPSULE ORAL NIGHTLY
Qty: 30 CAPSULE | Refills: 11 | Status: SHIPPED | OUTPATIENT
Start: 2020-07-10 | End: 2020-07-10

## 2020-07-10 RX ORDER — OXYCODONE HYDROCHLORIDE 10 MG/1
10 TABLET ORAL EVERY 4 HOURS PRN
Qty: 60 TABLET | Refills: 0 | Status: SHIPPED | OUTPATIENT
Start: 2020-07-10 | End: 2020-07-21

## 2020-07-10 RX ADMIN — OXYCODONE 10 MG: 5 TABLET ORAL at 08:07

## 2020-07-10 RX ADMIN — SODIUM CHLORIDE: 0.9 INJECTION, SOLUTION INTRAVENOUS at 10:07

## 2020-07-10 RX ADMIN — OXYCODONE 10 MG: 5 TABLET ORAL at 06:07

## 2020-07-10 RX ADMIN — SENNOSIDES 8.6 MG: 8.6 TABLET, FILM COATED ORAL at 11:07

## 2020-07-10 RX ADMIN — GABAPENTIN 300 MG: 300 CAPSULE ORAL at 09:07

## 2020-07-10 RX ADMIN — SENNOSIDES 8.6 MG: 8.6 TABLET, FILM COATED ORAL at 09:07

## 2020-07-10 RX ADMIN — POLYETHYLENE GLYCOL 3350 17 G: 17 POWDER, FOR SOLUTION ORAL at 08:07

## 2020-07-10 RX ADMIN — THERA TABS 1 TABLET: TAB at 08:07

## 2020-07-10 RX ADMIN — Medication 100 MG: at 08:07

## 2020-07-10 RX ADMIN — Medication 1 TABLET: at 08:07

## 2020-07-10 NOTE — PLAN OF CARE
Problem: Adult Inpatient Plan of Care  Goal: Plan of Care Review  Outcome: Ongoing, Progressing     Problem: Neutropenia  Goal: Absence of Infection  Outcome: Ongoing, Progressing     Problem: Fall Injury Risk  Goal: Absence of Fall and Fall-Related Injury  Outcome: Ongoing, Progressing     Problem: Electrolyte Imbalance (Acute Kidney Injury/Impairment)  Goal: Serum Electrolyte Balance  Outcome: Ongoing, Progressing

## 2020-07-10 NOTE — SUBJECTIVE & OBJECTIVE
Interval History: BUN and Creatinine continued to increase.  White blood cell count continues to recover.  Afebrile.  Negative for acute events overnight.    Oncology Treatment Plan:   OP CLADRIBINE (5 DAY) + RITUXIMAB - Hairy Cell Leukemia    Medications:  Continuous Infusions:   sodium chloride 0.9% 100 mL/hr at 07/08/20 0851     Scheduled Meds:   fentaNYL  1 patch Transdermal Q72H    folic acid-vit B6-vit B12 2.5-25-2 mg  1 tablet Oral Daily    gabapentin  300 mg Oral QHS    multivitamin  1 tablet Oral Daily    polyethylene glycol  17 g Oral Daily    senna  8.6 mg Oral BID    thiamine  100 mg Oral Daily     PRN Meds:acetaminophen, hydrOXYzine HCL, ondansetron, oxyCODONE, promethazine     Review of Systems   Constitutional: Positive for activity change and fatigue. Negative for appetite change, chills, diaphoresis, fever and unexpected weight change.   HENT: Positive for dental problem. Negative for congestion, hearing loss, nosebleeds, postnasal drip, sore throat and trouble swallowing.    Eyes: Negative for pain, discharge and visual disturbance.   Respiratory: Negative for cough, chest tightness and shortness of breath.    Cardiovascular: Negative for chest pain and palpitations.   Gastrointestinal: Negative for blood in stool, constipation, diarrhea, nausea and vomiting.   Endocrine: Negative for cold intolerance and heat intolerance.   Genitourinary: Negative for difficulty urinating, dyspareunia, flank pain and hematuria.   Musculoskeletal: Positive for arthralgias and myalgias. Negative for back pain, gait problem, joint swelling and neck pain.   Skin: Negative.    Neurological: Negative for dizziness, weakness, light-headedness and headaches.   Hematological: Negative for adenopathy. Does not bruise/bleed easily.   Psychiatric/Behavioral: Negative for agitation, behavioral problems and confusion. The patient is nervous/anxious.      Objective:     Vital Signs (Most Recent):  Temp: 98.3 °F (36.8 °C)  (07/10/20 0738)  Pulse: 84 (07/10/20 0738)  Resp: 16 (07/10/20 0845)  BP: 124/76 (07/10/20 0738)  SpO2: 99 % (07/10/20 0738) Vital Signs (24h Range):  Temp:  [97.2 °F (36.2 °C)-98.8 °F (37.1 °C)] 98.3 °F (36.8 °C)  Pulse:  [82-84] 84  Resp:  [16-18] 16  SpO2:  [97 %-100 %] 99 %  BP: (114-131)/(63-85) 124/76     Weight: 124.3 kg (274 lb)  Body mass index is 38.22 kg/m².  Body surface area is 2.5 meters squared.      Intake/Output Summary (Last 24 hours) at 7/10/2020 1046  Last data filed at 7/10/2020 0630  Gross per 24 hour   Intake 3715 ml   Output 1325 ml   Net 2390 ml       Physical Exam  Vitals signs and nursing note reviewed.   Constitutional:       General: She is not in acute distress.     Appearance: She is well-developed.   HENT:      Head: Normocephalic and atraumatic.      Right Ear: Hearing and external ear normal.      Left Ear: Hearing and external ear normal.      Nose: No rhinorrhea.      Right Sinus: No maxillary sinus tenderness or frontal sinus tenderness.      Left Sinus: No maxillary sinus tenderness or frontal sinus tenderness.      Mouth/Throat:      Mouth: No oral lesions.      Pharynx: Uvula midline.   Eyes:      General:         Right eye: No discharge.         Left eye: No discharge.      Conjunctiva/sclera: Conjunctivae normal.      Pupils: Pupils are equal, round, and reactive to light.   Neck:      Musculoskeletal: Normal range of motion.      Thyroid: No thyromegaly.      Vascular: No carotid bruit.      Trachea: No tracheal deviation.   Cardiovascular:      Rate and Rhythm: Normal rate and regular rhythm.      Pulses:           Dorsalis pedis pulses are 2+ on the right side and 2+ on the left side.      Heart sounds: Normal heart sounds, S1 normal and S2 normal. No murmur.   Pulmonary:      Effort: Pulmonary effort is normal. No respiratory distress.      Breath sounds: Normal breath sounds.   Abdominal:      General: Bowel sounds are normal. There is no distension.      Palpations:  Abdomen is soft. There is no mass.      Tenderness: There is no abdominal tenderness.   Musculoskeletal: Normal range of motion.      Left shoulder: She exhibits no tenderness.      Right upper leg: She exhibits no tenderness.      Left upper leg: She exhibits no tenderness.   Lymphadenopathy:      Cervical: No cervical adenopathy.      Upper Body:      Right upper body: No supraclavicular adenopathy.      Left upper body: No supraclavicular adenopathy.   Skin:     General: Skin is warm and dry.      Capillary Refill: Capillary refill takes less than 2 seconds.      Coloration: Skin is pale.      Findings: No rash.   Neurological:      Mental Status: She is alert and oriented to person, place, and time.      Sensory: No sensory deficit.      Coordination: Coordination normal.      Gait: Gait normal.   Psychiatric:         Mood and Affect: Mood is anxious. Mood is not depressed.         Speech: Speech normal.         Behavior: Behavior normal.         Thought Content: Thought content normal.         Judgment: Judgment normal.         Significant Labs:   CBC:   Recent Labs   Lab 07/09/20  0510 07/10/20  0524   WBC 1.95* 2.31*   HGB 10.0* 9.3*   HCT 31.5* 28.5*    160    and CMP:   Recent Labs   Lab 07/09/20  0510 07/10/20  0525    135*   K 4.7 5.1    109   CO2 20* 18*   GLU 86 85   BUN 28* 38*   CREATININE 6.8* 8.1*   CALCIUM 8.1* 7.8*   ANIONGAP 10 8   EGFRNONAA 7* 6*       Diagnostic Results:  I have reviewed all pertinent imaging results/findings within the past 24 hours.

## 2020-07-10 NOTE — PLAN OF CARE
Discharge re-assessment complete.  Patient currently has not discharge needs. CM will continue to follow.       07/10/20 1820   Discharge Reassessment   Assessment Type Discharge Planning Assessment   Provided patient/caregiver education on the expected discharge date and the discharge plan Yes   Do you have any problems affording any of your prescribed medications? No   Discharge Plan A Home   DME Needed Upon Discharge  none   Patient choice form signed by patient/caregiver N/A   Anticipated Discharge Disposition Home   Can the patient/caregiver answer the patient profile reliably? Yes, cognitively intact

## 2020-07-10 NOTE — PLAN OF CARE
Fall precautions maintained. IV fluids maintained. Pain is controlled with prn med. All needs met. Strict Is & Os. Pt is on her period making her urine colored red. Neutropenic isolation. All needs met. Will continue to monitor.

## 2020-07-10 NOTE — SUBJECTIVE & OBJECTIVE
"Interval History: The patient reports "feeling better today". WBCs continue to recover, Hem/onc following. Creatinine continues to worsen, Cr 8.1 today. The patient reports good UOP, Nephrology following.       Review of Systems   Constitutional: Positive for fever ("low-grade"). Negative for activity change, appetite change, chills, diaphoresis, fatigue and unexpected weight change.   HENT: Positive for dental problem and facial swelling (R lower jaw, L upper jaw). Negative for congestion, drooling, rhinorrhea, sinus pressure, sneezing, sore throat, trouble swallowing and voice change.    Eyes: Negative for photophobia, discharge, redness, itching and visual disturbance.   Respiratory: Negative for apnea, cough, choking, chest tightness, shortness of breath, wheezing and stridor.    Cardiovascular: Negative for chest pain, palpitations and leg swelling.   Gastrointestinal: Negative for abdominal distention, abdominal pain, anal bleeding, blood in stool, constipation, diarrhea, nausea and vomiting.   Endocrine: Negative for cold intolerance, heat intolerance, polydipsia, polyphagia and polyuria.   Genitourinary: Negative for decreased urine volume, difficulty urinating, dysuria, flank pain, frequency, hematuria, pelvic pain, urgency, vaginal bleeding and vaginal discharge.   Musculoskeletal: Positive for back pain (chronic). Negative for arthralgias, gait problem, joint swelling, myalgias, neck pain and neck stiffness.   Skin: Negative for color change, pallor, rash and wound.   Neurological: Positive for weakness (generalized). Negative for dizziness, seizures, syncope, facial asymmetry, speech difficulty, light-headedness, numbness and headaches.   Psychiatric/Behavioral: Negative for agitation, confusion, hallucinations and suicidal ideas.   All other systems reviewed and are negative.    Objective:     Vital Signs (Most Recent):  Temp: 97.5 °F (36.4 °C) (07/10/20 1634)  Pulse: 84 (07/10/20 1634)  Resp: 18 " (07/10/20 1634)  BP: 136/75 (07/10/20 1634)  SpO2: 100 % (07/10/20 1634) Vital Signs (24h Range):  Temp:  [97.5 °F (36.4 °C)-98.8 °F (37.1 °C)] 97.5 °F (36.4 °C)  Pulse:  [83-84] 84  Resp:  [15-18] 18  SpO2:  [97 %-100 %] 100 %  BP: (124-136)/(75-85) 136/75     Weight: 124.3 kg (274 lb)  Body mass index is 38.22 kg/m².    Intake/Output Summary (Last 24 hours) at 7/10/2020 1734  Last data filed at 7/10/2020 1000  Gross per 24 hour   Intake 3595 ml   Output 925 ml   Net 2670 ml      Physical Exam  Vitals signs and nursing note reviewed.   Constitutional:       General: She is not in acute distress.     Appearance: Normal appearance. She is well-developed. She is obese. She is toxic-appearing.   HENT:      Head: Normocephalic and atraumatic.      Nose: Nose normal.      Mouth/Throat:      Mouth: Mucous membranes are moist.      Comments: R lower jaw, L lower jaw redness, swelling of gums  Eyes:      Conjunctiva/sclera: Conjunctivae normal.      Pupils: Pupils are equal, round, and reactive to light.   Neck:      Musculoskeletal: Normal range of motion and neck supple.   Cardiovascular:      Rate and Rhythm: Normal rate and regular rhythm.      Pulses: Normal pulses.      Heart sounds: Normal heart sounds. No murmur.   Pulmonary:      Effort: Pulmonary effort is normal. No respiratory distress.      Breath sounds: Normal breath sounds. No wheezing.   Abdominal:      General: Abdomen is flat. Bowel sounds are normal. There is no distension.      Palpations: Abdomen is soft.      Tenderness: There is no abdominal tenderness. There is no guarding or rebound.   Musculoskeletal: Normal range of motion.         General: No tenderness, deformity or signs of injury.   Skin:     General: Skin is warm and dry.      Capillary Refill: Capillary refill takes less than 2 seconds.      Coloration: Skin is not jaundiced.      Findings: No erythema.   Neurological:      General: No focal deficit present.      Mental Status: She is alert  and oriented to person, place, and time.      Deep Tendon Reflexes: Reflexes are normal and symmetric.   Psychiatric:         Mood and Affect: Mood normal.         Behavior: Behavior normal.         Thought Content: Thought content normal.         Judgment: Judgment normal.         Significant Labs: All pertinent labs within the past 24 hours have been reviewed.    Significant Imaging:   Imaging Results    None

## 2020-07-10 NOTE — ASSESSMENT & PLAN NOTE
"Consult hematology/oncology   Consult palliative care for symptom/pain management  7/10/20 The patient reports "feeling better today". WBCs continue to recover, Hem/onc following.   "

## 2020-07-10 NOTE — PROGRESS NOTES
[facility] -   Hematology/Oncology  Progress Note    Patient Name: Jaswant Yang  Admission Date: 7/6/2020  Hospital Length of Stay: 4 days  Code Status: Prior     Subjective:     HPI:  44 y.o female with hairy cell lymphoma currently being treated with CLADRIBINE sent from Dr. Whittaker office on yesterday for fever and presumed right sided tooth infection. Associated symptoms include facial swelling, jaw pain, fever. Patient asked to report to ED for IV antibiotics for neutropenic fever in setting of tooth infection. Patient was started on Vancomycin but had allergic reaction and was placed on Zoysn.     Interval History: BUN and Creatinine continued to increase.  White blood cell count continues to recover.  Afebrile.  Negative for acute events overnight.    Oncology Treatment Plan:   OP CLADRIBINE (5 DAY) + RITUXIMAB - Hairy Cell Leukemia    Medications:  Continuous Infusions:   sodium chloride 0.9% 100 mL/hr at 07/08/20 0851     Scheduled Meds:   fentaNYL  1 patch Transdermal Q72H    folic acid-vit B6-vit B12 2.5-25-2 mg  1 tablet Oral Daily    gabapentin  300 mg Oral QHS    multivitamin  1 tablet Oral Daily    polyethylene glycol  17 g Oral Daily    senna  8.6 mg Oral BID    thiamine  100 mg Oral Daily     PRN Meds:acetaminophen, hydrOXYzine HCL, ondansetron, oxyCODONE, promethazine     Review of Systems   Constitutional: Positive for activity change and fatigue. Negative for appetite change, chills, diaphoresis, fever and unexpected weight change.   HENT: Positive for dental problem. Negative for congestion, hearing loss, nosebleeds, postnasal drip, sore throat and trouble swallowing.    Eyes: Negative for pain, discharge and visual disturbance.   Respiratory: Negative for cough, chest tightness and shortness of breath.    Cardiovascular: Negative for chest pain and palpitations.   Gastrointestinal: Negative for blood in stool, constipation, diarrhea, nausea and vomiting.   Endocrine:  Negative for cold intolerance and heat intolerance.   Genitourinary: Negative for difficulty urinating, dyspareunia, flank pain and hematuria.   Musculoskeletal: Positive for arthralgias and myalgias. Negative for back pain, gait problem, joint swelling and neck pain.   Skin: Negative.    Neurological: Negative for dizziness, weakness, light-headedness and headaches.   Hematological: Negative for adenopathy. Does not bruise/bleed easily.   Psychiatric/Behavioral: Negative for agitation, behavioral problems and confusion. The patient is nervous/anxious.      Objective:     Vital Signs (Most Recent):  Temp: 98.3 °F (36.8 °C) (07/10/20 0738)  Pulse: 84 (07/10/20 0738)  Resp: 16 (07/10/20 0845)  BP: 124/76 (07/10/20 0738)  SpO2: 99 % (07/10/20 0738) Vital Signs (24h Range):  Temp:  [97.2 °F (36.2 °C)-98.8 °F (37.1 °C)] 98.3 °F (36.8 °C)  Pulse:  [82-84] 84  Resp:  [16-18] 16  SpO2:  [97 %-100 %] 99 %  BP: (114-131)/(63-85) 124/76     Weight: 124.3 kg (274 lb)  Body mass index is 38.22 kg/m².  Body surface area is 2.5 meters squared.      Intake/Output Summary (Last 24 hours) at 7/10/2020 1046  Last data filed at 7/10/2020 0630  Gross per 24 hour   Intake 3715 ml   Output 1325 ml   Net 2390 ml       Physical Exam  Vitals signs and nursing note reviewed.   Constitutional:       General: She is not in acute distress.     Appearance: She is well-developed.   HENT:      Head: Normocephalic and atraumatic.      Right Ear: Hearing and external ear normal.      Left Ear: Hearing and external ear normal.      Nose: No rhinorrhea.      Right Sinus: No maxillary sinus tenderness or frontal sinus tenderness.      Left Sinus: No maxillary sinus tenderness or frontal sinus tenderness.      Mouth/Throat:      Mouth: No oral lesions.      Pharynx: Uvula midline.   Eyes:      General:         Right eye: No discharge.         Left eye: No discharge.      Conjunctiva/sclera: Conjunctivae normal.      Pupils: Pupils are equal, round, and  reactive to light.   Neck:      Musculoskeletal: Normal range of motion.      Thyroid: No thyromegaly.      Vascular: No carotid bruit.      Trachea: No tracheal deviation.   Cardiovascular:      Rate and Rhythm: Normal rate and regular rhythm.      Pulses:           Dorsalis pedis pulses are 2+ on the right side and 2+ on the left side.      Heart sounds: Normal heart sounds, S1 normal and S2 normal. No murmur.   Pulmonary:      Effort: Pulmonary effort is normal. No respiratory distress.      Breath sounds: Normal breath sounds.   Abdominal:      General: Bowel sounds are normal. There is no distension.      Palpations: Abdomen is soft. There is no mass.      Tenderness: There is no abdominal tenderness.   Musculoskeletal: Normal range of motion.      Left shoulder: She exhibits no tenderness.      Right upper leg: She exhibits no tenderness.      Left upper leg: She exhibits no tenderness.   Lymphadenopathy:      Cervical: No cervical adenopathy.      Upper Body:      Right upper body: No supraclavicular adenopathy.      Left upper body: No supraclavicular adenopathy.   Skin:     General: Skin is warm and dry.      Capillary Refill: Capillary refill takes less than 2 seconds.      Coloration: Skin is pale.      Findings: No rash.   Neurological:      Mental Status: She is alert and oriented to person, place, and time.      Sensory: No sensory deficit.      Coordination: Coordination normal.      Gait: Gait normal.   Psychiatric:         Mood and Affect: Mood is anxious. Mood is not depressed.         Speech: Speech normal.         Behavior: Behavior normal.         Thought Content: Thought content normal.         Judgment: Judgment normal.         Significant Labs:   CBC:   Recent Labs   Lab 07/09/20  0510 07/10/20  0524   WBC 1.95* 2.31*   HGB 10.0* 9.3*   HCT 31.5* 28.5*    160    and CMP:   Recent Labs   Lab 07/09/20  0510 07/10/20  0525    135*   K 4.7 5.1    109   CO2 20* 18*   GLU 86 85    BUN 28* 38*   CREATININE 6.8* 8.1*   CALCIUM 8.1* 7.8*   ANIONGAP 10 8   EGFRNONAA 7* 6*       Diagnostic Results:  I have reviewed all pertinent imaging results/findings within the past 24 hours.    Assessment/Plan:     * Dental infection  Dental referral/participating dentist list to be provided prior to discharge. Advised patient to notify dentist that dental work is required prior to resuming Chemotherapy.     ADAMARIS (acute kidney injury)  Management per Nephrology, patient does need oral antibiotic prophylaxis for dental issues, appreciate input from Nephrology.       Chemotherapy-induced neutropenia  Goal is ANC >1000 prior to discharge, will need IV ABT prophylaxis until ANC recovers.   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 Chemotherapy is on hold pending resolution of acute issues, patient will need to see a dentist prior to continuing on chemotherapy.     --Will arrange outpatient follow up with Primary Oncologist upon discharge  --Daily CBC, CMP          Thank you for your consult. I will follow-up with patient. Please contact us if you have any additional questions.     Shanda Couch NP  Hematology/Oncology  [facility] [place]

## 2020-07-10 NOTE — PROGRESS NOTES
Progress Note   Palliative Medicine        SUBJECTIVE:     History of Present Illness:  Patient seen and examined without family present. She reports her pain is well controlled and has been mindful of her PRN doses as to try to emulate the 4 max/ day outpatient regimen. She has not had a BM in a week but is passing gas. She is frustrated by her rising Cr but says her UOP has increased this morning which she takes as a positive.     In the last 24hrs she has received the following pain medications (7a-7a):  - Fentanyl 25mcg/ hr patch  - Oxycodone 10mg x 1      Past Medical History:   Diagnosis Date    Anemia     Anemia     Back pain     Dental infection     GERD (gastroesophageal reflux disease) 2020    Hairy-cell leukemia of spleen      Past Surgical History:   Procedure Laterality Date     SECTION, CLASSIC      FLUOROSCOPY N/A 2020    Procedure: Spleenic Bleed;  Surgeon: Hiren Cabrera MD;  Location: [facility] CATH LAB;  Service: General;  Laterality: N/A;    INJECTION OF ANESTHETIC AGENT INTO TISSUE PLANE DEFINED BY TRANSVERSUS ABDOMINIS MUSCLE N/A 2020    Procedure: BLOCK, TRANSVERSUS ABDOMINIS PLANE;  Surgeon: Yumi Ferguson MD;  Location: [facility] OR;  Service: General;  Laterality: N/A;    SPLENECTOMY N/A 2020    Procedure: SPLENECTOMY;  Surgeon: Yumi Ferguson MD;  Location: [facility] OR;  Service: General;  Laterality: N/A;    THORACENTESIS Left 2020    Procedure: THORACENTESIS;  Surgeon: Yumi Ferguson MD;  Location: [facility] OR;  Service: General;  Laterality: Left;    TUBAL LIGATION       History reviewed. No pertinent family history.  Review of patient's allergies indicates:   Allergen Reactions    Tramadol Hives     Current Facility-Administered Medications   Medication    0.9%  NaCl infusion    acetaminophen tablet 650 mg    fentaNYL 25 mcg/hr 1 patch    folic acid-vit B6-vit B12 2.5-25-2 mg tablet 1 tablet    gabapentin capsule 300 mg    hydrOXYzine  HCL tablet 25 mg    multivitamin tablet    ondansetron injection 4 mg    oxyCODONE immediate release tablet 10 mg    polyethylene glycol packet 17 g    promethazine tablet 25 mg    senna tablet 8.6 mg    thiamine tablet 100 mg       Review of Symptoms    Symptom Assessment (ESAS 0-10 Scale)  Pain:  4  Dyspnea:  0  Anxiety:  0  Nausea:  0  Depression:  0  Anorexia:  1  Fatigue:  0  Insomnia:  0  Restlessness:  0  Agitation:  0     CAM / Delirium:  Negative  Constipation:  Positive  Diarrhea:  Negative    Bowel Management Plan (BMP):  Yes      Pain Assessment:  Location(s): torso    Torso       Torso Location:  Posterior and bilateral       Quality:  Aching       Quantity:  5/10 in intensity    OME in 24 hours:  80    ECOG Performance Status Grade:  0 - Fully Active    Advanced Directives:  Living Will: No    LaPOST: No    Do Not Resuscitate Status: No    Medical Power of : Yes     Agent's Name:  Elsa Yang.  Agent's Contact Number:  [phone]    Decision Making:  Patient answered questions      ROS:  Review of Systems   Constitutional: Positive for appetite change and fever. Negative for activity change.   HENT: Positive for dental problem. Negative for facial swelling, mouth sores, sore throat and trouble swallowing.    Eyes: Negative for photophobia and visual disturbance.   Respiratory: Negative for cough and shortness of breath.    Cardiovascular: Negative for chest pain and leg swelling.   Gastrointestinal: Positive for constipation. Negative for abdominal pain, diarrhea and nausea.   Endocrine: Negative for polydipsia and polyphagia.   Genitourinary: Negative for difficulty urinating and dysuria.   Musculoskeletal: Positive for arthralgias, myalgias and neck pain. Negative for back pain and neck stiffness.   Skin: Negative for rash and wound.   Allergic/Immunologic: Positive for immunocompromised state.   Neurological: Negative for syncope, weakness and light-headedness.    Psychiatric/Behavioral: Positive for sleep disturbance (d/t pain). Negative for confusion. The patient is not nervous/anxious.      OBJECTIVE:     Physical Exam:  Vitals: Temp: 98.3 °F (36.8 °C) (07/10/20 0738)  Pulse: 84 (07/10/20 0738)  Resp: 16 (07/10/20 0845)  BP: 124/76 (07/10/20 0738)  SpO2: 99 % (07/10/20 0738)    Gen: well-developed, well-nourished, NAD  Head: atraumatic, normocephalic  Eyes: conjuctiva and sclera clear  Ears: hearing grossly intact with no external abnormality  Mouth: no mucositis, good dentition, MM moist  Respiratory: CTAB, no wheezing or rhonchi  Heart: RRR  Abdomen: soft, NTTP, nondistended, normoactive BS  Pulses: 2+ in DP  Extremities: no edema, full ROM of all joints  Neurologic: no focal deficits, CN II-XII grossly intact, normal coordination  Skin: no rashes or lesions  Psych: cooperative, normal mood and affect, normal attention span and concentration     Labs:  Lab Results   Component Value Date    WBC 2.31 (L) 07/10/2020    HGB 9.3 (L) 07/10/2020    HCT 28.5 (L) 07/10/2020    MCV 94 07/10/2020     07/10/2020     BMP  Lab Results   Component Value Date     (L) 07/10/2020    K 5.1 07/10/2020     07/10/2020    CO2 18 (L) 07/10/2020    BUN 38 (H) 07/10/2020    CREATININE 8.1 (H) 07/10/2020    CALCIUM 7.8 (L) 07/10/2020    ANIONGAP 8 07/10/2020    ESTGFRAFRICA 6 (A) 07/10/2020    EGFRNONAA 6 (A) 07/10/2020     Albumin   Date Value Ref Range Status   07/06/2020 4.3 3.5 - 5.2 g/dL Final       Radiology:I have reviewed all pertinent imaging results/findings within the past 24 hours.  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 Differential is wide for this appearance, and includes cocaine abuse, rhinitis medicamentosa (chronic Afrin-type medication abuse), complication related to antiangiogenesis chemotherapy, sinonasal granulomatosis with polyangiitis, sinonasal sarcoid,  or less likely but possible invasive fungal sinusitis.     Multiple dental caries are noted.     Exam limited by lack of IV contrast.  No appreciable oropharyngeal or dental abscess. Recommend further evaluation with direct visualization.     ASSESSMENT   Jaswant Yang is a 44 y.o. year old who was recently diagnosed with hairy cell leukemia currently on chemotherapy who was sent to the emergency department by Dr. Whittaker due to fever. She was complaining of jaw pain and edema and was admitted for antibiotics and monitoring. Ms. Yang has an outpatient referral for Palliative Medicine for pain and symptom management but her appointment was at the end of the month so we were asked to see while admitted.      PLAN   1. Neoplasm related pain  - Due to renal dysfunction will avoid morphine  - Continue Fentanyl 25 mcg/ hr patch q72hr- script sent to [facility]  - Continue oxycodone 10mg PO q4hr PRN pain- script sent to [facility]  - Continue gabapentin 300mg HS for neuropathic pain- will titrate once renal function allows  - The risks, side effects, benefits of chronic opioid therapy have been discussed and the opportunity for questions provided.  - A record of the controlled substances prescribed and dispensed to the patient in [place] was reviewed in  by me (or my delegate). Last received hydrocodone 10mg #40 tabs and 5mg #11 tabs on 6/22/20, hydrocodone 10mg #20 on 6/16 and on 6/11.    2. Opioid induced constipation  - Continue daily Miralax and increase Senna to 1 tab BID     3. Hairy cell leukemia  - Defer to oncology  - Currently receiving chemotherapy     4. Encounter for Palliative Care  - Code status: FULL  - HCPOA: mom Elsa Yang, alternate: son Dmitri Yang  - PM SW assisted with ACP and scanned in to the computer  - Primary goal is to continue cancer targeted therapy  - We will manage pain medication going forth and she already has f/u on 7/21 scheduled     5.  ADAMARIS  - Opioid selection driven by elevated Cr  - Renally dosed gabapentin and will titrate once renal function improves  - Nephrology is following  - Continue to monitor    Palliative Care is not rounding over the weekend but can be reached by secure chat. Will follow up in person on Monday. Any questions or concerns can be directed to primary team.    Thank you for allowing Palliative Medicine to be involved in the care of Jaswant Yang.      Medical decision making: HIGH based on high risk of death, untreated symptoms, high risk medications, management of more than one chronic illness in exacerbation, managing side effects of medications or polypharmacy      Plan required increased review of medication choice, interaction, dosing, frequency, and route due to patient complexity. Patient complexity increased by: Presence of renal insufficiency    > 50% of 36 min visit spent in chart review, face to face discussion symptom assessment, coordination of care and emotional support, exploring burden/ benefit of various approaches of treatment.       Tatyana Gupta PA-C  Palliative Medicine

## 2020-07-10 NOTE — ASSESSMENT & PLAN NOTE
"Maintain neutropenic precautions  Labs in AM  Continue supplemental thiamine, Folbic, MVI  7/9/20 Improving, Hem/onc following  7/10/20 The patient reports "feeling better today". WBCs continue to recover, Hem/onc following.   "

## 2020-07-10 NOTE — ASSESSMENT & PLAN NOTE
The patients renal function worsened overnight, Cr. Increased from 2 to 4.9 overnight. This is likely 2/2 vanc. IV fluids started and Nephrology consulted. Will monitor renal function.   7/9/20 The patient continues to have good UOP. Creatinine increased to 6.8. Nephrology following, will continue to monitor renal function.   7/10/20 Creatinine continues to worsen, Cr 8.1 today. The patient reports good UOP, Nephrology following.

## 2020-07-10 NOTE — ASSESSMENT & PLAN NOTE
Goal is ANC >1000 prior to discharge, will need IV ABT prophylaxis until ANC recovers.   --Resolved

## 2020-07-11 LAB
ACANTHOCYTES BLD QL SMEAR: PRESENT
ANION GAP SERPL CALC-SCNC: 9 MMOL/L (ref 8–16)
ANISOCYTOSIS BLD QL SMEAR: SLIGHT
BACTERIA BLD CULT: NORMAL
BACTERIA BLD CULT: NORMAL
BASOPHILS # BLD AUTO: 0.01 K/UL (ref 0–0.2)
BASOPHILS NFR BLD: 0.4 % (ref 0–1.9)
BUN SERPL-MCNC: 42 MG/DL (ref 6–20)
BURR CELLS BLD QL SMEAR: ABNORMAL
CALCIUM SERPL-MCNC: 7.9 MG/DL (ref 8.7–10.5)
CHLORIDE SERPL-SCNC: 110 MMOL/L (ref 95–110)
CO2 SERPL-SCNC: 17 MMOL/L (ref 23–29)
CREAT SERPL-MCNC: 8.9 MG/DL (ref 0.5–1.4)
DACRYOCYTES BLD QL SMEAR: ABNORMAL
DIFFERENTIAL METHOD: ABNORMAL
EOSINOPHIL # BLD AUTO: 0.1 K/UL (ref 0–0.5)
EOSINOPHIL NFR BLD: 2.2 % (ref 0–8)
ERYTHROCYTE [DISTWIDTH] IN BLOOD BY AUTOMATED COUNT: 20.6 % (ref 11.5–14.5)
EST. GFR  (AFRICAN AMERICAN): 6 ML/MIN/1.73 M^2
EST. GFR  (NON AFRICAN AMERICAN): 5 ML/MIN/1.73 M^2
GLUCOSE SERPL-MCNC: 100 MG/DL (ref 70–110)
HCT VFR BLD AUTO: 28.5 % (ref 37–48.5)
HGB BLD-MCNC: 9.3 G/DL (ref 12–16)
HYPOCHROMIA BLD QL SMEAR: ABNORMAL
IMM GRANULOCYTES # BLD AUTO: 0.04 K/UL (ref 0–0.04)
IMM GRANULOCYTES NFR BLD AUTO: 1.5 % (ref 0–0.5)
LYMPHOCYTES # BLD AUTO: 0.9 K/UL (ref 1–4.8)
LYMPHOCYTES NFR BLD: 32.6 % (ref 18–48)
MAGNESIUM SERPL-MCNC: 2.3 MG/DL (ref 1.6–2.6)
MCH RBC QN AUTO: 30.9 PG (ref 27–31)
MCHC RBC AUTO-ENTMCNC: 32.6 G/DL (ref 32–36)
MCV RBC AUTO: 95 FL (ref 82–98)
MONOCYTES # BLD AUTO: 0.2 K/UL (ref 0.3–1)
MONOCYTES NFR BLD: 8.1 % (ref 4–15)
NEUTROPHILS # BLD AUTO: 1.5 K/UL (ref 1.8–7.7)
NEUTROPHILS NFR BLD: 55.2 % (ref 38–73)
NRBC BLD-RTO: 1 /100 WBC
OVALOCYTES BLD QL SMEAR: ABNORMAL
PLATELET # BLD AUTO: 168 K/UL (ref 150–350)
PLATELET BLD QL SMEAR: ABNORMAL
PMV BLD AUTO: 10.1 FL (ref 9.2–12.9)
POIKILOCYTOSIS BLD QL SMEAR: SLIGHT
POLYCHROMASIA BLD QL SMEAR: ABNORMAL
POTASSIUM SERPL-SCNC: 5.3 MMOL/L (ref 3.5–5.1)
RBC # BLD AUTO: 3.01 M/UL (ref 4–5.4)
SCHISTOCYTES BLD QL SMEAR: PRESENT
SODIUM SERPL-SCNC: 136 MMOL/L (ref 136–145)
STOMATOCYTES BLD QL SMEAR: PRESENT
TARGETS BLD QL SMEAR: ABNORMAL
WBC # BLD AUTO: 2.73 K/UL (ref 3.9–12.7)

## 2020-07-11 PROCEDURE — 99233 PR SUBSEQUENT HOSPITAL CARE,LEVL III: ICD-10-PCS | Mod: ,,, | Performed by: INTERNAL MEDICINE

## 2020-07-11 PROCEDURE — 25000003 PHARM REV CODE 250: Performed by: PHYSICIAN ASSISTANT

## 2020-07-11 PROCEDURE — 25000003 PHARM REV CODE 250: Performed by: EMERGENCY MEDICINE

## 2020-07-11 PROCEDURE — 85025 COMPLETE CBC W/AUTO DIFF WBC: CPT

## 2020-07-11 PROCEDURE — 99233 SBSQ HOSP IP/OBS HIGH 50: CPT | Mod: ,,, | Performed by: INTERNAL MEDICINE

## 2020-07-11 PROCEDURE — 36415 COLL VENOUS BLD VENIPUNCTURE: CPT

## 2020-07-11 PROCEDURE — 11000001 HC ACUTE MED/SURG PRIVATE ROOM

## 2020-07-11 PROCEDURE — 83735 ASSAY OF MAGNESIUM: CPT

## 2020-07-11 PROCEDURE — 25000003 PHARM REV CODE 250: Performed by: NURSE PRACTITIONER

## 2020-07-11 PROCEDURE — 80048 BASIC METABOLIC PNL TOTAL CA: CPT

## 2020-07-11 PROCEDURE — 63700000 PHARM REV CODE 250 ALT 637 W/O HCPCS: Performed by: INTERNAL MEDICINE

## 2020-07-11 RX ORDER — FLUCONAZOLE 100 MG/1
200 TABLET ORAL ONCE
Status: COMPLETED | OUTPATIENT
Start: 2020-07-11 | End: 2020-07-11

## 2020-07-11 RX ADMIN — FLUCONAZOLE 200 MG: 100 TABLET ORAL at 05:07

## 2020-07-11 RX ADMIN — SODIUM CHLORIDE: 0.9 INJECTION, SOLUTION INTRAVENOUS at 07:07

## 2020-07-11 RX ADMIN — OXYCODONE 10 MG: 5 TABLET ORAL at 02:07

## 2020-07-11 RX ADMIN — Medication 1 TABLET: at 08:07

## 2020-07-11 RX ADMIN — SENNOSIDES 8.6 MG: 8.6 TABLET, FILM COATED ORAL at 09:07

## 2020-07-11 RX ADMIN — THERA TABS 1 TABLET: TAB at 08:07

## 2020-07-11 RX ADMIN — SENNOSIDES 8.6 MG: 8.6 TABLET, FILM COATED ORAL at 08:07

## 2020-07-11 RX ADMIN — Medication 100 MG: at 08:07

## 2020-07-11 RX ADMIN — OXYCODONE 10 MG: 5 TABLET ORAL at 08:07

## 2020-07-11 RX ADMIN — POLYETHYLENE GLYCOL 3350 17 G: 17 POWDER, FOR SOLUTION ORAL at 08:07

## 2020-07-11 RX ADMIN — GABAPENTIN 300 MG: 300 CAPSULE ORAL at 09:07

## 2020-07-11 NOTE — SUBJECTIVE & OBJECTIVE
Interval History:     Review of Systems   Constitutional: Negative for activity change, appetite change, chills, diaphoresis, fatigue, fever and unexpected weight change.   HENT: Positive for dental problem. Negative for congestion, drooling, facial swelling, rhinorrhea, sinus pressure, sneezing, sore throat, trouble swallowing and voice change.    Eyes: Negative for photophobia, discharge, redness, itching and visual disturbance.   Respiratory: Negative for apnea, cough, choking, chest tightness, shortness of breath, wheezing and stridor.    Cardiovascular: Negative for chest pain, palpitations and leg swelling.   Gastrointestinal: Negative for abdominal distention, abdominal pain, anal bleeding, blood in stool, constipation, diarrhea, nausea and vomiting.   Endocrine: Negative for cold intolerance, heat intolerance, polydipsia, polyphagia and polyuria.   Genitourinary: Negative for decreased urine volume, difficulty urinating, dysuria, flank pain, frequency, hematuria, pelvic pain, urgency, vaginal bleeding and vaginal discharge.   Musculoskeletal: Positive for back pain (chronic). Negative for arthralgias, gait problem, joint swelling, myalgias, neck pain and neck stiffness.   Skin: Negative for color change, pallor, rash and wound.   Neurological: Positive for weakness (generalized). Negative for dizziness, seizures, syncope, facial asymmetry, speech difficulty, light-headedness, numbness and headaches.   Psychiatric/Behavioral: Negative for agitation, confusion, hallucinations and suicidal ideas.   All other systems reviewed and are negative.    Objective:     Vital Signs (Most Recent):  Temp: 98.4 °F (36.9 °C) (07/11/20 0712)  Pulse: 83 (07/11/20 0712)  Resp: 16 (07/11/20 0831)  BP: 127/63 (07/11/20 0712)  SpO2: 95 % (07/11/20 0712) Vital Signs (24h Range):  Temp:  [97.5 °F (36.4 °C)-98.4 °F (36.9 °C)] 98.4 °F (36.9 °C)  Pulse:  [76-85] 83  Resp:  [16-18] 16  SpO2:  [95 %-100 %] 95 %  BP: (124-136)/(63-75)  127/63     Weight: 124.3 kg (274 lb)  Body mass index is 38.22 kg/m².    Intake/Output Summary (Last 24 hours) at 7/11/2020 1602  Last data filed at 7/11/2020 1454  Gross per 24 hour   Intake 3385.34 ml   Output 1200 ml   Net 2185.34 ml      Physical Exam  Vitals signs and nursing note reviewed.   Constitutional:       General: She is not in acute distress.     Appearance: Normal appearance. She is not ill-appearing.   HENT:      Head: Normocephalic and atraumatic.      Nose: Nose normal.      Mouth/Throat:      Mouth: Mucous membranes are moist.   Eyes:      Conjunctiva/sclera: Conjunctivae normal.      Pupils: Pupils are equal, round, and reactive to light.   Neck:      Musculoskeletal: Normal range of motion and neck supple. Muscular tenderness present. No neck rigidity.   Cardiovascular:      Rate and Rhythm: Regular rhythm.      Pulses: Normal pulses.      Heart sounds: Normal heart sounds.   Pulmonary:      Effort: Pulmonary effort is normal.      Breath sounds: Normal breath sounds. No rales.   Abdominal:      Palpations: Abdomen is soft.   Musculoskeletal: Normal range of motion.      Right lower leg: No edema.      Left lower leg: No edema.   Skin:     General: Skin is warm and dry.   Neurological:      General: No focal deficit present.      Mental Status: She is alert. Mental status is at baseline. She is disoriented.      Cranial Nerves: No cranial nerve deficit.      Sensory: Sensory deficit present.   Psychiatric:         Mood and Affect: Mood normal.         Behavior: Behavior normal.         Significant Labs: All pertinent labs within the past 24 hours have been reviewed.    Significant Imaging: I have reviewed all pertinent imaging results/findings within the past 24 hours.

## 2020-07-11 NOTE — PLAN OF CARE
Fall precautions maintained. IV fluids maintained. Pain is well controlled. All needs met. Strict Is & Os. Neutropenic isolation maintained. All needs met. Will continue to monitor.

## 2020-07-11 NOTE — PROGRESS NOTES
"[facility] Hospital Medicine  Progress Note    Patient Name: Jaswant Yang  MRN: [id number]  Patient Class: IP- Inpatient   Admission Date: 7/6/2020  Length of Stay: 5 days  Attending Physician: Rohit Rodriguez, *  Primary Care Provider: Primary Doctor No        Subjective:     Principal Problem:Dental infection        HPI:  45 y/o AA F with hx of hairy cell leukemia (diagnosed 05/2020), anemia, chronic pain to ED from Dr. Whittaker' office after being found to have a low grade temperature (99.6) with a visible dental infection in the R lower jaw and L upper jaw, in the setting of chemotherapy induced neutropenia (WBC 0.89); mild hypokalemia (3.4) also noted in ED, but labs otherwise largely unremarkable - admission for IV abx; BC x2 pending. Pt reports associated severe stabbing pain in the affected jaw areas, and mild facial edema, worsening over the past 2 days but states no drainage/bleeding. Pt's last chemo cycle ended on 06/26/20 - reports pain improved with IV morphine in ED, states she feels "a little better now" - palliative care consulted per ED physician at patient's request, to assist with symptom management and pain control. Denies CP, edema, palpitations, SOB, wheezing, orthopnea, PND, abdominal pain, N/V/D, dysuria, flank pain, HA, dizziness, fever, cough, chills, falls/trauma, blurred vision, focal deficits. No recent dietary changes, travel, sick contacts or viral illness - pt is typically active and independent with ADLs and ambulation at home. Pt is full code. Surrogate decision makers are son (Dmitri Yang) and Mother (Elsa Yang). Admitted for chemotherapy induced neutropenic fever with a jaw/dental infection.     Overview/Hospital Course:  7/7/20 The patient reports pain is not well controlled. Palliative care was consulted. The patient remained afebrile overnight. CT maxiollofacial CT was negative for a dental abscess but showed multiple dental caries. The patient " "remains neutropenic, Hem/onc following. Continue current management with IV ABX. 7/8/20 The patient remained afebrile overnight. WBCs improved to 1.41K, Hem/onc following. The patients renal function worsened overnight, Cr. Increased from 2 to 4.9 overnight. This is likely 2/2 vanc. IV fluids started and Nephrology consulted. Will monitor renal function. 7/9/20 No acute issues overnight. The patient continues to have good UOP. Creatinine increased to 6.8. Nephrology following, will continue to monitor renal function. ANC is improving, Hem/onc following. 7/10/20 The patient reports "feeling better today". WBCs continue to recover, Hem/onc following. Creatinine continues to worsen, Cr 8.1 today. The patient reports good UOP, Nephrology following.   7/11 Pt was seen and examined at bedside . She denies  Any complaint for today . The kidney function cont trending up .    Interval History:     Review of Systems   Constitutional: Negative for activity change, appetite change, chills, diaphoresis, fatigue, fever and unexpected weight change.   HENT: Positive for dental problem. Negative for congestion, drooling, facial swelling, rhinorrhea, sinus pressure, sneezing, sore throat, trouble swallowing and voice change.    Eyes: Negative for photophobia, discharge, redness, itching and visual disturbance.   Respiratory: Negative for apnea, cough, choking, chest tightness, shortness of breath, wheezing and stridor.    Cardiovascular: Negative for chest pain, palpitations and leg swelling.   Gastrointestinal: Negative for abdominal distention, abdominal pain, anal bleeding, blood in stool, constipation, diarrhea, nausea and vomiting.   Endocrine: Negative for cold intolerance, heat intolerance, polydipsia, polyphagia and polyuria.   Genitourinary: Negative for decreased urine volume, difficulty urinating, dysuria, flank pain, frequency, hematuria, pelvic pain, urgency, vaginal bleeding and vaginal discharge.   Musculoskeletal: " Positive for back pain (chronic). Negative for arthralgias, gait problem, joint swelling, myalgias, neck pain and neck stiffness.   Skin: Negative for color change, pallor, rash and wound.   Neurological: Positive for weakness (generalized). Negative for dizziness, seizures, syncope, facial asymmetry, speech difficulty, light-headedness, numbness and headaches.   Psychiatric/Behavioral: Negative for agitation, confusion, hallucinations and suicidal ideas.   All other systems reviewed and are negative.    Objective:     Vital Signs (Most Recent):  Temp: 98.4 °F (36.9 °C) (07/11/20 0712)  Pulse: 83 (07/11/20 0712)  Resp: 16 (07/11/20 0831)  BP: 127/63 (07/11/20 0712)  SpO2: 95 % (07/11/20 0712) Vital Signs (24h Range):  Temp:  [97.5 °F (36.4 °C)-98.4 °F (36.9 °C)] 98.4 °F (36.9 °C)  Pulse:  [76-85] 83  Resp:  [16-18] 16  SpO2:  [95 %-100 %] 95 %  BP: (124-136)/(63-75) 127/63     Weight: 124.3 kg (274 lb)  Body mass index is 38.22 kg/m².    Intake/Output Summary (Last 24 hours) at 7/11/2020 1602  Last data filed at 7/11/2020 1454  Gross per 24 hour   Intake 3385.34 ml   Output 1200 ml   Net 2185.34 ml      Physical Exam  Vitals signs and nursing note reviewed.   Constitutional:       General: She is not in acute distress.     Appearance: Normal appearance. She is not ill-appearing.   HENT:      Head: Normocephalic and atraumatic.      Nose: Nose normal.      Mouth/Throat:      Mouth: Mucous membranes are moist.   Eyes:      Conjunctiva/sclera: Conjunctivae normal.      Pupils: Pupils are equal, round, and reactive to light.   Neck:      Musculoskeletal: Normal range of motion and neck supple. Muscular tenderness present. No neck rigidity.   Cardiovascular:      Rate and Rhythm: Regular rhythm.      Pulses: Normal pulses.      Heart sounds: Normal heart sounds.   Pulmonary:      Effort: Pulmonary effort is normal.      Breath sounds: Normal breath sounds. No rales.   Abdominal:      Palpations: Abdomen is soft.  "  Musculoskeletal: Normal range of motion.      Right lower leg: No edema.      Left lower leg: No edema.   Skin:     General: Skin is warm and dry.   Neurological:      General: No focal deficit present.      Mental Status: She is alert. Mental status is at baseline. She is disoriented.      Cranial Nerves: No cranial nerve deficit.      Sensory: Sensory deficit present.   Psychiatric:         Mood and Affect: Mood normal.         Behavior: Behavior normal.         Significant Labs: All pertinent labs within the past 24 hours have been reviewed.    Significant Imaging: I have reviewed all pertinent imaging results/findings within the past 24 hours.      Assessment/Plan:      * Dental infection  Afebrile overnight, will need dental follow up after discharge.       Hypocalcemia        ADAMARIS (acute kidney injury)  Multifactorial : most likely due to vanc  Nephrology on board   Kidney function trending up       Acute neoplasm-related pain  - Continue PRN Analgesia - Palliative care consulted       Hypokalemia  Replace K+  Recheck in AM  Replete as needed  7/8/20 Resolved     Neutropenic fever  Consult hematology/oncology  Maintain neutropenic precautions  Tylenol prn fever  UA with reflex culture  NGTD  Cultures NGTD      Chemotherapy-induced neutropenia  Maintain neutropenic precautions  Labs in AM  Continue supplemental thiamine, Folbic, MVI  7/9/20 Improving, Hem/onc following  7/10/20 The patient reports "feeling better today". WBCs continue to recover, Hem/onc following.     Hairy cell leukemia of spleen  Consult hematology/oncology   Consult palliative care for symptom/pain management  7/10/20 The patient reports "feeling better today". WBCs continue to recover, Hem/onc following.       VTE Risk Mitigation (From admission, onward)         Ordered     Place sequential compression device  Until discontinued      07/06/20 1199                      Rohit Rodriguez MD  Department of [facility] Medicine   [facility]

## 2020-07-11 NOTE — PROGRESS NOTES
[facility] -   Hematology/Oncology  Progress Note    Patient Name: Jaswant Yang  Admission Date: 7/6/2020  Hospital Length of Stay: 5 days  Code Status: Prior     Subjective:     HPI:  44 year old female with anemia, GERD, hairy-cell leukemia [place] [facility] [place], leukopenia and dental pain. Patient was started on antibiotics including vancomycin and received 4.2 g of Vancomycin in a 15 h period. UOP has not been recorded since admission.     Nephrology was consulted to help with patient's renal care while [place] is [place] [facility] [place] I saw and examined patient in her hospital room. Patient reports generalized weakness and vomiting x 1 today. No other symptoms at present. She reports NSAID use in past (last use of ibuprofen about 3 weeks ago).     Chart review revealed that her baseline creatinine is about 0.9.     Interval history:  Resting in bed.  Seen and examined.  Noted worsening kidney damage secondary to vancomycin toxicity.    Review of Systems   Constitutional: Positive for activity change and fatigue. Negative for appetite change, chills, diaphoresis, fever and unexpected weight change.   HENT: Positive for dental problem. Negative for congestion, hearing loss, nosebleeds, postnasal drip, sore throat and trouble swallowing.    Eyes: Negative for pain, discharge and visual disturbance.   Respiratory: Negative for cough, chest tightness and shortness of breath.    Cardiovascular: Negative for chest pain and palpitations.   Gastrointestinal: Negative for blood in stool, constipation, diarrhea, nausea and vomiting.   Endocrine: Negative for cold intolerance and heat intolerance.   Genitourinary: Negative for difficulty urinating, dyspareunia, flank pain and hematuria.   Musculoskeletal: Positive for arthralgias and myalgias. Negative for back pain, gait problem, joint swelling and neck pain.   Skin: Negative.    Neurological: Negative for dizziness, weakness, light-headedness and  headaches.   Hematological: Negative for adenopathy. Does not bruise/bleed easily.   Psychiatric/Behavioral: Negative for agitation, behavioral problems and confusion. The patient is nervous/anxious.          Physical Exam  Vitals signs and nursing note reviewed.   Constitutional:       General: She is not in acute distress.     Appearance: She is well-developed.   HENT:      Head: Normocephalic and atraumatic.      Right Ear: Hearing and external ear normal.      Left Ear: Hearing and external ear normal.      Nose: No rhinorrhea.      Right Sinus: No maxillary sinus tenderness or frontal sinus tenderness.      Left Sinus: No maxillary sinus tenderness or frontal sinus tenderness.      Mouth/Throat:      Mouth: No oral lesions.      Pharynx: Uvula midline.   Eyes:      General:         Right eye: No discharge.         Left eye: No discharge.      Conjunctiva/sclera: Conjunctivae normal.      Pupils: Pupils are equal, round, and reactive to light.   Neck:      Musculoskeletal: Normal range of motion.      Thyroid: No thyromegaly.      Vascular: No carotid bruit.      Trachea: No tracheal deviation.   Cardiovascular:      Rate and Rhythm: Normal rate and regular rhythm.      Pulses:           Dorsalis pedis pulses are 2+ on the right side and 2+ on the left side.      Heart sounds: Normal heart sounds, S1 normal and S2 normal. No murmur.   Pulmonary:      Effort: Pulmonary effort is normal. No respiratory distress.      Breath sounds: Normal breath sounds.   Abdominal:      General: Bowel sounds are normal. There is no distension.      Palpations: Abdomen is soft. There is no mass.      Tenderness: There is no abdominal tenderness.   Musculoskeletal: Normal range of motion.      Left shoulder: She exhibits no tenderness.      Right upper leg: She exhibits no tenderness.      Left upper leg: She exhibits no tenderness.   Lymphadenopathy:      Cervical: No cervical adenopathy.      Upper Body:      Right upper body: No  supraclavicular adenopathy.      Left upper body: No supraclavicular adenopathy.   Skin:     General: Skin is warm and dry.      Capillary Refill: Capillary refill takes less than 2 seconds.      Coloration: Skin is pale.      Findings: No rash.   Neurological:      Mental Status: She is alert and oriented to person, place, and time.      Sensory: No sensory deficit.      Coordination: Coordination normal.      Gait: Gait normal.   Psychiatric:         Mood and Affect: Mood is anxious. Mood is not depressed.         Speech: Speech normal.         Behavior: Behavior normal.         Thought Content: Thought content normal.         Judgment: Judgment normal.        Assessment/Plan:     # Dental infection  -Dental referral/participating dentist list to be provided prior to discharge. Advised patient to notify dentist that dental work is required prior to resuming Chemotherapy.      #ADAMARIS (acute kidney injury)  -likely due to vancomycin toxicity per nephrology.  - Management per Nephrology, patient does need oral antibiotic prophylaxis for dental issues, appreciate input from Nephrology.         #Chemotherapy-induced neutropenia  -Goal is ANC >1000 prior to discharge, will need IV ABT prophylaxis until ANC recovers.  -Resolved    # normocytic anemia:  -no evidence of active bleed.  Likely related to acute illness and chemotherapy effect.  -hemoglobin noted above 9 grams/deciliter.  -continue daily CBC and CMP.     #Hairy cell leukemia of spleen  -S/P splenectomy, did not tolerate Rituxan, will evaluate treatment regimen as outpatient.   -Chemotherapy is on hold pending resolution of acute issues, patient will need to see a dentist prior to continuing on chemotherapy.  -follow-up with primary oncologist upon discharge.    Thank you for your consult. I will follow-up with patient. Please contact us if you have any additional questions.     Butch Herman MD  Hematology/Oncology  [facility]

## 2020-07-11 NOTE — PROGRESS NOTES
[facility] -   Nephrology  Progress Note    Patient Name: Jaswant Yang  MRN: [id number]  Admission Date: 7/6/2020  Hospital Length of Stay: 4 days  Attending Provider: Shantel Carlson MD   Primary Care Physician: Primary Doctor No  Principal Problem:Dental infection    Subjective:     HPI: 44 year old female with anemia, GERD, hairy-cell leukemia presents to [facility] with low grade fever, leukopenia and dental pain. Patient was started on antibiotics including vancomycin and received 4.2 g of Vancomycin in a 15 h period. UOP has not been recorded since admission.     Nephrology was consulted to help with patient's renal care while she is admitted at [facility]. I saw and examined patient in her hospital room. Patient reports generalized weakness and vomiting x 1 today. No other symptoms at present. She reports NSAID use in past (last use of ibuprofen about 3 weeks ago).     Chart review revealed that her baseline creatinine is about 0.9.     Interval History: Pt was seen and examined. Chart and h/o reviewed. Pt is a 45 y/o female with ADAMARIS, highly suspected due to vancomycin renal damage. Pt has no new c/o's, no SOB, says is making urine.    Review of patient's allergies indicates:   Allergen Reactions    Tramadol Hives     Current Facility-Administered Medications   Medication Frequency    0.9%  NaCl infusion Continuous    acetaminophen tablet 650 mg Q6H PRN    fentaNYL 25 mcg/hr 1 patch Q72H    folic acid-vit B6-vit B12 2.5-25-2 mg tablet 1 tablet Daily    gabapentin capsule 300 mg QHS    hydrOXYzine HCL tablet 25 mg TID PRN    multivitamin tablet Daily    ondansetron injection 4 mg Q8H PRN    oxyCODONE immediate release tablet 10 mg Q4H PRN    polyethylene glycol packet 17 g Daily    promethazine tablet 25 mg Q6H PRN    senna tablet 8.6 mg BID    thiamine tablet 100 mg Daily       Objective:     Vital Signs (Most Recent):  Temp: 98.1 °F (36.7 °C) (07/10/20 1959)  Pulse: 76 (07/10/20 1959)  Resp:  17 (07/10/20 1959)  BP: 124/70 (07/10/20 1959)  SpO2: 100 % (07/10/20 1959)  O2 Device (Oxygen Therapy): room air (07/09/20 1930) Vital Signs (24h Range):  Temp:  [97.5 °F (36.4 °C)-98.8 °F (37.1 °C)] 98.1 °F (36.7 °C)  Pulse:  [76-84] 76  Resp:  [15-18] 17  SpO2:  [97 %-100 %] 100 %  BP: (124-136)/(70-85) 124/70     Weight: 124.3 kg (274 lb) (07/06/20 1700)  Body mass index is 38.22 kg/m².  Body surface area is 2.5 meters squared.    I/O last 3 completed shifts:  In: 5213.3 [P.O.:1640; I.V.:3573.3]  Out: 2325 [Urine:2325]    Physical Exam  Vitals signs and nursing note reviewed.   Constitutional:       Appearance: Normal appearance.   HENT:      Head: Normocephalic and atraumatic.   Cardiovascular:      Rate and Rhythm: Regular rhythm.      Pulses: Normal pulses.      Heart sounds: Normal heart sounds.   Pulmonary:      Effort: Pulmonary effort is normal.      Breath sounds: Normal breath sounds. No rales.   Abdominal:      Palpations: Abdomen is soft.   Musculoskeletal:      Right lower leg: No edema.      Left lower leg: No edema.   Skin:     General: Skin is warm and dry.   Neurological:      Mental Status: She is alert.   Psychiatric:         Mood and Affect: Mood normal.         Behavior: Behavior normal.         Significant Labs: reviewed  BMP  Lab Results   Component Value Date     (L) 07/10/2020    K 5.1 07/10/2020     07/10/2020    CO2 18 (L) 07/10/2020    BUN 38 (H) 07/10/2020    CREATININE 8.1 (H) 07/10/2020    CALCIUM 7.8 (L) 07/10/2020    ANIONGAP 8 07/10/2020    ESTGFRAFRICA 6 (A) 07/10/2020    EGFRNONAA 6 (A) 07/10/2020     Lab Results   Component Value Date    WBC 2.31 (L) 07/10/2020    HGB 9.3 (L) 07/10/2020    HCT 28.5 (L) 07/10/2020    MCV 94 07/10/2020     07/10/2020           Significant Imaging: reviewed    Assessment/Plan:     43 y/o female with ADAMARIS:    ADAMARIS (acute kidney injury)  ADAMARIS occurred post admit  s Cr increased from 0.8 on 7/6 to 4.9 on 7/8/20.   ADAMARIS likely due to  Vancomycin use and patient recrived 4.2 g of Vancomycin in 15 hour period.  Vancomycin has been discontinued.   Also, pt reported NSAIDs use 3 weeks ago. (unlikely related to current ADAMARIS episode).     No sign of renal recovery at this point  Remains non-oliguric  K normal  Metabolic acidosis  O2 sat good  May need acute dialysis, discussed with pt      Plans and recommendations:  As discussed above  Will send random vancomycin level  Will send u/a, urine Na and Cr to calculate FENa, repeat u/a    Robel Montero MD  Nephrology  [facility]

## 2020-07-11 NOTE — ASSESSMENT & PLAN NOTE
Consult hematology/oncology  Maintain neutropenic precautions  Tylenol prn fever  UA with reflex culture  NGTD  Cultures NGTD

## 2020-07-11 NOTE — SUBJECTIVE & OBJECTIVE
Interval History: Pt was seen and examined. Chart and h/o reviewed. Pt is a 45 y/o female with ADAMARIS, highly suspected due to vancomycin renal damage. Pt has no new c/o's, no SOB, says is making urine.    Review of patient's allergies indicates:   Allergen Reactions    Tramadol Hives     Current Facility-Administered Medications   Medication Frequency    0.9%  NaCl infusion Continuous    acetaminophen tablet 650 mg Q6H PRN    fentaNYL 25 mcg/hr 1 patch Q72H    folic acid-vit B6-vit B12 2.5-25-2 mg tablet 1 tablet Daily    gabapentin capsule 300 mg QHS    hydrOXYzine HCL tablet 25 mg TID PRN    multivitamin tablet Daily    ondansetron injection 4 mg Q8H PRN    oxyCODONE immediate release tablet 10 mg Q4H PRN    polyethylene glycol packet 17 g Daily    promethazine tablet 25 mg Q6H PRN    senna tablet 8.6 mg BID    thiamine tablet 100 mg Daily       Objective:     Vital Signs (Most Recent):  Temp: 98.1 °F (36.7 °C) (07/10/20 1959)  Pulse: 76 (07/10/20 1959)  Resp: 17 (07/10/20 1959)  BP: 124/70 (07/10/20 1959)  SpO2: 100 % (07/10/20 1959)  O2 Device (Oxygen Therapy): room air (07/09/20 1930) Vital Signs (24h Range):  Temp:  [97.5 °F (36.4 °C)-98.8 °F (37.1 °C)] 98.1 °F (36.7 °C)  Pulse:  [76-84] 76  Resp:  [15-18] 17  SpO2:  [97 %-100 %] 100 %  BP: (124-136)/(70-85) 124/70     Weight: 124.3 kg (274 lb) (07/06/20 1700)  Body mass index is 38.22 kg/m².  Body surface area is 2.5 meters squared.    I/O last 3 completed shifts:  In: 5213.3 [P.O.:1640; I.V.:3573.3]  Out: 2325 [Urine:2325]    Physical Exam  Vitals signs and nursing note reviewed.   Constitutional:       Appearance: Normal appearance.   HENT:      Head: Normocephalic and atraumatic.   Cardiovascular:      Rate and Rhythm: Regular rhythm.      Pulses: Normal pulses.      Heart sounds: Normal heart sounds.   Pulmonary:      Effort: Pulmonary effort is normal.      Breath sounds: Normal breath sounds. No rales.   Abdominal:      Palpations: Abdomen  is soft.   Musculoskeletal:      Right lower leg: No edema.      Left lower leg: No edema.   Skin:     General: Skin is warm and dry.   Neurological:      Mental Status: She is alert.   Psychiatric:         Mood and Affect: Mood normal.         Behavior: Behavior normal.         Significant Labs: reviewed  BMP  Lab Results   Component Value Date     (L) 07/10/2020    K 5.1 07/10/2020     07/10/2020    CO2 18 (L) 07/10/2020    BUN 38 (H) 07/10/2020    CREATININE 8.1 (H) 07/10/2020    CALCIUM 7.8 (L) 07/10/2020    ANIONGAP 8 07/10/2020    ESTGFRAFRICA 6 (A) 07/10/2020    EGFRNONAA 6 (A) 07/10/2020     Lab Results   Component Value Date    WBC 2.31 (L) 07/10/2020    HGB 9.3 (L) 07/10/2020    HCT 28.5 (L) 07/10/2020    MCV 94 07/10/2020     07/10/2020           Significant Imaging: reviewed

## 2020-07-11 NOTE — ASSESSMENT & PLAN NOTE
ADAMARIS occurred post admit  s Cr increased from 0.8 on 7/6 to 4.9 on 7/8/20.   ADAMARIS likely due to Vancomycin use and patient recrived 4.2 g of Vancomycin in 15 hour period.  Vancomycin has been discontinued.   Also, pt reported NSAIDs use 3 weeks ago. (unlikely related to current ADAMARIS episode).     No sign of renal recovery at this point  Remains non-oliguric  K normal  Metabolic acidosis  O2 sat good  May need acute dialysis, discussed with pt

## 2020-07-11 NOTE — PLAN OF CARE
Problem: Adult Inpatient Plan of Care  Goal: Plan of Care Review  Outcome: Ongoing, Progressing     Problem: Neutropenia  Goal: Absence of Infection  Outcome: Ongoing, Progressing     Problem: Electrolyte Imbalance (Acute Kidney Injury/Impairment)  Goal: Serum Electrolyte Balance  Outcome: Ongoing, Progressing     Problem: Pain Chronic (Persistent)  Goal: Acceptable Pain Control and Functional Ability  Outcome: Ongoing, Progressing

## 2020-07-11 NOTE — SUBJECTIVE & OBJECTIVE
Interval History: Pt was seen and examined this am. No new c/o's, no SOB, no discomfort. No urinary c/o's.    Review of patient's allergies indicates:   Allergen Reactions    Tramadol Hives     Current Facility-Administered Medications   Medication Frequency    0.9%  NaCl infusion Continuous    acetaminophen tablet 650 mg Q6H PRN    fentaNYL 25 mcg/hr 1 patch Q72H    folic acid-vit B6-vit B12 2.5-25-2 mg tablet 1 tablet Daily    gabapentin capsule 300 mg QHS    hydrOXYzine HCL tablet 25 mg TID PRN    multivitamin tablet Daily    ondansetron injection 4 mg Q8H PRN    oxyCODONE immediate release tablet 10 mg Q4H PRN    polyethylene glycol packet 17 g Daily    promethazine tablet 25 mg Q6H PRN    senna tablet 8.6 mg BID    thiamine tablet 100 mg Daily       Objective:     Vital Signs (Most Recent):  Temp: 98.4 °F (36.9 °C) (07/11/20 0712)  Pulse: 83 (07/11/20 0712)  Resp: 16 (07/11/20 0831)  BP: 127/63 (07/11/20 0712)  SpO2: 95 % (07/11/20 0712)  O2 Device (Oxygen Therapy): room air (07/11/20 0011) Vital Signs (24h Range):  Temp:  [97.5 °F (36.4 °C)-98.4 °F (36.9 °C)] 98.4 °F (36.9 °C)  Pulse:  [76-85] 83  Resp:  [16-18] 16  SpO2:  [95 %-100 %] 95 %  BP: (124-136)/(63-75) 127/63     Weight: 124.3 kg (274 lb) (07/06/20 1700)  Body mass index is 38.22 kg/m².  Body surface area is 2.5 meters squared.    I/O last 3 completed shifts:  In: 4937 [P.O.:1512; I.V.:3425]  Out: 2725 [Urine:2725]    Physical Exam  Vitals signs and nursing note reviewed.   Constitutional:       Appearance: Normal appearance.   HENT:      Head: Normocephalic and atraumatic.   Cardiovascular:      Rate and Rhythm: Regular rhythm.      Pulses: Normal pulses.      Heart sounds: Normal heart sounds.   Pulmonary:      Effort: Pulmonary effort is normal.      Breath sounds: Normal breath sounds. No rales.   Abdominal:      Palpations: Abdomen is soft.   Musculoskeletal:      Right lower leg: No edema.      Left lower leg: No edema.   Skin:      General: Skin is warm and dry.   Neurological:      Mental Status: She is alert.   Psychiatric:         Mood and Affect: Mood normal.         Behavior: Behavior normal.         Significant Labs: reviewed  BMP  Lab Results   Component Value Date     07/11/2020    K 5.3 (H) 07/11/2020     07/11/2020    CO2 17 (L) 07/11/2020    BUN 42 (H) 07/11/2020    CREATININE 8.9 (H) 07/11/2020    CALCIUM 7.9 (L) 07/11/2020    ANIONGAP 9 07/11/2020    ESTGFRAFRICA 6 (A) 07/11/2020    EGFRNONAA 5 (A) 07/11/2020     Lab Results   Component Value Date    WBC 2.73 (L) 07/11/2020    HGB 9.3 (L) 07/11/2020    HCT 28.5 (L) 07/11/2020    MCV 95 07/11/2020     07/11/2020            Significant Imaging: reviewed

## 2020-07-11 NOTE — PROGRESS NOTES
[facility] -   Nephrology  Progress Note    Patient Name: Jaswant Yang  MRN: [id number]  Admission Date: 7/6/2020  Hospital Length of Stay: 5 days  Attending Provider: Rohit Rodriguez, *   Primary Care Physician: Primary Doctor No  Principal Problem:Dental infection    Subjective:     HPI: 44 year old female with anemia, GERD, hairy-cell leukemia presents to [facility] with low grade fever, leukopenia and dental pain. Patient was started on antibiotics including vancomycin and received 4.2 g of Vancomycin in a 15 h period. UOP has not been recorded since admission.     Nephrology was consulted to help with patient's renal care while she is admitted at [facility]. I saw and examined patient in her hospital room. Patient reports generalized weakness and vomiting x 1 today. No other symptoms at present. She reports NSAID use in past (last use of ibuprofen about 3 weeks ago).     Chart review revealed that her baseline creatinine is about 0.9.     Interval History: Pt was seen and examined this am. No new c/o's, no SOB, no discomfort. No urinary c/o's.    Review of patient's allergies indicates:   Allergen Reactions    Tramadol Hives     Current Facility-Administered Medications   Medication Frequency    0.9%  NaCl infusion Continuous    acetaminophen tablet 650 mg Q6H PRN    fentaNYL 25 mcg/hr 1 patch Q72H    folic acid-vit B6-vit B12 2.5-25-2 mg tablet 1 tablet Daily    gabapentin capsule 300 mg QHS    hydrOXYzine HCL tablet 25 mg TID PRN    multivitamin tablet Daily    ondansetron injection 4 mg Q8H PRN    oxyCODONE immediate release tablet 10 mg Q4H PRN    polyethylene glycol packet 17 g Daily    promethazine tablet 25 mg Q6H PRN    senna tablet 8.6 mg BID    thiamine tablet 100 mg Daily       Objective:     Vital Signs (Most Recent):  Temp: 98.4 °F (36.9 °C) (07/11/20 0712)  Pulse: 83 (07/11/20 0712)  Resp: 16 (07/11/20 0831)  BP: 127/63 (07/11/20 0712)  SpO2: 95 % (07/11/20 0712)  O2 Device  (Oxygen Therapy): room air (07/11/20 0011) Vital Signs (24h Range):  Temp:  [97.5 °F (36.4 °C)-98.4 °F (36.9 °C)] 98.4 °F (36.9 °C)  Pulse:  [76-85] 83  Resp:  [16-18] 16  SpO2:  [95 %-100 %] 95 %  BP: (124-136)/(63-75) 127/63     Weight: 124.3 kg (274 lb) (07/06/20 1700)  Body mass index is 38.22 kg/m².  Body surface area is 2.5 meters squared.    I/O last 3 completed shifts:  In: 4937 [P.O.:1512; I.V.:3425]  Out: 2725 [Urine:2725]    Physical Exam  Vitals signs and nursing note reviewed.   Constitutional:       Appearance: Normal appearance.   HENT:      Head: Normocephalic and atraumatic.   Cardiovascular:      Rate and Rhythm: Regular rhythm.      Pulses: Normal pulses.      Heart sounds: Normal heart sounds.   Pulmonary:      Effort: Pulmonary effort is normal.      Breath sounds: Normal breath sounds. No rales.   Abdominal:      Palpations: Abdomen is soft.   Musculoskeletal:      Right lower leg: No edema.      Left lower leg: No edema.   Skin:     General: Skin is warm and dry.   Neurological:      Mental Status: She is alert.   Psychiatric:         Mood and Affect: Mood normal.         Behavior: Behavior normal.         Significant Labs: reviewed  BMP  Lab Results   Component Value Date     07/11/2020    K 5.3 (H) 07/11/2020     07/11/2020    CO2 17 (L) 07/11/2020    BUN 42 (H) 07/11/2020    CREATININE 8.9 (H) 07/11/2020    CALCIUM 7.9 (L) 07/11/2020    ANIONGAP 9 07/11/2020    ESTGFRAFRICA 6 (A) 07/11/2020    EGFRNONAA 5 (A) 07/11/2020     Lab Results   Component Value Date    WBC 2.73 (L) 07/11/2020    HGB 9.3 (L) 07/11/2020    HCT 28.5 (L) 07/11/2020    MCV 95 07/11/2020     07/11/2020     Vancomycin level (yesterday): 29.6    U/a: no protein, 3+ blood, >100 RBC's , + Trichomonas, no casts reported  Urine protein/Cr ratio 390 mg  Urine Na 71, urine Cr 44, FENa = 10%       Significant Imaging: reviewed    Assessment/Plan:     ADAMARIS (acute kidney injury)  45 y/o female with ADAMARIS:     ADAMARIS  (acute kidney injury)  ADAMARIS occurred post admit.  Has intrinsic renal damage (FENa >>1%)  Proteinuria and microscopic hematuria  ADAMARIS likely due to Vancomycin use and patient recrived 4.2 g of Vancomycin in 15 hour period. Supratherapeutic vancomycin level  Vancomycin has been discontinued.   Also, pt reported NSAIDs use 3 weeks ago.      No sign of renal recovery at this point  Remains non-oliguric  K normal to borderline elevated  Metabolic acidosis  O2 sat good  May need acute dialysis, discussed with pt again today        Plans and recommendations:  As discussed above  Will follow closely      Robel Montero MD  Nephrology  [facility]

## 2020-07-11 NOTE — ASSESSMENT & PLAN NOTE
45 y/o female with ADAMARIS:     ADAMARIS (acute kidney injury)  ADAMARIS occurred post admit  s Cr increased from 0.8 on 7/6 to 4.9 on 7/8/20.   ADAMARIS likely due to Vancomycin use and patient recrived 4.2 g of Vancomycin in 15 hour period.  Vancomycin has been discontinued.   Also, pt reported NSAIDs use 3 weeks ago. (unlikely related to current ADAMARIS episode).      No sign of renal recovery at this point  Remains non-oliguric  K normal  Metabolic acidosis  O2 sat good  May need acute dialysis, discussed with pt        Plans and recommendations:  As discussed above  Will send random vancomycin level  Will send u/a, urine Na and Cr to calculate FENa, repeat u/a

## 2020-07-12 PROBLEM — E87.6 HYPOKALEMIA: Status: RESOLVED | Noted: 2020-07-06 | Resolved: 2020-07-12

## 2020-07-12 LAB
ACANTHOCYTES BLD QL SMEAR: PRESENT
ANION GAP SERPL CALC-SCNC: 11 MMOL/L (ref 8–16)
ANION GAP SERPL CALC-SCNC: 9 MMOL/L (ref 8–16)
ANION GAP SERPL CALC-SCNC: 9 MMOL/L (ref 8–16)
ANISOCYTOSIS BLD QL SMEAR: SLIGHT
BASOPHILS # BLD AUTO: 0.02 K/UL (ref 0–0.2)
BASOPHILS NFR BLD: 0.6 % (ref 0–1.9)
BUN SERPL-MCNC: 45 MG/DL (ref 6–20)
BUN SERPL-MCNC: 50 MG/DL (ref 6–20)
BUN SERPL-MCNC: 51 MG/DL (ref 6–20)
BURR CELLS BLD QL SMEAR: ABNORMAL
CALCIUM SERPL-MCNC: 7.4 MG/DL (ref 8.7–10.5)
CALCIUM SERPL-MCNC: 7.5 MG/DL (ref 8.7–10.5)
CALCIUM SERPL-MCNC: 7.9 MG/DL (ref 8.7–10.5)
CHLORIDE SERPL-SCNC: 110 MMOL/L (ref 95–110)
CHLORIDE SERPL-SCNC: 113 MMOL/L (ref 95–110)
CHLORIDE SERPL-SCNC: 114 MMOL/L (ref 95–110)
CO2 SERPL-SCNC: 14 MMOL/L (ref 23–29)
CO2 SERPL-SCNC: 16 MMOL/L (ref 23–29)
CO2 SERPL-SCNC: 16 MMOL/L (ref 23–29)
CREAT SERPL-MCNC: 8.3 MG/DL (ref 0.5–1.4)
CREAT SERPL-MCNC: 8.8 MG/DL (ref 0.5–1.4)
CREAT SERPL-MCNC: 9.3 MG/DL (ref 0.5–1.4)
DACRYOCYTES BLD QL SMEAR: ABNORMAL
DIFFERENTIAL METHOD: ABNORMAL
EOSINOPHIL # BLD AUTO: 0.1 K/UL (ref 0–0.5)
EOSINOPHIL NFR BLD: 2.2 % (ref 0–8)
ERYTHROCYTE [DISTWIDTH] IN BLOOD BY AUTOMATED COUNT: 21.2 % (ref 11.5–14.5)
EST. GFR  (AFRICAN AMERICAN): 5 ML/MIN/1.73 M^2
EST. GFR  (AFRICAN AMERICAN): 6 ML/MIN/1.73 M^2
EST. GFR  (AFRICAN AMERICAN): 6 ML/MIN/1.73 M^2
EST. GFR  (NON AFRICAN AMERICAN): 5 ML/MIN/1.73 M^2
GLUCOSE SERPL-MCNC: 88 MG/DL (ref 70–110)
GLUCOSE SERPL-MCNC: 91 MG/DL (ref 70–110)
GLUCOSE SERPL-MCNC: 95 MG/DL (ref 70–110)
HCT VFR BLD AUTO: 29.6 % (ref 37–48.5)
HGB BLD-MCNC: 9 G/DL (ref 12–16)
HYPOCHROMIA BLD QL SMEAR: ABNORMAL
IMM GRANULOCYTES # BLD AUTO: 0.04 K/UL (ref 0–0.04)
IMM GRANULOCYTES NFR BLD AUTO: 1.2 % (ref 0–0.5)
LYMPHOCYTES # BLD AUTO: 1.4 K/UL (ref 1–4.8)
LYMPHOCYTES NFR BLD: 42.7 % (ref 18–48)
MCH RBC QN AUTO: 29.4 PG (ref 27–31)
MCHC RBC AUTO-ENTMCNC: 30.4 G/DL (ref 32–36)
MCV RBC AUTO: 97 FL (ref 82–98)
MONOCYTES # BLD AUTO: 0.1 K/UL (ref 0.3–1)
MONOCYTES NFR BLD: 3.1 % (ref 4–15)
NEUTROPHILS # BLD AUTO: 1.6 K/UL (ref 1.8–7.7)
NEUTROPHILS NFR BLD: 50.2 % (ref 38–73)
NRBC BLD-RTO: 1 /100 WBC
OVALOCYTES BLD QL SMEAR: ABNORMAL
PLATELET # BLD AUTO: 184 K/UL (ref 150–350)
PLATELET BLD QL SMEAR: ABNORMAL
PMV BLD AUTO: 10.3 FL (ref 9.2–12.9)
POIKILOCYTOSIS BLD QL SMEAR: SLIGHT
POLYCHROMASIA BLD QL SMEAR: ABNORMAL
POTASSIUM SERPL-SCNC: 5.3 MMOL/L (ref 3.5–5.1)
POTASSIUM SERPL-SCNC: 5.5 MMOL/L (ref 3.5–5.1)
POTASSIUM SERPL-SCNC: 6.2 MMOL/L (ref 3.5–5.1)
RBC # BLD AUTO: 3.06 M/UL (ref 4–5.4)
SCHISTOCYTES BLD QL SMEAR: PRESENT
SODIUM SERPL-SCNC: 137 MMOL/L (ref 136–145)
SODIUM SERPL-SCNC: 137 MMOL/L (ref 136–145)
SODIUM SERPL-SCNC: 138 MMOL/L (ref 136–145)
STOMATOCYTES BLD QL SMEAR: PRESENT
TARGETS BLD QL SMEAR: ABNORMAL
WBC # BLD AUTO: 3.21 K/UL (ref 3.9–12.7)

## 2020-07-12 PROCEDURE — 25000003 PHARM REV CODE 250: Performed by: EMERGENCY MEDICINE

## 2020-07-12 PROCEDURE — 25000003 PHARM REV CODE 250: Performed by: PHYSICIAN ASSISTANT

## 2020-07-12 PROCEDURE — 99233 PR SUBSEQUENT HOSPITAL CARE,LEVL III: ICD-10-PCS | Mod: ,,, | Performed by: INTERNAL MEDICINE

## 2020-07-12 PROCEDURE — 80048 BASIC METABOLIC PNL TOTAL CA: CPT | Mod: 91

## 2020-07-12 PROCEDURE — 11000001 HC ACUTE MED/SURG PRIVATE ROOM

## 2020-07-12 PROCEDURE — 80048 BASIC METABOLIC PNL TOTAL CA: CPT

## 2020-07-12 PROCEDURE — 99233 SBSQ HOSP IP/OBS HIGH 50: CPT | Mod: ,,, | Performed by: INTERNAL MEDICINE

## 2020-07-12 PROCEDURE — 25000003 PHARM REV CODE 250: Performed by: NURSE PRACTITIONER

## 2020-07-12 PROCEDURE — 36415 COLL VENOUS BLD VENIPUNCTURE: CPT

## 2020-07-12 PROCEDURE — 94640 AIRWAY INHALATION TREATMENT: CPT

## 2020-07-12 PROCEDURE — 25000003 PHARM REV CODE 250: Performed by: INTERNAL MEDICINE

## 2020-07-12 PROCEDURE — 85025 COMPLETE CBC W/AUTO DIFF WBC: CPT

## 2020-07-12 PROCEDURE — 25000242 PHARM REV CODE 250 ALT 637 W/ HCPCS: Performed by: INTERNAL MEDICINE

## 2020-07-12 RX ORDER — IPRATROPIUM BROMIDE AND ALBUTEROL SULFATE 2.5; .5 MG/3ML; MG/3ML
3 SOLUTION RESPIRATORY (INHALATION) EVERY 4 HOURS PRN
Status: DISCONTINUED | OUTPATIENT
Start: 2020-07-12 | End: 2020-07-18 | Stop reason: HOSPADM

## 2020-07-12 RX ORDER — METRONIDAZOLE 500 MG/1
2000 TABLET ORAL ONCE
Status: COMPLETED | OUTPATIENT
Start: 2020-07-12 | End: 2020-07-12

## 2020-07-12 RX ORDER — FUROSEMIDE 40 MG/1
80 TABLET ORAL ONCE
Status: COMPLETED | OUTPATIENT
Start: 2020-07-12 | End: 2020-07-12

## 2020-07-12 RX ORDER — FUROSEMIDE 10 MG/ML
80 INJECTION INTRAMUSCULAR; INTRAVENOUS ONCE
Status: DISCONTINUED | OUTPATIENT
Start: 2020-07-12 | End: 2020-07-12

## 2020-07-12 RX ADMIN — Medication 1 TABLET: at 09:07

## 2020-07-12 RX ADMIN — SENNOSIDES 8.6 MG: 8.6 TABLET, FILM COATED ORAL at 09:07

## 2020-07-12 RX ADMIN — Medication 100 MG: at 09:07

## 2020-07-12 RX ADMIN — SODIUM CHLORIDE: 0.9 INJECTION, SOLUTION INTRAVENOUS at 05:07

## 2020-07-12 RX ADMIN — GABAPENTIN 300 MG: 300 CAPSULE ORAL at 09:07

## 2020-07-12 RX ADMIN — OXYCODONE 10 MG: 5 TABLET ORAL at 01:07

## 2020-07-12 RX ADMIN — FUROSEMIDE 80 MG: 40 TABLET ORAL at 04:07

## 2020-07-12 RX ADMIN — SODIUM CHLORIDE: 0.9 INJECTION, SOLUTION INTRAVENOUS at 01:07

## 2020-07-12 RX ADMIN — IPRATROPIUM BROMIDE AND ALBUTEROL SULFATE 3 ML: .5; 3 SOLUTION RESPIRATORY (INHALATION) at 11:07

## 2020-07-12 RX ADMIN — THERA TABS 1 TABLET: TAB at 09:07

## 2020-07-12 RX ADMIN — OXYCODONE 10 MG: 5 TABLET ORAL at 09:07

## 2020-07-12 RX ADMIN — POLYETHYLENE GLYCOL 3350 17 G: 17 POWDER, FOR SOLUTION ORAL at 09:07

## 2020-07-12 RX ADMIN — METRONIDAZOLE 2000 MG: 500 TABLET ORAL at 05:07

## 2020-07-12 RX ADMIN — FENTANYL 1 PATCH: 25 PATCH, EXTENDED RELEASE TRANSDERMAL at 06:07

## 2020-07-12 NOTE — PROGRESS NOTES
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 Patient was started on antibiotics including vancomycin and received 4.2 g of Vancomycin in a 15 h period. UOP has not been recorded since admission.     Nephrology was consulted to help with patient's renal care while she is [place] [facility] [place] I saw and examined patient in her hospital room. Patient reports generalized weakness and vomiting x 1 today. No other symptoms at present. She reports NSAID use in past (last use of ibuprofen about 3 weeks ago).     Chart review revealed that her baseline creatinine is about 0.9.     Interval history:  Resting in bed.  Seen and examined.  Noted worsening kidney damage secondary to vancomycin toxicity recent NSAID use..    Review of Systems   Constitutional: Positive for activity change and fatigue. Negative for appetite change, chills, diaphoresis, fever and unexpected weight change.   HENT: Positive for dental problem. Negative for congestion, hearing loss, nosebleeds, postnasal drip, sore throat and trouble swallowing.    Eyes: Negative for pain, discharge and visual disturbance.   Respiratory: Negative for cough, chest tightness and shortness of breath.    Cardiovascular: Negative for chest pain and palpitations.   Gastrointestinal: Negative for blood in stool, constipation, diarrhea, nausea and vomiting.   Endocrine: Negative for cold intolerance and heat intolerance.   Genitourinary: Negative for difficulty urinating, dyspareunia, flank pain and hematuria.   Musculoskeletal: Positive for arthralgias and myalgias. Negative for back pain, gait problem, joint swelling and neck pain.   Skin: Negative.    Neurological: Negative for dizziness, weakness,  light-headedness and headaches.   Hematological: Negative for adenopathy. Does not bruise/bleed easily.   Psychiatric/Behavioral: Negative for agitation, behavioral problems and confusion. The patient is nervous/anxious.          Physical Exam  Vitals signs and nursing note reviewed.   Constitutional:       General: She is not in acute distress.     Appearance: She is well-developed.   HENT:      Head: Normocephalic and atraumatic.      Right Ear: Hearing and external ear normal.      Left Ear: Hearing and external ear normal.      Nose: No rhinorrhea.      Right Sinus: No maxillary sinus tenderness or frontal sinus tenderness.      Left Sinus: No maxillary sinus tenderness or frontal sinus tenderness.      Mouth/Throat:      Mouth: No oral lesions.      Pharynx: Uvula midline.   Eyes:      General:         Right eye: No discharge.         Left eye: No discharge.      Conjunctiva/sclera: Conjunctivae normal.      Pupils: Pupils are equal, round, and reactive to light.   Neck:      Musculoskeletal: Normal range of motion.      Thyroid: No thyromegaly.      Vascular: No carotid bruit.      Trachea: No tracheal deviation.   Cardiovascular:      Rate and Rhythm: Normal rate and regular rhythm.      Pulses:           Dorsalis pedis pulses are 2+ on the right side and 2+ on the left side.      Heart sounds: Normal heart sounds, S1 normal and S2 normal. No murmur.   Pulmonary:      Effort: Pulmonary effort is normal. No respiratory distress.      Breath sounds: Normal breath sounds.   Abdominal:      General: Bowel sounds are normal. There is no distension.      Palpations: Abdomen is soft. There is no mass.      Tenderness: There is no abdominal tenderness.   Musculoskeletal: Normal range of motion.      Left shoulder: She exhibits no tenderness.      Right upper leg: She exhibits no tenderness.      Left upper leg: She exhibits no tenderness.   Lymphadenopathy:      Cervical: No cervical adenopathy.      Upper Body:       Right upper body: No supraclavicular adenopathy.      Left upper body: No supraclavicular adenopathy.   Skin:     General: Skin is warm and dry.      Capillary Refill: Capillary refill takes less than 2 seconds.      Coloration: Skin is pale.      Findings: No rash.   Neurological:      Mental Status: She is alert and oriented to person, place, and time.      Sensory: No sensory deficit.      Coordination: Coordination normal.      Gait: Gait normal.   Psychiatric:         Mood and Affect: Mood is anxious. Mood is not depressed.         Speech: Speech normal.         Behavior: Behavior normal.         Thought Content: Thought content normal.         Judgment: Judgment normal.        Assessment/Plan:     # Dental infection  -Dental referral/participating dentist list to be provided prior to discharge. Advised patient to notify dentist that dental work is required prior to resuming Chemotherapy.      #ADAMARIS (acute kidney injury)  -likely due to vancomycin toxicity per nephrology.  -will defer dialysis Recs to nephrology service.  - remains non oliguric.    #Chemotherapy-induced neutropenia  -Goal is ANC >1000 prior to discharge, will need IV ABT prophylaxis until ANC recovers.  -Resolved    # normocytic anemia:  -no evidence of active bleed.  Likely related to acute illness and chemotherapy effect.  -hemoglobin remains above 9 grams/deciliter.  -continue daily CBC and CMP.     #Hairy cell leukemia of spleen  -S/P splenectomy, did not tolerate Rituxan, will evaluate treatment regimen as outpatient.   -Chemotherapy is on hold pending resolution of acute issues, patient will need to see a dentist prior to continuing on chemotherapy.  -follow-up with primary oncologist upon discharge.    Thank you for your consult. I will follow-up with patient. Please contact us if you have any additional questions.     Butch Herman MD  Hematology/Oncology  [facility] [place]

## 2020-07-12 NOTE — ASSESSMENT & PLAN NOTE
U/a: no protein, 3+ blood, >100 RBC's , + Trichomonas, no casts reported  Urine protein/Cr ratio 390 mg  Urine Na 71, urine Cr 44, FENa = 10%

## 2020-07-12 NOTE — ASSESSMENT & PLAN NOTE
"Consult hematology/oncology   Consult palliative care for symptom/pain management  The patient reports "feeling better today". WBCs continue to recover, Hem/onc following.   "

## 2020-07-12 NOTE — PROGRESS NOTES
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 Patient was started on antibiotics including vancomycin and received 4.2 g of Vancomycin in a 15 h period. UOP has not been recorded since admission.     Nephrology was consulted to help with patient's renal care while she is [place] [facility] I saw and examined patient in her hospital room. Patient reports generalized weakness and vomiting x 1 today. No other symptoms at present. She reports NSAID use in past (last use of ibuprofen about 3 weeks ago).     Chart review revealed that her baseline creatinine is about 0.9.     Interval History: Pt was seen and examined. No new c/o's, still no SOB, making good UOP, no sx's of dysuria. No leg swelling.    Review of patient's allergies indicates:   Allergen Reactions    Tramadol Hives     Current Facility-Administered Medications   Medication Frequency    0.9%  NaCl infusion Continuous    acetaminophen tablet 650 mg Q6H PRN    albuterol-ipratropium 2.5 mg-0.5 mg/3 mL nebulizer solution 3 mL Q4H PRN    fentaNYL 25 mcg/hr 1 patch Q72H    folic acid-vit B6-vit B12 2.5-25-2 mg tablet 1 tablet Daily    gabapentin capsule 300 mg QHS    hydrOXYzine HCL tablet 25 mg TID PRN    metroNIDAZOLE tablet 2,000 mg Once    multivitamin tablet Daily    ondansetron injection 4 mg Q8H PRN    oxyCODONE immediate release tablet 10 mg Q4H PRN    polyethylene glycol packet 17 g Daily    promethazine tablet 25 mg Q6H PRN    senna tablet 8.6 mg BID    thiamine tablet 100 mg Daily       Objective:     Vital Signs (Most Recent):  Temp: 98.3 °F (36.8 °C)  (07/12/20 1644)  Pulse: 86 (07/12/20 1644)  Resp: 18 (07/12/20 1644)  BP: (!) 141/81 (07/12/20 1644)  SpO2: 99 % (07/12/20 1644)  O2 Device (Oxygen Therapy): room air (07/12/20 1104) Vital Signs (24h Range):  Temp:  [97.7 °F (36.5 °C)-98.9 °F (37.2 °C)] 98.3 °F (36.8 °C)  Pulse:  [80-92] 86  Resp:  [16-18] 18  SpO2:  [97 %-100 %] 99 %  BP: (121-156)/(76-86) 141/81     Weight: 124.3 kg (274 lb) (07/06/20 1700)  Body mass index is 38.22 kg/m².  Body surface area is 2.5 meters squared.    I/O last 3 completed shifts:  In: 3191.3 [P.O.:1072; I.V.:2119.3]  Out: 3300 [Urine:3300]    Physical Exam  Vitals signs and nursing note reviewed.   Constitutional:       Appearance: Normal appearance.   HENT:      Head: Normocephalic and atraumatic.   Cardiovascular:      Rate and Rhythm: Regular rhythm.      Pulses: Normal pulses.      Heart sounds: Normal heart sounds.   Pulmonary:      Effort: Pulmonary effort is normal.      Breath sounds: Normal breath sounds. No rales.   Abdominal:      Palpations: Abdomen is soft.   Musculoskeletal:      Right lower leg: No edema.      Left lower leg: No edema.   Skin:     General: Skin is warm and dry.   Neurological:      Mental Status: She is alert.   Psychiatric:         Mood and Affect: Mood normal.         Behavior: Behavior normal.         Significant Labs: reviewed  BMP  Lab Results   Component Value Date     07/12/2020    K 5.5 (H) 07/12/2020     07/12/2020    CO2 16 (L) 07/12/2020    BUN 51 (H) 07/12/2020    CREATININE 9.3 (H) 07/12/2020    CALCIUM 7.9 (L) 07/12/2020    ANIONGAP 11 07/12/2020    ESTGFRAFRICA 5 (A) 07/12/2020    EGFRNONAA 5 (A) 07/12/2020     Lab Results   Component Value Date    WBC 3.21 (L) 07/12/2020    HGB 9.0 (L) 07/12/2020    HCT 29.6 (L) 07/12/2020    MCV 97 07/12/2020     07/12/2020     U/a: no protein, 3+ blood, >100 RBC's , + Trichomonas, no casts reported  Urine protein/Cr ratio 390 mg  Urine Na 71, urine Cr 44, FENa = 10%    Significant  Imaging: reviewed    Assessment/Plan:     43 y/o female with ADAMARIS:     ADAMARIS (acute kidney injury)  ADAMARIS occurred post admit.  Still no sign of renal recovery, but it seems the rise in s Cr may be plateauing  Has intrinsic renal damage (FENa >>1%)  Proteinuria and microscopic hematuria  ADAMARIS likely due to Vancomycin use and patient recrived 4.2 g of Vancomycin in 15 hour period. Supratherapeutic vancomycin level  Vancomycin has been discontinued.   Also, pt reported NSAIDs use 3 prior to admit     Remains non-oliguric  K normal to borderline elevated  Metabolic acidosis  O2 sat good  May need acute dialysis, discussed with pt again today    Will give lasix 80 mg po (ordered IV this am, but has poor IV access) x 1 given has good UOP  Will repeat labs    Noted Trichomonas in u/a  Will give flagyl 2 g po x 1        Plans and recommendations:  As discussed above  Will follow closely    Robel Montero MD  Nephrology  [facility]

## 2020-07-12 NOTE — SUBJECTIVE & OBJECTIVE
Interval History:     Review of Systems   Constitutional: Negative for activity change, appetite change, chills, diaphoresis, fatigue, fever and unexpected weight change.   HENT: Positive for dental problem. Negative for congestion, drooling, facial swelling, rhinorrhea, sinus pressure, sneezing, sore throat, trouble swallowing and voice change.    Eyes: Negative for photophobia, discharge, redness, itching and visual disturbance.   Respiratory: Negative for apnea, cough, choking, chest tightness, shortness of breath, wheezing and stridor.    Cardiovascular: Negative for chest pain, palpitations and leg swelling.   Gastrointestinal: Negative for abdominal distention, abdominal pain, anal bleeding, blood in stool, constipation, diarrhea, nausea and vomiting.   Endocrine: Negative for cold intolerance, heat intolerance, polydipsia, polyphagia and polyuria.   Genitourinary: Negative for decreased urine volume, difficulty urinating, dysuria, flank pain, frequency, hematuria, pelvic pain, urgency, vaginal bleeding and vaginal discharge.   Musculoskeletal: Positive for back pain (chronic). Negative for arthralgias, gait problem, joint swelling, myalgias, neck pain and neck stiffness.   Skin: Negative for color change, pallor, rash and wound.   Neurological: Positive for weakness (generalized). Negative for dizziness, seizures, syncope, facial asymmetry, speech difficulty, light-headedness, numbness and headaches.   Psychiatric/Behavioral: Negative for agitation, confusion, hallucinations and suicidal ideas.   All other systems reviewed and are negative.    Objective:     Vital Signs (Most Recent):  Temp: 97.7 °F (36.5 °C) (07/12/20 1159)  Pulse: 88 (07/12/20 1159)  Resp: 18 (07/12/20 1159)  BP: 138/76 (07/12/20 1159)  SpO2: 99 % (07/12/20 1159) Vital Signs (24h Range):  Temp:  [97.7 °F (36.5 °C)-98.9 °F (37.2 °C)] 97.7 °F (36.5 °C)  Pulse:  [80-92] 88  Resp:  [16-18] 18  SpO2:  [97 %-100 %] 99 %  BP: (121-156)/(76-86)  138/76     Weight: 124.3 kg (274 lb)  Body mass index is 38.22 kg/m².    Intake/Output Summary (Last 24 hours) at 7/12/2020 1212  Last data filed at 7/12/2020 0900  Gross per 24 hour   Intake 1672.67 ml   Output 3100 ml   Net -1427.33 ml      Physical Exam  Vitals signs and nursing note reviewed.   Constitutional:       General: She is not in acute distress.     Appearance: Normal appearance. She is not ill-appearing.   HENT:      Head: Normocephalic and atraumatic.      Nose: Nose normal.      Mouth/Throat:      Mouth: Mucous membranes are moist.   Eyes:      Conjunctiva/sclera: Conjunctivae normal.      Pupils: Pupils are equal, round, and reactive to light.   Neck:      Musculoskeletal: Normal range of motion and neck supple. Muscular tenderness present. No neck rigidity.   Cardiovascular:      Rate and Rhythm: Regular rhythm.      Pulses: Normal pulses.      Heart sounds: Normal heart sounds.   Pulmonary:      Effort: Pulmonary effort is normal.      Breath sounds: Normal breath sounds. No rales.   Abdominal:      Palpations: Abdomen is soft.   Musculoskeletal: Normal range of motion.      Right lower leg: No edema.      Left lower leg: No edema.   Skin:     General: Skin is warm and dry.   Neurological:      General: No focal deficit present.      Mental Status: She is alert. Mental status is at baseline. She is disoriented.      Cranial Nerves: No cranial nerve deficit.      Sensory: Sensory deficit present.   Psychiatric:         Mood and Affect: Mood normal.         Behavior: Behavior normal.         Significant Labs: All pertinent labs within the past 24 hours have been reviewed.    Significant Imaging: I have reviewed all pertinent imaging results/findings within the past 24 hours.

## 2020-07-12 NOTE — ASSESSMENT & PLAN NOTE
Consult hematology/oncology  Maintain neutropenic precautions  UA with reflex culture  NGTD  Cultures NGTD

## 2020-07-12 NOTE — SUBJECTIVE & OBJECTIVE
Interval History: Pt was seen and examined. No new c/o's, still no SOB, making good UOP, no sx's of dysuria. No leg swelling.    Review of patient's allergies indicates:   Allergen Reactions    Tramadol Hives     Current Facility-Administered Medications   Medication Frequency    0.9%  NaCl infusion Continuous    acetaminophen tablet 650 mg Q6H PRN    albuterol-ipratropium 2.5 mg-0.5 mg/3 mL nebulizer solution 3 mL Q4H PRN    fentaNYL 25 mcg/hr 1 patch Q72H    folic acid-vit B6-vit B12 2.5-25-2 mg tablet 1 tablet Daily    gabapentin capsule 300 mg QHS    hydrOXYzine HCL tablet 25 mg TID PRN    metroNIDAZOLE tablet 2,000 mg Once    multivitamin tablet Daily    ondansetron injection 4 mg Q8H PRN    oxyCODONE immediate release tablet 10 mg Q4H PRN    polyethylene glycol packet 17 g Daily    promethazine tablet 25 mg Q6H PRN    senna tablet 8.6 mg BID    thiamine tablet 100 mg Daily       Objective:     Vital Signs (Most Recent):  Temp: 98.3 °F (36.8 °C) (07/12/20 1644)  Pulse: 86 (07/12/20 1644)  Resp: 18 (07/12/20 1644)  BP: (!) 141/81 (07/12/20 1644)  SpO2: 99 % (07/12/20 1644)  O2 Device (Oxygen Therapy): room air (07/12/20 1104) Vital Signs (24h Range):  Temp:  [97.7 °F (36.5 °C)-98.9 °F (37.2 °C)] 98.3 °F (36.8 °C)  Pulse:  [80-92] 86  Resp:  [16-18] 18  SpO2:  [97 %-100 %] 99 %  BP: (121-156)/(76-86) 141/81     Weight: 124.3 kg (274 lb) (07/06/20 1700)  Body mass index is 38.22 kg/m².  Body surface area is 2.5 meters squared.    I/O last 3 completed shifts:  In: 3191.3 [P.O.:1072; I.V.:2119.3]  Out: 3300 [Urine:3300]    Physical Exam  Vitals signs and nursing note reviewed.   Constitutional:       Appearance: Normal appearance.   HENT:      Head: Normocephalic and atraumatic.   Cardiovascular:      Rate and Rhythm: Regular rhythm.      Pulses: Normal pulses.      Heart sounds: Normal heart sounds.   Pulmonary:      Effort: Pulmonary effort is normal.      Breath sounds: Normal breath sounds. No  rales.   Abdominal:      Palpations: Abdomen is soft.   Musculoskeletal:      Right lower leg: No edema.      Left lower leg: No edema.   Skin:     General: Skin is warm and dry.   Neurological:      Mental Status: She is alert.   Psychiatric:         Mood and Affect: Mood normal.         Behavior: Behavior normal.         Significant Labs: reviewed  BMP  Lab Results   Component Value Date     07/12/2020    K 5.5 (H) 07/12/2020     07/12/2020    CO2 16 (L) 07/12/2020    BUN 51 (H) 07/12/2020    CREATININE 9.3 (H) 07/12/2020    CALCIUM 7.9 (L) 07/12/2020    ANIONGAP 11 07/12/2020    ESTGFRAFRICA 5 (A) 07/12/2020    EGFRNONAA 5 (A) 07/12/2020     Lab Results   Component Value Date    WBC 3.21 (L) 07/12/2020    HGB 9.0 (L) 07/12/2020    HCT 29.6 (L) 07/12/2020    MCV 97 07/12/2020     07/12/2020     U/a: no protein, 3+ blood, >100 RBC's , + Trichomonas, no casts reported  Urine protein/Cr ratio 390 mg  Urine Na 71, urine Cr 44, FENa = 10%    Significant Imaging: reviewed

## 2020-07-12 NOTE — PROGRESS NOTES
"[facility] Hospital Medicine  Progress Note    Patient Name: Jaswant Yang  MRN: [id number]  Patient Class: IP- Inpatient   Admission Date: 7/6/2020  Length of Stay: 6 days  Attending Physician: Rohit Rodriguez, *  Primary Care Provider: Primary Doctor No        Subjective:     Principal Problem:Dental infection        HPI:  45 y/o AA F with hx of hairy cell leukemia (diagnosed 05/2020), anemia, chronic pain to ED from Dr. Whittaker' office after being found to have a low grade temperature (99.6) with a visible dental infection in the R lower jaw and L upper jaw, in the setting of chemotherapy induced neutropenia (WBC 0.89); mild hypokalemia (3.4) also noted in ED, but labs otherwise largely unremarkable - admission for IV abx; BC x2 pending. Pt reports associated severe stabbing pain in the affected jaw areas, and mild facial edema, worsening over the past 2 days but states no drainage/bleeding. Pt's last chemo cycle ended on 06/26/20 - reports pain improved with IV morphine in ED, states she feels "a little better now" - palliative care consulted per ED physician at patient's request, to assist with symptom management and pain control. Denies CP, edema, palpitations, SOB, wheezing, orthopnea, PND, abdominal pain, N/V/D, dysuria, flank pain, HA, dizziness, fever, cough, chills, falls/trauma, blurred vision, focal deficits. No recent dietary changes, travel, sick contacts or viral illness - pt is typically active and independent with ADLs and ambulation at home. Pt is full code. Surrogate decision makers are son (Dmitri Yang) and Mother (Elsa Yang). Admitted for chemotherapy induced neutropenic fever with a jaw/dental infection.     Overview/Hospital Course:  7/7/20 The patient reports pain is not well controlled. Palliative care was consulted. The patient remained afebrile overnight. CT maxiollofacial CT was negative for a dental abscess but showed multiple dental caries. The patient " "remains neutropenic, Hem/onc following. Continue current management with IV ABX. 7/8/20 The patient remained afebrile overnight. WBCs improved to 1.41K, Hem/onc following. The patients renal function worsened overnight, Cr. Increased from 2 to 4.9 overnight. This is likely 2/2 vanc. IV fluids started and Nephrology consulted. Will monitor renal function. 7/9/20 No acute issues overnight. The patient continues to have good UOP. Creatinine increased to 6.8. Nephrology following, will continue to monitor renal function. ANC is improving, Hem/onc following. 7/10/20 The patient reports "feeling better today". WBCs continue to recover, Hem/onc following. Creatinine continues to worsen, Cr 8.1 today. The patient reports good UOP, Nephrology following.   7/11 Pt was seen and examined at bedside . She denies  Any complaint for today . The kidney function cont trending up .  7/12 The kidney function cont trending up . The K is 5.5 and Co2 16 . She  Have a good Uop . The CXR is clear . There was no acute event overnight     Interval History:     Review of Systems   Constitutional: Negative for activity change, appetite change, chills, diaphoresis, fatigue, fever and unexpected weight change.   HENT: Positive for dental problem. Negative for congestion, drooling, facial swelling, rhinorrhea, sinus pressure, sneezing, sore throat, trouble swallowing and voice change.    Eyes: Negative for photophobia, discharge, redness, itching and visual disturbance.   Respiratory: Negative for apnea, cough, choking, chest tightness, shortness of breath, wheezing and stridor.    Cardiovascular: Negative for chest pain, palpitations and leg swelling.   Gastrointestinal: Negative for abdominal distention, abdominal pain, anal bleeding, blood in stool, constipation, diarrhea, nausea and vomiting.   Endocrine: Negative for cold intolerance, heat intolerance, polydipsia, polyphagia and polyuria.   Genitourinary: Negative for decreased urine volume, " difficulty urinating, dysuria, flank pain, frequency, hematuria, pelvic pain, urgency, vaginal bleeding and vaginal discharge.   Musculoskeletal: Positive for back pain (chronic). Negative for arthralgias, gait problem, joint swelling, myalgias, neck pain and neck stiffness.   Skin: Negative for color change, pallor, rash and wound.   Neurological: Positive for weakness (generalized). Negative for dizziness, seizures, syncope, facial asymmetry, speech difficulty, light-headedness, numbness and headaches.   Psychiatric/Behavioral: Negative for agitation, confusion, hallucinations and suicidal ideas.   All other systems reviewed and are negative.    Objective:     Vital Signs (Most Recent):  Temp: 97.7 °F (36.5 °C) (07/12/20 1159)  Pulse: 88 (07/12/20 1159)  Resp: 18 (07/12/20 1159)  BP: 138/76 (07/12/20 1159)  SpO2: 99 % (07/12/20 1159) Vital Signs (24h Range):  Temp:  [97.7 °F (36.5 °C)-98.9 °F (37.2 °C)] 97.7 °F (36.5 °C)  Pulse:  [80-92] 88  Resp:  [16-18] 18  SpO2:  [97 %-100 %] 99 %  BP: (121-156)/(76-86) 138/76     Weight: 124.3 kg (274 lb)  Body mass index is 38.22 kg/m².    Intake/Output Summary (Last 24 hours) at 7/12/2020 1212  Last data filed at 7/12/2020 0900  Gross per 24 hour   Intake 1672.67 ml   Output 3100 ml   Net -1427.33 ml      Physical Exam  Vitals signs and nursing note reviewed.   Constitutional:       General: She is not in acute distress.     Appearance: Normal appearance. She is not ill-appearing.   HENT:      Head: Normocephalic and atraumatic.      Nose: Nose normal.      Mouth/Throat:      Mouth: Mucous membranes are moist.   Eyes:      Conjunctiva/sclera: Conjunctivae normal.      Pupils: Pupils are equal, round, and reactive to light.   Neck:      Musculoskeletal: Normal range of motion and neck supple. Muscular tenderness present. No neck rigidity.   Cardiovascular:      Rate and Rhythm: Regular rhythm.      Pulses: Normal pulses.      Heart sounds: Normal heart sounds.   Pulmonary:  "     Effort: Pulmonary effort is normal.      Breath sounds: Normal breath sounds. No rales.   Abdominal:      Palpations: Abdomen is soft.   Musculoskeletal: Normal range of motion.      Right lower leg: No edema.      Left lower leg: No edema.   Skin:     General: Skin is warm and dry.   Neurological:      General: No focal deficit present.      Mental Status: She is alert. Mental status is at baseline. She is disoriented.      Cranial Nerves: No cranial nerve deficit.      Sensory: Sensory deficit present.   Psychiatric:         Mood and Affect: Mood normal.         Behavior: Behavior normal.         Significant Labs: All pertinent labs within the past 24 hours have been reviewed.    Significant Imaging: I have reviewed all pertinent imaging results/findings within the past 24 hours.      Assessment/Plan:      * Dental infection  Afebrile overnight, will need dental follow up after discharge.       Metabolic acidosis  Most likley 2/2 to ADAMARIS  Monitor       Hypocalcemia        ADAMARIS (acute kidney injury)  Multifactorial : most likely due to vanc  Nephrology on board   Kidney function trending up   Will do 80 mg of lasix iv x 1   If does not improved  Will need  HD       Acute neoplasm-related pain  - Continue PRN Analgesia - Palliative care consulted       Encounter for palliative care  Per palliative care       Neutropenic fever  Consult hematology/oncology  Maintain neutropenic precautions  UA with reflex culture  NGTD  Cultures NGTD      Chemotherapy-induced neutropenia  Improving   Cont monitor     Hairy cell leukemia of spleen  Consult hematology/oncology   Consult palliative care for symptom/pain management  The patient reports "feeling better today". WBCs continue to recover, Hem/onc following.       VTE Risk Mitigation (From admission, onward)         Ordered     Place sequential compression device  Until discontinued      07/06/20 7161                      Rohit Rodriguez MD  Department of Hospital " Medicine   [facility]

## 2020-07-12 NOTE — PLAN OF CARE
Fall precautions implemented. Pt remained free from falls/injuries.  Neutropenic isolations maintained. Afebrile. Frequent weight shifting encouraged. Monitoring I' & O's. Pain adequately managed. Safety precautions implemented. Chart reviewed. Will continue to monitor.

## 2020-07-12 NOTE — ASSESSMENT & PLAN NOTE
Multifactorial : most likely due to vanc  Nephrology on board   Kidney function trending up   Will do 80 mg of lasix iv x 1   If does not improved  Will need  HD

## 2020-07-13 LAB
ANION GAP SERPL CALC-SCNC: 11 MMOL/L (ref 8–16)
B-HCG UR QL: NEGATIVE
BASOPHILS # BLD AUTO: 0.01 K/UL (ref 0–0.2)
BASOPHILS NFR BLD: 0.3 % (ref 0–1.9)
BUN SERPL-MCNC: 54 MG/DL (ref 6–20)
CALCIUM SERPL-MCNC: 8.2 MG/DL (ref 8.7–10.5)
CHLORIDE SERPL-SCNC: 110 MMOL/L (ref 95–110)
CO2 SERPL-SCNC: 16 MMOL/L (ref 23–29)
CREAT SERPL-MCNC: 9.5 MG/DL (ref 0.5–1.4)
DIFFERENTIAL METHOD: ABNORMAL
EOSINOPHIL # BLD AUTO: 0.1 K/UL (ref 0–0.5)
EOSINOPHIL NFR BLD: 2.3 % (ref 0–8)
ERYTHROCYTE [DISTWIDTH] IN BLOOD BY AUTOMATED COUNT: 21.2 % (ref 11.5–14.5)
EST. GFR  (AFRICAN AMERICAN): 5 ML/MIN/1.73 M^2
EST. GFR  (NON AFRICAN AMERICAN): 5 ML/MIN/1.73 M^2
GLUCOSE SERPL-MCNC: 91 MG/DL (ref 70–110)
HCT VFR BLD AUTO: 30.3 % (ref 37–48.5)
HGB BLD-MCNC: 9.4 G/DL (ref 12–16)
HYPOCHROMIA BLD QL SMEAR: ABNORMAL
IMM GRANULOCYTES # BLD AUTO: 0.03 K/UL (ref 0–0.04)
IMM GRANULOCYTES NFR BLD AUTO: 1 % (ref 0–0.5)
LYMPHOCYTES # BLD AUTO: 1.1 K/UL (ref 1–4.8)
LYMPHOCYTES NFR BLD: 36.7 % (ref 18–48)
MCH RBC QN AUTO: 29.7 PG (ref 27–31)
MCHC RBC AUTO-ENTMCNC: 31 G/DL (ref 32–36)
MCV RBC AUTO: 96 FL (ref 82–98)
MONOCYTES # BLD AUTO: 0.2 K/UL (ref 0.3–1)
MONOCYTES NFR BLD: 7.8 % (ref 4–15)
NEUTROPHILS # BLD AUTO: 1.6 K/UL (ref 1.8–7.7)
NEUTROPHILS NFR BLD: 51.9 % (ref 38–73)
NRBC BLD-RTO: 1 /100 WBC
OVALOCYTES BLD QL SMEAR: ABNORMAL
PLATELET # BLD AUTO: 209 K/UL (ref 150–350)
PMV BLD AUTO: 10.4 FL (ref 9.2–12.9)
POIKILOCYTOSIS BLD QL SMEAR: SLIGHT
POTASSIUM SERPL-SCNC: 4.6 MMOL/L (ref 3.5–5.1)
POTASSIUM SERPL-SCNC: 5.6 MMOL/L (ref 3.5–5.1)
POTASSIUM SERPL-SCNC: 6 MMOL/L (ref 3.5–5.1)
POTASSIUM SERPL-SCNC: 6.1 MMOL/L (ref 3.5–5.1)
RBC # BLD AUTO: 3.16 M/UL (ref 4–5.4)
SODIUM SERPL-SCNC: 137 MMOL/L (ref 136–145)
STOMATOCYTES BLD QL SMEAR: PRESENT
TARGETS BLD QL SMEAR: ABNORMAL
WBC # BLD AUTO: 3.08 K/UL (ref 3.9–12.7)

## 2020-07-13 PROCEDURE — 99233 SBSQ HOSP IP/OBS HIGH 50: CPT | Mod: ,,, | Performed by: INTERNAL MEDICINE

## 2020-07-13 PROCEDURE — 25000003 PHARM REV CODE 250: Performed by: SURGERY

## 2020-07-13 PROCEDURE — 86706 HEP B SURFACE ANTIBODY: CPT | Mod: 91

## 2020-07-13 PROCEDURE — 21400001 HC TELEMETRY ROOM

## 2020-07-13 PROCEDURE — 25000003 PHARM REV CODE 250: Performed by: NURSE PRACTITIONER

## 2020-07-13 PROCEDURE — 99233 SBSQ HOSP IP/OBS HIGH 50: CPT | Mod: ,,, | Performed by: PHYSICIAN ASSISTANT

## 2020-07-13 PROCEDURE — 86706 HEP B SURFACE ANTIBODY: CPT

## 2020-07-13 PROCEDURE — C1894 INTRO/SHEATH, NON-LASER: HCPCS | Performed by: SURGERY

## 2020-07-13 PROCEDURE — C1769 GUIDE WIRE: HCPCS | Performed by: SURGERY

## 2020-07-13 PROCEDURE — 63600175 PHARM REV CODE 636 W HCPCS: Performed by: INTERNAL MEDICINE

## 2020-07-13 PROCEDURE — 11000001 HC ACUTE MED/SURG PRIVATE ROOM

## 2020-07-13 PROCEDURE — C1750 CATH, HEMODIALYSIS,LONG-TERM: HCPCS | Performed by: SURGERY

## 2020-07-13 PROCEDURE — 36565 INSERT TUNNELED CV CATH: CPT | Performed by: SURGERY

## 2020-07-13 PROCEDURE — 25000003 PHARM REV CODE 250: Performed by: INTERNAL MEDICINE

## 2020-07-13 PROCEDURE — 84132 ASSAY OF SERUM POTASSIUM: CPT | Mod: 91

## 2020-07-13 PROCEDURE — 99233 PR SUBSEQUENT HOSPITAL CARE,LEVL III: ICD-10-PCS | Mod: ,,, | Performed by: INTERNAL MEDICINE

## 2020-07-13 PROCEDURE — 87340 HEPATITIS B SURFACE AG IA: CPT

## 2020-07-13 PROCEDURE — 36415 COLL VENOUS BLD VENIPUNCTURE: CPT

## 2020-07-13 PROCEDURE — 86704 HEP B CORE ANTIBODY TOTAL: CPT

## 2020-07-13 PROCEDURE — 81025 URINE PREGNANCY TEST: CPT

## 2020-07-13 PROCEDURE — 80048 BASIC METABOLIC PNL TOTAL CA: CPT

## 2020-07-13 PROCEDURE — 80100014 HC HEMODIALYSIS 1:1

## 2020-07-13 PROCEDURE — 25000003 PHARM REV CODE 250: Performed by: EMERGENCY MEDICINE

## 2020-07-13 PROCEDURE — 85025 COMPLETE CBC W/AUTO DIFF WBC: CPT

## 2020-07-13 PROCEDURE — 25000003 PHARM REV CODE 250: Performed by: PHYSICIAN ASSISTANT

## 2020-07-13 PROCEDURE — 99233 PR SUBSEQUENT HOSPITAL CARE,LEVL III: ICD-10-PCS | Mod: ,,, | Performed by: PHYSICIAN ASSISTANT

## 2020-07-13 DEVICE — GLIDEPATH HEMODIALYSIS CATH, ST, DL 14.5 FR. 19CM
Type: IMPLANTABLE DEVICE | Site: NECK | Status: FUNCTIONAL
Brand: GLIDEPATH LONG-TERM HEMODIALYSIS CATHETER WITH PRELOADED STYLET

## 2020-07-13 RX ORDER — MANNITOL 250 MG/ML
25 INJECTION, SOLUTION INTRAVENOUS ONCE
Status: COMPLETED | OUTPATIENT
Start: 2020-07-13 | End: 2020-07-13

## 2020-07-13 RX ORDER — HEPARIN SODIUM 1000 [USP'U]/ML
3200 INJECTION, SOLUTION INTRAVENOUS; SUBCUTANEOUS
Status: DISCONTINUED | OUTPATIENT
Start: 2020-07-13 | End: 2020-07-18 | Stop reason: HOSPADM

## 2020-07-13 RX ORDER — METRONIDAZOLE 500 MG/1
2000 TABLET ORAL ONCE
Status: COMPLETED | OUTPATIENT
Start: 2020-07-13 | End: 2020-07-13

## 2020-07-13 RX ORDER — SODIUM CHLORIDE 9 MG/ML
INJECTION, SOLUTION INTRAVENOUS
Status: DISCONTINUED | OUTPATIENT
Start: 2020-07-13 | End: 2020-07-18 | Stop reason: HOSPADM

## 2020-07-13 RX ORDER — SENNOSIDES 8.6 MG/1
17.2 TABLET ORAL NIGHTLY
Status: DISCONTINUED | OUTPATIENT
Start: 2020-07-14 | End: 2020-07-18 | Stop reason: HOSPADM

## 2020-07-13 RX ORDER — LIDOCAINE HYDROCHLORIDE 20 MG/ML
INJECTION, SOLUTION EPIDURAL; INFILTRATION; INTRACAUDAL; PERINEURAL
Status: DISCONTINUED | OUTPATIENT
Start: 2020-07-13 | End: 2020-07-13 | Stop reason: HOSPADM

## 2020-07-13 RX ADMIN — HEPARIN SODIUM 3200 UNITS: 1000 INJECTION, SOLUTION INTRAVENOUS; SUBCUTANEOUS at 08:07

## 2020-07-13 RX ADMIN — SENNOSIDES 8.6 MG: 8.6 TABLET, FILM COATED ORAL at 09:07

## 2020-07-13 RX ADMIN — Medication 100 MG: at 09:07

## 2020-07-13 RX ADMIN — OXYCODONE 10 MG: 5 TABLET ORAL at 09:07

## 2020-07-13 RX ADMIN — CALCIUM GLUCONATE 1 G: 94 INJECTION, SOLUTION INTRAVENOUS at 01:07

## 2020-07-13 RX ADMIN — SODIUM POLYSTYRENE SULFONATE 45 G: 15 SUSPENSION ORAL; RECTAL at 01:07

## 2020-07-13 RX ADMIN — SODIUM CHLORIDE: 0.9 INJECTION, SOLUTION INTRAVENOUS at 08:07

## 2020-07-13 RX ADMIN — Medication 1 TABLET: at 09:07

## 2020-07-13 RX ADMIN — METRONIDAZOLE 2000 MG: 500 TABLET ORAL at 09:07

## 2020-07-13 RX ADMIN — OXYCODONE 10 MG: 5 TABLET ORAL at 05:07

## 2020-07-13 RX ADMIN — GABAPENTIN 300 MG: 300 CAPSULE ORAL at 09:07

## 2020-07-13 RX ADMIN — THERA TABS 1 TABLET: TAB at 09:07

## 2020-07-13 RX ADMIN — MANNITOL 25 G: 250 INJECTION, SOLUTION INTRAVENOUS at 05:07

## 2020-07-13 NOTE — ASSESSMENT & PLAN NOTE
Multifactorial : most likely due to vanc  Nephrology on board   Kidney function trending up  Pt will start HD today

## 2020-07-13 NOTE — ASSESSMENT & PLAN NOTE
Management per Nephrology.  Vancomycin has been DC'd.  Possible dialysis per nephrology. Creatinine increased/egfr decreased.

## 2020-07-13 NOTE — PLAN OF CARE
List of dental providers given per Ursula Tran NP request.  Requested patient choose one and CM will make an appointment at discharge.

## 2020-07-13 NOTE — NURSING
Secure chat sent to  regarding pts potassium increasing from 5.3 to 6.2 BUN and creatinine trending up as well. New orders received per ALTA Lovelace NP to administer 45g PO Kayexalate and 1g IV calcium gluconate.

## 2020-07-13 NOTE — PROGRESS NOTES
Progress Note   Palliative Medicine        SUBJECTIVE:     History of Present Illness:  Patient seen and examined without family present. She reports her pain is well controlled with an average of 2 doses/ day. She has been waiting to take a dose until morning and evening but knows she has them available throughout the day should her pain warrant a dose. She is concerned about her rising Cr but is encouraged by the fact that she continues to make urine. She had a BM last night after taking kayexalate for hyperkalemia and thinks she will go again today. She seems down today and is hoping her Cr improves without HD. No opioid induced side effects including somnolence, jerks, hallucinations, or constipation.    In the last 24hrs she has received the following pain medications (7a-7a):  - Fentanyl 25mcg/ hr patch  - Oxycodone 10mg x 1      Past Medical History:   Diagnosis Date    Anemia     Anemia     Back pain     Dental infection     GERD (gastroesophageal reflux disease) 2020    Hairy-cell leukemia of spleen      Past Surgical History:   Procedure Laterality Date     SECTION, CLASSIC      FLUOROSCOPY N/A 2020    Procedure: Spleenic Bleed;  Surgeon: Hiren Cabrera MD;  Location: [facility] CATH LAB;  Service: General;  Laterality: N/A;    INJECTION OF ANESTHETIC AGENT INTO TISSUE PLANE DEFINED BY TRANSVERSUS ABDOMINIS MUSCLE N/A 2020    Procedure: BLOCK, TRANSVERSUS ABDOMINIS PLANE;  Surgeon: Yumi Ferguson MD;  Location: [facility] OR;  Service: General;  Laterality: N/A;    SPLENECTOMY N/A 2020    Procedure: SPLENECTOMY;  Surgeon: Yumi Ferguson MD;  Location: [facility] OR;  Service: General;  Laterality: N/A;    THORACENTESIS Left 2020    Procedure: THORACENTESIS;  Surgeon: Yumi Ferguson MD;  Location: [facility] OR;  Service: General;  Laterality: Left;    TUBAL LIGATION       History reviewed. No pertinent family history.  Review of patient's allergies indicates:    Allergen Reactions    Tramadol Hives     Current Facility-Administered Medications   Medication    0.9%  NaCl infusion    acetaminophen tablet 650 mg    albuterol-ipratropium 2.5 mg-0.5 mg/3 mL nebulizer solution 3 mL    fentaNYL 25 mcg/hr 1 patch    folic acid-vit B6-vit B12 2.5-25-2 mg tablet 1 tablet    gabapentin capsule 300 mg    hydrOXYzine HCL tablet 25 mg    mannitol 25% injection 25 g    multivitamin tablet    ondansetron injection 4 mg    oxyCODONE immediate release tablet 10 mg    polyethylene glycol packet 17 g    promethazine tablet 25 mg    senna tablet 8.6 mg    thiamine tablet 100 mg       Review of Symptoms    Symptom Assessment (ESAS 0-10 Scale)  Pain:  4  Dyspnea:  0  Anxiety:  0  Nausea:  0  Depression:  0  Anorexia:  1  Fatigue:  0  Insomnia:  0  Restlessness:  0  Agitation:  0     CAM / Delirium:  Negative  Constipation:  Negative  Diarrhea:  Negative    Bowel Management Plan (BMP):  Yes      Pain Assessment:  Location(s): torso    Torso       Torso Location:  Posterior and bilateral       Quality:  Aching       Quantity:  4/10 in intensity    OME in 24 hours:  60    ECOG Performance Status Grade:  0 - Fully Active    Advanced Directives:  Living Will: No    LaPOST: No    Do Not Resuscitate Status: No    Medical Power of : Yes     Agent's Name:  Elsa Yang.  Agent's Contact Number:  [phone]    Decision Making:  Patient answered questions      ROS:  Review of Systems   Constitutional: Positive for appetite change and fever. Negative for activity change.   HENT: Positive for dental problem. Negative for facial swelling, mouth sores, sore throat and trouble swallowing.    Eyes: Negative for photophobia and visual disturbance.   Respiratory: Negative for cough and shortness of breath.    Cardiovascular: Negative for chest pain and leg swelling.   Gastrointestinal: Positive for constipation. Negative for abdominal pain, diarrhea and nausea.   Endocrine: Negative  for polydipsia and polyphagia.   Genitourinary: Negative for difficulty urinating and dysuria.   Musculoskeletal: Positive for arthralgias, myalgias and neck pain. Negative for back pain and neck stiffness.   Skin: Negative for rash and wound.   Allergic/Immunologic: Positive for immunocompromised state.   Neurological: Negative for syncope, weakness and light-headedness.   Psychiatric/Behavioral: Positive for sleep disturbance (d/t pain). Negative for confusion. The patient is not nervous/anxious.      OBJECTIVE:     Physical Exam:  Vitals: Temp: 98.3 °F (36.8 °C) (07/10/20 0738)  Pulse: 84 (07/10/20 0738)  Resp: 16 (07/10/20 0845)  BP: 124/76 (07/10/20 0738)  SpO2: 99 % (07/10/20 0738)    Gen: well-developed, well-nourished, NAD  Head: atraumatic, normocephalic  Eyes: conjuctiva and sclera clear  Ears: hearing grossly intact with no external abnormality  Mouth: good dentition, MM moist  Respiratory: CTAB, no wheezing or rhonchi  Heart: RRR  Abdomen: soft, NTTP, nondistended, normoactive BS  Pulses: 2+ in DP  Extremities: no edema, full ROM of all joints  Neurologic: no focal deficits, CN II-XII grossly intact, normal coordination  Skin: no rashes or lesions  Psych: cooperative, normal mood and affect, normal attention span and concentration     Labs:  Lab Results   Component Value Date    WBC 3.08 (L) 07/13/2020    HGB 9.4 (L) 07/13/2020    HCT 30.3 (L) 07/13/2020    MCV 96 07/13/2020     07/13/2020     BMP  Lab Results   Component Value Date     07/13/2020    K 6.1 (H) 07/13/2020    K 6.0 (H) 07/13/2020     07/13/2020    CO2 16 (L) 07/13/2020    BUN 54 (H) 07/13/2020    CREATININE 9.5 (H) 07/13/2020    CALCIUM 8.2 (L) 07/13/2020    ANIONGAP 11 07/13/2020    ESTGFRAFRICA 5 (A) 07/13/2020    EGFRNONAA 5 (A) 07/13/2020     Albumin   Date Value Ref Range Status   07/06/2020 4.3 3.5 - 5.2 g/dL Final       Radiology:I have reviewed all pertinent imaging results/findings within the past 24 hours.  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 Differential is wide for this appearance, and includes cocaine abuse, rhinitis medicamentosa (chronic Afrin-type medication abuse), complication related to antiangiogenesis chemotherapy, sinonasal granulomatosis with polyangiitis, sinonasal sarcoid, or less likely but possible invasive fungal sinusitis.     Multiple dental caries are noted.     Exam limited by lack of IV contrast.  No appreciable oropharyngeal or dental abscess. Recommend further evaluation with direct visualization.     ASSESSMENT   Jaswant Yang is a 44 y.o. year old who was recently diagnosed with hairy cell leukemia currently on chemotherapy who was sent to the emergency department by Dr. Whittaker due to fever. She was complaining of jaw pain and edema and was admitted for antibiotics and monitoring. Ms. Yang has an outpatient referral for Palliative Medicine for pain and symptom management but her appointment was at the end of the month so we were asked to see while admitted.      PLAN   1. Neoplasm related pain  - Due to renal dysfunction will avoid morphine  - Continue Fentanyl 25 mcg/ hr patch q72hr- script sent on 7/10/20 and already picked up  - Continue oxycodone 10mg PO q4hr PRN pain- script sent on 7/10/20 and already picked up  - Continue gabapentin 300mg HS for neuropathic pain- will titrate once renal function allows  - The risks, side effects, benefits of chronic opioid therapy have been discussed and the opportunity for questions provided.  - A record of the controlled substances prescribed and dispensed to the patient in [place] was reviewed in  by me (or my delegate). Last received hydrocodone 10mg #40 tabs and 5mg #11 tabs on 6/22/20, hydrocodone 10mg #20 on 6/16 and on 6/11.    2. Opioid induced constipation  - Continue daily Miralax and Senna 2 tabs HS     3. Hairy cell leukemia  -  Defer to oncology  - Currently receiving chemotherapy     4. Encounter for Palliative Care  - Code status: FULL  - HCPOA: mom Elsa Yang, alternate: son Dmitri Yang  - PM SW assisted with ACP and scanned in to the computer  - Primary goal is to continue cancer targeted therapy  - We will manage pain medication going forth and she already has f/u on 7/21 scheduled     5. ADAMARIS  - Opioid selection driven by elevated Cr  - Renally dosed gabapentin and will titrate once renal function improves  - Nephrology is following and monitoring for the need of dialysis  - Continue to monitor    Thank you for allowing Palliative Medicine to be involved in the care of Jaswant Yang.      Medical decision making: HIGH based on high risk of death, untreated symptoms, high risk medications, management of more than one chronic illness in exacerbation, managing side effects of medications or polypharmacy      Plan required increased review of medication choice, interaction, dosing, frequency, and route due to patient complexity. Patient complexity increased by: Presence of renal insufficiency    > 50% of 38 min visit spent in chart review, face to face discussion symptom assessment, coordination of care and emotional support, exploring burden/ benefit of various approaches of treatment.       Tatyana Gupta PA-C  Palliative Medicine

## 2020-07-13 NOTE — CONSULTS
[facility]   Vascular Surgery  Consult Note    Consults  Subjective:     Chief Complaint/Reason for Admission: Neutropenic fever now with ARF    History of Present Illness: 44 y.o female with hairy cell lymphoma currently being treated with CLADRIBINE sent from Dr. Whittaker office on yesterday for fever and presumed right sided tooth infection. Associated symptoms include facial swelling, jaw pain, fever. Patient asked to report to ED for IV antibiotics for neutropenic fever in setting of tooth infection. Patient was started on Vancomycin but had allergic reaction and was placed on Zoysn.     Now with acute renal failure likely secondary to Abx.  Vascular surgery consulted for vas cath placement           Medications Prior to Admission   Medication Sig Dispense Refill Last Dose    albuterol (PROVENTIL/VENTOLIN HFA) 90 mcg/actuation inhaler Inhale 1-2 puffs into the lungs every 6 (six) hours as needed for Wheezing. Rescue 1 Inhaler 0 Past Week at 0200    HYDROcodone-acetaminophen (NORCO)  mg per tablet Take 10 mg by mouth 4 (four) times daily.   7/6/2020 at 0200    ondansetron (ZOFRAN-ODT) 8 MG TbDL Take 1 tablet (8 mg total) by mouth every 12 (twelve) hours as needed. 30 tablet 1 Past Week at 0200    polyethylene glycol (GLYCOLAX) 17 gram/dose powder Take 17 g by mouth once daily. 510 g 3 Past Week at 0200    prochlorperazine (COMPAZINE) 10 MG tablet Take 1 tablet (10 mg total) by mouth every 6 (six) hours as needed. 30 tablet 1 Past Week at 0200    promethazine (PHENERGAN) 25 MG tablet Take 1 tablet (25 mg total) by mouth every 6 (six) hours as needed for Nausea. 15 tablet 0 Past Week at 0200       Review of patient's allergies indicates:   Allergen Reactions    Tramadol Hives       Past Medical History:   Diagnosis Date    Anemia     Anemia     Back pain     Dental infection     GERD (gastroesophageal reflux disease) 5/14/2020    Hairy-cell leukemia of spleen      Past Surgical  History:   Procedure Laterality Date     SECTION, CLASSIC      FLUOROSCOPY N/A 2020    Procedure: Spleenic Bleed;  Surgeon: Hiren Cabrera MD;  Location: [facility] CATH LAB;  Service: General;  Laterality: N/A;    INJECTION OF ANESTHETIC AGENT INTO TISSUE PLANE DEFINED BY TRANSVERSUS ABDOMINIS MUSCLE N/A 2020    Procedure: BLOCK, TRANSVERSUS ABDOMINIS PLANE;  Surgeon: Yumi Ferguson MD;  Location: [facility] OR;  Service: General;  Laterality: N/A;    SPLENECTOMY N/A 2020    Procedure: SPLENECTOMY;  Surgeon: Yumi Ferguson MD;  Location: [facility] OR;  Service: General;  Laterality: N/A;    THORACENTESIS Left 2020    Procedure: THORACENTESIS;  Surgeon: Yumi Ferguson MD;  Location: [facility] OR;  Service: General;  Laterality: Left;    TUBAL LIGATION       Family History     None        Tobacco Use    Smoking status: Former Smoker     Packs/day: 0.25     Types: Cigarettes    Smokeless tobacco: Never Used    Tobacco comment: no longer smoker   Substance and Sexual Activity    Alcohol use: Yes     Comment: occasional    Drug use: No    Sexual activity: Yes     Birth control/protection: Condom     Review of Systems   Negative except for above    Objective:     Vital Signs (Most Recent):  Temp: 98.1 °F (36.7 °C) (20 1201)  Pulse: 81 (20 1201)  Resp: 18 (20 1201)  BP: 129/69 (20 1201)  SpO2: 99 % (20 1201) Vital Signs (24h Range):  Temp:  [97.7 °F (36.5 °C)-98.8 °F (37.1 °C)] 98.1 °F (36.7 °C)  Pulse:  [81-89] 81  Resp:  [16-18] 18  SpO2:  [96 %-99 %] 99 %  BP: (129-152)/(69-83) 129/69     Weight: 124.3 kg (274 lb)  Body mass index is 38.22 kg/m².        Physical Exam  A&Ox3, NAD  RRR  CTA bilaterally  Abd: S/NT/ND    Significant Labs:  BMP:   Recent Labs   Lab 20  0757 20  1255   GLU 91  --      --    K 6.1*  6.0* 4.6     --    CO2 16*  --    BUN 54*  --    CREATININE 9.5*  --    CALCIUM 8.2*  --      CBC:   Recent Labs   Lab  07/13/20  0757   WBC 3.08*   RBC 3.16*   HGB 9.4*   HCT 30.3*      MCV 96   MCH 29.7   MCHC 31.0*     Coagulation: No results for input(s): LABPROT, INR, APTT in the last 48 hours.    Significant Diagnostics:  I have reviewed all pertinent imaging results/findings within the past 24 hours.    Assessment/Plan:     Active Diagnoses:    Diagnosis Date Noted POA    PRINCIPAL PROBLEM:  Dental infection [K04.7] 07/06/2020 Yes    Trichomonas infection [A59.9]  Yes    Metabolic acidosis [E87.2]  Yes    ADAMARIS (acute kidney injury) [N17.9] 07/08/2020 No    Hypocalcemia [E83.51] 07/08/2020 No    Encounter for palliative care [Z51.5] 07/07/2020 Not Applicable    Acute neoplasm-related pain [G89.3] 07/07/2020 Yes    Therapeutic opioid-induced constipation (OIC) [K59.03, T40.2X5A] 07/07/2020 Yes    Chemotherapy-induced neutropenia [D70.1, T45.1X5A] 07/06/2020 Yes    Neutropenic fever [D70.9, R50.81] 07/06/2020 Yes    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] 05/28/2020 Yes      Problems Resolved During this Admission:    Diagnosis Date Noted Date Resolved POA    Hypokalemia [E87.6] 07/06/2020 07/12/2020 Yes     Acute renal failure  Will proceed with tunneled vas cath placement for HD    Thank you for your consult. I will sign off. Please contact us if you have any additional questions.    Tarik Tobias IV, MD  Vascular Surgery  [facility]

## 2020-07-13 NOTE — PLAN OF CARE
Pt remain free falls and injuries this shift, pt is resting comfortably with call light within reach, bed locked and in lowest position VSS, pt's pain controlled with PRN meds, will continue to monitor

## 2020-07-13 NOTE — PROGRESS NOTES
"[facility] Hospital Medicine  Progress Note    Patient Name: Jaswant Yang  MRN: [id number]  Patient Class: IP- Inpatient   Admission Date: 7/6/2020  Length of Stay: 7 days  Attending Physician: Rohit Rodriguez, *  Primary Care Provider: Primary Doctor No        Subjective:     Principal Problem:Hairy cell leukemia of spleen        HPI:  45 y/o AA F with hx of hairy cell leukemia (diagnosed 05/2020), anemia, chronic pain to ED from Dr. Whittaker' office after being found to have a low grade temperature (99.6) with a visible dental infection in the R lower jaw and L upper jaw, in the setting of chemotherapy induced neutropenia (WBC 0.89); mild hypokalemia (3.4) also noted in ED, but labs otherwise largely unremarkable - admission for IV abx; BC x2 pending. Pt reports associated severe stabbing pain in the affected jaw areas, and mild facial edema, worsening over the past 2 days but states no drainage/bleeding. Pt's last chemo cycle ended on 06/26/20 - reports pain improved with IV morphine in ED, states she feels "a little better now" - palliative care consulted per ED physician at patient's request, to assist with symptom management and pain control. Denies CP, edema, palpitations, SOB, wheezing, orthopnea, PND, abdominal pain, N/V/D, dysuria, flank pain, HA, dizziness, fever, cough, chills, falls/trauma, blurred vision, focal deficits. No recent dietary changes, travel, sick contacts or viral illness - pt is typically active and independent with ADLs and ambulation at home. Pt is full code. Surrogate decision makers are son (Dmitri Yang) and Mother (Elsa Yang). Admitted for chemotherapy induced neutropenic fever with a jaw/dental infection.     Overview/Hospital Course:  7/7/20 The patient reports pain is not well controlled. Palliative care was consulted. The patient remained afebrile overnight. CT maxiollofacial CT was negative for a dental abscess but showed multiple dental " "caries. The patient remains neutropenic, Hem/onc following. Continue current management with IV ABX. 7/8/20 The patient remained afebrile overnight. WBCs improved to 1.41K, Hem/onc following. The patients renal function worsened overnight, Cr. Increased from 2 to 4.9 overnight. This is likely 2/2 vanc. IV fluids started and Nephrology consulted. Will monitor renal function. 7/9/20 No acute issues overnight. The patient continues to have good UOP. Creatinine increased to 6.8. Nephrology following, will continue to monitor renal function. ANC is improving, Hem/onc following. 7/10/20 The patient reports "feeling better today". WBCs continue to recover, Hem/onc following. Creatinine continues to worsen, Cr 8.1 today. The patient reports good UOP, Nephrology following.   7/11 Pt was seen and examined at bedside . She denies  Any complaint for today . The kidney function cont trending up .  7/12 The kidney function cont trending up . The K is 5.5 and Co2 16 . She  Have a good Uop . The CXR is clear . There was no acute event overnight   7/13 The kidney function is trending up . The case was d/c nephrology  . Pt will start HD  Today .     Interval History:     Review of Systems   Constitutional: Negative for activity change, appetite change, chills, diaphoresis, fatigue, fever and unexpected weight change.   HENT: Positive for dental problem. Negative for congestion, drooling, facial swelling, rhinorrhea, sinus pressure, sneezing, sore throat, trouble swallowing and voice change.    Eyes: Negative for photophobia, discharge, redness, itching and visual disturbance.   Respiratory: Negative for apnea, cough, choking, chest tightness, shortness of breath, wheezing and stridor.    Cardiovascular: Negative for chest pain, palpitations and leg swelling.   Gastrointestinal: Negative for abdominal distention, abdominal pain, anal bleeding, blood in stool, constipation, diarrhea, nausea and vomiting.   Endocrine: Negative for cold " intolerance, heat intolerance, polydipsia, polyphagia and polyuria.   Genitourinary: Negative for decreased urine volume, difficulty urinating, dysuria, flank pain, frequency, hematuria, pelvic pain, urgency, vaginal bleeding and vaginal discharge.   Musculoskeletal: Positive for back pain (chronic). Negative for arthralgias, gait problem, joint swelling, myalgias, neck pain and neck stiffness.   Skin: Negative for color change, pallor, rash and wound.   Neurological: Positive for weakness (generalized). Negative for dizziness, seizures, syncope, facial asymmetry, speech difficulty, light-headedness, numbness and headaches.   Psychiatric/Behavioral: Negative for agitation, confusion, hallucinations and suicidal ideas.   All other systems reviewed and are negative.    Objective:     Vital Signs (Most Recent):  Temp: 97.6 °F (36.4 °C) (07/13/20 1604)  Pulse: 86 (07/13/20 1604)  Resp: 18 (07/13/20 1715)  BP: (!) 155/87 (07/13/20 1604)  SpO2: 95 % (07/13/20 1604) Vital Signs (24h Range):  Temp:  [97.6 °F (36.4 °C)-98.8 °F (37.1 °C)] 97.6 °F (36.4 °C)  Pulse:  [81-89] 86  Resp:  [16-18] 18  SpO2:  [95 %-99 %] 95 %  BP: (129-155)/(69-87) 155/87     Weight: 124.3 kg (274 lb)  Body mass index is 38.22 kg/m².    Intake/Output Summary (Last 24 hours) at 7/13/2020 1730  Last data filed at 7/13/2020 1700  Gross per 24 hour   Intake --   Output 2400 ml   Net -2400 ml      Physical Exam  Vitals signs and nursing note reviewed.   Constitutional:       General: She is not in acute distress.     Appearance: Normal appearance. She is not ill-appearing.   HENT:      Head: Normocephalic and atraumatic.      Nose: Nose normal.      Mouth/Throat:      Mouth: Mucous membranes are moist.   Eyes:      Conjunctiva/sclera: Conjunctivae normal.      Pupils: Pupils are equal, round, and reactive to light.   Neck:      Musculoskeletal: Normal range of motion and neck supple. Muscular tenderness present. No neck rigidity.   Cardiovascular:       "Rate and Rhythm: Regular rhythm.      Pulses: Normal pulses.      Heart sounds: Normal heart sounds.   Pulmonary:      Effort: Pulmonary effort is normal.      Breath sounds: Normal breath sounds. No rales.   Abdominal:      Palpations: Abdomen is soft.   Musculoskeletal: Normal range of motion.      Right lower leg: No edema.      Left lower leg: No edema.   Skin:     General: Skin is warm and dry.   Neurological:      General: No focal deficit present.      Mental Status: She is alert. Mental status is at baseline. She is disoriented.      Cranial Nerves: No cranial nerve deficit.      Sensory: Sensory deficit present.   Psychiatric:         Mood and Affect: Mood normal.         Behavior: Behavior normal.         Significant Labs: All pertinent labs within the past 24 hours have been reviewed.    Significant Imaging: I have reviewed all pertinent imaging results/findings within the past 24 hours.      Assessment/Plan:      * Hairy cell leukemia of spleen  Consult hematology/oncology   Consult palliative care for symptom/pain management  The patient reports "feeling better today". WBCs continue to recover, Hem/onc following.     Trichomonas infection  metronidazol 2gr  po x 1       Metabolic acidosis  Most likley 2/2 to ADAMARIS  Monitor       Hypocalcemia        ADAMARIS (acute kidney injury)  Multifactorial : most likely due to [facility]  Nephrology on board   Kidney function trending up  Pt will start HD today       Acute neoplasm-related pain  - Continue PRN Analgesia - Palliative care consulted       Encounter for palliative care  Per palliative care       Dental infection  Afebrile overnight, will need dental follow up after discharge.       Neutropenic fever  Consult hematology/oncology  Maintain neutropenic precautions  UA with reflex culture  NGTD  Cultures NGTD      Chemotherapy-induced neutropenia  Improving   Cont monitor       VTE Risk Mitigation (From admission, onward)         Ordered     Place sequential " compression device  Until discontinued      07/06/20 2188                      Rohit Rodriguez MD  Department of Hospital Medicine   [facility]

## 2020-07-13 NOTE — SUBJECTIVE & OBJECTIVE
Interval History: ARF pending dialysis.  Egfr 5. Creatinine 9.5, potassium 6.5.  Eating breakfast when entered room this morning.  No acute events overnight.      Oncology Treatment Plan:   OP CLADRIBINE (5 DAY) + RITUXIMAB - Hairy Cell Leukemia    Medications:  Continuous Infusions:   sodium chloride 0.9% 100 mL/hr at 07/12/20 1729     Scheduled Meds:   fentaNYL  1 patch Transdermal Q72H    folic acid-vit B6-vit B12 2.5-25-2 mg  1 tablet Oral Daily    gabapentin  300 mg Oral QHS    mannitol 25%  25 g Intravenous Once    multivitamin  1 tablet Oral Daily    polyethylene glycol  17 g Oral Daily    senna  8.6 mg Oral BID    thiamine  100 mg Oral Daily     PRN Meds:acetaminophen, albuterol-ipratropium, hydrOXYzine HCL, ondansetron, oxyCODONE, promethazine     Review of Systems   Constitutional: Positive for activity change and fatigue. Negative for appetite change, chills, diaphoresis, fever and unexpected weight change.   HENT: Positive for dental problem. Negative for congestion, hearing loss, nosebleeds, postnasal drip, sore throat and trouble swallowing.    Eyes: Negative for pain, discharge and visual disturbance.   Respiratory: Negative for cough, chest tightness and shortness of breath.    Cardiovascular: Negative for chest pain and palpitations.   Gastrointestinal: Negative for blood in stool, constipation, diarrhea, nausea and vomiting.   Endocrine: Negative for cold intolerance and heat intolerance.   Genitourinary: Negative for difficulty urinating, dyspareunia, flank pain and hematuria.   Musculoskeletal: Positive for arthralgias and myalgias. Negative for back pain, gait problem, joint swelling and neck pain.   Skin: Negative.    Neurological: Negative for dizziness, weakness, light-headedness and headaches.   Hematological: Negative for adenopathy. Does not bruise/bleed easily.   Psychiatric/Behavioral: Positive for dysphoric mood. Negative for agitation, behavioral problems and confusion. The  patient is nervous/anxious.      Objective:     Vital Signs (Most Recent):  Temp: 98.4 °F (36.9 °C) (07/13/20 0801)  Pulse: 85 (07/13/20 0801)  Resp: 18 (07/13/20 0929)  BP: 134/70 (07/13/20 0801)  SpO2: 98 % (07/13/20 0801) Vital Signs (24h Range):  Temp:  [97.7 °F (36.5 °C)-98.8 °F (37.1 °C)] 98.4 °F (36.9 °C)  Pulse:  [83-89] 85  Resp:  [16-18] 18  SpO2:  [96 %-99 %] 98 %  BP: (134-152)/(70-83) 134/70     Weight: 124.3 kg (274 lb)  Body mass index is 38.22 kg/m².  Body surface area is 2.5 meters squared.      Intake/Output Summary (Last 24 hours) at 7/13/2020 1148  Last data filed at 7/13/2020 0144  Gross per 24 hour   Intake 580 ml   Output 2800 ml   Net -2220 ml       Physical Exam  Vitals signs and nursing note reviewed.   Constitutional:       General: She is not in acute distress.     Appearance: She is well-developed.   HENT:      Head: Normocephalic and atraumatic.      Right Ear: Hearing and external ear normal.      Left Ear: Hearing and external ear normal.      Nose: No rhinorrhea.      Right Sinus: No maxillary sinus tenderness or frontal sinus tenderness.      Left Sinus: No maxillary sinus tenderness or frontal sinus tenderness.      Mouth/Throat:      Mouth: No oral lesions.      Pharynx: Uvula midline.   Eyes:      General:         Right eye: No discharge.         Left eye: No discharge.      Conjunctiva/sclera: Conjunctivae normal.      Pupils: Pupils are equal, round, and reactive to light.   Neck:      Musculoskeletal: Normal range of motion.      Thyroid: No thyromegaly.      Vascular: No carotid bruit.      Trachea: No tracheal deviation.   Cardiovascular:      Rate and Rhythm: Normal rate and regular rhythm.      Pulses:           Dorsalis pedis pulses are 2+ on the right side and 2+ on the left side.      Heart sounds: Normal heart sounds, S1 normal and S2 normal. No murmur.   Pulmonary:      Effort: Pulmonary effort is normal. No respiratory distress.      Breath sounds: Normal breath  sounds.   Abdominal:      General: Bowel sounds are normal. There is no distension.      Palpations: Abdomen is soft. There is no mass.      Tenderness: There is no abdominal tenderness.   Musculoskeletal: Normal range of motion.      Left shoulder: She exhibits no tenderness.      Right upper leg: She exhibits no tenderness.      Left upper leg: She exhibits no tenderness.   Lymphadenopathy:      Cervical: No cervical adenopathy.      Upper Body:      Right upper body: No supraclavicular adenopathy.      Left upper body: No supraclavicular adenopathy.   Skin:     General: Skin is warm and dry.      Capillary Refill: Capillary refill takes less than 2 seconds.      Coloration: Skin is pale.      Findings: No rash.   Neurological:      Mental Status: She is alert and oriented to person, place, and time.      Sensory: No sensory deficit.      Coordination: Coordination normal.      Gait: Gait normal.   Psychiatric:         Attention and Perception: Attention normal.         Mood and Affect: Mood is anxious and depressed.         Speech: Speech normal.         Behavior: Behavior normal.         Thought Content: Thought content normal.         Cognition and Memory: Cognition normal.         Judgment: Judgment normal.         Significant Labs:   CBC:   Recent Labs   Lab 07/12/20  0840 07/13/20  0757   WBC 3.21* 3.08*   HGB 9.0* 9.4*   HCT 29.6* 30.3*    209    and CMP:   Recent Labs   Lab 07/12/20  1741 07/12/20  2118 07/13/20  0513 07/13/20  0757    138  --  137   K 5.3* 6.2* 5.6* 6.1*  6.0*   * 113*  --  110   CO2 14* 16*  --  16*   GLU 88 91  --  91   BUN 45* 50*  --  54*   CREATININE 8.3* 8.8*  --  9.5*   CALCIUM 7.5* 7.4*  --  8.2*   ANIONGAP 9 9  --  11   EGFRNONAA 5* 5*  --  5*       Diagnostic Results:  I have reviewed all pertinent imaging results/findings within the past 24 hours.

## 2020-07-13 NOTE — PROGRESS NOTES
[facility]   Hematology/Oncology  Progress Note    Patient Name: Jaswant Yang  Admission Date: 7/6/2020  Hospital Length of Stay: 7 days  Code Status: Prior     Subjective:     HPI:  44 y.o female with hairy cell lymphoma currently being treated with CLADRIBINE sent from Dr. Whittaker office on yesterday for fever and presumed right sided tooth infection. Associated symptoms include facial swelling, jaw pain, fever. Patient asked to report to ED for IV antibiotics for neutropenic fever in setting of tooth infection. Patient was started on Vancomycin but had allergic reaction and was placed on Zoysn.     Interval History: ARF pending dialysis.  Egfr 5. Creatinine 9.5, potassium 6.5.  Eating breakfast when entered room this morning.  No acute events overnight.      Oncology Treatment Plan:   OP CLADRIBINE (5 DAY) + RITUXIMAB - Hairy Cell Leukemia    Medications:  Continuous Infusions:   sodium chloride 0.9% 100 mL/hr at 07/12/20 1729     Scheduled Meds:   fentaNYL  1 patch Transdermal Q72H    folic acid-vit B6-vit B12 2.5-25-2 mg  1 tablet Oral Daily    gabapentin  300 mg Oral QHS    mannitol 25%  25 g Intravenous Once    multivitamin  1 tablet Oral Daily    polyethylene glycol  17 g Oral Daily    senna  8.6 mg Oral BID    thiamine  100 mg Oral Daily     PRN Meds:acetaminophen, albuterol-ipratropium, hydrOXYzine HCL, ondansetron, oxyCODONE, promethazine     Review of Systems   Constitutional: Positive for activity change and fatigue. Negative for appetite change, chills, diaphoresis, fever and unexpected weight change.   HENT: Positive for dental problem. Negative for congestion, hearing loss, nosebleeds, postnasal drip, sore throat and trouble swallowing.    Eyes: Negative for pain, discharge and visual disturbance.   Respiratory: Negative for cough, chest tightness and shortness of breath.    Cardiovascular: Negative for chest pain and palpitations.   Gastrointestinal: Negative for blood in  stool, constipation, diarrhea, nausea and vomiting.   Endocrine: Negative for cold intolerance and heat intolerance.   Genitourinary: Negative for difficulty urinating, dyspareunia, flank pain and hematuria.   Musculoskeletal: Positive for arthralgias and myalgias. Negative for back pain, gait problem, joint swelling and neck pain.   Skin: Negative.    Neurological: Negative for dizziness, weakness, light-headedness and headaches.   Hematological: Negative for adenopathy. Does not bruise/bleed easily.   Psychiatric/Behavioral: Positive for dysphoric mood. Negative for agitation, behavioral problems and confusion. The patient is nervous/anxious.      Objective:     Vital Signs (Most Recent):  Temp: 98.4 °F (36.9 °C) (07/13/20 0801)  Pulse: 85 (07/13/20 0801)  Resp: 18 (07/13/20 0929)  BP: 134/70 (07/13/20 0801)  SpO2: 98 % (07/13/20 0801) Vital Signs (24h Range):  Temp:  [97.7 °F (36.5 °C)-98.8 °F (37.1 °C)] 98.4 °F (36.9 °C)  Pulse:  [83-89] 85  Resp:  [16-18] 18  SpO2:  [96 %-99 %] 98 %  BP: (134-152)/(70-83) 134/70     Weight: 124.3 kg (274 lb)  Body mass index is 38.22 kg/m².  Body surface area is 2.5 meters squared.      Intake/Output Summary (Last 24 hours) at 7/13/2020 1148  Last data filed at 7/13/2020 0144  Gross per 24 hour   Intake 580 ml   Output 2800 ml   Net -2220 ml       Physical Exam  Vitals signs and nursing note reviewed.   Constitutional:       General: She is not in acute distress.     Appearance: She is well-developed.   HENT:      Head: Normocephalic and atraumatic.      Right Ear: Hearing and external ear normal.      Left Ear: Hearing and external ear normal.      Nose: No rhinorrhea.      Right Sinus: No maxillary sinus tenderness or frontal sinus tenderness.      Left Sinus: No maxillary sinus tenderness or frontal sinus tenderness.      Mouth/Throat:      Mouth: No oral lesions.      Pharynx: Uvula midline.   Eyes:      General:         Right eye: No discharge.         Left eye: No  discharge.      Conjunctiva/sclera: Conjunctivae normal.      Pupils: Pupils are equal, round, and reactive to light.   Neck:      Musculoskeletal: Normal range of motion.      Thyroid: No thyromegaly.      Vascular: No carotid bruit.      Trachea: No tracheal deviation.   Cardiovascular:      Rate and Rhythm: Normal rate and regular rhythm.      Pulses:           Dorsalis pedis pulses are 2+ on the right side and 2+ on the left side.      Heart sounds: Normal heart sounds, S1 normal and S2 normal. No murmur.   Pulmonary:      Effort: Pulmonary effort is normal. No respiratory distress.      Breath sounds: Normal breath sounds.   Abdominal:      General: Bowel sounds are normal. There is no distension.      Palpations: Abdomen is soft. There is no mass.      Tenderness: There is no abdominal tenderness.   Musculoskeletal: Normal range of motion.      Left shoulder: She exhibits no tenderness.      Right upper leg: She exhibits no tenderness.      Left upper leg: She exhibits no tenderness.   Lymphadenopathy:      Cervical: No cervical adenopathy.      Upper Body:      Right upper body: No supraclavicular adenopathy.      Left upper body: No supraclavicular adenopathy.   Skin:     General: Skin is warm and dry.      Capillary Refill: Capillary refill takes less than 2 seconds.      Coloration: Skin is pale.      Findings: No rash.   Neurological:      Mental Status: She is alert and oriented to person, place, and time.      Sensory: No sensory deficit.      Coordination: Coordination normal.      Gait: Gait normal.   Psychiatric:         Attention and Perception: Attention normal.         Mood and Affect: Mood is anxious and depressed.         Speech: Speech normal.         Behavior: Behavior normal.         Thought Content: Thought content normal.         Cognition and Memory: Cognition normal.         Judgment: Judgment normal.         Significant Labs:   CBC:   Recent Labs   Lab 07/12/20  0840 07/13/20  0757   WBC  3.21* 3.08*   HGB 9.0* 9.4*   HCT 29.6* 30.3*    209    and CMP:   Recent Labs   Lab 07/12/20  1741 07/12/20  2118 07/13/20  0513 07/13/20  0757    138  --  137   K 5.3* 6.2* 5.6* 6.1*  6.0*   * 113*  --  110   CO2 14* 16*  --  16*   GLU 88 91  --  91   BUN 45* 50*  --  54*   CREATININE 8.3* 8.8*  --  9.5*   CALCIUM 7.5* 7.4*  --  8.2*   ANIONGAP 9 9  --  11   EGFRNONAA 5* 5*  --  5*       Diagnostic Results:  I have reviewed all pertinent imaging results/findings within the past 24 hours.    Assessment/Plan:     * Dental infection  No complaints of dental pain today.  Vanc stopped.  Does not have dentist.  Spoke to case management who will try to get patient set up within the [facility] system due to insurance constraints.   Patient is aware.       ADAMARIS (acute kidney injury)  Management per Nephrology.  Vancomycin has been DC'd.  Possible dialysis per nephrology. Creatinine increased/egfr decreased.         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  To re evaluate treatment regimen as outpatient. Chemotherapy is on hold pending resolution of acute issues.  F/U with dental upon discharge.    --F/U with Primary Oncologist upon discharge  --Daily CBC, CMP          Thank you for your consult. I will follow-up with patient. Please contact us if you have any additional questions.     Ursula Tran NP  Hematology/Oncology  [facility]

## 2020-07-13 NOTE — ASSESSMENT & PLAN NOTE
43 y/o female with ADAMARIS:     ADAMARIS (acute kidney injury)  ADAMARIS occurred post admit.  Still no sign of renal recovery, but it seems the rise in s Cr may be plateauing  Has intrinsic renal damage (FENa >>1%)  Proteinuria and microscopic hematuria  ADAMARIS likely due to Vancomycin use and patient recrived 4.2 g of Vancomycin in 15 hour period. Supratherapeutic vancomycin level  Vancomycin has been discontinued.   Also, pt reported NSAIDs use 3 prior to admit     Remains non-oliguric  K normal to borderline elevated  Metabolic acidosis  O2 sat good  May need acute dialysis, discussed with pt again today     Will give lasix 80 mg po (ordered IV this am, but has poor IV access) x 1 given has good UOP  Will repeat labs     Noted Trichomonas in u/a  Will give flagyl 2 g po x 1        Plans and recommendations:  As discussed above  Will follow closely

## 2020-07-13 NOTE — PROGRESS NOTES
[facility] -   Nephrology  Progress Note    Patient Name: Jaswant Yang  MRN: [id number]  Admission Date: 7/6/2020  Hospital Length of Stay: 7 days  Attending Provider: Rohit Rodriguez, *   Primary Care Physician: Primary Doctor No  Principal Problem:Hairy cell leukemia of spleen    Subjective:     HPI: 44 year old female with anemia, GERD, hairy-cell leukemia presents to [facility] with low grade fever, leukopenia and dental pain. Patient was started on antibiotics including vancomycin and received 4.2 g of Vancomycin in a 15 h period. UOP has not been recorded since admission.     Nephrology was consulted to help with patient's renal care while she is admitted at [facility]. I saw and examined patient in her hospital room. Patient reports generalized weakness and vomiting x 1 today. No other symptoms at present. She reports NSAID use in past (last use of ibuprofen about 3 weeks ago).     Chart review revealed that her baseline creatinine is about 0.9.     Interval History: Pt was seen and examined this am. K is elevated. Pt has no palpitation, no new c/o's, no SOB, no discomfort. Discussed the medical issues and lack of renal recovery at present with pt. She was given kayexalate last pm with improvement in s K to 5. But this am K is 6. Risk and benefits of HD explained.    Review of patient's allergies indicates:   Allergen Reactions    Tramadol Hives     Current Facility-Administered Medications   Medication Frequency    0.9%  NaCl infusion Continuous    acetaminophen tablet 650 mg Q6H PRN    albuterol-ipratropium 2.5 mg-0.5 mg/3 mL nebulizer solution 3 mL Q4H PRN    fentaNYL 25 mcg/hr 1 patch Q72H    folic acid-vit B6-vit B12 2.5-25-2 mg tablet 1 tablet Daily    gabapentin capsule 300 mg QHS    hydrOXYzine HCL tablet 25 mg TID PRN    metroNIDAZOLE tablet 2,000 mg Once    multivitamin tablet Daily    ondansetron injection 4 mg Q8H PRN    oxyCODONE immediate release tablet 10 mg Q4H PRN     polyethylene glycol packet 17 g Daily    promethazine tablet 25 mg Q6H PRN    [START ON 7/14/2020] senna tablet 17.2 mg QHS    thiamine tablet 100 mg Daily       Objective:     Vital Signs (Most Recent):  Temp: 97.6 °F (36.4 °C) (07/13/20 1604)  Pulse: 86 (07/13/20 1604)  Resp: 18 (07/13/20 1715)  BP: (!) 155/87 (07/13/20 1604)  SpO2: 95 % (07/13/20 1604)  O2 Device (Oxygen Therapy): nasal cannula (07/13/20 1450) Vital Signs (24h Range):  Temp:  [97.6 °F (36.4 °C)-98.8 °F (37.1 °C)] 97.6 °F (36.4 °C)  Pulse:  [81-89] 86  Resp:  [16-18] 18  SpO2:  [95 %-99 %] 95 %  BP: (129-155)/(69-87) 155/87     Weight: 124.3 kg (274 lb) (07/06/20 1700)  Body mass index is 38.22 kg/m².  Body surface area is 2.5 meters squared.    I/O last 3 completed shifts:  In: 1760 [P.O.:1760]  Out: 5000 [Urine:5000]    Physical Exam  Vitals signs and nursing note reviewed.   Constitutional:       Appearance: Normal appearance.   HENT:      Head: Normocephalic and atraumatic.   Cardiovascular:      Rate and Rhythm: Regular rhythm.      Pulses: Normal pulses.      Heart sounds: Normal heart sounds.   Pulmonary:      Effort: Pulmonary effort is normal.      Breath sounds: Normal breath sounds. No rales.   Abdominal:      Palpations: Abdomen is soft.   Musculoskeletal:      Right lower leg: No edema.      Left lower leg: No edema.   Skin:     General: Skin is warm and dry.   Neurological:      Mental Status: She is alert.   Psychiatric:         Mood and Affect: Mood normal.         Behavior: Behavior normal.         Significant Labs: reviewed, K this am was 6.0  BMP  Lab Results   Component Value Date     07/13/2020    K 4.6 07/13/2020     07/13/2020    CO2 16 (L) 07/13/2020    BUN 54 (H) 07/13/2020    CREATININE 9.5 (H) 07/13/2020    CALCIUM 8.2 (L) 07/13/2020    ANIONGAP 11 07/13/2020    ESTGFRAFRICA 5 (A) 07/13/2020    EGFRNONAA 5 (A) 07/13/2020       Lab Results   Component Value Date    WBC 3.08 (L) 07/13/2020    HGB 9.4 (L)  07/13/2020    HCT 30.3 (L) 07/13/2020    MCV 96 07/13/2020     07/13/2020       Significant Imaging: reviewed CXR's    Assessment/Plan:     45 y/o female with ADAMARIS:     ADAMARIS (acute kidney injury)  No signs of renal recovery  K high despite being given kayexalate and being non-oliguric  No response to high dose lasix yesterday  Acute HD was recommended  Explained to pt in layman's language  Risks and benefits of hemodialysis initiation were explained to the pt. Benefits include correction of hyperkalemia and other electrolyte disorders, maintanance of fluid balance, and improvement in oxygenation. Risks include changes in fluid status, heart failure, and death.    To review:  Has intrinsic renal damage (FENa >>1%)  Proteinuria and microscopic hematuria  ADAMARIS likely due to Vancomycin use and patient recrived 4.2 g of Vancomycin in 15 hour period. Supratherapeutic vancomycin level  Vancomycin has been discontinued.   Also, pt reported NSAIDs use 3 prior to admit     Trichomonas in u/a  S/p flagyl 2 g po x 1 yesterday        Plans and recommendations:  As discussed above  Will follow closely  Will provide acute HD as needed.      Robel Montero MD  Nephrology  [facility]

## 2020-07-13 NOTE — OP NOTE
[facility] -   Vascular Surgery  Operative Note    SUMMARY     Date of Procedure: 7/13/2020     Procedure: Procedure(s) (LRB):  INSERTION, CENTRAL VENOUS ACCESS DEVICE (N/A)     Tunneled right IJ vas cath insertion    Surgeon(s) and Role:     * Tarik Tobias IV, MD - Primary    Assisting Surgeon: None    Pre-Operative Diagnosis: ESRD (end stage renal disease) [N18.6]    Post-Operative Diagnosis: Post-Op Diagnosis Codes:     * ADAMARIS (acute kidney injury) [N17.9]    Anesthesia: Local    Technical Procedures Used: Tunneled right IJ vas cath insertion    Description of the Findings of the Procedure:   After informed consent was obtained the right neck and shoulder were prepped and draped in sterile fashion.  The right IJ was then accessed with a micropuncture needle and wire after a timeout was performed.   A micropuncture sheath was placed and a stiff wire advanced into the IVC.1% lidocaine was used to numb the right shoulder and track of the vas cath.  The vas cath was then tunneled from the right shoulder to the right IJ access point.  The IJ track was serial dilated and a peel away sheath was placed.  The vas cath was placed through the peel away sheath and the sheath was removed.  The tip of the vas cath was at the junction of the SVC and right atrium.  Each lumen was aspirated and flushed and was working appropriately.  Each lumen was then flushed with 3cc of heparin.  The vas cath was sutured in place and sterile dressings applied.    Significant Surgical Tasks Conducted by the Assistant(s), if Applicable: none    Complications: No    Estimated Blood Loss (EBL): * No values recorded between 7/13/2020 12:00 AM and 7/13/2020  2:54 PM *           Implants:   Implant Name Type Inv. Item Serial No.  Lot No. LRB No. Used Action   CATH GLIDEPATH 14.5F 19CM 24CM - [id number] Dialysis Catheter CATH GLIDEPATH 14.5F 19CM 24CM  [employer] [id number] N/A 1 Implanted       Specimens:   Specimen (12h  ago, onward)    None                  Condition: Good    Disposition: PACU - hemodynamically stable.    Attestation: I was present and scrubbed for the entire procedure.

## 2020-07-13 NOTE — PLAN OF CARE
Fall precautions implemented. Pt remained free from falls/injuries  IVF infusing per orders. Neutropenic precautions in place. Pain adequately managed. Monitoring labs. Safety precautions implemented. Chart reviewed. Will continue to monitor.

## 2020-07-13 NOTE — ASSESSMENT & PLAN NOTE
No complaints of dental pain today.  Vanc stopped.  Does not have dentist.  Spoke to case management who will try to get patient set up within the [facility] system due to insurance constraints.   Patient is aware.

## 2020-07-13 NOTE — MEDICAL/APP STUDENT
[facility]  Progress Note    Patient Name: Jaswant Yang  MRN: [id number]  Admission Date: 7/6/2020  Hospital Length of Stay: 7 days  Attending Provider: Rohit Rodriguez, *   Primary Care Physician: Primary Doctor No    Subjective:    Principal problem: Neutropenic fever     HPI: Jaswant Yang is a 43 yo female w/ a PMHx of hairy cell leukemia (dx 5/20), anemia, GERD, splenectomy, and chemotherapy (most recent 6/26/20) who presented to the ED on 7/6/20 w/ neutropenic fever and a dental infection.    Interval history: No acute events reported overnight. Pt reports pain well controlled but feels a bit fatigued. Pt says she has been ambulating, eating, and urinating normally. Pt reports 1x normal bowel movement yesterday.       Review of Systems   Constitutional: Positive for malaise/fatigue. Negative for chills, diaphoresis and fever.   HENT: Negative.         No tooth pain/discomfort   Eyes: Negative.    Respiratory: Negative for cough and shortness of breath.    Cardiovascular: Negative for chest pain, palpitations and leg swelling.   Gastrointestinal: Negative for abdominal pain, blood in stool, constipation, diarrhea, nausea and vomiting.   Genitourinary: Negative for dysuria, frequency, hematuria and urgency.   Musculoskeletal: Negative for myalgias.   Skin: Negative.    Neurological: Negative for dizziness and headaches.   Endo/Heme/Allergies: Negative.    Psychiatric/Behavioral: Negative.        Objective:     Vitals  Temp:  [97.7 °F (36.5 °C)-98.8 °F (37.1 °C)] 98.4 °F (36.9 °C)  Pulse:  [83-92] 85  Resp:  [16-18] 18  SpO2:  [96 %-100 %] 98 %  BP: (134-152)/(70-83) 134/70    Physical Exam   Constitutional: She is oriented to person, place, and time and well-developed, well-nourished, and in no distress.   IV drip; telemetry    HENT:   Tooth decay right side    Neck: No JVD present.   Cardiovascular: Normal rate, regular rhythm, normal heart sounds and intact distal pulses.    Pulmonary/Chest: Effort normal and breath sounds normal. No stridor.   Abdominal: Soft. Bowel sounds are normal. There is no abdominal tenderness.   Truncal obesity; splenectomy scar    Genitourinary:    Genitourinary Comments: No urinary catheter     Musculoskeletal:         General: No tenderness or edema.   Neurological: She is alert and oriented to person, place, and time.   Skin: Skin is dry.   Psychiatric: Mood, memory and affect normal.     Labs  WBC 3.08 (decreased)  ANC 1.6   H&H 9.4&30.3 (increased)  K+ 6.0 (decreased)  Bicarb 16 (same)  BUN 54 (increased)  Cr 9.5 (increased)  Calcium 8.2 (increased)    Current Facility-Administered Medications     0.9%  NaCl infusion, , Intravenous, Continuous    acetaminophen tablet 650 mg, 650 mg, Oral, Q6H PRN    albuterol-ipratropium 2.5 mg-0.5 mg/3 mL nebulizer solution 3 mL, 3 mL, Nebulization, Q4H PRN    fentaNYL 25 mcg/hr 1 patch, 1 patch, Transdermal, Q72H    folic acid-vit B6-vit B12 2.5-25-2 mg tablet 1 tablet, 1 tablet, Oral, Daily    gabapentin capsule 300 mg, 300 mg, Oral, QHS    hydrOXYzine HCL tablet 25 mg, 25 mg, Oral, TID PRN    multivitamin tablet, 1 tablet, Oral, Daily    ondansetron injection 4 mg, 4 mg, Intravenous, Q8H PRN    oxyCODONE immediate release tablet 10 mg, 10 mg, Oral, Q4H PRN    polyethylene glycol packet 17 g, 17 g, Oral, Daily    promethazine tablet 25 mg, 25 mg, Oral, Q6H PRN    senna tablet 8.6 mg, 8.6 mg, Oral, BID    thiamine tablet 100 mg, 100 mg, Oral, Daily      Assessment/Plan:   Ms. Jaswant Yang is a 43 yo female w/ a PMHx of hairy cell leukemia, splenectomy, chemotherapy (6/26/20), and anemia who presented to the ED on 7/6 w/ neutropenic fever and dental infection.     Neutropenic fever  Afebrile past 24 hrs  WBCs and ANC improving (3.08; 1.6)  Vanc discontinued due to renal dysfxn    ADAMARIS  Intrinsic renal failure presumed due to vancomycin --> discontinued  Cr and BUN still uptrending (9.5; 54)   Good urine  outpt  Neph consult discussed acute dialysis  Lasix 80 mg PO    Dental infxn  F/u dentist after discharge    Chronic Pain   Well controlled w/ PRN oxycodone     Anemia  H&H uptrending  Daily CBC     Hairy cell leukemia  F/u hem/onc outpt    Active Diagnoses:    Diagnosis Date Noted POA    PRINCIPAL PROBLEM:  Dental infection [K04.7] 07/06/2020 Yes    Trichomonas infection [A59.9]  Yes    Metabolic acidosis [E87.2]  Yes    ADAMARIS (acute kidney injury) [N17.9] 07/08/2020 No    Hypocalcemia [E83.51] 07/08/2020 No    Encounter for palliative care [Z51.5] 07/07/2020 Not Applicable    Acute neoplasm-related pain [G89.3] 07/07/2020 Yes    Therapeutic opioid-induced constipation (OIC) [K59.03, T40.2X5A] 07/07/2020 Yes    Chemotherapy-induced neutropenia [D70.1, T45.1X5A] 07/06/2020 Yes    Neutropenic fever [D70.9, R50.81] 07/06/2020 Yes    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] 05/28/2020 Yes      Problems Resolved During this Admission:    Diagnosis Date Noted Date Resolved POA    Hypokalemia [E87.6] 07/06/2020 07/12/2020 Yes     VTE Risk Mitigation (From admission, onward)         Ordered     Place sequential compression device  Until discontinued      07/06/20 9154                  Kerri Olvera MS3  [facility]

## 2020-07-13 NOTE — SUBJECTIVE & OBJECTIVE
Interval History:     Review of Systems   Constitutional: Negative for activity change, appetite change, chills, diaphoresis, fatigue, fever and unexpected weight change.   HENT: Positive for dental problem. Negative for congestion, drooling, facial swelling, rhinorrhea, sinus pressure, sneezing, sore throat, trouble swallowing and voice change.    Eyes: Negative for photophobia, discharge, redness, itching and visual disturbance.   Respiratory: Negative for apnea, cough, choking, chest tightness, shortness of breath, wheezing and stridor.    Cardiovascular: Negative for chest pain, palpitations and leg swelling.   Gastrointestinal: Negative for abdominal distention, abdominal pain, anal bleeding, blood in stool, constipation, diarrhea, nausea and vomiting.   Endocrine: Negative for cold intolerance, heat intolerance, polydipsia, polyphagia and polyuria.   Genitourinary: Negative for decreased urine volume, difficulty urinating, dysuria, flank pain, frequency, hematuria, pelvic pain, urgency, vaginal bleeding and vaginal discharge.   Musculoskeletal: Positive for back pain (chronic). Negative for arthralgias, gait problem, joint swelling, myalgias, neck pain and neck stiffness.   Skin: Negative for color change, pallor, rash and wound.   Neurological: Positive for weakness (generalized). Negative for dizziness, seizures, syncope, facial asymmetry, speech difficulty, light-headedness, numbness and headaches.   Psychiatric/Behavioral: Negative for agitation, confusion, hallucinations and suicidal ideas.   All other systems reviewed and are negative.    Objective:     Vital Signs (Most Recent):  Temp: 97.6 °F (36.4 °C) (07/13/20 1604)  Pulse: 86 (07/13/20 1604)  Resp: 18 (07/13/20 1715)  BP: (!) 155/87 (07/13/20 1604)  SpO2: 95 % (07/13/20 1604) Vital Signs (24h Range):  Temp:  [97.6 °F (36.4 °C)-98.8 °F (37.1 °C)] 97.6 °F (36.4 °C)  Pulse:  [81-89] 86  Resp:  [16-18] 18  SpO2:  [95 %-99 %] 95 %  BP: (129-155)/(69-87)  155/87     Weight: 124.3 kg (274 lb)  Body mass index is 38.22 kg/m².    Intake/Output Summary (Last 24 hours) at 7/13/2020 1730  Last data filed at 7/13/2020 1700  Gross per 24 hour   Intake --   Output 2400 ml   Net -2400 ml      Physical Exam  Vitals signs and nursing note reviewed.   Constitutional:       General: She is not in acute distress.     Appearance: Normal appearance. She is not ill-appearing.   HENT:      Head: Normocephalic and atraumatic.      Nose: Nose normal.      Mouth/Throat:      Mouth: Mucous membranes are moist.   Eyes:      Conjunctiva/sclera: Conjunctivae normal.      Pupils: Pupils are equal, round, and reactive to light.   Neck:      Musculoskeletal: Normal range of motion and neck supple. Muscular tenderness present. No neck rigidity.   Cardiovascular:      Rate and Rhythm: Regular rhythm.      Pulses: Normal pulses.      Heart sounds: Normal heart sounds.   Pulmonary:      Effort: Pulmonary effort is normal.      Breath sounds: Normal breath sounds. No rales.   Abdominal:      Palpations: Abdomen is soft.   Musculoskeletal: Normal range of motion.      Right lower leg: No edema.      Left lower leg: No edema.   Skin:     General: Skin is warm and dry.   Neurological:      General: No focal deficit present.      Mental Status: She is alert. Mental status is at baseline. She is disoriented.      Cranial Nerves: No cranial nerve deficit.      Sensory: Sensory deficit present.   Psychiatric:         Mood and Affect: Mood normal.         Behavior: Behavior normal.         Significant Labs: All pertinent labs within the past 24 hours have been reviewed.    Significant Imaging: I have reviewed all pertinent imaging results/findings within the past 24 hours.

## 2020-07-14 LAB
ACID FAST MOD KINY STN SPEC: NORMAL
ANION GAP SERPL CALC-SCNC: 11 MMOL/L (ref 8–16)
BASOPHILS # BLD AUTO: 0.02 K/UL (ref 0–0.2)
BASOPHILS NFR BLD: 0.7 % (ref 0–1.9)
BUN SERPL-MCNC: 39 MG/DL (ref 6–20)
CALCIUM SERPL-MCNC: 8.1 MG/DL (ref 8.7–10.5)
CHLORIDE SERPL-SCNC: 106 MMOL/L (ref 95–110)
CO2 SERPL-SCNC: 22 MMOL/L (ref 23–29)
CREAT SERPL-MCNC: 7 MG/DL (ref 0.5–1.4)
DIFFERENTIAL METHOD: ABNORMAL
EOSINOPHIL # BLD AUTO: 0.1 K/UL (ref 0–0.5)
EOSINOPHIL NFR BLD: 2.5 % (ref 0–8)
ERYTHROCYTE [DISTWIDTH] IN BLOOD BY AUTOMATED COUNT: 20.6 % (ref 11.5–14.5)
EST. GFR  (AFRICAN AMERICAN): 8 ML/MIN/1.73 M^2
EST. GFR  (NON AFRICAN AMERICAN): 7 ML/MIN/1.73 M^2
GLUCOSE SERPL-MCNC: 102 MG/DL (ref 70–110)
HBV CORE AB SERPL QL IA: NEGATIVE
HBV SURFACE AB SER-ACNC: POSITIVE M[IU]/ML
HBV SURFACE AG SERPL QL IA: NEGATIVE
HCT VFR BLD AUTO: 27.9 % (ref 37–48.5)
HGB BLD-MCNC: 9.1 G/DL (ref 12–16)
IMM GRANULOCYTES # BLD AUTO: 0.04 K/UL (ref 0–0.04)
IMM GRANULOCYTES NFR BLD AUTO: 1.4 % (ref 0–0.5)
LYMPHOCYTES # BLD AUTO: 1.2 K/UL (ref 1–4.8)
LYMPHOCYTES NFR BLD: 41.9 % (ref 18–48)
MCH RBC QN AUTO: 30.3 PG (ref 27–31)
MCHC RBC AUTO-ENTMCNC: 32.6 G/DL (ref 32–36)
MCV RBC AUTO: 93 FL (ref 82–98)
MONOCYTES # BLD AUTO: 0.1 K/UL (ref 0.3–1)
MONOCYTES NFR BLD: 3.2 % (ref 4–15)
MYCOBACTERIUM SPEC QL CULT: NORMAL
NEUTROPHILS # BLD AUTO: 1.4 K/UL (ref 1.8–7.7)
NEUTROPHILS NFR BLD: 50.3 % (ref 38–73)
NRBC BLD-RTO: 1 /100 WBC
PLATELET # BLD AUTO: 179 K/UL (ref 150–350)
PMV BLD AUTO: 9.9 FL (ref 9.2–12.9)
POTASSIUM SERPL-SCNC: 4.9 MMOL/L (ref 3.5–5.1)
RBC # BLD AUTO: 3 M/UL (ref 4–5.4)
SODIUM SERPL-SCNC: 139 MMOL/L (ref 136–145)
WBC # BLD AUTO: 2.77 K/UL (ref 3.9–12.7)

## 2020-07-14 PROCEDURE — 99233 PR SUBSEQUENT HOSPITAL CARE,LEVL III: ICD-10-PCS | Mod: ,,, | Performed by: PHYSICIAN ASSISTANT

## 2020-07-14 PROCEDURE — 36415 COLL VENOUS BLD VENIPUNCTURE: CPT

## 2020-07-14 PROCEDURE — 99232 SBSQ HOSP IP/OBS MODERATE 35: CPT | Mod: ,,, | Performed by: INTERNAL MEDICINE

## 2020-07-14 PROCEDURE — 99232 PR SUBSEQUENT HOSPITAL CARE,LEVL II: ICD-10-PCS | Mod: ,,, | Performed by: INTERNAL MEDICINE

## 2020-07-14 PROCEDURE — 21400001 HC TELEMETRY ROOM

## 2020-07-14 PROCEDURE — 25000003 PHARM REV CODE 250: Performed by: SURGERY

## 2020-07-14 PROCEDURE — 99233 SBSQ HOSP IP/OBS HIGH 50: CPT | Mod: ,,, | Performed by: PHYSICIAN ASSISTANT

## 2020-07-14 PROCEDURE — 85025 COMPLETE CBC W/AUTO DIFF WBC: CPT

## 2020-07-14 PROCEDURE — 80048 BASIC METABOLIC PNL TOTAL CA: CPT

## 2020-07-14 PROCEDURE — 99233 SBSQ HOSP IP/OBS HIGH 50: CPT | Mod: ,,, | Performed by: INTERNAL MEDICINE

## 2020-07-14 PROCEDURE — 99233 PR SUBSEQUENT HOSPITAL CARE,LEVL III: ICD-10-PCS | Mod: ,,, | Performed by: INTERNAL MEDICINE

## 2020-07-14 RX ADMIN — Medication 100 MG: at 09:07

## 2020-07-14 RX ADMIN — SENNOSIDES 17.2 MG: 8.6 TABLET, FILM COATED ORAL at 08:07

## 2020-07-14 RX ADMIN — THERA TABS 1 TABLET: TAB at 09:07

## 2020-07-14 RX ADMIN — OXYCODONE 10 MG: 5 TABLET ORAL at 09:07

## 2020-07-14 RX ADMIN — Medication 1 TABLET: at 09:07

## 2020-07-14 RX ADMIN — GABAPENTIN 300 MG: 300 CAPSULE ORAL at 08:07

## 2020-07-14 RX ADMIN — OXYCODONE 10 MG: 5 TABLET ORAL at 08:07

## 2020-07-14 NOTE — PROGRESS NOTES
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 Whittaker' office after being found to have a low grade temperature (99.6) with a visible dental infection in the R lower jaw and L upper jaw, in the setting of chemotherapy induced neutropenia (WBC 0.89); mild hypokalemia (3.4) also noted in ED, but labs otherwise largely unremarkable - admission for IV abx; BC x2 pending. Pt reports associated severe stabbing pain in the affected jaw areas, and mild facial edema, worsening over the past 2 days but states no drainage/bleeding. Pt's last chemo cycle ended on 06/26/20 - reports pain improved with IV morphine in ED, states she feels "a little better now" - palliative care consulted per ED physician at patient's request, to assist with symptom management and pain control. Denies CP, edema, palpitations, SOB, wheezing, orthopnea, PND, abdominal pain, N/V/D, dysuria, flank pain, HA, dizziness, fever, cough, chills, falls/trauma, blurred vision, focal deficits. No recent dietary changes, travel, sick contacts or viral illness - pt is typically active and independent with ADLs and ambulation at home. Pt is full code. Surrogate decision makers are son (Dmitri Yang) and Mother (Elsa Yang). Admitted for chemotherapy induced neutropenic fever with a jaw/dental infection.      Overview/Hospital Course:  7/7/20 The patient reports pain is not well controlled. Palliative care was consulted. The patient remained afebrile overnight. CT maxiollofacial CT was negative for a dental abscess but showed multiple " "dental caries. The patient remains neutropenic, Hem/onc following. Continue current management with IV ABX. 7/8/20 The patient remained afebrile overnight. WBCs improved to 1.41K, Hem/onc following. The patients renal function worsened overnight, Cr. Increased from 2 to 4.9 overnight. This is likely 2/2 vanc. IV fluids started and Nephrology consulted. Will monitor renal function. 7/9/20 No acute issues overnight. The patient continues to have good UOP. Creatinine increased to 6.8. Nephrology following, will continue to monitor renal function. ANC is improving, Hem/onc following. 7/10/20 The patient reports "feeling better today". WBCs continue to recover, Hem/onc following. Creatinine continues to worsen, Cr 8.1 today. The patient reports good UOP, Nephrology following.   7/11 Pt was seen and examined at bedside . She denies  Any complaint for today . The kidney function cont trending up .  7/12 The kidney function cont trending up . The K is 5.5 and Co2 16 . She  Have a good Uop . The CXR is clear . There was no acute event overnight   7/13 The kidney function is trending up . The case was d/c nephrology  . Pt will start HD  Today .      Interval History:      Review of Systems   Constitutional: Negative for activity change, appetite change, chills, diaphoresis, fatigue, fever and unexpected weight change.   HENT: Positive for dental problem. Negative for congestion, drooling, facial swelling, rhinorrhea, sinus pressure, sneezing, sore throat, trouble swallowing and voice change.    Eyes: Negative for photophobia, discharge, redness, itching and visual disturbance.   Respiratory: Negative for apnea, cough, choking, chest tightness, shortness of breath, wheezing and stridor.    Cardiovascular: Negative for chest pain, palpitations and leg swelling.   Gastrointestinal: Negative for abdominal distention, abdominal pain, anal bleeding, blood in stool, constipation, diarrhea, nausea and vomiting.   Endocrine: Negative " for cold intolerance, heat intolerance, polydipsia, polyphagia and polyuria.   Genitourinary: Negative for decreased urine volume, difficulty urinating, dysuria, flank pain, frequency, hematuria, pelvic pain, urgency, vaginal bleeding and vaginal discharge.   Musculoskeletal: Positive for back pain (chronic). Negative for arthralgias, gait problem, joint swelling, myalgias, neck pain and neck stiffness.   Skin: Negative for color change, pallor, rash and wound.   Neurological: Positive for weakness (generalized). Negative for dizziness, seizures, syncope, facial asymmetry, speech difficulty, light-headedness, numbness and headaches.   Psychiatric/Behavioral: Negative for agitation, confusion, hallucinations and suicidal ideas.   All other systems reviewed and are negative.     Objective:      Vital Signs (Most Recent):  Temp: 97.6 °F (36.4 °C) (07/13/20 1604)  Pulse: 86 (07/13/20 1604)  Resp: 18 (07/13/20 1715)  BP: (!) 155/87 (07/13/20 1604)  SpO2: 95 % (07/13/20 1604) Vital Signs (24h Range):  Temp:  [97.6 °F (36.4 °C)-98.8 °F (37.1 °C)] 97.6 °F (36.4 °C)  Pulse:  [81-89] 86  Resp:  [16-18] 18  SpO2:  [95 %-99 %] 95 %  BP: (129-155)/(69-87) 155/87      Weight: 124.3 kg (274 lb)  Body mass index is 38.22 kg/m².     Intake/Output Summary (Last 24 hours) at 7/13/2020 1730  Last data filed at 7/13/2020 1700      Gross per 24 hour   Intake --   Output 2400 ml   Net -2400 ml      Physical Exam  Vitals signs and nursing note reviewed.   Constitutional:       General: She is not in acute distress.     Appearance: Normal appearance. She is not ill-appearing.   HENT:      Head: Normocephalic and atraumatic.      Nose: Nose normal.      Mouth/Throat:      Mouth: Mucous membranes are moist.   Eyes:      Conjunctiva/sclera: Conjunctivae normal.      Pupils: Pupils are equal, round, and reactive to light.   Neck:      Musculoskeletal: Normal range of motion and neck supple. Muscular tenderness present. No neck rigidity.  "  Cardiovascular:      Rate and Rhythm: Regular rhythm.      Pulses: Normal pulses.      Heart sounds: Normal heart sounds.   Pulmonary:      Effort: Pulmonary effort is normal.      Breath sounds: Normal breath sounds. No rales.   Abdominal:      Palpations: Abdomen is soft.   Musculoskeletal: Normal range of motion.      Right lower leg: No edema.      Left lower leg: No edema.   Skin:     General: Skin is warm and dry.   Neurological:      General: No focal deficit present.      Mental Status: She is alert. Mental status is at baseline. She is disoriented.      Cranial Nerves: No cranial nerve deficit.      Sensory: Sensory deficit present.   Psychiatric:         Mood and Affect: Mood normal.         Behavior: Behavior normal.            Significant Labs: All pertinent labs within the past 24 hours have been reviewed.     Significant Imaging: I have reviewed all pertinent imaging results/findings within the past 24 hours.       Assessment/Plan:      * Hairy cell leukemia of spleen  Consult hematology/oncology   Consult palliative care for symptom/pain management  The patient reports "feeling better today". WBCs continue to recover, Hem/onc following.      7/14/20  Oncology input appreciated    Trichomonas infection  metronidazol 2gr  po x 1         Metabolic acidosis  Most alexley 2/2 to ADAMARIS  Monitor      7/14/20 improving     Hypocalcemia           ADAMARIS (acute kidney injury)  Multifactorial : most likely due to vanc  Nephrology on board   Kidney function trending up  Pt will start HD today      7/14/20  Nephrology managing     Acute neoplasm-related pain  - Continue PRN Analgesia - Palliative care consulted         Encounter for palliative care  Per palliative care         Dental infection  Afebrile overnight, will need dental follow up after discharge.         Neutropenic fever  Maintain neutropenic precautions  UA with reflex culture  NGTD  Cultures NGTD        Chemotherapy-induced neutropenia  Improving   Cont " monitor            VTE Risk Mitigation (From admission, onward)                  Ordered        Place sequential compression device  Until discontinued      07/06/20 1030

## 2020-07-14 NOTE — SUBJECTIVE & OBJECTIVE
Interval History: Acute dialysis yesterday evening.  Complains of fatigue today.  No other complaints currently.    Oncology Treatment Plan:   OP CLADRIBINE (5 DAY) + RITUXIMAB - Hairy Cell Leukemia    Medications:  Continuous Infusions:   sodium chloride 0.9% 100 mL/hr at 07/13/20 2049     Scheduled Meds:   fentaNYL  1 patch Transdermal Q72H    folic acid-vit B6-vit B12 2.5-25-2 mg  1 tablet Oral Daily    gabapentin  300 mg Oral QHS    multivitamin  1 tablet Oral Daily    polyethylene glycol  17 g Oral Daily    senna  17.2 mg Oral QHS    thiamine  100 mg Oral Daily     PRN Meds:sodium chloride 0.9%, acetaminophen, albuterol-ipratropium, heparin (porcine), hydrOXYzine HCL, ondansetron, oxyCODONE, promethazine     Review of Systems   Constitutional: Positive for activity change and fatigue. Negative for appetite change, chills, diaphoresis, fever and unexpected weight change.   HENT: Positive for dental problem. Negative for congestion, hearing loss, nosebleeds, postnasal drip, sore throat and trouble swallowing.    Eyes: Negative for pain, discharge and visual disturbance.   Respiratory: Negative for cough, chest tightness and shortness of breath.    Cardiovascular: Negative for chest pain and palpitations.   Gastrointestinal: Negative for blood in stool, constipation, diarrhea, nausea and vomiting.   Endocrine: Negative for cold intolerance and heat intolerance.   Genitourinary: Negative for difficulty urinating, dyspareunia, flank pain and hematuria.   Musculoskeletal: Positive for arthralgias and myalgias. Negative for back pain, gait problem, joint swelling and neck pain.   Skin: Negative.    Neurological: Negative for dizziness, weakness, light-headedness and headaches.   Hematological: Negative for adenopathy. Does not bruise/bleed easily.   Psychiatric/Behavioral: Positive for dysphoric mood. Negative for agitation, behavioral problems and confusion. The patient is nervous/anxious.      Objective:      Vital Signs (Most Recent):  Temp: 98.8 °F (37.1 °C) (07/14/20 0723)  Pulse: 85 (07/14/20 0723)  Resp: 20 (07/14/20 0905)  BP: (!) 149/76 (07/14/20 0723)  SpO2: 98 % (07/14/20 0723) Vital Signs (24h Range):  Temp:  [97.6 °F (36.4 °C)-98.8 °F (37.1 °C)] 98.8 °F (37.1 °C)  Pulse:  [79-91] 85  Resp:  [18-20] 20  SpO2:  [95 %-100 %] 98 %  BP: (129-160)/(69-96) 149/76     Weight: 124.3 kg (274 lb)  Body mass index is 38.22 kg/m².  Body surface area is 2.5 meters squared.      Intake/Output Summary (Last 24 hours) at 7/14/2020 1013  Last data filed at 7/14/2020 0619  Gross per 24 hour   Intake 2885 ml   Output 2750 ml   Net 135 ml       Physical Exam  Vitals signs and nursing note reviewed.   Constitutional:       General: She is not in acute distress.     Appearance: She is well-developed.   HENT:      Head: Normocephalic and atraumatic.      Right Ear: Hearing and external ear normal.      Left Ear: Hearing and external ear normal.      Nose: No rhinorrhea.      Right Sinus: No maxillary sinus tenderness or frontal sinus tenderness.      Left Sinus: No maxillary sinus tenderness or frontal sinus tenderness.      Mouth/Throat:      Mouth: No oral lesions.      Pharynx: Uvula midline.   Eyes:      General:         Right eye: No discharge.         Left eye: No discharge.      Conjunctiva/sclera: Conjunctivae normal.      Pupils: Pupils are equal, round, and reactive to light.   Neck:      Musculoskeletal: Normal range of motion.      Thyroid: No thyromegaly.      Vascular: No carotid bruit.      Trachea: No tracheal deviation.   Cardiovascular:      Rate and Rhythm: Normal rate and regular rhythm.      Pulses:           Dorsalis pedis pulses are 2+ on the right side and 2+ on the left side.      Heart sounds: Normal heart sounds, S1 normal and S2 normal. No murmur.   Pulmonary:      Effort: Pulmonary effort is normal. No respiratory distress.      Breath sounds: Normal breath sounds.   Abdominal:      General: Bowel  sounds are normal. There is no distension.      Palpations: Abdomen is soft. There is no mass.      Tenderness: There is no abdominal tenderness.   Musculoskeletal: Normal range of motion.      Left shoulder: She exhibits no tenderness.      Right upper leg: She exhibits no tenderness.      Left upper leg: She exhibits no tenderness.   Lymphadenopathy:      Cervical: No cervical adenopathy.      Upper Body:      Right upper body: No supraclavicular adenopathy.      Left upper body: No supraclavicular adenopathy.   Skin:     General: Skin is warm and dry.      Capillary Refill: Capillary refill takes less than 2 seconds.      Coloration: Skin is pale.      Findings: No rash.   Neurological:      Mental Status: She is alert and oriented to person, place, and time.      Sensory: No sensory deficit.      Coordination: Coordination normal.      Gait: Gait normal.   Psychiatric:         Attention and Perception: Attention normal.         Mood and Affect: Mood is anxious and depressed.         Speech: Speech normal.         Behavior: Behavior normal.         Thought Content: Thought content normal.         Cognition and Memory: Cognition normal.         Judgment: Judgment normal.         Significant Labs:   CBC:   Recent Labs   Lab 07/13/20  0757 07/14/20  0853   WBC 3.08* 2.77*   HGB 9.4* 9.1*   HCT 30.3* 27.9*    179    and CMP:   Recent Labs   Lab 07/12/20  2118  07/13/20  0757 07/13/20  1255 07/14/20  0853     --  137  --  139   K 6.2*   < > 6.1*  6.0* 4.6 4.9   *  --  110  --  106   CO2 16*  --  16*  --  22*   GLU 91  --  91  --  102   BUN 50*  --  54*  --  39*   CREATININE 8.8*  --  9.5*  --  7.0*   CALCIUM 7.4*  --  8.2*  --  8.1*   ANIONGAP 9  --  11  --  11   EGFRNONAA 5*  --  5*  --  7*    < > = values in this interval not displayed.       Diagnostic Results:  I have reviewed all pertinent imaging results/findings within the past 24 hours.

## 2020-07-14 NOTE — MEDICAL/APP STUDENT
[facility]  Progress Note     Patient Name: Jaswant Yang  MRN: [id number]  Admission Date: 7/6/2020  Hospital Length of Stay: 7 days  Attending Provider: Rohit Rodriguez, *   Primary Care Physician: Primary Doctor No     Subjective:    Principal problem: Neutropenic fever      HPI: Jaswant Yang is a 43 yo female w/ a PMHx of hairy cell leukemia (dx 5/20), anemia, GERD, splenectomy, and chemotherapy (most recent 6/26/20) who presented to the ED on 7/6/20 w/ neutropenic fever and a dental infection.     Interval history: No acute events reported overnight. Pt reports pain well controlled and her fatigue and dizziness are improving. Pt says she has been ambulating, eating, and urinating normally. Pt reports 1x normal bowel movement this morning.       Review of Systems   Constitutional: Improved malaise/fatigue. Negative for chills, diaphoresis and fever.   HENT: Negative.         Tooth a bit sore  Eyes: Negative.    Respiratory: Negative for cough and shortness of breath.    Cardiovascular: Negative for chest pain, palpitations and leg swelling.   Gastrointestinal: Negative for abdominal pain, blood in stool, constipation, diarrhea, nausea and vomiting.   Genitourinary: Negative for dysuria, frequency, hematuria and urgency.   Musculoskeletal: Negative for myalgias.   Skin: Negative.    Neurological: Negative for dizziness and headaches.   Endo/Heme/Allergies: Negative.    Psychiatric/Behavioral: Negative.          Objective:      Vitals  Temp:  [97.6 °F (36.4 °C)-98.8 °F (37.1 °C)] 98.8 °F (37.1 °C)  Pulse:  [79-91] 85  Resp:  [18-20] 18  SpO2:  [95 %-100 %] 98 %  BP: (129-160)/(69-96) 149/76     Physical Exam   Constitutional: She is oriented to person, place, and time and well-developed, well-nourished, and in no distress.   IV drip; telemetry; central venous catheter  HENT:   Tooth decay right side    Neck: No JVD present.   Cardiovascular: Normal rate, regular rhythm, normal heart  sounds and intact distal pulses; systolic ejection murmur   Pulmonary/Chest: Effort normal and breath sounds normal. No stridor.   Abdominal: Soft. Bowel sounds are normal. There is no abdominal tenderness.   Truncal obesity; splenectomy scar    Genitourinary:    Genitourinary Comments: No urinary catheter    Musculoskeletal:         General: No tenderness or edema.   Neurological: She is alert and oriented to person, place, and time.   Skin: Skin is dry.   Psychiatric: Mood, memory and affect normal.      Labs  WBC 3.08 (decreased)  ANC 1.6   H&H 9.4&30.3 (increased)  K+ 6.0 (decreased)  Bicarb 16 (same)  BUN 54 (increased)  Cr 9.5 (increased)  Calcium 8.2 (increased)     Current Facility-Administered Medications     0.9%  NaCl infusion, , Intravenous, Continuous    acetaminophen tablet 650 mg, 650 mg, Oral, Q6H PRN    albuterol-ipratropium 2.5 mg-0.5 mg/3 mL nebulizer solution 3 mL, 3 mL, Nebulization, Q4H PRN    fentaNYL 25 mcg/hr 1 patch, 1 patch, Transdermal, Q72H    folic acid-vit B6-vit B12 2.5-25-2 mg tablet 1 tablet, 1 tablet, Oral, Daily    gabapentin capsule 300 mg, 300 mg, Oral, QHS    hydrOXYzine HCL tablet 25 mg, 25 mg, Oral, TID PRN    multivitamin tablet, 1 tablet, Oral, Daily    ondansetron injection 4 mg, 4 mg, Intravenous, Q8H PRN    oxyCODONE immediate release tablet 10 mg, 10 mg, Oral, Q4H PRN    polyethylene glycol packet 17 g, 17 g, Oral, Daily    promethazine tablet 25 mg, 25 mg, Oral, Q6H PRN    senna tablet 8.6 mg, 8.6 mg, Oral, BID    thiamine tablet 100 mg, 100 mg, Oral, Daily        Assessment/Plan:   Ms. Jaswant Yang is a 43 yo female w/ a PMHx of hairy cell leukemia, splenectomy, chemotherapy (6/26/20), and anemia who presented to the ED on 7/6 w/ neutropenic fever and dental infection.      Neutropenic fever  Afebrile past 24 hrs  WBCs and ANC improving (3.08; 1.6) but f/u CBC today  Vanc discontinued due to renal dysfxn     ADAMARIS  Intrinsic renal failure presumed due to  vancomycin --> discontinued  Cr and BUN still uptrending (9.5; 54) but f/u CMP today  Good urine outpt  Received dialysis 3 hrs yesterday   Lasix 80 mg PO     Dental infxn  F/u dentist after discharge     Chronic Pain   Well controlled w/ PRN oxycodone      Anemia  H&H uptrending  Daily CBC      Hairy cell leukemia  F/u hem/onc outpt            Active Diagnoses:     Diagnosis Date Noted POA    PRINCIPAL PROBLEM:  Dental infection [K04.7] 07/06/2020 Yes    Trichomonas infection [A59.9]   Yes    Metabolic acidosis [E87.2]   Yes    ADAMARIS (acute kidney injury) [N17.9] 07/08/2020 No    Hypocalcemia [E83.51] 07/08/2020 No    Encounter for palliative care [Z51.5] 07/07/2020 Not Applicable    Acute neoplasm-related pain [G89.3] 07/07/2020 Yes    Therapeutic opioid-induced constipation (OIC) [K59.03, T40.2X5A] 07/07/2020 Yes    Chemotherapy-induced neutropenia [D70.1, T45.1X5A] 07/06/2020 Yes    Neutropenic fever [D70.9, R50.81] 07/06/2020 Yes    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] 05/28/2020 Yes       Problems Resolved During this Admission:     Diagnosis Date Noted Date Resolved POA    Hypokalemia [E87.6] 07/06/2020 07/12/2020 Yes         VTE Risk Mitigation (From admission, onward)                  Ordered        Place sequential compression device  Until discontinued      07/06/20 9167                       Kerri Olvera MS3  [facility]

## 2020-07-14 NOTE — PROGRESS NOTES
[facility] -   Hematology/Oncology  Progress Note    Patient Name: Jaswant Yang  Admission Date: 7/6/2020  Hospital Length of Stay: 8 days  Code Status: Prior     Subjective:     HPI:  44 y.o female with hairy cell lymphoma currently being treated with CLADRIBINE sent from Dr. Whittaker office on yesterday for fever and presumed right sided tooth infection. Associated symptoms include facial swelling, jaw pain, fever. Patient asked to report to ED for IV antibiotics for neutropenic fever in setting of tooth infection. Patient was started on Vancomycin but had allergic reaction and was placed on Zoysn.     Interval History: Acute dialysis yesterday evening.  Complains of fatigue today.  No other complaints currently.    Oncology Treatment Plan:   OP CLADRIBINE (5 DAY) + RITUXIMAB - Hairy Cell Leukemia    Medications:  Continuous Infusions:   sodium chloride 0.9% 100 mL/hr at 07/13/20 2049     Scheduled Meds:   fentaNYL  1 patch Transdermal Q72H    folic acid-vit B6-vit B12 2.5-25-2 mg  1 tablet Oral Daily    gabapentin  300 mg Oral QHS    multivitamin  1 tablet Oral Daily    polyethylene glycol  17 g Oral Daily    senna  17.2 mg Oral QHS    thiamine  100 mg Oral Daily     PRN Meds:sodium chloride 0.9%, acetaminophen, albuterol-ipratropium, heparin (porcine), hydrOXYzine HCL, ondansetron, oxyCODONE, promethazine     Review of Systems   Constitutional: Positive for activity change and fatigue. Negative for appetite change, chills, diaphoresis, fever and unexpected weight change.   HENT: Positive for dental problem. Negative for congestion, hearing loss, nosebleeds, postnasal drip, sore throat and trouble swallowing.    Eyes: Negative for pain, discharge and visual disturbance.   Respiratory: Negative for cough, chest tightness and shortness of breath.    Cardiovascular: Negative for chest pain and palpitations.   Gastrointestinal: Negative for blood in stool, constipation, diarrhea, nausea and  vomiting.   Endocrine: Negative for cold intolerance and heat intolerance.   Genitourinary: Negative for difficulty urinating, dyspareunia, flank pain and hematuria.   Musculoskeletal: Positive for arthralgias and myalgias. Negative for back pain, gait problem, joint swelling and neck pain.   Skin: Negative.    Neurological: Negative for dizziness, weakness, light-headedness and headaches.   Hematological: Negative for adenopathy. Does not bruise/bleed easily.   Psychiatric/Behavioral: Positive for dysphoric mood. Negative for agitation, behavioral problems and confusion. The patient is nervous/anxious.      Objective:     Vital Signs (Most Recent):  Temp: 98.8 °F (37.1 °C) (07/14/20 0723)  Pulse: 85 (07/14/20 0723)  Resp: 20 (07/14/20 0905)  BP: (!) 149/76 (07/14/20 0723)  SpO2: 98 % (07/14/20 0723) Vital Signs (24h Range):  Temp:  [97.6 °F (36.4 °C)-98.8 °F (37.1 °C)] 98.8 °F (37.1 °C)  Pulse:  [79-91] 85  Resp:  [18-20] 20  SpO2:  [95 %-100 %] 98 %  BP: (129-160)/(69-96) 149/76     Weight: 124.3 kg (274 lb)  Body mass index is 38.22 kg/m².  Body surface area is 2.5 meters squared.      Intake/Output Summary (Last 24 hours) at 7/14/2020 1013  Last data filed at 7/14/2020 0619  Gross per 24 hour   Intake 2885 ml   Output 2750 ml   Net 135 ml       Physical Exam  Vitals signs and nursing note reviewed.   Constitutional:       General: She is not in acute distress.     Appearance: She is well-developed.   HENT:      Head: Normocephalic and atraumatic.      Right Ear: Hearing and external ear normal.      Left Ear: Hearing and external ear normal.      Nose: No rhinorrhea.      Right Sinus: No maxillary sinus tenderness or frontal sinus tenderness.      Left Sinus: No maxillary sinus tenderness or frontal sinus tenderness.      Mouth/Throat:      Mouth: No oral lesions.      Pharynx: Uvula midline.   Eyes:      General:         Right eye: No discharge.         Left eye: No discharge.      Conjunctiva/sclera:  Conjunctivae normal.      Pupils: Pupils are equal, round, and reactive to light.   Neck:      Musculoskeletal: Normal range of motion.      Thyroid: No thyromegaly.      Vascular: No carotid bruit.      Trachea: No tracheal deviation.   Cardiovascular:      Rate and Rhythm: Normal rate and regular rhythm.      Pulses:           Dorsalis pedis pulses are 2+ on the right side and 2+ on the left side.      Heart sounds: Normal heart sounds, S1 normal and S2 normal. No murmur.   Pulmonary:      Effort: Pulmonary effort is normal. No respiratory distress.      Breath sounds: Normal breath sounds.   Abdominal:      General: Bowel sounds are normal. There is no distension.      Palpations: Abdomen is soft. There is no mass.      Tenderness: There is no abdominal tenderness.   Musculoskeletal: Normal range of motion.      Left shoulder: She exhibits no tenderness.      Right upper leg: She exhibits no tenderness.      Left upper leg: She exhibits no tenderness.   Lymphadenopathy:      Cervical: No cervical adenopathy.      Upper Body:      Right upper body: No supraclavicular adenopathy.      Left upper body: No supraclavicular adenopathy.   Skin:     General: Skin is warm and dry.      Capillary Refill: Capillary refill takes less than 2 seconds.      Coloration: Skin is pale.      Findings: No rash.   Neurological:      Mental Status: She is alert and oriented to person, place, and time.      Sensory: No sensory deficit.      Coordination: Coordination normal.      Gait: Gait normal.   Psychiatric:         Attention and Perception: Attention normal.         Mood and Affect: Mood is anxious and depressed.         Speech: Speech normal.         Behavior: Behavior normal.         Thought Content: Thought content normal.         Cognition and Memory: Cognition normal.         Judgment: Judgment normal.         Significant Labs:   CBC:   Recent Labs   Lab 07/13/20  0757 07/14/20  0853   WBC 3.08* 2.77*   HGB 9.4* 9.1*   HCT  30.3* 27.9*    179    and CMP:   Recent Labs   Lab 07/12/20  2118  07/13/20  0757 07/13/20  1255 07/14/20  0853     --  137  --  139   K 6.2*   < > 6.1*  6.0* 4.6 4.9   *  --  110  --  106   CO2 16*  --  16*  --  22*   GLU 91  --  91  --  102   BUN 50*  --  54*  --  39*   CREATININE 8.8*  --  9.5*  --  7.0*   CALCIUM 7.4*  --  8.2*  --  8.1*   ANIONGAP 9  --  11  --  11   EGFRNONAA 5*  --  5*  --  7*    < > = values in this interval not displayed.       Diagnostic Results:  I have reviewed all pertinent imaging results/findings within the past 24 hours.    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  To re evaluate treatment regimen as outpatient. Chemotherapy is on hold pending resolution of acute issues.  F/U with dental upon discharge.    --F/U with Primary Oncologist upon discharge  --Daily CBC, CMP  --Case management working on referral to facility for dental work due to patient insurance      ADAMARIS (acute kidney injury)  Management per Nephrology.  Vancomycin has been DC'd.  Required dialysis on 7/13/2020.          Dental infection  No complaints of dental pain today.  Vanc stopped.  Does not have dentist.  Spoke to case management who will try to get patient set up within the [facility] system due to insurance constraints.   Patient is aware.       Chemotherapy-induced neutropenia  ANC >1000 - recovering        Thank you for your consult. I will follow-up with patient. Please contact us if you have any additional questions.     Ursula Tran NP  Hematology/Oncology  [facility] [place]

## 2020-07-14 NOTE — PLAN OF CARE
Problem: Adult Inpatient Plan of Care  Goal: Plan of Care Review  Outcome: Ongoing, Progressing  Flowsheets (Taken 7/14/2020 0144)  Plan of Care Reviewed With: patient  Pt had no adverse events during shift. Pt free from falls. Call light in reach. SR's x2. Pain well controlled w/ PRN meds. Pt repositions independently and with encouragement. IVF's and abx administered as ordered. VTE prophylaxis - pt refusing SCD's. Ambulation encouraged, fluids promoted. Heart monitor in place. (NSR. 70's - 80's). VSS. Chart reviewed. Will continue to monitor.

## 2020-07-14 NOTE — SUBJECTIVE & OBJECTIVE
Interval History: Pt was seen and examined. Pt received acute HD yesterday for hyperkalemia. Pt tolerated HD well, no new issues today, no SOB.    Review of patient's allergies indicates:   Allergen Reactions    Tramadol Hives     Current Facility-Administered Medications   Medication Frequency    0.9%  NaCl infusion Continuous    0.9%  NaCl infusion PRN    acetaminophen tablet 650 mg Q6H PRN    albuterol-ipratropium 2.5 mg-0.5 mg/3 mL nebulizer solution 3 mL Q4H PRN    fentaNYL 25 mcg/hr 1 patch Q72H    folic acid-vit B6-vit B12 2.5-25-2 mg tablet 1 tablet Daily    gabapentin capsule 300 mg QHS    heparin (porcine) injection 3,200 Units PRN    hydrOXYzine HCL tablet 25 mg TID PRN    multivitamin tablet Daily    ondansetron injection 4 mg Q8H PRN    oxyCODONE immediate release tablet 10 mg Q4H PRN    polyethylene glycol packet 17 g Daily    promethazine tablet 25 mg Q6H PRN    senna tablet 17.2 mg QHS    thiamine tablet 100 mg Daily       Objective:     Vital Signs (Most Recent):  Temp: 98 °F (36.7 °C) (07/14/20 1652)  Pulse: 91 (07/14/20 1652)  Resp: 18 (07/14/20 1652)  BP: (!) 134/97 (07/14/20 1652)  SpO2: 100 % (07/14/20 1652)  O2 Device (Oxygen Therapy): room air (07/14/20 0723) Vital Signs (24h Range):  Temp:  [97.8 °F (36.6 °C)-98.8 °F (37.1 °C)] 98 °F (36.7 °C)  Pulse:  [79-91] 91  Resp:  [18-20] 18  SpO2:  [97 %-100 %] 100 %  BP: (130-160)/(75-97) 134/97     Weight: 124.3 kg (274 lb) (07/06/20 1700)  Body mass index is 38.22 kg/m².  Body surface area is 2.5 meters squared.    I/O last 3 completed shifts:  In: 2885 [P.O.:730; I.V.:2155]  Out: 4150 [Urine:3650; Other:500]    Physical Exam  Vitals signs and nursing note reviewed.   Constitutional:       Appearance: Normal appearance.   HENT:      Head: Normocephalic and atraumatic.   Cardiovascular:      Rate and Rhythm: Regular rhythm.      Pulses: Normal pulses.      Heart sounds: Normal heart sounds.   Pulmonary:      Effort: Pulmonary  effort is normal.      Breath sounds: Normal breath sounds. No rales.   Abdominal:      Palpations: Abdomen is soft.   Musculoskeletal:      Right lower leg: No edema.      Left lower leg: No edema.   Skin:     General: Skin is warm and dry.   Neurological:      Mental Status: She is alert.   Psychiatric:         Mood and Affect: Mood normal.         Behavior: Behavior normal.         Significant Labs: reviewed  BMP  Lab Results   Component Value Date     07/14/2020    K 4.9 07/14/2020     07/14/2020    CO2 22 (L) 07/14/2020    BUN 39 (H) 07/14/2020    CREATININE 7.0 (H) 07/14/2020    CALCIUM 8.1 (L) 07/14/2020    ANIONGAP 11 07/14/2020    ESTGFRAFRICA 8 (A) 07/14/2020    EGFRNONAA 7 (A) 07/14/2020     Lab Results   Component Value Date    WBC 2.77 (L) 07/14/2020    HGB 9.1 (L) 07/14/2020    HCT 27.9 (L) 07/14/2020    MCV 93 07/14/2020     07/14/2020         Significant Imaging:

## 2020-07-14 NOTE — PROGRESS NOTES
Progress Note   Palliative Medicine      SUBJECTIVE:     History of Present Illness:  Patient seen and examined without family present. HD was started yesterday and she tolerated it without issue. She was sad when she learned HD was needed but says she is coping better today. She is hopeful she will start to improve and be able to resume her cancer targeted therapy soon. Her pain is still well controlled on the current regimen. She has not experienced the restless and jumpy legs since starting gabapentin. She continues to have regular BM.    In the last 24hrs she has received the following pain medications (7a-7a):  - Fentanyl 25mcg/ hr patch  - Oxycodone 10mg x 3      Past Medical History:   Diagnosis Date    Anemia     Anemia     Back pain     Dental infection     GERD (gastroesophageal reflux disease) 2020    Hairy-cell leukemia of spleen      Past Surgical History:   Procedure Laterality Date     SECTION, CLASSIC      FLUOROSCOPY N/A 2020    Procedure: Spleenic Bleed;  Surgeon: Hiren Cabrera MD;  Location: [facility] CATH LAB;  Service: General;  Laterality: N/A;    INJECTION OF ANESTHETIC AGENT INTO TISSUE PLANE DEFINED BY TRANSVERSUS ABDOMINIS MUSCLE N/A 2020    Procedure: BLOCK, TRANSVERSUS ABDOMINIS PLANE;  Surgeon: Yumi Ferguson MD;  Location: [facility] OR;  Service: General;  Laterality: N/A;    SPLENECTOMY N/A 2020    Procedure: SPLENECTOMY;  Surgeon: Yumi Ferguson MD;  Location: [facility] OR;  Service: General;  Laterality: N/A;    THORACENTESIS Left 2020    Procedure: THORACENTESIS;  Surgeon: Yumi Ferguson MD;  Location: [facility] OR;  Service: General;  Laterality: Left;    TUBAL LIGATION       History reviewed. No pertinent family history.  Review of patient's allergies indicates:   Allergen Reactions    Tramadol Hives     Current Facility-Administered Medications   Medication    0.9%  NaCl infusion    0.9%  NaCl infusion    acetaminophen tablet 650 mg     albuterol-ipratropium 2.5 mg-0.5 mg/3 mL nebulizer solution 3 mL    fentaNYL 25 mcg/hr 1 patch    folic acid-vit B6-vit B12 2.5-25-2 mg tablet 1 tablet    gabapentin capsule 300 mg    heparin (porcine) injection 3,200 Units    hydrOXYzine HCL tablet 25 mg    multivitamin tablet    ondansetron injection 4 mg    oxyCODONE immediate release tablet 10 mg    polyethylene glycol packet 17 g    promethazine tablet 25 mg    senna tablet 17.2 mg    thiamine tablet 100 mg       Review of Symptoms    Symptom Assessment (ESAS 0-10 Scale)  Pain:  4  Dyspnea:  0  Anxiety:  0  Nausea:  0  Depression:  0  Anorexia:  0  Fatigue:  0  Insomnia:  0  Restlessness:  0  Agitation:  0     CAM / Delirium:  Negative  Constipation:  Negative  Diarrhea:  Negative    Bowel Management Plan (BMP):  Yes      Pain Assessment:  Location(s): torso    Torso       Torso Location:  Posterior and bilateral       Quality:  Aching       Quantity:  4/10 in intensity    OME in 24 hours:  80    ECOG Performance Status Grade:  0 - Fully Active    Advanced Directives:  Living Will: No    LaPOST: No    Do Not Resuscitate Status: No    Medical Power of : Yes     Agent's Name:  Elsa Yang.  Agent's Contact Number:  [phone]    Decision Making:  Patient answered questions      ROS:  Review of Systems   Constitutional: Positive for appetite change and fever. Negative for activity change.   HENT: Positive for dental problem. Negative for facial swelling, mouth sores, sore throat and trouble swallowing.    Eyes: Negative for photophobia and visual disturbance.   Respiratory: Negative for cough and shortness of breath.    Cardiovascular: Negative for chest pain and leg swelling.   Gastrointestinal: Positive for constipation. Negative for abdominal pain, diarrhea and nausea.   Endocrine: Negative for polydipsia and polyphagia.   Genitourinary: Negative for difficulty urinating and dysuria.   Musculoskeletal: Positive for arthralgias,  myalgias and neck pain. Negative for back pain and neck stiffness.   Skin: Negative for rash and wound.   Allergic/Immunologic: Positive for immunocompromised state.   Neurological: Negative for syncope, weakness and light-headedness.   Psychiatric/Behavioral: Positive for sleep disturbance (d/t pain). Negative for confusion. The patient is not nervous/anxious.      OBJECTIVE:     Physical Exam:  Vitals: Temp: 98.3 °F (36.8 °C) (07/10/20 0738)  Pulse: 84 (07/10/20 0738)  Resp: 16 (07/10/20 0845)  BP: 124/76 (07/10/20 0738)  SpO2: 99 % (07/10/20 0738)    Gen: well-developed, well-nourished, NAD, 25mcg/ hr patch noted to R shoulder  Head: atraumatic, normocephalic  Eyes: conjuctiva and sclera clear  Ears: hearing grossly intact with no external abnormality  Mouth: good dentition, MM moist  Respiratory: CTAB, no wheezing or rhonchi  Heart: RRR  Abdomen: soft, NTTP, nondistended, normoactive BS  Pulses: 2+ in DP  Extremities: no edema, full ROM of all joints  Neurologic: no focal deficits, CN II-XII grossly intact, normal coordination  Skin: no rashes or lesions  Psych: cooperative, normal mood and affect, normal attention span and concentration     Labs:  Lab Results   Component Value Date    WBC 2.77 (L) 07/14/2020    HGB 9.1 (L) 07/14/2020    HCT 27.9 (L) 07/14/2020    MCV 93 07/14/2020     07/14/2020     BMP  Lab Results   Component Value Date     07/14/2020    K 4.9 07/14/2020     07/14/2020    CO2 22 (L) 07/14/2020    BUN 39 (H) 07/14/2020    CREATININE 7.0 (H) 07/14/2020    CALCIUM 8.1 (L) 07/14/2020    ANIONGAP 11 07/14/2020    ESTGFRAFRICA 8 (A) 07/14/2020    EGFRNONAA 7 (A) 07/14/2020     Albumin   Date Value Ref Range Status   07/06/2020 4.3 3.5 - 5.2 g/dL Final       Radiology:I have reviewed all pertinent imaging results/findings within the past 24 hours.  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 Differential is wide for this appearance, and includes cocaine abuse, rhinitis medicamentosa (chronic Afrin-type medication abuse), complication related to antiangiogenesis chemotherapy, sinonasal granulomatosis with polyangiitis, sinonasal sarcoid, or less likely but possible invasive fungal sinusitis.     Multiple dental caries are noted.     Exam limited by lack of IV contrast.  No appreciable oropharyngeal or dental abscess. Recommend further evaluation with direct visualization.     ASSESSMENT   Jaswant Yang is a 44 y.o. year old who was recently diagnosed with hairy cell leukemia currently on chemotherapy who was sent to the emergency department by Dr. Whittaker due to fever. She was complaining of jaw pain and edema and was admitted for antibiotics and monitoring. Ms. Yang has an outpatient referral for Palliative Medicine for pain and symptom management but her appointment was at the end of the month so we were asked to see while admitted.      PLAN   1. Neoplasm related pain  - Continue Fentanyl 25 mcg/ hr patch q72hr- script sent on 7/10/20 and already picked up  - Continue oxycodone 10mg PO q4hr PRN pain- script sent on 7/10/20 and already picked up  - Continue gabapentin 300mg HS for neuropathic pain- will titrate once renal function allows  - The risks, side effects, benefits of chronic opioid therapy have been discussed and the opportunity for questions provided.  - A record of the controlled substances prescribed and dispensed to the patient in [place] was reviewed in  by me (or my delegate). Last received hydrocodone 10mg #40 tabs and 5mg #11 tabs on 6/22/20, hydrocodone 10mg #20 on 6/16 and on 6/11.    2. Opioid induced constipation  - Continue daily Miralax and Senna 2 tabs HS     3. Hairy cell leukemia  - Defer to oncology  - Currently receiving chemotherapy     4. Encounter for Palliative Care  - Code status: FULL  - HCPOA: mom Elsa Yang, alternate: son Dmitri  Trey  - PM SW assisted with ACP and scanned in to the computer  - Primary goal is to continue cancer targeted therapy  - We will manage pain medication going forth and she already has f/u on 7/21 scheduled     5. ADAMARIS  - Opioid selection driven by elevated Cr  - Renally dosed gabapentin and will titrate once renal function improves  - Nephrology is following and she received HD yesterday  - Continue to monitor    Thank you for allowing Palliative Medicine to be involved in the care of Jaswant Yang.      Medical decision making: HIGH based on high risk of death, untreated symptoms, high risk medications, management of more than one chronic illness in exacerbation, managing side effects of medications or polypharmacy      Plan required increased review of medication choice, interaction, dosing, frequency, and route due to patient complexity. Patient complexity increased by: Presence of renal insufficiency    > 50% of 33 min visit spent in chart review, face to face discussion symptom assessment, coordination of care and emotional support, exploring burden/ benefit of various approaches of treatment.       Tatyana Gupta PA-C  Palliative Medicine

## 2020-07-14 NOTE — ASSESSMENT & PLAN NOTE
S/P splenectomy.  To re evaluate treatment regimen as outpatient. Chemotherapy is on hold pending resolution of acute issues.  F/U with dental upon discharge.    --F/U with Primary Oncologist upon discharge  --Daily CBC, CMP  --Case management working on referral to facility for dental work due to patient insurance

## 2020-07-14 NOTE — PROGRESS NOTES
[facility] -   Nephrology  Progress Note    Patient Name: Jaswant Yang  MRN: [id number]  Admission Date: 7/6/2020  Hospital Length of Stay: 8 days  Attending Provider: Rohit Rodriguez, *   Primary Care Physician: Primary Doctor No  Principal Problem:Hairy cell leukemia of spleen    Subjective:     HPI: 44 year old female with anemia, GERD, hairy-cell leukemia presents to [facility] with low grade fever, leukopenia and dental pain. Patient was started on antibiotics including vancomycin and received 4.2 g of Vancomycin in a 15 h period. UOP has not been recorded since admission.     Nephrology was consulted to help with patient's renal care while she is admitted at [facility]. I saw and examined patient in her hospital room. Patient reports generalized weakness and vomiting x 1 today. No other symptoms at present. She reports NSAID use in past (last use of ibuprofen about 3 weeks ago).     Chart review revealed that her baseline creatinine is about 0.9.     Interval History: Pt was seen and examined. Pt received acute HD yesterday for hyperkalemia. Pt tolerated HD well, no new issues today, no SOB.    Review of patient's allergies indicates:   Allergen Reactions    Tramadol Hives     Current Facility-Administered Medications   Medication Frequency    0.9%  NaCl infusion Continuous    0.9%  NaCl infusion PRN    acetaminophen tablet 650 mg Q6H PRN    albuterol-ipratropium 2.5 mg-0.5 mg/3 mL nebulizer solution 3 mL Q4H PRN    fentaNYL 25 mcg/hr 1 patch Q72H    folic acid-vit B6-vit B12 2.5-25-2 mg tablet 1 tablet Daily    gabapentin capsule 300 mg QHS    heparin (porcine) injection 3,200 Units PRN    hydrOXYzine HCL tablet 25 mg TID PRN    multivitamin tablet Daily    ondansetron injection 4 mg Q8H PRN    oxyCODONE immediate release tablet 10 mg Q4H PRN    polyethylene glycol packet 17 g Daily    promethazine tablet 25 mg Q6H PRN    senna tablet 17.2 mg QHS    thiamine tablet 100 mg Daily        Objective:     Vital Signs (Most Recent):  Temp: 98 °F (36.7 °C) (07/14/20 1652)  Pulse: 91 (07/14/20 1652)  Resp: 18 (07/14/20 1652)  BP: (!) 134/97 (07/14/20 1652)  SpO2: 100 % (07/14/20 1652)  O2 Device (Oxygen Therapy): room air (07/14/20 0723) Vital Signs (24h Range):  Temp:  [97.8 °F (36.6 °C)-98.8 °F (37.1 °C)] 98 °F (36.7 °C)  Pulse:  [79-91] 91  Resp:  [18-20] 18  SpO2:  [97 %-100 %] 100 %  BP: (130-160)/(75-97) 134/97     Weight: 124.3 kg (274 lb) (07/06/20 1700)  Body mass index is 38.22 kg/m².  Body surface area is 2.5 meters squared.    I/O last 3 completed shifts:  In: 2885 [P.O.:730; I.V.:2155]  Out: 4150 [Urine:3650; Other:500]    Physical Exam  Vitals signs and nursing note reviewed.   Constitutional:       Appearance: Normal appearance.   HENT:      Head: Normocephalic and atraumatic.   Cardiovascular:      Rate and Rhythm: Regular rhythm.      Pulses: Normal pulses.      Heart sounds: Normal heart sounds.   Pulmonary:      Effort: Pulmonary effort is normal.      Breath sounds: Normal breath sounds. No rales.   Abdominal:      Palpations: Abdomen is soft.   Musculoskeletal:      Right lower leg: No edema.      Left lower leg: No edema.   Skin:     General: Skin is warm and dry.   Neurological:      Mental Status: She is alert.   Psychiatric:         Mood and Affect: Mood normal.         Behavior: Behavior normal.         Significant Labs: reviewed  BMP  Lab Results   Component Value Date     07/14/2020    K 4.9 07/14/2020     07/14/2020    CO2 22 (L) 07/14/2020    BUN 39 (H) 07/14/2020    CREATININE 7.0 (H) 07/14/2020    CALCIUM 8.1 (L) 07/14/2020    ANIONGAP 11 07/14/2020    ESTGFRAFRICA 8 (A) 07/14/2020    EGFRNONAA 7 (A) 07/14/2020     Lab Results   Component Value Date    WBC 2.77 (L) 07/14/2020    HGB 9.1 (L) 07/14/2020    HCT 27.9 (L) 07/14/2020    MCV 93 07/14/2020     07/14/2020         Significant Imaging:     Assessment/Plan:     45 y/o female with ADAMARIS:     ADAMARIS  (acute kidney injury)  No signs of renal recovery  S/p HD acutely yesterday for hyperkalemia  Will continue to monitor for signs of renal recovery  Will provide acute HD as needed for hyperkalemia, severe acidosis, or hypoxia  Remains non-oliguric, easier management  K normal today  Metabolic acidosis is mild  O2 sat is good  No indications for acute HD     To review:  Has intrinsic renal damage (FENa >>1%)  Proteinuria and microscopic hematuria  ADAMARIS likely due to Vancomycin use and patient recrived 4.2 g of Vancomycin in 15 hour period. Supratherapeutic vancomycin level  Vancomycin has been discontinued.   Also, pt reported NSAIDs use 3 prior to admit     Trichomonas in u/a  S/p flagyl 2 g po x 1 yesterday        Plans and recommendations:  As discussed above  Will follow closely  Will provide acute HD as needed        Robel Montero MD  Nephrology  [facility]

## 2020-07-14 NOTE — ASSESSMENT & PLAN NOTE
45 y/o female with ADAMARIS:     ADAMARIS (acute kidney injury)  No signs of renal recovery  K high despite being given kayexalate and being non-oliguric  No response to high dose lasix yesterday  Acute HD was recommended  Explained to pt in layman's language  Risks and benefits of hemodialysis initiation were explained to the pt. Benefits include correction of hyperkalemia and other electrolyte disorders, maintanance of fluid balance, and improvement in oxygenation. Risks include changes in fluid status, heart failure, and death.     To review:  Has intrinsic renal damage (FENa >>1%)  Proteinuria and microscopic hematuria  ADAMARIS likely due to Vancomycin use and patient recrived 4.2 g of Vancomycin in 15 hour period. Supratherapeutic vancomycin level  Vancomycin has been discontinued.   Also, pt reported NSAIDs use 3 prior to admit     Trichomonas in u/a  S/p flagyl 2 g po x 1 yesterday        Plans and recommendations:  As discussed above  Will follow closely  Will provide acute HD as needed

## 2020-07-14 NOTE — PROGRESS NOTES
Patient received 3 hours of dialysis treatment. Mannitol 25 mg IVP given intra-dialysis. Net UF 0 ml removed. Dr. Montero visited during treatment. No access issues noted.

## 2020-07-14 NOTE — PLAN OF CARE
Fall precautions maintained. Pt free from falls/injuries.  Patient complains of pain. Pain controlled with prn meds.  Ambulates and repositions independently.  Plan of care and medications discussed with patient.  Patient verbalized understanding.  Bed locked and low, call bell within reach.  Chart check done. Will continue to monitor.

## 2020-07-15 PROBLEM — R50.81 NEUTROPENIC FEVER: Status: RESOLVED | Noted: 2020-07-06 | Resolved: 2020-07-15

## 2020-07-15 PROBLEM — D70.9 NEUTROPENIC FEVER: Status: RESOLVED | Noted: 2020-07-06 | Resolved: 2020-07-15

## 2020-07-15 PROBLEM — K04.7 DENTAL INFECTION: Status: RESOLVED | Noted: 2020-07-06 | Resolved: 2020-07-15

## 2020-07-15 LAB
ACANTHOCYTES BLD QL SMEAR: PRESENT
ALBUMIN SERPL BCP-MCNC: 3.4 G/DL (ref 3.5–5.2)
ALP SERPL-CCNC: 58 U/L (ref 55–135)
ALT SERPL W/O P-5'-P-CCNC: 27 U/L (ref 10–44)
ANION GAP SERPL CALC-SCNC: 11 MMOL/L (ref 8–16)
ANISOCYTOSIS BLD QL SMEAR: SLIGHT
AST SERPL-CCNC: 28 U/L (ref 10–40)
BASOPHILS # BLD AUTO: 0.01 K/UL (ref 0–0.2)
BASOPHILS NFR BLD: 0.3 % (ref 0–1.9)
BILIRUB SERPL-MCNC: 0.3 MG/DL (ref 0.1–1)
BUN SERPL-MCNC: 43 MG/DL (ref 6–20)
BURR CELLS BLD QL SMEAR: ABNORMAL
CALCIUM SERPL-MCNC: 8.2 MG/DL (ref 8.7–10.5)
CHLORIDE SERPL-SCNC: 108 MMOL/L (ref 95–110)
CO2 SERPL-SCNC: 20 MMOL/L (ref 23–29)
CREAT SERPL-MCNC: 7.4 MG/DL (ref 0.5–1.4)
DACRYOCYTES BLD QL SMEAR: ABNORMAL
DIFFERENTIAL METHOD: ABNORMAL
EOSINOPHIL # BLD AUTO: 0.1 K/UL (ref 0–0.5)
EOSINOPHIL NFR BLD: 2.1 % (ref 0–8)
ERYTHROCYTE [DISTWIDTH] IN BLOOD BY AUTOMATED COUNT: 20.9 % (ref 11.5–14.5)
EST. GFR  (AFRICAN AMERICAN): 7 ML/MIN/1.73 M^2
EST. GFR  (NON AFRICAN AMERICAN): 6 ML/MIN/1.73 M^2
GLUCOSE SERPL-MCNC: 108 MG/DL (ref 70–110)
HBV SURFACE AB SER QL IA: POSITIVE
HBV SURFACE AB SERPL IA-ACNC: 276 MIU/ML
HCT VFR BLD AUTO: 27.7 % (ref 37–48.5)
HGB BLD-MCNC: 8.9 G/DL (ref 12–16)
HYPOCHROMIA BLD QL SMEAR: ABNORMAL
IMM GRANULOCYTES # BLD AUTO: 0.05 K/UL (ref 0–0.04)
IMM GRANULOCYTES NFR BLD AUTO: 1.7 % (ref 0–0.5)
LYMPHOCYTES # BLD AUTO: 1.2 K/UL (ref 1–4.8)
LYMPHOCYTES NFR BLD: 41.2 % (ref 18–48)
MCH RBC QN AUTO: 29.8 PG (ref 27–31)
MCHC RBC AUTO-ENTMCNC: 32.1 G/DL (ref 32–36)
MCV RBC AUTO: 93 FL (ref 82–98)
MONOCYTES # BLD AUTO: 0.1 K/UL (ref 0.3–1)
MONOCYTES NFR BLD: 2.7 % (ref 4–15)
NEUTROPHILS # BLD AUTO: 1.5 K/UL (ref 1.8–7.7)
NEUTROPHILS NFR BLD: 52 % (ref 38–73)
NRBC BLD-RTO: 1 /100 WBC
OVALOCYTES BLD QL SMEAR: ABNORMAL
PLATELET # BLD AUTO: 200 K/UL (ref 150–350)
PLATELET BLD QL SMEAR: ABNORMAL
PMV BLD AUTO: 10.3 FL (ref 9.2–12.9)
POIKILOCYTOSIS BLD QL SMEAR: SLIGHT
POLYCHROMASIA BLD QL SMEAR: ABNORMAL
POTASSIUM SERPL-SCNC: 5.1 MMOL/L (ref 3.5–5.1)
PROT SERPL-MCNC: 6.7 G/DL (ref 6–8.4)
RBC # BLD AUTO: 2.99 M/UL (ref 4–5.4)
SCHISTOCYTES BLD QL SMEAR: PRESENT
SODIUM SERPL-SCNC: 139 MMOL/L (ref 136–145)
STOMATOCYTES BLD QL SMEAR: PRESENT
TARGETS BLD QL SMEAR: ABNORMAL
WBC # BLD AUTO: 2.91 K/UL (ref 3.9–12.7)

## 2020-07-15 PROCEDURE — 99232 PR SUBSEQUENT HOSPITAL CARE,LEVL II: ICD-10-PCS | Mod: ,,, | Performed by: INTERNAL MEDICINE

## 2020-07-15 PROCEDURE — 99233 SBSQ HOSP IP/OBS HIGH 50: CPT | Mod: ,,, | Performed by: INTERNAL MEDICINE

## 2020-07-15 PROCEDURE — 25000003 PHARM REV CODE 250: Performed by: SURGERY

## 2020-07-15 PROCEDURE — 36415 COLL VENOUS BLD VENIPUNCTURE: CPT

## 2020-07-15 PROCEDURE — 21400001 HC TELEMETRY ROOM

## 2020-07-15 PROCEDURE — 99233 PR SUBSEQUENT HOSPITAL CARE,LEVL III: ICD-10-PCS | Mod: ,,, | Performed by: INTERNAL MEDICINE

## 2020-07-15 PROCEDURE — 80053 COMPREHEN METABOLIC PANEL: CPT

## 2020-07-15 PROCEDURE — 85025 COMPLETE CBC W/AUTO DIFF WBC: CPT

## 2020-07-15 PROCEDURE — 99232 SBSQ HOSP IP/OBS MODERATE 35: CPT | Mod: ,,, | Performed by: INTERNAL MEDICINE

## 2020-07-15 RX ADMIN — THERA TABS 1 TABLET: TAB at 08:07

## 2020-07-15 RX ADMIN — GABAPENTIN 300 MG: 300 CAPSULE ORAL at 09:07

## 2020-07-15 RX ADMIN — SENNOSIDES 17.2 MG: 8.6 TABLET, FILM COATED ORAL at 09:07

## 2020-07-15 RX ADMIN — Medication 1 TABLET: at 08:07

## 2020-07-15 RX ADMIN — Medication 100 MG: at 08:07

## 2020-07-15 RX ADMIN — SODIUM CHLORIDE: 0.9 INJECTION, SOLUTION INTRAVENOUS at 08:07

## 2020-07-15 RX ADMIN — SODIUM CHLORIDE: 0.9 INJECTION, SOLUTION INTRAVENOUS at 06:07

## 2020-07-15 RX ADMIN — SODIUM CHLORIDE: 0.9 INJECTION, SOLUTION INTRAVENOUS at 12:07

## 2020-07-15 RX ADMIN — OXYCODONE 10 MG: 5 TABLET ORAL at 08:07

## 2020-07-15 RX ADMIN — FENTANYL 1 PATCH: 25 PATCH, EXTENDED RELEASE TRANSDERMAL at 07:07

## 2020-07-15 NOTE — PLAN OF CARE
Recommendation:   1. Consider neutropenic diet   2. Monitor for changes in meal consumption   3. RD to monitor    Interventions:  General healthful diet  Collaboration with other providers    Goals: meet at least 85% EEN/EPN with meals by discharge  Nutrition Goal Status: new  Nutrition Discharge Planning: neutropenic diet

## 2020-07-15 NOTE — PROGRESS NOTES
[facility] -   Hematology/Oncology  Progress Note    Patient Name: Jaswant Yang  Admission Date: 7/6/2020  Hospital Length of Stay: 9 days  Code Status: Prior     Subjective:     HPI:  44 y.o female with hairy cell lymphoma currently being treated with CLADRIBINE sent from Dr. Whittaker office on yesterday for fever and presumed right sided tooth infection. Associated symptoms include facial swelling, jaw pain, fever. Patient asked to report to ED for IV antibiotics for neutropenic fever in setting of tooth infection. Patient was started on Vancomycin but had allergic reaction and was placed on Zoysn.     Interval History: States that she is feeling better today with no complaints currently.      Oncology Treatment Plan:   OP CLADRIBINE (5 DAY) + RITUXIMAB - Hairy Cell Leukemia    Medications:  Continuous Infusions:   sodium chloride 0.9% 100 mL/hr at 07/15/20 0819     Scheduled Meds:   fentaNYL  1 patch Transdermal Q72H    folic acid-vit B6-vit B12 2.5-25-2 mg  1 tablet Oral Daily    gabapentin  300 mg Oral QHS    multivitamin  1 tablet Oral Daily    polyethylene glycol  17 g Oral Daily    senna  17.2 mg Oral QHS    thiamine  100 mg Oral Daily     PRN Meds:sodium chloride 0.9%, acetaminophen, albuterol-ipratropium, heparin (porcine), hydrOXYzine HCL, ondansetron, oxyCODONE, promethazine     Review of Systems   Constitutional: Positive for activity change and fatigue. Negative for appetite change, chills, diaphoresis, fever and unexpected weight change.   HENT: Positive for dental problem. Negative for congestion, hearing loss, nosebleeds, postnasal drip, sore throat and trouble swallowing.    Eyes: Negative for pain, discharge and visual disturbance.   Respiratory: Negative for cough, chest tightness and shortness of breath.    Cardiovascular: Negative for chest pain and palpitations.   Gastrointestinal: Negative for blood in stool, constipation, diarrhea, nausea and vomiting.   Endocrine:  Negative for cold intolerance and heat intolerance.   Genitourinary: Negative for difficulty urinating, dyspareunia, flank pain and hematuria.   Musculoskeletal: Positive for arthralgias and myalgias. Negative for back pain, gait problem, joint swelling and neck pain.   Skin: Negative.    Neurological: Negative for dizziness, weakness, light-headedness and headaches.   Hematological: Negative for adenopathy. Does not bruise/bleed easily.   Psychiatric/Behavioral: Positive for dysphoric mood. Negative for agitation, behavioral problems and confusion. The patient is nervous/anxious.      Objective:     Vital Signs (Most Recent):  Temp: 98.2 °F (36.8 °C) (07/15/20 0711)  Pulse: 84 (07/15/20 1144)  Resp: 17 (07/15/20 0818)  BP: (!) 143/91 (07/15/20 0711)  SpO2: 99 % (07/15/20 0711) Vital Signs (24h Range):  Temp:  [97.9 °F (36.6 °C)-98.4 °F (36.9 °C)] 98.2 °F (36.8 °C)  Pulse:  [] 84  Resp:  [17-18] 17  SpO2:  [98 %-100 %] 99 %  BP: (134-156)/(79-97) 143/91     Weight: 124.3 kg (274 lb)  Body mass index is 38.22 kg/m².  Body surface area is 2.5 meters squared.      Intake/Output Summary (Last 24 hours) at 7/15/2020 1205  Last data filed at 7/15/2020 1145  Gross per 24 hour   Intake 2763.33 ml   Output 4200 ml   Net -1436.67 ml       Physical Exam  Vitals signs and nursing note reviewed.   Constitutional:       General: She is not in acute distress.     Appearance: She is well-developed.   HENT:      Head: Normocephalic and atraumatic.      Right Ear: Hearing and external ear normal.      Left Ear: Hearing and external ear normal.      Nose: No rhinorrhea.      Right Sinus: No maxillary sinus tenderness or frontal sinus tenderness.      Left Sinus: No maxillary sinus tenderness or frontal sinus tenderness.      Mouth/Throat:      Mouth: No oral lesions.      Pharynx: Uvula midline.   Eyes:      General:         Right eye: No discharge.         Left eye: No discharge.      Conjunctiva/sclera: Conjunctivae normal.       Pupils: Pupils are equal, round, and reactive to light.   Neck:      Musculoskeletal: Normal range of motion.      Thyroid: No thyromegaly.      Vascular: No carotid bruit.      Trachea: No tracheal deviation.   Cardiovascular:      Rate and Rhythm: Normal rate and regular rhythm.      Pulses:           Dorsalis pedis pulses are 2+ on the right side and 2+ on the left side.      Heart sounds: Normal heart sounds, S1 normal and S2 normal. No murmur.   Pulmonary:      Effort: Pulmonary effort is normal. No respiratory distress.      Breath sounds: Normal breath sounds.   Abdominal:      General: Bowel sounds are normal. There is no distension.      Palpations: Abdomen is soft. There is no mass.      Tenderness: There is no abdominal tenderness.   Musculoskeletal: Normal range of motion.      Left shoulder: She exhibits no tenderness.      Right upper leg: She exhibits no tenderness.      Left upper leg: She exhibits no tenderness.   Lymphadenopathy:      Cervical: No cervical adenopathy.      Upper Body:      Right upper body: No supraclavicular adenopathy.      Left upper body: No supraclavicular adenopathy.   Skin:     General: Skin is warm and dry.      Capillary Refill: Capillary refill takes less than 2 seconds.      Coloration: Skin is pale.      Findings: No rash.   Neurological:      Mental Status: She is alert and oriented to person, place, and time.      Sensory: No sensory deficit.      Coordination: Coordination normal.      Gait: Gait normal.   Psychiatric:         Attention and Perception: Attention normal.         Mood and Affect: Mood is anxious and depressed.         Speech: Speech normal.         Behavior: Behavior normal.         Thought Content: Thought content normal.         Cognition and Memory: Cognition normal.         Judgment: Judgment normal.         Significant Labs:   CBC:   Recent Labs   Lab 07/14/20  0853 07/15/20  0919   WBC 2.77* 2.91*   HGB 9.1* 8.9*   HCT 27.9* 27.7*    200     and CMP:   Recent Labs   Lab 07/13/20  1255 07/14/20  0853 07/15/20  0919   NA  --  139 139   K 4.6 4.9 5.1   CL  --  106 108   CO2  --  22* 20*   GLU  --  102 108   BUN  --  39* 43*   CREATININE  --  7.0* 7.4*   CALCIUM  --  8.1* 8.2*   PROT  --   --  6.7   ALBUMIN  --   --  3.4*   BILITOT  --   --  0.3   ALKPHOS  --   --  58   AST  --   --  28   ALT  --   --  27   ANIONGAP  --  11 11   EGFRNONAA  --  7* 6*       Diagnostic Results:  I have reviewed all pertinent imaging results/findings within the past 24 hours.    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  To re evaluate treatment regimen as outpatient. Chemotherapy is on hold pending resolution of acute issues.   --F/U with Primary Oncologist upon discharge  --Daily CBC, CMP  --Case management working on referral to facility for dental work due to patient insurance      ADAMARIS (acute kidney injury)  Management per Nephrology.  Vancomycin has been DC'd.  Required dialysis on 7/13/2020.         Dental infection  No complaints of dental pain today.  Vanc stopped.  Does not have dentist.  Case management working on getting patient f/u within the [facility] system due to insurance constraints.   Patient is aware.       Chemotherapy-induced neutropenia  ANC >1000 - recovering        Thank you for your consult. I will follow-up with patient. Please contact us if you have any additional questions.     Ursula Tran NP  Hematology/Oncology  [facility]

## 2020-07-15 NOTE — PROGRESS NOTES
[facility] -   Nephrology  Progress Note    Patient Name: Jaswant Yang  MRN: [id number]  Admission Date: 7/6/2020  Hospital Length of Stay: 9 days  Attending Provider: Rohit Rodriguez, *   Primary Care Physician: Primary Doctor No  Principal Problem:Hairy cell leukemia of spleen    Subjective:     HPI: 44 year old female with anemia, GERD, hairy-cell leukemia presents to [facility] with low grade fever, leukopenia and dental pain. Patient was started on antibiotics including vancomycin and received 4.2 g of Vancomycin in a 15 h period. UOP has not been recorded since admission.     Nephrology was consulted to help with patient's renal care while she is admitted at [facility]. I saw and examined patient in her hospital room. Patient reports generalized weakness and vomiting x 1 today. No other symptoms at present. She reports NSAID use in past (last use of ibuprofen about 3 weeks ago).     Chart review revealed that her baseline creatinine is about 0.9.     Interval History: Pt was seen and examined. No new c/o's, no SOB, pt is s/p acute Hd for hyperkalemia 2 days ago. Has no new c/o's today.    Review of patient's allergies indicates:   Allergen Reactions    Tramadol Hives     Current Facility-Administered Medications   Medication Frequency    0.9%  NaCl infusion Continuous    0.9%  NaCl infusion PRN    acetaminophen tablet 650 mg Q6H PRN    albuterol-ipratropium 2.5 mg-0.5 mg/3 mL nebulizer solution 3 mL Q4H PRN    fentaNYL 25 mcg/hr 1 patch Q72H    folic acid-vit B6-vit B12 2.5-25-2 mg tablet 1 tablet Daily    gabapentin capsule 300 mg QHS    heparin (porcine) injection 3,200 Units PRN    hydrOXYzine HCL tablet 25 mg TID PRN    multivitamin tablet Daily    ondansetron injection 4 mg Q8H PRN    oxyCODONE immediate release tablet 10 mg Q4H PRN    polyethylene glycol packet 17 g Daily    promethazine tablet 25 mg Q6H PRN    senna tablet 17.2 mg QHS    thiamine tablet 100 mg Daily        Objective:     Vital Signs (Most Recent):  Temp: 98.2 °F (36.8 °C) (07/15/20 0711)  Pulse: 84 (07/15/20 1144)  Resp: 17 (07/15/20 0818)  BP: (!) 143/91 (07/15/20 0711)  SpO2: 99 % (07/15/20 0711)  O2 Device (Oxygen Therapy): room air (07/15/20 0711) Vital Signs (24h Range):  Temp:  [97.9 °F (36.6 °C)-98.4 °F (36.9 °C)] 98.2 °F (36.8 °C)  Pulse:  [] 84  Resp:  [17-18] 17  SpO2:  [98 %-100 %] 99 %  BP: (134-156)/(79-97) 143/91     Weight: 124.3 kg (274 lb) (07/06/20 1700)  Body mass index is 38.22 kg/m².  Body surface area is 2.5 meters squared.    I/O last 3 completed shifts:  In: 3215 [P.O.:980; I.V.:2235]  Out: 5150 [Urine:4650; Other:500]    Physical Exam  Vitals signs and nursing note reviewed.   Constitutional:       Appearance: Normal appearance.   HENT:      Head: Normocephalic and atraumatic.   Cardiovascular:      Rate and Rhythm: Regular rhythm.      Pulses: Normal pulses.      Heart sounds: Normal heart sounds.   Pulmonary:      Effort: Pulmonary effort is normal.      Breath sounds: Normal breath sounds. No rales.   Abdominal:      Palpations: Abdomen is soft.   Musculoskeletal:      Right lower leg: No edema.      Left lower leg: No edema.   Skin:     General: Skin is warm and dry.   Neurological:      Mental Status: She is alert.   Psychiatric:         Mood and Affect: Mood normal.         Behavior: Behavior normal.         Significant Labs: reviewed  BMP  Lab Results   Component Value Date     07/15/2020    K 5.1 07/15/2020     07/15/2020    CO2 20 (L) 07/15/2020    BUN 43 (H) 07/15/2020    CREATININE 7.4 (H) 07/15/2020    CALCIUM 8.2 (L) 07/15/2020    ANIONGAP 11 07/15/2020    ESTGFRAFRICA 7 (A) 07/15/2020    EGFRNONAA 6 (A) 07/15/2020     Lab Results   Component Value Date    WBC 2.91 (L) 07/15/2020    HGB 8.9 (L) 07/15/2020    HCT 27.7 (L) 07/15/2020    MCV 93 07/15/2020     07/15/2020           Significant Imaging: reviewed    Assessment/Plan:     43 y/o female with  ADAMARIS:     ADAMARIS (acute kidney injury)  No signs of renal recovery at this point  S/p HD acutely yesterday for hyperkalemia  K normal  O2 sat good  Metabolic acidosis, mild  Remains non-oliguric with good UOP  No acute indications for HD today  Will hold HD today    Will continue to monitor for signs of renal recovery  Will provide acute HD as needed for hyperkalemia, severe acidosis, or hypoxia     To review:  Has intrinsic renal damage (FENa >>1%)  Proteinuria and microscopic hematuria  ADAMARIS likely due to Vancomycin use and patient recrived 4.2 g of Vancomycin in 15 hour period. Supratherapeutic vancomycin level  Vancomycin has been discontinued.   Also, pt reported NSAIDs use 3 prior to admit     Trichomonas in u/a  S/p flagyl 2 g po x 1 yesterday        Plans and recommendations:  As discussed above  Will follow closely  Will provide acute HD as needed        Robel Montero MD  Nephrology  [facility]

## 2020-07-15 NOTE — SUBJECTIVE & OBJECTIVE
Interval History: Pt was seen and examined. No new c/o's, no SOB, pt is s/p acute Hd for hyperkalemia 2 days ago. Has no new c/o's today.    Review of patient's allergies indicates:   Allergen Reactions    Tramadol Hives     Current Facility-Administered Medications   Medication Frequency    0.9%  NaCl infusion Continuous    0.9%  NaCl infusion PRN    acetaminophen tablet 650 mg Q6H PRN    albuterol-ipratropium 2.5 mg-0.5 mg/3 mL nebulizer solution 3 mL Q4H PRN    fentaNYL 25 mcg/hr 1 patch Q72H    folic acid-vit B6-vit B12 2.5-25-2 mg tablet 1 tablet Daily    gabapentin capsule 300 mg QHS    heparin (porcine) injection 3,200 Units PRN    hydrOXYzine HCL tablet 25 mg TID PRN    multivitamin tablet Daily    ondansetron injection 4 mg Q8H PRN    oxyCODONE immediate release tablet 10 mg Q4H PRN    polyethylene glycol packet 17 g Daily    promethazine tablet 25 mg Q6H PRN    senna tablet 17.2 mg QHS    thiamine tablet 100 mg Daily       Objective:     Vital Signs (Most Recent):  Temp: 98.2 °F (36.8 °C) (07/15/20 0711)  Pulse: 84 (07/15/20 1144)  Resp: 17 (07/15/20 0818)  BP: (!) 143/91 (07/15/20 0711)  SpO2: 99 % (07/15/20 0711)  O2 Device (Oxygen Therapy): room air (07/15/20 0711) Vital Signs (24h Range):  Temp:  [97.9 °F (36.6 °C)-98.4 °F (36.9 °C)] 98.2 °F (36.8 °C)  Pulse:  [] 84  Resp:  [17-18] 17  SpO2:  [98 %-100 %] 99 %  BP: (134-156)/(79-97) 143/91     Weight: 124.3 kg (274 lb) (07/06/20 1700)  Body mass index is 38.22 kg/m².  Body surface area is 2.5 meters squared.    I/O last 3 completed shifts:  In: 3215 [P.O.:980; I.V.:2235]  Out: 5150 [Urine:4650; Other:500]    Physical Exam  Vitals signs and nursing note reviewed.   Constitutional:       Appearance: Normal appearance.   HENT:      Head: Normocephalic and atraumatic.   Cardiovascular:      Rate and Rhythm: Regular rhythm.      Pulses: Normal pulses.      Heart sounds: Normal heart sounds.   Pulmonary:      Effort: Pulmonary effort  is normal.      Breath sounds: Normal breath sounds. No rales.   Abdominal:      Palpations: Abdomen is soft.   Musculoskeletal:      Right lower leg: No edema.      Left lower leg: No edema.   Skin:     General: Skin is warm and dry.   Neurological:      Mental Status: She is alert.   Psychiatric:         Mood and Affect: Mood normal.         Behavior: Behavior normal.         Significant Labs: reviewed  BMP  Lab Results   Component Value Date     07/15/2020    K 5.1 07/15/2020     07/15/2020    CO2 20 (L) 07/15/2020    BUN 43 (H) 07/15/2020    CREATININE 7.4 (H) 07/15/2020    CALCIUM 8.2 (L) 07/15/2020    ANIONGAP 11 07/15/2020    ESTGFRAFRICA 7 (A) 07/15/2020    EGFRNONAA 6 (A) 07/15/2020     Lab Results   Component Value Date    WBC 2.91 (L) 07/15/2020    HGB 8.9 (L) 07/15/2020    HCT 27.7 (L) 07/15/2020    MCV 93 07/15/2020     07/15/2020           Significant Imaging: reviewed

## 2020-07-15 NOTE — SUBJECTIVE & OBJECTIVE
Interval History:asleep; easily around. No urgent need for HD today    Review of Systems   Constitutional: Negative for activity change, appetite change, chills, diaphoresis, fatigue, fever and unexpected weight change.   HENT: Positive for dental problem. Negative for congestion, drooling, facial swelling, rhinorrhea, sinus pressure, sneezing, sore throat, trouble swallowing and voice change.    Eyes: Negative for photophobia, discharge, redness, itching and visual disturbance.   Respiratory: Negative for apnea, cough, choking, chest tightness, shortness of breath, wheezing and stridor.    Cardiovascular: Negative for chest pain, palpitations and leg swelling.   Gastrointestinal: Negative for abdominal distention, abdominal pain, anal bleeding, blood in stool, constipation, diarrhea, nausea and vomiting.   Endocrine: Negative for cold intolerance, heat intolerance, polydipsia, polyphagia and polyuria.   Genitourinary: Negative for decreased urine volume, difficulty urinating, dysuria, flank pain, frequency, hematuria, pelvic pain, urgency, vaginal bleeding and vaginal discharge.   Musculoskeletal: Positive for back pain (chronic). Negative for arthralgias, gait problem, joint swelling, myalgias, neck pain and neck stiffness.   Skin: Negative for color change, pallor, rash and wound.   Neurological: Positive for weakness (generalized). Negative for dizziness, seizures, syncope, facial asymmetry, speech difficulty, light-headedness, numbness and headaches.   Psychiatric/Behavioral: Negative for agitation, confusion, hallucinations and suicidal ideas.   All other systems reviewed and are negative.         Objective:     Vital Signs (Most Recent):  Temp: 98.3 °F (36.8 °C) (07/15/20 1418)  Pulse: 99 (07/15/20 1503)  Resp: 18 (07/15/20 1418)  BP: 135/88 (07/15/20 1418)  SpO2: 99 % (07/15/20 1418) Vital Signs (24h Range):  Temp:  [97.9 °F (36.6 °C)-98.4 °F (36.9 °C)] 98.3 °F (36.8 °C)  Pulse:  [] 99  Resp:  [17-18]  18  SpO2:  [98 %-100 %] 99 %  BP: (134-156)/(79-97) 135/88     Weight: 124.3 kg (274 lb)  Body mass index is 38.22 kg/m².    Intake/Output Summary (Last 24 hours) at 7/15/2020 1518  Last data filed at 7/15/2020 1145  Gross per 24 hour   Intake 2763.33 ml   Output 4200 ml   Net -1436.67 ml      Physical Exam  Vitals signs and nursing note reviewed.   Constitutional:       Appearance: Normal appearance.   HENT:      Head: Normocephalic and atraumatic.   Cardiovascular:      Rate and Rhythm: Regular rhythm.      Pulses: Normal pulses.      Heart sounds: Normal heart sounds.   Pulmonary:      Effort: Pulmonary effort is normal.      Breath sounds: Normal breath sounds. No rales.   Abdominal:      Palpations: Abdomen is soft.   Musculoskeletal:      Right lower leg: No edema.      Left lower leg: No edema.   Skin:     General: Skin is warm and dry.   Neurological:      Mental Status: She is alert.   Psychiatric:         Mood and Affect: Mood normal.         Behavior: Behavior normal.       Significant Labs:   CBC:   Recent Labs   Lab 07/14/20  0853 07/15/20  0919   WBC 2.77* 2.91*   HGB 9.1* 8.9*   HCT 27.9* 27.7*    200     CMP:   Recent Labs   Lab 07/14/20  0853 07/15/20  0919    139   K 4.9 5.1    108   CO2 22* 20*    108   BUN 39* 43*   CREATININE 7.0* 7.4*   CALCIUM 8.1* 8.2*   PROT  --  6.7   ALBUMIN  --  3.4*   BILITOT  --  0.3   ALKPHOS  --  58   AST  --  28   ALT  --  27   ANIONGAP 11 11   EGFRNONAA 7* 6*       Significant Imaging: I have reviewed all pertinent imaging results/findings within the past 24 hours.

## 2020-07-15 NOTE — PLAN OF CARE
Problem: Adult Inpatient Plan of Care  Goal: Plan of Care Review  Outcome: Ongoing, Progressing     Problem: Neutropenia  Goal: Absence of Infection  Outcome: Ongoing, Progressing     Problem: Renal Function Impairment (Acute Kidney Injury/Impairment)  Goal: Effective Renal Function  Outcome: Ongoing, Progressing

## 2020-07-15 NOTE — PROGRESS NOTES
"[facility] Hospital Medicine  Progress Note    Patient Name: Jaswant Yang  MRN: [id number]  Patient Class: IP- Inpatient   Admission Date: 7/6/2020  Length of Stay: 9 days  Attending Physician: Rohit Rodriguez, *  Primary Care Provider: Primary Doctor No    Subjective:     Principal Problem:Hairy cell leukemia of spleen        HPI:  45 y/o AA F with hx of hairy cell leukemia (diagnosed 05/2020), anemia, chronic pain to ED from Dr. Whittaker' office after being found to have a low grade temperature (99.6) with a visible dental infection in the R lower jaw and L upper jaw, in the setting of chemotherapy induced neutropenia (WBC 0.89); mild hypokalemia (3.4) also noted in ED, but labs otherwise largely unremarkable - admission for IV abx; BC x2 pending. Pt reports associated severe stabbing pain in the affected jaw areas, and mild facial edema, worsening over the past 2 days but states no drainage/bleeding. Pt's last chemo cycle ended on 06/26/20 - reports pain improved with IV morphine in ED, states she feels "a little better now" - palliative care consulted per ED physician at patient's request, to assist with symptom management and pain control. Denies CP, edema, palpitations, SOB, wheezing, orthopnea, PND, abdominal pain, N/V/D, dysuria, flank pain, HA, dizziness, fever, cough, chills, falls/trauma, blurred vision, focal deficits. No recent dietary changes, travel, sick contacts or viral illness - pt is typically active and independent with ADLs and ambulation at home. Pt is full code. Surrogate decision makers are son (Dmitri Yang) and Mother (Elsa Yang). Admitted for chemotherapy induced neutropenic fever with a jaw/dental infection.     Overview/Hospital Course:  7/7/20 The patient reports pain is not well controlled. Palliative care was consulted. The patient remained afebrile overnight. CT maxiollofacial CT was negative for a dental abscess but showed multiple dental caries. " "The patient remains neutropenic, Hem/onc following. Continue current management with IV ABX. 7/8/20 The patient remained afebrile overnight. WBCs improved to 1.41K, Hem/onc following. The patients renal function worsened overnight, Cr. Increased from 2 to 4.9 overnight. This is likely 2/2 vanc. IV fluids started and Nephrology consulted. Will monitor renal function. 7/9/20 No acute issues overnight. The patient continues to have good UOP. Creatinine increased to 6.8. Nephrology following, will continue to monitor renal function. ANC is improving, Hem/onc following. 7/10/20 The patient reports "feeling better today". WBCs continue to recover, Hem/onc following. Creatinine continues to worsen, Cr 8.1 today. The patient reports good UOP, Nephrology following.   7/11 Pt was seen and examined at bedside . She denies  Any complaint for today . The kidney function cont trending up .  7/12 The kidney function cont trending up . The K is 5.5 and Co2 16 . She  Have a good Uop . The CXR is clear . There was no acute event overnight   7/13 The kidney function is trending up . The case was d/c nephrology  . Pt will start HD  Today.   7/14-Kidney function stable  7/15-HD not needed urgently today. Will continue to monitor kidney function.    Interval History:asleep; easily around. No urgent need for HD today    Review of Systems   Constitutional: Negative for activity change, appetite change, chills, diaphoresis, fatigue, fever and unexpected weight change.   HENT: Positive for dental problem. Negative for congestion, drooling, facial swelling, rhinorrhea, sinus pressure, sneezing, sore throat, trouble swallowing and voice change.    Eyes: Negative for photophobia, discharge, redness, itching and visual disturbance.   Respiratory: Negative for apnea, cough, choking, chest tightness, shortness of breath, wheezing and stridor.    Cardiovascular: Negative for chest pain, palpitations and leg swelling.   Gastrointestinal: Negative " for abdominal distention, abdominal pain, anal bleeding, blood in stool, constipation, diarrhea, nausea and vomiting.   Endocrine: Negative for cold intolerance, heat intolerance, polydipsia, polyphagia and polyuria.   Genitourinary: Negative for decreased urine volume, difficulty urinating, dysuria, flank pain, frequency, hematuria, pelvic pain, urgency, vaginal bleeding and vaginal discharge.   Musculoskeletal: Positive for back pain (chronic). Negative for arthralgias, gait problem, joint swelling, myalgias, neck pain and neck stiffness.   Skin: Negative for color change, pallor, rash and wound.   Neurological: Positive for weakness (generalized). Negative for dizziness, seizures, syncope, facial asymmetry, speech difficulty, light-headedness, numbness and headaches.   Psychiatric/Behavioral: Negative for agitation, confusion, hallucinations and suicidal ideas.   All other systems reviewed and are negative.         Objective:     Vital Signs (Most Recent):  Temp: 98.3 °F (36.8 °C) (07/15/20 1418)  Pulse: 99 (07/15/20 1503)  Resp: 18 (07/15/20 1418)  BP: 135/88 (07/15/20 1418)  SpO2: 99 % (07/15/20 1418) Vital Signs (24h Range):  Temp:  [97.9 °F (36.6 °C)-98.4 °F (36.9 °C)] 98.3 °F (36.8 °C)  Pulse:  [] 99  Resp:  [17-18] 18  SpO2:  [98 %-100 %] 99 %  BP: (134-156)/(79-97) 135/88     Weight: 124.3 kg (274 lb)  Body mass index is 38.22 kg/m².    Intake/Output Summary (Last 24 hours) at 7/15/2020 1518  Last data filed at 7/15/2020 1145  Gross per 24 hour   Intake 2763.33 ml   Output 4200 ml   Net -1436.67 ml      Physical Exam  Vitals signs and nursing note reviewed.   Constitutional:       Appearance: Normal appearance.   HENT:      Head: Normocephalic and atraumatic.   Cardiovascular:      Rate and Rhythm: Regular rhythm.      Pulses: Normal pulses.      Heart sounds: Normal heart sounds.   Pulmonary:      Effort: Pulmonary effort is normal.      Breath sounds: Normal breath sounds. No rales.   Abdominal:     "  Palpations: Abdomen is soft.   Musculoskeletal:      Right lower leg: No edema.      Left lower leg: No edema.   Skin:     General: Skin is warm and dry.   Neurological:      Mental Status: She is alert.   Psychiatric:         Mood and Affect: Mood normal.         Behavior: Behavior normal.       Significant Labs:   CBC:   Recent Labs   Lab 07/14/20  0853 07/15/20  0919   WBC 2.77* 2.91*   HGB 9.1* 8.9*   HCT 27.9* 27.7*    200     CMP:   Recent Labs   Lab 07/14/20  0853 07/15/20  0919    139   K 4.9 5.1    108   CO2 22* 20*    108   BUN 39* 43*   CREATININE 7.0* 7.4*   CALCIUM 8.1* 8.2*   PROT  --  6.7   ALBUMIN  --  3.4*   BILITOT  --  0.3   ALKPHOS  --  58   AST  --  28   ALT  --  27   ANIONGAP 11 11   EGFRNONAA 7* 6*       Significant Imaging: I have reviewed all pertinent imaging results/findings within the past 24 hours.      Assessment/Plan:      * Hairy cell leukemia of spleen  Consult hematology/oncology   Consult palliative care for symptom/pain management  The patient reports "feeling better today". WBCs continue to recover, Hem/onc following.     Trichomonas infection  metronidazol 2gr  po x 1       Metabolic acidosis  Most likley 2/2 to ADAMARIS  Monitor       Hypocalcemia  --corrected calcium was 8.7. no replacement needed      ADAMARIS (acute kidney injury)  Multifactorial : most likely due to vanc  Nephrology on board   Kidney function trending up  Pt will start HD today     7/15/20  Last HD was on 7/13/2020. No urgent indication for HD today. Nephrology following      Acute neoplasm-related pain  - Continue PRN Analgesia - Palliative care consulted       Encounter for palliative care  Per palliative care       Chemotherapy-induced neutropenia  Improving   Cont monitor       VTE Risk Mitigation (From admission, onward)         Ordered     heparin (porcine) injection 3,200 Units  As needed (PRN)      07/13/20 1752     Place sequential compression device  Until discontinued      " 07/06/20 1654                Hemant Zuniga NP  Department of Hospital Medicine   [facility]

## 2020-07-15 NOTE — MEDICAL/APP STUDENT
[facility]  Progress Note    Patient Name: Jaswant Yang  MRN: [id number]  Patient Class: IP- Inpatient   Admission Date: 7/6/2020  Length of Stay: 9 days  Attending Physician: Rohit Rodriguez, *  Primary Care Provider: Primary Doctor No    Subjective:   Principal problem: Neutropenic fever      HPI: Jaswant Yang is a 43 yo female w/ a PMHx of hairy cell leukemia (dx 5/20), anemia, GERD, splenectomy, and chemotherapy (most recent 6/26/20) who presented to the ED on 7/6/20 w/ neutropenic fever and a dental infection.     Interval history: No acute events reported overnight. Pt reports pain well controlled. Pt says she has been ambulating, eating, and urinating normally. Pt reports normal bowel movement yesterday.    Review of Systems   Constitutional: Positive for malaise/fatigue. Negative for chills and fever.   HENT: Negative.    Eyes: Negative.    Respiratory: Negative for cough and shortness of breath.    Cardiovascular: Negative for chest pain, palpitations, claudication and leg swelling.   Gastrointestinal: Negative for abdominal pain, nausea and vomiting.   Genitourinary: Negative.    Musculoskeletal: Negative.    Skin: Negative.    Neurological: Negative.    Endo/Heme/Allergies: Negative.    Psychiatric/Behavioral: Negative.         Objective:   Vitals  Temp:  [97.9 °F (36.6 °C)-98.4 °F (36.9 °C)] 98.2 °F (36.8 °C)  Pulse:  [] 93  Resp:  [17-18] 17  SpO2:  [98 %-100 %] 99 %  BP: (134-156)/(79-97) 143/91    Physical Exam   Constitutional: She is oriented to person, place, and time and well-developed, well-nourished, and in no distress.   Obese, telemetry, IV NS, central venous cath on the R side, IV fentanyl patch R upper back   HENT:   Head: Normocephalic and atraumatic.   Nose: Nose normal.   Mouth/Throat: Oropharynx is clear and moist and mucous membranes are normal.   Eyes: Pupils are equal, round, and reactive to light.   Cardiovascular: Regular rhythm, S1 normal, S2 normal  and normal pulses.   Murmur heard.   Systolic murmur is present with a grade of 4/6.  Pulmonary/Chest: Effort normal and breath sounds normal.   Abdominal: Normal appearance. There is no abdominal tenderness.   Neurological: She is alert and oriented to person, place, and time.   Skin: Skin is warm and dry.   Psychiatric: Mood and affect normal.     Labs  WBC 2.91 (uptrending)  H&H 8.9&27.77 (uptrending)  ANC 1.5 (uptrending)  Bicarb 20 (downtrending)  BUN 43 (uptrending)  Cr 7.4 (uptrending)  Ca 8.2 (uptrending)  Albumin 3.4  Normal bili & AST/ALT    Current Facility-Administered Medications     0.9%  NaCl infusion, , Intravenous, Continuous    0.9%  NaCl infusion, , Intravenous, PRN    acetaminophen tablet 650 mg, 650 mg, Oral, Q6H PRN    albuterol-ipratropium 2.5 mg-0.5 mg/3 mL nebulizer solution 3 mL, 3 mL, Nebulization, Q4H PRN    fentaNYL 25 mcg/hr 1 patch, 1 patch, Transdermal, Q72H    folic acid-vit B6-vit B12 2.5-25-2 mg tablet 1 tablet, 1 tablet, Oral, Daily    gabapentin capsule 300 mg, 300 mg, Oral, QHS    heparin (porcine) injection 3,200 Units, 3,200 Units, Intravenous, PRN    hydrOXYzine HCL tablet 25 mg, 25 mg, Oral, TID PRN    multivitamin tablet, 1 tablet, Oral, Daily    ondansetron injection 4 mg, 4 mg, Intravenous, Q8H PRN    oxyCODONE immediate release tablet 10 mg, 10 mg, Oral, Q4H PRN    polyethylene glycol packet 17 g, 17 g, Oral, Daily    promethazine tablet 25 mg, 25 mg, Oral, Q6H PRN    senna tablet 17.2 mg, 17.2 mg, Oral, QHS    thiamine tablet 100 mg, 100 mg, Oral, Daily    Assessment/Plan:   Ms. Jaswant Yang is a 43 yo female w/ a PMHx of hairy cell leukemia, splenectomy, chemotherapy (6/26/20), and anemia who presented to the ED on 7/6 w/ neutropenic fever and dental infection.      Neutropenic fever  Afebrile past 24 hrs  WBCs and ANC uptrending  Vanc discontinued due to renal dysfxn     ADAMARIS  Intrinsic renal failure presumed due to vancomycin --> discontinued  Cr and  BUN still uptrending   Good urine outpt  Received dialysis 2 days ago   Lasix 80 mg PO  Consult nephro for another round of dialysis     Dental infxn  F/u dentist after discharge     Chronic Pain   Well controlled w/ fentanyl patch & PRN oxycodone      Anemia  H&H uptrending  Daily CBC      Hairy cell leukemia  F/u hem/onc outpt    No notes on file    Active Diagnoses:    Diagnosis Date Noted POA    PRINCIPAL PROBLEM:  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] 05/28/2020 Yes    Trichomonas infection [A59.9]  Yes    Metabolic acidosis [E87.2]  Yes    ADAMARIS (acute kidney injury) [N17.9] 07/08/2020 No    Hypocalcemia [E83.51] 07/08/2020 No    Encounter for palliative care [Z51.5] 07/07/2020 Not Applicable    Acute neoplasm-related pain [G89.3] 07/07/2020 Yes    Therapeutic opioid-induced constipation (OIC) [K59.03, T40.2X5A] 07/07/2020 Yes    Chemotherapy-induced neutropenia [D70.1, T45.1X5A] 07/06/2020 Yes    Neutropenic fever [D70.9, R50.81] 07/06/2020 Yes    Dental infection [K04.7] 07/06/2020 Yes      Problems Resolved During this Admission:    Diagnosis Date Noted Date Resolved POA    Hypokalemia [E87.6] 07/06/2020 07/12/2020 Yes     VTE Risk Mitigation (From admission, onward)         Ordered     heparin (porcine) injection 3,200 Units  As needed (PRN)      07/13/20 1752     Place sequential compression device  Until discontinued      07/06/20 1654                   Kerri Olvera MS3  [facility]

## 2020-07-15 NOTE — ASSESSMENT & PLAN NOTE
Multifactorial : most likely due to vanc  Nephrology on board   Kidney function trending up  Pt will start HD today     7/15/20  Last HD was on 7/13/2020. No urgent indication for HD today. Nephrology following

## 2020-07-15 NOTE — ASSESSMENT & PLAN NOTE
No complaints of dental pain today.  Vanc stopped.  Does not have dentist.  Case management working on getting patient f/u within the [facility] system due to insurance constraints.   Patient is aware.

## 2020-07-15 NOTE — PLAN OF CARE
Patient remains free of falls and safety precautions maintained. Afebrile. Pain controlled. IV fluids per order. Vas cath in place with dressing intact. NSR. Will continue to monitor.12 hour chart check.

## 2020-07-15 NOTE — ASSESSMENT & PLAN NOTE
45 y/o female with ADAMARIS:     ADAMARIS (acute kidney injury)  No signs of renal recovery  S/p HD acutely yesterday for hyperkalemia  Will continue to monitor for signs of renal recovery  Will provide acute HD as needed for hyperkalemia, severe acidosis, or hypoxia  Remains non-oliguric, easier management  K normal today  Metabolic acidosis is mild  O2 sat is good  No indications for acute HD     To review:  Has intrinsic renal damage (FENa >>1%)  Proteinuria and microscopic hematuria  ADAMARIS likely due to Vancomycin use and patient recrived 4.2 g of Vancomycin in 15 hour period. Supratherapeutic vancomycin level  Vancomycin has been discontinued.   Also, pt reported NSAIDs use 3 prior to admit     Trichomonas in u/a  S/p flagyl 2 g po x 1 yesterday        Plans and recommendations:  As discussed above  Will follow closely  Will provide acute HD as needed

## 2020-07-15 NOTE — SUBJECTIVE & OBJECTIVE
Interval History: States that she is feeling better today with no complaints currently.      Oncology Treatment Plan:   OP CLADRIBINE (5 DAY) + RITUXIMAB - Hairy Cell Leukemia    Medications:  Continuous Infusions:   sodium chloride 0.9% 100 mL/hr at 07/15/20 0819     Scheduled Meds:   fentaNYL  1 patch Transdermal Q72H    folic acid-vit B6-vit B12 2.5-25-2 mg  1 tablet Oral Daily    gabapentin  300 mg Oral QHS    multivitamin  1 tablet Oral Daily    polyethylene glycol  17 g Oral Daily    senna  17.2 mg Oral QHS    thiamine  100 mg Oral Daily     PRN Meds:sodium chloride 0.9%, acetaminophen, albuterol-ipratropium, heparin (porcine), hydrOXYzine HCL, ondansetron, oxyCODONE, promethazine     Review of Systems   Constitutional: Positive for activity change and fatigue. Negative for appetite change, chills, diaphoresis, fever and unexpected weight change.   HENT: Positive for dental problem. Negative for congestion, hearing loss, nosebleeds, postnasal drip, sore throat and trouble swallowing.    Eyes: Negative for pain, discharge and visual disturbance.   Respiratory: Negative for cough, chest tightness and shortness of breath.    Cardiovascular: Negative for chest pain and palpitations.   Gastrointestinal: Negative for blood in stool, constipation, diarrhea, nausea and vomiting.   Endocrine: Negative for cold intolerance and heat intolerance.   Genitourinary: Negative for difficulty urinating, dyspareunia, flank pain and hematuria.   Musculoskeletal: Positive for arthralgias and myalgias. Negative for back pain, gait problem, joint swelling and neck pain.   Skin: Negative.    Neurological: Negative for dizziness, weakness, light-headedness and headaches.   Hematological: Negative for adenopathy. Does not bruise/bleed easily.   Psychiatric/Behavioral: Positive for dysphoric mood. Negative for agitation, behavioral problems and confusion. The patient is nervous/anxious.      Objective:     Vital Signs (Most  Recent):  Temp: 98.2 °F (36.8 °C) (07/15/20 0711)  Pulse: 84 (07/15/20 1144)  Resp: 17 (07/15/20 0818)  BP: (!) 143/91 (07/15/20 0711)  SpO2: 99 % (07/15/20 0711) Vital Signs (24h Range):  Temp:  [97.9 °F (36.6 °C)-98.4 °F (36.9 °C)] 98.2 °F (36.8 °C)  Pulse:  [] 84  Resp:  [17-18] 17  SpO2:  [98 %-100 %] 99 %  BP: (134-156)/(79-97) 143/91     Weight: 124.3 kg (274 lb)  Body mass index is 38.22 kg/m².  Body surface area is 2.5 meters squared.      Intake/Output Summary (Last 24 hours) at 7/15/2020 1205  Last data filed at 7/15/2020 1145  Gross per 24 hour   Intake 2763.33 ml   Output 4200 ml   Net -1436.67 ml       Physical Exam  Vitals signs and nursing note reviewed.   Constitutional:       General: She is not in acute distress.     Appearance: She is well-developed.   HENT:      Head: Normocephalic and atraumatic.      Right Ear: Hearing and external ear normal.      Left Ear: Hearing and external ear normal.      Nose: No rhinorrhea.      Right Sinus: No maxillary sinus tenderness or frontal sinus tenderness.      Left Sinus: No maxillary sinus tenderness or frontal sinus tenderness.      Mouth/Throat:      Mouth: No oral lesions.      Pharynx: Uvula midline.   Eyes:      General:         Right eye: No discharge.         Left eye: No discharge.      Conjunctiva/sclera: Conjunctivae normal.      Pupils: Pupils are equal, round, and reactive to light.   Neck:      Musculoskeletal: Normal range of motion.      Thyroid: No thyromegaly.      Vascular: No carotid bruit.      Trachea: No tracheal deviation.   Cardiovascular:      Rate and Rhythm: Normal rate and regular rhythm.      Pulses:           Dorsalis pedis pulses are 2+ on the right side and 2+ on the left side.      Heart sounds: Normal heart sounds, S1 normal and S2 normal. No murmur.   Pulmonary:      Effort: Pulmonary effort is normal. No respiratory distress.      Breath sounds: Normal breath sounds.   Abdominal:      General: Bowel sounds are  normal. There is no distension.      Palpations: Abdomen is soft. There is no mass.      Tenderness: There is no abdominal tenderness.   Musculoskeletal: Normal range of motion.      Left shoulder: She exhibits no tenderness.      Right upper leg: She exhibits no tenderness.      Left upper leg: She exhibits no tenderness.   Lymphadenopathy:      Cervical: No cervical adenopathy.      Upper Body:      Right upper body: No supraclavicular adenopathy.      Left upper body: No supraclavicular adenopathy.   Skin:     General: Skin is warm and dry.      Capillary Refill: Capillary refill takes less than 2 seconds.      Coloration: Skin is pale.      Findings: No rash.   Neurological:      Mental Status: She is alert and oriented to person, place, and time.      Sensory: No sensory deficit.      Coordination: Coordination normal.      Gait: Gait normal.   Psychiatric:         Attention and Perception: Attention normal.         Mood and Affect: Mood is anxious and depressed.         Speech: Speech normal.         Behavior: Behavior normal.         Thought Content: Thought content normal.         Cognition and Memory: Cognition normal.         Judgment: Judgment normal.         Significant Labs:   CBC:   Recent Labs   Lab 07/14/20  0853 07/15/20  0919   WBC 2.77* 2.91*   HGB 9.1* 8.9*   HCT 27.9* 27.7*    200    and CMP:   Recent Labs   Lab 07/13/20  1255 07/14/20  0853 07/15/20  0919   NA  --  139 139   K 4.6 4.9 5.1   CL  --  106 108   CO2  --  22* 20*   GLU  --  102 108   BUN  --  39* 43*   CREATININE  --  7.0* 7.4*   CALCIUM  --  8.1* 8.2*   PROT  --   --  6.7   ALBUMIN  --   --  3.4*   BILITOT  --   --  0.3   ALKPHOS  --   --  58   AST  --   --  28   ALT  --   --  27   ANIONGAP  --  11 11   EGFRNONAA  --  7* 6*       Diagnostic Results:  I have reviewed all pertinent imaging results/findings within the past 24 hours.

## 2020-07-15 NOTE — PROGRESS NOTES
"[facility] -   Adult Nutrition  Progress Note    SUMMARY       Recommendations    Recommendation:   1. Consider neutropenic diet   2. Monitor for changes in meal consumption   3. RD to monitor    Interventions:  General healthful diet  Collaboration with other providers    Goals: meet at least 85% EEN/EPN with meals by discharge  Nutrition Goal Status: new  Communication of RD Recs: (POC)    Reason for Assessment    Reason For Assessment: length of stay  Diagnosis: (Hairy cell leukemia of spleen)  Relevant Medical History: Anemia, GERD, hairy cell leukemia    General Information Comments: Remote assessment completed, NFPE unable to be performed. Pt identified as LOS. Admitted for chemotherapy induced neutropenic fever with a jaw/dental infection. Renal function was decreased and pt did have HD during admit. 100% intake of meals. Normal weight fluctuations in the past month.    Nutrition Discharge Planning: neutropenic diet    Nutrition Risk Screen    Nutrition Risk Screen: no indicators present    Nutrition/Diet History    Spiritual, Cultural Beliefs, [employer] Practices, Values that Affect Care: yes  Food Allergies: NKFA  Factors Affecting Nutritional Intake: None identified at this time    Anthropometrics    Temp: 98.3 °F (36.8 °C)  Height Method: Measured  Height: 5' 11" (180.3 cm)  Height (inches): 71 in  Weight Method: Bed Scale  Weight: 124.3 kg (274 lb)  Weight (lb): 274 lb  Ideal Body Weight (IBW), Female: 155 lb  % Ideal Body Weight, Female (lb): 176.77 %  BMI (Calculated): 38.2  BMI Grade: 35 - 39.9 - obesity - grade II     Lab/Procedures/Meds    Pertinent Labs Reviewed: reviewed  Pertinent Labs Comments: BUN 43, creatinine 7.4, eGFR 7, albumin 3.4  Pertinent Medications Reviewed: reviewed  Pertinent Medications Comments: folic aicd-vit B6-vit B12, MVI, polyethylene glycol, senna-tablet, thiamine    Estimated/Assessed Needs    Weight Used For Calorie Calculations: 124.3 kg (274 lb 0.5 " oz)  Energy Calorie Requirements (kcal): 2400  Energy Need Method: Starke-St Jeor(*1.2)  Protein Requirements: 124-149 g(1-1.2 g/kg)  Weight Used For Protein Calculations: 124.3 kg (274 lb 0.5 oz)  Fluid Requirements (mL): 1 mL/kcal or per MD     RDA Method (mL): 2400     Nutrition Prescription Ordered    Current Diet Order: Regular Diet    Evaluation of Received Nutrient/Fluid Intake    I/O: +1.7 L since admit  Fluid Required: (per MD)  Comments: LBM 7/12  % Intake of Estimated Energy Needs: 75 - 100 %  % Meal Intake: 75 - 100 %    Nutrition Risk    Level of Risk/Frequency of Follow-up: (1x/wk)     Assessment and Plan  Nutrition Problem:  No nutrition dx at this time    Interventions:  General healthful diet  Collaboration with other providers    Nutrition Diagnosis Status:   New    Monitor and Evaluation    Food and Nutrient Intake: energy intake, food and beverage intake  Food and Nutrient Adminstration: diet order  Physical Activity and Function: nutrition-related ADLs and IADLs  Anthropometric Measurements: weight, weight change, body mass index  Biochemical Data, Medical Tests and Procedures: electrolyte and renal panel  Nutrition-Focused Physical Findings: overall appearance     Malnutrition Assessment  Malnutrition Type: (patient does not meet criteria for malnutrition dx at this time)    Nutrition Follow-Up    RD Follow-up?: Yes

## 2020-07-15 NOTE — ASSESSMENT & PLAN NOTE
S/P splenectomy.  To re evaluate treatment regimen as outpatient. Chemotherapy is on hold pending resolution of acute issues.   --F/U with Primary Oncologist upon discharge  --Daily CBC, CMP  --Case management working on referral to facility for dental work due to patient insurance

## 2020-07-16 PROBLEM — E83.51 HYPOCALCEMIA: Status: RESOLVED | Noted: 2020-07-08 | Resolved: 2020-07-16

## 2020-07-16 LAB
ACANTHOCYTES BLD QL SMEAR: PRESENT
ANION GAP SERPL CALC-SCNC: 9 MMOL/L (ref 8–16)
ANISOCYTOSIS BLD QL SMEAR: SLIGHT
BASOPHILS # BLD AUTO: 0.01 K/UL (ref 0–0.2)
BASOPHILS NFR BLD: 0.3 % (ref 0–1.9)
BUN SERPL-MCNC: 44 MG/DL (ref 6–20)
BURR CELLS BLD QL SMEAR: ABNORMAL
CALCIUM SERPL-MCNC: 8.1 MG/DL (ref 8.7–10.5)
CHLORIDE SERPL-SCNC: 109 MMOL/L (ref 95–110)
CO2 SERPL-SCNC: 23 MMOL/L (ref 23–29)
CREAT SERPL-MCNC: 6.9 MG/DL (ref 0.5–1.4)
DACRYOCYTES BLD QL SMEAR: ABNORMAL
DIFFERENTIAL METHOD: ABNORMAL
EOSINOPHIL # BLD AUTO: 0.1 K/UL (ref 0–0.5)
EOSINOPHIL NFR BLD: 1.9 % (ref 0–8)
ERYTHROCYTE [DISTWIDTH] IN BLOOD BY AUTOMATED COUNT: 20.7 % (ref 11.5–14.5)
EST. GFR  (AFRICAN AMERICAN): 8 ML/MIN/1.73 M^2
EST. GFR  (NON AFRICAN AMERICAN): 7 ML/MIN/1.73 M^2
GLUCOSE SERPL-MCNC: 100 MG/DL (ref 70–110)
HCT VFR BLD AUTO: 27.1 % (ref 37–48.5)
HGB BLD-MCNC: 8.7 G/DL (ref 12–16)
HYPOCHROMIA BLD QL SMEAR: ABNORMAL
IMM GRANULOCYTES # BLD AUTO: 0.05 K/UL (ref 0–0.04)
IMM GRANULOCYTES NFR BLD AUTO: 1.6 % (ref 0–0.5)
LYMPHOCYTES # BLD AUTO: 1.6 K/UL (ref 1–4.8)
LYMPHOCYTES NFR BLD: 51.4 % (ref 18–48)
MCH RBC QN AUTO: 29.9 PG (ref 27–31)
MCHC RBC AUTO-ENTMCNC: 32.1 G/DL (ref 32–36)
MCV RBC AUTO: 93 FL (ref 82–98)
MONOCYTES # BLD AUTO: 0.1 K/UL (ref 0.3–1)
MONOCYTES NFR BLD: 2.8 % (ref 4–15)
NEUTROPHILS # BLD AUTO: 1.3 K/UL (ref 1.8–7.7)
NEUTROPHILS NFR BLD: 42 % (ref 38–73)
NRBC BLD-RTO: 1 /100 WBC
OVALOCYTES BLD QL SMEAR: ABNORMAL
PLATELET # BLD AUTO: 195 K/UL (ref 150–350)
PLATELET BLD QL SMEAR: ABNORMAL
PMV BLD AUTO: 9.9 FL (ref 9.2–12.9)
POIKILOCYTOSIS BLD QL SMEAR: SLIGHT
POLYCHROMASIA BLD QL SMEAR: ABNORMAL
POTASSIUM SERPL-SCNC: 5.2 MMOL/L (ref 3.5–5.1)
RBC # BLD AUTO: 2.91 M/UL (ref 4–5.4)
SCHISTOCYTES BLD QL SMEAR: PRESENT
SODIUM SERPL-SCNC: 141 MMOL/L (ref 136–145)
STOMATOCYTES BLD QL SMEAR: PRESENT
TARGETS BLD QL SMEAR: ABNORMAL
WBC # BLD AUTO: 3.19 K/UL (ref 3.9–12.7)

## 2020-07-16 PROCEDURE — 25000003 PHARM REV CODE 250: Performed by: SURGERY

## 2020-07-16 PROCEDURE — 80048 BASIC METABOLIC PNL TOTAL CA: CPT

## 2020-07-16 PROCEDURE — 25000003 PHARM REV CODE 250: Performed by: PHYSICIAN ASSISTANT

## 2020-07-16 PROCEDURE — 85025 COMPLETE CBC W/AUTO DIFF WBC: CPT

## 2020-07-16 PROCEDURE — 99233 SBSQ HOSP IP/OBS HIGH 50: CPT | Mod: ,,, | Performed by: INTERNAL MEDICINE

## 2020-07-16 PROCEDURE — 21400001 HC TELEMETRY ROOM

## 2020-07-16 PROCEDURE — 99232 SBSQ HOSP IP/OBS MODERATE 35: CPT | Mod: ,,, | Performed by: INTERNAL MEDICINE

## 2020-07-16 PROCEDURE — 99232 PR SUBSEQUENT HOSPITAL CARE,LEVL II: ICD-10-PCS | Mod: ,,, | Performed by: INTERNAL MEDICINE

## 2020-07-16 PROCEDURE — 99233 SBSQ HOSP IP/OBS HIGH 50: CPT | Mod: ,,, | Performed by: PHYSICIAN ASSISTANT

## 2020-07-16 PROCEDURE — 99233 PR SUBSEQUENT HOSPITAL CARE,LEVL III: ICD-10-PCS | Mod: ,,, | Performed by: PHYSICIAN ASSISTANT

## 2020-07-16 PROCEDURE — 99233 PR SUBSEQUENT HOSPITAL CARE,LEVL III: ICD-10-PCS | Mod: ,,, | Performed by: INTERNAL MEDICINE

## 2020-07-16 PROCEDURE — 36415 COLL VENOUS BLD VENIPUNCTURE: CPT

## 2020-07-16 RX ORDER — HYDROCORTISONE 1 %
CREAM (GRAM) TOPICAL 2 TIMES DAILY PRN
Status: DISCONTINUED | OUTPATIENT
Start: 2020-07-16 | End: 2020-07-18 | Stop reason: HOSPADM

## 2020-07-16 RX ADMIN — OXYCODONE 10 MG: 5 TABLET ORAL at 05:07

## 2020-07-16 RX ADMIN — THERA TABS 1 TABLET: TAB at 08:07

## 2020-07-16 RX ADMIN — SENNOSIDES 17.2 MG: 8.6 TABLET, FILM COATED ORAL at 08:07

## 2020-07-16 RX ADMIN — OXYCODONE 10 MG: 5 TABLET ORAL at 04:07

## 2020-07-16 RX ADMIN — GABAPENTIN 300 MG: 300 CAPSULE ORAL at 08:07

## 2020-07-16 RX ADMIN — HYDROCORTISONE: 1 CREAM TOPICAL at 09:07

## 2020-07-16 RX ADMIN — SODIUM CHLORIDE: 0.9 INJECTION, SOLUTION INTRAVENOUS at 03:07

## 2020-07-16 RX ADMIN — Medication 1 TABLET: at 08:07

## 2020-07-16 RX ADMIN — SODIUM CHLORIDE: 0.9 INJECTION, SOLUTION INTRAVENOUS at 06:07

## 2020-07-16 RX ADMIN — Medication 100 MG: at 08:07

## 2020-07-16 NOTE — SUBJECTIVE & OBJECTIVE
Interval History: Pt was seen and examined. No new c/o's, stable last pm, no new events. No SOB. Pt reports good UOP.    Review of patient's allergies indicates:   Allergen Reactions    Tramadol Hives     Current Facility-Administered Medications   Medication Frequency    0.9%  NaCl infusion Continuous    0.9%  NaCl infusion PRN    acetaminophen tablet 650 mg Q6H PRN    albuterol-ipratropium 2.5 mg-0.5 mg/3 mL nebulizer solution 3 mL Q4H PRN    fentaNYL 25 mcg/hr 1 patch Q72H    folic acid-vit B6-vit B12 2.5-25-2 mg tablet 1 tablet Daily    gabapentin capsule 300 mg QHS    heparin (porcine) injection 3,200 Units PRN    hydrOXYzine HCL tablet 25 mg TID PRN    ondansetron injection 4 mg Q8H PRN    oxyCODONE immediate release tablet 10 mg Q4H PRN    polyethylene glycol packet 17 g Daily    promethazine tablet 25 mg Q6H PRN    senna tablet 17.2 mg QHS    thiamine tablet 100 mg Daily       Objective:     Vital Signs (Most Recent):  Temp: 98 °F (36.7 °C) (07/16/20 0717)  Pulse: 88 (07/16/20 1117)  Resp: 17 (07/16/20 0717)  BP: 135/80 (07/16/20 0717)  SpO2: 98 % (07/16/20 0717)  O2 Device (Oxygen Therapy): room air (07/16/20 0717) Vital Signs (24h Range):  Temp:  [98 °F (36.7 °C)-98.8 °F (37.1 °C)] 98 °F (36.7 °C)  Pulse:  [] 88  Resp:  [17-18] 17  SpO2:  [98 %-100 %] 98 %  BP: (135-160)/(70-90) 135/80     Weight: 124.3 kg (274 lb) (07/06/20 1700)  Body mass index is 38.22 kg/m².  Body surface area is 2.5 meters squared.    I/O last 3 completed shifts:  In: 3373.3 [P.O.:600; I.V.:2773.3]  Out: 7350 [Urine:7350]    Physical Exam  Vitals signs and nursing note reviewed.   Constitutional:       Appearance: Normal appearance.   HENT:      Head: Normocephalic and atraumatic.   Cardiovascular:      Rate and Rhythm: Regular rhythm.      Pulses: Normal pulses.      Heart sounds: Normal heart sounds.   Pulmonary:      Effort: Pulmonary effort is normal.      Breath sounds: Normal breath sounds. No rales.    Abdominal:      Palpations: Abdomen is soft.   Musculoskeletal:      Right lower leg: No edema.      Left lower leg: No edema.   Skin:     General: Skin is warm and dry.   Neurological:      Mental Status: She is alert.   Psychiatric:         Mood and Affect: Mood normal.         Behavior: Behavior normal.         Significant Labs: reviewed  BMP  Lab Results   Component Value Date     07/16/2020    K 5.2 (H) 07/16/2020     07/16/2020    CO2 23 07/16/2020    BUN 44 (H) 07/16/2020    CREATININE 6.9 (H) 07/16/2020    CALCIUM 8.1 (L) 07/16/2020    ANIONGAP 9 07/16/2020    ESTGFRAFRICA 8 (A) 07/16/2020    EGFRNONAA 7 (A) 07/16/2020     Lab Results   Component Value Date    WBC 3.19 (L) 07/16/2020    HGB 8.7 (L) 07/16/2020    HCT 27.1 (L) 07/16/2020    MCV 93 07/16/2020     07/16/2020           Significant Imaging: reviewed

## 2020-07-16 NOTE — PLAN OF CARE
Problem: Adult Inpatient Plan of Care  Goal: Plan of Care Review  Outcome: Ongoing, Progressing     Problem: Neutropenia  Goal: Absence of Infection  Outcome: Ongoing, Progressing     Problem: Infection (Hemodialysis)  Goal: Absence of Infection Signs/Symptoms  Outcome: Ongoing, Progressing     Problem: Renal Function Impairment (Acute Kidney Injury/Impairment)  Goal: Effective Renal Function  Outcome: Ongoing, Progressing

## 2020-07-16 NOTE — ASSESSMENT & PLAN NOTE
Multifactorial : most likely due to vanc  Nephrology on board   Kidney function trending up  Pt will start HD today     7/15/20  Last HD was on 7/13/2020. No urgent indication for HD today. Nephrology following    7/16/2020  Creatinine continues to trend down. If lower on 7/17 will plan to remove vascath. Urine output stable.    7/17  Creatinine continues to improve. Will plan to monitor overnight. If stable Vascula surgery will pull Vascath in AM. Dr. Pearce is aware.

## 2020-07-16 NOTE — PROGRESS NOTES
[facility] -   Hematology/Oncology  Progress Note    Patient Name: Jaswant Yang  Admission Date: 7/6/2020  Hospital Length of Stay: 10 days  Code Status: Prior     Subjective:     HPI:  44 y.o female with hairy cell lymphoma currently being treated with CLADRIBINE sent from Dr. Whittaker office on yesterday for fever and presumed right sided tooth infection. Associated symptoms include facial swelling, jaw pain, fever. Patient asked to report to ED for IV antibiotics for neutropenic fever in setting of tooth infection. Patient was started on Vancomycin but had allergic reaction and was placed on Zoysn.     Interval History:  No complaints currently.  Uneventful evening.    Oncology Treatment Plan:   OP CLADRIBINE (5 DAY) + RITUXIMAB - Hairy Cell Leukemia    Medications:  Continuous Infusions:   sodium chloride 0.9% 100 mL/hr at 07/16/20 0651     Scheduled Meds:   fentaNYL  1 patch Transdermal Q72H    folic acid-vit B6-vit B12 2.5-25-2 mg  1 tablet Oral Daily    gabapentin  300 mg Oral QHS    multivitamin  1 tablet Oral Daily    polyethylene glycol  17 g Oral Daily    senna  17.2 mg Oral QHS    thiamine  100 mg Oral Daily     PRN Meds:sodium chloride 0.9%, acetaminophen, albuterol-ipratropium, heparin (porcine), hydrOXYzine HCL, ondansetron, oxyCODONE, promethazine     Review of Systems   Constitutional: Positive for activity change and fatigue. Negative for appetite change, chills, diaphoresis, fever and unexpected weight change.   HENT: Positive for dental problem. Negative for congestion, hearing loss, nosebleeds, postnasal drip, sore throat and trouble swallowing.    Eyes: Negative for pain, discharge and visual disturbance.   Respiratory: Negative for cough, chest tightness and shortness of breath.    Cardiovascular: Negative for chest pain and palpitations.   Gastrointestinal: Negative for blood in stool, constipation, diarrhea, nausea and vomiting.   Endocrine: Negative for cold  intolerance and heat intolerance.   Genitourinary: Negative for difficulty urinating, dyspareunia, flank pain and hematuria.   Musculoskeletal: Positive for arthralgias and myalgias. Negative for back pain, gait problem, joint swelling and neck pain.   Skin: Negative.    Neurological: Negative for dizziness, weakness, light-headedness and headaches.   Hematological: Negative for adenopathy. Does not bruise/bleed easily.   Psychiatric/Behavioral: Positive for dysphoric mood. Negative for agitation, behavioral problems and confusion. The patient is nervous/anxious.      Objective:     Vital Signs (Most Recent):  Temp: 98 °F (36.7 °C) (07/16/20 0717)  Pulse: 89 (07/16/20 0908)  Resp: 17 (07/16/20 0717)  BP: 135/80 (07/16/20 0717)  SpO2: 98 % (07/16/20 0717) Vital Signs (24h Range):  Temp:  [98 °F (36.7 °C)-98.8 °F (37.1 °C)] 98 °F (36.7 °C)  Pulse:  [] 89  Resp:  [17-18] 17  SpO2:  [98 %-100 %] 98 %  BP: (135-160)/(70-90) 135/80     Weight: 124.3 kg (274 lb)  Body mass index is 38.22 kg/m².  Body surface area is 2.5 meters squared.      Intake/Output Summary (Last 24 hours) at 7/16/2020 0932  Last data filed at 7/16/2020 0908  Gross per 24 hour   Intake 3144.99 ml   Output 4850 ml   Net -1705.01 ml       Physical Exam  Vitals signs and nursing note reviewed.   Constitutional:       General: She is not in acute distress.     Appearance: She is well-developed.   HENT:      Head: Normocephalic and atraumatic.      Right Ear: Hearing and external ear normal.      Left Ear: Hearing and external ear normal.      Nose: No rhinorrhea.      Right Sinus: No maxillary sinus tenderness or frontal sinus tenderness.      Left Sinus: No maxillary sinus tenderness or frontal sinus tenderness.      Mouth/Throat:      Mouth: No oral lesions.      Pharynx: Uvula midline.   Eyes:      General:         Right eye: No discharge.         Left eye: No discharge.      Conjunctiva/sclera: Conjunctivae normal.      Pupils: Pupils are equal,  round, and reactive to light.   Neck:      Musculoskeletal: Normal range of motion.      Thyroid: No thyromegaly.      Vascular: No carotid bruit.      Trachea: No tracheal deviation.   Cardiovascular:      Rate and Rhythm: Normal rate and regular rhythm.      Pulses:           Dorsalis pedis pulses are 2+ on the right side and 2+ on the left side.      Heart sounds: Normal heart sounds, S1 normal and S2 normal. No murmur.   Pulmonary:      Effort: Pulmonary effort is normal. No respiratory distress.      Breath sounds: Normal breath sounds.   Abdominal:      General: Bowel sounds are normal. There is no distension.      Palpations: Abdomen is soft. There is no mass.      Tenderness: There is no abdominal tenderness.   Musculoskeletal: Normal range of motion.      Left shoulder: She exhibits no tenderness.      Right upper leg: She exhibits no tenderness.      Left upper leg: She exhibits no tenderness.   Lymphadenopathy:      Cervical: No cervical adenopathy.      Upper Body:      Right upper body: No supraclavicular adenopathy.      Left upper body: No supraclavicular adenopathy.   Skin:     General: Skin is warm and dry.      Capillary Refill: Capillary refill takes less than 2 seconds.      Coloration: Skin is pale.      Findings: No rash.   Neurological:      Mental Status: She is alert and oriented to person, place, and time.      Sensory: No sensory deficit.      Coordination: Coordination normal.      Gait: Gait normal.   Psychiatric:         Attention and Perception: Attention normal.         Mood and Affect: Mood is anxious and depressed.         Speech: Speech normal.         Behavior: Behavior normal.         Thought Content: Thought content normal.         Cognition and Memory: Cognition normal.         Judgment: Judgment normal.         Significant Labs:   CBC:   Recent Labs   Lab 07/15/20  0919 07/16/20  0802   WBC 2.91* 3.19*   HGB 8.9* 8.7*   HCT 27.7* 27.1*    195    and CMP:   Recent Labs    Lab 07/15/20  0919 07/16/20  0802    141   K 5.1 5.2*    109   CO2 20* 23    100   BUN 43* 44*   CREATININE 7.4* 6.9*   CALCIUM 8.2* 8.1*   PROT 6.7  --    ALBUMIN 3.4*  --    BILITOT 0.3  --    ALKPHOS 58  --    AST 28  --    ALT 27  --    ANIONGAP 11 9   EGFRNONAA 6* 7*       Diagnostic Results:  I have reviewed all pertinent imaging results/findings within the past 24 hours.    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  To re evaluate treatment regimen as outpatient. Chemotherapy is on hold pending resolution of acute issues.   --F/U with Primary Oncologist upon discharge  --Daily CBC, CMP  --Case management working on referral to facility for dental work due to patient insurance      ADAMARIS (acute kidney injury)  Management per Nephrology.  Vancomycin has been DC'd.  Required 1st dialysis on 7/13/2020.         Chemotherapy-induced neutropenia  ANC >1000 - continuing to recover.        Thank you for your consult. I will follow-up with patient. Please contact us if you have any additional questions.     Ursula Tran NP  Hematology/Oncology  [facility] [place]

## 2020-07-16 NOTE — PROGRESS NOTES
"[facility] Hospital Medicine  Progress Note     Patient Name: Jaswant Yang  MRN: [id number]  Patient Class: IP- Inpatient     Admission Date: 7/6/2020  Length of Stay: 9 days  Attending Physician: Rohit Rodriguez, *  Primary Care Provider: Primary Doctor No     Subjective:      Principal Problem:Hairy cell leukemia of spleen           HPI:  43 y/o AA F with hx of hairy cell leukemia (diagnosed 05/2020), anemia, chronic pain to ED from Dr. Whittaker' office after being found to have a low grade temperature (99.6) with a visible dental infection in the R lower jaw and L upper jaw, in the setting of chemotherapy induced neutropenia (WBC 0.89); mild hypokalemia (3.4) also noted in ED, but labs otherwise largely unremarkable - admission for IV abx; BC x2 pending. Pt reports associated severe stabbing pain in the affected jaw areas, and mild facial edema, worsening over the past 2 days but states no drainage/bleeding. Pt's last chemo cycle ended on 06/26/20 - reports pain improved with IV morphine in ED, states she feels "a little better now" - palliative care consulted per ED physician at patient's request, to assist with symptom management and pain control. Denies CP, edema, palpitations, SOB, wheezing, orthopnea, PND, abdominal pain, N/V/D, dysuria, flank pain, HA, dizziness, fever, cough, chills, falls/trauma, blurred vision, focal deficits. No recent dietary changes, travel, sick contacts or viral illness - pt is typically active and independent with ADLs and ambulation at home. Pt is full code. Surrogate decision makers are son (Dmitri Yang) and Mother (Elsa Yang). Admitted for chemotherapy induced neutropenic fever with a jaw/dental infection.      Overview/Hospital Course:  7/7/20 The patient reports pain is not well controlled. Palliative care was consulted. The patient remained afebrile overnight. CT maxiollofacial CT was negative for a dental abscess but showed multiple dental " "caries. The patient remains neutropenic, Hem/onc following. Continue current management with IV ABX. 7/8/20 The patient remained afebrile overnight. WBCs improved to 1.41K, Hem/onc following. The patients renal function worsened overnight, Cr. Increased from 2 to 4.9 overnight. This is likely 2/2 vanc. IV fluids started and Nephrology consulted. Will monitor renal function. 7/9/20 No acute issues overnight. The patient continues to have good UOP. Creatinine increased to 6.8. Nephrology following, will continue to monitor renal function. ANC is improving, Hem/onc following. 7/10/20 The patient reports "feeling better today". WBCs continue to recover, Hem/onc following. Creatinine continues to worsen, Cr 8.1 today. The patient reports good UOP, Nephrology following.   7/11 Pt was seen and examined at bedside . She denies  Any complaint for today . The kidney function cont trending up .  7/12 The kidney function cont trending up . The K is 5.5 and Co2 16 . She  Have a good Uop . The CXR is clear . There was no acute event overnight   7/13 The kidney function is trending up . The case was d/c nephrology  . Pt will start HD  Today.   7/14-Kidney function stable  7/15-HD not needed urgently today. Will continue to monitor kidney function.  7/16-no HD today. Creatinine continues to trend down.      Interval History:no complaints today. Urine output is good.     Review of Systems   Constitutional: Negative for activity change, appetite change, chills, diaphoresis, fatigue, fever and unexpected weight change.   HENT: Positive for dental problem. Negative for congestion, drooling, facial swelling, rhinorrhea, sinus pressure, sneezing, sore throat, trouble swallowing and voice change.    Eyes: Negative for photophobia, discharge, redness, itching and visual disturbance.   Respiratory: Negative for apnea, cough, choking, chest tightness, shortness of breath, wheezing and stridor.    Cardiovascular: Negative for chest pain, " palpitations and leg swelling.   Gastrointestinal: Negative for abdominal distention, abdominal pain, anal bleeding, blood in stool, constipation, diarrhea, nausea and vomiting.   Endocrine: Negative for cold intolerance, heat intolerance, polydipsia, polyphagia and polyuria.   Genitourinary: Negative for decreased urine volume, difficulty urinating, dysuria, flank pain, frequency, hematuria, pelvic pain, urgency, vaginal bleeding and vaginal discharge.   Musculoskeletal: Positive for back pain (chronic). Negative for arthralgias, gait problem, joint swelling, myalgias, neck pain and neck stiffness.   Skin: Negative for color change, pallor, rash and wound.   Neurological: Positive for weakness (generalized). Negative for dizziness, seizures, syncope, facial asymmetry, speech difficulty, light-headedness, numbness and headaches.   Psychiatric/Behavioral: Negative for agitation, confusion, hallucinations and suicidal ideas.   All other systems reviewed and are negative.           Objective:      Vital Signs (Most Recent):  Temp: 98.3 °F (36.8 °C) (07/15/20 1418)  Pulse: 99 (07/15/20 1503)  Resp: 18 (07/15/20 1418)  BP: 135/88 (07/15/20 1418)  SpO2: 99 % (07/15/20 1418) Vital Signs (24h Range):  Temp:  [97.9 °F (36.6 °C)-98.4 °F (36.9 °C)] 98.3 °F (36.8 °C)  Pulse:  [] 99  Resp:  [17-18] 18  SpO2:  [98 %-100 %] 99 %  BP: (134-156)/(79-97) 135/88      Weight: 124.3 kg (274 lb)  Body mass index is 38.22 kg/m².     Intake/Output Summary (Last 24 hours) at 7/15/2020 1518  Last data filed at 7/15/2020 1145    Intake/Output Summary (Last 24 hours) at 7/16/2020 1659  Last data filed at 7/16/2020 1348  Gross per 24 hour   Intake 1786.66 ml   Output 5850 ml   Net -4063.34 ml   Physical Exam  Vitals signs and nursing note reviewed.   Constitutional:       Appearance: Normal appearance.   HENT:      Head: Normocephalic and atraumatic.   Cardiovascular:      Rate and Rhythm: Regular rhythm.      Pulses: Normal pulses.       Heart sounds: Normal heart sounds.   Pulmonary:      Effort: Pulmonary effort is normal.      Breath sounds: Normal breath sounds. No rales.   Abdominal:      Palpations: Abdomen is soft.   Musculoskeletal:      Right lower leg: No edema.      Left lower leg: No edema.   Skin:     General: Skin is warm and dry.   Neurological:      Mental Status: She is alert.   Psychiatric:         Mood and Affect: Mood normal.         Behavior: Behavior normal.         Significant Labs:   Results for JUANY GREGORY (MRN [id number]) as of 7/16/2020 16:58   Ref. Range 7/16/2020 08:02   Sodium Latest Ref Range: 136 - 145 mmol/L 141   Potassium Latest Ref Range: 3.5 - 5.1 mmol/L 5.2 (H)   Chloride Latest Ref Range: 95 - 110 mmol/L 109   CO2 Latest Ref Range: 23 - 29 mmol/L 23   Anion Gap Latest Ref Range: 8 - 16 mmol/L 9   BUN, Bld Latest Ref Range: 6 - 20 mg/dL 44 (H)   Creatinine Latest Ref Range: 0.5 - 1.4 mg/dL 6.9 (H)   eGFR if non African American Latest Ref Range: >60 mL/min/1.73 m^2 7 (A)   eGFR if African American Latest Ref Range: >60 mL/min/1.73 m^2 8 (A)   Glucose Latest Ref Range: 70 - 110 mg/dL 100   Calcium Latest Ref Range: 8.7 - 10.5 mg/dL 8.1 (L)         Significant Imaging: I have reviewed all pertinent imaging results/findings within the past 24 hours.       Assessment/Plan:      * Hairy cell leukemia of spleen  Oncology following.    Trichomonas infection  metronidazol 2gr  po x 1     7/15/20  Treated       Metabolic acidosis  Stable today          ADAMARIS (acute kidney injury)  Multifactorial : most likely due to [facility]  Nephrology on board   Kidney function trending up  Pt will start HD today      7/15/20  Last HD was on 7/13/2020. No urgent indication for HD today. Nephrology following     7/16/2020  Creatinine continues to trend down. If lower on 7/17 will plan to remove vascath. Urine output stable.     Acute neoplasm-related pain  - Continue PRN Analgesia - Palliative care consulted         Encounter for  palliative care  Per palliative care         Chemotherapy-induced neutropenia  Improving   Cont monitor            VTE Risk Mitigation (From admission, onward)                  Ordered        heparin (porcine) injection 3,200 Units  As needed (PRN)      07/13/20 1752        Place sequential compression device  Until discontinued      07/06/20 1654                    Hemant Zuniga NP  Department of Hospital Medicine   [facility]

## 2020-07-16 NOTE — MEDICAL/APP STUDENT
[facility]  Progress Note    Patient Name: Jaswant Yang  MRN: [id number]  Patient Class: IP- Inpatient   Admission Date: 7/6/2020  Length of Stay: 10 days  Attending Physician: Rohit Rodriguez, *  Primary Care Provider: Primary Doctor No    Subjective:   Principal problem: Neutropenic fever      HPI: Jaswant Yang is a 43 yo female w/ a PMHx of hairy cell leukemia (dx 5/20), anemia, GERD, splenectomy, and chemotherapy (most recent 6/26/20) who presented to the ED on 7/6/20 w/ neutropenic fever and a dental infection.     Interval history: No acute events reported overnight. Pt reports pain well controlled. Pt says she has been ambulating, eating, and urinating normally. Pt reports normal bowel movement yesterday.       Review of Systems   Constitutional: Positive for malaise/fatigue.   HENT: Negative.    Eyes: Negative.    Respiratory: Negative for cough and shortness of breath.    Cardiovascular: Negative for chest pain, palpitations, orthopnea, claudication and leg swelling.   Gastrointestinal: Negative for abdominal pain, nausea and vomiting.   Genitourinary: Negative.    Musculoskeletal: Negative.    Skin: Negative.    Neurological: Negative for weakness and headaches.   Endo/Heme/Allergies: Negative.    Psychiatric/Behavioral: Negative.      Objective:   Vitals  Temp:  [98 °F (36.7 °C)-98.8 °F (37.1 °C)] 98 °F (36.7 °C)  Pulse:  [] 93  Resp:  [17-18] 17  SpO2:  [98 %-100 %] 98 %  BP: (135-160)/(70-90) 135/80    Physical Exam   Constitutional: She is oriented to person, place, and time and well-developed, well-nourished, and in no distress.   Obese; IV drip; telemetry; fentanyl patch upper posterior back    HENT:   Head: Normocephalic.   Torus palatinus    Eyes: Pupils are equal, round, and reactive to light.   Neck: Normal range of motion.   Cardiovascular: Normal rate, regular rhythm and normal pulses.   Murmur heard.   Systolic murmur is present with a grade of  3/6.  Pulmonary/Chest: Effort normal and breath sounds normal.   Abdominal: Soft. Normal appearance and bowel sounds are normal. There is no abdominal tenderness.   Splenectomy scar; truncal obesity   Neurological: She is alert and oriented to person, place, and time.   Skin: Skin is warm, dry and intact. No rash noted.   Psychiatric: Mood normal.     Labs  WBC 3.19 (uptrending)  ANC 1.3 (downtrending)   H&H 8.7&27 (downtrending)   K 5.2 (uptrending)  BUN 44 (uptrending)  Cr 6.9 (downtrending)    Urine output  1.5 ml/kg/hr    Current Facility-Administered Medications    0.9%  NaCl infusion, , Intravenous, Continuous    0.9%  NaCl infusion, , Intravenous, PRN    acetaminophen tablet 650 mg, 650 mg, Oral, Q6H PRN    albuterol-ipratropium 2.5 mg-0.5 mg/3 mL nebulizer solution 3 mL, 3 mL, Nebulization, Q4H PRN    fentaNYL 25 mcg/hr 1 patch, 1 patch, Transdermal, Q72H    folic acid-vit B6-vit B12 2.5-25-2 mg tablet 1 tablet, 1 tablet, Oral, Daily    gabapentin capsule 300 mg, 300 mg, Oral, QHS    heparin (porcine) injection 3,200 Units, 3,200 Units, Intravenous, PRN    hydrOXYzine HCL tablet 25 mg, 25 mg, Oral, TID PRN    multivitamin tablet, 1 tablet, Oral, Daily    ondansetron injection 4 mg, 4 mg, Intravenous, Q8H PRN    oxyCODONE immediate release tablet 10 mg, 10 mg, Oral, Q4H PRN    polyethylene glycol packet 17 g, 17 g, Oral, Daily    promethazine tablet 25 mg, 25 mg, Oral, Q6H PRN    senna tablet 17.2 mg, 17.2 mg, Oral, QHS    thiamine tablet 100 mg, 100 mg, Oral, Daily    Assessment/Plan:    Ms. Jaswant Yang is a 43 yo female w/ a PMHx of hairy cell leukemia, splenectomy, chemotherapy (6/26/20), and anemia who presented to the ED on 7/6 w/ neutropenic fever and dental infection.      Neutropenic fever  Pt has been consistently afebrile  WBCs uptrending to 3.19 (ANC at 1.3)  Vanc discontinued due to renal dysfxn     ADAMARIS  Intrinsic renal failure presumed due to vancomycin --> discontinued  Cr  decreased from 7.4 to 6.9 -- cont daily CMP  High urine outpt  Received HD 7/13  Consult nephro if Cr doesn't cont to trend down      Dental infxn  F/u dentist after discharge     Chronic Pain   Well controlled w/ fentanyl patch & PRN oxycodone      Anemia  H&H slightly decreased to 8.7&27.1  Daily CBC      Hairy cell leukemia  F/u hem/onc outpt    No notes on file    Active Diagnoses:    Diagnosis Date Noted POA    PRINCIPAL PROBLEM:  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] 05/28/2020 Yes    Trichomonas infection [A59.9]  Yes    Metabolic acidosis [E87.2]  Yes    ADAMARIS (acute kidney injury) [N17.9] 07/08/2020 No    Hypocalcemia [E83.51] 07/08/2020 No    Encounter for palliative care [Z51.5] 07/07/2020 Not Applicable    Acute neoplasm-related pain [G89.3] 07/07/2020 Yes    Therapeutic opioid-induced constipation (OIC) [K59.03, T40.2X5A] 07/07/2020 Yes    Chemotherapy-induced neutropenia [D70.1, T45.1X5A] 07/06/2020 Yes      Problems Resolved During this Admission:    Diagnosis Date Noted Date Resolved POA    Neutropenic fever [D70.9, R50.81] 07/06/2020 07/15/2020 Yes    Dental infection [K04.7] 07/06/2020 07/15/2020 Yes    Hypokalemia [E87.6] 07/06/2020 07/12/2020 Yes     VTE Risk Mitigation (From admission, onward)         Ordered     heparin (porcine) injection 3,200 Units  As needed (PRN)      07/13/20 1752     Place sequential compression device  Until discontinued      07/06/20 1654                   Kerri Olvera MS3  [facility]

## 2020-07-16 NOTE — PROGRESS NOTES
[facility]   Nephrology  Progress Note    Patient Name: Jaswant Yang  MRN: [id number]  Admission Date: 7/6/2020  Hospital Length of Stay: 10 days  Attending Provider: Rohit Rodriguez, *   Primary Care Physician: Primary Doctor No  Principal Problem:Hairy cell leukemia of spleen    Subjective:     HPI: 44 year old female with anemia, GERD, hairy-cell leukemia presents to [facility] with low grade fever, leukopenia and dental pain. Patient was started on antibiotics including vancomycin and received 4.2 g of Vancomycin in a 15 h period. UOP has not been recorded since admission.     Nephrology was consulted to help with patient's renal care while she is admitted at [facility]. I saw and examined patient in her hospital room. Patient reports generalized weakness and vomiting x 1 today. No other symptoms at present. She reports NSAID use in past (last use of ibuprofen about 3 weeks ago).     Chart review revealed that her baseline creatinine is about 0.9.     Interval History: Pt was seen and examined. No new c/o's, stable last pm, no new events. No SOB. Pt reports good UOP.    Review of patient's allergies indicates:   Allergen Reactions    Tramadol Hives     Current Facility-Administered Medications   Medication Frequency    0.9%  NaCl infusion Continuous    0.9%  NaCl infusion PRN    acetaminophen tablet 650 mg Q6H PRN    albuterol-ipratropium 2.5 mg-0.5 mg/3 mL nebulizer solution 3 mL Q4H PRN    fentaNYL 25 mcg/hr 1 patch Q72H    folic acid-vit B6-vit B12 2.5-25-2 mg tablet 1 tablet Daily    gabapentin capsule 300 mg QHS    heparin (porcine) injection 3,200 Units PRN    hydrOXYzine HCL tablet 25 mg TID PRN    ondansetron injection 4 mg Q8H PRN    oxyCODONE immediate release tablet 10 mg Q4H PRN    polyethylene glycol packet 17 g Daily    promethazine tablet 25 mg Q6H PRN    senna tablet 17.2 mg QHS    thiamine tablet 100 mg Daily       Objective:     Vital Signs (Most Recent):  Temp: 98 °F  (36.7 °C) (07/16/20 0717)  Pulse: 88 (07/16/20 1117)  Resp: 17 (07/16/20 0717)  BP: 135/80 (07/16/20 0717)  SpO2: 98 % (07/16/20 0717)  O2 Device (Oxygen Therapy): room air (07/16/20 0717) Vital Signs (24h Range):  Temp:  [98 °F (36.7 °C)-98.8 °F (37.1 °C)] 98 °F (36.7 °C)  Pulse:  [] 88  Resp:  [17-18] 17  SpO2:  [98 %-100 %] 98 %  BP: (135-160)/(70-90) 135/80     Weight: 124.3 kg (274 lb) (07/06/20 1700)  Body mass index is 38.22 kg/m².  Body surface area is 2.5 meters squared.    I/O last 3 completed shifts:  In: 3373.3 [P.O.:600; I.V.:2773.3]  Out: 7350 [Urine:7350]    Physical Exam  Vitals signs and nursing note reviewed.   Constitutional:       Appearance: Normal appearance.   HENT:      Head: Normocephalic and atraumatic.   Cardiovascular:      Rate and Rhythm: Regular rhythm.      Pulses: Normal pulses.      Heart sounds: Normal heart sounds.   Pulmonary:      Effort: Pulmonary effort is normal.      Breath sounds: Normal breath sounds. No rales.   Abdominal:      Palpations: Abdomen is soft.   Musculoskeletal:      Right lower leg: No edema.      Left lower leg: No edema.   Skin:     General: Skin is warm and dry.   Neurological:      Mental Status: She is alert.   Psychiatric:         Mood and Affect: Mood normal.         Behavior: Behavior normal.         Significant Labs: reviewed  BMP  Lab Results   Component Value Date     07/16/2020    K 5.2 (H) 07/16/2020     07/16/2020    CO2 23 07/16/2020    BUN 44 (H) 07/16/2020    CREATININE 6.9 (H) 07/16/2020    CALCIUM 8.1 (L) 07/16/2020    ANIONGAP 9 07/16/2020    ESTGFRAFRICA 8 (A) 07/16/2020    EGFRNONAA 7 (A) 07/16/2020     Lab Results   Component Value Date    WBC 3.19 (L) 07/16/2020    HGB 8.7 (L) 07/16/2020    HCT 27.1 (L) 07/16/2020    MCV 93 07/16/2020     07/16/2020           Significant Imaging: reviewed    Assessment/Plan:     45 y/o female with ADAMARIS:     ADAMARIS (acute kidney injury)  s Cr lower today without having received  HD  Stable renal function, not worse. ADAMARIS beginning to resolve  S/p acute HD on 7/13/20 for hyperkalemia  K normal  O2 sat good  Metabolic acidosis, mild  Remains non-oliguric with good UOP  No acute indications for HD today  Will hold HD  If s Cr lower tomorrow, can remove vas cath     To review:  Has intrinsic renal damage (FENa >>1%)  Proteinuria and microscopic hematuria  ADAMARIS likely due to Vancomycin use and patient recrived 4.2 g of Vancomycin in 15 hour period. Supratherapeutic vancomycin level  Vancomycin has been discontinued.   Also, pt reported NSAIDs use 3 prior to admit     Trichomonas in u/a  S/p flagyl 2 g po x 1 this admit  Will inform pt to let her partner know to be treated as well        Plans and recommendations:  As discussed above  Will follow closely          Robel Montero MD  Nephrology  [facility]

## 2020-07-16 NOTE — PLAN OF CARE
Patient remains free of falls and safety precautions maintained. Afebrile. Pain controlled. IV fluids per order. NSR. Strict I&o. Vas cath intact. Will continue to monitor. 12 hour chart check.

## 2020-07-16 NOTE — ASSESSMENT & PLAN NOTE
43 y/o female with ADAMARIS:     ADAMARIS (acute kidney injury)  No signs of renal recovery at this point  S/p HD acutely yesterday for hyperkalemia  K normal  O2 sat good  Metabolic acidosis, mild  Remains non-oliguric with good UOP  No acute indications for HD today  Will hold HD today     Will continue to monitor for signs of renal recovery  Will provide acute HD as needed for hyperkalemia, severe acidosis, or hypoxia     To review:  Has intrinsic renal damage (FENa >>1%)  Proteinuria and microscopic hematuria  ADAMARIS likely due to Vancomycin use and patient recrived 4.2 g of Vancomycin in 15 hour period. Supratherapeutic vancomycin level  Vancomycin has been discontinued.   Also, pt reported NSAIDs use 3 prior to admit     Trichomonas in u/a  S/p flagyl 2 g po x 1 yesterday        Plans and recommendations:  As discussed above  Will follow closely  Will provide acute HD as needed

## 2020-07-16 NOTE — PROGRESS NOTES
Progress Note   Palliative Medicine      SUBJECTIVE:     History of Present Illness:  Patient seen and examined with her sister at bedside. She reports her pain is well controlled at 3/10 currently. She only required one dose of pain medication yesterday. She was very happy that her Cr has trended down this morning without HD yesterday. She endorses polyuria that is waking her up at night. She is hopeful she will be able to d/c soon.    In the last 24hrs she has received the following pain medications (7a-7a):  - Fentanyl 25mcg/ hr patch  - Oxycodone 10mg x 1      Past Medical History:   Diagnosis Date    Anemia     Anemia     Back pain     Dental infection     GERD (gastroesophageal reflux disease) 2020    Hairy-cell leukemia of spleen      Past Surgical History:   Procedure Laterality Date     SECTION, CLASSIC      FLUOROSCOPY N/A 2020    Procedure: Spleenic Bleed;  Surgeon: Hiren Cabrera MD;  Location: [facility] CATH LAB;  Service: General;  Laterality: N/A;    INJECTION OF ANESTHETIC AGENT INTO TISSUE PLANE DEFINED BY TRANSVERSUS ABDOMINIS MUSCLE N/A 2020    Procedure: BLOCK, TRANSVERSUS ABDOMINIS PLANE;  Surgeon: Yumi Ferguson MD;  Location: [facility] OR;  Service: General;  Laterality: N/A;    SPLENECTOMY N/A 2020    Procedure: SPLENECTOMY;  Surgeon: Yumi Ferguson MD;  Location: [facility] OR;  Service: General;  Laterality: N/A;    THORACENTESIS Left 2020    Procedure: THORACENTESIS;  Surgeon: Yumi Ferguson MD;  Location: [facility] OR;  Service: General;  Laterality: Left;    TUBAL LIGATION       History reviewed. No pertinent family history.  Review of patient's allergies indicates:   Allergen Reactions    Tramadol Hives     Current Facility-Administered Medications   Medication    0.9%  NaCl infusion    0.9%  NaCl infusion    acetaminophen tablet 650 mg    albuterol-ipratropium 2.5 mg-0.5 mg/3 mL nebulizer solution 3 mL    fentaNYL 25 mcg/hr 1 patch     folic acid-vit B6-vit B12 2.5-25-2 mg tablet 1 tablet    gabapentin capsule 300 mg    heparin (porcine) injection 3,200 Units    hydrOXYzine HCL tablet 25 mg    ondansetron injection 4 mg    oxyCODONE immediate release tablet 10 mg    polyethylene glycol packet 17 g    promethazine tablet 25 mg    senna tablet 17.2 mg    thiamine tablet 100 mg       Review of Symptoms    Symptom Assessment (ESAS 0-10 Scale)  Pain:  3  Dyspnea:  0  Anxiety:  0  Nausea:  0  Depression:  0  Anorexia:  0  Fatigue:  0  Insomnia:  0  Restlessness:  0  Agitation:  0     CAM / Delirium:  Negative  Constipation:  Negative  Diarrhea:  Negative    Bowel Management Plan (BMP):  Yes      Pain Assessment:  Location(s): torso    Torso       Torso Location:  Posterior and bilateral       Quality:  Aching       Quantity:  3/10 in intensity    OME in 24 hours:  60    ECOG Performance Status Grade:  0 - Fully Active    Advanced Directives:  Living Will: No    LaPOST: No    Do Not Resuscitate Status: No    Medical Power of : Yes     Agent's Name:  Elsa Yang.  Agent's Contact Number:  [phone]    Decision Making:  Patient answered questions      ROS:  Review of Systems   Constitutional: Negative for activity change, appetite change and fever.   HENT: Negative for dental problem, facial swelling, mouth sores, sore throat and trouble swallowing.    Eyes: Negative for photophobia and visual disturbance.   Respiratory: Negative for cough and shortness of breath.    Cardiovascular: Negative for chest pain and leg swelling.   Gastrointestinal: Negative for abdominal pain, constipation, diarrhea and nausea.   Endocrine: Negative for polydipsia and polyphagia.   Genitourinary: Negative for difficulty urinating and dysuria.   Musculoskeletal: Positive for arthralgias and neck pain. Negative for back pain, myalgias and neck stiffness.   Skin: Negative for rash and wound.   Allergic/Immunologic: Positive for immunocompromised state.    Neurological: Negative for syncope, weakness and light-headedness.   Psychiatric/Behavioral: Negative for confusion and sleep disturbance. The patient is not nervous/anxious.      OBJECTIVE:     Physical Exam:  Vitals: Temp: 98.3 °F (36.8 °C) (07/10/20 0738)  Pulse: 84 (07/10/20 0738)  Resp: 16 (07/10/20 0845)  BP: 124/76 (07/10/20 0738)  SpO2: 99 % (07/10/20 0738)    Gen: well-developed, well-nourished, NAD, 25mcg/ hr patch noted to L shoulder  Head: atraumatic, normocephalic  Eyes: conjuctiva and sclera clear  Ears: hearing grossly intact with no external abnormality  Mouth: good dentition, MM moist  Respiratory: CTAB, no wheezing or rhonchi  Heart: RRR  Abdomen: soft, NTTP, nondistended, normoactive BS  Pulses: 2+ in DP  Extremities: no edema, full ROM of all joints  Neurologic: no focal deficits, CN II-XII grossly intact, normal coordination  Skin: no rashes or lesions  Psych: cooperative, normal mood and affect, normal attention span and concentration     Labs:  Lab Results   Component Value Date    WBC 3.19 (L) 07/16/2020    HGB 8.7 (L) 07/16/2020    HCT 27.1 (L) 07/16/2020    MCV 93 07/16/2020     07/16/2020     BMP  Lab Results   Component Value Date     07/16/2020    K 5.2 (H) 07/16/2020     07/16/2020    CO2 23 07/16/2020    BUN 44 (H) 07/16/2020    CREATININE 6.9 (H) 07/16/2020    CALCIUM 8.1 (L) 07/16/2020    ANIONGAP 9 07/16/2020    ESTGFRAFRICA 8 (A) 07/16/2020    EGFRNONAA 7 (A) 07/16/2020     Albumin   Date Value Ref Range Status   07/06/2020 4.3 3.5 - 5.2 g/dL Final       Radiology:I have reviewed all pertinent imaging results/findings within the past 24 hours.  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 Differential is wide for this appearance, and includes cocaine abuse, rhinitis medicamentosa (chronic Afrin-type medication abuse), complication related to antiangiogenesis  chemotherapy, sinonasal granulomatosis with polyangiitis, sinonasal sarcoid, or less likely but possible invasive fungal sinusitis.     Multiple dental caries are noted.     Exam limited by lack of IV contrast.  No appreciable oropharyngeal or dental abscess. Recommend further evaluation with direct visualization.     ASSESSMENT   Jaswant Yang is a 44 y.o. year old who was recently diagnosed with hairy cell leukemia currently on chemotherapy who was sent to the emergency department by Dr. Whittaker due to fever. She was complaining of jaw pain and edema and was admitted for antibiotics and monitoring. Ms. Yang has an outpatient referral for Palliative Medicine for pain and symptom management but her appointment was at the end of the month so we were asked to see while admitted.      PLAN   1. Neoplasm related pain  - Continue Fentanyl 25 mcg/ hr patch q72hr- script sent on 7/10/20 and already picked up  - Continue oxycodone 10mg PO q4hr PRN pain- script sent on 7/10/20 and already picked up  - Continue gabapentin 300mg HS for neuropathic pain- will titrate once renal function allows  - The risks, side effects, benefits of chronic opioid therapy have been discussed and the opportunity for questions provided.  - A record of the controlled substances prescribed and dispensed to the patient in [place] was reviewed in  by me (or my delegate). Last received hydrocodone 10mg #40 tabs and 5mg #11 tabs on 6/22/20, hydrocodone 10mg #20 on 6/16 and on 6/11.    2. Opioid induced constipation- well contrlled  - Continue daily Miralax and Senna 2 tabs HS  - She has been refusing Miralax but we discussed the mechanism of action for each and she will start taking Miralax as well     3. Hairy cell leukemia  - Defer to oncology  - Currently receiving chemotherapy     4. Encounter for Palliative Care  - Code status: FULL  - HCPOA: mom Elsa Yang, alternate: son Dmitri Yang  - PM SW assisted with ACP and scanned in  to the computer  - Primary goal is to continue cancer targeted therapy.  - We will manage pain medication going forth and she already has f/u on 7/21 scheduled     5. ADAMARIS  - Opioid selection driven by elevated Cr  - Renally dosed gabapentin and will titrate once renal function improves  - Nephrology is following and she received HD 7/13  - Cr has trended down today with good UOP  - Continue to monitor    Thank you for allowing Palliative Medicine to be involved in the care of Jaswant Yang.      Medical decision making: HIGH based on high risk of death, untreated symptoms, high risk medications, management of more than one chronic illness in exacerbation, managing side effects of medications or polypharmacy      Plan required increased review of medication choice, interaction, dosing, frequency, and route due to patient complexity. Patient complexity increased by: Presence of renal insufficiency    > 50% of 31 min visit spent in chart review, face to face discussion symptom assessment, coordination of care and emotional support, exploring burden/ benefit of various approaches of treatment.       Tatyana Gupta PA-C  Palliative Medicine

## 2020-07-16 NOTE — SUBJECTIVE & OBJECTIVE
Interval History:  No complaints currently.  Uneventful evening.    Oncology Treatment Plan:   OP CLADRIBINE (5 DAY) + RITUXIMAB - Hairy Cell Leukemia    Medications:  Continuous Infusions:   sodium chloride 0.9% 100 mL/hr at 07/16/20 0651     Scheduled Meds:   fentaNYL  1 patch Transdermal Q72H    folic acid-vit B6-vit B12 2.5-25-2 mg  1 tablet Oral Daily    gabapentin  300 mg Oral QHS    multivitamin  1 tablet Oral Daily    polyethylene glycol  17 g Oral Daily    senna  17.2 mg Oral QHS    thiamine  100 mg Oral Daily     PRN Meds:sodium chloride 0.9%, acetaminophen, albuterol-ipratropium, heparin (porcine), hydrOXYzine HCL, ondansetron, oxyCODONE, promethazine     Review of Systems   Constitutional: Positive for activity change and fatigue. Negative for appetite change, chills, diaphoresis, fever and unexpected weight change.   HENT: Positive for dental problem. Negative for congestion, hearing loss, nosebleeds, postnasal drip, sore throat and trouble swallowing.    Eyes: Negative for pain, discharge and visual disturbance.   Respiratory: Negative for cough, chest tightness and shortness of breath.    Cardiovascular: Negative for chest pain and palpitations.   Gastrointestinal: Negative for blood in stool, constipation, diarrhea, nausea and vomiting.   Endocrine: Negative for cold intolerance and heat intolerance.   Genitourinary: Negative for difficulty urinating, dyspareunia, flank pain and hematuria.   Musculoskeletal: Positive for arthralgias and myalgias. Negative for back pain, gait problem, joint swelling and neck pain.   Skin: Negative.    Neurological: Negative for dizziness, weakness, light-headedness and headaches.   Hematological: Negative for adenopathy. Does not bruise/bleed easily.   Psychiatric/Behavioral: Positive for dysphoric mood. Negative for agitation, behavioral problems and confusion. The patient is nervous/anxious.      Objective:     Vital Signs (Most Recent):  Temp: 98 °F (36.7  °C) (07/16/20 0717)  Pulse: 89 (07/16/20 0908)  Resp: 17 (07/16/20 0717)  BP: 135/80 (07/16/20 0717)  SpO2: 98 % (07/16/20 0717) Vital Signs (24h Range):  Temp:  [98 °F (36.7 °C)-98.8 °F (37.1 °C)] 98 °F (36.7 °C)  Pulse:  [] 89  Resp:  [17-18] 17  SpO2:  [98 %-100 %] 98 %  BP: (135-160)/(70-90) 135/80     Weight: 124.3 kg (274 lb)  Body mass index is 38.22 kg/m².  Body surface area is 2.5 meters squared.      Intake/Output Summary (Last 24 hours) at 7/16/2020 0932  Last data filed at 7/16/2020 0908  Gross per 24 hour   Intake 3144.99 ml   Output 4850 ml   Net -1705.01 ml       Physical Exam  Vitals signs and nursing note reviewed.   Constitutional:       General: She is not in acute distress.     Appearance: She is well-developed.   HENT:      Head: Normocephalic and atraumatic.      Right Ear: Hearing and external ear normal.      Left Ear: Hearing and external ear normal.      Nose: No rhinorrhea.      Right Sinus: No maxillary sinus tenderness or frontal sinus tenderness.      Left Sinus: No maxillary sinus tenderness or frontal sinus tenderness.      Mouth/Throat:      Mouth: No oral lesions.      Pharynx: Uvula midline.   Eyes:      General:         Right eye: No discharge.         Left eye: No discharge.      Conjunctiva/sclera: Conjunctivae normal.      Pupils: Pupils are equal, round, and reactive to light.   Neck:      Musculoskeletal: Normal range of motion.      Thyroid: No thyromegaly.      Vascular: No carotid bruit.      Trachea: No tracheal deviation.   Cardiovascular:      Rate and Rhythm: Normal rate and regular rhythm.      Pulses:           Dorsalis pedis pulses are 2+ on the right side and 2+ on the left side.      Heart sounds: Normal heart sounds, S1 normal and S2 normal. No murmur.   Pulmonary:      Effort: Pulmonary effort is normal. No respiratory distress.      Breath sounds: Normal breath sounds.   Abdominal:      General: Bowel sounds are normal. There is no distension.       Palpations: Abdomen is soft. There is no mass.      Tenderness: There is no abdominal tenderness.   Musculoskeletal: Normal range of motion.      Left shoulder: She exhibits no tenderness.      Right upper leg: She exhibits no tenderness.      Left upper leg: She exhibits no tenderness.   Lymphadenopathy:      Cervical: No cervical adenopathy.      Upper Body:      Right upper body: No supraclavicular adenopathy.      Left upper body: No supraclavicular adenopathy.   Skin:     General: Skin is warm and dry.      Capillary Refill: Capillary refill takes less than 2 seconds.      Coloration: Skin is pale.      Findings: No rash.   Neurological:      Mental Status: She is alert and oriented to person, place, and time.      Sensory: No sensory deficit.      Coordination: Coordination normal.      Gait: Gait normal.   Psychiatric:         Attention and Perception: Attention normal.         Mood and Affect: Mood is anxious and depressed.         Speech: Speech normal.         Behavior: Behavior normal.         Thought Content: Thought content normal.         Cognition and Memory: Cognition normal.         Judgment: Judgment normal.         Significant Labs:   CBC:   Recent Labs   Lab 07/15/20  0919 07/16/20  0802   WBC 2.91* 3.19*   HGB 8.9* 8.7*   HCT 27.7* 27.1*    195    and CMP:   Recent Labs   Lab 07/15/20  0919 07/16/20  0802    141   K 5.1 5.2*    109   CO2 20* 23    100   BUN 43* 44*   CREATININE 7.4* 6.9*   CALCIUM 8.2* 8.1*   PROT 6.7  --    ALBUMIN 3.4*  --    BILITOT 0.3  --    ALKPHOS 58  --    AST 28  --    ALT 27  --    ANIONGAP 11 9   EGFRNONAA 6* 7*       Diagnostic Results:  I have reviewed all pertinent imaging results/findings within the past 24 hours.

## 2020-07-17 PROBLEM — R03.0 ELEVATED BLOOD PRESSURE READING: Status: ACTIVE | Noted: 2020-07-17

## 2020-07-17 LAB
ACANTHOCYTES BLD QL SMEAR: PRESENT
ANION GAP SERPL CALC-SCNC: 7 MMOL/L (ref 8–16)
ANISOCYTOSIS BLD QL SMEAR: SLIGHT
BASOPHILS # BLD AUTO: 0.01 K/UL (ref 0–0.2)
BASOPHILS NFR BLD: 0.3 % (ref 0–1.9)
BUN SERPL-MCNC: 42 MG/DL (ref 6–20)
CALCIUM SERPL-MCNC: 8 MG/DL (ref 8.7–10.5)
CHLORIDE SERPL-SCNC: 108 MMOL/L (ref 95–110)
CO2 SERPL-SCNC: 24 MMOL/L (ref 23–29)
CREAT SERPL-MCNC: 6 MG/DL (ref 0.5–1.4)
DACRYOCYTES BLD QL SMEAR: ABNORMAL
DIFFERENTIAL METHOD: ABNORMAL
EOSINOPHIL # BLD AUTO: 0.1 K/UL (ref 0–0.5)
EOSINOPHIL NFR BLD: 2.2 % (ref 0–8)
ERYTHROCYTE [DISTWIDTH] IN BLOOD BY AUTOMATED COUNT: 20.9 % (ref 11.5–14.5)
EST. GFR  (AFRICAN AMERICAN): 9 ML/MIN/1.73 M^2
EST. GFR  (NON AFRICAN AMERICAN): 8 ML/MIN/1.73 M^2
GLUCOSE SERPL-MCNC: 91 MG/DL (ref 70–110)
HCT VFR BLD AUTO: 27.2 % (ref 37–48.5)
HGB BLD-MCNC: 8.8 G/DL (ref 12–16)
IMM GRANULOCYTES # BLD AUTO: 0.06 K/UL (ref 0–0.04)
IMM GRANULOCYTES NFR BLD AUTO: 1.9 % (ref 0–0.5)
LYMPHOCYTES # BLD AUTO: 1.5 K/UL (ref 1–4.8)
LYMPHOCYTES NFR BLD: 47.5 % (ref 18–48)
MCH RBC QN AUTO: 30.2 PG (ref 27–31)
MCHC RBC AUTO-ENTMCNC: 32.4 G/DL (ref 32–36)
MCV RBC AUTO: 94 FL (ref 82–98)
MONOCYTES # BLD AUTO: 0.3 K/UL (ref 0.3–1)
MONOCYTES NFR BLD: 8.2 % (ref 4–15)
NEUTROPHILS # BLD AUTO: 1.3 K/UL (ref 1.8–7.7)
NEUTROPHILS NFR BLD: 39.9 % (ref 38–73)
NRBC BLD-RTO: 1 /100 WBC
OVALOCYTES BLD QL SMEAR: ABNORMAL
PLATELET # BLD AUTO: 223 K/UL (ref 150–350)
PMV BLD AUTO: 10.2 FL (ref 9.2–12.9)
POIKILOCYTOSIS BLD QL SMEAR: SLIGHT
POTASSIUM SERPL-SCNC: 5.2 MMOL/L (ref 3.5–5.1)
RBC # BLD AUTO: 2.91 M/UL (ref 4–5.4)
SCHISTOCYTES BLD QL SMEAR: PRESENT
SODIUM SERPL-SCNC: 139 MMOL/L (ref 136–145)
STOMATOCYTES BLD QL SMEAR: PRESENT
TARGETS BLD QL SMEAR: ABNORMAL
WBC # BLD AUTO: 3.16 K/UL (ref 3.9–12.7)

## 2020-07-17 PROCEDURE — 25000003 PHARM REV CODE 250: Performed by: SURGERY

## 2020-07-17 PROCEDURE — 25000003 PHARM REV CODE 250: Performed by: NURSE PRACTITIONER

## 2020-07-17 PROCEDURE — 63600175 PHARM REV CODE 636 W HCPCS: Performed by: NURSE PRACTITIONER

## 2020-07-17 PROCEDURE — 80048 BASIC METABOLIC PNL TOTAL CA: CPT

## 2020-07-17 PROCEDURE — 99233 PR SUBSEQUENT HOSPITAL CARE,LEVL III: ICD-10-PCS | Mod: ,,, | Performed by: PHYSICIAN ASSISTANT

## 2020-07-17 PROCEDURE — 21400001 HC TELEMETRY ROOM

## 2020-07-17 PROCEDURE — 36415 COLL VENOUS BLD VENIPUNCTURE: CPT

## 2020-07-17 PROCEDURE — 99233 SBSQ HOSP IP/OBS HIGH 50: CPT | Mod: ,,, | Performed by: PHYSICIAN ASSISTANT

## 2020-07-17 PROCEDURE — 99233 SBSQ HOSP IP/OBS HIGH 50: CPT | Mod: ,,, | Performed by: INTERNAL MEDICINE

## 2020-07-17 PROCEDURE — 85025 COMPLETE CBC W/AUTO DIFF WBC: CPT

## 2020-07-17 PROCEDURE — 99233 PR SUBSEQUENT HOSPITAL CARE,LEVL III: ICD-10-PCS | Mod: ,,, | Performed by: INTERNAL MEDICINE

## 2020-07-17 RX ORDER — HYDROXYZINE HYDROCHLORIDE 25 MG/1
25 TABLET, FILM COATED ORAL 3 TIMES DAILY PRN
Status: DISCONTINUED | OUTPATIENT
Start: 2020-07-17 | End: 2020-07-17

## 2020-07-17 RX ORDER — DIPHENHYDRAMINE HCL 25 MG
25 CAPSULE ORAL EVERY 6 HOURS PRN
Status: DISCONTINUED | OUTPATIENT
Start: 2020-07-17 | End: 2020-07-18 | Stop reason: HOSPADM

## 2020-07-17 RX ORDER — HYDRALAZINE HYDROCHLORIDE 20 MG/ML
10 INJECTION INTRAMUSCULAR; INTRAVENOUS EVERY 8 HOURS PRN
Status: DISCONTINUED | OUTPATIENT
Start: 2020-07-17 | End: 2020-07-18 | Stop reason: HOSPADM

## 2020-07-17 RX ADMIN — Medication 1 TABLET: at 08:07

## 2020-07-17 RX ADMIN — Medication 100 MG: at 08:07

## 2020-07-17 RX ADMIN — HYDRALAZINE HYDROCHLORIDE 10 MG: 20 INJECTION INTRAMUSCULAR; INTRAVENOUS at 09:07

## 2020-07-17 RX ADMIN — SENNOSIDES 17.2 MG: 8.6 TABLET, FILM COATED ORAL at 08:07

## 2020-07-17 RX ADMIN — POLYETHYLENE GLYCOL 3350 17 G: 17 POWDER, FOR SOLUTION ORAL at 08:07

## 2020-07-17 RX ADMIN — OXYCODONE 10 MG: 5 TABLET ORAL at 08:07

## 2020-07-17 RX ADMIN — GABAPENTIN 300 MG: 300 CAPSULE ORAL at 08:07

## 2020-07-17 RX ADMIN — DIPHENHYDRAMINE HYDROCHLORIDE 25 MG: 25 CAPSULE ORAL at 09:07

## 2020-07-17 RX ADMIN — DIPHENHYDRAMINE HYDROCHLORIDE 25 MG: 25 CAPSULE ORAL at 04:07

## 2020-07-17 RX ADMIN — OXYCODONE 10 MG: 5 TABLET ORAL at 12:07

## 2020-07-17 RX ADMIN — SODIUM CHLORIDE: 0.9 INJECTION, SOLUTION INTRAVENOUS at 01:07

## 2020-07-17 NOTE — PROGRESS NOTES
[facility] -   Nephrology  Progress Note    Patient Name: Jaswant Yang  MRN: [id number]  Admission Date: 7/6/2020  Hospital Length of Stay: 11 days  Attending Provider: Rohit Rodriguez, *   Primary Care Physician: Primary Doctor No  Principal Problem:Hairy cell leukemia of spleen    Subjective:     HPI: 44 year old female with anemia, GERD, hairy-cell leukemia presents to [facility] with low grade fever, leukopenia and dental pain. Patient was started on antibiotics including vancomycin and received 4.2 g of Vancomycin in a 15 h period. UOP has not been recorded since admission.     Nephrology was consulted to help with patient's renal care while she is admitted at [facility]. I saw and examined patient in her hospital room. Patient reports generalized weakness and vomiting x 1 today. No other symptoms at present. She reports NSAID use in past (last use of ibuprofen about 3 weeks ago).     Chart review revealed that her baseline creatinine is about 0.9.     Interval History:  Creatinine down to 6.0.  Urine output greater than 7 L. okay to take the Vas-Cath out no more dialysis    Review of patient's allergies indicates:   Allergen Reactions    Tramadol Hives     Current Facility-Administered Medications   Medication Frequency    0.9%  NaCl infusion Continuous    0.9%  NaCl infusion PRN    acetaminophen tablet 650 mg Q6H PRN    albuterol-ipratropium 2.5 mg-0.5 mg/3 mL nebulizer solution 3 mL Q4H PRN    diphenhydrAMINE capsule 25 mg Q6H PRN    fentaNYL 25 mcg/hr 1 patch Q72H    folic acid-vit B6-vit B12 2.5-25-2 mg tablet 1 tablet Daily    gabapentin capsule 300 mg QHS    heparin (porcine) injection 3,200 Units PRN    hydrALAZINE injection 10 mg Q8H PRN    hydrocortisone 1 % cream BID PRN    ondansetron injection 4 mg Q8H PRN    oxyCODONE immediate release tablet 10 mg Q4H PRN    polyethylene glycol packet 17 g Daily    promethazine tablet 25 mg Q6H PRN    senna tablet 17.2 mg QHS    thiamine  tablet 100 mg Daily       Objective:     Vital Signs (Most Recent):  Temp: 98.5 °F (36.9 °C) (07/17/20 1248)  Pulse: 98 (07/17/20 1248)  Resp: 18 (07/17/20 1248)  BP: (!) 141/95 (07/17/20 1248)  SpO2: 100 % (07/17/20 1248)  O2 Device (Oxygen Therapy): room air (07/16/20 1938) Vital Signs (24h Range):  Temp:  [97.1 °F (36.2 °C)-98.7 °F (37.1 °C)] 98.5 °F (36.9 °C)  Pulse:  [82-98] 98  Resp:  [16-20] 18  SpO2:  [97 %-100 %] 100 %  BP: (137-177)/() 141/95     Weight: (!) 144.4 kg (318 lb 5.5 oz) (07/17/20 0515)  Body mass index is 44.4 kg/m².  Body surface area is 2.69 meters squared.    I/O last 3 completed shifts:  In: 3868.8 [P.O.:360; I.V.:3508.8]  Out: 8500 [Urine:8500]    Physical Exam  Vitals signs and nursing note reviewed.   Constitutional:       General: She is not in acute distress.     Appearance: She is well-developed.   HENT:      Head: Normocephalic and atraumatic.      Right Ear: Hearing and external ear normal.      Left Ear: Hearing and external ear normal.      Nose: No rhinorrhea.      Right Sinus: No maxillary sinus tenderness or frontal sinus tenderness.      Left Sinus: No maxillary sinus tenderness or frontal sinus tenderness.      Mouth/Throat:      Mouth: No oral lesions.      Pharynx: Uvula midline.   Eyes:      General:         Right eye: No discharge.         Left eye: No discharge.      Conjunctiva/sclera: Conjunctivae normal.      Pupils: Pupils are equal, round, and reactive to light.   Neck:      Musculoskeletal: Normal range of motion.      Thyroid: No thyromegaly.      Vascular: No carotid bruit.      Trachea: No tracheal deviation.   Cardiovascular:      Rate and Rhythm: Normal rate and regular rhythm.      Pulses:           Dorsalis pedis pulses are 2+ on the right side and 2+ on the left side.      Heart sounds: Normal heart sounds, S1 normal and S2 normal. No murmur.   Pulmonary:      Effort: Pulmonary effort is normal. No respiratory distress.      Breath sounds: Normal  breath sounds.   Abdominal:      General: Bowel sounds are normal. There is no distension.      Palpations: Abdomen is soft. There is no mass.      Tenderness: There is no abdominal tenderness.   Musculoskeletal: Normal range of motion.      Left shoulder: She exhibits no tenderness.      Right upper leg: She exhibits no tenderness.      Left upper leg: She exhibits no tenderness.   Lymphadenopathy:      Cervical: No cervical adenopathy.      Upper Body:      Right upper body: No supraclavicular adenopathy.      Left upper body: No supraclavicular adenopathy.   Skin:     General: Skin is warm and dry.      Capillary Refill: Capillary refill takes less than 2 seconds.      Coloration: Skin is pale.      Findings: No rash.   Neurological:      Mental Status: She is alert and oriented to person, place, and time.      Sensory: No sensory deficit.      Coordination: Coordination normal.      Gait: Gait normal.   Psychiatric:         Attention and Perception: Attention normal.         Mood and Affect: Mood is anxious and depressed.         Speech: Speech normal.         Behavior: Behavior normal.         Thought Content: Thought content normal.         Cognition and Memory: Cognition normal.         Judgment: Judgment normal.         Significant Labs:  All labs within the past 24 hours have been reviewed.     Significant Imaging:      Assessment/Plan:     ADAMARIS (acute kidney injury)   Creatinine down to 6.0.  Urine output greater than 7 L. okay to take the Vas-Cath out no more dialysis          Thank you for your consult.     Bernard Martin MD  Nephrology  [facility] -

## 2020-07-17 NOTE — NURSING
D, Chau BOGGS informed that the patient had a rash to the entire  back and under her breasts. Pt states that she didn't start itching until the fentanyl patch was placed. Patch removed per patient request also. Pt request cream instead of a pill for the itching. Cream ordered. Will continue to monitor.

## 2020-07-17 NOTE — SUBJECTIVE & OBJECTIVE
Interval History:doing well. Will plan to pull vascath tomorrow if creatinine continues to improve.     Review of Systems   Constitutional: Negative for activity change, appetite change, chills, diaphoresis, fatigue, fever and unexpected weight change.   HENT: Positive for dental problem. Negative for congestion, drooling, facial swelling, rhinorrhea, sinus pressure, sneezing, sore throat, trouble swallowing and voice change.    Eyes: Negative for photophobia, discharge, redness, itching and visual disturbance.   Respiratory: Negative for apnea, cough, choking, chest tightness, shortness of breath, wheezing and stridor.    Cardiovascular: Negative for chest pain, palpitations and leg swelling.   Gastrointestinal: Negative for abdominal distention, abdominal pain, anal bleeding, blood in stool, constipation, diarrhea, nausea and vomiting.   Endocrine: Negative for cold intolerance, heat intolerance, polydipsia, polyphagia and polyuria.   Genitourinary: Negative for decreased urine volume, difficulty urinating, dysuria, flank pain, frequency, hematuria, pelvic pain, urgency, vaginal bleeding and vaginal discharge.   Musculoskeletal: Positive for back pain (chronic). Negative for arthralgias, gait problem, joint swelling, myalgias, neck pain and neck stiffness.   Skin: Negative for color change, pallor, rash and wound.   Neurological: Positive for weakness (generalized). Negative for dizziness, seizures, syncope, facial asymmetry, speech difficulty, light-headedness, numbness and headaches.   Psychiatric/Behavioral: Negative for agitation, confusion, hallucinations and suicidal ideas.   All other systems reviewed and are negative.     Objective:     Vital Signs (Most Recent):  Temp: 98.2 °F (36.8 °C) (07/17/20 1632)  Pulse: 97 (07/17/20 1632)  Resp: 18 (07/17/20 1632)  BP: (!) 145/86 (07/17/20 1632)  SpO2: 99 % (07/17/20 1632) Vital Signs (24h Range):  Temp:  [97.1 °F (36.2 °C)-98.7 °F (37.1 °C)] 98.2 °F (36.8  °C)  Pulse:  [82-98] 97  Resp:  [16-20] 18  SpO2:  [97 %-100 %] 99 %  BP: (137-177)/() 145/86     Weight: (!) 144.4 kg (318 lb 5.5 oz)  Body mass index is 44.4 kg/m².    Intake/Output Summary (Last 24 hours) at 7/17/2020 1632  Last data filed at 7/17/2020 1400  Gross per 24 hour   Intake 2082.16 ml   Output 6850 ml   Net -4767.84 ml      Physical Exam  Vitals signs and nursing note reviewed.   Constitutional:       General: She is not in acute distress.     Appearance: She is well-developed.   HENT:      Head: Normocephalic and atraumatic.      Right Ear: Hearing and external ear normal.      Left Ear: Hearing and external ear normal.      Nose: No rhinorrhea.      Right Sinus: No maxillary sinus tenderness or frontal sinus tenderness.      Left Sinus: No maxillary sinus tenderness or frontal sinus tenderness.      Mouth/Throat:      Mouth: No oral lesions.      Pharynx: Uvula midline.   Eyes:      General:         Right eye: No discharge.         Left eye: No discharge.      Conjunctiva/sclera: Conjunctivae normal.      Pupils: Pupils are equal, round, and reactive to light.   Neck:      Musculoskeletal: Normal range of motion.      Thyroid: No thyromegaly.      Vascular: No carotid bruit.      Trachea: No tracheal deviation.      Comments: Right subclavian vascath  Cardiovascular:      Rate and Rhythm: Normal rate and regular rhythm.      Pulses:           Dorsalis pedis pulses are 2+ on the right side and 2+ on the left side.      Heart sounds: Normal heart sounds, S1 normal and S2 normal. No murmur.   Pulmonary:      Effort: Pulmonary effort is normal. No respiratory distress.      Breath sounds: Normal breath sounds.   Abdominal:      General: Bowel sounds are normal. There is no distension.      Palpations: Abdomen is soft. There is no mass.      Tenderness: There is no abdominal tenderness.   Musculoskeletal: Normal range of motion.      Left shoulder: She exhibits no tenderness.      Right upper leg:  She exhibits no tenderness.      Left upper leg: She exhibits no tenderness.   Lymphadenopathy:      Cervical: No cervical adenopathy.      Upper Body:      Right upper body: No supraclavicular adenopathy.      Left upper body: No supraclavicular adenopathy.   Skin:     General: Skin is warm and dry.      Capillary Refill: Capillary refill takes less than 2 seconds.      Coloration: Skin is pale.      Findings: No rash.   Neurological:      Mental Status: She is alert and oriented to person, place, and time.      Sensory: No sensory deficit.      Coordination: Coordination normal.      Gait: Gait normal.   Psychiatric:         Attention and Perception: Attention normal.         Mood and Affect: Mood is anxious and depressed.         Speech: Speech normal.         Behavior: Behavior normal.         Thought Content: Thought content normal.         Cognition and Memory: Cognition normal.         Judgment: Judgment normal.             Significant Labs:   CBC:   Recent Labs   Lab 07/16/20  0802 07/17/20  0755   WBC 3.19* 3.16*   HGB 8.7* 8.8*   HCT 27.1* 27.2*    223     CMP:   Recent Labs   Lab 07/16/20  0802 07/17/20  0755    139   K 5.2* 5.2*    108   CO2 23 24    91   BUN 44* 42*   CREATININE 6.9* 6.0*   CALCIUM 8.1* 8.0*   ANIONGAP 9 7*   EGFRNONAA 7* 8*       Significant Imaging: I have reviewed all pertinent imaging results/findings within the past 24 hours.

## 2020-07-17 NOTE — PLAN OF CARE
Problem: Adult Inpatient Plan of Care  Goal: Plan of Care Review  Outcome: Ongoing, Progressing     Problem: Neutropenia  Goal: Absence of Infection  Outcome: Ongoing, Progressing     Problem: Renal Function Impairment (Acute Kidney Injury/Impairment)  Goal: Effective Renal Function  Outcome: Ongoing, Progressing     Problem: Pain Chronic (Persistent)  Goal: Acceptable Pain Control and Functional Ability  Outcome: Ongoing, Progressing

## 2020-07-17 NOTE — SUBJECTIVE & OBJECTIVE
Interval History:  No complaints currently.  States has good UOP.      Oncology Treatment Plan:   OP CLADRIBINE (5 DAY) + RITUXIMAB - Hairy Cell Leukemia    Medications:  Continuous Infusions:   sodium chloride 0.9% 100 mL/hr at 07/17/20 0127     Scheduled Meds:   fentaNYL  1 patch Transdermal Q72H    folic acid-vit B6-vit B12 2.5-25-2 mg  1 tablet Oral Daily    gabapentin  300 mg Oral QHS    polyethylene glycol  17 g Oral Daily    senna  17.2 mg Oral QHS    thiamine  100 mg Oral Daily     PRN Meds:sodium chloride 0.9%, acetaminophen, albuterol-ipratropium, diphenhydrAMINE, heparin (porcine), hydrALAZINE, hydrocortisone, ondansetron, oxyCODONE, promethazine     Review of Systems   Constitutional: Positive for activity change and fatigue. Negative for appetite change, chills, diaphoresis, fever and unexpected weight change.   HENT: Positive for dental problem. Negative for congestion, hearing loss, nosebleeds, postnasal drip, sore throat and trouble swallowing.    Eyes: Negative for pain, discharge and visual disturbance.   Respiratory: Negative for cough, chest tightness and shortness of breath.    Cardiovascular: Negative for chest pain and palpitations.   Gastrointestinal: Negative for blood in stool, constipation, diarrhea, nausea and vomiting.   Endocrine: Negative for cold intolerance and heat intolerance.   Genitourinary: Negative for difficulty urinating, dyspareunia, flank pain and hematuria.   Musculoskeletal: Positive for arthralgias and myalgias. Negative for back pain, gait problem, joint swelling and neck pain.   Skin: Negative.    Neurological: Negative for dizziness, weakness, light-headedness and headaches.   Hematological: Negative for adenopathy. Does not bruise/bleed easily.   Psychiatric/Behavioral: Positive for dysphoric mood. Negative for agitation, behavioral problems and confusion. The patient is nervous/anxious.      Objective:     Vital Signs (Most Recent):  Temp: 97.1 °F (36.2 °C)  (07/17/20 0742)  Pulse: 88 (07/17/20 0900)  Resp: 18 (07/17/20 0900)  BP: (!) 177/87 (07/17/20 0900)  SpO2: 98 % (07/17/20 0742) Vital Signs (24h Range):  Temp:  [97.1 °F (36.2 °C)-98.7 °F (37.1 °C)] 97.1 °F (36.2 °C)  Pulse:  [82-95] 88  Resp:  [16-20] 18  SpO2:  [97 %-100 %] 98 %  BP: (137-177)/() 177/87     Weight: (!) 144.4 kg (318 lb 5.5 oz)  Body mass index is 44.4 kg/m².  Body surface area is 2.69 meters squared.      Intake/Output Summary (Last 24 hours) at 7/17/2020 1000  Last data filed at 7/17/2020 0856  Gross per 24 hour   Intake 2442.16 ml   Output 7450 ml   Net -5007.84 ml       Physical Exam  Vitals signs and nursing note reviewed.   Constitutional:       General: She is not in acute distress.     Appearance: She is well-developed.   HENT:      Head: Normocephalic and atraumatic.      Right Ear: Hearing and external ear normal.      Left Ear: Hearing and external ear normal.      Nose: No rhinorrhea.      Right Sinus: No maxillary sinus tenderness or frontal sinus tenderness.      Left Sinus: No maxillary sinus tenderness or frontal sinus tenderness.      Mouth/Throat:      Mouth: No oral lesions.      Pharynx: Uvula midline.   Eyes:      General:         Right eye: No discharge.         Left eye: No discharge.      Conjunctiva/sclera: Conjunctivae normal.      Pupils: Pupils are equal, round, and reactive to light.   Neck:      Musculoskeletal: Normal range of motion.      Thyroid: No thyromegaly.      Vascular: No carotid bruit.      Trachea: No tracheal deviation.   Cardiovascular:      Rate and Rhythm: Normal rate and regular rhythm.      Pulses:           Dorsalis pedis pulses are 2+ on the right side and 2+ on the left side.      Heart sounds: Normal heart sounds, S1 normal and S2 normal. No murmur.   Pulmonary:      Effort: Pulmonary effort is normal. No respiratory distress.      Breath sounds: Normal breath sounds.   Abdominal:      General: Bowel sounds are normal. There is no  distension.      Palpations: Abdomen is soft. There is no mass.      Tenderness: There is no abdominal tenderness.   Musculoskeletal: Normal range of motion.      Left shoulder: She exhibits no tenderness.      Right upper leg: She exhibits no tenderness.      Left upper leg: She exhibits no tenderness.   Lymphadenopathy:      Cervical: No cervical adenopathy.      Upper Body:      Right upper body: No supraclavicular adenopathy.      Left upper body: No supraclavicular adenopathy.   Skin:     General: Skin is warm and dry.      Capillary Refill: Capillary refill takes less than 2 seconds.      Coloration: Skin is pale.      Findings: No rash.   Neurological:      Mental Status: She is alert and oriented to person, place, and time.      Sensory: No sensory deficit.      Coordination: Coordination normal.      Gait: Gait normal.   Psychiatric:         Attention and Perception: Attention normal.         Mood and Affect: Mood is anxious and depressed.         Speech: Speech normal.         Behavior: Behavior normal.         Thought Content: Thought content normal.         Cognition and Memory: Cognition normal.         Judgment: Judgment normal.         Significant Labs:   CBC:   Recent Labs   Lab 07/16/20  0802 07/17/20  0755   WBC 3.19* 3.16*   HGB 8.7* 8.8*   HCT 27.1* 27.2*    223    and CMP:   Recent Labs   Lab 07/16/20  0802 07/17/20  0755    139   K 5.2* 5.2*    108   CO2 23 24    91   BUN 44* 42*   CREATININE 6.9* 6.0*   CALCIUM 8.1* 8.0*   ANIONGAP 9 7*   EGFRNONAA 7* 8*       Diagnostic Results:  I have reviewed all pertinent imaging results/findings within the past 24 hours.

## 2020-07-17 NOTE — PROGRESS NOTES
Progress Note   Palliative Medicine      SUBJECTIVE:     History of Present Illness:  Patient seen and examined without family present. She says that last night she noticed that her back was very itchy and the nurse identified a rash. She reports some mild pruritus with the previous patch on the right shoulder then worsening pruritus after the new patch was placed on the left shoulder. The maculopapular rash extends down her back and onto her abdomen. She does not have any lesions on her arms, legs, face, or chest. No oral lesions or difficulty swallowing. She is drowsy from Benadryl and says the hydrocortisone cream is not helpful. We discussed rotating to another long acting opioid such as MSER or OxyContin. Since OxyContin is often times not covered by insurance and we are unsure whether the oxycodone is contributing to rash I will hold off. She was instructed to continue Oxycodone q4hr PRN and at our follow up appointment on  we can decide whether safe to start Morphine or we will try OxyContin. She was on board with this plan. I have cancelled the prescription for Fentanyl which has yet to be filled.      In the last 24hrs she has received the following pain medications (7a-7a):  - Fentanyl 25mcg/ hr patch- now d/c  - Oxycodone 10mg x 2      Past Medical History:   Diagnosis Date    Anemia     Anemia     Back pain     Dental infection     GERD (gastroesophageal reflux disease) 2020    Hairy-cell leukemia of spleen      Past Surgical History:   Procedure Laterality Date     SECTION, CLASSIC      FLUOROSCOPY N/A 2020    Procedure: Spleenic Bleed;  Surgeon: Hiren Cabrera MD;  Location: [facility] CATH LAB;  Service: General;  Laterality: N/A;    INJECTION OF ANESTHETIC AGENT INTO TISSUE PLANE DEFINED BY TRANSVERSUS ABDOMINIS MUSCLE N/A 2020    Procedure: BLOCK, TRANSVERSUS ABDOMINIS PLANE;  Surgeon: Yumi Ferguson MD;  Location: [facility] OR;  Service: General;  Laterality: N/A;     SPLENECTOMY N/A 5/11/2020    Procedure: SPLENECTOMY;  Surgeon: Yumi Ferguson MD;  Location: [facility] OR;  Service: General;  Laterality: N/A;    THORACENTESIS Left 5/11/2020    Procedure: THORACENTESIS;  Surgeon: Yumi Ferguson MD;  Location: [facility] OR;  Service: General;  Laterality: Left;    TUBAL LIGATION       History reviewed. No pertinent family history.  Review of patient's allergies indicates:   Allergen Reactions    Tramadol Hives     Current Facility-Administered Medications   Medication    0.9%  NaCl infusion    0.9%  NaCl infusion    acetaminophen tablet 650 mg    albuterol-ipratropium 2.5 mg-0.5 mg/3 mL nebulizer solution 3 mL    diphenhydrAMINE capsule 25 mg    folic acid-vit B6-vit B12 2.5-25-2 mg tablet 1 tablet    gabapentin capsule 300 mg    heparin (porcine) injection 3,200 Units    hydrALAZINE injection 10 mg    hydrocortisone 1 % cream    ondansetron injection 4 mg    oxyCODONE immediate release tablet 10 mg    polyethylene glycol packet 17 g    promethazine tablet 25 mg    senna tablet 17.2 mg    thiamine tablet 100 mg       Review of Symptoms    Symptom Assessment (ESAS 0-10 Scale)  Pain:  5  Dyspnea:  0  Anxiety:  0  Nausea:  0  Depression:  0  Anorexia:  0  Fatigue:  0  Insomnia:  0  Restlessness:  0  Agitation:  0     CAM / Delirium:  Negative  Constipation:  Negative  Diarrhea:  Negative    Bowel Management Plan (BMP):  Yes      Pain Assessment:  Location(s): torso    Torso       Torso Location:  Posterior and bilateral       Quality:  Aching       Quantity:  3/10 in intensity    OME in 24 hours:  60    ECOG Performance Status Grade:  0 - Fully Active    Advanced Directives:  Living Will: No    LaPOST: No    Do Not Resuscitate Status: No    Medical Power of : Yes     Agent's Name:  Elsa Yang.  Agent's Contact Number:  [phone]    Decision Making:  Patient answered questions      ROS:  Review of Systems   Constitutional: Negative for activity  change, appetite change and fever.   HENT: Negative for dental problem, facial swelling, mouth sores, sore throat, trouble swallowing and voice change.    Eyes: Negative for photophobia and visual disturbance.   Respiratory: Negative for cough and shortness of breath.    Cardiovascular: Negative for chest pain and leg swelling.   Gastrointestinal: Negative for abdominal pain, constipation, diarrhea and nausea.   Endocrine: Negative for polydipsia and polyphagia.   Genitourinary: Negative for difficulty urinating and dysuria.   Musculoskeletal: Positive for arthralgias. Negative for back pain, myalgias, neck pain and neck stiffness.   Skin: Positive for rash. Negative for wound.   Allergic/Immunologic: Positive for immunocompromised state.   Neurological: Negative for syncope, weakness and light-headedness.   Hematological: Bruises/bleeds easily.   Psychiatric/Behavioral: Negative for confusion and sleep disturbance. The patient is not nervous/anxious.      OBJECTIVE:     Physical Exam:  Vitals:    07/17/20 1248   BP: (!) 141/95   Pulse: 98   Resp: 18   Temp: 98.5 °F (36.9 °C)     Gen: well-developed, well-nourished, drowsy after benadryl  Head: atraumatic, normocephalic  Eyes: conjuctiva and sclera clear  Ears: hearing grossly intact with no external abnormality  Mouth: good dentition, MM moist  Respiratory: CTAB, no wheezing or rhonchi  Heart: RRR  Abdomen: soft, NTTP, nondistended, normoactive BS  Pulses: 2+ in DP  Extremities: no edema, full ROM of all joints  Neurologic: no focal deficits, CN II-XII grossly intact, normal coordination  Skin: maculopapular rash noted to back and stomach  Psych: cooperative, normal mood and affect, normal attention span and concentration     Labs:  Lab Results   Component Value Date    WBC 3.16 (L) 07/17/2020    HGB 8.8 (L) 07/17/2020    HCT 27.2 (L) 07/17/2020    MCV 94 07/17/2020     07/17/2020     BMP  Lab Results   Component Value Date     07/17/2020    K 5.2 (H)  07/17/2020     07/17/2020    CO2 24 07/17/2020    BUN 42 (H) 07/17/2020    CREATININE 6.0 (H) 07/17/2020    CALCIUM 8.0 (L) 07/17/2020    ANIONGAP 7 (L) 07/17/2020    ESTGFRAFRICA 9 (A) 07/17/2020    EGFRNONAA 8 (A) 07/17/2020     Albumin   Date Value Ref Range Status   07/06/2020 4.3 3.5 - 5.2 g/dL Final       Radiology:I have reviewed all pertinent imaging results/findings within the past 24 hours.  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 Differential is wide for this appearance, and includes cocaine abuse, rhinitis medicamentosa (chronic Afrin-type medication abuse), complication related to antiangiogenesis chemotherapy, sinonasal granulomatosis with polyangiitis, sinonasal sarcoid, or less likely but possible invasive fungal sinusitis.     Multiple dental caries are noted.     Exam limited by lack of IV contrast.  No appreciable oropharyngeal or dental abscess. Recommend further evaluation with direct visualization.     ASSESSMENT   Jaswant Yang is a 44 y.o. year old who was recently diagnosed with hairy cell leukemia currently on chemotherapy who was sent to the emergency department by Dr. Whittaker due to fever. She was complaining of jaw pain and edema and was admitted for antibiotics and monitoring. Ms. Yang has an outpatient referral for Palliative Medicine for pain and symptom management but her appointment was at the end of the month so we were asked to see while admitted.      PLAN   1. Neoplasm related pain  - Discontinue Fentanyl 25 mcg/ hr patch due to concern of drug reaction. I have cancelled the script sent on 7/10/20   - Continue oxycodone 10mg PO q4hr PRN pain- script sent on 7/10/20 and already picked up  - Continue gabapentin 300mg HS for neuropathic pain- will titrate once renal function allows  - The risks, side effects, benefits of chronic opioid therapy have been discussed and the  opportunity for questions provided.  - A record of the controlled substances prescribed and dispensed to the patient in [place] was reviewed in  by me (or my delegate). Last received hydrocodone 10mg #40 tabs and 5mg #11 tabs on 6/22/20, hydrocodone 10mg #20 on 6/16 and on 6/11.  - We discussed rotating to another long acting opioid such as MSER or OxyContin. Since OxyContin is often times not covered by insurance and we are unsure whether the oxycodone is contributing to rash I will hold off. She was instructed to continue Oxycodone q4hr PRN and at our follow up appointment on 7/21 we can decide whether safe to start Morphine or we will try OxyContin.    2. Drug reaction  - Likely related to Fentanyl since the rash is most pronounced where the patches were placed.  - Patches removed and order d/c  - Use Oxycodone monotherapy for now and monitor for continued eruption or resolution  - Plan to rotate to Morphine ER or Oxycontin at the next clinic visit depending on renal function    3. Opioid induced constipation- well contrlled  - Continue daily Miralax and Senna 2 tabs HS     4. Hairy cell leukemia  - Defer to oncology  - Currently receiving chemotherapy     5. Encounter for Palliative Care  - Code status: FULL  - HCPOA: mom Elsa Yang, alternate: son Dmitri Yang  - PM SW assisted with ACP and scanned in to the computer  - Primary goal is to continue cancer targeted therapy.  - We will manage pain medication going forth and she already has f/u on 7/21 scheduled     5. ADAMARIS  - Opioid selection driven by elevated Cr  - Renally dosed gabapentin and will titrate once renal function improves  - Nephrology is following and she received HD 7/13  - Cr continues to trend down and the team is optimistic about renal recovery. Vascath to be removed and d/c anticipated soon.   - Continue to monitor    Thank you for allowing Palliative Medicine to be involved in the care of Jaswant Yang.      Medical decision  making: HIGH based on high risk of death, untreated symptoms, high risk medications, management of more than one chronic illness in exacerbation, managing side effects of medications or polypharmacy      Plan required increased review of medication choice, interaction, dosing, frequency, and route due to patient complexity. Patient complexity increased by: Presence of renal insufficiency    > 50% of 45 min visit spent in chart review, face to face discussion symptom assessment, coordination of care and emotional support, exploring burden/ benefit of various approaches of treatment.       Tatyana Gupta PA-C  Palliative Medicine

## 2020-07-17 NOTE — ASSESSMENT & PLAN NOTE
Management per Nephrology.  Vancomycin has been DC'd.  Required 1st dialysis on 7/13/2020. None since.

## 2020-07-17 NOTE — PROGRESS NOTES
[facility] -   Hematology/Oncology  Progress Note    Patient Name: Jaswant Yang  Admission Date: 7/6/2020  Hospital Length of Stay: 11 days  Code Status: Prior     Subjective:     HPI:  44 y.o female with hairy cell lymphoma currently being treated with CLADRIBINE sent from Dr. Whittaker office on yesterday for fever and presumed right sided tooth infection. Associated symptoms include facial swelling, jaw pain, fever. Patient asked to report to ED for IV antibiotics for neutropenic fever in setting of tooth infection. Patient was started on Vancomycin but had allergic reaction and was placed on Zoysn.     Interval History:  No complaints currently.  States has good UOP.      Oncology Treatment Plan:   OP CLADRIBINE (5 DAY) + RITUXIMAB - Hairy Cell Leukemia    Medications:  Continuous Infusions:   sodium chloride 0.9% 100 mL/hr at 07/17/20 0127     Scheduled Meds:   fentaNYL  1 patch Transdermal Q72H    folic acid-vit B6-vit B12 2.5-25-2 mg  1 tablet Oral Daily    gabapentin  300 mg Oral QHS    polyethylene glycol  17 g Oral Daily    senna  17.2 mg Oral QHS    thiamine  100 mg Oral Daily     PRN Meds:sodium chloride 0.9%, acetaminophen, albuterol-ipratropium, diphenhydrAMINE, heparin (porcine), hydrALAZINE, hydrocortisone, ondansetron, oxyCODONE, promethazine     Review of Systems   Constitutional: Positive for activity change and fatigue. Negative for appetite change, chills, diaphoresis, fever and unexpected weight change.   HENT: Positive for dental problem. Negative for congestion, hearing loss, nosebleeds, postnasal drip, sore throat and trouble swallowing.    Eyes: Negative for pain, discharge and visual disturbance.   Respiratory: Negative for cough, chest tightness and shortness of breath.    Cardiovascular: Negative for chest pain and palpitations.   Gastrointestinal: Negative for blood in stool, constipation, diarrhea, nausea and vomiting.   Endocrine: Negative for cold intolerance  and heat intolerance.   Genitourinary: Negative for difficulty urinating, dyspareunia, flank pain and hematuria.   Musculoskeletal: Positive for arthralgias and myalgias. Negative for back pain, gait problem, joint swelling and neck pain.   Skin: Negative.    Neurological: Negative for dizziness, weakness, light-headedness and headaches.   Hematological: Negative for adenopathy. Does not bruise/bleed easily.   Psychiatric/Behavioral: Positive for dysphoric mood. Negative for agitation, behavioral problems and confusion. The patient is nervous/anxious.      Objective:     Vital Signs (Most Recent):  Temp: 97.1 °F (36.2 °C) (07/17/20 0742)  Pulse: 88 (07/17/20 0900)  Resp: 18 (07/17/20 0900)  BP: (!) 177/87 (07/17/20 0900)  SpO2: 98 % (07/17/20 0742) Vital Signs (24h Range):  Temp:  [97.1 °F (36.2 °C)-98.7 °F (37.1 °C)] 97.1 °F (36.2 °C)  Pulse:  [82-95] 88  Resp:  [16-20] 18  SpO2:  [97 %-100 %] 98 %  BP: (137-177)/() 177/87     Weight: (!) 144.4 kg (318 lb 5.5 oz)  Body mass index is 44.4 kg/m².  Body surface area is 2.69 meters squared.      Intake/Output Summary (Last 24 hours) at 7/17/2020 1000  Last data filed at 7/17/2020 0856  Gross per 24 hour   Intake 2442.16 ml   Output 7450 ml   Net -5007.84 ml       Physical Exam  Vitals signs and nursing note reviewed.   Constitutional:       General: She is not in acute distress.     Appearance: She is well-developed.   HENT:      Head: Normocephalic and atraumatic.      Right Ear: Hearing and external ear normal.      Left Ear: Hearing and external ear normal.      Nose: No rhinorrhea.      Right Sinus: No maxillary sinus tenderness or frontal sinus tenderness.      Left Sinus: No maxillary sinus tenderness or frontal sinus tenderness.      Mouth/Throat:      Mouth: No oral lesions.      Pharynx: Uvula midline.   Eyes:      General:         Right eye: No discharge.         Left eye: No discharge.      Conjunctiva/sclera: Conjunctivae normal.      Pupils: Pupils  are equal, round, and reactive to light.   Neck:      Musculoskeletal: Normal range of motion.      Thyroid: No thyromegaly.      Vascular: No carotid bruit.      Trachea: No tracheal deviation.   Cardiovascular:      Rate and Rhythm: Normal rate and regular rhythm.      Pulses:           Dorsalis pedis pulses are 2+ on the right side and 2+ on the left side.      Heart sounds: Normal heart sounds, S1 normal and S2 normal. No murmur.   Pulmonary:      Effort: Pulmonary effort is normal. No respiratory distress.      Breath sounds: Normal breath sounds.   Abdominal:      General: Bowel sounds are normal. There is no distension.      Palpations: Abdomen is soft. There is no mass.      Tenderness: There is no abdominal tenderness.   Musculoskeletal: Normal range of motion.      Left shoulder: She exhibits no tenderness.      Right upper leg: She exhibits no tenderness.      Left upper leg: She exhibits no tenderness.   Lymphadenopathy:      Cervical: No cervical adenopathy.      Upper Body:      Right upper body: No supraclavicular adenopathy.      Left upper body: No supraclavicular adenopathy.   Skin:     General: Skin is warm and dry.      Capillary Refill: Capillary refill takes less than 2 seconds.      Coloration: Skin is pale.      Findings: No rash.   Neurological:      Mental Status: She is alert and oriented to person, place, and time.      Sensory: No sensory deficit.      Coordination: Coordination normal.      Gait: Gait normal.   Psychiatric:         Attention and Perception: Attention normal.         Mood and Affect: Mood is anxious and depressed.         Speech: Speech normal.         Behavior: Behavior normal.         Thought Content: Thought content normal.         Cognition and Memory: Cognition normal.         Judgment: Judgment normal.         Significant Labs:   CBC:   Recent Labs   Lab 07/16/20  0802 07/17/20  0755   WBC 3.19* 3.16*   HGB 8.7* 8.8*   HCT 27.1* 27.2*    223    and CMP:    Recent Labs   Lab 07/16/20  0802 07/17/20  0755    139   K 5.2* 5.2*    108   CO2 23 24    91   BUN 44* 42*   CREATININE 6.9* 6.0*   CALCIUM 8.1* 8.0*   ANIONGAP 9 7*   EGFRNONAA 7* 8*       Diagnostic Results:  I have reviewed all pertinent imaging results/findings within the past 24 hours.    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  To re evaluate treatment regimen as outpatient. Chemotherapy is on hold pending resolution of acute issues.   --F/U with Primary Oncologist upon discharge  --Daily CBC, CMP  --Case management working on referral to facility for dental work due to patient insurance      ADAMARIS (acute kidney injury)  Management per Nephrology.  Vancomycin has been DC'd.  Required 1st dialysis on 7/13/2020. None since.         Chemotherapy-induced neutropenia  ANC >1000 - stable        Thank you for your consult. I will follow-up with patient. Please contact us if you have any additional questions.     Ursula Tran NP  Hematology/Oncology  [facility]

## 2020-07-17 NOTE — PROGRESS NOTES
"[facility] Hospital Medicine  Progress Note    Patient Name: Jaswant Yang  MRN: [id number]  Patient Class: IP- Inpatient   Admission Date: 7/6/2020  Length of Stay: 11 days  Attending Physician: Rohit Rodriguez, *  Primary Care Provider: Primary Doctor No    Subjective:     Principal Problem:Hairy cell leukemia of spleen        HPI:  45 y/o AA F with hx of hairy cell leukemia (diagnosed 05/2020), anemia, chronic pain to ED from Dr. Whittaker' office after being found to have a low grade temperature (99.6) with a visible dental infection in the R lower jaw and L upper jaw, in the setting of chemotherapy induced neutropenia (WBC 0.89); mild hypokalemia (3.4) also noted in ED, but labs otherwise largely unremarkable - admission for IV abx; BC x2 pending. Pt reports associated severe stabbing pain in the affected jaw areas, and mild facial edema, worsening over the past 2 days but states no drainage/bleeding. Pt's last chemo cycle ended on 06/26/20 - reports pain improved with IV morphine in ED, states she feels "a little better now" - palliative care consulted per ED physician at patient's request, to assist with symptom management and pain control. Denies CP, edema, palpitations, SOB, wheezing, orthopnea, PND, abdominal pain, N/V/D, dysuria, flank pain, HA, dizziness, fever, cough, chills, falls/trauma, blurred vision, focal deficits. No recent dietary changes, travel, sick contacts or viral illness - pt is typically active and independent with ADLs and ambulation at home. Pt is full code. Surrogate decision makers are son (Dmitri Yang) and Mother (Elsa Yang). Admitted for chemotherapy induced neutropenic fever with a jaw/dental infection.     Overview/Hospital Course:  7/7/20 The patient reports pain is not well controlled. Palliative care was consulted. The patient remained afebrile overnight. CT maxiollofacial CT was negative for a dental abscess but showed multiple dental caries. " "The patient remains neutropenic, Hem/onc following. Continue current management with IV ABX. 7/8/20 The patient remained afebrile overnight. WBCs improved to 1.41K, Hem/onc following. The patients renal function worsened overnight, Cr. Increased from 2 to 4.9 overnight. This is likely 2/2 vanc. IV fluids started and Nephrology consulted. Will monitor renal function. 7/9/20 No acute issues overnight. The patient continues to have good UOP. Creatinine increased to 6.8. Nephrology following, will continue to monitor renal function. ANC is improving, Hem/onc following. 7/10/20 The patient reports "feeling better today". WBCs continue to recover, Hem/onc following. Creatinine continues to worsen, Cr 8.1 today. The patient reports good UOP, Nephrology following.   7/11 Pt was seen and examined at bedside . She denies  Any complaint for today . The kidney function cont trending up .  7/12 The kidney function cont trending up . The K is 5.5 and Co2 16 . She  Have a good Uop . The CXR is clear . There was no acute event overnight   7/13 The kidney function is trending up . The case was d/c nephrology  . Pt will start HD  Today.   7/14-Kidney function stable  7/15-HD not needed urgently today. Will continue to monitor kidney function.  7/16--creatinine continues to trend down. Will monitor overnight and will plan to discontinue Vascath if creatinine continues to improve. Hyperkalemia stable. Blood pressure uncontrolled, but improved once IVFs were discontinued.    Interval History:doing well. Will plan to pull vascath tomorrow if creatinine continues to improve.     Review of Systems   Constitutional: Negative for activity change, appetite change, chills, diaphoresis, fatigue, fever and unexpected weight change.   HENT: Positive for dental problem. Negative for congestion, drooling, facial swelling, rhinorrhea, sinus pressure, sneezing, sore throat, trouble swallowing and voice change.    Eyes: Negative for photophobia, " discharge, redness, itching and visual disturbance.   Respiratory: Negative for apnea, cough, choking, chest tightness, shortness of breath, wheezing and stridor.    Cardiovascular: Negative for chest pain, palpitations and leg swelling.   Gastrointestinal: Negative for abdominal distention, abdominal pain, anal bleeding, blood in stool, constipation, diarrhea, nausea and vomiting.   Endocrine: Negative for cold intolerance, heat intolerance, polydipsia, polyphagia and polyuria.   Genitourinary: Negative for decreased urine volume, difficulty urinating, dysuria, flank pain, frequency, hematuria, pelvic pain, urgency, vaginal bleeding and vaginal discharge.   Musculoskeletal: Positive for back pain (chronic). Negative for arthralgias, gait problem, joint swelling, myalgias, neck pain and neck stiffness.   Skin: Negative for color change, pallor, rash and wound.   Neurological: Positive for weakness (generalized). Negative for dizziness, seizures, syncope, facial asymmetry, speech difficulty, light-headedness, numbness and headaches.   Psychiatric/Behavioral: Negative for agitation, confusion, hallucinations and suicidal ideas.   All other systems reviewed and are negative.     Objective:     Vital Signs (Most Recent):  Temp: 98.2 °F (36.8 °C) (07/17/20 1632)  Pulse: 97 (07/17/20 1632)  Resp: 18 (07/17/20 1632)  BP: (!) 145/86 (07/17/20 1632)  SpO2: 99 % (07/17/20 1632) Vital Signs (24h Range):  Temp:  [97.1 °F (36.2 °C)-98.7 °F (37.1 °C)] 98.2 °F (36.8 °C)  Pulse:  [82-98] 97  Resp:  [16-20] 18  SpO2:  [97 %-100 %] 99 %  BP: (137-177)/() 145/86     Weight: (!) 144.4 kg (318 lb 5.5 oz)  Body mass index is 44.4 kg/m².    Intake/Output Summary (Last 24 hours) at 7/17/2020 1632  Last data filed at 7/17/2020 1400  Gross per 24 hour   Intake 2082.16 ml   Output 6850 ml   Net -4767.84 ml      Physical Exam  Vitals signs and nursing note reviewed.   Constitutional:       General: She is not in acute distress.      Appearance: She is well-developed.   HENT:      Head: Normocephalic and atraumatic.      Right Ear: Hearing and external ear normal.      Left Ear: Hearing and external ear normal.      Nose: No rhinorrhea.      Right Sinus: No maxillary sinus tenderness or frontal sinus tenderness.      Left Sinus: No maxillary sinus tenderness or frontal sinus tenderness.      Mouth/Throat:      Mouth: No oral lesions.      Pharynx: Uvula midline.   Eyes:      General:         Right eye: No discharge.         Left eye: No discharge.      Conjunctiva/sclera: Conjunctivae normal.      Pupils: Pupils are equal, round, and reactive to light.   Neck:      Musculoskeletal: Normal range of motion.      Thyroid: No thyromegaly.      Vascular: No carotid bruit.      Trachea: No tracheal deviation.      Comments: Right subclavian vascath  Cardiovascular:      Rate and Rhythm: Normal rate and regular rhythm.      Pulses:           Dorsalis pedis pulses are 2+ on the right side and 2+ on the left side.      Heart sounds: Normal heart sounds, S1 normal and S2 normal. No murmur.   Pulmonary:      Effort: Pulmonary effort is normal. No respiratory distress.      Breath sounds: Normal breath sounds.   Abdominal:      General: Bowel sounds are normal. There is no distension.      Palpations: Abdomen is soft. There is no mass.      Tenderness: There is no abdominal tenderness.   Musculoskeletal: Normal range of motion.      Left shoulder: She exhibits no tenderness.      Right upper leg: She exhibits no tenderness.      Left upper leg: She exhibits no tenderness.   Lymphadenopathy:      Cervical: No cervical adenopathy.      Upper Body:      Right upper body: No supraclavicular adenopathy.      Left upper body: No supraclavicular adenopathy.   Skin:     General: Skin is warm and dry.      Capillary Refill: Capillary refill takes less than 2 seconds.      Coloration: Skin is pale.      Findings: No rash.   Neurological:      Mental Status: She is  alert and oriented to person, place, and time.      Sensory: No sensory deficit.      Coordination: Coordination normal.      Gait: Gait normal.   Psychiatric:         Attention and Perception: Attention normal.         Mood and Affect: Mood is anxious and depressed.         Speech: Speech normal.         Behavior: Behavior normal.         Thought Content: Thought content normal.         Cognition and Memory: Cognition normal.         Judgment: Judgment normal.             Significant Labs:   CBC:   Recent Labs   Lab 07/16/20  0802 07/17/20  0755   WBC 3.19* 3.16*   HGB 8.7* 8.8*   HCT 27.1* 27.2*    223     CMP:   Recent Labs   Lab 07/16/20  0802 07/17/20  0755    139   K 5.2* 5.2*    108   CO2 23 24    91   BUN 44* 42*   CREATININE 6.9* 6.0*   CALCIUM 8.1* 8.0*   ANIONGAP 9 7*   EGFRNONAA 7* 8*       Significant Imaging: I have reviewed all pertinent imaging results/findings within the past 24 hours.      Assessment/Plan:      * Hairy cell leukemia of spleen  Defer to Hematology    Elevated blood pressure reading  --s/p hydralazine.  --IVFs discontinued and blood  Pressure is trending downward  --will closely monitor      Metabolic acidosis  Most [facility] 2/2 to ADAMARIS  Monitor     7/17/20  Resolved.    ADAMARIS (acute kidney injury)  Multifactorial : most likely due to [facility]  Nephrology on board   Kidney function trending up  Pt will start HD today     7/15/20  Last HD was on 7/13/2020. No urgent indication for HD today. Nephrology following    7/16/2020  Creatinine continues to trend down. If lower on 7/17 will plan to remove vascath. Urine output stable.    7/17  Creatinine continues to improve. Will plan to monitor overnight. If stable Vascula surgery will pull Vascath in AM. Dr. Pearce is aware.      Acute neoplasm-related pain  - Continue PRN Analgesia - Palliative care consulted       Encounter for palliative care  Per palliative care       Chemotherapy-induced neutropenia  Improving   Cont  monitor       VTE Risk Mitigation (From admission, onward)         Ordered     heparin (porcine) injection 3,200 Units  As needed (PRN)      07/13/20 1752     Place sequential compression device  Until discontinued      07/06/20 1654                Hemant Zuniga NP  Department of Hospital Medicine   [facility]

## 2020-07-17 NOTE — SUBJECTIVE & OBJECTIVE
Interval History:  Creatinine down to 6.0.  Urine output greater than 7 L. okay to take the Vas-Cath out no more dialysis    Review of patient's allergies indicates:   Allergen Reactions    Tramadol Hives     Current Facility-Administered Medications   Medication Frequency    0.9%  NaCl infusion Continuous    0.9%  NaCl infusion PRN    acetaminophen tablet 650 mg Q6H PRN    albuterol-ipratropium 2.5 mg-0.5 mg/3 mL nebulizer solution 3 mL Q4H PRN    diphenhydrAMINE capsule 25 mg Q6H PRN    fentaNYL 25 mcg/hr 1 patch Q72H    folic acid-vit B6-vit B12 2.5-25-2 mg tablet 1 tablet Daily    gabapentin capsule 300 mg QHS    heparin (porcine) injection 3,200 Units PRN    hydrALAZINE injection 10 mg Q8H PRN    hydrocortisone 1 % cream BID PRN    ondansetron injection 4 mg Q8H PRN    oxyCODONE immediate release tablet 10 mg Q4H PRN    polyethylene glycol packet 17 g Daily    promethazine tablet 25 mg Q6H PRN    senna tablet 17.2 mg QHS    thiamine tablet 100 mg Daily       Objective:     Vital Signs (Most Recent):  Temp: 98.5 °F (36.9 °C) (07/17/20 1248)  Pulse: 98 (07/17/20 1248)  Resp: 18 (07/17/20 1248)  BP: (!) 141/95 (07/17/20 1248)  SpO2: 100 % (07/17/20 1248)  O2 Device (Oxygen Therapy): room air (07/16/20 1938) Vital Signs (24h Range):  Temp:  [97.1 °F (36.2 °C)-98.7 °F (37.1 °C)] 98.5 °F (36.9 °C)  Pulse:  [82-98] 98  Resp:  [16-20] 18  SpO2:  [97 %-100 %] 100 %  BP: (137-177)/() 141/95     Weight: (!) 144.4 kg (318 lb 5.5 oz) (07/17/20 0515)  Body mass index is 44.4 kg/m².  Body surface area is 2.69 meters squared.    I/O last 3 completed shifts:  In: 3868.8 [P.O.:360; I.V.:3508.8]  Out: 8500 [Urine:8500]    Physical Exam  Vitals signs and nursing note reviewed.   Constitutional:       General: She is not in acute distress.     Appearance: She is well-developed.   HENT:      Head: Normocephalic and atraumatic.      Right Ear: Hearing and external ear normal.      Left Ear: Hearing and  external ear normal.      Nose: No rhinorrhea.      Right Sinus: No maxillary sinus tenderness or frontal sinus tenderness.      Left Sinus: No maxillary sinus tenderness or frontal sinus tenderness.      Mouth/Throat:      Mouth: No oral lesions.      Pharynx: Uvula midline.   Eyes:      General:         Right eye: No discharge.         Left eye: No discharge.      Conjunctiva/sclera: Conjunctivae normal.      Pupils: Pupils are equal, round, and reactive to light.   Neck:      Musculoskeletal: Normal range of motion.      Thyroid: No thyromegaly.      Vascular: No carotid bruit.      Trachea: No tracheal deviation.   Cardiovascular:      Rate and Rhythm: Normal rate and regular rhythm.      Pulses:           Dorsalis pedis pulses are 2+ on the right side and 2+ on the left side.      Heart sounds: Normal heart sounds, S1 normal and S2 normal. No murmur.   Pulmonary:      Effort: Pulmonary effort is normal. No respiratory distress.      Breath sounds: Normal breath sounds.   Abdominal:      General: Bowel sounds are normal. There is no distension.      Palpations: Abdomen is soft. There is no mass.      Tenderness: There is no abdominal tenderness.   Musculoskeletal: Normal range of motion.      Left shoulder: She exhibits no tenderness.      Right upper leg: She exhibits no tenderness.      Left upper leg: She exhibits no tenderness.   Lymphadenopathy:      Cervical: No cervical adenopathy.      Upper Body:      Right upper body: No supraclavicular adenopathy.      Left upper body: No supraclavicular adenopathy.   Skin:     General: Skin is warm and dry.      Capillary Refill: Capillary refill takes less than 2 seconds.      Coloration: Skin is pale.      Findings: No rash.   Neurological:      Mental Status: She is alert and oriented to person, place, and time.      Sensory: No sensory deficit.      Coordination: Coordination normal.      Gait: Gait normal.   Psychiatric:         Attention and Perception:  Attention normal.         Mood and Affect: Mood is anxious and depressed.         Speech: Speech normal.         Behavior: Behavior normal.         Thought Content: Thought content normal.         Cognition and Memory: Cognition normal.         Judgment: Judgment normal.         Significant Labs:  All labs within the past 24 hours have been reviewed.     Significant Imaging:

## 2020-07-17 NOTE — MEDICAL/APP STUDENT
[facility]  Progress Note    Patient Name: Jaswant Yang  MRN: [id number]  Patient Class: IP- Inpatient   Admission Date: 7/6/2020  Length of Stay: 11 days  Attending Physician: Rohit Rodriguez, *  Primary Care Provider: Primary Doctor No    Subjective:   Principal problem: Neutropenic fever      HPI: Jaswant Yang is a 45 yo female w/ a PMHx of hairy cell leukemia (dx 5/20), anemia, GERD, splenectomy, and chemotherapy (most recent 6/26/20) who presented to the ED on 7/6/20 w/ neutropenic fever and a dental infection.     Interval history: Pt reports itchy rash that started under fentanyl patch and spread across back, under both breasts, and across stomach. Pt says hydrocortisone cream helps. Pt has no other complaints. Pt is ambulating, urinating, eating, and opening her bowels normally.    Review of Systems   Constitutional: Negative for fever and malaise/fatigue.   HENT: Negative.    Eyes: Negative.    Respiratory: Negative for cough and shortness of breath.    Cardiovascular: Negative for chest pain, palpitations and leg swelling.   Gastrointestinal: Negative for abdominal pain, constipation, diarrhea, heartburn, nausea and vomiting.   Genitourinary: Negative.    Musculoskeletal: Negative.    Skin: Positive for itching and rash.   Neurological: Negative.    Endo/Heme/Allergies: Negative.    Psychiatric/Behavioral: Negative.        Objective:   Temp:  [97.1 °F (36.2 °C)-98.7 °F (37.1 °C)] 97.1 °F (36.2 °C)  Pulse:  [82-95] 88  Resp:  [16-20] 18  SpO2:  [97 %-100 %] 98 %  BP: (137-177)/() 177/87    Physical Exam   Constitutional: She is oriented to person, place, and time and well-developed, well-nourished, and in no distress.   Obese; IV NS; telemetry; central venous cath   HENT:   Head: Normocephalic and atraumatic.   Tooth infxn; torus palatinus    Eyes: Pupils are equal, round, and reactive to light. Conjunctivae and EOM are normal.   Neck: Normal range of motion. Neck supple.    Cardiovascular: Normal rate, regular rhythm and intact distal pulses.   Murmur heard.   Systolic murmur is present with a grade of 3/6.  Pulmonary/Chest: Effort normal and breath sounds normal.   Abdominal: Soft. Bowel sounds are normal.   Splenectomy scar   Musculoskeletal:         General: No edema.   Neurological: She is alert and oriented to person, place, and time.   Skin: Rash noted. Rash is maculopapular.   Rash is present across back, under breasts, and slightly across stomach    Psychiatric: Mood and affect normal.     Urine output  2.1 ml/kg/hr    Labs  WBC 3.16 (steady)  ANC 1.3 (steady)  H&H 8.8&27.2 (uptrending)  K 5.2 (steady)  Cr 6 (downtrending)  BUN 42 (downtrending)    Current Facility-Administered Medications     0.9%  NaCl infusion, , Intravenous, Continuous    0.9%  NaCl infusion, , Intravenous, PRN    acetaminophen tablet 650 mg, 650 mg, Oral, Q6H PRN    albuterol-ipratropium 2.5 mg-0.5 mg/3 mL nebulizer solution 3 mL, 3 mL, Nebulization, Q4H     diphenhydrAMINE capsule 25 mg, 25 mg, Oral, Q6H PRN    fentaNYL 25 mcg/hr 1 patch, 1 patch, Transdermal, Q72H    folic acid-vit B6-vit B12 2.5-25-2 mg tablet 1 tablet, 1 tablet, Oral, Daily    gabapentin capsule 300 mg, 300 mg, Oral, QHS    heparin (porcine) injection 3,200 Units, 3,200 Units, Intravenous, PRN    hydrALAZINE injection 10 mg, 10 mg, Intravenous, Q8H PRN    hydrocortisone 1 % cream, , Topical (Top), BID PRN    ondansetron injection 4 mg, 4 mg, Intravenous, Q8H PRN    oxyCODONE immediate release tablet 10 mg, 10 mg, Oral, Q4H PRN    polyethylene glycol packet 17 g, 17 g, Oral, Daily    promethazine tablet 25 mg, 25 mg, Oral, Q6H PRN    senna tablet 17.2 mg, 17.2 mg, Oral, QHS    thiamine tablet 100 mg, 100 mg, Oral, Daily    Assessment/Plan:    Ms. Jaswant Yang is a 43 yo female w/ a PMHx of hairy cell leukemia, splenectomy, chemotherapy (6/26/20), and anemia who presented to the ED on 7/6 w/ neutropenic fever and  dental infection. Pt is progressing well and can be discharged if able to f/u outpt w/ hem/onc and nephrology.      Neutropenic fever  Pt has been consistently afebrile  WBCs uptrending to 3.16 (ANC at 1.3)  Vanc discontinued due to renal dysfxn     ADAMARIS  Intrinsic renal failure presumed due to vancomycin --> discontinued  Cr continues to downtrend -- now at 6  High urine outpt  Received HD 7/13     Rash  2/2 fentanyl patch   Removed patch -- send home with hydrocortisone cream    Dental infxn  F/u dentist after discharge     Chronic Pain   Well controlled w/ fentanyl patch & PRN oxycodone   Fentanyl patch removed b/c causing itchy rash     Anemia  H&H uptrending     Hairy cell leukemia  F/u hem/onc outpt    No notes on file    Active Diagnoses:    Diagnosis Date Noted POA    PRINCIPAL PROBLEM:  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] 05/28/2020 Yes    Trichomonas infection [A59.9]  Yes    Metabolic acidosis [E87.2]  Yes    ADAMARIS (acute kidney injury) [N17.9] 07/08/2020 No    Encounter for palliative care [Z51.5] 07/07/2020 Not Applicable    Acute neoplasm-related pain [G89.3] 07/07/2020 Yes    Therapeutic opioid-induced constipation (OIC) [K59.03, T40.2X5A] 07/07/2020 Yes    Chemotherapy-induced neutropenia [D70.1, T45.1X5A] 07/06/2020 Yes      Problems Resolved During this Admission:    Diagnosis Date Noted Date Resolved POA    Hypocalcemia [E83.51] 07/08/2020 07/16/2020 No    Neutropenic fever [D70.9, R50.81] 07/06/2020 07/15/2020 Yes    Dental infection [K04.7] 07/06/2020 07/15/2020 Yes    Hypokalemia [E87.6] 07/06/2020 07/12/2020 Yes     VTE Risk Mitigation (From admission, onward)         Ordered     heparin (porcine) injection 3,200 Units  As needed (PRN)      07/13/20 1752     Place sequential compression device  Until discontinued      07/06/20 1654                   Kerri Olvera MS3  [facility]

## 2020-07-17 NOTE — ASSESSMENT & PLAN NOTE
--s/p hydralazine.  --IVFs discontinued and blood  Pressure is trending downward  --will closely monitor

## 2020-07-17 NOTE — PLAN OF CARE
Pt. received laying in bed awake, x4, in no acute distress. Or discomfort. Vitals remains within stable limits. Medications tolerated well. Will continue to monitor progress.

## 2020-07-17 NOTE — ASSESSMENT & PLAN NOTE
Creatinine down to 6.0.  Urine output greater than 7 L. okay to take the Vas-Cath out no more dialysis

## 2020-07-18 VITALS
BODY MASS INDEX: 41.02 KG/M2 | DIASTOLIC BLOOD PRESSURE: 88 MMHG | HEART RATE: 89 BPM | WEIGHT: 293 LBS | SYSTOLIC BLOOD PRESSURE: 140 MMHG | OXYGEN SATURATION: 100 % | HEIGHT: 71 IN | TEMPERATURE: 98 F | RESPIRATION RATE: 18 BRPM

## 2020-07-18 LAB
ANION GAP SERPL CALC-SCNC: 8 MMOL/L (ref 8–16)
BASOPHILS # BLD AUTO: 0.01 K/UL (ref 0–0.2)
BASOPHILS NFR BLD: 0.4 % (ref 0–1.9)
BUN SERPL-MCNC: 43 MG/DL (ref 6–20)
CALCIUM SERPL-MCNC: 8.2 MG/DL (ref 8.7–10.5)
CHLORIDE SERPL-SCNC: 108 MMOL/L (ref 95–110)
CO2 SERPL-SCNC: 25 MMOL/L (ref 23–29)
CREAT SERPL-MCNC: 5.8 MG/DL (ref 0.5–1.4)
DIFFERENTIAL METHOD: ABNORMAL
EOSINOPHIL # BLD AUTO: 0.1 K/UL (ref 0–0.5)
EOSINOPHIL NFR BLD: 2.8 % (ref 0–8)
ERYTHROCYTE [DISTWIDTH] IN BLOOD BY AUTOMATED COUNT: 20.4 % (ref 11.5–14.5)
EST. GFR  (AFRICAN AMERICAN): 10 ML/MIN/1.73 M^2
EST. GFR  (NON AFRICAN AMERICAN): 8 ML/MIN/1.73 M^2
GLUCOSE SERPL-MCNC: 74 MG/DL (ref 70–110)
HCT VFR BLD AUTO: 26 % (ref 37–48.5)
HGB BLD-MCNC: 8.7 G/DL (ref 12–16)
IMM GRANULOCYTES # BLD AUTO: 0.04 K/UL (ref 0–0.04)
IMM GRANULOCYTES NFR BLD AUTO: 1.4 % (ref 0–0.5)
LYMPHOCYTES # BLD AUTO: 1.5 K/UL (ref 1–4.8)
LYMPHOCYTES NFR BLD: 52.7 % (ref 18–48)
MCH RBC QN AUTO: 31 PG (ref 27–31)
MCHC RBC AUTO-ENTMCNC: 33.5 G/DL (ref 32–36)
MCV RBC AUTO: 93 FL (ref 82–98)
MONOCYTES # BLD AUTO: 0.1 K/UL (ref 0.3–1)
MONOCYTES NFR BLD: 4.9 % (ref 4–15)
NEUTROPHILS # BLD AUTO: 1.1 K/UL (ref 1.8–7.7)
NEUTROPHILS NFR BLD: 37.8 % (ref 38–73)
NRBC BLD-RTO: 1 /100 WBC
OVALOCYTES BLD QL SMEAR: ABNORMAL
PLATELET # BLD AUTO: 245 K/UL (ref 150–350)
PMV BLD AUTO: 10.8 FL (ref 9.2–12.9)
POIKILOCYTOSIS BLD QL SMEAR: SLIGHT
POTASSIUM SERPL-SCNC: 5.3 MMOL/L (ref 3.5–5.1)
RBC # BLD AUTO: 2.81 M/UL (ref 4–5.4)
SODIUM SERPL-SCNC: 141 MMOL/L (ref 136–145)
STOMATOCYTES BLD QL SMEAR: PRESENT
TARGETS BLD QL SMEAR: ABNORMAL
WBC # BLD AUTO: 2.83 K/UL (ref 3.9–12.7)

## 2020-07-18 PROCEDURE — 99233 SBSQ HOSP IP/OBS HIGH 50: CPT | Mod: ,,, | Performed by: INTERNAL MEDICINE

## 2020-07-18 PROCEDURE — 99232 SBSQ HOSP IP/OBS MODERATE 35: CPT | Mod: ,,, | Performed by: PHYSICIAN ASSISTANT

## 2020-07-18 PROCEDURE — 99232 SBSQ HOSP IP/OBS MODERATE 35: CPT | Mod: ,,, | Performed by: INTERNAL MEDICINE

## 2020-07-18 PROCEDURE — 99232 PR SUBSEQUENT HOSPITAL CARE,LEVL II: ICD-10-PCS | Mod: ,,, | Performed by: INTERNAL MEDICINE

## 2020-07-18 PROCEDURE — 85025 COMPLETE CBC W/AUTO DIFF WBC: CPT

## 2020-07-18 PROCEDURE — 80048 BASIC METABOLIC PNL TOTAL CA: CPT

## 2020-07-18 PROCEDURE — 36415 COLL VENOUS BLD VENIPUNCTURE: CPT

## 2020-07-18 PROCEDURE — 99232 PR SUBSEQUENT HOSPITAL CARE,LEVL II: ICD-10-PCS | Mod: ,,, | Performed by: PHYSICIAN ASSISTANT

## 2020-07-18 PROCEDURE — 25000003 PHARM REV CODE 250: Performed by: NURSE PRACTITIONER

## 2020-07-18 PROCEDURE — 25000003 PHARM REV CODE 250: Performed by: SURGERY

## 2020-07-18 PROCEDURE — 99233 PR SUBSEQUENT HOSPITAL CARE,LEVL III: ICD-10-PCS | Mod: ,,, | Performed by: INTERNAL MEDICINE

## 2020-07-18 RX ORDER — HYDROCORTISONE 1 %
CREAM (GRAM) TOPICAL 2 TIMES DAILY PRN
Qty: 56 G | Refills: 0 | Status: SHIPPED | OUTPATIENT
Start: 2020-07-18 | End: 2020-11-19 | Stop reason: SDUPTHER

## 2020-07-18 RX ORDER — LANOLIN ALCOHOL/MO/W.PET/CERES
100 CREAM (GRAM) TOPICAL DAILY
COMMUNITY
Start: 2020-07-19 | End: 2020-08-04

## 2020-07-18 RX ORDER — ACETAMINOPHEN 325 MG/1
650 TABLET ORAL EVERY 8 HOURS PRN
Qty: 40 TABLET | Refills: 0 | Status: SHIPPED | OUTPATIENT
Start: 2020-07-18 | End: 2020-07-28

## 2020-07-18 RX ORDER — LIDOCAINE HYDROCHLORIDE 10 MG/ML
1 INJECTION, SOLUTION EPIDURAL; INFILTRATION; INTRACAUDAL; PERINEURAL ONCE
Status: DISCONTINUED | OUTPATIENT
Start: 2020-07-18 | End: 2020-07-18 | Stop reason: HOSPADM

## 2020-07-18 RX ORDER — DIPHENHYDRAMINE HCL 25 MG
25 CAPSULE ORAL EVERY 6 HOURS PRN
Qty: 40 CAPSULE | Refills: 0 | Status: SHIPPED | OUTPATIENT
Start: 2020-07-18 | End: 2020-07-21

## 2020-07-18 RX ADMIN — OXYCODONE 10 MG: 5 TABLET ORAL at 01:07

## 2020-07-18 RX ADMIN — DIPHENHYDRAMINE HYDROCHLORIDE 25 MG: 25 CAPSULE ORAL at 08:07

## 2020-07-18 RX ADMIN — DIPHENHYDRAMINE HYDROCHLORIDE 25 MG: 25 CAPSULE ORAL at 01:07

## 2020-07-18 RX ADMIN — Medication 1 TABLET: at 08:07

## 2020-07-18 RX ADMIN — OXYCODONE 10 MG: 5 TABLET ORAL at 08:07

## 2020-07-18 RX ADMIN — Medication 100 MG: at 08:07

## 2020-07-18 NOTE — ASSESSMENT & PLAN NOTE
Pain control palliative care.  Reaction to fentanyl patch on hold.  Using oxycodone with good pain control and will follow-up with palliative Care to discuss long-acting options.

## 2020-07-18 NOTE — SUBJECTIVE & OBJECTIVE
Interval History:  Creatinine down to 5.8.  Okay to discharge.  Will follow up in Nephrology Clinic    Review of patient's allergies indicates:   Allergen Reactions    Tramadol Hives     Current Facility-Administered Medications   Medication Frequency    0.9%  NaCl infusion Continuous    0.9%  NaCl infusion PRN    acetaminophen tablet 650 mg Q6H PRN    albuterol-ipratropium 2.5 mg-0.5 mg/3 mL nebulizer solution 3 mL Q4H PRN    diphenhydrAMINE capsule 25 mg Q6H PRN    folic acid-vit B6-vit B12 2.5-25-2 mg tablet 1 tablet Daily    gabapentin capsule 300 mg QHS    heparin (porcine) injection 3,200 Units PRN    hydrALAZINE injection 10 mg Q8H PRN    hydrocortisone 1 % cream BID PRN    lidocaine (PF) 10 mg/ml (1%) injection 10 mg Once    ondansetron injection 4 mg Q8H PRN    oxyCODONE immediate release tablet 10 mg Q4H PRN    polyethylene glycol packet 17 g Daily    promethazine tablet 25 mg Q6H PRN    senna tablet 17.2 mg QHS    thiamine tablet 100 mg Daily       Objective:     Vital Signs (Most Recent):  Temp: 98.4 °F (36.9 °C) (07/18/20 1136)  Pulse: 89 (07/18/20 1136)  Resp: 18 (07/18/20 1136)  BP: (!) 140/88 (07/18/20 1136)  SpO2: 100 % (07/18/20 1136)  O2 Device (Oxygen Therapy): room air (07/18/20 0336) Vital Signs (24h Range):  Temp:  [98.1 °F (36.7 °C)-98.4 °F (36.9 °C)] 98.4 °F (36.9 °C)  Pulse:  [88-97] 89  Resp:  [16-20] 18  SpO2:  [95 %-100 %] 100 %  BP: (129-151)/(79-88) 140/88     Weight: (!) 144.4 kg (318 lb 5.5 oz) (07/17/20 0515)  Body mass index is 44.4 kg/m².  Body surface area is 2.69 meters squared.    I/O last 3 completed shifts:  In: 1860.5 [P.O.:600; I.V.:1260.5]  Out: 94575 [Urine:78371]    Physical Exam  Vitals signs and nursing note reviewed. Exam conducted with a chaperone present.   Constitutional:       General: She is not in acute distress.     Appearance: She is well-developed.   HENT:      Head: Normocephalic and atraumatic.      Right Ear: Hearing and external ear  normal.      Left Ear: Hearing and external ear normal.      Nose: No rhinorrhea.      Right Sinus: No maxillary sinus tenderness or frontal sinus tenderness.      Left Sinus: No maxillary sinus tenderness or frontal sinus tenderness.      Mouth/Throat:      Mouth: No oral lesions.      Pharynx: Uvula midline.   Eyes:      General:         Right eye: No discharge.         Left eye: No discharge.      Conjunctiva/sclera: Conjunctivae normal.      Pupils: Pupils are equal, round, and reactive to light.   Neck:      Musculoskeletal: Normal range of motion.      Thyroid: No thyromegaly.      Vascular: No carotid bruit.      Trachea: No tracheal deviation.      Comments: vasc cath out   Cardiovascular:      Rate and Rhythm: Normal rate and regular rhythm.      Pulses:           Dorsalis pedis pulses are 2+ on the right side and 2+ on the left side.      Heart sounds: Normal heart sounds, S1 normal and S2 normal. No murmur.   Pulmonary:      Effort: Pulmonary effort is normal. No respiratory distress.      Breath sounds: Normal breath sounds.   Abdominal:      General: Bowel sounds are normal. There is no distension.      Palpations: Abdomen is soft. There is no mass.      Tenderness: There is no abdominal tenderness.   Musculoskeletal: Normal range of motion.      Left shoulder: She exhibits no tenderness.      Right upper leg: She exhibits no tenderness.      Left upper leg: She exhibits no tenderness.   Lymphadenopathy:      Cervical: No cervical adenopathy.      Upper Body:      Right upper body: No supraclavicular adenopathy.      Left upper body: No supraclavicular adenopathy.   Skin:     General: Skin is warm and dry.      Capillary Refill: Capillary refill takes less than 2 seconds.      Coloration: Skin is pale.      Findings: No rash.   Neurological:      Mental Status: She is alert and oriented to person, place, and time.      Sensory: No sensory deficit.      Coordination: Coordination normal.      Gait: Gait  normal.   Psychiatric:         Attention and Perception: Attention normal.         Mood and Affect: Mood is anxious and depressed.         Speech: Speech normal.         Behavior: Behavior normal.         Thought Content: Thought content normal.         Cognition and Memory: Cognition normal.         Judgment: Judgment normal.         Significant Labs:  All labs within the past 24 hours have been reviewed.     Significant Imaging:

## 2020-07-18 NOTE — ASSESSMENT & PLAN NOTE
S/P splenectomy.  Initiated treatment with cladribine, reaction during rituximab and would hold further dose.  Will have close follow-up with her after discharge next week.

## 2020-07-18 NOTE — PLAN OF CARE
Fall precautions implemented. Pt remained free from falls/injuries.  VSS. Monitoring I's and O's. NSR on monitor HR 90's. Pain adequately managed. No acute events overnight. Safety precautions implemented. Chart reviewed. Will continue to monitor.

## 2020-07-18 NOTE — SUBJECTIVE & OBJECTIVE
Interval History:  Denies notable oral pain, residual edema, no discharge, fever, chills.  Fentanyl patch removed after rash development  She is feeling well in your to go home today.     Oncology Treatment Plan:   OP CLADRIBINE (5 DAY) + RITUXIMAB - Hairy Cell Leukemia    Medications:  Continuous Infusions:   sodium chloride 0.9% Stopped (07/17/20 1120)     Scheduled Meds:   folic acid-vit B6-vit B12 2.5-25-2 mg  1 tablet Oral Daily    gabapentin  300 mg Oral QHS    lidocaine (PF) 10 mg/ml (1%)  1 mL Other Once    polyethylene glycol  17 g Oral Daily    senna  17.2 mg Oral QHS    thiamine  100 mg Oral Daily     PRN Meds:sodium chloride 0.9%, acetaminophen, albuterol-ipratropium, diphenhydrAMINE, heparin (porcine), hydrALAZINE, hydrocortisone, ondansetron, oxyCODONE, promethazine     Review of Systems   Constitutional: Positive for fatigue. Negative for activity change, appetite change, chills, diaphoresis and fever.   HENT: Positive for mouth sores. Negative for dental problem.    Respiratory: Negative for cough, choking, chest tightness, shortness of breath, wheezing and stridor.    Gastrointestinal: Negative for abdominal distention, abdominal pain, diarrhea, nausea and vomiting.   Skin: Negative.    Hematological: Negative.    Psychiatric/Behavioral: Negative.      Objective:     Vital Signs (Most Recent):  Temp: 98.4 °F (36.9 °C) (07/18/20 1136)  Pulse: 89 (07/18/20 1136)  Resp: 18 (07/18/20 1136)  BP: (!) 140/88 (07/18/20 1136)  SpO2: 100 % (07/18/20 1136) Vital Signs (24h Range):  Temp:  [98.1 °F (36.7 °C)-98.4 °F (36.9 °C)] 98.4 °F (36.9 °C)  Pulse:  [88-97] 89  Resp:  [16-20] 18  SpO2:  [95 %-100 %] 100 %  BP: (129-151)/(79-88) 140/88     Weight: (!) 144.4 kg (318 lb 5.5 oz)  Body mass index is 44.4 kg/m².  Body surface area is 2.69 meters squared.      Intake/Output Summary (Last 24 hours) at 7/18/2020 1420  Last data filed at 7/18/2020 1200  Gross per 24 hour   Intake 1200 ml   Output 3600 ml   Net  -2400 ml       Physical Exam     General appearance: Generally well appearing, in no acute distress.   Head, eyes, ears, nose, and throat: Oropharynx clear with edema right posterior buccal mucosa  Cardiovascular: Regular rate and rhythm, S1, S2, no audible murmurs.   Respiratory: Lungs clear to auscultation bilaterally.   Abdomen: nontender, nondistended.   Extremities: Warm, without edema.   Neurologic: Alert and oriented.  Skin:  Diffuse maculopapular rash on back and chest, ecchymoses or petechial lesion.   Psychiatric: normal mood and affect, conversant and appropriate      Significant Labs:   CBC:  A  Recent Labs   Lab 07/17/20 0755 07/18/20  0528   WBC 3.16* 2.83*   HGB 8.8* 8.7*   HCT 27.2* 26.0*    245    and CMP:   Recent Labs   Lab 07/17/20 0755 07/18/20  0528    141   K 5.2* 5.3*    108   CO2 24 25   GLU 91 74   BUN 42* 43*   CREATININE 6.0* 5.8*   CALCIUM 8.0* 8.2*   ANIONGAP 7* 8   EGFRNONAA 8* 8*       Diagnostic Results:  I have reviewed all pertinent imaging results/findings within the past 24 hours.

## 2020-07-18 NOTE — PROGRESS NOTES
[facility] -   Hematology/Oncology  Progress Note    Patient Name: Jaswant Yang  Admission Date: 7/6/2020  Hospital Length of Stay: 12 days  Code Status: Prior     Subjective:     HPI:  44 y.o female with hairy cell lymphoma currently being treated with CLADRIBINE sent from Dr. Whittaker office on yesterday for fever and presumed right sided tooth infection. Associated symptoms include facial swelling, jaw pain, fever. Patient asked to report to ED for IV antibiotics for neutropenic fever in setting of tooth infection. Patient was started on Vancomycin but had allergic reaction and was placed on Zoysn.     Interval History:  Denies notable oral pain, residual edema, no discharge, fever, chills.  Fentanyl patch removed after rash development  She is feeling well in your to go home today.     Oncology Treatment Plan:   OP CLADRIBINE (5 DAY) + RITUXIMAB - Hairy Cell Leukemia    Medications:  Continuous Infusions:   sodium chloride 0.9% Stopped (07/17/20 1120)     Scheduled Meds:   folic acid-vit B6-vit B12 2.5-25-2 mg  1 tablet Oral Daily    gabapentin  300 mg Oral QHS    lidocaine (PF) 10 mg/ml (1%)  1 mL Other Once    polyethylene glycol  17 g Oral Daily    senna  17.2 mg Oral QHS    thiamine  100 mg Oral Daily     PRN Meds:sodium chloride 0.9%, acetaminophen, albuterol-ipratropium, diphenhydrAMINE, heparin (porcine), hydrALAZINE, hydrocortisone, ondansetron, oxyCODONE, promethazine     Review of Systems   Constitutional: Positive for fatigue. Negative for activity change, appetite change, chills, diaphoresis and fever.   HENT: Positive for mouth sores. Negative for dental problem.    Respiratory: Negative for cough, choking, chest tightness, shortness of breath, wheezing and stridor.    Gastrointestinal: Negative for abdominal distention, abdominal pain, diarrhea, nausea and vomiting.   Skin: Negative.    Hematological: Negative.    Psychiatric/Behavioral: Negative.      Objective:     Vital  Signs (Most Recent):  Temp: 98.4 °F (36.9 °C) (07/18/20 1136)  Pulse: 89 (07/18/20 1136)  Resp: 18 (07/18/20 1136)  BP: (!) 140/88 (07/18/20 1136)  SpO2: 100 % (07/18/20 1136) Vital Signs (24h Range):  Temp:  [98.1 °F (36.7 °C)-98.4 °F (36.9 °C)] 98.4 °F (36.9 °C)  Pulse:  [88-97] 89  Resp:  [16-20] 18  SpO2:  [95 %-100 %] 100 %  BP: (129-151)/(79-88) 140/88     Weight: (!) 144.4 kg (318 lb 5.5 oz)  Body mass index is 44.4 kg/m².  Body surface area is 2.69 meters squared.      Intake/Output Summary (Last 24 hours) at 7/18/2020 1420  Last data filed at 7/18/2020 1200  Gross per 24 hour   Intake 1200 ml   Output 3600 ml   Net -2400 ml       Physical Exam     General appearance: Generally well appearing, in no acute distress.   Head, eyes, ears, nose, and throat: Oropharynx clear with edema right posterior buccal mucosa  Cardiovascular: Regular rate and rhythm, S1, S2, no audible murmurs.   Respiratory: Lungs clear to auscultation bilaterally.   Abdomen: nontender, nondistended.   Extremities: Warm, without edema.   Neurologic: Alert and oriented.  Skin:  Diffuse maculopapular rash on back and chest, ecchymoses or petechial lesion.   Psychiatric: normal mood and affect, conversant and appropriate      Significant Labs:   CBC:  A  Recent Labs   Lab 07/17/20  0755 07/18/20  0528   WBC 3.16* 2.83*   HGB 8.8* 8.7*   HCT 27.2* 26.0*    245    and CMP:   Recent Labs   Lab 07/17/20  0755 07/18/20  0528    141   K 5.2* 5.3*    108   CO2 24 25   GLU 91 74   BUN 42* 43*   CREATININE 6.0* 5.8*   CALCIUM 8.0* 8.2*   ANIONGAP 7* 8   EGFRNONAA 8* 8*       Diagnostic Results:  I have reviewed all pertinent imaging results/findings within the past 24 hours.    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  Initiated treatment with cladribine, reaction during rituximab and would hold further dose.  Will have close follow-up with her after discharge next week.        ADAMARIS (acute kidney  injury)  Suspected secondary to antibiotic.  Required 1st dialysis on 7/13/2020 and subsequent improvement Vas-Cath removed today plan for nephrology follow-up on discharge.        Encounter for palliative care  Pain control palliative care.  Reaction to fentanyl patch on hold.  Using oxycodone with good pain control and will follow-up with palliative Care to discuss long-acting options.    Chemotherapy-induced neutropenia  Relatively stable neutropenia, infectious precautions, will continue to monitor.        Thank you for your consult. I will sign off. Please contact us if you have any additional questions.     Guerda Hernandez MD  Hematology/Oncology  [facility]

## 2020-07-18 NOTE — DISCHARGE SUMMARY
"[facility] Hospital Medicine  Discharge Summary      Patient Name: Jaswant Yang  MRN: [id number]  Admission Date: 7/6/2020  Hospital Length of Stay: 12 days  Discharge Date and Time: 7/18/2020  2:30 PM  Attending Physician: Dr. Jamey Kidd   Discharging Provider: Daksha Fernandes NP  Primary Care Provider: Primary Doctor No      HPI:   43 y/o AA F with hx of hairy cell leukemia (diagnosed 05/2020), anemia, chronic pain to ED from Dr. Whittaker' office after being found to have a low grade temperature (99.6) with a visible dental infection in the R lower jaw and L upper jaw, in the setting of chemotherapy induced neutropenia (WBC 0.89); mild hypokalemia (3.4) also noted in ED, but labs otherwise largely unremarkable - admission for IV abx; BC x2 pending. Pt reports associated severe stabbing pain in the affected jaw areas, and mild facial edema, worsening over the past 2 days but states no drainage/bleeding. Pt's last chemo cycle ended on 06/26/20 - reports pain improved with IV morphine in ED, states she feels "a little better now" - palliative care consulted per ED physician at patient's request, to assist with symptom management and pain control. Denies CP, edema, palpitations, SOB, wheezing, orthopnea, PND, abdominal pain, N/V/D, dysuria, flank pain, HA, dizziness, fever, cough, chills, falls/trauma, blurred vision, focal deficits. No recent dietary changes, travel, sick contacts or viral illness - pt is typically active and independent with ADLs and ambulation at home. Pt is full code. Surrogate decision makers are son (Dmitri Yang) and Mother (Elsa Yang). Admitted for chemotherapy induced neutropenic fever with a jaw/dental infection.     Procedure(s) (LRB):  INSERTION, CENTRAL VENOUS ACCESS DEVICE (N/A)      Hospital Course:   Pt admitted to Medical Surgical Unit in there setting of hairy cell leukemia of spleen.  Heme/Onc consulted.  Pt reports pain not well controlled. Palliative care " consulted. CT maxiollofacial was negative for a dental abscess but showed multiple dental caries. The patient remains neutropenic, Hem/onc following. Continue current management with IV ABX. On 7/8/20, patient remained afebrile.  WBCs improved to 1.41K.  Hem/onc following.  Renal function worsened with Creatinine increased from 2 to 4.9 likely medication induced. IV fluids initiated, medications adjusted, and Nephrology consulted. On 7/9/20, pt remained stable with no acute issues reported.  Creatinine increased to 6.8.  ANC is improving. On 7/10/20, pt verbalized symptom improvement. WBCs continue to recover.  Creatinine worsened with increased to 8.1 with adequate UOP continued.  On 7/11/20, pt denies any complaints upon exam.  Kidney function monitored closely with Nephrology following.  On 7/12/20, kidney function continued to worsen with K of 5.5 and Co2 of 16 noted.  Chest xray is clear.  On 7/13/20,  case was d/c nephrology and Vas cath placed with initiation of HD planned.  On 7/14/2020, HD tolerated and kidney function stable.  On 7/15/20, HD not performed urgently and kidney function monitored. On 7/16/2020, creatinine trending down and kidney function improved.  Hyperkalemia stable. Blood pressure initially uncontrolled, but improved once IVFs were discontinued.  By 7/18/2020, kidney function continued to improve.  Generalized rash noted likely due to medications.  Antihistamines and hydrocortisone cream ordered.  Vas Cath removed per Vascular Surgery with site stable.  Pt denies any additional complaints with no acute issues witnessed or reported.  Pt seen and examined and deemed suitable for discharge to home accompanied by sister.  Current medications resumed with Tylenol, Folbic, Neurontin, Roxicodone, Senokot, and Thiamine prescribed.  Pt instructed to follow up with Nephrology, Palliative Care, and Heme/Onc upon discharge for further evaluation.       Consults:   Consults (From admission, onward)         Status Ordering Provider     Inpatient consult to Palliative Care  Once     Provider:  Tatyana Gupta PA-C    Completed JESSICA BRYANT     Inpatient consult to Vascular Surgery  Once     Provider:  Jm Tomlinson MD    Completed AISHA STANTON          Final Active Diagnoses:    Diagnosis Date Noted POA    PRINCIPAL PROBLEM:  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] 05/28/2020 Yes    Elevated blood pressure reading [R03.0] 07/17/2020 No    Metabolic acidosis [E87.2]  Yes    ADAMARIS (acute kidney injury) [N17.9] 07/08/2020 No    Encounter for palliative care [Z51.5] 07/07/2020 Not Applicable    Acute neoplasm-related pain [G89.3] 07/07/2020 Yes    Therapeutic opioid-induced constipation (OIC) [K59.03, T40.2X5A] 07/07/2020 Yes    Chemotherapy-induced neutropenia [D70.1, T45.1X5A] 07/06/2020 Yes      Problems Resolved During this Admission:    Diagnosis Date Noted Date Resolved POA    Trichomonas infection [A59.9]  07/17/2020 Yes    Hypocalcemia [E83.51] 07/08/2020 07/16/2020 No    Neutropenic fever [D70.9, R50.81] 07/06/2020 07/15/2020 Yes    Dental infection [K04.7] 07/06/2020 07/15/2020 Yes    Hypokalemia [E87.6] 07/06/2020 07/12/2020 Yes       Discharged Condition: stable    Disposition: Home or Self Care    Follow Up:  Follow-up Information     Cirilo Whittaker MD In 1 week.    Specialty: Hematology and Oncology  Why: -hospital follow up   Contact information:  [address] [address] [facility] [place] [address]  [phone]             Bernard Martin MD In 1 week.    Specialty: Nephrology  Why: -hospital follow up   Contact information:  [address] [facility]  [place] [address]  [phone]             Tatyana Gupta PA-C In 3 days.    Specialty: Hospice and Palliative Medicine  Why: -please keep scheduled appointment at 1 pm   Contact information:  [address] [place] [address] [address] [place] [address]  [phone]             Guerda Hernandez MD In 1 week.    Specialty: Hematology and  Oncology  Why: -hospital follow up  Contact information:  [address]  [place] [address]  [phone]                 Patient Instructions:      Diet Adult Regular     Notify your health care provider if you experience any of the following:  temperature >100.4     Notify your health care provider if you experience any of the following:  persistent dizziness, light-headedness, or visual disturbances     Notify your health care provider if you experience any of the following:  difficulty breathing or increased cough     Notify your health care provider if you experience any of the following:  redness, tenderness, or signs of infection (pain, swelling, redness, odor or green/yellow discharge around incision site)     Notify your health care provider if you experience any of the following:  persistent nausea and vomiting or diarrhea     Notify your health care provider if you experience any of the following:  worsening rash     Notify your health care provider if you experience any of the following:  increased confusion or weakness     Activity as tolerated       Significant Diagnostic Studies: Labs: All labs within the past 24 hours have been reviewed    Pending Diagnostic Studies:     None         Medications:  Reconciled Home Medications:      Medication List      START taking these medications    acetaminophen 325 MG tablet  Commonly known as: TYLENOL  Take 2 tablets (650 mg total) by mouth every 8 (eight) hours as needed.     diphenhydrAMINE 25 mg capsule  Commonly known as: BENADRYL  Take 1 capsule (25 mg total) by mouth every 6 (six) hours as needed for Itching.     folic acid-vit B6-vit B12 2.5-25-2 mg 2.5-25-2 mg Tab  Commonly known as: FOLBIC or Equiv  Take 1 tablet by mouth once daily.     gabapentin 300 MG capsule  Commonly known as: NEURONTIN  Take 1 capsule (300 mg total) by mouth every evening.     hydrocortisone 1 % cream  Apply topically 2 (two) times daily as needed (itching).  Replaces:  hydrocortisone 2.5 % cream     oxyCODONE 10 mg Tab immediate release tablet  Commonly known as: ROXICODONE  Take 1 tablet (10 mg total) by mouth every 4 (four) hours as needed (pain). Max 4 doses/ day     senna 8.6 mg tablet  Commonly known as: SENOKOT  Take 1 tablet by mouth 2 (two) times a day.     thiamine 100 MG tablet  Take 1 tablet (100 mg total) by mouth once daily.        CONTINUE taking these medications    albuterol 90 mcg/actuation inhaler  Commonly known as: PROVENTIL/VENTOLIN HFA  Inhale 1-2 puffs into the lungs every 6 (six) hours as needed for Wheezing. Rescue     ondansetron 8 MG Tbdl  Commonly known as: ZOFRAN-ODT  Take 1 tablet (8 mg total) by mouth every 12 (twelve) hours as needed.     polyethylene glycol 17 gram/dose powder  Commonly known as: GLYCOLAX  Take 17 g by mouth once daily.     prochlorperazine 10 MG tablet  Commonly known as: COMPAZINE  Take 1 tablet (10 mg total) by mouth every 6 (six) hours as needed.     promethazine 25 MG tablet  Commonly known as: PHENERGAN  Take 1 tablet (25 mg total) by mouth every 6 (six) hours as needed for Nausea.        STOP taking these medications    HYDROcodone-acetaminophen  mg per tablet  Commonly known as: NORCO     hydrocortisone 2.5 % cream  Replaced by: hydrocortisone 1 % cream            Indwelling Lines/Drains at time of discharge:   Lines/Drains/Airways     Central Venous Catheter Line                 Hemodialysis Catheter 07/13/20 1510 right internal jugular 4 days                Time spent on the discharge of patient: > 40 minutes  Patient was seen and examined on the date of discharge and determined to be suitable for discharge.         Daksha Fernandes NP  Department of Hospital Medicine  [facility]

## 2020-07-18 NOTE — PROGRESS NOTES
Progress Note   Palliative Medicine      SUBJECTIVE:     History of Present Illness:  Patient seen and examined without family present. Her rash has worsened in intensity and spread across her chest. She has also broken out around the tape from the vascath. She says her pruritus is no worse and still has no oral lesions. Her pain is a bit worse since removing the Fentanyl patch but relatively controlled with the PRN oxycodone. She is very happy she will be dc today and plans to keep her follow up in our clinic on Tuesday.    In the last 24hrs she has received the following pain medications (7a-7a):  - Oxycodone 10mg x 2      Past Medical History:   Diagnosis Date    Anemia     Anemia     Back pain     Dental infection     GERD (gastroesophageal reflux disease) 2020    Hairy-cell leukemia of spleen      Past Surgical History:   Procedure Laterality Date     SECTION, CLASSIC      FLUOROSCOPY N/A 2020    Procedure: Spleenic Bleed;  Surgeon: Hiren Cabrera MD;  Location: [facility] CATH LAB;  Service: General;  Laterality: N/A;    INJECTION OF ANESTHETIC AGENT INTO TISSUE PLANE DEFINED BY TRANSVERSUS ABDOMINIS MUSCLE N/A 2020    Procedure: BLOCK, TRANSVERSUS ABDOMINIS PLANE;  Surgeon: Yumi Ferguson MD;  Location: [facility] OR;  Service: General;  Laterality: N/A;    SPLENECTOMY N/A 2020    Procedure: SPLENECTOMY;  Surgeon: Yumi Ferguson MD;  Location: [facility] OR;  Service: General;  Laterality: N/A;    THORACENTESIS Left 2020    Procedure: THORACENTESIS;  Surgeon: Yumi Ferguson MD;  Location: [facility] OR;  Service: General;  Laterality: Left;    TUBAL LIGATION       History reviewed. No pertinent family history.  Review of patient's allergies indicates:   Allergen Reactions    Tramadol Hives     Current Facility-Administered Medications   Medication    0.9%  NaCl infusion    0.9%  NaCl infusion    acetaminophen tablet 650 mg    albuterol-ipratropium 2.5 mg-0.5 mg/3 mL  nebulizer solution 3 mL    diphenhydrAMINE capsule 25 mg    folic acid-vit B6-vit B12 2.5-25-2 mg tablet 1 tablet    gabapentin capsule 300 mg    heparin (porcine) injection 3,200 Units    hydrALAZINE injection 10 mg    hydrocortisone 1 % cream    lidocaine (PF) 10 mg/ml (1%) injection 10 mg    ondansetron injection 4 mg    oxyCODONE immediate release tablet 10 mg    polyethylene glycol packet 17 g    promethazine tablet 25 mg    senna tablet 17.2 mg    thiamine tablet 100 mg       Review of Symptoms    Symptom Assessment (ESAS 0-10 Scale)  Pain:  5  Dyspnea:  0  Anxiety:  0  Nausea:  0  Depression:  0  Anorexia:  0  Fatigue:  0  Insomnia:  0  Restlessness:  0  Agitation:  0     CAM / Delirium:  Negative  Constipation:  Negative  Diarrhea:  Negative    Bowel Management Plan (BMP):  Yes      Pain Assessment:  Location(s): torso    Torso       Torso Location:  Posterior and bilateral       Quality:  Aching       Quantity:  3/10 in intensity    OME in 24 hours:  20    ECOG Performance Status Grade:  0 - Fully Active    Advanced Directives:  Living Will: No    LaPOST: No    Do Not Resuscitate Status: No    Medical Power of : Yes     Agent's Name:  Elsa Yang.  Agent's Contact Number:  [phone]    Decision Making:  Patient answered questions      ROS:  Review of Systems   Constitutional: Negative for activity change, appetite change and fever.   HENT: Negative for dental problem, facial swelling, mouth sores, sore throat, trouble swallowing and voice change.    Eyes: Negative for photophobia and visual disturbance.   Respiratory: Negative for cough and shortness of breath.    Cardiovascular: Negative for chest pain and leg swelling.   Gastrointestinal: Negative for abdominal pain, constipation, diarrhea and nausea.   Endocrine: Negative for polydipsia and polyphagia.   Genitourinary: Negative for difficulty urinating and dysuria.   Musculoskeletal: Positive for arthralgias. Negative for back  pain, myalgias, neck pain and neck stiffness.   Skin: Positive for rash. Negative for wound.   Allergic/Immunologic: Positive for immunocompromised state.   Neurological: Negative for syncope, weakness and light-headedness.   Hematological: Bruises/bleeds easily.   Psychiatric/Behavioral: Negative for confusion and sleep disturbance. The patient is not nervous/anxious.      OBJECTIVE:     Physical Exam:  Vitals:    07/18/20 0857   BP:    Pulse:    Resp: 18   Temp:      Gen: well-developed, well-nourished, drowsy after benadryl  Head: atraumatic, normocephalic  Eyes: conjuctiva and sclera clear  Ears: hearing grossly intact with no external abnormality  Mouth: good dentition, MM moist, no oral lesions  Respiratory: CTAB, no wheezing or rhonchi, no stridor  Heart: RRR  Abdomen: soft, NTTP, nondistended, normoactive BS  Pulses: 2+ in DP  Extremities: no edema, full ROM of all joints  Neurologic: no focal deficits, CN II-XII grossly intact, normal coordination  Skin: maculopapular rash noted to back, chest, and stomach- increased in intensity today  Psych: cooperative, normal mood and affect, normal attention span and concentration     Labs:  Lab Results   Component Value Date    WBC 2.83 (L) 07/18/2020    HGB 8.7 (L) 07/18/2020    HCT 26.0 (L) 07/18/2020    MCV 93 07/18/2020     07/18/2020     BMP  Lab Results   Component Value Date     07/18/2020    K 5.3 (H) 07/18/2020     07/18/2020    CO2 25 07/18/2020    BUN 43 (H) 07/18/2020    CREATININE 5.8 (H) 07/18/2020    CALCIUM 8.2 (L) 07/18/2020    ANIONGAP 8 07/18/2020    ESTGFRAFRICA 10 (A) 07/18/2020    EGFRNONAA 8 (A) 07/18/2020     Albumin   Date Value Ref Range Status   07/06/2020 4.3 3.5 - 5.2 g/dL Final       Radiology:I have reviewed all pertinent imaging results/findings within the past 24 hours.  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  Differential is wide for this appearance, and includes cocaine abuse, rhinitis medicamentosa (chronic Afrin-type medication abuse), complication related to antiangiogenesis chemotherapy, sinonasal granulomatosis with polyangiitis, sinonasal sarcoid, or less likely but possible invasive fungal sinusitis.     Multiple dental caries are noted.     Exam limited by lack of IV contrast.  No appreciable oropharyngeal or dental abscess. Recommend further evaluation with direct visualization.     ASSESSMENT   Jaswant Yang is a 44 y.o. year old who was recently diagnosed with hairy cell leukemia currently on chemotherapy who was sent to the emergency department by Dr. Whittaker due to fever. She was complaining of jaw pain and edema and was admitted for antibiotics and monitoring. Ms. Yang has an outpatient referral for Palliative Medicine for pain and symptom management but her appointment was at the end of the month so we were asked to see while admitted.      PLAN   1. Neoplasm related pain  - Discontinue Fentanyl 25 mcg/ hr patch due to concern of drug reaction. I have cancelled the script sent on 7/10/20   - Continue oxycodone 10mg PO q4hr PRN pain- script sent on 7/10/20 and already picked up  - Continue gabapentin 300mg HS for neuropathic pain- will titrate once renal function allows  - The risks, side effects, benefits of chronic opioid therapy have been discussed and the opportunity for questions provided.  - A record of the controlled substances prescribed and dispensed to the patient in [place] was reviewed in  by me (or my delegate). Last received hydrocodone 10mg #40 tabs and 5mg #11 tabs on 6/22/20, hydrocodone 10mg #20 on 6/16 and on 6/11.  - We discussed rotating to another long acting opioid such as MSER or OxyContin. Since OxyContin is often times not covered by insurance and we are unsure whether the oxycodone is contributing to rash I will hold off. She was instructed to continue Oxycodone q4hr  PRN and at our follow up appointment on 7/21 we can decide whether safe to start Morphine or we will try OxyContin.    2. Drug reaction  - Likely related to Fentanyl since the rash is most pronounced where the patches were placed.  - Use Oxycodone monotherapy for now and monitor for continued eruption or resolution  - Plan to rotate to Morphine ER or Oxycontin at the next clinic visit depending on renal function    3. Opioid induced constipation- well contrlled  - Continue daily Miralax and Senna 2 tabs HS     4. Hairy cell leukemia  - Defer to oncology  - Currently receiving chemotherapy     5. Encounter for Palliative Care  - Code status: FULL  - HCPOA: mom Elsa Yang, alternate: son Dmitri Yang  - PM SW assisted with ACP and scanned in to the computer  - Primary goal is to continue cancer targeted therapy.  - We will manage pain medication going forth and she already has f/u on 7/21 scheduled     5. ADAMARIS  - Opioid selection driven by elevated Cr  - Renally dosed gabapentin and will titrate once renal function improves  - Nephrology is following and she received HD 7/13  - Cr continues to trend down and the team is optimistic about renal recovery. Vascath to removed and d/c today.   - Continue to monitor    Thank you for allowing Palliative Medicine to be involved in the care of Jaswant Yang.      Medical decision making: HIGH based on high risk of death, untreated symptoms, high risk medications, management of more than one chronic illness in exacerbation, managing side effects of medications or polypharmacy      Plan required increased review of medication choice, interaction, dosing, frequency, and route due to patient complexity. Patient complexity increased by: Presence of renal insufficiency    > 50% of 25 min visit spent in chart review, face to face discussion symptom assessment, coordination of care and emotional support, exploring burden/ benefit of various approaches of treatment.        Tatyana Gupta PA-C  Palliative Medicine

## 2020-07-18 NOTE — ASSESSMENT & PLAN NOTE
Suspected secondary to antibiotic.  Required 1st dialysis on 7/13/2020 and subsequent improvement Vas-Cath removed today plan for nephrology follow-up on discharge.

## 2020-07-18 NOTE — PROGRESS NOTES
[facility] -   Nephrology  Progress Note    Patient Name: Jaswant Yang  MRN: [id number]  Admission Date: 7/6/2020  Hospital Length of Stay: 12 days  Attending Provider: Jamey Kidd MD   Primary Care Physician: Primary Doctor No  Principal Problem:Hairy cell leukemia of spleen    Subjective:     HPI: 44 year old female with anemia, GERD, hairy-cell leukemia presents to [facility] with low grade fever, leukopenia and dental pain. Patient was started on antibiotics including vancomycin and received 4.2 g of Vancomycin in a 15 h period. UOP has not been recorded since admission.     Nephrology was consulted to help with patient's renal care while she is admitted at [facility]. I saw and examined patient in her hospital room. Patient reports generalized weakness and vomiting x 1 today. No other symptoms at present. She reports NSAID use in past (last use of ibuprofen about 3 weeks ago).     Chart review revealed that her baseline creatinine is about 0.9.     Interval History:  Creatinine down to 5.8.  Okay to discharge.  Will follow up in Nephrology Clinic    Review of patient's allergies indicates:   Allergen Reactions    Tramadol Hives     Current Facility-Administered Medications   Medication Frequency    0.9%  NaCl infusion Continuous    0.9%  NaCl infusion PRN    acetaminophen tablet 650 mg Q6H PRN    albuterol-ipratropium 2.5 mg-0.5 mg/3 mL nebulizer solution 3 mL Q4H PRN    diphenhydrAMINE capsule 25 mg Q6H PRN    folic acid-vit B6-vit B12 2.5-25-2 mg tablet 1 tablet Daily    gabapentin capsule 300 mg QHS    heparin (porcine) injection 3,200 Units PRN    hydrALAZINE injection 10 mg Q8H PRN    hydrocortisone 1 % cream BID PRN    lidocaine (PF) 10 mg/ml (1%) injection 10 mg Once    ondansetron injection 4 mg Q8H PRN    oxyCODONE immediate release tablet 10 mg Q4H PRN    polyethylene glycol packet 17 g Daily    promethazine tablet 25 mg Q6H PRN    senna tablet 17.2 mg QHS    thiamine tablet 100 mg  Daily       Objective:     Vital Signs (Most Recent):  Temp: 98.4 °F (36.9 °C) (07/18/20 1136)  Pulse: 89 (07/18/20 1136)  Resp: 18 (07/18/20 1136)  BP: (!) 140/88 (07/18/20 1136)  SpO2: 100 % (07/18/20 1136)  O2 Device (Oxygen Therapy): room air (07/18/20 0336) Vital Signs (24h Range):  Temp:  [98.1 °F (36.7 °C)-98.4 °F (36.9 °C)] 98.4 °F (36.9 °C)  Pulse:  [88-97] 89  Resp:  [16-20] 18  SpO2:  [95 %-100 %] 100 %  BP: (129-151)/(79-88) 140/88     Weight: (!) 144.4 kg (318 lb 5.5 oz) (07/17/20 0515)  Body mass index is 44.4 kg/m².  Body surface area is 2.69 meters squared.    I/O last 3 completed shifts:  In: 1860.5 [P.O.:600; I.V.:1260.5]  Out: 32124 [Urine:49549]    Physical Exam  Vitals signs and nursing note reviewed. Exam conducted with a chaperone present.   Constitutional:       General: She is not in acute distress.     Appearance: She is well-developed.   HENT:      Head: Normocephalic and atraumatic.      Right Ear: Hearing and external ear normal.      Left Ear: Hearing and external ear normal.      Nose: No rhinorrhea.      Right Sinus: No maxillary sinus tenderness or frontal sinus tenderness.      Left Sinus: No maxillary sinus tenderness or frontal sinus tenderness.      Mouth/Throat:      Mouth: No oral lesions.      Pharynx: Uvula midline.   Eyes:      General:         Right eye: No discharge.         Left eye: No discharge.      Conjunctiva/sclera: Conjunctivae normal.      Pupils: Pupils are equal, round, and reactive to light.   Neck:      Musculoskeletal: Normal range of motion.      Thyroid: No thyromegaly.      Vascular: No carotid bruit.      Trachea: No tracheal deviation.      Comments: vasc cath out   Cardiovascular:      Rate and Rhythm: Normal rate and regular rhythm.      Pulses:           Dorsalis pedis pulses are 2+ on the right side and 2+ on the left side.      Heart sounds: Normal heart sounds, S1 normal and S2 normal. No murmur.   Pulmonary:      Effort: Pulmonary effort is  normal. No respiratory distress.      Breath sounds: Normal breath sounds.   Abdominal:      General: Bowel sounds are normal. There is no distension.      Palpations: Abdomen is soft. There is no mass.      Tenderness: There is no abdominal tenderness.   Musculoskeletal: Normal range of motion.      Left shoulder: She exhibits no tenderness.      Right upper leg: She exhibits no tenderness.      Left upper leg: She exhibits no tenderness.   Lymphadenopathy:      Cervical: No cervical adenopathy.      Upper Body:      Right upper body: No supraclavicular adenopathy.      Left upper body: No supraclavicular adenopathy.   Skin:     General: Skin is warm and dry.      Capillary Refill: Capillary refill takes less than 2 seconds.      Coloration: Skin is pale.      Findings: No rash.   Neurological:      Mental Status: She is alert and oriented to person, place, and time.      Sensory: No sensory deficit.      Coordination: Coordination normal.      Gait: Gait normal.   Psychiatric:         Attention and Perception: Attention normal.         Mood and Affect: Mood is anxious and depressed.         Speech: Speech normal.         Behavior: Behavior normal.         Thought Content: Thought content normal.         Cognition and Memory: Cognition normal.         Judgment: Judgment normal.         Significant Labs:  All labs within the past 24 hours have been reviewed.     Significant Imaging:      Assessment/Plan:     ADAMARIS (acute kidney injury)  Creatinine down to 5.8.  Okay to discharge.  Will follow up in Nephrology Clinic        Thank you for your consult.     Bernard Martin MD  Nephrology  [facility] -

## 2020-07-18 NOTE — PLAN OF CARE
07/18/20 1340   Final Note   Assessment Type Final Discharge Note   Anticipated Discharge Disposition Home   Right Care Referral Info   Post Acute Recommendation No Care

## 2020-07-20 ENCOUNTER — TELEPHONE (OUTPATIENT)
Dept: NEPHROLOGY | Facility: CLINIC | Age: 45
End: 2020-07-20

## 2020-07-21 ENCOUNTER — LAB VISIT (OUTPATIENT)
Dept: LAB | Facility: HOSPITAL | Age: 45
End: 2020-07-21
Attending: INTERNAL MEDICINE
Payer: MEDICAID

## 2020-07-21 ENCOUNTER — INITIAL CONSULT (OUTPATIENT)
Dept: PALLIATIVE MEDICINE | Facility: CLINIC | Age: 45
End: 2020-07-21
Payer: MEDICAID

## 2020-07-21 ENCOUNTER — OFFICE VISIT (OUTPATIENT)
Dept: HEMATOLOGY/ONCOLOGY | Facility: CLINIC | Age: 45
End: 2020-07-21
Payer: MEDICAID

## 2020-07-21 VITALS — TEMPERATURE: 98 F

## 2020-07-21 DIAGNOSIS — N17.9 AKI (ACUTE KIDNEY INJURY): ICD-10-CM

## 2020-07-21 DIAGNOSIS — T45.1X5A CHEMOTHERAPY-INDUCED NEUTROPENIA: ICD-10-CM

## 2020-07-21 DIAGNOSIS — C91.40 HAIRY CELL LEUKEMIA NOT HAVING ACHIEVED REMISSION: Primary | ICD-10-CM

## 2020-07-21 DIAGNOSIS — D63.0 ANEMIA COMPLICATING NEOPLASTIC DISEASE: ICD-10-CM

## 2020-07-21 DIAGNOSIS — C91.40 HAIRY CELL LEUKEMIA NOT HAVING ACHIEVED REMISSION: ICD-10-CM

## 2020-07-21 DIAGNOSIS — D70.1 CHEMOTHERAPY-INDUCED NEUTROPENIA: ICD-10-CM

## 2020-07-21 DIAGNOSIS — F41.9 ANXIETY: Primary | ICD-10-CM

## 2020-07-21 DIAGNOSIS — L30.9 DERMATITIS: ICD-10-CM

## 2020-07-21 DIAGNOSIS — R21 MACULOPAPULAR RASH, GENERALIZED: ICD-10-CM

## 2020-07-21 LAB
ACANTHOCYTES BLD QL SMEAR: PRESENT
ALBUMIN SERPL BCP-MCNC: 4.4 G/DL (ref 3.5–5.2)
ALP SERPL-CCNC: 56 U/L (ref 55–135)
ALT SERPL W/O P-5'-P-CCNC: 39 U/L (ref 10–44)
ANION GAP SERPL CALC-SCNC: 13 MMOL/L (ref 8–16)
ANISOCYTOSIS BLD QL SMEAR: SLIGHT
AST SERPL-CCNC: 38 U/L (ref 10–40)
BASOPHILS # BLD AUTO: 0.01 K/UL (ref 0–0.2)
BASOPHILS NFR BLD: 0.3 % (ref 0–1.9)
BILIRUB SERPL-MCNC: 0.4 MG/DL (ref 0.1–1)
BUN SERPL-MCNC: 29 MG/DL (ref 6–20)
BURR CELLS BLD QL SMEAR: ABNORMAL
CALCIUM SERPL-MCNC: 8.4 MG/DL (ref 8.7–10.5)
CHLORIDE SERPL-SCNC: 104 MMOL/L (ref 95–110)
CO2 SERPL-SCNC: 21 MMOL/L (ref 23–29)
CREAT SERPL-MCNC: 3.9 MG/DL (ref 0.5–1.4)
DACRYOCYTES BLD QL SMEAR: ABNORMAL
DIFFERENTIAL METHOD: ABNORMAL
EOSINOPHIL # BLD AUTO: 0.1 K/UL (ref 0–0.5)
EOSINOPHIL NFR BLD: 1.7 % (ref 0–8)
ERYTHROCYTE [DISTWIDTH] IN BLOOD BY AUTOMATED COUNT: 20.1 % (ref 11.5–14.5)
EST. GFR  (AFRICAN AMERICAN): 15 ML/MIN/1.73 M^2
EST. GFR  (NON AFRICAN AMERICAN): 13 ML/MIN/1.73 M^2
GLUCOSE SERPL-MCNC: 127 MG/DL (ref 70–110)
HCT VFR BLD AUTO: 34.5 % (ref 37–48.5)
HGB BLD-MCNC: 11.3 G/DL (ref 12–16)
HYPOCHROMIA BLD QL SMEAR: ABNORMAL
IMM GRANULOCYTES # BLD AUTO: 0.06 K/UL (ref 0–0.04)
IMM GRANULOCYTES NFR BLD AUTO: 1.7 % (ref 0–0.5)
LYMPHOCYTES # BLD AUTO: 1.1 K/UL (ref 1–4.8)
LYMPHOCYTES NFR BLD: 31.4 % (ref 18–48)
MCH RBC QN AUTO: 29.8 PG (ref 27–31)
MCHC RBC AUTO-ENTMCNC: 32.8 G/DL (ref 32–36)
MCV RBC AUTO: 91 FL (ref 82–98)
MONOCYTES # BLD AUTO: 0.2 K/UL (ref 0.3–1)
MONOCYTES NFR BLD: 4.9 % (ref 4–15)
NEUTROPHILS # BLD AUTO: 2.1 K/UL (ref 1.8–7.7)
NEUTROPHILS NFR BLD: 60 % (ref 38–73)
NRBC BLD-RTO: 1 /100 WBC
OVALOCYTES BLD QL SMEAR: ABNORMAL
PLATELET # BLD AUTO: 339 K/UL (ref 150–350)
PLATELET BLD QL SMEAR: ABNORMAL
PMV BLD AUTO: 9.8 FL (ref 9.2–12.9)
POIKILOCYTOSIS BLD QL SMEAR: SLIGHT
POLYCHROMASIA BLD QL SMEAR: ABNORMAL
POTASSIUM SERPL-SCNC: 5 MMOL/L (ref 3.5–5.1)
PROT SERPL-MCNC: 8.5 G/DL (ref 6–8.4)
RBC # BLD AUTO: 3.79 M/UL (ref 4–5.4)
SCHISTOCYTES BLD QL SMEAR: PRESENT
SODIUM SERPL-SCNC: 138 MMOL/L (ref 136–145)
STOMATOCYTES BLD QL SMEAR: PRESENT
TARGETS BLD QL SMEAR: ABNORMAL
WBC # BLD AUTO: 3.5 K/UL (ref 3.9–12.7)

## 2020-07-21 PROCEDURE — 36415 COLL VENOUS BLD VENIPUNCTURE: CPT

## 2020-07-21 PROCEDURE — 99213 OFFICE O/P EST LOW 20 MIN: CPT | Mod: PBBFAC,27 | Performed by: INTERNAL MEDICINE

## 2020-07-21 PROCEDURE — 99999 PR PBB SHADOW E&M-EST. PATIENT-LVL IV: ICD-10-PCS | Mod: PBBFAC,,, | Performed by: FAMILY MEDICINE

## 2020-07-21 PROCEDURE — 99215 PR OFFICE/OUTPT VISIT, EST, LEVL V, 40-54 MIN: ICD-10-PCS | Mod: S$PBB,,, | Performed by: FAMILY MEDICINE

## 2020-07-21 PROCEDURE — 99215 PR OFFICE/OUTPT VISIT, EST, LEVL V, 40-54 MIN: ICD-10-PCS | Mod: S$PBB,,, | Performed by: INTERNAL MEDICINE

## 2020-07-21 PROCEDURE — 85025 COMPLETE CBC W/AUTO DIFF WBC: CPT

## 2020-07-21 PROCEDURE — 99214 OFFICE O/P EST MOD 30 MIN: CPT | Mod: PBBFAC | Performed by: FAMILY MEDICINE

## 2020-07-21 PROCEDURE — 99999 PR PBB SHADOW E&M-EST. PATIENT-LVL III: ICD-10-PCS | Mod: PBBFAC,,, | Performed by: INTERNAL MEDICINE

## 2020-07-21 PROCEDURE — 99999 PR PBB SHADOW E&M-EST. PATIENT-LVL IV: CPT | Mod: PBBFAC,,, | Performed by: FAMILY MEDICINE

## 2020-07-21 PROCEDURE — 99215 OFFICE O/P EST HI 40 MIN: CPT | Mod: S$PBB,,, | Performed by: INTERNAL MEDICINE

## 2020-07-21 PROCEDURE — 80053 COMPREHEN METABOLIC PANEL: CPT

## 2020-07-21 PROCEDURE — 99999 PR PBB SHADOW E&M-EST. PATIENT-LVL III: CPT | Mod: PBBFAC,,, | Performed by: INTERNAL MEDICINE

## 2020-07-21 PROCEDURE — 99215 OFFICE O/P EST HI 40 MIN: CPT | Mod: S$PBB,,, | Performed by: FAMILY MEDICINE

## 2020-07-21 RX ORDER — PREDNISONE 20 MG/1
TABLET ORAL
Qty: 11 TABLET | Refills: 0 | Status: SHIPPED | OUTPATIENT
Start: 2020-07-21 | End: 2020-11-19 | Stop reason: SDUPTHER

## 2020-07-21 RX ORDER — HYDROXYZINE HYDROCHLORIDE 25 MG/1
25 TABLET, FILM COATED ORAL 3 TIMES DAILY PRN
Qty: 30 TABLET | Refills: 0 | Status: SHIPPED | OUTPATIENT
Start: 2020-07-21 | End: 2022-03-03 | Stop reason: SDUPTHER

## 2020-07-21 RX ORDER — TRIAMCINOLONE ACETONIDE 1 MG/ML
LOTION TOPICAL 2 TIMES DAILY
Qty: 120 ML | Refills: 1 | Status: SHIPPED | OUTPATIENT
Start: 2020-07-21 | End: 2020-11-19 | Stop reason: SDUPTHER

## 2020-07-21 RX ORDER — HYDROCODONE BITARTRATE AND ACETAMINOPHEN 10; 325 MG/1; MG/1
1 TABLET ORAL EVERY 6 HOURS PRN
Qty: 90 TABLET | Refills: 0 | Status: SHIPPED | OUTPATIENT
Start: 2020-07-21 | End: 2020-08-10 | Stop reason: SDUPTHER

## 2020-07-21 RX ORDER — ALPRAZOLAM 0.5 MG/1
.25-.5 TABLET ORAL 2 TIMES DAILY PRN
Qty: 30 TABLET | Refills: 0 | Status: SHIPPED | OUTPATIENT
Start: 2020-07-21 | End: 2020-08-10 | Stop reason: SDUPTHER

## 2020-07-21 NOTE — PROGRESS NOTES
Subjective:       Patient ID: Jaswant Yang is a 44 y.o. female.    Chief Complaint: No chief complaint on file.    43 yo female with hairy cell leukemia recently discharged from lengthy hospitalization complicated by ADAMARIS requiring dialysis. She developed a rash in the hospital, though due to medication, possibly fentanyl. She was discharged with oxycodone by inpatient palliative medicine--avoiding morphine due to compromised kidney function. This is her first follow-up visit since hospitalization; only other scheduled f/u is with Dr. Martin on 8/5/20. Her main complaint today is that her rash has gotten significantly worse--has coalesced and is intensely pruritic. She has been taking benadryl and using OTC hydrocortisone cream without relief. Not sleeping well; taking oxycodone about 5 doses per day.     BMP  Lab Results       Component                Value               Date                       NA                       141                 07/18/2020                 K                        5.3 (H)             07/18/2020                 CL                       108                 07/18/2020                 CO2                      25                  07/18/2020                 BUN                      43 (H)              07/18/2020                 CREATININE               5.8 (H)             07/18/2020                 CALCIUM                  8.2 (L)             07/18/2020                 ANIONGAP                 8                   07/18/2020                 ESTGFRAFRICA             10 (A)              07/18/2020                 EGFRNONAA                8 (A)               07/18/2020            Lab Results      Component                Value               Date                      WBC                      2.83 (L)            07/18/2020                HGB                      8.7 (L)             07/18/2020                HCT                      26.0 (L)            07/18/2020                MCV                       93                  2020                PLT                      245                 2020                    does not have any pertinent problems on file.  Past Medical History:   Diagnosis Date    Anemia     Anemia     Back pain     Dental infection     GERD (gastroesophageal reflux disease) 2020    Hairy-cell leukemia of spleen      Past Surgical History:   Procedure Laterality Date     SECTION, CLASSIC      FLUOROSCOPY N/A 2020    Procedure: Spleenic Bleed;  Surgeon: Hiren Cabrera MD;  Location: [facility] CATH LAB;  Service: General;  Laterality: N/A;    INJECTION OF ANESTHETIC AGENT INTO TISSUE PLANE DEFINED BY TRANSVERSUS ABDOMINIS MUSCLE N/A 2020    Procedure: BLOCK, TRANSVERSUS ABDOMINIS PLANE;  Surgeon: Yumi Ferguson MD;  Location: [facility] OR;  Service: General;  Laterality: N/A;    SPLENECTOMY N/A 2020    Procedure: SPLENECTOMY;  Surgeon: Yumi Ferguson MD;  Location: [facility] OR;  Service: General;  Laterality: N/A;    THORACENTESIS Left 2020    Procedure: THORACENTESIS;  Surgeon: Yumi Ferguson MD;  Location: [facility] OR;  Service: General;  Laterality: Left;    TUBAL LIGATION       History reviewed. No pertinent family history.  Social History     Socioeconomic History    Marital status: Single     Spouse name: Not on file    Number of children: Not on file    Years of education: Not on file    Highest education level: Not on file   Occupational History    Not on file   Social Needs    Financial resource strain: Not on file    Food insecurity     Worry: Not on file     Inability: Not on file    Transportation needs     Medical: Not on file     Non-medical: Not on file   Tobacco Use    Smoking status: Former Smoker     Packs/day: 0.25     Types: Cigarettes    Smokeless tobacco: Never Used    Tobacco comment: no longer smoker   Substance and Sexual Activity    Alcohol use: Yes     Comment: occasional    Drug use: No    Sexual  activity: Yes     Birth control/protection: Condom   Lifestyle    Physical activity     Days per week: Not on file     Minutes per session: Not on file    Stress: Not on file   Relationships    Social connections     Talks on phone: Not on file     Gets together: Not on file     Attends Protestant service: Not on file     Active member of club or organization: Not on file     Attends meetings of clubs or organizations: Not on file     Relationship status: Not on file   Other Topics Concern    Not on file   Social History Narrative    Not on file     Review of Systems   A comprehensive 14-point review of systems was reviewed with patient and was negative other than as specified above.     Objective:     Vitals:    07/21/20 1322   Temp: 98.4 °F (36.9 °C)        Physical Exam  Vitals signs and nursing note reviewed.   Constitutional:       Appearance: She is well-developed.   HENT:      Head: Normocephalic and atraumatic.   Eyes:      Pupils: Pupils are equal, round, and reactive to light.   Neck:      Musculoskeletal: Normal range of motion and neck supple.   Cardiovascular:      Rate and Rhythm: Normal rate and regular rhythm.   Pulmonary:      Effort: Pulmonary effort is normal.      Breath sounds: Normal breath sounds.   Musculoskeletal: Normal range of motion.         General: No deformity.   Skin:     General: Skin is warm and dry.      Findings: Rash present.   Neurological:      Mental Status: She is alert and oriented to person, place, and time.   Psychiatric:         Behavior: Behavior normal.         Review of Symptoms    Symptom Assessment (ESAS 0-10 Scale)  Pain:  6  Dyspnea:  0  Anxiety:  6  Depression:  1  Anorexia:  1  Fatigue:  5  Insomnia:  8  Restlessness:  8  Agitation:  0     CAM / Delirium:  Negative  Constipation:  Negative  Diarrhea:  Negative    Bowel Management Plan (BMP):  Yes            ECOG Performance Status Grade:  1 - Ambulates, capable of light work    Advanced Directives:  Living  Will: No    LaPOST: No    Medical Power of : Yes      Decision Making:  Patient answered questions    Living Arrangements:  Lives with family          Assessment:       1. Anxiety    2. Hairy cell leukemia not having achieved remission    3. Dermatitis    4. ADAMARIS (acute kidney injury)        Plan:           Problem List Items Addressed This Visit        Renal/    ADAMARIS (acute kidney injury)    Relevant Orders    Ambulatory referral/consult to [facility]       Oncology    Hairy cell leukemia not having achieved remission    Relevant Medications    HYDROcodone-acetaminophen (NORCO)  mg per tablet    Other Relevant Orders    Ambulatory referral/consult to [facility]      Other Visit Diagnoses     Anxiety    -  Primary    Relevant Medications    ALPRAZolam (XANAX) 0.5 MG tablet    Dermatitis        Relevant Medications    predniSONE (DELTASONE) 20 MG tablet    hydrOXYzine HCL (ATARAX) 25 MG tablet    Other Relevant Orders    Ambulatory referral/consult to Allergy    Ambulatory referral/consult to Dermatology        Will get in to follow up with oncology MD or NP today if possible; if not will draw labs and arrange f/u with oncology. Encouraged to keep renal appointment. Will also set up referrals to allergy and derm re: rash. Steroid taper and topical kenalog for now. Will stop oxycodone and switch back to hydrocodone which she has taken without problem in the past. No long acting added today due to this question of drug allergy.     She does not have primary care and has had trouble establishing care with a new MD. I will set up a transitional care home visit for her to help ensure she doesn't fall through the cracks with this.     Savi Pino MD  > 50% of  45 min visit spent in chart review, face to face discussion of goals of care, symptom assessment, coordination of care and emotional support

## 2020-07-21 NOTE — PROGRESS NOTES
"  Subjective:      DATE OF VISIT: 7/21/20     ?  Patient ID:?Jaswant Yang is a 44 y.o. female.?? MR#: [id number]   ?   PRIMARY ONCOLOGIST: Dr. Hernandez    ? Primary Care Providers:  Primary Doctor No (General)     CHIEF COMPLAINT: ??  Newly diagnosed hairy cell leukemia??   ?   ONCOLOGIC DIAGNOSIS:  Hairy cell leukemia  ?   CURRENT TREATMENT: TBD    PAST TREATMENT:  6/22/20 cladribine; 7/6/20 initiated rituximab, not completed due to transfusion reaction  05/11/2020 splenectomy  ?   ONCOLOGIC HISTORY:   ?   Ms Yang is a 44 year old woman with obesity and longstanding history of anemia, without treatment or further evaluation.  She does not follow with primary care and no other known diagnoses.  She presented to emergency room with left-sided abdominal pain for 2 days which she thought initially due to gas/indigestion.  She denies any associated symptoms.  CT scan revealed splenomegaly to 17 cm and concern for splenic laceration.  She was taken to the operating room on 05/11/20 where she was noted to have "Splenic rupture with capsular tear at anterior superior border of the spleen. Reactive ascites. No massive hemoperitoneum. Abnormal tissue extending to the hilum, sent for frozen section. Unable to definitively determine if lymphoma on frozen section. Splenectomy performed. No other disease noted within the abdomen."    05/11/2020 splenic lymph node excisional biopsy with pathology months consistent with hairy cell leukemia.    6/3/20 bone marrow biopsy/aspirate with hypercellular marrow % with extensive involvement by hairy cell leukemia    6/22/20 cladribine; 7/6/20 initiated rituximab, not completed due to transfusion reaction    Follow-up        She presents with mother after hospitalization.  Since my last visit with her she had cladribine chemotherapy however this was later complicated by oral infection treated with antibiotics complicated by ADAMARIS requiring dialysis x1.  She was discharged last Friday " at which time she developed rash question whether due to antibiotic vancomycin verses fentanyl patch both since discontinued.  Unfortunately rash has progressed across chest, abdomen, back and pruritic.  No shortness of breath, chest pain, dysphagia/odynophagia.  She did review with me allergy to rituximab consisted of severe abdominal pain, sweating elevated blood pressure and discontinued early into infusion.    Review of Systems    ?   A comprehensive 14-point review of systems was reviewed with patient and was negative other than as specified above.   ?   PAST MEDICAL HISTORY:   Past Medical History:   Diagnosis Date    Anemia     Anemia     Back pain     Dental infection     GERD (gastroesophageal reflux disease) 2020    Hairy-cell leukemia of spleen     ?     PAST SURGICAL HISTORY:   Past Surgical History:   Procedure Laterality Date     SECTION, CLASSIC      FLUOROSCOPY N/A 2020    Procedure: Spleenic Bleed;  Surgeon: Hiren Cabrera MD;  Location: [facility] CATH LAB;  Service: General;  Laterality: N/A;    INJECTION OF ANESTHETIC AGENT INTO TISSUE PLANE DEFINED BY TRANSVERSUS ABDOMINIS MUSCLE N/A 2020    Procedure: BLOCK, TRANSVERSUS ABDOMINIS PLANE;  Surgeon: Yumi Ferguson MD;  Location: [facility] OR;  Service: General;  Laterality: N/A;    SPLENECTOMY N/A 2020    Procedure: SPLENECTOMY;  Surgeon: Yumi Ferguson MD;  Location: [facility] OR;  Service: General;  Laterality: N/A;    THORACENTESIS Left 2020    Procedure: THORACENTESIS;  Surgeon: Yumi Ferguson MD;  Location: [facility] OR;  Service: General;  Laterality: Left;    TUBAL LIGATION        ?   ALLERGIES:   Allergies as of 2020 - Reviewed 2020   Allergen Reaction Noted    Fentanyl Rash 2020    Vancomycin analogues Rash 2020    Rituximab Other (See Comments) 2020    Tramadol Hives 2020      ?   MEDICATIONS:?   No outpatient medications have been marked as taking for the  7/21/20 encounter (Office Visit) with Guerda Hernandez MD.      ?   SOCIAL HISTORY:?   Social History     Tobacco Use    Smoking status: Former Smoker     Packs/day: 0.25     Types: Cigarettes    Smokeless tobacco: Never Used    Tobacco comment: no longer smoker   Substance Use Topics    Alcohol use: Yes     Comment: occasional      ?      ?   FAMILY HISTORY:   family history is not on file.   ?   She notes several family members with cancer but does not know details.      Objective:      Physical Exam    weight 124kg  Blood pressure 108/77  Pulse 107  Respiratory rate 18  Temperature 98°  Oxygen 97% on room air  ?     ?   ECOG:?1   General appearance: Generally well appearing, in no acute distress.   Head, eyes, ears, nose, and throat: Pupils round and equally reactive to light. Oropharynx clear with moist mucous membranes.   Lymph: No palpable cervical or supraclavicular lymphadenopathy.   Cardiovascular:  Mild tachycardia, regular rhythm, S1, S2, no audible murmurs.   Respiratory: Lungs clear to auscultation bilaterally.   Abdomen:  Obese, vertical midline incisional scar well-healing  Extremities: Warm, without edema.   Neurologic: Alert and oriented. Grossly normal strength, coordination, and gait.   Skin:  Diffuse maculopapular rash throughout chest, abdomen, back, pruritic  ?   Laboratory:  ?       Lab Visit on 07/21/2020   Component Date Value Ref Range Status    WBC 07/21/2020 3.50* 3.90 - 12.70 K/uL Final    RBC 07/21/2020 3.79* 4.00 - 5.40 M/uL Final    Hemoglobin 07/21/2020 11.3* 12.0 - 16.0 g/dL Final    Hematocrit 07/21/2020 34.5* 37.0 - 48.5 % Final    Mean Corpuscular Volume 07/21/2020 91  82 - 98 fL Final    Mean Corpuscular Hemoglobin 07/21/2020 29.8  27.0 - 31.0 pg Final    Mean Corpuscular Hemoglobin Conc 07/21/2020 32.8  32.0 - 36.0 g/dL Final    RDW 07/21/2020 20.1* 11.5 - 14.5 % Final    Platelets 07/21/2020 339  150 - 350 K/uL Final    MPV 07/21/2020 9.8  9.2 - 12.9 fL Final     Immature Granulocytes 07/21/2020 1.7* 0.0 - 0.5 % Final    Gran # (ANC) 07/21/2020 2.1  1.8 - 7.7 K/uL Final    Immature Grans (Abs) 07/21/2020 0.06* 0.00 - 0.04 K/uL Final    Lymph # 07/21/2020 1.1  1.0 - 4.8 K/uL Final    Mono # 07/21/2020 0.2* 0.3 - 1.0 K/uL Final    Eos # 07/21/2020 0.1  0.0 - 0.5 K/uL Final    Baso # 07/21/2020 0.01  0.00 - 0.20 K/uL Final    nRBC 07/21/2020 1* 0 /100 WBC Final    Gran% 07/21/2020 60.0  38.0 - 73.0 % Final    Lymph% 07/21/2020 31.4  18.0 - 48.0 % Final    Mono% 07/21/2020 4.9  4.0 - 15.0 % Final    Eosinophil% 07/21/2020 1.7  0.0 - 8.0 % Final    Basophil% 07/21/2020 0.3  0.0 - 1.9 % Final    Platelet Estimate 07/21/2020 Appears normal   Final    Aniso 07/21/2020 Slight   Final    Poik 07/21/2020 Slight   Final    Poly 07/21/2020 Occasional   Final    Hypo 07/21/2020 Occasional   Final    Ovalocytes 07/21/2020 Occasional   Final    Target Cells 07/21/2020 Occasional   Final    Tear Drop Cells 07/21/2020 Occasional   Final    Providence Cells 07/21/2020 Occasional   Final    Stomatocytes 07/21/2020 Present   Final    Acanthocytes 07/21/2020 Present   Final    Schistocytes 07/21/2020 Present   Final    Differential Method 07/21/2020 Automated   Final    Sodium 07/21/2020 138  136 - 145 mmol/L Final    Potassium 07/21/2020 5.0  3.5 - 5.1 mmol/L Final    Chloride 07/21/2020 104  95 - 110 mmol/L Final    CO2 07/21/2020 21* 23 - 29 mmol/L Final    Glucose 07/21/2020 127* 70 - 110 mg/dL Final    BUN, Bld 07/21/2020 29* 6 - 20 mg/dL Final    Creatinine 07/21/2020 3.9* 0.5 - 1.4 mg/dL Final    Calcium 07/21/2020 8.4* 8.7 - 10.5 mg/dL Final    Total Protein 07/21/2020 8.5* 6.0 - 8.4 g/dL Final    Albumin 07/21/2020 4.4  3.5 - 5.2 g/dL Final    Total Bilirubin 07/21/2020 0.4  0.1 - 1.0 mg/dL Final    AST 07/21/2020 38  10 - 40 U/L Final    ALT 07/21/2020 39  10 - 44 U/L Final    Anion Gap 07/21/2020 13  8 - 16 mmol/L Final    eGFR if   07/21/2020 15* >60 mL/min/1.73 m^2 Final    eGFR if non African American 07/21/2020 13* >60 mL/min/1.73 m^2 Final      ?     Chemistry        Component Value Date/Time     07/21/2020 1452    K 5.0 07/21/2020 1452     07/21/2020 1452    CO2 21 (L) 07/21/2020 1452    BUN 29 (H) 07/21/2020 1452    CREATININE 3.9 (H) 07/21/2020 1452     (H) 07/21/2020 1452        Component Value Date/Time    CALCIUM 8.4 (L) 07/21/2020 1452    ALKPHOS 58 07/15/2020 0919    AST 38 07/21/2020 1452    ALT 39 07/21/2020 1452    BILITOT 0.4 07/21/2020 1452    ESTGFRAFRICA 15 (A) 07/21/2020 1452    EGFRNONAA 13 (A) 07/21/2020 1452          ?   ?   Imaging:  ?  5/11/20  CT ABDOMEN PELVIS WITHOUT CONTRAST    CLINICAL HISTORY:  Abd pain, fever, abscess suspected;    TECHNIQUE:  Low dose axial images, sagittal and coronal reformations were obtained from the lung bases to the pubic symphysis.  Oral contrast was not administered.    COMPARISON:  05/11/2020    FINDINGS:  Heart: Mild cardiomegaly..  Trace effusion.    Lung Bases: Large left pleural effusion with left lower lobe atelectasis or airspace disease.    Liver: Normal size and attenuation. No focal lesions.    Gallbladder: No calcified gallstones.    Bile Ducts: No dilatation.    Pancreas: No obvious mass. No peripancreatic fat stranding.    Spleen: Splenomegaly measuring up to 17 cm..  Area of decreased attenuation within the lower portion of the spleen concerning for splenic laceration and surrounding perisplenic hematoma.  High attenuating fluid surrounds the spleen.  Active bleed is not excluded on noncontrast imaging.  CTA can be obtained as clinically warranted.    Adrenals: Normal.    Kidneys/Ureters: No mass, hydroureteronephrosis, or nephroureterolithiasis.  4 cm hypodensity within the interpolar region right kidney consistent with a cyst.    Bladder: No wall thickening.    Reproductive organs: Enlarged myomatous uterus is suspected.    GI Tract/Mesentery: No  evidence of bowel obstruction or inflammation.  Stranding is seen within the mesentery of the left upper quadrant which could reflect local inflammation or posttraumatic injury.  No evidence of bowel obstruction.  Moderate constipation noted.    Peritoneal Space: Scattered fluid is seen within the pelvis anterior to the uterus as well as within the cul-de-sac which likely reflects hemoperitoneum.    Retroperitoneum: Small amount of fluid is seen tracking along the left pericolic gutter likely reflecting extension of perisplenic hematoma.  Small amount of fluid is seen within the cul-de-sac.  Small amount of fluid also suspected within the lorrie hepatis region.    Abdominal wall: Normal.    Vasculature: No aneurysm.    Bones: No acute fracture.  No rib fractures are seen.  No suspicious lytic or sclerotic lesions.      Impression       Area of decreased attenuation within the lower portion of the spleen concerning for splenic laceration and surrounding perisplenic hematoma. High attenuating fluid surrounds the spleen. Active bleed is not excluded on noncontrast imaging. CTA can be obtained as clinically warranted.    Splenomegaly.    Scattered fluid within the pelvis likely reflects mild hemoperitoneum.    Stranding is seen within the mesentery of the left upper quadrant which could reflect local inflammation or reflect traumatic injury.           Pathology:    1.  Splenic lymph node (excision):   - Negative for carcinoma   - See separately submitted flow cytometry report   - Review by a hematopathologist will be performed and results issued in a   supplemental report   2.  Spleen (splenectomy):   - Benign spleen with hemorrhage and associated reactive changes  VC       Comment: Interpreted by: Jose Silva M.D., Signed on 05/22/2020 at 11:49   Supplemental Diagnosis 1. SPLENIC LYMPH NODE, EXCISION:   --HAIRY CELL LEUKEMIA (SEE COMMENT).   2. SPLEEN, SPLENECTOMY:   --HAIRY CELL LEUKEMIA (SEE COMMENT).   COMMENT:   1.  " Splenic lymph node:   Histologic sections of the specimen show lymph node partially effaced by   diffuse infiltrates of atypical lymphoid cells displaying abundant cytoplasm   and irregular nuclear contour.  Lymphoid follicles are seen.   Flow cytometric analysis of the lymph node detects a kappa light chain   restricted B lymphocyte population expressing CD19, CD20, .   CD10 and   CD5 are negative. Flow differential:  Lymphocytes 70.2%, Monocytes 13.4%,   Granulocytes  12.9%, Blast  0.3%, Debris/nRBC 1.9%,  Viability 84.5%.   2.  Spleen:   Histologic sections of the spleen show atypical lymphoid infiltrates in the   right pulp.  The lymphoid cells display abundant cytoplasm and irregular   nuclear contour.  Red blood cell lakes are seen.  A lymph node is identified,   displaying morphologic features cellular to part 1.   Immunohistochemical stains are performed with adequate controls for greater   sensitivity and further architectural assessment.   1).  The atypical lymphoid cells in the lymph nodes are positive for CD20,   BCL2, and cyclin D1 (weak); negative for CD5, CD10, CD23, CD30, and BCL6.   Proliferation index (Ki-67) is moderate.  CD3-positive T lymphocytes are   seen.  -positive plasma cells are polytypic by immunoglobulin kappa and   lambda light chain in situ hybridization study.   2).  The atypical lymphoid cells in the spleen are positive for CD20, cyclin   D1 (weak),  ([facility]), CD25 ([facility]),   BRAF V600E ([facility]); negative for CD5, CD10, and CD23.   Proliferation index (Ki-67) is moderate.  CD3-positive T lymphocytes are seen.   Taken together, the overall findings are consistent with hairy cell leukemia   involving spleen and lymph node.  VC      Gross 1:  Surgery ID:  [id number];  Pathology ID:  [id number]   1.  Received fresh for frozen section labeled "splenic lymph node" is a 1.5 x   0.3 x 0.3 cm piece of yellow fatty tissue.  A " "portion is submitted in RPMI   for flow cytometry, and a portion is submitted for frozen section diagnosis.   Frozen section remnant is embedded in cassette 1790, 1 FSA.  The remainder is   entirely embedded in cassette 1790, 1B.   Grossed by: Ricardo Wilson    2: Surgery ID:  [id number];  Pathology ID:  [id number]   2.  Received in formalin labeled "spleen" is an 892 g, 17.8 by 12 x 6.5 cm   spleen.  The capsule is purple blue, smooth, and loosely adhered to spleen,   with moderate amounts of adhered clotted blood. There is a 16 x 13.5 region   on the diaphragmatic surface without capsule. Sectioning reveals subcapsular   hemorrhage, which impacts the splenic parenchyma. The parenchyma displays a   beefy red cut surface.  Within the hilar fat is a 0.7 cm candidate lymph   node.  No distinct masses grossly identified.  Representative sections are   embedded in cassette 1790, 2A-2D.   2A-2B:  Splenic parenchyma with capsule and subcapsular hemorrhage   2C:  Hilar fat and splenic parenchyma without capsule   2D:  Lymph node, bisected            ?   Assessment/Plan:       1. Hairy cell leukemia not having achieved remission    2. ADAMARIS (acute kidney injury)    3. Chemotherapy-induced neutropenia    4. Maculopapular rash, generalized    5. Anemia complicating neoplastic disease          Plan:     # Hairy cell leukemia:  Bone marrow hypercellular (%) with extensive involvement by hairy cell leukemia; flow cytometry from bone marrow consistent with this diagnosis.  I discussed at length natural history of her cell leukemia.  Given her presentation with splenic rupture and extensive involvement with bone marrow with borderline cytopenias (ANC 1.4, hgb 11, plt 120K) I dicussed recommendation to initiate treatment at this time. Per recent study there was demonstrated benefit to addition of rituximab to cladribine (Tacho et al. J Clin Oncol 3.2020) and will pursue this regimen: cladribine 0.15 mg/kg by 2 hour intravenous infusion " days 1-5 concurrently with rituximab 375 mg/m2/wk × 8.   6/22/20 cladribine; 7/6/20 initiated rituximab, not completed due to transfusion reaction.  Multiple complications from treatment, see below, labs reviewed in note improvement in cytopenias likely multifactorial from treatment, infection.  Continue to monitor.  Will hold further rituximab due to transfusion reaction.  Discussed assessment of disease response in a couple months with repeat bone marrow biopsy.    # maculopapular rash:  Unclear due to vancomycin versus fentanyl patch, both discontinued some progression of rash.  Prescribed by palliative care prednisone taper, will continue to follow.  Dermatology consult noted to be done by palliative care team.    # ADAMARIS:  Status post dialysis x1 in-hospital may be related to antibiotics /oral infection, follow-up with Nephrology    # Neoplasm related pain/status post splenomegaly:  Pain well-controlled this time, followed by palliative care, avoiding sentinel due to possible contribution to rash.  Vaccinations as appropriate through surgery/primary care    # Left lower quadrant abdominal pain, wound drainage, no signs or symptoms of active infection but did recommend she follow-up with surgery and if necessary will also need to discuss port placement per above.  I refilled limited supply of Norco although postsurgical pain should be improving.    # anemia:  Improved, may be disease/treatment related, continue to monitor.    # Leukopenia:  Myelosuppression from disease/therapy, improved, continue monitor.    # Oral infection:  Recommended close follow-up with dental.        Follow-Up:   Labs today  Revisit in 2 weeks with labs prior.

## 2020-07-21 NOTE — LETTER
July 21, 2020      Shanda oCuch NP  [address] [facility] [address] [place] [address]           [address]  [facility] [place] [address]  Phone: [phone]  Fax: [phone]          Patient: Jaswant Yang   MR Number: [id number]   YOB: 1975   Date of Visit: 7/21/2020       Dear Shanda Couch:    Thank you for referring Jaswant Yang to me for evaluation. Attached you will find relevant portions of my assessment and plan of care.    If you have questions, please do not hesitate to call me. I look forward to following Jaswant Yang along with you.    Sincerely,    Savi Pino MD    Enclosure  CC:  No Recipients    If you would like to receive this communication electronically, please contact [email or web address] or [phone] to request more information on [employer] access.    For providers and/or their staff who would like to refer a patient to [facility], please contact us through our one-stop-shop provider referral line, [facility] , at [phone].    If you feel you have received this communication in error or would no longer like to receive these types of communications, please e-mail [email or web address]

## 2020-07-22 ENCOUNTER — TELEPHONE (OUTPATIENT)
Dept: HEMATOLOGY/ONCOLOGY | Facility: CLINIC | Age: 45
End: 2020-07-22

## 2020-07-22 VITALS
HEIGHT: 71 IN | DIASTOLIC BLOOD PRESSURE: 77 MMHG | SYSTOLIC BLOOD PRESSURE: 108 MMHG | WEIGHT: 273.38 LBS | OXYGEN SATURATION: 98 % | BODY MASS INDEX: 38.27 KG/M2 | TEMPERATURE: 97 F | HEART RATE: 107 BPM

## 2020-07-22 NOTE — PLAN OF CARE
07/22/20 1533   Final Note   Assessment Type Final Discharge Note   Anticipated Discharge Disposition Home   Right Care Referral Info   Post Acute Recommendation No Care

## 2020-07-22 NOTE — TELEPHONE ENCOUNTER
----- Message from Concepción Hamilton LPN sent at 7/21/2020  3:59 PM CDT -----  Please call to let her know the good news that all of her lab work looks significantly improved!  Renal function improved from 5.8-3.9, potassium now in normal range at 5.0 .  anemia and white blood cell count are both also much improved.  We will continue to trend on next visit in 2 weeks planned.

## 2020-07-24 NOTE — PROGRESS NOTES
I think that this should be going to someone else instead of me. I am familiar with the patient, and looks like Marty is. Thanks.

## 2020-08-04 ENCOUNTER — CARE AT HOME (OUTPATIENT)
Dept: HOME HEALTH SERVICES | Facility: CLINIC | Age: 45
End: 2020-08-04
Payer: MEDICAID

## 2020-08-04 VITALS
RESPIRATION RATE: 18 BRPM | TEMPERATURE: 97 F | HEART RATE: 68 BPM | SYSTOLIC BLOOD PRESSURE: 159 MMHG | OXYGEN SATURATION: 98 % | DIASTOLIC BLOOD PRESSURE: 90 MMHG

## 2020-08-04 DIAGNOSIS — Z09 FOLLOW UP: Primary | ICD-10-CM

## 2020-08-04 PROCEDURE — 99497 PR ADVNCD CARE PLAN 30 MIN: ICD-10-PCS | Mod: S$GLB,,, | Performed by: NURSE PRACTITIONER

## 2020-08-04 PROCEDURE — 99349 PR HOME VISIT,ESTAB PATIENT,LEVEL III: ICD-10-PCS | Mod: 25,S$GLB,, | Performed by: NURSE PRACTITIONER

## 2020-08-04 PROCEDURE — 99349 HOME/RES VST EST MOD MDM 40: CPT | Mod: 25,S$GLB,, | Performed by: NURSE PRACTITIONER

## 2020-08-04 PROCEDURE — 99497 ADVNCD CARE PLAN 30 MIN: CPT | Mod: S$GLB,,, | Performed by: NURSE PRACTITIONER

## 2020-08-04 NOTE — PROGRESS NOTES
"[facility]  Medical Home Visit    Visit Date: 8/4/2020  Encounter Provider: Alessandro Cisneros NP  PCP:  Primary Doctor No    Subjective:      Patient ID: Jaswant Yang is a 44 y.o. female.    Consult Requested By:  No ref. provider found  Reason for Consult: Medical Visit by Home Care Provider    The patient is being seen at home due to a physical debility that presents a taxing effort to leave the home, to mitigate high risk of hospital readmission or due to the limited availability of reliable or safe options for transportation to the point of access to the provider. The visit meets the criteria for medical necessity as defined by CMS as "health-care services needed to prevent, diagnose, or treat an illness, injury, condition, disease, or its symptoms and that meet accepted standards of medicine." Prior to treatment on this visit the chart was reviewed and patient consent was obtained.    Chief Complaint: Follow-up for chronic medical conditions and medication review  HPI:   45 y/o AA F with hx of hairy cell leukemia (diagnosed 05/2020), anemia, chronic pain to ED from Dr. Whittaker' office after being found to have a low grade temperature (99.6) with a visible dental infection in the R lower jaw and L upper jaw, in the setting of chemotherapy induced neutropenia (WBC 0.89); mild hypokalemia (3.4) also noted in ED, but labs otherwise largely unremarkable - admission for IV abx; BC x2 pending. Pt reports associated severe stabbing pain in the affected jaw areas, and mild facial edema, worsening over the past 2 days but states no drainage/bleeding. Pt's last chemo cycle ended on 06/26/20 - reports pain improved with IV morphine in ED, states she feels "a little better now" - palliative care consulted per ED physician at patient's request, to assist with symptom management and pain control. Denies CP, edema, palpitations, SOB, wheezing, orthopnea, PND, abdominal pain, N/V/D, dysuria, flank pain, HA, " dizziness, fever, cough, chills, falls/trauma, blurred vision, focal deficits. No recent dietary changes, travel, sick contacts or viral illness - pt is typically active and independent with ADLs and ambulation at home. Pt is full code. Surrogate decision makers are son (Dmitri Yang) and Mother (Elsa Yang). Admitted for chemotherapy induced neutropenic fever with a jaw/dental infection.      Procedure(s) (LRB):  INSERTION, CENTRAL VENOUS ACCESS DEVICE (N/A)       Hospital Course:   Pt admitted to Medical Surgical Unit in there setting of hairy cell leukemia of spleen.  Heme/Onc consulted.  Pt reports pain not well controlled. Palliative care consulted. CT maxiollofacial was negative for a dental abscess but showed multiple dental caries. The patient remains neutropenic, Hem/onc following. Continue current management with IV ABX. On 7/8/20, patient remained afebrile.  WBCs improved to 1.41K.  Hem/onc following.  Renal function worsened with Creatinine increased from 2 to 4.9 likely medication induced. IV fluids initiated, medications adjusted, and Nephrology consulted. On 7/9/20, pt remained stable with no acute issues reported.  Creatinine increased to 6.8.  ANC is improving. On 7/10/20, pt verbalized symptom improvement. WBCs continue to recover.  Creatinine worsened with increased to 8.1 with adequate UOP continued.  On 7/11/20, pt denies any complaints upon exam.  Kidney function monitored closely with Nephrology following.  On 7/12/20, kidney function continued to worsen with K of 5.5 and Co2 of 16 noted.  Chest xray is clear.  On 7/13/20,  case was d/c nephrology and Vas cath placed with initiation of HD planned.  On 7/14/2020, HD tolerated and kidney function stable.  On 7/15/20, HD not performed urgently and kidney function monitored. On 7/16/2020, creatinine trending down and kidney function improved.  Hyperkalemia stable. Blood pressure initially uncontrolled, but improved once IVFs were  discontinued.  By 7/18/2020, kidney function continued to improve.  Generalized rash noted likely due to medications.  Antihistamines and hydrocortisone cream ordered.  Vas Cath removed per Vascular Surgery with site stable.  Pt denies any additional complaints with no acute issues witnessed or reported.  Pt seen and examined and deemed suitable for discharge to home accompanied by sister.  Current medications resumed with Tylenol, Folbic, Neurontin, Roxicodone, Senokot, and Thiamine prescribed.  Pt instructed to follow up with Nephrology, Palliative Care, and Heme/Onc upon discharge for further evaluation.        Today:  Ms. Jaswant Yang is a 44 y.o. female Jaswant presents at baseline state of health as reported by patient. Her pain is adequately controlled by her current analgesic regimen. She reports concern over environmental risk of COVID transmission r/t her child starting school this week. She will discuss distance learning with the child's school as an alternative. VSS. Denies any acute issues, concerns or complaints to address on today's visit. Reports taking all medications as prescribed. Aware of all her upcoming follow-up appointments as recommended on her discharge summary. No other needs identified at this time. Risks of environmental exposure to coronavirus discussed including: social distancing, hand hygiene, and limiting departures from the home for necessities only.  Reports understanding and willingness to comply.      Review of Systems   Constitutional: Positive for appetite change. Negative for chills, fever and unexpected weight change.   HENT: Negative for sore throat.    Eyes: Negative.    Respiratory: Negative.  Negative for cough and shortness of breath.    Cardiovascular: Negative.  Negative for chest pain and palpitations.   Gastrointestinal: Positive for nausea. Negative for constipation and diarrhea.   Genitourinary: Negative.    Musculoskeletal: Positive for arthralgias and  "myalgias.   Skin: Positive for rash ("from chemo"?).   Neurological: Positive for weakness. Negative for dizziness.   Psychiatric/Behavioral: Negative for dysphoric mood.     Assessments:  · Environmental: single story home, no steps to enter, adequate lighting and temeprature control  · Functional Status: Independent with ADL's/IADL's, ambulates with assistance of a cane/walker, continent of bowel and bladder  · Safety: Fall Precautions, COVID Precautions/Social Distancing/Mask Use  · Nutritional: Adequate  · Home Health:  NA  · DME/Supplies: NA    Objective:     Vitals:    08/04/20 0939   BP: (!) 159/90   Pulse: 68   Resp: 18   Temp: 97.3 °F (36.3 °C)   SpO2: 98%   PainSc:   8     There is no height or weight on file to calculate BMI.    Physical Exam  Musculoskeletal: Normal range of motion.   Skin:     Capillary Refill: Capillary refill takes less than 2 seconds.   Neurological:      Mental Status: She is oriented to person, place, and time.   Psychiatric:         Attention and Perception: Attention and perception normal.         Mood and Affect: Mood and affect normal. Mood is not anxious or depressed. Affect is not angry or inappropriate.         Speech: Speech normal.         Behavior: Behavior normal. Behavior is cooperative.         Thought Content: Thought content normal. Thought content does not include homicidal or suicidal ideation.         Cognition and Memory: Cognition and memory normal.         Judgment: Judgment normal.      Comments: Concerned about COVID risk r/t child going back to school.       Assessment:     1. Follow up      Plan:     Ethical / Legal: Advance Care Planning   Capacity to make medical decisions:  yes, Conflict no  · Surrogate decision maker:  Name Elsa Yang/Dmitri Yang , Relationship: mother/son  · Advance Directives:  On file  · HCPOA: NA  · [place]:  Signed this visit  · Code Status:  full    Advanced Care Directives and HCPOA  forms left in the home for patient and " family review, discussion and signing with instructions to return upon their next provider encounter for inclusion to the medical record.      Jaswant was seen today for follow-up.  Diagnoses and all orders for this visit:    Encounter for Medical Follow-Up and Medication Review   - [facility] Nurse Practitioner to schedule home visit with patient as PRN.    Were controlled substances prescribed?  No    Follow Up Appointments:   Future Appointments   Date Time Provider Department Center   8/4/2020  2:00 PM Alessandro Cisneros, [facility] C3HV [facility]   8/5/2020  9:45 AM LABORATORY, [facility] HGV LAB [facility]   8/5/2020 10:20 AM Guerda Hernandez MD [facility] HEM ONC [facility]   8/5/2020 11:00 AM Bernard Martin MD [facility] NEPHRO [facility]     Attestation: Screening criteria to assess the level of the patient's risk for infection with COVID-19 as recommended by the CDC at the time of the above documented home visit concluded appropriateness to proceed. Universal precautions were maintained at all times, including provider use of >60% alcohol gel hand  immediately prior to entry and upon departing the patient's home as well as cleaning of equipment used in home visit with antibacterial/germicidal disposable wipes.     Signature:    Alessandro Cisneros, MSN, APRN, FNP-C  [facility]    Total face-to-face time was 40 min, >50% of this was spent on counseling and coordination of care. The following issues were discussed: primary and secondary diagnoses, co-morbidities, prescribed medications, treatment modalities, importance of compliance with medical advice and directives for follow-up care     With the consent of the patient and/or caregiver(s), an additional 20 minutes included a comprehensive discussion regarding Advanced Care Planning. Sources of emotional and spiritual support were identified and encouraged for involvement to assist in finalization of decisions regarding the patient's goals of  care (Living Will). Topics discussed include statutory guidelines of the [facility] to determine/delineate the legal surrogate medical decision maker should the patient be deemed incompetent to self-direct medical care (next of kin vs.POA) and the required documentation to ensure legal validity thereof (Advanced Directives/[place] Post).

## 2020-08-05 ENCOUNTER — OFFICE VISIT (OUTPATIENT)
Dept: HEMATOLOGY/ONCOLOGY | Facility: CLINIC | Age: 45
End: 2020-08-05
Payer: MEDICAID

## 2020-08-05 ENCOUNTER — LAB VISIT (OUTPATIENT)
Dept: LAB | Facility: HOSPITAL | Age: 45
End: 2020-08-05
Attending: INTERNAL MEDICINE
Payer: MEDICAID

## 2020-08-05 ENCOUNTER — OFFICE VISIT (OUTPATIENT)
Dept: NEPHROLOGY | Facility: CLINIC | Age: 45
End: 2020-08-05
Payer: MEDICAID

## 2020-08-05 VITALS
DIASTOLIC BLOOD PRESSURE: 60 MMHG | HEART RATE: 80 BPM | BODY MASS INDEX: 37.52 KG/M2 | SYSTOLIC BLOOD PRESSURE: 106 MMHG | HEIGHT: 71 IN | WEIGHT: 268 LBS

## 2020-08-05 VITALS
HEIGHT: 71 IN | OXYGEN SATURATION: 100 % | BODY MASS INDEX: 37.62 KG/M2 | TEMPERATURE: 98 F | SYSTOLIC BLOOD PRESSURE: 110 MMHG | WEIGHT: 268.75 LBS | RESPIRATION RATE: 14 BRPM | DIASTOLIC BLOOD PRESSURE: 74 MMHG | HEART RATE: 89 BPM

## 2020-08-05 DIAGNOSIS — D63.0 ANEMIA IN NEOPLASTIC DISEASE: ICD-10-CM

## 2020-08-05 DIAGNOSIS — C91.40 HAIRY CELL LEUKEMIA NOT HAVING ACHIEVED REMISSION: Primary | ICD-10-CM

## 2020-08-05 DIAGNOSIS — C91.40 HAIRY CELL LEUKEMIA NOT HAVING ACHIEVED REMISSION: ICD-10-CM

## 2020-08-05 DIAGNOSIS — N17.9 AKI (ACUTE KIDNEY INJURY): Primary | ICD-10-CM

## 2020-08-05 DIAGNOSIS — D72.819 LEUKOPENIA, UNSPECIFIED TYPE: ICD-10-CM

## 2020-08-05 LAB
ALBUMIN SERPL BCP-MCNC: 3.8 G/DL (ref 3.5–5.2)
ALP SERPL-CCNC: 107 U/L (ref 55–135)
ALT SERPL W/O P-5'-P-CCNC: 30 U/L (ref 10–44)
ANION GAP SERPL CALC-SCNC: 7 MMOL/L (ref 8–16)
AST SERPL-CCNC: 27 U/L (ref 10–40)
BASOPHILS # BLD AUTO: ABNORMAL K/UL (ref 0–0.2)
BASOPHILS NFR BLD: 0 % (ref 0–1.9)
BILIRUB SERPL-MCNC: 0.4 MG/DL (ref 0.1–1)
BUN SERPL-MCNC: 17 MG/DL (ref 6–20)
CALCIUM SERPL-MCNC: 8.9 MG/DL (ref 8.7–10.5)
CHLORIDE SERPL-SCNC: 108 MMOL/L (ref 95–110)
CO2 SERPL-SCNC: 23 MMOL/L (ref 23–29)
CREAT SERPL-MCNC: 1.2 MG/DL (ref 0.5–1.4)
DIFFERENTIAL METHOD: ABNORMAL
EOSINOPHIL # BLD AUTO: ABNORMAL K/UL (ref 0–0.5)
EOSINOPHIL NFR BLD: 33 % (ref 0–8)
ERYTHROCYTE [DISTWIDTH] IN BLOOD BY AUTOMATED COUNT: 20.4 % (ref 11.5–14.5)
EST. GFR  (AFRICAN AMERICAN): >60 ML/MIN/1.73 M^2
EST. GFR  (NON AFRICAN AMERICAN): 55 ML/MIN/1.73 M^2
GLUCOSE SERPL-MCNC: 98 MG/DL (ref 70–110)
HCT VFR BLD AUTO: 30.8 % (ref 37–48.5)
HGB BLD-MCNC: 9.9 G/DL (ref 12–16)
IMM GRANULOCYTES # BLD AUTO: ABNORMAL K/UL (ref 0–0.04)
IMM GRANULOCYTES NFR BLD AUTO: ABNORMAL % (ref 0–0.5)
LYMPHOCYTES # BLD AUTO: ABNORMAL K/UL (ref 1–4.8)
LYMPHOCYTES NFR BLD: 13 % (ref 18–48)
MCH RBC QN AUTO: 30.4 PG (ref 27–31)
MCHC RBC AUTO-ENTMCNC: 32.1 G/DL (ref 32–36)
MCV RBC AUTO: 95 FL (ref 82–98)
MONOCYTES # BLD AUTO: ABNORMAL K/UL (ref 0.3–1)
MONOCYTES NFR BLD: 8 % (ref 4–15)
NEUTROPHILS # BLD AUTO: ABNORMAL K/UL (ref 1.8–7.7)
NEUTROPHILS NFR BLD: 42 % (ref 38–73)
NEUTS BAND NFR BLD MANUAL: 4 %
NRBC BLD-RTO: 5 /100 WBC
PLATELET # BLD AUTO: 257 K/UL (ref 150–350)
PLATELET BLD QL SMEAR: ABNORMAL
PMV BLD AUTO: 9.5 FL (ref 9.2–12.9)
POTASSIUM SERPL-SCNC: 4.4 MMOL/L (ref 3.5–5.1)
PROT SERPL-MCNC: 7.3 G/DL (ref 6–8.4)
RBC # BLD AUTO: 3.26 M/UL (ref 4–5.4)
SODIUM SERPL-SCNC: 138 MMOL/L (ref 136–145)
WBC # BLD AUTO: 4.88 K/UL (ref 3.9–12.7)

## 2020-08-05 PROCEDURE — 80053 COMPREHEN METABOLIC PANEL: CPT

## 2020-08-05 PROCEDURE — 99214 OFFICE O/P EST MOD 30 MIN: CPT | Mod: S$PBB,,, | Performed by: INTERNAL MEDICINE

## 2020-08-05 PROCEDURE — 99999 PR PBB SHADOW E&M-EST. PATIENT-LVL V: CPT | Mod: PBBFAC,,, | Performed by: INTERNAL MEDICINE

## 2020-08-05 PROCEDURE — 36415 COLL VENOUS BLD VENIPUNCTURE: CPT

## 2020-08-05 PROCEDURE — 99213 OFFICE O/P EST LOW 20 MIN: CPT | Mod: PBBFAC | Performed by: INTERNAL MEDICINE

## 2020-08-05 PROCEDURE — 85027 COMPLETE CBC AUTOMATED: CPT

## 2020-08-05 PROCEDURE — 82728 ASSAY OF FERRITIN: CPT

## 2020-08-05 PROCEDURE — 83540 ASSAY OF IRON: CPT

## 2020-08-05 PROCEDURE — 99999 PR PBB SHADOW E&M-EST. PATIENT-LVL III: ICD-10-PCS | Mod: PBBFAC,,, | Performed by: INTERNAL MEDICINE

## 2020-08-05 PROCEDURE — 99999 PR PBB SHADOW E&M-EST. PATIENT-LVL V: ICD-10-PCS | Mod: PBBFAC,,, | Performed by: INTERNAL MEDICINE

## 2020-08-05 PROCEDURE — 99999 PR PBB SHADOW E&M-EST. PATIENT-LVL III: CPT | Mod: PBBFAC,,, | Performed by: INTERNAL MEDICINE

## 2020-08-05 PROCEDURE — 99214 PR OFFICE/OUTPT VISIT, EST, LEVL IV, 30-39 MIN: ICD-10-PCS | Mod: S$PBB,,, | Performed by: INTERNAL MEDICINE

## 2020-08-05 PROCEDURE — 99215 OFFICE O/P EST HI 40 MIN: CPT | Mod: PBBFAC,27 | Performed by: INTERNAL MEDICINE

## 2020-08-05 PROCEDURE — 85007 BL SMEAR W/DIFF WBC COUNT: CPT

## 2020-08-05 NOTE — PROGRESS NOTES
Subjective:       Patient ID: Jaswant Yang is a 44 y.o. Black or  female who presents for follow-up evaluation of Hospital Follow Up and Acute Renal Failure    HPI 44 year old female with anemia, GERD, hairy-cell leukemia presents to [facility] with low grade fever, leukopenia and dental pain. Patient was started on antibiotics including vancomycin and received 4.2 g of Vancomycin in a 15 h periodbaseline creatinine is about 0.9. seen inpatient in 07/2020 for ADAMARIS needed one HD and then vasc cath taken out. At that time patient reports generalized weakness and vomiting x 1 today. No other symptoms at present. She reports NSAID use in past (last use of ibuprofen about 3 weeks ago).     08/5/20 ADAMARIS is better creatinine down to 1.2 rash is better ; records of  reviewed       Review of Systems   Constitutional: Negative for activity change, appetite change, chills, diaphoresis, fatigue, fever and unexpected weight change.   HENT: Negative for congestion, dental problem, drooling, postnasal drip, rhinorrhea and voice change.    Eyes: Negative for discharge.   Respiratory: Negative for apnea, cough, choking, chest tightness, shortness of breath, wheezing and stridor.    Cardiovascular: Negative for chest pain, palpitations and leg swelling.   Gastrointestinal: Negative for abdominal distention, blood in stool, constipation, diarrhea, nausea, rectal pain and vomiting.   Endocrine: Negative for cold intolerance, heat intolerance, polydipsia and polyuria.   Genitourinary: Negative for decreased urine volume, difficulty urinating, dysuria, enuresis, flank pain, frequency, hematuria and urgency.   Musculoskeletal: Positive for arthralgias, back pain, gait problem and myalgias. Negative for joint swelling.   Skin: Positive for color change and rash.   Allergic/Immunologic: Negative for food allergies and immunocompromised state.   Neurological: Negative for dizziness, tremors, syncope, numbness and  "headaches.   Hematological: Does not bruise/bleed easily.   Psychiatric/Behavioral: Negative for agitation, behavioral problems and self-injury. The patient is not nervous/anxious and is not hyperactive.    All other systems reviewed and are negative.      Objective:   /60   Pulse 80   Ht 5' 11" (1.803 m)   Wt 121.6 kg (268 lb)   BMI 37.38 kg/m²      Physical Exam  Vitals signs and nursing note reviewed.   Constitutional:       General: She is not in acute distress.     Appearance: She is well-developed. She is not diaphoretic.   HENT:      Head: Normocephalic and atraumatic.      Nose: Nose normal.   Eyes:      Conjunctiva/sclera: Conjunctivae normal.      Pupils: Pupils are equal, round, and reactive to light.   Neck:      Musculoskeletal: Full passive range of motion without pain, normal range of motion and neck supple. No edema.      Thyroid: No thyroid mass or thyromegaly.      Vascular: No carotid bruit or JVD.      Trachea: No tracheal deviation.   Cardiovascular:      Rate and Rhythm: Normal rate and regular rhythm.      Chest Wall: PMI is not displaced.      Pulses: Normal pulses.      Heart sounds: Normal heart sounds, S1 normal and S2 normal. No murmur. No friction rub. No gallop.    Pulmonary:      Effort: Pulmonary effort is normal. No accessory muscle usage or respiratory distress.      Breath sounds: Normal breath sounds. No wheezing or rales.   Chest:      Chest wall: No tenderness.   Abdominal:      General: Bowel sounds are normal. There is no distension.      Palpations: Abdomen is soft. There is no mass.      Tenderness: There is no abdominal tenderness. There is no rebound.      Hernia: No hernia is present.   Musculoskeletal: Normal range of motion.         General: No tenderness or deformity.   Skin:     General: Skin is warm and dry.      Capillary Refill: Capillary refill takes less than 2 seconds.      Coloration: Skin is pale.      Findings: Rash present. No bruising, ecchymosis " or erythema.      Nails: There is no clubbing.        Comments: Healing maculopap rash    Neurological:      Mental Status: She is alert and oriented to person, place, and time.      Cranial Nerves: No cranial nerve deficit.      Sensory: No sensory deficit.      Motor: No abnormal muscle tone.      Coordination: Coordination normal.      Deep Tendon Reflexes: Reflexes are normal and symmetric. Reflexes normal.   Psychiatric:         Speech: Speech normal.         Behavior: Behavior normal.         Thought Content: Thought content normal.         Judgment: Judgment normal.           Lab Results   Component Value Date    CREATININE 1.2 08/05/2020    BUN 17 08/05/2020     08/05/2020    K 4.4 08/05/2020     08/05/2020    CO2 23 08/05/2020     Lab Results   Component Value Date    WBC 4.88 08/05/2020    HGB 9.9 (L) 08/05/2020    HCT 30.8 (L) 08/05/2020    MCV 95 08/05/2020     08/05/2020     Lab Results   Component Value Date    CALCIUM 8.9 08/05/2020     @RESUFAST(URICACID)    Assessment:    )    1. HELGA (acute kidney injury)        Plan:         Helga is much better     BP stable     Follow up 3 months

## 2020-08-05 NOTE — PROGRESS NOTES
"  Subjective:      DATE OF VISIT: 8/5/20     ?  Patient ID:?Jaswant Yang is a 44 y.o. female.?? MR#: [id number]   ?   PRIMARY ONCOLOGIST: Dr. Hernandez    ? Primary Care Providers:  Primary Doctor No (General)     CHIEF COMPLAINT: ?  Follow-up?    ?   ONCOLOGIC DIAGNOSIS:  Hairy cell leukemia  ?   CURRENT TREATMENT: post-treatment follow-up, surveillance    PAST TREATMENT:  6/22/20 cladribine; 7/6/20 initiated rituximab, not completed due to transfusion reaction  05/11/2020 splenectomy  ?   ONCOLOGIC HISTORY:   ?   Ms Yang is a 44 year old woman with obesity and longstanding history of anemia, without treatment or further evaluation.  She does not follow with primary care and no other known diagnoses.  She presented to emergency room with left-sided abdominal pain for 2 days which she thought initially due to gas/indigestion.  She denies any associated symptoms.  CT scan revealed splenomegaly to 17 cm and concern for splenic laceration.  She was taken to the operating room on 05/11/20 where she was noted to have "Splenic rupture with capsular tear at anterior superior border of the spleen. Reactive ascites. No massive hemoperitoneum. Abnormal tissue extending to the hilum, sent for frozen section. Unable to definitively determine if lymphoma on frozen section. Splenectomy performed. No other disease noted within the abdomen."    05/11/2020 splenic lymph node excisional biopsy with pathology months consistent with hairy cell leukemia.    6/3/20 bone marrow biopsy/aspirate with hypercellular marrow % with extensive involvement by hairy cell leukemia    6/22/20 cladribine; 7/6/20 initiated rituximab, not completed due to transfusion reaction    Follow-up     Ms. Yang presents with her mother.  Diffuse rash is gradually resolving, improvement in pruritus, are area in buttocks with denuded skin and associated tenderness.  Generalized fatigue mildly improved.  She has not yet established care with dental and " "referred to [facility].  She denies fevers or chills.    Review of Systems    ?   A comprehensive 14-point review of systems was reviewed with patient and was negative other than as specified above.   ?     Objective:      Physical Exam      /74   Pulse 89   Temp 98.3 °F (36.8 °C)   Resp 14   Ht 5' 11" (1.803 m)   Wt 121.9 kg (268 lb 11.9 oz)   SpO2 100%   BMI 37.48 kg/m²     ?   ECOG:?0   General appearance: Generally well appearing, in no acute distress.   Head, eyes, ears, nose, and throat: Pupils round, sclerae anicteric.  Lymph: No palpable cervical or supraclavicular lymphadenopathy.   Cardiovascular:  Regular rate and rhythm, S1, S2, no audible murmurs.   Respiratory: Lungs clear to auscultation bilaterally.   Abdomen:  Obese, vertical midline incisional scar well-healing  Extremities: Warm, without edema.   Neurologic: Alert and oriented. Grossly normal strength, coordination, and gait.   Skin:  Resolving maculopapular rash throughout chest, abdomen, back, pruritic now with hyperpigmentation.  Buttocks with denuded skin mild erythema without drainage.  ?   Laboratory:  ?       Lab Visit on 08/05/2020   Component Date Value Ref Range Status    WBC 08/05/2020 4.88  3.90 - 12.70 K/uL Final    RBC 08/05/2020 3.26* 4.00 - 5.40 M/uL Final    Hemoglobin 08/05/2020 9.9* 12.0 - 16.0 g/dL Final    Hematocrit 08/05/2020 30.8* 37.0 - 48.5 % Final    Mean Corpuscular Volume 08/05/2020 95  82 - 98 fL Final    Mean Corpuscular Hemoglobin 08/05/2020 30.4  27.0 - 31.0 pg Final    Mean Corpuscular Hemoglobin Conc 08/05/2020 32.1  32.0 - 36.0 g/dL Final    RDW 08/05/2020 20.4* 11.5 - 14.5 % Final    Platelets 08/05/2020 257  150 - 350 K/uL Final    MPV 08/05/2020 9.5  9.2 - 12.9 fL Final    Immature Granulocytes 08/05/2020 Test Not Performed  0.0 - 0.5 % Corrected    Gran # (ANC) 08/05/2020 Test Not Performed  1.8 - 7.7 K/uL Corrected    Immature Grans (Abs) 08/05/2020 Test Not Performed  0.00 - 0.04 K/uL " Corrected    Lymph # 08/05/2020 Test Not Performed  1.0 - 4.8 K/uL Corrected    Mono # 08/05/2020 Test Not Performed  0.3 - 1.0 K/uL Corrected    Eos # 08/05/2020 Test Not Performed  0.0 - 0.5 K/uL Corrected    Baso # 08/05/2020 Test Not Performed  0.00 - 0.20 K/uL Corrected    nRBC 08/05/2020 5* 0 /100 WBC Final    Gran% 08/05/2020 42.0  38.0 - 73.0 % Corrected    Lymph% 08/05/2020 13.0* 18.0 - 48.0 % Corrected    Mono% 08/05/2020 8.0  4.0 - 15.0 % Corrected    Eosinophil% 08/05/2020 33.0* 0.0 - 8.0 % Corrected    Basophil% 08/05/2020 0.0  0.0 - 1.9 % Corrected    Bands 08/05/2020 4.0  % Final    Platelet Estimate 08/05/2020 Appears normal   Final    Differential Method 08/05/2020 Manual   Corrected    Sodium 08/05/2020 138  136 - 145 mmol/L Final    Potassium 08/05/2020 4.4  3.5 - 5.1 mmol/L Final    Chloride 08/05/2020 108  95 - 110 mmol/L Final    CO2 08/05/2020 23  23 - 29 mmol/L Final    Glucose 08/05/2020 98  70 - 110 mg/dL Final    BUN, Bld 08/05/2020 17  6 - 20 mg/dL Final    Creatinine 08/05/2020 1.2  0.5 - 1.4 mg/dL Final    Calcium 08/05/2020 8.9  8.7 - 10.5 mg/dL Final    Total Protein 08/05/2020 7.3  6.0 - 8.4 g/dL Final    Albumin 08/05/2020 3.8  3.5 - 5.2 g/dL Final    Total Bilirubin 08/05/2020 0.4  0.1 - 1.0 mg/dL Final    Alkaline Phosphatase 08/05/2020 107  55 - 135 U/L Final    AST 08/05/2020 27  10 - 40 U/L Final    ALT 08/05/2020 30  10 - 44 U/L Final    Anion Gap 08/05/2020 7* 8 - 16 mmol/L Final    eGFR if African American 08/05/2020 >60  >60 mL/min/1.73 m^2 Final    eGFR if non African American 08/05/2020 55* >60 mL/min/1.73 m^2 Final      ?     Chemistry        Component Value Date/Time     08/05/2020 0955    K 4.4 08/05/2020 0955     08/05/2020 0955    CO2 23 08/05/2020 0955    BUN 17 08/05/2020 0955    CREATININE 1.2 08/05/2020 0955    GLU 98 08/05/2020 0955        Component Value Date/Time    CALCIUM 8.9 08/05/2020 0955    ALKPHOS 107 08/05/2020  0955    AST 27 08/05/2020 0955    ALT 30 08/05/2020 0955    BILITOT 0.4 08/05/2020 0955    ESTGFRAFRICA >60 08/05/2020 0955    EGFRNONAA 55 (A) 08/05/2020 0955          ?   ?   Imaging:  ?  5/11/20  CT ABDOMEN PELVIS WITHOUT CONTRAST    CLINICAL HISTORY:  Abd pain, fever, abscess suspected;    TECHNIQUE:  Low dose axial images, sagittal and coronal reformations were obtained from the lung bases to the pubic symphysis.  Oral contrast was not administered.    COMPARISON:  05/11/2020    FINDINGS:  Heart: Mild cardiomegaly..  Trace effusion.    Lung Bases: Large left pleural effusion with left lower lobe atelectasis or airspace disease.    Liver: Normal size and attenuation. No focal lesions.    Gallbladder: No calcified gallstones.    Bile Ducts: No dilatation.    Pancreas: No obvious mass. No peripancreatic fat stranding.    Spleen: Splenomegaly measuring up to 17 cm..  Area of decreased attenuation within the lower portion of the spleen concerning for splenic laceration and surrounding perisplenic hematoma.  High attenuating fluid surrounds the spleen.  Active bleed is not excluded on noncontrast imaging.  CTA can be obtained as clinically warranted.    Adrenals: Normal.    Kidneys/Ureters: No mass, hydroureteronephrosis, or nephroureterolithiasis.  4 cm hypodensity within the interpolar region right kidney consistent with a cyst.    Bladder: No wall thickening.    Reproductive organs: Enlarged myomatous uterus is suspected.    GI Tract/Mesentery: No evidence of bowel obstruction or inflammation.  Stranding is seen within the mesentery of the left upper quadrant which could reflect local inflammation or posttraumatic injury.  No evidence of bowel obstruction.  Moderate constipation noted.    Peritoneal Space: Scattered fluid is seen within the pelvis anterior to the uterus as well as within the cul-de-sac which likely reflects hemoperitoneum.    Retroperitoneum: Small amount of fluid is seen tracking along the left  pericolic gutter likely reflecting extension of perisplenic hematoma.  Small amount of fluid is seen within the cul-de-sac.  Small amount of fluid also suspected within the lorrie hepatis region.    Abdominal wall: Normal.    Vasculature: No aneurysm.    Bones: No acute fracture.  No rib fractures are seen.  No suspicious lytic or sclerotic lesions.      Impression       Area of decreased attenuation within the lower portion of the spleen concerning for splenic laceration and surrounding perisplenic hematoma. High attenuating fluid surrounds the spleen. Active bleed is not excluded on noncontrast imaging. CTA can be obtained as clinically warranted.    Splenomegaly.    Scattered fluid within the pelvis likely reflects mild hemoperitoneum.    Stranding is seen within the mesentery of the left upper quadrant which could reflect local inflammation or reflect traumatic injury.           Pathology:    1.  Splenic lymph node (excision):   - Negative for carcinoma   - See separately submitted flow cytometry report   - Review by a hematopathologist will be performed and results issued in a   supplemental report   2.  Spleen (splenectomy):   - Benign spleen with hemorrhage and associated reactive changes  VC       Comment: Interpreted by: Jose Silva M.D., Signed on 05/22/2020 at 11:49   Supplemental Diagnosis 1. SPLENIC LYMPH NODE, EXCISION:   --HAIRY CELL LEUKEMIA (SEE COMMENT).   2. SPLEEN, SPLENECTOMY:   --HAIRY CELL LEUKEMIA (SEE COMMENT).   COMMENT:   1.  Splenic lymph node:   Histologic sections of the specimen show lymph node partially effaced by   diffuse infiltrates of atypical lymphoid cells displaying abundant cytoplasm   and irregular nuclear contour.  Lymphoid follicles are seen.   Flow cytometric analysis of the lymph node detects a kappa light chain   restricted B lymphocyte population expressing CD19, CD20, .   CD10 and   CD5 are negative. Flow differential:  Lymphocytes 70.2%, Monocytes 13.4%,  "  Granulocytes  12.9%, Blast  0.3%, Debris/nRBC 1.9%,  Viability 84.5%.   2.  Spleen:   Histologic sections of the spleen show atypical lymphoid infiltrates in the   right pulp.  The lymphoid cells display abundant cytoplasm and irregular   nuclear contour.  Red blood cell lakes are seen.  A lymph node is identified,   displaying morphologic features cellular to part 1.   Immunohistochemical stains are performed with adequate controls for greater   sensitivity and further architectural assessment.   1).  The atypical lymphoid cells in the lymph nodes are positive for CD20,   BCL2, and cyclin D1 (weak); negative for CD5, CD10, CD23, CD30, and BCL6.   Proliferation index (Ki-67) is moderate.  CD3-positive T lymphocytes are   seen.  -positive plasma cells are polytypic by immunoglobulin kappa and   lambda light chain in situ hybridization study.   2).  The atypical lymphoid cells in the spleen are positive for CD20, cyclin   D1 (weak),  ([facility]), CD25 ([facility]),   BRAF V600E ([facility]); negative for CD5, CD10, and CD23.   Proliferation index (Ki-67) is moderate.  CD3-positive T lymphocytes are seen.   Taken together, the overall findings are consistent with hairy cell leukemia   involving spleen and lymph node.  VC      Gross 1:  Surgery ID:  [id number];  Pathology ID:  [id number]   1.  Received fresh for frozen section labeled "splenic lymph node" is a 1.5 x   0.3 x 0.3 cm piece of yellow fatty tissue.  A portion is submitted in RPMI   for flow cytometry, and a portion is submitted for frozen section diagnosis.   Frozen section remnant is embedded in cassette 1790, 1 FSA.  The remainder is   entirely embedded in cassette 1790, 1B.   Grossed by: Ricardo Wilson    2: Surgery ID:  [id number];  Pathology ID:  [id number]   2.  Received in formalin labeled "spleen" is an 892 g, 17.8 by 12 x 6.5 cm   spleen.  The capsule is purple blue, smooth, and loosely adhered to spleen, "   with moderate amounts of adhered clotted blood. There is a 16 x 13.5 region   on the diaphragmatic surface without capsule. Sectioning reveals subcapsular   hemorrhage, which impacts the splenic parenchyma. The parenchyma displays a   beefy red cut surface.  Within the hilar fat is a 0.7 cm candidate lymph   node.  No distinct masses grossly identified.  Representative sections are   embedded in cassette 1790, 2A-2D.   2A-2B:  Splenic parenchyma with capsule and subcapsular hemorrhage   2C:  Hilar fat and splenic parenchyma without capsule   2D:  Lymph node, bisected            ?   Assessment/Plan:       1. Hairy cell leukemia not having achieved remission    2. Leukopenia, unspecified type    3. Anemia in neoplastic disease          Plan:     # Hairy cell leukemia:  Bone marrow hypercellular (%) with extensive involvement by hairy cell leukemia; flow cytometry from bone marrow consistent with this diagnosis.  I discussed at length natural history of her cell leukemia.  Given her presentation with splenic rupture and extensive involvement with bone marrow with borderline cytopenias (ANC 1.4, hgb 11, plt 120K)we proceeded with treatment with cladribine on 6/22/20; 7/6/20 initiated rituximab, not completed due to transfusion reaction.  Multiple complications from treatment, see below, labs reviewed in note improvement in cytopenias likely multifactorial from treatment, infection.  Gradual improvement in leukopenia, no thrombocytopenia, persistent anemia, see below.   Discussed assessment of disease response planned in 3 months from completion of therapy with repeat bone marrow biopsy, around 09/22/2020.    # maculopapular rash:  Suspected secondary to vancomycin , cannot exclude fentanyl patch, both discontinued some progression of rash.  Has not yet followed up with Dermatology and encourage this.  Buttock area of denuded area, no exudate on exam, recommend keeping area clean and dry with topical antibiotic  ointment, calling if worsens, drainage or fever develops.  Recommend follow-up with Dermatology.  Pain control improving but still requiring Norco about 4 times daily.  Recommend follow-up with palliative care.    # ADAMARIS:  Resolved and renal function at baseline today.    # anemia:  Improved, may be disease/treatment related, will assess iron indices today, add on lab, continue to monitor.    # Leukopenia:  Myelosuppression from disease/therapy, improved, continue monitor.    # Oral infection:  Recommended close follow-up with dental still pending, will need to contact [facility] per patient report.        Follow-Up:   Please add on iron studies to today's labs  Arrange RV with [facility] telehealth  RV in 1 month with labs same day prior

## 2020-08-05 NOTE — PATIENT INSTRUCTIONS
Please add on iron studies to today's labs  Arrange RV with [facility] telehealth  RV in 1 month with labs same day prior

## 2020-08-06 LAB
FERRITIN SERPL-MCNC: 279 NG/ML (ref 20–300)
IRON SERPL-MCNC: 164 UG/DL (ref 30–160)
SATURATED IRON: 49 % (ref 20–50)
TOTAL IRON BINDING CAPACITY: 334 UG/DL (ref 250–450)
TRANSFERRIN SERPL-MCNC: 226 MG/DL (ref 200–375)

## 2020-08-10 DIAGNOSIS — F41.9 ANXIETY: ICD-10-CM

## 2020-08-10 DIAGNOSIS — C91.40 HAIRY CELL LEUKEMIA NOT HAVING ACHIEVED REMISSION: ICD-10-CM

## 2020-08-10 RX ORDER — ALPRAZOLAM 0.5 MG/1
.25-.5 TABLET ORAL 2 TIMES DAILY PRN
Qty: 30 TABLET | Refills: 5 | Status: SHIPPED | OUTPATIENT
Start: 2020-08-10 | End: 2020-08-10 | Stop reason: SDUPTHER

## 2020-08-10 RX ORDER — HYDROCODONE BITARTRATE AND ACETAMINOPHEN 10; 325 MG/1; MG/1
1 TABLET ORAL EVERY 6 HOURS PRN
Qty: 90 TABLET | Refills: 0 | Status: SHIPPED | OUTPATIENT
Start: 2020-08-10 | End: 2020-08-10 | Stop reason: SDUPTHER

## 2020-08-10 RX ORDER — GABAPENTIN 300 MG/1
300 CAPSULE ORAL NIGHTLY
Qty: 30 CAPSULE | Refills: 11 | Status: SHIPPED | OUTPATIENT
Start: 2020-08-10 | End: 2020-11-19

## 2020-08-25 ENCOUNTER — OFFICE VISIT (OUTPATIENT)
Dept: PALLIATIVE MEDICINE | Facility: CLINIC | Age: 45
End: 2020-08-25
Payer: MEDICAID

## 2020-08-25 DIAGNOSIS — G89.3 CANCER ASSOCIATED PAIN: Primary | ICD-10-CM

## 2020-08-25 DIAGNOSIS — C91.40 HAIRY CELL LEUKEMIA NOT HAVING ACHIEVED REMISSION: ICD-10-CM

## 2020-08-25 PROCEDURE — 99214 PR OFFICE/OUTPT VISIT, EST, LEVL IV, 30-39 MIN: ICD-10-PCS | Mod: 95,,, | Performed by: FAMILY MEDICINE

## 2020-08-25 PROCEDURE — 99214 OFFICE O/P EST MOD 30 MIN: CPT | Mod: 95,,, | Performed by: FAMILY MEDICINE

## 2020-08-25 RX ORDER — MORPHINE SULFATE 15 MG/1
15 TABLET, FILM COATED, EXTENDED RELEASE ORAL EVERY 12 HOURS
Qty: 60 TABLET | Refills: 0 | Status: SHIPPED | OUTPATIENT
Start: 2020-08-25 | End: 2020-09-24 | Stop reason: SDUPTHER

## 2020-08-25 RX ORDER — HYDROCODONE BITARTRATE AND ACETAMINOPHEN 10; 325 MG/1; MG/1
1 TABLET ORAL EVERY 6 HOURS PRN
Qty: 90 TABLET | Refills: 0 | Status: SHIPPED | OUTPATIENT
Start: 2020-08-25 | End: 2020-09-24 | Stop reason: SDUPTHER

## 2020-08-25 NOTE — LETTER
August 26, 2020      Shanda Couch NP  [address] [address] [place] [address]           [address]  [facility] [place] [address]  Phone: [phone]  Fax: [phone]          Patient: Jaswant Yang   MR Number: [id number]   YOB: 1975   Date of Visit: 8/25/2020       Dear Shanda Couch:    Thank you for referring Jawsant Yang to me for evaluation. Attached you will find relevant portions of my assessment and plan of care.    If you have questions, please do not hesitate to call me. I look forward to following Jaswant Yang along with you.    Sincerely,    Savi Pino MD    Enclosure  CC:  No Recipients    If you would like to receive this communication electronically, please contact [email or web address] or [phone] to request more information on [facility] Link access.    For providers and/or their staff who would like to refer a patient to [facility], please contact us through our one-stop-shop provider referral line, [facility] , at [phone].    If you feel you have received this communication in error or would no longer like to receive these types of communications, please e-mail [email or web address]

## 2020-08-25 NOTE — PROGRESS NOTES
Subjective:       Patient ID: Jaswant Yang is a 44 y.o. female.    Chief Complaint:   The patient location is: [place]  The chief complaint leading to consultation is: palliative med f/u of pain    Visit type: audiovisual    Face to Face time with patient: 30  45 minutes of total time spent on the encounter, which includes face to face time and non-face to face time preparing to see the patient (eg, review of tests), Obtaining and/or reviewing separately obtained history, Documenting clinical information in the electronic or other health record, Independently interpreting results (not separately reported) and communicating results to the patient/family/caregiver, or Care coordination (not separately reported).         Each patient to whom he or she provides medical services by telemedicine is:  (1) informed of the relationship between the physician and patient and the respective role of any other health care provider with respect to management of the patient; and (2) notified that he or she may decline to receive medical services by telemedicine and may withdraw from such care at any time.    Notes: 43 yo female with hairy cell leukemia seen for palliative f/u. She has ongoing pain, described as bony pain in back, shoulder blade, hips. Also has easy fatigability.  She has consistently been taking hydrocodone 4 times a day.  She was started on fentanyl well in the hospital but this was stopped due to concern of allergic reaction due to developing rash.  She is still dealing with the rash the reports that has resolved everywhere except for her legs.  Describes a burning sensation similar to a burn from heat on her skin.  She is willing to try another medication for pain.  We discussed her functional status.  She reports she is still able to do household tasks although she tires easily.      does not have any pertinent problems on file.  Past Medical History:   Diagnosis Date    Anemia     Anemia     Back pain      Dental infection     GERD (gastroesophageal reflux disease) 2020    Hairy-cell leukemia of spleen      Past Surgical History:   Procedure Laterality Date     SECTION, CLASSIC      FLUOROSCOPY N/A 2020    Procedure: Spleenic Bleed;  Surgeon: Hiren Cabrera MD;  Location: [facility] CATH LAB;  Service: General;  Laterality: N/A;    INJECTION OF ANESTHETIC AGENT INTO TISSUE PLANE DEFINED BY TRANSVERSUS ABDOMINIS MUSCLE N/A 2020    Procedure: BLOCK, TRANSVERSUS ABDOMINIS PLANE;  Surgeon: Yumi Ferguson MD;  Location: [facility] OR;  Service: General;  Laterality: N/A;    SPLENECTOMY N/A 2020    Procedure: SPLENECTOMY;  Surgeon: Yumi Ferguson MD;  Location: [facility] OR;  Service: General;  Laterality: N/A;    THORACENTESIS Left 2020    Procedure: THORACENTESIS;  Surgeon: Yumi Ferguson MD;  Location: [facility] OR;  Service: General;  Laterality: Left;    TUBAL LIGATION       History reviewed. No pertinent family history.  Social History     Socioeconomic History    Marital status: Single     Spouse name: Not on file    Number of children: Not on file    Years of education: Not on file    Highest education level: Not on file   Occupational History    Not on file   Social Needs    Financial resource strain: Not on file    Food insecurity     Worry: Not on file     Inability: Not on file    Transportation needs     Medical: Not on file     Non-medical: Not on file   Tobacco Use    Smoking status: Former Smoker     Packs/day: 0.25     Types: Cigarettes    Smokeless tobacco: Never Used    Tobacco comment: no longer smoker   Substance and Sexual Activity    Alcohol use: Yes     Comment: occasional    Drug use: No    Sexual activity: Yes     Birth control/protection: Condom   Lifestyle    Physical activity     Days per week: Not on file     Minutes per session: Not on file    Stress: Not on file   Relationships    Social connections     Talks on phone: Not on file     Gets  together: Not on file     Attends Jain service: Not on file     Active member of club or organization: Not on file     Attends meetings of clubs or organizations: Not on file     Relationship status: Not on file   Other Topics Concern    Not on file   Social History Narrative    Lives in a single family home with her fiance and her daughter.     Her mother and her son are her SDM's. ACP Documents are available in her chart.      Review of Systems   A comprehensive 14-point review of systems was reviewed with patient and was negative other than as specified above.     Objective:   There were no vitals filed for this visit.     Physical Exam  Constitutional:       General: She is not in acute distress.     Appearance: She is well-developed.   HENT:      Head: Normocephalic and atraumatic.   Eyes:      General: No scleral icterus.  Neck:      Musculoskeletal: Normal range of motion and neck supple.   Pulmonary:      Effort: Pulmonary effort is normal. No respiratory distress.   Musculoskeletal: Normal range of motion.   Skin:     Coloration: Skin is not pale.      Findings: No rash.   Neurological:      Mental Status: She is alert and oriented to person, place, and time. Mental status is at baseline.   Psychiatric:         Behavior: Behavior normal.         Thought Content: Thought content normal.         Review of Symptoms    Symptom Assessment (ESAS 0-10 Scale)  Pain:  7  Dyspnea:  2  Anxiety:  2  Nausea:  0  Depression:  2  Anorexia:  0  Fatigue:  8  Insomnia:  0  Restlessness:  0  Agitation:  0     CAM / Delirium:  Negative  Constipation:  Negative  Diarrhea:  Negative          ECOG Performance Status Grade:  1 - Ambulates, capable of light work    Decision Making:  Patient answered questions    Living Arrangements:  Lives with family      Assessment:       1. Cancer associated pain    2. Hairy cell leukemia not having achieved remission        Plan:           Problem List Items Addressed This Visit         Oncology    Hairy cell leukemia not having achieved remission    Relevant Medications    HYDROcodone-acetaminophen (NORCO)  mg per tablet      Other Visit Diagnoses     Cancer associated pain    -  Primary    Relevant Medications    morphine (MS CONTIN) 15 MG 12 hr tablet        Started today on long-acting morphine.  I will follow-up with her next week to see how she is feeling.

## 2020-09-04 ENCOUNTER — TELEPHONE (OUTPATIENT)
Dept: INTERNAL MEDICINE | Facility: CLINIC | Age: 45
End: 2020-09-04

## 2020-09-04 ENCOUNTER — OFFICE VISIT (OUTPATIENT)
Dept: HEMATOLOGY/ONCOLOGY | Facility: CLINIC | Age: 45
End: 2020-09-04
Payer: MEDICAID

## 2020-09-04 ENCOUNTER — LAB VISIT (OUTPATIENT)
Dept: LAB | Facility: HOSPITAL | Age: 45
End: 2020-09-04
Attending: INTERNAL MEDICINE
Payer: MEDICAID

## 2020-09-04 VITALS
BODY MASS INDEX: 39.04 KG/M2 | DIASTOLIC BLOOD PRESSURE: 81 MMHG | HEIGHT: 71 IN | OXYGEN SATURATION: 98 % | RESPIRATION RATE: 17 BRPM | SYSTOLIC BLOOD PRESSURE: 127 MMHG | TEMPERATURE: 99 F | WEIGHT: 278.88 LBS | HEART RATE: 105 BPM

## 2020-09-04 DIAGNOSIS — C91.40 HAIRY CELL LEUKEMIA NOT HAVING ACHIEVED REMISSION: ICD-10-CM

## 2020-09-04 DIAGNOSIS — D75.839 THROMBOCYTOSIS: ICD-10-CM

## 2020-09-04 DIAGNOSIS — C91.40 HAIRY CELL LEUKEMIA NOT HAVING ACHIEVED REMISSION: Primary | ICD-10-CM

## 2020-09-04 DIAGNOSIS — Z00.00 GENERAL MEDICAL EXAM: ICD-10-CM

## 2020-09-04 DIAGNOSIS — D72.821 MONOCYTOSIS: ICD-10-CM

## 2020-09-04 DIAGNOSIS — Z90.81 S/P SPLENECTOMY: ICD-10-CM

## 2020-09-04 LAB
ALBUMIN SERPL BCP-MCNC: 4.1 G/DL (ref 3.5–5.2)
ALP SERPL-CCNC: 92 U/L (ref 55–135)
ALT SERPL W/O P-5'-P-CCNC: 11 U/L (ref 10–44)
ANION GAP SERPL CALC-SCNC: 9 MMOL/L (ref 8–16)
ANISOCYTOSIS BLD QL SMEAR: ABNORMAL
AST SERPL-CCNC: 19 U/L (ref 10–40)
BASOPHILS # BLD AUTO: 0.02 K/UL (ref 0–0.2)
BASOPHILS NFR BLD: 0.5 % (ref 0–1.9)
BILIRUB SERPL-MCNC: 0.6 MG/DL (ref 0.1–1)
BUN SERPL-MCNC: 20 MG/DL (ref 6–20)
CALCIUM SERPL-MCNC: 8.6 MG/DL (ref 8.7–10.5)
CHLORIDE SERPL-SCNC: 108 MMOL/L (ref 95–110)
CO2 SERPL-SCNC: 19 MMOL/L (ref 23–29)
CREAT SERPL-MCNC: 1.3 MG/DL (ref 0.5–1.4)
DIFFERENTIAL METHOD: ABNORMAL
EOSINOPHIL # BLD AUTO: 0.3 K/UL (ref 0–0.5)
EOSINOPHIL NFR BLD: 6.5 % (ref 0–8)
ERYTHROCYTE [DISTWIDTH] IN BLOOD BY AUTOMATED COUNT: 21.5 % (ref 11.5–14.5)
EST. GFR  (AFRICAN AMERICAN): 58 ML/MIN/1.73 M^2
EST. GFR  (NON AFRICAN AMERICAN): 50 ML/MIN/1.73 M^2
GLUCOSE SERPL-MCNC: 90 MG/DL (ref 70–110)
HCT VFR BLD AUTO: 31.6 % (ref 37–48.5)
HGB BLD-MCNC: 10.5 G/DL (ref 12–16)
HOWELL-JOLLY BOD BLD QL SMEAR: ABNORMAL
HYPOCHROMIA BLD QL SMEAR: ABNORMAL
IMM GRANULOCYTES # BLD AUTO: 0.03 K/UL (ref 0–0.04)
IMM GRANULOCYTES NFR BLD AUTO: 0.7 % (ref 0–0.5)
LYMPHOCYTES # BLD AUTO: 1 K/UL (ref 1–4.8)
LYMPHOCYTES NFR BLD: 23.6 % (ref 18–48)
MCH RBC QN AUTO: 31.7 PG (ref 27–31)
MCHC RBC AUTO-ENTMCNC: 33.2 G/DL (ref 32–36)
MCV RBC AUTO: 96 FL (ref 82–98)
MONOCYTES # BLD AUTO: 0.9 K/UL (ref 0.3–1)
MONOCYTES NFR BLD: 20.5 % (ref 4–15)
NEUTROPHILS # BLD AUTO: 2 K/UL (ref 1.8–7.7)
NEUTROPHILS NFR BLD: 48.9 % (ref 38–73)
NRBC BLD-RTO: 2 /100 WBC
PLATELET # BLD AUTO: 451 K/UL (ref 150–350)
PLATELET BLD QL SMEAR: ABNORMAL
PMV BLD AUTO: 9.8 FL (ref 9.2–12.9)
POIKILOCYTOSIS BLD QL SMEAR: SLIGHT
POLYCHROMASIA BLD QL SMEAR: ABNORMAL
POTASSIUM SERPL-SCNC: 4.4 MMOL/L (ref 3.5–5.1)
PROT SERPL-MCNC: 7.6 G/DL (ref 6–8.4)
RBC # BLD AUTO: 3.31 M/UL (ref 4–5.4)
SCHISTOCYTES BLD QL SMEAR: PRESENT
SODIUM SERPL-SCNC: 136 MMOL/L (ref 136–145)
TARGETS BLD QL SMEAR: ABNORMAL
WBC # BLD AUTO: 4.15 K/UL (ref 3.9–12.7)

## 2020-09-04 PROCEDURE — 99215 PR OFFICE/OUTPT VISIT, EST, LEVL V, 40-54 MIN: ICD-10-PCS | Mod: S$PBB,,, | Performed by: INTERNAL MEDICINE

## 2020-09-04 PROCEDURE — 99215 OFFICE O/P EST HI 40 MIN: CPT | Mod: PBBFAC | Performed by: INTERNAL MEDICINE

## 2020-09-04 PROCEDURE — 80053 COMPREHEN METABOLIC PANEL: CPT

## 2020-09-04 PROCEDURE — 36415 COLL VENOUS BLD VENIPUNCTURE: CPT

## 2020-09-04 PROCEDURE — 99215 OFFICE O/P EST HI 40 MIN: CPT | Mod: S$PBB,,, | Performed by: INTERNAL MEDICINE

## 2020-09-04 PROCEDURE — 99999 PR PBB SHADOW E&M-EST. PATIENT-LVL V: ICD-10-PCS | Mod: PBBFAC,,, | Performed by: INTERNAL MEDICINE

## 2020-09-04 PROCEDURE — 99999 PR PBB SHADOW E&M-EST. PATIENT-LVL V: CPT | Mod: PBBFAC,,, | Performed by: INTERNAL MEDICINE

## 2020-09-04 PROCEDURE — 85025 COMPLETE CBC W/AUTO DIFF WBC: CPT

## 2020-09-04 NOTE — TELEPHONE ENCOUNTER
----- Message from Concepción Hamilton LPN sent at 9/4/2020  4:13 PM CDT -----  Regarding: NEW PT  Hi this is one of our oncology pt dr marina would like her to est care as a new pt with [facility]. Can some one please asist with getting her in with any provider, she is medicaid and I was not able to schedule her. She is really sweet and use the portal, please help her.    Thank you

## 2020-09-04 NOTE — PATIENT INSTRUCTIONS
Bone marrow biopsy and repeat labs same morning week of 9/28/20 and RV with me about 1 week after when path returns  Please set up with new PCP

## 2020-09-08 ENCOUNTER — TELEPHONE (OUTPATIENT)
Dept: PALLIATIVE MEDICINE | Facility: CLINIC | Age: 45
End: 2020-09-08

## 2020-09-08 NOTE — TELEPHONE ENCOUNTER
Palliative Care:    Called patient to check on her status and ask her how well her pain is being managed with recent medicine changes.  Also, need to schedule follow-up appointment with Dr. Pino.    No answer, left voicemail with my direct line to return my call.      Nohemi Pruitt RN

## 2020-09-24 DIAGNOSIS — F41.9 ANXIETY: ICD-10-CM

## 2020-09-24 DIAGNOSIS — C91.40 HAIRY CELL LEUKEMIA NOT HAVING ACHIEVED REMISSION: ICD-10-CM

## 2020-09-24 DIAGNOSIS — G89.3 CANCER ASSOCIATED PAIN: ICD-10-CM

## 2020-09-24 RX ORDER — ALPRAZOLAM 0.5 MG/1
.25-.5 TABLET ORAL 2 TIMES DAILY PRN
Qty: 30 TABLET | Refills: 5 | Status: SHIPPED | OUTPATIENT
Start: 2020-09-24 | End: 2020-10-13 | Stop reason: SDUPTHER

## 2020-09-24 RX ORDER — MORPHINE SULFATE 15 MG/1
15 TABLET, FILM COATED, EXTENDED RELEASE ORAL EVERY 12 HOURS
Qty: 60 TABLET | Refills: 0 | Status: SHIPPED | OUTPATIENT
Start: 2020-09-24 | End: 2020-11-19 | Stop reason: SDUPTHER

## 2020-09-24 RX ORDER — HYDROCODONE BITARTRATE AND ACETAMINOPHEN 10; 325 MG/1; MG/1
1 TABLET ORAL EVERY 6 HOURS PRN
Qty: 90 TABLET | Refills: 0 | Status: SHIPPED | OUTPATIENT
Start: 2020-09-24 | End: 2020-10-13 | Stop reason: SDUPTHER

## 2020-09-24 NOTE — TELEPHONE ENCOUNTER
Palliative Care:    Returned patient call.  She is requesting refills on her: Xanax, Hydrocodone and Morphine.  Routing request to LETI Villegas.    Nohemi Pruitt RN

## 2020-09-25 ENCOUNTER — TELEPHONE (OUTPATIENT)
Dept: RADIOLOGY | Facility: HOSPITAL | Age: 45
End: 2020-09-25

## 2020-09-25 DIAGNOSIS — C91.40 HAIRY CELL LEUKEMIA NOT HAVING ACHIEVED REMISSION: ICD-10-CM

## 2020-09-25 DIAGNOSIS — Z51.11 ENCOUNTER FOR ANTINEOPLASTIC CHEMOTHERAPY: ICD-10-CM

## 2020-09-25 DIAGNOSIS — N17.9 AKI (ACUTE KIDNEY INJURY): Primary | ICD-10-CM

## 2020-09-25 DIAGNOSIS — E87.20 METABOLIC ACIDOSIS: ICD-10-CM

## 2020-09-25 DIAGNOSIS — C91.40 HAIRY CELL LEUKEMIA OF SPLEEN: ICD-10-CM

## 2020-09-25 DIAGNOSIS — D70.1 CHEMOTHERAPY-INDUCED NEUTROPENIA: ICD-10-CM

## 2020-09-25 DIAGNOSIS — T45.1X5A CHEMOTHERAPY-INDUCED NEUTROPENIA: ICD-10-CM

## 2020-09-25 NOTE — TELEPHONE ENCOUNTER
Interventional Radiology:  Called patient to confirm procedure LM on patient VM, patient to be here at 0745, must have a ride d/t sedation, nothing to eat/drink after midnight the night before, may take morning meds with a small sip of water.  I left our number to call with any questions or concerns

## 2020-10-06 ENCOUNTER — TELEPHONE (OUTPATIENT)
Dept: RADIOLOGY | Facility: HOSPITAL | Age: 45
End: 2020-10-06

## 2020-10-06 NOTE — TELEPHONE ENCOUNTER
Pre-procedure phone call made at this time. Informed pt to be NPO after midnight, show up to [facility] on [address] at 8:00am, either have a ride with them or have a phone number for the person driving them home so that we can get in contact with them to keep them updated. Answered all questions that the pt had and pt verbalized understanding of all discussed.

## 2020-10-07 ENCOUNTER — HOSPITAL ENCOUNTER (OUTPATIENT)
Dept: RADIOLOGY | Facility: HOSPITAL | Age: 45
Discharge: HOME | End: 2020-10-07
Attending: INTERNAL MEDICINE
Payer: MEDICAID

## 2020-10-07 ENCOUNTER — LAB VISIT (OUTPATIENT)
Dept: LAB | Facility: HOSPITAL | Age: 45
End: 2020-10-07
Attending: INTERNAL MEDICINE
Payer: MEDICAID

## 2020-10-07 VITALS
BODY MASS INDEX: 37.52 KG/M2 | DIASTOLIC BLOOD PRESSURE: 80 MMHG | WEIGHT: 268 LBS | HEIGHT: 71 IN | HEART RATE: 68 BPM | SYSTOLIC BLOOD PRESSURE: 136 MMHG | OXYGEN SATURATION: 100 % | RESPIRATION RATE: 17 BRPM

## 2020-10-07 DIAGNOSIS — Z51.11 ENCOUNTER FOR ANTINEOPLASTIC CHEMOTHERAPY: ICD-10-CM

## 2020-10-07 DIAGNOSIS — C91.40 HAIRY CELL LEUKEMIA NOT HAVING ACHIEVED REMISSION: ICD-10-CM

## 2020-10-07 DIAGNOSIS — D70.1 CHEMOTHERAPY-INDUCED NEUTROPENIA: ICD-10-CM

## 2020-10-07 DIAGNOSIS — E87.20 METABOLIC ACIDOSIS: ICD-10-CM

## 2020-10-07 DIAGNOSIS — N17.9 AKI (ACUTE KIDNEY INJURY): ICD-10-CM

## 2020-10-07 DIAGNOSIS — T45.1X5A CHEMOTHERAPY-INDUCED NEUTROPENIA: ICD-10-CM

## 2020-10-07 DIAGNOSIS — C91.40 HAIRY CELL LEUKEMIA OF SPLEEN: ICD-10-CM

## 2020-10-07 LAB
ALBUMIN SERPL BCP-MCNC: 3.9 G/DL (ref 3.5–5.2)
ALP SERPL-CCNC: 92 U/L (ref 55–135)
ALT SERPL W/O P-5'-P-CCNC: 10 U/L (ref 10–44)
ANION GAP SERPL CALC-SCNC: 7 MMOL/L (ref 8–16)
APTT BLDCRRT: 24.4 SEC (ref 21–32)
AST SERPL-CCNC: 14 U/L (ref 10–40)
BASOPHILS # BLD AUTO: 0.03 K/UL (ref 0–0.2)
BASOPHILS NFR BLD: 0.6 % (ref 0–1.9)
BILIRUB SERPL-MCNC: 0.7 MG/DL (ref 0.1–1)
BUN SERPL-MCNC: 13 MG/DL (ref 6–20)
CALCIUM SERPL-MCNC: 8.5 MG/DL (ref 8.7–10.5)
CHLORIDE SERPL-SCNC: 108 MMOL/L (ref 95–110)
CO2 SERPL-SCNC: 22 MMOL/L (ref 23–29)
CREAT SERPL-MCNC: 1 MG/DL (ref 0.5–1.4)
DIFFERENTIAL METHOD: ABNORMAL
EOSINOPHIL # BLD AUTO: 0.1 K/UL (ref 0–0.5)
EOSINOPHIL NFR BLD: 1.6 % (ref 0–8)
ERYTHROCYTE [DISTWIDTH] IN BLOOD BY AUTOMATED COUNT: 18.3 % (ref 11.5–14.5)
EST. GFR  (AFRICAN AMERICAN): >60 ML/MIN/1.73 M^2
EST. GFR  (NON AFRICAN AMERICAN): >60 ML/MIN/1.73 M^2
GLUCOSE SERPL-MCNC: 109 MG/DL (ref 70–110)
HCT VFR BLD AUTO: 34.1 % (ref 37–48.5)
HGB BLD-MCNC: 10.9 G/DL (ref 12–16)
IMM GRANULOCYTES # BLD AUTO: 0.02 K/UL (ref 0–0.04)
IMM GRANULOCYTES NFR BLD AUTO: 0.4 % (ref 0–0.5)
INR PPP: 1 (ref 0.8–1.2)
LYMPHOCYTES # BLD AUTO: 2 K/UL (ref 1–4.8)
LYMPHOCYTES NFR BLD: 38.8 % (ref 18–48)
MCH RBC QN AUTO: 29.1 PG (ref 27–31)
MCHC RBC AUTO-ENTMCNC: 32 G/DL (ref 32–36)
MCV RBC AUTO: 91 FL (ref 82–98)
MONOCYTES # BLD AUTO: 0.6 K/UL (ref 0.3–1)
MONOCYTES NFR BLD: 11.6 % (ref 4–15)
NEUTROPHILS # BLD AUTO: 2.4 K/UL (ref 1.8–7.7)
NEUTROPHILS NFR BLD: 47 % (ref 38–73)
NRBC BLD-RTO: 0 /100 WBC
PLATELET # BLD AUTO: 435 K/UL (ref 150–350)
PMV BLD AUTO: 8.7 FL (ref 9.2–12.9)
POTASSIUM SERPL-SCNC: 3.9 MMOL/L (ref 3.5–5.1)
PROT SERPL-MCNC: 7.1 G/DL (ref 6–8.4)
PROTHROMBIN TIME: 10.6 SEC (ref 9–12.5)
RBC # BLD AUTO: 3.75 M/UL (ref 4–5.4)
SODIUM SERPL-SCNC: 137 MMOL/L (ref 136–145)
WBC # BLD AUTO: 5.1 K/UL (ref 3.9–12.7)

## 2020-10-07 PROCEDURE — 88342 IMHCHEM/IMCYTCHM 1ST ANTB: CPT | Performed by: PATHOLOGY

## 2020-10-07 PROCEDURE — 88342 CHG IMMUNOCYTOCHEMISTRY: ICD-10-PCS | Mod: 26,59,, | Performed by: PATHOLOGY

## 2020-10-07 PROCEDURE — 77012 CT SCAN FOR NEEDLE BIOPSY: CPT | Mod: TC

## 2020-10-07 PROCEDURE — 88189 PR  FLOWCYTOMETRY/READ, 16 & > MARKERS: ICD-10-PCS | Mod: ,,, | Performed by: PATHOLOGY

## 2020-10-07 PROCEDURE — 85097 BONE MARROW INTERPRETATION: CPT | Mod: ,,, | Performed by: PATHOLOGY

## 2020-10-07 PROCEDURE — 88311 DECALCIFY TISSUE: CPT | Performed by: PATHOLOGY

## 2020-10-07 PROCEDURE — 80053 COMPREHEN METABOLIC PANEL: CPT

## 2020-10-07 PROCEDURE — 88341 PR IHC OR ICC EACH ADD'L SINGLE ANTIBODY  STAINPR: ICD-10-PCS | Mod: 26,59,, | Performed by: PATHOLOGY

## 2020-10-07 PROCEDURE — 88313 SPECIAL STAINS GROUP 2: CPT | Mod: 26,,, | Performed by: PATHOLOGY

## 2020-10-07 PROCEDURE — 88237 TISSUE CULTURE BONE MARROW: CPT

## 2020-10-07 PROCEDURE — 88342 IMHCHEM/IMCYTCHM 1ST ANTB: CPT | Mod: 91 | Performed by: PATHOLOGY

## 2020-10-07 PROCEDURE — 85025 COMPLETE CBC W/AUTO DIFF WBC: CPT

## 2020-10-07 PROCEDURE — 85730 THROMBOPLASTIN TIME PARTIAL: CPT

## 2020-10-07 PROCEDURE — 88341 IMHCHEM/IMCYTCHM EA ADD ANTB: CPT | Mod: 59 | Performed by: PATHOLOGY

## 2020-10-07 PROCEDURE — 88305 TISSUE EXAM BY PATHOLOGIST: CPT | Mod: 26,,, | Performed by: PATHOLOGY

## 2020-10-07 PROCEDURE — 88305 TISSUE EXAM BY PATHOLOGIST: CPT | Performed by: PATHOLOGY

## 2020-10-07 PROCEDURE — 88313 PR  SPECIAL STAINS,GROUP II: ICD-10-PCS | Mod: 26,,, | Performed by: PATHOLOGY

## 2020-10-07 PROCEDURE — 88311 DECALCIFY TISSUE: CPT | Mod: 26,,, | Performed by: PATHOLOGY

## 2020-10-07 PROCEDURE — 88185 FLOWCYTOMETRY/TC ADD-ON: CPT | Performed by: PATHOLOGY

## 2020-10-07 PROCEDURE — 36415 COLL VENOUS BLD VENIPUNCTURE: CPT

## 2020-10-07 PROCEDURE — 88313 SPECIAL STAINS GROUP 2: CPT | Performed by: PATHOLOGY

## 2020-10-07 PROCEDURE — 88311 PR  DECALCIFY TISSUE: ICD-10-PCS | Mod: 26,,, | Performed by: PATHOLOGY

## 2020-10-07 PROCEDURE — 88341 IMHCHEM/IMCYTCHM EA ADD ANTB: CPT | Mod: 26,59,, | Performed by: PATHOLOGY

## 2020-10-07 PROCEDURE — 85097 PR  BONE MARROW,SMEAR INTERPRETATION: ICD-10-PCS | Mod: ,,, | Performed by: PATHOLOGY

## 2020-10-07 PROCEDURE — 88342 IMHCHEM/IMCYTCHM 1ST ANTB: CPT | Mod: 26,59,, | Performed by: PATHOLOGY

## 2020-10-07 PROCEDURE — 63600175 PHARM REV CODE 636 W HCPCS: Performed by: RADIOLOGY

## 2020-10-07 PROCEDURE — 85610 PROTHROMBIN TIME: CPT

## 2020-10-07 PROCEDURE — 88305 TISSUE EXAM BY PATHOLOGIST: ICD-10-PCS | Mod: 26,,, | Performed by: PATHOLOGY

## 2020-10-07 PROCEDURE — 88184 FLOWCYTOMETRY/ TC 1 MARKER: CPT | Performed by: PATHOLOGY

## 2020-10-07 PROCEDURE — 88264 CHROMOSOME ANALYSIS 20-25: CPT

## 2020-10-07 PROCEDURE — 88189 FLOWCYTOMETRY/READ 16 & >: CPT | Mod: ,,, | Performed by: PATHOLOGY

## 2020-10-07 RX ORDER — HYDROMORPHONE HYDROCHLORIDE 1 MG/ML
INJECTION, SOLUTION INTRAMUSCULAR; INTRAVENOUS; SUBCUTANEOUS CODE/TRAUMA/SEDATION MEDICATION
Status: COMPLETED | OUTPATIENT
Start: 2020-10-07 | End: 2020-10-07

## 2020-10-07 RX ORDER — MIDAZOLAM HYDROCHLORIDE 1 MG/ML
INJECTION INTRAMUSCULAR; INTRAVENOUS CODE/TRAUMA/SEDATION MEDICATION
Status: COMPLETED | OUTPATIENT
Start: 2020-10-07 | End: 2020-10-07

## 2020-10-07 RX ADMIN — MIDAZOLAM HYDROCHLORIDE 1 MG: 1 INJECTION, SOLUTION INTRAMUSCULAR; INTRAVENOUS at 10:10

## 2020-10-07 RX ADMIN — HYDROMORPHONE HYDROCHLORIDE 0.5 MG: 1 INJECTION, SOLUTION INTRAMUSCULAR; INTRAVENOUS; SUBCUTANEOUS at 10:10

## 2020-10-07 NOTE — H&P
[facility] -   History & Physical    Subjective:      Chief Complaint/Reason for Admission: leukemia    Jaswant Yang is a 45 y.o. female.    Past Medical History:   Diagnosis Date    Anemia     Anemia     Back pain     Dental infection     GERD (gastroesophageal reflux disease) 2020    Hairy-cell leukemia of spleen      Past Surgical History:   Procedure Laterality Date     SECTION, CLASSIC      FLUOROSCOPY N/A 2020    Procedure: Spleenic Bleed;  Surgeon: Hiren Cabrera MD;  Location: [facility] CATH LAB;  Service: General;  Laterality: N/A;    INJECTION OF ANESTHETIC AGENT INTO TISSUE PLANE DEFINED BY TRANSVERSUS ABDOMINIS MUSCLE N/A 2020    Procedure: BLOCK, TRANSVERSUS ABDOMINIS PLANE;  Surgeon: Yumi Ferguson MD;  Location: [facility] OR;  Service: General;  Laterality: N/A;    SPLENECTOMY N/A 2020    Procedure: SPLENECTOMY;  Surgeon: Yumi Ferguson MD;  Location: [facility] OR;  Service: General;  Laterality: N/A;    THORACENTESIS Left 2020    Procedure: THORACENTESIS;  Surgeon: Yumi Ferguson MD;  Location: [facility] OR;  Service: General;  Laterality: Left;    TUBAL LIGATION       History reviewed. No pertinent family history.  Social History     Tobacco Use    Smoking status: Former Smoker     Packs/day: 0.25     Types: Cigarettes    Smokeless tobacco: Never Used    Tobacco comment: no longer smoker   Substance Use Topics    Alcohol use: Yes     Comment: occasional    Drug use: No       (Not in a hospital admission)    Review of patient's allergies indicates:   Allergen Reactions    Fentanyl Rash     Severe full body diffuse rash requiring systemic steroids    Vancomycin analogues Rash     Severe full body diffuse rash requiring systemic steroids    Rituximab Other (See Comments)     Sweating, abdominal pain, elevated blood pressure    Tramadol Hives        Review of Systems   Constitutional: Negative.    HENT: Negative.    Eyes: Negative.     Gastrointestinal: Negative.    Skin: Negative.        Objective:      Vital Signs (Most Recent)  Pulse: 86 (10/07/20 0829)  Resp: 16 (10/07/20 0829)  BP: 136/80 (10/07/20 0829)  SpO2: 100 % (10/07/20 0829)    Vital Signs Range (Last 24H):  Pulse:  [86]   Resp:  [16]   BP: (136)/(80)   SpO2:  [100 %]     Physical Exam  Constitutional:       Appearance: Normal appearance. She is obese.   HENT:      Head: Normocephalic and atraumatic.   Pulmonary:      Effort: Pulmonary effort is normal.   Abdominal:      General: Abdomen is flat.         Data Review:    CBC:   Lab Results   Component Value Date    WBC 5.10 10/07/2020    RBC 3.75 (L) 10/07/2020    HGB 10.9 (L) 10/07/2020    HCT 34.1 (L) 10/07/2020    HCT 25 (L) 05/11/2020     (H) 10/07/2020      ECG: no prior ECG.     Assessment:      There are no hospital problems to display for this patient.      Plan:    Bone marrow biopsy

## 2020-10-07 NOTE — DISCHARGE INSTRUCTIONS
Bone Marrow Aspiration and Biopsy  Does this test have other names?  Bone marrow exam  What is this test?  This is a two-part test that looks at the blood cells in a sample of bone marrow, the spongy tissue within certain bones. This test may help your healthcare provider diagnose or monitor a blood disease or health condition affecting your marrow.  Your bone marrow has a liquid part and a solid part. Aspiration uses a needle to remove a sample of the liquid part of bone marrow. Biopsy uses a larger needle to remove a small amount of bone with its marrow.  Part of the job of bone marrow is to make blood cells. This test can find out how well your bone marrow is working. This test is also done to find some types of cancer.  Why do I need this test?  You might have this test if your healthcare provider wants to find out the health of your bone marrow or to check on how well your marrow is making blood cells.  You may have an aspiration to check for:  · The health of your bone marrow for a transplant  · Acute leukemia  · Multiple myeloma  In some cases, bone marrow aspiration is used to confirm chromosome disorders in newborns.  You may have an aspiration followed by a biopsy if you could have:  · Bacterial, fungal, or parasitic infection  · Unexplained anemia, leucopenia, or thrombocytopenia  · Metastatic cancer or many other diseases  What other tests might I have along with this test?  Your healthcare provider may also order these tests:  · Complete blood count, or CBC  · Reticulocyte count to find out your red blood cell survival rate  What do my test results mean?  Many things may affect your lab test results. These include the method each lab uses to do the test. Even if your test results are different from the normal value, you may not have a problem. To learn what the results mean for you, talk with your healthcare provider.  The lab will look at different aspects of your bone marrow to help find certain  diseases or conditions. These aspects include:  · Type and number of blood cells  · Any abnormalities in the size, shape, or look of cells  · Level of iron in the bone marrow  · Abnormal amount of young white blood cells, called blasts  · Any chromosomal abnormalities  Depending on what is seen, your results may mean you have an infection, a blood disease, leukemia, or cancer that has spread to the bone marrow from another site.  Your healthcare provider will take your results and combine this information with information from your physical exam, health history, and other types of tests to make a diagnosis.   If your results are negative, your provider may order other tests to diagnose your condition.   How is this test done?  These tests require a sample of bone marrow. A number of sites on your body can be used for marrow aspiration, but the hip bone is a common spot. You will likely lie on your side or stomach on an exam table. Your healthcare provider will numb the area of the test. You may feel a slight prick from the needle that the provider uses to give the numbing agent.  Does this test pose any risks?  It's not possible to numb the bone, so you may feel slight pain during the procedure. But you shouldn't feel any pain afterward. Risks from a bone marrow test are rare, but you could have bleeding or an infection.  What might affect my test results?  Other factors aren't likely to affect your results.  How do I get ready for this test?  Tell your healthcare provider if you take aspirin or have any allergies. Also tell your provider if you are pregnant, take any blood-thinner medicines, or have a history of bleeding problems.  Be sure your provider knows about all other medicines, herbs, vitamins, and supplements you are taking. This includes medicines that don't need a prescription and any illicit drugs you may use.     © 6425-3987 [employer]. [address] [place] [address], [place] [address]. All  rights reserved. This information is not intended as a substitute for professional medical care. Always follow your healthcare professional's instructions.        Recovery After Procedural Sedation (Adult)   You have been given medicine by vein to make you sleep during your surgery. This may have included both a pain medicine and sleeping medicine. Most of the effects have worn off. But you may still have some drowsiness for the next 6 to 8 hours.  Home care  Follow these guidelines when you get home:  · For the next 8 hours, you should be watched by a responsible adult. This person should make sure your condition is not getting worse.  · Don't drink any alcohol for the next 24 hours.  · Don't drive, operate dangerous machinery, or make important business or personal decisions during the next 24 hours.  · To prevent injury or falls, use caution when standing and walking for at least 24 hours after your procedure.  Note: Your healthcare provider may tell you not to take any medicine by mouth for pain or sleep in the next 4 hours. These medicines may react with the medicines you were given in the hospital. This could cause a much stronger response than usual.  Follow-up care  Follow up with your healthcare provider if you are not alert and back to your usual level of activity within 12 hours.  When to seek medical advice  Call your healthcare provider right away if any of these occur:  · Drowsiness gets worse  · Weakness or dizziness gets worse  · Repeated vomiting  · You can't be awakened  · Fever  · New rash  Megan last reviewed this educational content on 9/1/2019  © 2561-2877 [employer]. [address] [place] [address], [place] [address]. All rights reserved. This information is not intended as a substitute for professional medical care. Always follow your healthcare professional's instructions.

## 2020-10-07 NOTE — SEDATION DOCUMENTATION
Pt placed prone with arms above her head on CT table. CM in place, VSS, NADN, pt verbalizes understanding of procedure.

## 2020-10-07 NOTE — SEDATION DOCUMENTATION
Procedure completed at this time. VSS, NADN, and pt tolerated procedure well. Band aid to right posterior iliac crest puncture site C/D/I with no bleeding noted. Will continue to monitor pt.

## 2020-10-07 NOTE — PLAN OF CARE
Band aid to right posterior iliac crest puncture site C/D/I with no bleeding/redness/swelling noted. Discharge instructions given to and reviewed with pt and pt verbalized understanding of all. Pt discharged to home, taken out via wheelchair and driven home by [facility].

## 2020-10-13 ENCOUNTER — TELEPHONE (OUTPATIENT)
Dept: HEMATOLOGY/ONCOLOGY | Facility: CLINIC | Age: 45
End: 2020-10-13

## 2020-10-13 ENCOUNTER — TELEPHONE (OUTPATIENT)
Dept: INTERNAL MEDICINE | Facility: CLINIC | Age: 45
End: 2020-10-13

## 2020-10-13 NOTE — TELEPHONE ENCOUNTER
----- Message from Ondina Graf sent at 10/13/2020  1:32 PM CDT -----  Type:  RX Refill Request    Who Called: pt   Refill or New Rx:refill  RX Name and Strength:HYDROcodone-acetaminophen (NORCO)  mg per tablet  How is the patient currently taking it? (ex. 1XDay):4XDaily   Is this a 30 day or 90 day RX:30  Preferred Pharmacy with phone number:  Local or Mail Order:local   Ordering Provider:dale   Would the patient rather a call back or a response via [employer]? Callback   Best Call Back Number:[phone]   Additional Information:       Type:  RX Refill Request    Who Called: pt   Refill or New Rx:refill  RX Name and Strength:ALPRAZolam (XANAX) 0.5 MG tablet  How is the patient currently taking it? (ex. 1XDay):2xDaily   Is this a 30 day or 90 day RX:30  Preferred Pharmacy with phone number:  Local or Mail Order:local   Ordering Provider:dale   Would the patient rather a call back or a response via [employer]? Callback   Best Call Back Number:[phone]  Additional Information:       [id number] - [place] - [address]  [address] [address]  Phone: [phone] Fax: [phone]

## 2020-10-13 NOTE — TELEPHONE ENCOUNTER
----- Message from Ondina Graf sent at 10/13/2020  1:32 PM CDT -----  Type:  RX Refill Request    Who Called: pt   Refill or New Rx:refill  RX Name and Strength:HYDROcodone-acetaminophen (NORCO)  mg per tablet  How is the patient currently taking it? (ex. 1XDay):4XDaily   Is this a 30 day or 90 day RX:30  Preferred Pharmacy with phone number:  Local or Mail Order:local   Ordering Provider:dale   Would the patient rather a call back or a response via [employer]? Callback   Best Call Back Number:[phone]   Additional Information:       Type:  RX Refill Request    Who Called: pt   Refill or New Rx:refill  RX Name and Strength:ALPRAZolam (XANAX) 0.5 MG tablet  How is the patient currently taking it? (ex. 1XDay):2xDaily   Is this a 30 day or 90 day RX:30  Preferred Pharmacy with phone number:  Local or Mail Order:local   Ordering Provider:dale   Would the patient rather a call back or a response via [employer]? Callback   Best Call Back Number:[phone]  Additional Information:       [id number] - [place] - [address]  [address] [address]  Phone: [phone] Fax: [phone]

## 2020-10-13 NOTE — TELEPHONE ENCOUNTER
----- Message from Paola Liu sent at 10/13/2020 10:18 AM CDT -----  Regarding: refill  Type:  RX Refill Request    Who Called: patient  Refill or New Rx:refill  RX Name and Strength:Narco and Xanax   How is the patient currently taking it? (ex. 1XDay):na  Is this a 30 day or 90 day RX:na  Preferred Pharmacy with phone number: [facility]  Local or Mail Order:local  Ordering Provider:Dr Pino  Would the patient rather a call back or a response via [facility]?  Call back  Best Call Back Number:[phone]  Additional Information: na

## 2020-10-14 LAB
CHROM BANDING METHOD: NORMAL
CHROMOSOME ANALYSIS BM ADDITIONAL INFORMATION: NORMAL
CHROMOSOME ANALYSIS BM RELEASED BY: NORMAL
CHROMOSOME ANALYSIS BM RESULT SUMMARY: NORMAL
CLINICAL CYTOGENETICIST REVIEW: NORMAL
KARYOTYP MAR: NORMAL
REASON FOR REFERRAL (NARRATIVE): NORMAL
REF LAB TEST METHOD: NORMAL
SPECIMEN SOURCE: NORMAL
SPECIMEN: NORMAL

## 2020-10-20 LAB
COMMENT: NORMAL
FINAL PATHOLOGIC DIAGNOSIS: NORMAL
GROSS: NORMAL
Lab: NORMAL
MICROSCOPIC EXAM: NORMAL

## 2020-10-21 ENCOUNTER — SOCIAL WORK (OUTPATIENT)
Dept: HEMATOLOGY/ONCOLOGY | Facility: CLINIC | Age: 45
End: 2020-10-21

## 2020-10-21 ENCOUNTER — DOCUMENTATION ONLY (OUTPATIENT)
Dept: HEMATOLOGY/ONCOLOGY | Facility: CLINIC | Age: 45
End: 2020-10-21

## 2020-10-21 ENCOUNTER — OFFICE VISIT (OUTPATIENT)
Dept: HEMATOLOGY/ONCOLOGY | Facility: CLINIC | Age: 45
End: 2020-10-21
Payer: MEDICAID

## 2020-10-21 VITALS
BODY MASS INDEX: 39.94 KG/M2 | WEIGHT: 285.25 LBS | DIASTOLIC BLOOD PRESSURE: 89 MMHG | HEART RATE: 80 BPM | SYSTOLIC BLOOD PRESSURE: 134 MMHG | HEIGHT: 71 IN | OXYGEN SATURATION: 99 % | TEMPERATURE: 99 F

## 2020-10-21 DIAGNOSIS — Z90.81 S/P SPLENECTOMY: ICD-10-CM

## 2020-10-21 DIAGNOSIS — D75.839 THROMBOCYTOSIS: ICD-10-CM

## 2020-10-21 DIAGNOSIS — D63.0 ANEMIA IN NEOPLASTIC DISEASE: ICD-10-CM

## 2020-10-21 DIAGNOSIS — C91.40 HAIRY CELL LEUKEMIA NOT HAVING ACHIEVED REMISSION: Primary | ICD-10-CM

## 2020-10-21 PROCEDURE — 99999 PR PBB SHADOW E&M-EST. PATIENT-LVL V: ICD-10-PCS | Mod: PBBFAC,,, | Performed by: INTERNAL MEDICINE

## 2020-10-21 PROCEDURE — 99999 PR PBB SHADOW E&M-EST. PATIENT-LVL V: CPT | Mod: PBBFAC,,, | Performed by: INTERNAL MEDICINE

## 2020-10-21 PROCEDURE — 99215 OFFICE O/P EST HI 40 MIN: CPT | Mod: S$PBB,,, | Performed by: INTERNAL MEDICINE

## 2020-10-21 PROCEDURE — 90686 IIV4 VACC NO PRSV 0.5 ML IM: CPT | Mod: PBBFAC

## 2020-10-21 PROCEDURE — 99215 OFFICE O/P EST HI 40 MIN: CPT | Mod: PBBFAC,25 | Performed by: INTERNAL MEDICINE

## 2020-10-21 PROCEDURE — 99215 PR OFFICE/OUTPT VISIT, EST, LEVL V, 40-54 MIN: ICD-10-PCS | Mod: S$PBB,,, | Performed by: INTERNAL MEDICINE

## 2020-10-21 NOTE — PROGRESS NOTES
"  Subjective:      DATE OF VISIT: 10/21/20     ?  Patient ID:?Jaswant Yang is a 45 y.o. female.?? MR#: [id number]   ?   PRIMARY ONCOLOGIST: Dr. Hernandez    ? Primary Care Providers:  Primary Doctor No (General)     CHIEF COMPLAINT: ?  Follow-up?    ?   ONCOLOGIC DIAGNOSIS:  Hairy cell leukemia  ?   CURRENT TREATMENT: post-treatment follow-up, surveillance    PAST TREATMENT:  6/22/20 cladribine; 7/6/20 initiated rituximab, not completed due to transfusion reaction  05/11/2020 splenectomy  ?   ONCOLOGIC HISTORY:   ?   Ms Yang is a 44 year old woman with obesity and longstanding history of anemia, without treatment or further evaluation.  She does not follow with primary care and no other known diagnoses.  She presented to emergency room with left-sided abdominal pain for 2 days which she thought initially due to gas/indigestion.  She denies any associated symptoms.  CT scan revealed splenomegaly to 17 cm and concern for splenic laceration.  She was taken to the operating room on 05/11/20 where she was noted to have "Splenic rupture with capsular tear at anterior superior border of the spleen. Reactive ascites. No massive hemoperitoneum. Abnormal tissue extending to the hilum, sent for frozen section. Unable to definitively determine if lymphoma on frozen section. Splenectomy performed. No other disease noted within the abdomen."    05/11/2020 splenic lymph node excisional biopsy with pathology months consistent with hairy cell leukemia.    6/3/20 bone marrow biopsy/aspirate with hypercellular marrow % with extensive involvement by hairy cell leukemia    6/22/20 cladribine; 7/6/20 initiated rituximab, not completed due to transfusion reaction    Follow-up     Ms. Yang presents with her partner to discuss results of bone marrow biopsy.  She continues to have chronic pain in bilateral thighs which predated her diagnosis however since treatment she has had left shoulder/intra-articular pain.  No new rash, " "fever, chills, night sweats or unintentional weight loss.  Good energy and appetite.    Review of Systems    ?   A comprehensive 14-point review of systems was reviewed with patient and was negative other than as specified above.   ?     Objective:      Physical Exam    /89   Pulse 80   Temp 98.8 °F (37.1 °C) (Oral)   Ht 5' 11" (1.803 m)   Wt 129.4 kg (285 lb 4.4 oz)   LMP 10/07/2020 (Within Days)   SpO2 99%   BMI 39.79 kg/m²      ECOG:?0   General appearance: Generally well appearing, in no acute distress.   Head, eyes, ears, nose, and throat: Pupils round, sclerae anicteric.   Cardiovascular:  Regular rate and rhythm, S1, S2, no audible murmurs.   Respiratory: Lungs clear to auscultation bilaterally.   Abdomen:  Obese, nontender  Extremities: Warm, without edema.   Neurologic: Alert and oriented. Grossly normal strength, coordination, and gait.   Skin:  Hyperpigmentation at sites of prior maculopapular rash diffusely, no active lesions.   ?   Laboratory:  ?       No visits with results within 1 Day(s) from this visit.   Latest known visit with results is:   Hospital Outpatient Visit on 10/07/2020   Component Date Value Ref Range Status    Final Pathologic Diagnosis 10/07/2020    Final                    Value:BONE MARROW, RIGHT ILIAC CREST (ASPIRATE SMEAR, TOUCH PREPARATION, CLOT  SECTION, AND CORE BIOPSY):  -- HYPERCELLULAR MARROW WITH TRILINEAGE HEMATOPOIESIS AND PERSISTENT  INVOLVEMENT BY HAIRY-CELL LEUKEMIA (1% of TOTAL CELLULARITY).  -- RETICULIN MYELOFIBROSIS (MF 3 OF 3).  -- SEE COMMENT.      Gross 10/07/2020    Final                    Value:Patient id/pathology [id number]  Received in 2 parts  Part 1:  Patient id/pathology ID [id number]  Received in formalin labeled "clots right BM"  are 2 portions of red-brown  hemorrhagic tissue measuring 4.5 x 1.3 x 0.7 cm in aggregate.  The specimen  is submitted entirely in cassette [id number]-C,  Part 2:  Patient id/pathology ID:  [id number]  Received in formali " "labeled "core right BM" is a cylindrical fragment of bone  measuring 2.0 cm in length by 0.2 cm in diameter with attached red  hemorrhagic tissue measuring 3.0 x 0.2 x 0.2 cm.  The specimen is submitted  entirely in cassette BYT--3-A following decalcification.  Cathy Brown      Microscopic Exam 10/07/2020    Final                    Value:CBC (10/07/2020): WBC 5.10 K/uL (granulocyte 47.0 %, lymphocyte 38.8 %,  monocyte 11.6 %, eosinophil 1.6 %, basophil 0.6 %), RBC 3.75 M/uL, HGB 10.9  g/dL, HCT 34.1 %, MCV 91 fL, MCH 29.1 pg, MCHC 32.0 g/dL, RDW 18.3 %,  Platelet 435 K/uL, MPV 8.7 fL.  BONE MARROW ASPIRATE SMEAR:  The smears are hypocellular and hemodiluted. Atypical lymphoid cells with  cytoplasmic projections are seen.  Occasional myeloid and erythroid  precursors are present.  Megakaryocytes are not identified.  Differential  count is not performed.  Stainable iron is not evaluable due to aspicular  smear.  BONE MARROW TOUCH PREPARATION:  The touch preparation are cellular and clotted, which is suboptimal for  morphologic evaluation.  Atypical lymphoid cells are increased.   Myeloid and  erythroid precursors and occasional megakaryocytes are seen.  BONE MARROW CLOT SECTION:  The clot sections are aspicular.  Stainable iron is not identified.  BONE MARROW CORE BIOPSY:  Cortical and medullary bones are present with as                          piration artifact. The  cellularity is variable, ranging from 70% to 95% . The myeloid to erythroid  ratio is unremarkable.  Erythroid maturation is left-shifted.  Megakaryocytes  are present and morphologically unremarkable.  Interstitial lymphocytes are  increased and form clusters in some areas.  Stainable iron is not identified.   Reticulin stain shows myelofibrosis (MF 3 of 3).      Comment 10/07/2020    Final                    Value:Flow cytometric analysis of  bone marrow shows very small population of kappa  restricted monoclonal B lymphocytes  (<0.1% of " total sample) with expression  of CD19, CD22, , and CD11c. It is negative for CD20 and CD25.  Polyclonal B lymphocytes are detected.  T lymphocytes show partial loss of  CD7 expression.  The blast gate is not increased. Flow differential:  Lymphocytes 46.3%, Monocytes 5.3%, Granulocytes  47.1%, Blast  0.7%,  Debris/nRBC 0.5%,  Viability 95.3%.  Immunohistochemical stains are performed on the biopsy core for greater  sensitivity and further architectural assessment with adequate controls.  CD19, CD20, and CD79a highlight B lymphocytes with interstitial distribution  pattern.  Scattered lymphoid cells (1% of total cellularity) are positive for   ([facility]),  ([facility]), CD25  ([facility]) and cyclin D1.  Rare lymphoid cells are positive  for BRAF V600E ([facility] e[facility]).  E-cadherin stains clust                          ers of  early erythroid precursors.  Patient history of hairy cell leukemia is noted.  Patient underwent treatment  with rituximab.  The overall findings are consistent with persistent bone  marrow involvement by hairy cell leukemia (1%of total cellulairty).      Disclaimer 10/07/2020    Final                    Value:CD20 (L26) immunohistochemical staining (close L26, DAB detection method) is  performed on formalin-fixed (10% neutral buffered formalin), paraffin  embedded tissues sections. The presence of an appropriately colored reaction  product within the target cells is indicative of positive reactivity.  Positive staining intensity should be assessed within the context of any  background staining of the negative reagent control. This test was developed  and performance characteristics determined by [facility], Section  of Anatomic Pathology. It has been cleared by the U.S. Food and Drug  Administration.      Clinical Diagnosis - Bone Marrow 10/07/2020 LEUK   Final    Body Site - Bone Marrow 10/07/2020 RPI   Final     Bone Marrow Interpretation 10/07/2020    Final                    Value:Immunophenotyping detects very small population (<0.1% of total sample) of  monoclonal B lymphocytes. See comment.      Bone Marrow Antibodies Analyzed 10/07/2020    Final                    Value:All analyzed: CD2, CD3, CD4, CD5, CD7, CD8, CD10, CD11c, CD13, CD19, CD20,  CD22, CD25, CD34, , FMC7, KAPPA, LAMBDA,CD45 and 7AAD.      Bone Marrow Comment 10/07/2020    Corrected                    Value:Flow cytometric analysis of  bone marrow shows very small population of  kappa restricted monoclonal B lymphocytes  (<0.1% of total sample) with  expression of CD19, CD22, , and CD11c. It is negative for CD20 and CD25.  Polyclonal B lymphocytes are detected. T lymphocytes show partial loss of CD7  expression.  The blast gate is not increased.  Correlation with bone marrow  morphology is required.  Flow differential:  Lymphocytes 46.3%, Monocytes 5.3%, Granulocytes  47.1%,  Blast  0.7%, Debris/nRBC 0.5%,  Viability 95.3%.      Chromosome analysis BM Result Summ* 10/07/2020 Normal   Final    Interpretation 10/07/2020 SEE BELOW   Final    Results 10/07/2020 46,XX[11]   Final    Reason for Referral 10/07/2020 hairy cell leukemia   Corrected    Specimen 10/07/2020 Bone Marrow   Final    Source 10/07/2020 Test Not Performed   Final    Method 10/07/2020 Culture without mitogens   Final    Banding Methods, BM Chromosome 10/07/2020 SEE BELOW   Final    Chromosome analysis BM Additional * 10/07/2020 SEE BELOW   Final    Chromosome analysis BM Released By 10/07/2020 Stephen Mar M.D.   Final      ?     Chemistry        Component Value Date/Time     10/07/2020 0818    K 3.9 10/07/2020 0818     10/07/2020 0818    CO2 22 (L) 10/07/2020 0818    BUN 13 10/07/2020 0818    CREATININE 1.0 10/07/2020 0818     10/07/2020 0818        Component Value Date/Time    CALCIUM 8.5 (L) 10/07/2020 0818    ALKPHOS 92 10/07/2020  0818    AST 14 10/07/2020 0818    ALT 10 10/07/2020 0818    BILITOT 0.7 10/07/2020 0818    ESTGFRAFRICA >60 10/07/2020 0818    EGFRNONAA >60 10/07/2020 0818          ?   ?   Imaging:  ?  5/11/20  CT ABDOMEN PELVIS WITHOUT CONTRAST    CLINICAL HISTORY:  Abd pain, fever, abscess suspected;    TECHNIQUE:  Low dose axial images, sagittal and coronal reformations were obtained from the lung bases to the pubic symphysis.  Oral contrast was not administered.    COMPARISON:  05/11/2020    FINDINGS:  Heart: Mild cardiomegaly..  Trace effusion.    Lung Bases: Large left pleural effusion with left lower lobe atelectasis or airspace disease.    Liver: Normal size and attenuation. No focal lesions.    Gallbladder: No calcified gallstones.    Bile Ducts: No dilatation.    Pancreas: No obvious mass. No peripancreatic fat stranding.    Spleen: Splenomegaly measuring up to 17 cm..  Area of decreased attenuation within the lower portion of the spleen concerning for splenic laceration and surrounding perisplenic hematoma.  High attenuating fluid surrounds the spleen.  Active bleed is not excluded on noncontrast imaging.  CTA can be obtained as clinically warranted.    Adrenals: Normal.    Kidneys/Ureters: No mass, hydroureteronephrosis, or nephroureterolithiasis.  4 cm hypodensity within the interpolar region right kidney consistent with a cyst.    Bladder: No wall thickening.    Reproductive organs: Enlarged myomatous uterus is suspected.    GI Tract/Mesentery: No evidence of bowel obstruction or inflammation.  Stranding is seen within the mesentery of the left upper quadrant which could reflect local inflammation or posttraumatic injury.  No evidence of bowel obstruction.  Moderate constipation noted.    Peritoneal Space: Scattered fluid is seen within the pelvis anterior to the uterus as well as within the cul-de-sac which likely reflects hemoperitoneum.    Retroperitoneum: Small amount of fluid is seen tracking along the left  pericolic gutter likely reflecting extension of perisplenic hematoma.  Small amount of fluid is seen within the cul-de-sac.  Small amount of fluid also suspected within the lorrie hepatis region.    Abdominal wall: Normal.    Vasculature: No aneurysm.    Bones: No acute fracture.  No rib fractures are seen.  No suspicious lytic or sclerotic lesions.      Impression       Area of decreased attenuation within the lower portion of the spleen concerning for splenic laceration and surrounding perisplenic hematoma. High attenuating fluid surrounds the spleen. Active bleed is not excluded on noncontrast imaging. CTA can be obtained as clinically warranted.    Splenomegaly.    Scattered fluid within the pelvis likely reflects mild hemoperitoneum.    Stranding is seen within the mesentery of the left upper quadrant which could reflect local inflammation or reflect traumatic injury.           Pathology:    1.  Splenic lymph node (excision):   - Negative for carcinoma   - See separately submitted flow cytometry report   - Review by a hematopathologist will be performed and results issued in a   supplemental report   2.  Spleen (splenectomy):   - Benign spleen with hemorrhage and associated reactive changes  VC       Comment: Interpreted by: Jose Silva M.D., Signed on 05/22/2020 at 11:49   Supplemental Diagnosis 1. SPLENIC LYMPH NODE, EXCISION:   --HAIRY CELL LEUKEMIA (SEE COMMENT).   2. SPLEEN, SPLENECTOMY:   --HAIRY CELL LEUKEMIA (SEE COMMENT).   COMMENT:   1.  Splenic lymph node:   Histologic sections of the specimen show lymph node partially effaced by   diffuse infiltrates of atypical lymphoid cells displaying abundant cytoplasm   and irregular nuclear contour.  Lymphoid follicles are seen.   Flow cytometric analysis of the lymph node detects a kappa light chain   restricted B lymphocyte population expressing CD19, CD20, .   CD10 and   CD5 are negative. Flow differential:  Lymphocytes 70.2%, Monocytes 13.4%,  "  Granulocytes  12.9%, Blast  0.3%, Debris/nRBC 1.9%,  Viability 84.5%.   2.  Spleen:   Histologic sections of the spleen show atypical lymphoid infiltrates in the   right pulp.  The lymphoid cells display abundant cytoplasm and irregular   nuclear contour.  Red blood cell lakes are seen.  A lymph node is identified,   displaying morphologic features cellular to part 1.   Immunohistochemical stains are performed with adequate controls for greater   sensitivity and further architectural assessment.   1).  The atypical lymphoid cells in the lymph nodes are positive for CD20,   BCL2, and cyclin D1 (weak); negative for CD5, CD10, CD23, CD30, and BCL6.   Proliferation index (Ki-67) is moderate.  CD3-positive T lymphocytes are   seen.  -positive plasma cells are polytypic by immunoglobulin kappa and   lambda light chain in situ hybridization study.   2).  The atypical lymphoid cells in the spleen are positive for CD20, cyclin   D1 (weak),  ([facility]), CD25 ([facility]),   BRAF V600E ([facility]); negative for CD5, CD10, and CD23.   Proliferation index (Ki-67) is moderate.  CD3-positive T lymphocytes are seen.   Taken together, the overall findings are consistent with hairy cell leukemia   involving spleen and lymph node.  VC      Gross 1:  Surgery ID:  [id number];  Pathology ID:  [id number]   1.  Received fresh for frozen section labeled "splenic lymph node" is a 1.5 x   0.3 x 0.3 cm piece of yellow fatty tissue.  A portion is submitted in RPMI   for flow cytometry, and a portion is submitted for frozen section diagnosis.   Frozen section remnant is embedded in cassette 1790, 1 FSA.  The remainder is   entirely embedded in cassette 1790, 1B.   Grossed by: Ricardo Wilson    2: Surgery ID:  [id number];  Pathology ID:  [id number]   2.  Received in formalin labeled "spleen" is an 892 g, 17.8 by 12 x 6.5 cm   spleen.  The capsule is purple blue, smooth, and loosely adhered to spleen, "   with moderate amounts of adhered clotted blood. There is a 16 x 13.5 region   on the diaphragmatic surface without capsule. Sectioning reveals subcapsular   hemorrhage, which impacts the splenic parenchyma. The parenchyma displays a   beefy red cut surface.  Within the hilar fat is a 0.7 cm candidate lymph   node.  No distinct masses grossly identified.  Representative sections are   embedded in cassette 1790, 2A-2D.   2A-2B:  Splenic parenchyma with capsule and subcapsular hemorrhage   2C:  Hilar fat and splenic parenchyma without capsule   2D:  Lymph node, bisected            ?   Assessment/Plan:       1. Hairy cell leukemia not having achieved remission    2. Thrombocytosis    3. S/P splenectomy    4. Anemia in neoplastic disease          Plan:     # Hairy cell leukemia:  Initial bone marrow hypercellular (%) with extensive involvement by hairy cell leukemia. Presentation with splenic rupture and extensive involvement with bone marrow with borderline cytopenias (ANC 1.4, hgb 11, plt 120K) we proceeded with treatment with cladribine on 6/22/20; 7/6/20 initiated rituximab, not completed due to transfusion reaction.     Repeat bone marrow biopsy 4 months after completion of therapy show significant improvement with 1% residual hairy cell leukemia.  She continues to have mild anemia hemoglobin 10-11 with resolution of neutropenia and thrombocytopenia.  I reviewed with her her results of bone marrow biopsy which well does show significant improvement does have residual disease.  It is possible that she may continue to show improvement in subsequent months and make consider re-evaluation at that time verses treatment for residual disease.  Given her intolerance for rituximab she is very hesitant to rechallenge with this agent.  Alternative treatments may include pentostatin or clinical trial.  We will arrange for her to meet with BMT for 2nd opinion on next steps in evaluation/management.    # chronic thigh  pain/left shoulder pain:  Chronic thigh pain predated cancer diagnosis but she is most bothered by left shoulder pain.  She did have splenic rupture and splenectomy in differential diagnosis did include diaphragmatic referred pain however she is several months out from this surgery and current pain baby intra-articular.  Recommended following up with pain management in or orthopedics for further evaluation.  She has been prescribed narcotic medications by palliative care and unclear if this is most appropriate/helpful for her.  It does not appear that this is cancer associated given her diagnosis is hairy cell leukemia with resolution of splenic rupture/splenectomy.    # s/p splenectomy:  Requires vaccinations, referral made for a primary care pending.    # anemia:  Mild hemoglobin 10-11.  Prior iron indices within normal limits.  Unclear this is related to her hairy cell leukemia given significant improvement in bone marrow, see above.    # Leukopenia:  Resolved    # Oral infection:  Recommended close follow-up with dental still pending.    Follow-Up:   Patient Instructions   - PCP referral previously placed, still pending, needs to be set up asap  - flu shot today  - pain management referral  - referral to BMT, Dr. Norwood  - please change Dr. West's labs to that Friday am to avoid multiple trips   - RV with me in 2 months with cbc, cmp same day prior

## 2020-10-21 NOTE — PROGRESS NOTES
Flu Injection given as per Dr. Hernandez placed order, in system, Flu injection given in  Rt. Deltoid site,  Bandaid applied,  pt  waited 15 minutes in exam with nurse scheduling multiple apt.  Prior to leaving her site was without redness, swelling or pain.   Patient verbalized understanding to read over VIS and will notify office  with concerns.

## 2020-10-21 NOTE — Clinical Note
Swetha wanted to get your thoughts on her case,  please see note in happy to discuss prior to visit with her prn, ty

## 2020-10-21 NOTE — PATIENT INSTRUCTIONS
- PCP referral previously placed, still pending, needs to be set up asap  - flu shot today  - pain management referral  - referral to BMT, Dr. Norwood  - please change Dr. West's labs to that Friday am to avoid multiple trips   - RV with me in 2 months with cbc, cmp same day prior

## 2020-10-22 ENCOUNTER — PATIENT MESSAGE (OUTPATIENT)
Dept: HEMATOLOGY/ONCOLOGY | Facility: CLINIC | Age: 45
End: 2020-10-22

## 2020-10-29 ENCOUNTER — TELEPHONE (OUTPATIENT)
Dept: INTERNAL MEDICINE | Facility: CLINIC | Age: 45
End: 2020-10-29

## 2020-10-29 ENCOUNTER — TELEPHONE (OUTPATIENT)
Dept: HEMATOLOGY/ONCOLOGY | Facility: CLINIC | Age: 45
End: 2020-10-29

## 2020-10-29 ENCOUNTER — TELEPHONE (OUTPATIENT)
Dept: PALLIATIVE MEDICINE | Facility: CLINIC | Age: 45
End: 2020-10-29

## 2020-10-29 NOTE — TELEPHONE ENCOUNTER
Palliative Care:    Returned patients call regarding refill requests.  Noted that patient is due for a follow up visit.  Appointment made for follow up with Dr. Pino via virtual visit on Monday, November, 5, 2020.      Nohemi Pruitt RN

## 2020-10-29 NOTE — PROGRESS NOTES
Pt contacted [employer] to determine any new housing resources.    [employer] was able to e-mail additional housing resources such as: [email or web address] and [facility] contact information.     [employer] inquired on previous housing resources that were emailed to pt; however, pt responded she did not have the time to due to change in daughter's health and other circumstances. SW listened and provided emotional support. Pt expressed she would contact housing resources that was sent from [employer] to determine eligibility. [employer] informed pt of [employer] and [employer] being sent to [facility] within next two weeks for pt to stop by and pickup.     F/u by next week to determine.

## 2020-10-29 NOTE — TELEPHONE ENCOUNTER
----- Message from Patricia Franklin sent at 10/29/2020  3:54 PM CDT -----  Regarding: pt  .Type:  RX Refill Request    Who Called: pt   Refill or New Rx:refill   RX Name and Strength:Narco 10   How is the patient currently taking it? (ex. 1XDay):4xday   Is this a 30 day or 90 day Rx:30day   Preferred Pharmacy with phone number:.  [id number] - [place], [place] - [address]  [address] [address]  Phone: [phone] Fax: [phone]      Local or Mail Order:local   Ordering Provider:   Would the patient rather a call back or a response via [facility]? Call back   Best Call Back Number.[phone] (home)   Additional Information:    .Type:  RX Refill Request    Who Called: pt   Refill or New Rx:Refill   RX Name and Strength:Morphine 15mg   How is the patient currently taking it? (ex. 1XDay):2xday   Is this a 30 day or 90 day Rx:30day   Preferred Pharmacy with phone number:.  [id number]  [place]  [address] [place] [facility] [address]  Phone: [phone] Fax: [phone]      Local or Mail Order:local   Ordering Provider:   Would the patient rather a call back or a response via [facility]? Call back   Best Call Back Number:.[phone] (home)   Additional Information:         .Type:  RX Refill Request    Who Called: pt   Refill or New Rx:refill   RX Name and Strength:xanax 0.5mg   How is the patient currently taking it? (ex. 1XDay):2xday   Is this a 30 day or 90 day Rx:30day   Preferred Pharmacy with phone number:..  [id number]  [place] - [address] [place] [address] [facility] [address]  Phone: [phone] Fax: [phone]    Local or Mail Order:local   Ordering Provider:,am    Would the patient rather a call back or a response via [facility]? Call back   Best Call Back Number:.[phone] (home)   Additional Information:       .Type:  RX Refill Request    Who Called: pt   Refill or New Rx:Refill   RX Name and Strength:Gabepentin 300mg   How is the patient currently taking it? (ex. 1XDay):1xday   Is this a 30 day or 90 day Rx:30day   Preferred Pharmacy with phone number:.  [id number] - [place], [place] - [address] [facility] [address]  [address] [address]  Phone: [phone] Fax: [phone]      Local or Mail Order:local   Ordering Provider:   Would the patient rather a call back or a response via [facility]? Call back   Best Call Back Number:.[phone] (home)   Additional Information:

## 2020-11-02 ENCOUNTER — OFFICE VISIT (OUTPATIENT)
Dept: PALLIATIVE MEDICINE | Facility: CLINIC | Age: 45
End: 2020-11-02
Payer: MEDICAID

## 2020-11-02 DIAGNOSIS — G89.3 ACUTE NEOPLASM-RELATED PAIN: ICD-10-CM

## 2020-11-02 PROCEDURE — 99215 PR OFFICE/OUTPT VISIT, EST, LEVL V, 40-54 MIN: ICD-10-PCS | Mod: 95,,, | Performed by: FAMILY MEDICINE

## 2020-11-02 PROCEDURE — 99215 OFFICE O/P EST HI 40 MIN: CPT | Mod: 95,,, | Performed by: FAMILY MEDICINE

## 2020-11-02 NOTE — PROGRESS NOTES
Consult Note  Palliative Medicine     Reason for Consult: palliative follow-up    SUBJECTIVE:     History of Present Illness:  Jaswant Yang is a 45 y.o. 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 Since that time she has been doing well with treatment is awaiting consult with Dr. Gould re: BMT. She reports that she has had generalized pain consistent with pain prior to her diagnosis and localized to her shoulder and legs. Upon review of recent visit with Dr. Hernandez in medical oncology, it was noted that pain seems unlikely related to her leukemia diagnosis. Referral placed by Dr. Hernandez with interventional pain management; appointment with Dr. Bautista is scheduled 20.     Today she reports uncontrolled pain, mostly generalized. She requests switch from hydrocodone to oxycodone. She is out of her medication and has not noted any withdrawal symptoms.     Past Medical History:   Diagnosis Date    Anemia     Anemia     Back pain     Dental infection     GERD (gastroesophageal reflux disease) 2020    Hairy-cell leukemia of spleen      Past Surgical History:   Procedure Laterality Date     SECTION, CLASSIC      FLUOROSCOPY N/A 2020    Procedure: Spleenic Bleed;  Surgeon: Hiren Cabrera MD;  Location: [facility] CATH LAB;  Service: General;  Laterality: N/A;    INJECTION OF ANESTHETIC AGENT INTO TISSUE PLANE DEFINED BY TRANSVERSUS ABDOMINIS MUSCLE N/A 2020    Procedure: BLOCK, TRANSVERSUS ABDOMINIS PLANE;  Surgeon: Yumi Ferguson MD;  Location: [facility] OR;  Service: General;  Laterality: N/A;    SPLENECTOMY N/A 2020    Procedure: SPLENECTOMY;  Surgeon: Yumi Ferguson MD;  Location: [facility] OR;  Service: General;  Laterality: N/A;    THORACENTESIS Left 2020    Procedure: THORACENTESIS;  Surgeon: Yumi Ferguson MD;  Location: [facility] OR;  Service: General;  Laterality:  Left;    TUBAL LIGATION       No family history on file.    Social History     Socioeconomic History    Marital status: Single     Spouse name: Not on file    Number of children: Not on file    Years of education: Not on file    Highest education level: Not on file   Occupational History    Not on file   Social Needs    Financial resource strain: Not on file    Food insecurity     Worry: Not on file     Inability: Not on file    Transportation needs     Medical: Not on file     Non-medical: Not on file   Tobacco Use    Smoking status: Former Smoker     Packs/day: 0.25     Types: Cigarettes    Smokeless tobacco: Never Used    Tobacco comment: no longer smoker   Substance and Sexual Activity    Alcohol use: Yes     Comment: occasional    Drug use: No    Sexual activity: Yes     Birth control/protection: Condom   Lifestyle    Physical activity     Days per week: Not on file     Minutes per session: Not on file    Stress: Not on file   Relationships    Social connections     Talks on phone: Not on file     Gets together: Not on file     Attends Voodoo service: Not on file     Active member of club or organization: Not on file     Attends meetings of clubs or organizations: Not on file     Relationship status: Not on file   Other Topics Concern    Not on file   Social History Narrative    Lives in a single family home with her fiance and her daughter.     Her mother and her son are her SDM's. ACP Documents are available in her chart.       Review of patient's allergies indicates:   Allergen Reactions    Fentanyl Rash     Severe full body diffuse rash requiring systemic steroids    Vancomycin analogues Rash     Severe full body diffuse rash requiring systemic steroids    Rituximab Other (See Comments)     Sweating, abdominal pain, elevated blood pressure    Tramadol Hives       Medications:    Current Outpatient Medications:     albuterol (PROVENTIL/VENTOLIN HFA) 90 mcg/actuation inhaler, Inhale  1-2 puffs into the lungs every 6 (six) hours as needed for Wheezing. Rescue, Disp: 1 Inhaler, Rfl: 0    ALPRAZolam (XANAX) 0.5 MG tablet, Take 0.5-1 tablets (0.25-0.5 mg total) by mouth 2 (two) times daily as needed for Anxiety (anxiety)., Disp: 60 tablet, Rfl: 3    gabapentin (NEURONTIN) 300 MG capsule, Take 1 capsule (300 mg total) by mouth every evening., Disp: 30 capsule, Rfl: 11    HYDROcodone-acetaminophen (NORCO)  mg per tablet, Take 1 tablet by mouth every 6 (six) hours as needed for Pain. Take no more than 4 per day, Disp: 90 tablet, Rfl: 0    hydrocortisone 1 % cream, Apply topically 2 (two) times daily as needed (itching)., Disp: 56 g, Rfl: 0    hydrOXYzine HCL (ATARAX) 25 MG tablet, Take 1 tablet (25 mg total) by mouth 3 (three) times daily as needed for Itching., Disp: 30 tablet, Rfl: 0    morphine (MS CONTIN) 15 MG 12 hr tablet, Take 1 tablet (15 mg total) by mouth every 12 (twelve) hours., Disp: 60 tablet, Rfl: 0    ondansetron (ZOFRAN-ODT) 8 MG TbDL, Take 1 tablet (8 mg total) by mouth every 12 (twelve) hours as needed., Disp: 30 tablet, Rfl: 1    polyethylene glycol (GLYCOLAX) 17 gram/dose powder, Take 17 g by mouth once daily., Disp: 510 g, Rfl: 3    predniSONE (DELTASONE) 20 MG tablet, 2 PO daily x 3 days then 1 po daily x 3 days then 1/2 po daily x 3 days then stop, Disp: 11 tablet, Rfl: 0    prochlorperazine (COMPAZINE) 10 MG tablet, Take 1 tablet (10 mg total) by mouth every 6 (six) hours as needed., Disp: 30 tablet, Rfl: 1    promethazine (PHENERGAN) 25 MG tablet, Take 1 tablet (25 mg total) by mouth every 6 (six) hours as needed for Nausea., Disp: 15 tablet, Rfl: 0    senna (SENOKOT) 8.6 mg tablet, Take 1 tablet by mouth 2 (two) times a day., Disp:  , Rfl:     triamcinolone acetonide 0.1% (KENALOG) 0.1 % Lotn, Apply topically 2 (two) times daily. for 7 days, Disp: 120 mL, Rfl: 1    ROS:  Review of Systems  A comprehensive 14-point review of systems was reviewed with patient  and was negative other than as specified above.       OBJECTIVE:     Physical Exam:  Vitals:      Gen: well-developed, well-nourished, NAD  Head: atraumatic, normocephalic  Eyes: conjuctiva and sclera clear  Ears: hearing grossly intact with no external abnormality  Mouth: no mucositis, good dentition  Respiratory: breathing is even, unlabored, no respiratory distress or retractions  Abdomen: nondistended,no gross abnormality   Pulses: no pallor or mottling  Extremities: no edema, full ROM of all joints, no mottling  Neurologic: no focal deficits, CN II-XII grossly intact, normal coordination  Skin: no rashes or lesions  Psych: cooperative, normal mood and affect, normal attention span and concentration    Review of Symptoms    Symptom Assessment (ESAS 0-10 Scale)  Pain:  5  Dyspnea:  0  Anxiety:  1  Nausea:  0  Depression:  0  Anorexia:  0  Fatigue:  4  Insomnia:  0  Restlessness:  0  Agitation:  0         Pain Assessment:  Location(s): generalized      ECOG Performance Status Grade:  1 - Ambulates, capable of light work    Living Arrangements:  Lives with family    Advance Directives:   Living Will: No    LaPOST: No    Do Not Resuscitate Status: No    Medical Power of : Yes        Labs:  WBC   Date Value Ref Range Status   10/07/2020 5.10 3.90 - 12.70 K/uL Final       Hemoglobin   Date Value Ref Range Status   10/07/2020 10.9 (L) 12.0 - 16.0 g/dL Final       POC Hematocrit   Date Value Ref Range Status   05/11/2020 25 (L) 36 - 54 %PCV Final     Hematocrit   Date Value Ref Range Status   10/07/2020 34.1 (L) 37.0 - 48.5 % Final       MCV   Date Value Ref Range Status   10/07/2020 91 82 - 98 fL Final       Platelets   Date Value Ref Range Status   10/07/2020 435 (H) 150 - 350 K/uL Final       BMP  Lab Results   Component Value Date     10/07/2020    K 3.9 10/07/2020     10/07/2020    CO2 22 (L) 10/07/2020    BUN 13 10/07/2020    CREATININE 1.0 10/07/2020    CALCIUM 8.5 (L) 10/07/2020    ANIONGAP 7  (L) 10/07/2020    ESTGFRAFRICA >60 10/07/2020    EGFRNONAA >60 10/07/2020       Lab Results   Component Value Date    AST 14 10/07/2020    ALKPHOS 92 10/07/2020    BILITOT 0.7 10/07/2020       Albumin   Date Value Ref Range Status   10/07/2020 3.9 3.5 - 5.2 g/dL Final       Radiology:I have reviewed all pertinent imaging results/findings within the past 24 hours.  None    Narrative & Impression     EXAMINATION:  XR CHEST 1 VIEW     CLINICAL HISTORY:  sob;     COMPARISON:  Chest x-ray, 05/14/2020     FINDINGS:  The lungs are clear. The heart size is mildly enlarged.  No pleural effusion or pneumothorax.     Impression:     No acute cardiopulmonary disease.       ASSESSMENT   44 yo female with hairy cell leukemia originally seen in consultation during hospitalization for pain and starting on MS Contin and hydrocodone PRN.     At this time, pain thought to be unlikely due to cancer/spleen, and thought most related to chronic musculoskeletal pain that predates her leukemia diagnosis.       PLAN     Neoplasm related pain: Shoulder pain and generalized pain that predate cancer diagnosis thought most likely unrelated to cancer diagnosis. Medical oncology has placed a referral to interventional pain management. Patient has run out of pain medication and advised today that we would hold off on refilling it today. She seems to be doing well without any narcotic medication and has appt upcoming to eval other possible interventions for pain relief.     Discussed case and visit details with palliative medicine staff.    Thank you for allowing Palliative Medicine to be involved in the care of Jaswant Yang.         Medical decision making: HIGH based on high risk of death, untreated symptoms, high risk medications, poor prognosis, deescalating treatments, managing side effects of medications or polypharmacy.      Plan required increased review of medication choice, interaction, dosing, frequency, and route due to patient  complexity. Patient complexity increased by potential of medication abuse and patient's comorbid hairy cell leukemia.        Savi Pino MD  Palliative Medicine  The patient location is: [place]  The chief complaint leading to consultation is: pain    Visit type: audiovisual    Face to Face time with patient: 45  45 minutes of total time spent on the encounter, which includes face to face time and non-face to face time preparing to see the patient (eg, review of tests), Obtaining and/or reviewing separately obtained history, Documenting clinical information in the electronic or other health record, Independently interpreting results (not separately reported) and communicating results to the patient/family/caregiver, or Care coordination (not separately reported).         Each patient to whom he or she provides medical services by telemedicine is:  (1) informed of the relationship between the physician and patient and the respective role of any other health care provider with respect to management of the patient; and (2) notified that he or she may decline to receive medical services by telemedicine and may withdraw from such care at any time.

## 2020-11-03 ENCOUNTER — PATIENT MESSAGE (OUTPATIENT)
Dept: HEMATOLOGY/ONCOLOGY | Facility: CLINIC | Age: 45
End: 2020-11-03

## 2020-11-03 ENCOUNTER — TELEPHONE (OUTPATIENT)
Dept: HEMATOLOGY/ONCOLOGY | Facility: CLINIC | Age: 45
End: 2020-11-03

## 2020-11-03 NOTE — ASSESSMENT & PLAN NOTE
Shoulder pain and generalized pain that predate cancer diagnosis thought most likely unrelated to cancer diagnosis. Medical oncology has placed a referral to interventional pain management. Patient has run out of pain medication and advised today that we would hold off on refilling it today. She seems to be doing well without any narcotic medication and has appt upcoming to eval other possible interventions for pain relief.

## 2020-11-03 NOTE — TELEPHONE ENCOUNTER
SW returned pt's VM but was only able to leave a VM. SW sent pt a message in portal with [employer] contact info for additional housing support.    F/u by [email or web address]

## 2020-11-05 ENCOUNTER — OFFICE VISIT (OUTPATIENT)
Dept: NEPHROLOGY | Facility: CLINIC | Age: 45
End: 2020-11-05
Payer: MEDICAID

## 2020-11-05 VITALS
WEIGHT: 289.44 LBS | HEART RATE: 74 BPM | BODY MASS INDEX: 40.52 KG/M2 | DIASTOLIC BLOOD PRESSURE: 80 MMHG | HEIGHT: 71 IN | SYSTOLIC BLOOD PRESSURE: 128 MMHG

## 2020-11-05 DIAGNOSIS — N17.9 AKI (ACUTE KIDNEY INJURY): Primary | ICD-10-CM

## 2020-11-05 PROCEDURE — 99213 PR OFFICE/OUTPT VISIT, EST, LEVL III, 20-29 MIN: ICD-10-PCS | Mod: S$PBB,,, | Performed by: INTERNAL MEDICINE

## 2020-11-05 PROCEDURE — 99213 OFFICE O/P EST LOW 20 MIN: CPT | Mod: PBBFAC | Performed by: INTERNAL MEDICINE

## 2020-11-05 PROCEDURE — 99999 PR PBB SHADOW E&M-EST. PATIENT-LVL III: CPT | Mod: PBBFAC,,, | Performed by: INTERNAL MEDICINE

## 2020-11-05 PROCEDURE — 99213 OFFICE O/P EST LOW 20 MIN: CPT | Mod: S$PBB,,, | Performed by: INTERNAL MEDICINE

## 2020-11-05 PROCEDURE — 99999 PR PBB SHADOW E&M-EST. PATIENT-LVL III: ICD-10-PCS | Mod: PBBFAC,,, | Performed by: INTERNAL MEDICINE

## 2020-11-05 NOTE — PROGRESS NOTES
Subjective:       Patient ID: Jaswant Yang is a 45 y.o. Black or  female who presents for follow-up evaluation of Acute Renal Failure    HPI 44 year old female with anemia, GERD, hairy-cell leukemia presents to [facility] with low grade fever, leukopenia and dental pain. Patient was started on antibiotics including vancomycin and received 4.2 g of Vancomycin in a 15 h period----baseline creatinine is about 0.9. seen inpatient in 07/2020 for ADAMARIS needed one HD and then vasc cath taken out. At that time patient reports generalized weakness and vomiting x 1 today. No other symptoms at present. She reports NSAID use in past (last use of ibuprofen about 3 weeks ago).     08/5/20 ADAMARIS is better creatinine down to 1.2 rash is better ; records of  reviewed     November 2020 creatinine down to 1.0.  Palliative care records and Hematology-Oncology records reviewed  Review of Systems   Constitutional: Negative for activity change, appetite change, chills, diaphoresis, fatigue, fever and unexpected weight change.   HENT: Negative for congestion, dental problem, drooling, postnasal drip, rhinorrhea and voice change.    Eyes: Negative for discharge.   Respiratory: Negative for apnea, cough, choking, chest tightness, shortness of breath, wheezing and stridor.    Cardiovascular: Negative for chest pain, palpitations and leg swelling.   Gastrointestinal: Negative for abdominal distention, blood in stool, constipation, diarrhea, nausea, rectal pain and vomiting.   Endocrine: Negative for cold intolerance, heat intolerance, polydipsia and polyuria.   Genitourinary: Negative for decreased urine volume, difficulty urinating, dysuria, enuresis, flank pain, frequency, hematuria and urgency.   Musculoskeletal: Positive for arthralgias, back pain, gait problem and myalgias. Negative for joint swelling.   Skin: Positive for color change and rash.   Allergic/Immunologic: Negative for food allergies and immunocompromised  "state.   Neurological: Negative for dizziness, tremors, syncope, numbness and headaches.   Hematological: Does not bruise/bleed easily.   Psychiatric/Behavioral: Negative for agitation, behavioral problems and self-injury. The patient is not nervous/anxious and is not hyperactive.    All other systems reviewed and are negative.      Objective:   /80   Pulse 74   Ht 5' 11" (1.803 m)   Wt 131.3 kg (289 lb 7.4 oz)   LMP 10/07/2020 (Within Days)   BMI 40.37 kg/m²      Physical Exam  Vitals signs and nursing note reviewed.   Constitutional:       General: She is not in acute distress.     Appearance: She is well-developed. She is not diaphoretic.   HENT:      Head: Normocephalic and atraumatic.      Nose: Nose normal.   Eyes:      Conjunctiva/sclera: Conjunctivae normal.      Pupils: Pupils are equal, round, and reactive to light.   Neck:      Musculoskeletal: Full passive range of motion without pain, normal range of motion and neck supple. No edema.      Thyroid: No thyroid mass or thyromegaly.      Vascular: No carotid bruit or JVD.      Trachea: No tracheal deviation.   Cardiovascular:      Rate and Rhythm: Normal rate and regular rhythm.      Chest Wall: PMI is not displaced.      Pulses: Normal pulses.      Heart sounds: Normal heart sounds, S1 normal and S2 normal. No murmur. No friction rub. No gallop.    Pulmonary:      Effort: Pulmonary effort is normal. No accessory muscle usage or respiratory distress.      Breath sounds: Normal breath sounds. No wheezing or rales.   Chest:      Chest wall: No tenderness.   Abdominal:      General: Bowel sounds are normal. There is no distension.      Palpations: Abdomen is soft. There is no mass.      Tenderness: There is no abdominal tenderness. There is no rebound.      Hernia: No hernia is present.   Musculoskeletal: Normal range of motion.         General: No tenderness or deformity.   Skin:     General: Skin is warm and dry.      Capillary Refill: Capillary " refill takes less than 2 seconds.      Coloration: Skin is pale.      Findings: Rash present. No bruising, ecchymosis or erythema.      Nails: There is no clubbing.        Comments: Healing maculopap rash    Neurological:      Mental Status: She is alert and oriented to person, place, and time.      Cranial Nerves: No cranial nerve deficit.      Sensory: No sensory deficit.      Motor: No abnormal muscle tone.      Coordination: Coordination normal.      Deep Tendon Reflexes: Reflexes are normal and symmetric. Reflexes normal.   Psychiatric:         Speech: Speech normal.         Behavior: Behavior normal.         Thought Content: Thought content normal.         Judgment: Judgment normal.           Lab Results   Component Value Date    CREATININE 1.0 10/07/2020    BUN 13 10/07/2020     10/07/2020    K 3.9 10/07/2020     10/07/2020    CO2 22 (L) 10/07/2020     Lab Results   Component Value Date    WBC 5.10 10/07/2020    HGB 10.9 (L) 10/07/2020    HCT 34.1 (L) 10/07/2020    MCV 91 10/07/2020     (H) 10/07/2020     Lab Results   Component Value Date    CALCIUM 8.5 (L) 10/07/2020     @RESUFAST(URICACID)    Assessment:    )    1. HELGA (acute kidney injury)        Plan:         Helga is much better --no nephrology follow-up is necessary at this time. BP is controlled no edema

## 2020-11-09 PROBLEM — Z09 FOLLOW UP: Status: RESOLVED | Noted: 2020-07-07 | Resolved: 2020-11-09

## 2020-11-12 ENCOUNTER — PATIENT MESSAGE (OUTPATIENT)
Dept: HEMATOLOGY/ONCOLOGY | Facility: CLINIC | Age: 45
End: 2020-11-12

## 2020-11-16 ENCOUNTER — PATIENT MESSAGE (OUTPATIENT)
Dept: HEMATOLOGY/ONCOLOGY | Facility: CLINIC | Age: 45
End: 2020-11-16

## 2020-11-19 ENCOUNTER — OFFICE VISIT (OUTPATIENT)
Dept: PAIN MEDICINE | Facility: CLINIC | Age: 45
End: 2020-11-19
Payer: MEDICAID

## 2020-11-19 VITALS
HEIGHT: 71 IN | WEIGHT: 293 LBS | BODY MASS INDEX: 41.02 KG/M2 | HEART RATE: 88 BPM | SYSTOLIC BLOOD PRESSURE: 140 MMHG | DIASTOLIC BLOOD PRESSURE: 87 MMHG

## 2020-11-19 DIAGNOSIS — M51.36 DDD (DEGENERATIVE DISC DISEASE), LUMBAR: ICD-10-CM

## 2020-11-19 DIAGNOSIS — M75.40 SHOULDER IMPINGEMENT SYNDROME, UNSPECIFIED LATERALITY: ICD-10-CM

## 2020-11-19 DIAGNOSIS — C91.40 HAIRY CELL LEUKEMIA NOT HAVING ACHIEVED REMISSION: ICD-10-CM

## 2020-11-19 DIAGNOSIS — G89.29 CHRONIC MYOFASCIAL PAIN: ICD-10-CM

## 2020-11-19 DIAGNOSIS — M54.9 DORSALGIA, UNSPECIFIED: ICD-10-CM

## 2020-11-19 DIAGNOSIS — M79.12 MYALGIA OF AUXILIARY MUSCLES, HEAD AND NECK: Primary | ICD-10-CM

## 2020-11-19 DIAGNOSIS — M79.18 CHRONIC MYOFASCIAL PAIN: ICD-10-CM

## 2020-11-19 PROCEDURE — 99204 OFFICE O/P NEW MOD 45 MIN: CPT | Mod: S$PBB,,, | Performed by: PHYSICAL MEDICINE & REHABILITATION

## 2020-11-19 PROCEDURE — 99999 PR PBB SHADOW E&M-EST. PATIENT-LVL IV: CPT | Mod: PBBFAC,,, | Performed by: PHYSICAL MEDICINE & REHABILITATION

## 2020-11-19 PROCEDURE — 99214 OFFICE O/P EST MOD 30 MIN: CPT | Mod: PBBFAC | Performed by: PHYSICAL MEDICINE & REHABILITATION

## 2020-11-19 PROCEDURE — 99999 PR PBB SHADOW E&M-EST. PATIENT-LVL IV: ICD-10-PCS | Mod: PBBFAC,,, | Performed by: PHYSICAL MEDICINE & REHABILITATION

## 2020-11-19 PROCEDURE — 99204 PR OFFICE/OUTPT VISIT, NEW, LEVL IV, 45-59 MIN: ICD-10-PCS | Mod: S$PBB,,, | Performed by: PHYSICAL MEDICINE & REHABILITATION

## 2020-11-19 RX ORDER — TIZANIDINE 4 MG/1
TABLET ORAL
Qty: 60 TABLET | Refills: 2 | Status: SHIPPED | OUTPATIENT
Start: 2020-11-19 | End: 2020-11-30 | Stop reason: SDUPTHER

## 2020-11-19 RX ORDER — GABAPENTIN 300 MG/1
600 CAPSULE ORAL NIGHTLY
Qty: 60 CAPSULE | Refills: 11 | Status: SHIPPED | OUTPATIENT
Start: 2020-11-19 | End: 2022-05-13

## 2020-11-19 NOTE — PROGRESS NOTES
New Patient Chronic Pain Note (Initial Visit)    Referring Physician: Guerda Hernandez MD    PCP: Primary Doctor No    Chief Complaint:   Chief Complaint   Patient presents with    Leukemia        SUBJECTIVE:    Jaswant Yang is a 45 y.o. female who presents to the clinic for the evaluation of bilateral shoulder and lower back pain. The pain started 6 months ago per patient following her diagnosis of leukemia and symptoms have been worsening.  However, there was complaints of shoulder and lower back pain prior to her diagnosis.  The pain is located in the bilateral shoulder area and radiates to the bilateral upper extremities.  She also locates pain to the lumbosacral area with referred pain down the bilateral lower extremities without any dermatomal pattern.  The pain is described as Stabbing, aching, numbness, tingling and is rated as 7/10. The pain is rated with a score of  5/10 on the BEST day and a score of 10/10 on the WORST day.  Symptoms interfere with daily activity. The pain is exacerbated by walking, sit to stand.  The pain is mitigated by nothing. The patient reports spending 2-4 hours per day reclining. The patient reports 5-7 hours of uninterrupted sleep per night.    Of note, patient reports that she completed chemo therapy in June 2020.    Patient denies night fever/night sweats, urinary incontinence, bowel incontinence, significant weight loss, significant motor weakness and loss of sensations.    Pain Disability Index Review:  Last 3 PDI Scores 11/19/2020   Pain Disability Index (PDI) 37       Non-Pharmacologic Treatments:  Physical Therapy/Home Exercise: no  Ice/Heat:yes  TENS: yes  Acupuncture: no  Massage: yes  Chiropractic: yes    Other: no      Pain Medications:  - Opioids: Norco, morphine extended release, fentanyl patches, tramadol  - Adjuvant Medications:  Gabapentin, Xanax  - Anti-Coagulants:  None     report:  Reviewed and consistent with medication use as  prescribed.        Pain Procedures:   Denies      Imaging:   No pertinent imaging available to review    Past Medical History:   Diagnosis Date    Anemia     Anemia     Back pain     Dental infection     GERD (gastroesophageal reflux disease) 2020    Hairy-cell leukemia of spleen      Past Surgical History:   Procedure Laterality Date     SECTION, CLASSIC      FLUOROSCOPY N/A 2020    Procedure: Spleenic Bleed;  Surgeon: Hiren Cabrera MD;  Location: [facility] CATH LAB;  Service: General;  Laterality: N/A;    INJECTION OF ANESTHETIC AGENT INTO TISSUE PLANE DEFINED BY TRANSVERSUS ABDOMINIS MUSCLE N/A 2020    Procedure: BLOCK, TRANSVERSUS ABDOMINIS PLANE;  Surgeon: Yumi Ferguson MD;  Location: [facility] OR;  Service: General;  Laterality: N/A;    SPLENECTOMY N/A 2020    Procedure: SPLENECTOMY;  Surgeon: Yumi Ferguson MD;  Location: [facility] OR;  Service: General;  Laterality: N/A;    THORACENTESIS Left 2020    Procedure: THORACENTESIS;  Surgeon: Yumi Ferguson MD;  Location: [facility] OR;  Service: General;  Laterality: Left;    TUBAL LIGATION       Social History     Socioeconomic History    Marital status: Single     Spouse name: Not on file    Number of children: Not on file    Years of education: Not on file    Highest education level: Not on file   Occupational History    Not on file   Social Needs    Financial resource strain: Not on file    Food insecurity     Worry: Not on file     Inability: Not on file    Transportation needs     Medical: Not on file     Non-medical: Not on file   Tobacco Use    Smoking status: Former Smoker     Packs/day: 0.25     Types: Cigarettes    Smokeless tobacco: Never Used    Tobacco comment: no longer smoker   Substance and Sexual Activity    Alcohol use: Yes     Comment: occasional    Drug use: No    Sexual activity: Yes     Birth control/protection: Condom   Lifestyle    Physical activity     Days per week: Not on file      Minutes per session: Not on file    Stress: Not on file   Relationships    Social connections     Talks on phone: Not on file     Gets together: Not on file     Attends Taoist service: Not on file     Active member of club or organization: Not on file     Attends meetings of clubs or organizations: Not on file     Relationship status: Not on file   Other Topics Concern    Not on file   Social History Narrative    Lives in a single family home with her fiance and her daughter.     Her mother and her son are her SDM's. ACP Documents are available in her chart.      No family history on file.    Review of patient's allergies indicates:   Allergen Reactions    Fentanyl Rash     Severe full body diffuse rash requiring systemic steroids    Vancomycin analogues Rash     Severe full body diffuse rash requiring systemic steroids    Rituximab Other (See Comments)     Sweating, abdominal pain, elevated blood pressure    Tramadol Hives       Current Outpatient Medications   Medication Sig    albuterol (PROVENTIL/VENTOLIN HFA) 90 mcg/actuation inhaler Inhale 1-2 puffs into the lungs every 6 (six) hours as needed for Wheezing. Rescue    ALPRAZolam (XANAX) 0.5 MG tablet Take 0.5-1 tablets (0.25-0.5 mg total) by mouth 2 (two) times daily as needed for Anxiety (anxiety).    gabapentin (NEURONTIN) 300 MG capsule Take 2 capsules (600 mg total) by mouth every evening.    HYDROcodone-acetaminophen (NORCO)  mg per tablet Take 1 tablet by mouth every 6 (six) hours as needed for Pain. Take no more than 4 per day    polyethylene glycol (GLYCOLAX) 17 gram/dose powder Take 17 g by mouth once daily.    promethazine (PHENERGAN) 25 MG tablet Take 1 tablet (25 mg total) by mouth every 6 (six) hours as needed for Nausea.    senna (SENOKOT) 8.6 mg tablet Take 1 tablet by mouth 2 (two) times a day.    hydrOXYzine HCL (ATARAX) 25 MG tablet Take 1 tablet (25 mg total) by mouth 3 (three) times daily as needed for Itching.  "(Patient not taking: Reported on 11/19/2020)    prochlorperazine (COMPAZINE) 10 MG tablet Take 1 tablet (10 mg total) by mouth every 6 (six) hours as needed. (Patient not taking: Reported on 11/19/2020)    tiZANidine (ZANAFLEX) 4 MG tablet Take 1/2 to 1 tab BID PRN muscle spasms. May cause drowsiness.     No current facility-administered medications for this visit.        Review of Systems     GENERAL:  No weight loss, malaise or fevers.  HEENT:   No recent changes in vision or hearing  NECK:  Negative for lumps, no difficulty with swallowing.  RESPIRATORY:  Negative for cough, wheezing or shortness of breath, patient denies any recent URI.  CARDIOVASCULAR:  Negative for chest pain, leg swelling or palpitations.  GI:  Negative for abdominal discomfort, blood in stools or black stools or change in bowel habits.  MUSCULOSKELETAL:  See HPI.  SKIN:  Negative for lesions, rash, and itching.  PSYCH:  No mood disorder or recent psychosocial stressors.  Patients sleep is not disturbed secondary to pain.  HEMATOLOGY/LYMPHOLOGY:  Negative for prolonged bleeding, bruising easily or swollen nodes.  Patient is not currently taking any anti-coagulants  NEURO:   No history of headaches, syncope, paralysis, seizures or tremors.  All other reviewed and negative other than HPI.    OBJECTIVE:    BP (!) 140/87   Pulse 88   Ht 5' 11" (1.803 m)   Wt 133.6 kg (294 lb 8.6 oz)   BMI 41.08 kg/m²         Physical Exam    GENERAL: Well appearing, in no acute distress, alert and oriented x3.  Morbidly obese  PSYCH:  Mood and affect appropriate.  SKIN: Skin color, texture, turgor normal, no rashes or lesions.  HEAD/FACE:  Normocephalic, atraumatic. Cranial nerves grossly intact.  NECK:  Moderate pain to palpation over the cervical paraspinous muscles and upper trapezius. Spurling Negative.  Mild-to-moderate pain with neck flexion, extension, and lateral flexion.   CV: RRR with palpation of the radial artery.  PULM: No evidence of " respiratory difficulty, symmetric chest rise.  GI:  Soft and non-tender.  BACK: Straight leg raising in the sitting and supine positions is equivocal to radicular pain bilaterally.  Moderate pain to palpation over the facet joints of the lumbar spine and lumbar paraspinals limited range of motion secondary to pain reproduction.  EXTREMITIES: Peripheral joint ROM is full and pain free without obvious instability or laxity in all four extremities. No deformities, edema, or skin discoloration. Good capillary refill.  MUSCULOSKELETAL:  Positive impingement signs of the bilateral shoulders with Peres, Neer's, and empty can.  There is moderate pain with palpation over the sacroiliac joints bilaterally.  FABERs test is equivocal.  FADIRs test is equivocal.   Bilateral upper and lower extremity strength is normal and symmetric.  No atrophy or tone abnormalities are noted.  NEURO: Bilateral upper and lower extremity coordination and muscle stretch reflexes are physiologic and symmetric.  Plantar response are downgoing. No clonus.  No loss of sensation is noted.  GAIT:  Slow, antalgic.      LABS:  Lab Results   Component Value Date    WBC 5.10 10/07/2020    HGB 10.9 (L) 10/07/2020    HCT 34.1 (L) 10/07/2020    MCV 91 10/07/2020     (H) 10/07/2020       CMP  Sodium   Date Value Ref Range Status   10/07/2020 137 136 - 145 mmol/L Final     Potassium   Date Value Ref Range Status   10/07/2020 3.9 3.5 - 5.1 mmol/L Final     Chloride   Date Value Ref Range Status   10/07/2020 108 95 - 110 mmol/L Final     CO2   Date Value Ref Range Status   10/07/2020 22 (L) 23 - 29 mmol/L Final     Glucose   Date Value Ref Range Status   10/07/2020 109 70 - 110 mg/dL Final     BUN   Date Value Ref Range Status   10/07/2020 13 6 - 20 mg/dL Final     Creatinine   Date Value Ref Range Status   10/07/2020 1.0 0.5 - 1.4 mg/dL Final     Calcium   Date Value Ref Range Status   10/07/2020 8.5 (L) 8.7 - 10.5 mg/dL Final     Total Protein   Date  Value Ref Range Status   10/07/2020 7.1 6.0 - 8.4 g/dL Final     Albumin   Date Value Ref Range Status   10/07/2020 3.9 3.5 - 5.2 g/dL Final     Total Bilirubin   Date Value Ref Range Status   10/07/2020 0.7 0.1 - 1.0 mg/dL Final     Comment:     For infants and newborns, interpretation of results should be based  on gestational age, weight and in agreement with clinical  observations.  Premature Infant recommended reference ranges:  Up to 24 hours.............<8.0 mg/dL  Up to 48 hours............<12.0 mg/dL  3-5 days..................<15.0 mg/dL  6-29 days.................<15.0 mg/dL       Alkaline Phosphatase   Date Value Ref Range Status   10/07/2020 92 55 - 135 U/L Final     AST   Date Value Ref Range Status   10/07/2020 14 10 - 40 U/L Final     ALT   Date Value Ref Range Status   10/07/2020 10 10 - 44 U/L Final     Anion Gap   Date Value Ref Range Status   10/07/2020 7 (L) 8 - 16 mmol/L Final     eGFR if    Date Value Ref Range Status   10/07/2020 >60 >60 mL/min/1.73 m^2 Final     eGFR if non    Date Value Ref Range Status   10/07/2020 >60 >60 mL/min/1.73 m^2 Final     Comment:     Calculation used to obtain the estimated glomerular filtration  rate (eGFR) is the CKD-EPI equation.          No results found for: LABA1C, HGBA1C          ASSESSMENT: 45 y.o. year old female with bilateral shoulder and lower back pain, consistent with     1. Myalgia of auxiliary muscles, head and neck     2. Shoulder impingement syndrome, unspecified laterality     3. Dorsalgia, unspecified  CANCELED: MRI Lumbar Spine Without Contrast   4. DDD (degenerative disc disease), lumbar  MRI Lumbar Spine Without Contrast    MRI Lumbar Spine Without Contrast   5. Chronic myofascial pain     6. Hairy cell leukemia not having achieved remission  Ambulatory referral/consult to Pain Clinic    gabapentin (NEURONTIN) 300 MG capsule         PLAN:   - Interventions: Consider subacromial bursa injections in the  future, deferred today due to active tooth infection    - Anticoagulation use: no     - Medications: I have stressed the importance of physical activity and a home exercise plan to help with pain and improve health. and Patient can continue with medications for now since they are providing benefits, using them appropriately, and without side effects.  Increase gabapentin to 600 mg twice daily, also provide tizanidine 2-4 mg up to 2 times daily as needed for pain and sleep disturbance as well.    - Therapy:  Advised patient continue with activities and exercises as tolerated    - Psychological:  Discussed coping mechanisms to help address chronic pain issues    - Labs:  Reviewed    - Imaging:  No pertinent imaging available.Order MRI of the Lumbar Spine to help evaluate possible source of pain.    - Consults/Referrals:  None at this time    - Records:  Reviewed/Obtain old records from outside physicians and imaging    - Follow up visit: return to clinic after MRI to review results    - Counseled patient regarding the importance of activity modification and physical therapy    - This condition does not require this patient to take time off of work, and the primary goal of our Pain Management services is to improve the patient's functional capacity.    - Patient Questions: Answered all of the patient's questions regarding diagnosis, therapy, and treatment        The above plan and management options were discussed at length with patient. Patient is in agreement with the above and verbalized understanding.    I discussed the goals of interventional chronic pain management with the patient on today's visit.  I explained the utility of injections for diagnostic and therapeutic purposes.  We discussed a multimodal approach to pain including treating the patient's given worst pain at any given time.  We will use a systematic approach to addressing pain.  We will also adopt a multimodal approach that includes injections,  adjuvant medications, physical therapy, at times psychiatry.  There may be a limited role for opioid use intermittently in the treatment of pain, more particularly for acute pain although no one approach can be used as a sole treatment modality.    I emphasized the importance of regular exercise, core strengthening and stretching, diet and weight loss as a cornerstone of long-term pain management.      Dmitri Bautista MD  Interventional Pain Management  [facility]    Disclaimer:  This note was prepared using voice recognition system and is likely to have sound alike errors that may have been overlooked even after proof reading.  Please call me with any questions

## 2020-11-19 NOTE — LETTER
November 19, 2020      Guerda Hernandez MD  [address]  [place] [address]           [facility]  [address]  [place] [address]  Phone: [phone]  Fax: [phone]          Patient: Jaswant Yang   MR Number: [id number]   YOB: 1975   Date of Visit: 11/19/2020       Dear Dr. Guerda Hernandez:    Thank you for referring Jaswant Yagn to me for evaluation. Attached you will find relevant portions of my assessment and plan of care.    If you have questions, please do not hesitate to call me. I look forward to following Jaswant Yang along with you.    Sincerely,    Dmitri Bautista MD    Enclosure  CC:  No Recipients    If you would like to receive this communication electronically, please contact [email or web address] or [phone] to request more information on [employer] Link access.    For providers and/or their staff who would like to refer a patient to [facility], please contact us through our one-stop-shop provider referral line, [facility], at [phone].    If you feel you have received this communication in error or would no longer like to receive these types of communications, please e-mail [email or web address]

## 2020-11-24 ENCOUNTER — TELEPHONE (OUTPATIENT)
Dept: PAIN MEDICINE | Facility: CLINIC | Age: 45
End: 2020-11-24

## 2020-11-24 NOTE — TELEPHONE ENCOUNTER
Pt stated she already Is taking 2 of zanaflex and that is not helping . Did inform pt we are not refilling norco , but I will discuss with  to see what else he suggest since taking 2 of zanaflex does not help . Please advise

## 2020-11-24 NOTE — TELEPHONE ENCOUNTER
Tried to call. No answer. Left   Alexis Boyd, MA [facility]   [facility]

## 2020-11-24 NOTE — TELEPHONE ENCOUNTER
----- Message from Dmitri Bautista MD sent at 11/24/2020 12:29 PM CST -----  Contact: Jaswant  Please contact the patient and inform her that she can increase the dose of the Zanaflex to 4-8mg up to 2x daily as needed. Norco is not being refilled as we focus on interventional methods to control pain primarily rather than medications. Once tooth infection has resolved, will have more to offer.     Dmitri Bautista MD  Interventional Pain Medicine  [facility] [place]    ----- Message -----  From: Hadleygali Chris  Sent: 11/23/2020  11:45 AM CST  To: Dmitri Bautista MD    Pt is calling in regards to muscle relaxer medication is not currently working. Pt wants to know if dosage can be changed. Pt also needs [place] refilled. Please call her back at [phone].      [id number] - [place] - [address] AT [address] [facility] [address] [facility]  [address]  [place] [address]  Phone: [phone] Fax: [phone]      Thanks  DD

## 2020-12-02 ENCOUNTER — TELEPHONE (OUTPATIENT)
Dept: PAIN MEDICINE | Facility: CLINIC | Age: 45
End: 2020-12-02

## 2020-12-02 NOTE — TELEPHONE ENCOUNTER
[2:44 PM] Jose Juan Chatterjee        good afternoon,  Jaswant Yang , Female, 45 y.o., 1975  MRN: [id number], has [facility] at [facility] [place],, [address], [place] [address] ,   [phone]. Needs PA. Please advise      ?[2:45 PM] Marylou Cabrera      ok  ?[2:46 PM] Marylou Cabrera      that is approved auth#[id number] valid 12/2-1/16/2021  ?[2:47 PM] Jose Juan Chatterjee      thank you     Informed [facility] [place]. All questions answered.   [facility] Inter[facility]

## 2020-12-02 NOTE — TELEPHONE ENCOUNTER
----- Message from Gloria Fitzgerald sent at 12/2/2020 11:09 AM CST -----  Good morning,      Jose L with [facility] called to inform you that pt is scheduled for tomorrow however they need approval from insurance. She can be reached at [phone]      Thanks,  Gloria Fitzgerald

## 2020-12-14 ENCOUNTER — TELEPHONE (OUTPATIENT)
Dept: PAIN MEDICINE | Facility: CLINIC | Age: 45
End: 2020-12-14

## 2020-12-14 NOTE — TELEPHONE ENCOUNTER
Spoke with jose from [facility] [place] and was informed pt not approved , but I was infrormed 12/02 she was approved , jose stated she called and they told she was not approved for [facility]. Will inform my pre cert team. All questions answered.   Jose Juan Chatterjee MA  [facility]

## 2020-12-18 ENCOUNTER — OFFICE VISIT (OUTPATIENT)
Dept: HEMATOLOGY/ONCOLOGY | Facility: CLINIC | Age: 45
End: 2020-12-18
Payer: MEDICAID

## 2020-12-18 ENCOUNTER — PATIENT MESSAGE (OUTPATIENT)
Dept: PAIN MEDICINE | Facility: CLINIC | Age: 45
End: 2020-12-18

## 2020-12-18 VITALS
WEIGHT: 293 LBS | TEMPERATURE: 98 F | BODY MASS INDEX: 41.02 KG/M2 | DIASTOLIC BLOOD PRESSURE: 94 MMHG | OXYGEN SATURATION: 99 % | SYSTOLIC BLOOD PRESSURE: 150 MMHG | HEIGHT: 71 IN | HEART RATE: 76 BPM

## 2020-12-18 DIAGNOSIS — C91.40 HAIRY CELL LEUKEMIA NOT HAVING ACHIEVED REMISSION: ICD-10-CM

## 2020-12-18 PROCEDURE — 99214 OFFICE O/P EST MOD 30 MIN: CPT | Mod: PBBFAC | Performed by: INTERNAL MEDICINE

## 2020-12-18 PROCEDURE — 99999 PR PBB SHADOW E&M-EST. PATIENT-LVL IV: CPT | Mod: PBBFAC,,, | Performed by: INTERNAL MEDICINE

## 2020-12-18 PROCEDURE — 99214 PR OFFICE/OUTPT VISIT, EST, LEVL IV, 30-39 MIN: ICD-10-PCS | Mod: S$PBB,,, | Performed by: INTERNAL MEDICINE

## 2020-12-18 PROCEDURE — 99999 PR PBB SHADOW E&M-EST. PATIENT-LVL IV: ICD-10-PCS | Mod: PBBFAC,,, | Performed by: INTERNAL MEDICINE

## 2020-12-18 PROCEDURE — 99214 OFFICE O/P EST MOD 30 MIN: CPT | Mod: S$PBB,,, | Performed by: INTERNAL MEDICINE

## 2020-12-18 NOTE — PROGRESS NOTES
Subjective:       Patient ID: Jaswant Yang is a 45 y.o. female.    Chief Complaint: No chief complaint on file.    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  Post-operative pathology consistent with hairy cell leukemia    6/2020  Hypercellular marrow with extensive involvement by hairy cell leukemia  Treatment with Cladribine/Rituxan   6/22/2020 adverse reaction to Rituxan; stopped and no further therapy due to infusion reaction    10/2020  Hypercellular marrow with 1% hairy cell leukemia  WBC is normal  Platelet count elevated (appropriate post-splenectomy)  Mild normocytic anemia  Complains of diffuse pain, predates diagnosis of hairy cell leukemia      Review of Systems   Constitutional: Positive for fatigue.        Diffuse pain   HENT: Negative.    Eyes: Negative.    Respiratory: Negative.    Cardiovascular: Negative.    Gastrointestinal: Negative.    Endocrine: Negative.    Genitourinary: Negative.    Musculoskeletal: Negative.    Integumentary:  Negative.   Allergic/Immunologic: Negative for environmental allergies, food allergies and immunocompromised state.   Neurological: Negative.    Hematological: Negative for adenopathy. Does not bruise/bleed easily.   Psychiatric/Behavioral: Negative.          Objective:      Physical Exam  Vitals signs and nursing note reviewed.   Constitutional:       Appearance: She is well-developed.   HENT:      Head: Normocephalic and atraumatic.   Eyes:      General: No scleral icterus.     Conjunctiva/sclera: Conjunctivae normal.   Neck:      Musculoskeletal: Normal range of motion and neck supple.   Cardiovascular:      Rate and Rhythm: Normal rate.   Pulmonary:      Effort: Pulmonary effort is normal. No respiratory distress.   Abdominal:      General: There is no distension.      Palpations: Abdomen is soft.      Tenderness: There is no abdominal tenderness.   Musculoskeletal: Normal range of motion.   Skin:     General:  Skin is warm and dry.      Findings: Rash present.   Neurological:      Mental Status: She is alert and oriented to person, place, and time.      Cranial Nerves: No cranial nerve deficit.   Psychiatric:         Behavior: Behavior normal.          Assessment:       1. Hairy cell leukemia not having achieved remission        Plan:       Hairy cell leukemia post induction therapy with cladribine (infusion reaction to Rituxan) in partial (near complete) remission.  I would not retreat for refractory HCL at this time and patient politely refuses any additional therapy at this time.  Recommend CBC surveillance and consideration of next therapy when new leukopenia or thrombocytopenia develops.  Or if hemoglobin drops to 9 gram (lower threshold in setting of baseline anemia).    Next CBC with Dr. Hernandez 12/21/2020  Advised she reach out to pain medicine for her MRI results and next plan in her treatment for diffuse pain

## 2020-12-18 NOTE — LETTER
December 18, 2020      Guerda Hernandez MD  [address]  [place] [address]           [facility]  [address]  [place] [address]  Phone: [phone]  Fax: [phone]          Patient: Jaswant Yang   MR Number: [id number]   YOB: 1975   Date of Visit: 12/18/2020       Dear Dr. Guerda Hernandez:    Thank you for referring Jaswant Yang to me for evaluation. Attached you will find relevant portions of my assessment and plan of care.    If you have questions, please do not hesitate to call me. I look forward to following Jaswant Yang along with you.    Sincerely,    Malena Norwood MD    Enclosure  CC:  No Recipients    If you would like to receive this communication electronically, please contact [email or web address] or [phone] to request more information on [employer] Link access.    For providers and/or their staff who would like to refer a patient to [facility], please contact us through our one-stop-shop provider referral line, [facility], at [phone].    If you feel you have received this communication in error or would no longer like to receive these types of communications, please e-mail [email or web address]

## 2020-12-28 ENCOUNTER — PATIENT MESSAGE (OUTPATIENT)
Dept: PAIN MEDICINE | Facility: CLINIC | Age: 45
End: 2020-12-28

## 2020-12-29 DIAGNOSIS — M51.36 DDD (DEGENERATIVE DISC DISEASE), LUMBAR: Primary | ICD-10-CM

## 2020-12-30 ENCOUNTER — PATIENT MESSAGE (OUTPATIENT)
Dept: PAIN MEDICINE | Facility: CLINIC | Age: 45
End: 2020-12-30

## 2020-12-30 ENCOUNTER — TELEPHONE (OUTPATIENT)
Dept: PAIN MEDICINE | Facility: CLINIC | Age: 45
End: 2020-12-30

## 2020-12-30 DIAGNOSIS — M79.12 MYALGIA OF AUXILIARY MUSCLES, HEAD AND NECK: Primary | ICD-10-CM

## 2020-12-30 RX ORDER — CYCLOBENZAPRINE HCL 10 MG
TABLET ORAL
Qty: 60 TABLET | Refills: 1 | Status: SHIPPED | OUTPATIENT
Start: 2020-12-30 | End: 2021-03-16

## 2020-12-30 NOTE — TELEPHONE ENCOUNTER
Attempted to call patient to schedule procedure with Dr. Bautista. Left v/m for patient to call me back.

## 2020-12-30 NOTE — TELEPHONE ENCOUNTER
Called patient to schedule the procedure with Dr. Bautista on 01.11.2021.  Discuss with her all pre-procedure instructions. Also instructions are sent to patient's chart and mailed it to her. Post injection F/U is scheduled with Kathi Seo on 02.09.2021 at the [facility].   All questions are answered. Patient understood.

## 2021-01-26 ENCOUNTER — OFFICE VISIT (OUTPATIENT)
Dept: HEMATOLOGY/ONCOLOGY | Facility: CLINIC | Age: 46
End: 2021-01-26
Payer: MEDICAID

## 2021-01-26 ENCOUNTER — LAB VISIT (OUTPATIENT)
Dept: LAB | Facility: HOSPITAL | Age: 46
End: 2021-01-26
Attending: INTERNAL MEDICINE
Payer: MEDICAID

## 2021-01-26 VITALS
HEART RATE: 76 BPM | BODY MASS INDEX: 41.02 KG/M2 | SYSTOLIC BLOOD PRESSURE: 134 MMHG | WEIGHT: 293 LBS | DIASTOLIC BLOOD PRESSURE: 92 MMHG | TEMPERATURE: 97 F | HEIGHT: 71 IN | OXYGEN SATURATION: 100 %

## 2021-01-26 DIAGNOSIS — C91.40 HAIRY CELL LEUKEMIA NOT HAVING ACHIEVED REMISSION: Primary | ICD-10-CM

## 2021-01-26 DIAGNOSIS — Z90.81 S/P SPLENECTOMY: ICD-10-CM

## 2021-01-26 DIAGNOSIS — D75.839 THROMBOCYTOSIS: ICD-10-CM

## 2021-01-26 DIAGNOSIS — C91.40 HAIRY CELL LEUKEMIA NOT HAVING ACHIEVED REMISSION: ICD-10-CM

## 2021-01-26 DIAGNOSIS — G89.4 CHRONIC PAIN SYNDROME: ICD-10-CM

## 2021-01-26 DIAGNOSIS — D63.0 ANEMIA IN NEOPLASTIC DISEASE: ICD-10-CM

## 2021-01-26 DIAGNOSIS — D72.821 MONOCYTOSIS: ICD-10-CM

## 2021-01-26 DIAGNOSIS — K04.7 DENTAL INFECTION: ICD-10-CM

## 2021-01-26 LAB
ALBUMIN SERPL BCP-MCNC: 3.9 G/DL (ref 3.5–5.2)
ALP SERPL-CCNC: 93 U/L (ref 55–135)
ALT SERPL W/O P-5'-P-CCNC: 16 U/L (ref 10–44)
ANION GAP SERPL CALC-SCNC: 5 MMOL/L (ref 8–16)
AST SERPL-CCNC: 19 U/L (ref 10–40)
BASOPHILS # BLD AUTO: 0.03 K/UL (ref 0–0.2)
BASOPHILS # BLD AUTO: 0.03 K/UL (ref 0–0.2)
BASOPHILS NFR BLD: 0.6 % (ref 0–1.9)
BASOPHILS NFR BLD: 0.6 % (ref 0–1.9)
BILIRUB SERPL-MCNC: 0.8 MG/DL (ref 0.1–1)
BUN SERPL-MCNC: 17 MG/DL (ref 6–20)
CALCIUM SERPL-MCNC: 8.7 MG/DL (ref 8.7–10.5)
CHLORIDE SERPL-SCNC: 106 MMOL/L (ref 95–110)
CO2 SERPL-SCNC: 25 MMOL/L (ref 23–29)
CREAT SERPL-MCNC: 1 MG/DL (ref 0.5–1.4)
DIFFERENTIAL METHOD: ABNORMAL
DIFFERENTIAL METHOD: ABNORMAL
EOSINOPHIL # BLD AUTO: 0.2 K/UL (ref 0–0.5)
EOSINOPHIL # BLD AUTO: 0.2 K/UL (ref 0–0.5)
EOSINOPHIL NFR BLD: 4.3 % (ref 0–8)
EOSINOPHIL NFR BLD: 4.3 % (ref 0–8)
ERYTHROCYTE [DISTWIDTH] IN BLOOD BY AUTOMATED COUNT: 21.9 % (ref 11.5–14.5)
ERYTHROCYTE [DISTWIDTH] IN BLOOD BY AUTOMATED COUNT: 21.9 % (ref 11.5–14.5)
EST. GFR  (AFRICAN AMERICAN): >60 ML/MIN/1.73 M^2
EST. GFR  (NON AFRICAN AMERICAN): >60 ML/MIN/1.73 M^2
GLUCOSE SERPL-MCNC: 96 MG/DL (ref 70–110)
HCT VFR BLD AUTO: 39 % (ref 37–48.5)
HCT VFR BLD AUTO: 39 % (ref 37–48.5)
HGB BLD-MCNC: 12.4 G/DL (ref 12–16)
HGB BLD-MCNC: 12.4 G/DL (ref 12–16)
IMM GRANULOCYTES # BLD AUTO: 0.01 K/UL (ref 0–0.04)
IMM GRANULOCYTES # BLD AUTO: 0.01 K/UL (ref 0–0.04)
IMM GRANULOCYTES NFR BLD AUTO: 0.2 % (ref 0–0.5)
IMM GRANULOCYTES NFR BLD AUTO: 0.2 % (ref 0–0.5)
LYMPHOCYTES # BLD AUTO: 2.1 K/UL (ref 1–4.8)
LYMPHOCYTES # BLD AUTO: 2.1 K/UL (ref 1–4.8)
LYMPHOCYTES NFR BLD: 41.6 % (ref 18–48)
LYMPHOCYTES NFR BLD: 41.6 % (ref 18–48)
MCH RBC QN AUTO: 25.9 PG (ref 27–31)
MCH RBC QN AUTO: 25.9 PG (ref 27–31)
MCHC RBC AUTO-ENTMCNC: 31.8 G/DL (ref 32–36)
MCHC RBC AUTO-ENTMCNC: 31.8 G/DL (ref 32–36)
MCV RBC AUTO: 81 FL (ref 82–98)
MCV RBC AUTO: 81 FL (ref 82–98)
MONOCYTES # BLD AUTO: 0.7 K/UL (ref 0.3–1)
MONOCYTES # BLD AUTO: 0.7 K/UL (ref 0.3–1)
MONOCYTES NFR BLD: 14.3 % (ref 4–15)
MONOCYTES NFR BLD: 14.3 % (ref 4–15)
NEUTROPHILS # BLD AUTO: 2 K/UL (ref 1.8–7.7)
NEUTROPHILS # BLD AUTO: 2 K/UL (ref 1.8–7.7)
NEUTROPHILS NFR BLD: 39 % (ref 38–73)
NEUTROPHILS NFR BLD: 39 % (ref 38–73)
NRBC BLD-RTO: 0 /100 WBC
NRBC BLD-RTO: 0 /100 WBC
PATH REV BLD -IMP: NORMAL
PATH REV BLD -IMP: NORMAL
PLATELET # BLD AUTO: 384 K/UL (ref 150–350)
PLATELET # BLD AUTO: 384 K/UL (ref 150–350)
PMV BLD AUTO: 9.5 FL (ref 9.2–12.9)
PMV BLD AUTO: 9.5 FL (ref 9.2–12.9)
POTASSIUM SERPL-SCNC: 4.7 MMOL/L (ref 3.5–5.1)
PROT SERPL-MCNC: 7 G/DL (ref 6–8.4)
RBC # BLD AUTO: 4.79 M/UL (ref 4–5.4)
RBC # BLD AUTO: 4.79 M/UL (ref 4–5.4)
SODIUM SERPL-SCNC: 136 MMOL/L (ref 136–145)
WBC # BLD AUTO: 5.12 K/UL (ref 3.9–12.7)
WBC # BLD AUTO: 5.12 K/UL (ref 3.9–12.7)

## 2021-01-26 PROCEDURE — 85060 BLOOD SMEAR INTERPRETATION: CPT | Mod: ,,, | Performed by: PATHOLOGY

## 2021-01-26 PROCEDURE — 99214 OFFICE O/P EST MOD 30 MIN: CPT | Mod: S$PBB,,, | Performed by: INTERNAL MEDICINE

## 2021-01-26 PROCEDURE — 88185 FLOWCYTOMETRY/TC ADD-ON: CPT | Performed by: PATHOLOGY

## 2021-01-26 PROCEDURE — 88184 FLOWCYTOMETRY/ TC 1 MARKER: CPT | Performed by: PATHOLOGY

## 2021-01-26 PROCEDURE — 99999 PR PBB SHADOW E&M-EST. PATIENT-LVL V: CPT | Mod: PBBFAC,,, | Performed by: INTERNAL MEDICINE

## 2021-01-26 PROCEDURE — 88189 FLOWCYTOMETRY/READ 16 & >: CPT | Mod: ,,, | Performed by: PATHOLOGY

## 2021-01-26 PROCEDURE — 36415 COLL VENOUS BLD VENIPUNCTURE: CPT

## 2021-01-26 PROCEDURE — 99214 PR OFFICE/OUTPT VISIT, EST, LEVL IV, 30-39 MIN: ICD-10-PCS | Mod: S$PBB,,, | Performed by: INTERNAL MEDICINE

## 2021-01-26 PROCEDURE — 88189 PR  FLOWCYTOMETRY/READ, 16 & > MARKERS: ICD-10-PCS | Mod: ,,, | Performed by: PATHOLOGY

## 2021-01-26 PROCEDURE — 80053 COMPREHEN METABOLIC PANEL: CPT

## 2021-01-26 PROCEDURE — 85060 PATHOLOGIST REVIEW: ICD-10-PCS | Mod: ,,, | Performed by: PATHOLOGY

## 2021-01-26 PROCEDURE — 85025 COMPLETE CBC W/AUTO DIFF WBC: CPT

## 2021-01-26 PROCEDURE — 99999 PR PBB SHADOW E&M-EST. PATIENT-LVL V: ICD-10-PCS | Mod: PBBFAC,,, | Performed by: INTERNAL MEDICINE

## 2021-01-26 PROCEDURE — 99215 OFFICE O/P EST HI 40 MIN: CPT | Mod: PBBFAC,25 | Performed by: INTERNAL MEDICINE

## 2021-01-26 RX ORDER — TIZANIDINE 4 MG/1
TABLET ORAL
COMMUNITY
Start: 2021-01-18 | End: 2021-09-24

## 2021-01-27 ENCOUNTER — TELEPHONE (OUTPATIENT)
Dept: HEMATOLOGY/ONCOLOGY | Facility: CLINIC | Age: 46
End: 2021-01-27

## 2021-01-27 LAB
FLOW CYTOMETRY ANTIBODIES ANALYZED - BLOOD: NORMAL
FLOW CYTOMETRY COMMENT - BLOOD: NORMAL
FLOW CYTOMETRY INTERPRETATION - BLOOD: NORMAL

## 2021-02-01 ENCOUNTER — PATIENT MESSAGE (OUTPATIENT)
Dept: PAIN MEDICINE | Facility: CLINIC | Age: 46
End: 2021-02-01

## 2021-02-09 ENCOUNTER — TELEPHONE (OUTPATIENT)
Dept: HEMATOLOGY/ONCOLOGY | Facility: CLINIC | Age: 46
End: 2021-02-09

## 2021-02-22 ENCOUNTER — TELEPHONE (OUTPATIENT)
Dept: PAIN MEDICINE | Facility: CLINIC | Age: 46
End: 2021-02-22

## 2021-04-06 ENCOUNTER — TELEPHONE (OUTPATIENT)
Dept: PAIN MEDICINE | Facility: CLINIC | Age: 46
End: 2021-04-06

## 2021-04-28 ENCOUNTER — PATIENT MESSAGE (OUTPATIENT)
Dept: RESEARCH | Facility: HOSPITAL | Age: 46
End: 2021-04-28

## 2021-05-18 ENCOUNTER — PATIENT MESSAGE (OUTPATIENT)
Dept: HEMATOLOGY/ONCOLOGY | Facility: CLINIC | Age: 46
End: 2021-05-18

## 2021-05-18 ENCOUNTER — TELEPHONE (OUTPATIENT)
Dept: HEMATOLOGY/ONCOLOGY | Facility: CLINIC | Age: 46
End: 2021-05-18

## 2021-06-25 ENCOUNTER — TELEPHONE (OUTPATIENT)
Dept: HEMATOLOGY/ONCOLOGY | Facility: CLINIC | Age: 46
End: 2021-06-25

## 2021-08-24 ENCOUNTER — LAB VISIT (OUTPATIENT)
Dept: LAB | Facility: HOSPITAL | Age: 46
End: 2021-08-24
Attending: INTERNAL MEDICINE
Payer: MEDICAID

## 2021-08-24 DIAGNOSIS — D75.839 THROMBOCYTOSIS: ICD-10-CM

## 2021-08-24 DIAGNOSIS — C91.40 HAIRY CELL LEUKEMIA NOT HAVING ACHIEVED REMISSION: ICD-10-CM

## 2021-08-24 LAB
ALBUMIN SERPL BCP-MCNC: 3.9 G/DL (ref 3.5–5.2)
ALP SERPL-CCNC: 61 U/L (ref 55–135)
ALT SERPL W/O P-5'-P-CCNC: 20 U/L (ref 10–44)
ANION GAP SERPL CALC-SCNC: 10 MMOL/L (ref 8–16)
AST SERPL-CCNC: 19 U/L (ref 10–40)
BASOPHILS # BLD AUTO: 0.06 K/UL (ref 0–0.2)
BASOPHILS NFR BLD: 0.8 % (ref 0–1.9)
BILIRUB SERPL-MCNC: 0.5 MG/DL (ref 0.1–1)
BUN SERPL-MCNC: 14 MG/DL (ref 6–20)
CALCIUM SERPL-MCNC: 9.6 MG/DL (ref 8.7–10.5)
CHLORIDE SERPL-SCNC: 104 MMOL/L (ref 95–110)
CO2 SERPL-SCNC: 25 MMOL/L (ref 23–29)
CREAT SERPL-MCNC: 1 MG/DL (ref 0.5–1.4)
DIFFERENTIAL METHOD: ABNORMAL
EOSINOPHIL # BLD AUTO: 0.6 K/UL (ref 0–0.5)
EOSINOPHIL NFR BLD: 8.7 % (ref 0–8)
ERYTHROCYTE [DISTWIDTH] IN BLOOD BY AUTOMATED COUNT: 19 % (ref 11.5–14.5)
EST. GFR  (AFRICAN AMERICAN): >60 ML/MIN/1.73 M^2
EST. GFR  (NON AFRICAN AMERICAN): >60 ML/MIN/1.73 M^2
GLUCOSE SERPL-MCNC: 98 MG/DL (ref 70–110)
HCT VFR BLD AUTO: 39.4 % (ref 37–48.5)
HGB BLD-MCNC: 12.8 G/DL (ref 12–16)
IMM GRANULOCYTES # BLD AUTO: 0.02 K/UL (ref 0–0.04)
IMM GRANULOCYTES NFR BLD AUTO: 0.3 % (ref 0–0.5)
LYMPHOCYTES # BLD AUTO: 3.7 K/UL (ref 1–4.8)
LYMPHOCYTES NFR BLD: 52.1 % (ref 18–48)
MCH RBC QN AUTO: 27.2 PG (ref 27–31)
MCHC RBC AUTO-ENTMCNC: 32.5 G/DL (ref 32–36)
MCV RBC AUTO: 84 FL (ref 82–98)
MONOCYTES # BLD AUTO: 0.7 K/UL (ref 0.3–1)
MONOCYTES NFR BLD: 9.2 % (ref 4–15)
NEUTROPHILS # BLD AUTO: 2.1 K/UL (ref 1.8–7.7)
NEUTROPHILS NFR BLD: 28.9 % (ref 38–73)
NRBC BLD-RTO: 0 /100 WBC
PLATELET # BLD AUTO: 377 K/UL (ref 150–450)
PMV BLD AUTO: 9.8 FL (ref 9.2–12.9)
POTASSIUM SERPL-SCNC: 4.3 MMOL/L (ref 3.5–5.1)
PROT SERPL-MCNC: 6.8 G/DL (ref 6–8.4)
RBC # BLD AUTO: 4.71 M/UL (ref 4–5.4)
SODIUM SERPL-SCNC: 139 MMOL/L (ref 136–145)
WBC # BLD AUTO: 7.14 K/UL (ref 3.9–12.7)

## 2021-08-24 PROCEDURE — 85025 COMPLETE CBC W/AUTO DIFF WBC: CPT | Performed by: INTERNAL MEDICINE

## 2021-08-24 PROCEDURE — 36415 COLL VENOUS BLD VENIPUNCTURE: CPT | Performed by: INTERNAL MEDICINE

## 2021-08-24 PROCEDURE — 80053 COMPREHEN METABOLIC PANEL: CPT | Performed by: INTERNAL MEDICINE

## 2021-08-26 ENCOUNTER — PATIENT MESSAGE (OUTPATIENT)
Dept: HEMATOLOGY/ONCOLOGY | Facility: CLINIC | Age: 46
End: 2021-08-26

## 2021-08-29 DIAGNOSIS — M79.12 MYALGIA OF AUXILIARY MUSCLES, HEAD AND NECK: ICD-10-CM

## 2021-09-01 RX ORDER — CYCLOBENZAPRINE HCL 10 MG
TABLET ORAL
Qty: 60 TABLET | Refills: 1 | Status: SHIPPED | OUTPATIENT
Start: 2021-09-01

## 2021-09-22 ENCOUNTER — TELEPHONE (OUTPATIENT)
Dept: HEMATOLOGY/ONCOLOGY | Facility: CLINIC | Age: 46
End: 2021-09-22

## 2021-09-22 ENCOUNTER — PATIENT MESSAGE (OUTPATIENT)
Dept: HEMATOLOGY/ONCOLOGY | Facility: CLINIC | Age: 46
End: 2021-09-22

## 2021-09-23 ENCOUNTER — TELEPHONE (OUTPATIENT)
Dept: HEMATOLOGY/ONCOLOGY | Facility: CLINIC | Age: 46
End: 2021-09-23

## 2021-10-25 ENCOUNTER — LAB VISIT (OUTPATIENT)
Dept: LAB | Facility: HOSPITAL | Age: 46
End: 2021-10-25
Attending: INTERNAL MEDICINE
Payer: MEDICAID

## 2021-10-25 ENCOUNTER — TELEPHONE (OUTPATIENT)
Dept: HEMATOLOGY/ONCOLOGY | Facility: CLINIC | Age: 46
End: 2021-10-25
Payer: MEDICAID

## 2021-10-25 ENCOUNTER — OFFICE VISIT (OUTPATIENT)
Dept: HEMATOLOGY/ONCOLOGY | Facility: CLINIC | Age: 46
End: 2021-10-25
Payer: MEDICAID

## 2021-10-25 VITALS
HEIGHT: 72 IN | DIASTOLIC BLOOD PRESSURE: 99 MMHG | BODY MASS INDEX: 39.68 KG/M2 | TEMPERATURE: 97 F | OXYGEN SATURATION: 98 % | HEART RATE: 72 BPM | SYSTOLIC BLOOD PRESSURE: 142 MMHG | WEIGHT: 293 LBS

## 2021-10-25 DIAGNOSIS — K04.7 DENTAL INFECTION: ICD-10-CM

## 2021-10-25 DIAGNOSIS — D75.839 THROMBOCYTOSIS: ICD-10-CM

## 2021-10-25 DIAGNOSIS — C91.40 HAIRY CELL LEUKEMIA NOT HAVING ACHIEVED REMISSION: Primary | ICD-10-CM

## 2021-10-25 DIAGNOSIS — D63.0 ANEMIA IN NEOPLASTIC DISEASE: ICD-10-CM

## 2021-10-25 DIAGNOSIS — L29.9 PRURITUS: ICD-10-CM

## 2021-10-25 DIAGNOSIS — C91.40 HAIRY CELL LEUKEMIA NOT HAVING ACHIEVED REMISSION: ICD-10-CM

## 2021-10-25 LAB
ALBUMIN SERPL BCP-MCNC: 3.9 G/DL (ref 3.5–5.2)
ALP SERPL-CCNC: 59 U/L (ref 55–135)
ALT SERPL W/O P-5'-P-CCNC: 17 U/L (ref 10–44)
ANION GAP SERPL CALC-SCNC: 6 MMOL/L (ref 8–16)
AST SERPL-CCNC: 18 U/L (ref 10–40)
BASOPHILS # BLD AUTO: 0.03 K/UL (ref 0–0.2)
BASOPHILS NFR BLD: 0.4 % (ref 0–1.9)
BILIRUB SERPL-MCNC: 0.9 MG/DL (ref 0.1–1)
BUN SERPL-MCNC: 18 MG/DL (ref 6–20)
CALCIUM SERPL-MCNC: 9.4 MG/DL (ref 8.7–10.5)
CHLORIDE SERPL-SCNC: 108 MMOL/L (ref 95–110)
CO2 SERPL-SCNC: 28 MMOL/L (ref 23–29)
CREAT SERPL-MCNC: 1.1 MG/DL (ref 0.5–1.4)
DIFFERENTIAL METHOD: ABNORMAL
EOSINOPHIL # BLD AUTO: 0.6 K/UL (ref 0–0.5)
EOSINOPHIL NFR BLD: 8.4 % (ref 0–8)
ERYTHROCYTE [DISTWIDTH] IN BLOOD BY AUTOMATED COUNT: 18.1 % (ref 11.5–14.5)
EST. GFR  (AFRICAN AMERICAN): >60 ML/MIN/1.73 M^2
EST. GFR  (NON AFRICAN AMERICAN): >60 ML/MIN/1.73 M^2
GLUCOSE SERPL-MCNC: 87 MG/DL (ref 70–110)
HCT VFR BLD AUTO: 39.7 % (ref 37–48.5)
HGB BLD-MCNC: 12.9 G/DL (ref 12–16)
IMM GRANULOCYTES # BLD AUTO: 0.01 K/UL (ref 0–0.04)
IMM GRANULOCYTES NFR BLD AUTO: 0.1 % (ref 0–0.5)
LYMPHOCYTES # BLD AUTO: 3 K/UL (ref 1–4.8)
LYMPHOCYTES NFR BLD: 43 % (ref 18–48)
MCH RBC QN AUTO: 28 PG (ref 27–31)
MCHC RBC AUTO-ENTMCNC: 32.5 G/DL (ref 32–36)
MCV RBC AUTO: 86 FL (ref 82–98)
MONOCYTES # BLD AUTO: 0.8 K/UL (ref 0.3–1)
MONOCYTES NFR BLD: 11 % (ref 4–15)
NEUTROPHILS # BLD AUTO: 2.6 K/UL (ref 1.8–7.7)
NEUTROPHILS NFR BLD: 37.1 % (ref 38–73)
NRBC BLD-RTO: 0 /100 WBC
PLATELET # BLD AUTO: 366 K/UL (ref 150–450)
PMV BLD AUTO: 9.5 FL (ref 9.2–12.9)
POTASSIUM SERPL-SCNC: 4.6 MMOL/L (ref 3.5–5.1)
PROT SERPL-MCNC: 7.1 G/DL (ref 6–8.4)
RBC # BLD AUTO: 4.61 M/UL (ref 4–5.4)
SODIUM SERPL-SCNC: 142 MMOL/L (ref 136–145)
WBC # BLD AUTO: 6.89 K/UL (ref 3.9–12.7)

## 2021-10-25 PROCEDURE — 99215 PR OFFICE/OUTPT VISIT, EST, LEVL V, 40-54 MIN: ICD-10-PCS | Mod: S$PBB,,, | Performed by: INTERNAL MEDICINE

## 2021-10-25 PROCEDURE — 85060 PATHOLOGIST REVIEW: ICD-10-PCS | Mod: ,,, | Performed by: PATHOLOGY

## 2021-10-25 PROCEDURE — 36415 COLL VENOUS BLD VENIPUNCTURE: CPT | Performed by: INTERNAL MEDICINE

## 2021-10-25 PROCEDURE — 99999 PR PBB SHADOW E&M-EST. PATIENT-LVL V: ICD-10-PCS | Mod: PBBFAC,,, | Performed by: INTERNAL MEDICINE

## 2021-10-25 PROCEDURE — 99215 OFFICE O/P EST HI 40 MIN: CPT | Mod: PBBFAC | Performed by: INTERNAL MEDICINE

## 2021-10-25 PROCEDURE — 80053 COMPREHEN METABOLIC PANEL: CPT | Performed by: INTERNAL MEDICINE

## 2021-10-25 PROCEDURE — 99215 OFFICE O/P EST HI 40 MIN: CPT | Mod: S$PBB,,, | Performed by: INTERNAL MEDICINE

## 2021-10-25 PROCEDURE — 99999 PR PBB SHADOW E&M-EST. PATIENT-LVL V: CPT | Mod: PBBFAC,,, | Performed by: INTERNAL MEDICINE

## 2021-10-25 PROCEDURE — 85025 COMPLETE CBC W/AUTO DIFF WBC: CPT | Performed by: INTERNAL MEDICINE

## 2021-10-25 PROCEDURE — 85060 BLOOD SMEAR INTERPRETATION: CPT | Mod: ,,, | Performed by: PATHOLOGY

## 2021-10-25 RX ORDER — CHLORHEXIDINE GLUCONATE ORAL RINSE 1.2 MG/ML
SOLUTION DENTAL
COMMUNITY
Start: 2021-09-04

## 2021-10-26 ENCOUNTER — TELEPHONE (OUTPATIENT)
Dept: DERMATOLOGY | Facility: CLINIC | Age: 46
End: 2021-10-26
Payer: MEDICAID

## 2021-10-26 ENCOUNTER — TELEPHONE (OUTPATIENT)
Dept: HEMATOLOGY/ONCOLOGY | Facility: CLINIC | Age: 46
End: 2021-10-26
Payer: MEDICAID

## 2021-10-26 LAB
PATH REV BLD -IMP: NORMAL
PATH REV BLD -IMP: NORMAL

## 2021-10-27 ENCOUNTER — TELEPHONE (OUTPATIENT)
Dept: HEMATOLOGY/ONCOLOGY | Facility: CLINIC | Age: 46
End: 2021-10-27
Payer: MEDICAID

## 2021-10-29 ENCOUNTER — TELEPHONE (OUTPATIENT)
Dept: PAIN MEDICINE | Facility: CLINIC | Age: 46
End: 2021-10-29
Payer: MEDICAID

## 2021-11-16 ENCOUNTER — OFFICE VISIT (OUTPATIENT)
Dept: DERMATOLOGY | Facility: CLINIC | Age: 46
End: 2021-11-16
Payer: MEDICAID

## 2021-11-16 DIAGNOSIS — L50.8 ACUTE URTICARIA: ICD-10-CM

## 2021-11-16 PROCEDURE — 99999 PR PBB SHADOW E&M-EST. PATIENT-LVL III: CPT | Mod: PBBFAC,,, | Performed by: STUDENT IN AN ORGANIZED HEALTH CARE EDUCATION/TRAINING PROGRAM

## 2021-11-16 PROCEDURE — 99203 OFFICE O/P NEW LOW 30 MIN: CPT | Mod: S$PBB,,, | Performed by: STUDENT IN AN ORGANIZED HEALTH CARE EDUCATION/TRAINING PROGRAM

## 2021-11-16 PROCEDURE — 99999 PR PBB SHADOW E&M-EST. PATIENT-LVL III: ICD-10-PCS | Mod: PBBFAC,,, | Performed by: STUDENT IN AN ORGANIZED HEALTH CARE EDUCATION/TRAINING PROGRAM

## 2021-11-16 PROCEDURE — 99203 PR OFFICE/OUTPT VISIT, NEW, LEVL III, 30-44 MIN: ICD-10-PCS | Mod: S$PBB,,, | Performed by: STUDENT IN AN ORGANIZED HEALTH CARE EDUCATION/TRAINING PROGRAM

## 2021-11-16 PROCEDURE — 99213 OFFICE O/P EST LOW 20 MIN: CPT | Mod: PBBFAC | Performed by: STUDENT IN AN ORGANIZED HEALTH CARE EDUCATION/TRAINING PROGRAM

## 2021-11-16 RX ORDER — HYDROXYZINE HYDROCHLORIDE 25 MG/1
25 TABLET, FILM COATED ORAL NIGHTLY
Qty: 30 TABLET | Refills: 0 | Status: SHIPPED | OUTPATIENT
Start: 2021-11-16 | End: 2021-12-16

## 2021-11-16 RX ORDER — IBUPROFEN 600 MG/1
600 TABLET ORAL EVERY 6 HOURS PRN
COMMUNITY
Start: 2021-11-09

## 2022-01-26 ENCOUNTER — LAB VISIT (OUTPATIENT)
Dept: LAB | Facility: HOSPITAL | Age: 47
End: 2022-01-26
Payer: MEDICAID

## 2022-01-26 DIAGNOSIS — D75.839 THROMBOCYTOSIS: ICD-10-CM

## 2022-01-26 DIAGNOSIS — C91.40 HAIRY CELL LEUKEMIA NOT HAVING ACHIEVED REMISSION: ICD-10-CM

## 2022-01-26 LAB
ALBUMIN SERPL BCP-MCNC: 4.5 G/DL (ref 3.5–5.2)
ALP SERPL-CCNC: 61 U/L (ref 55–135)
ALT SERPL W/O P-5'-P-CCNC: 12 U/L (ref 10–44)
ANION GAP SERPL CALC-SCNC: 7 MMOL/L (ref 8–16)
AST SERPL-CCNC: 19 U/L (ref 10–40)
BASOPHILS # BLD AUTO: 0.04 K/UL (ref 0–0.2)
BASOPHILS NFR BLD: 0.5 % (ref 0–1.9)
BILIRUB SERPL-MCNC: 1.9 MG/DL (ref 0.1–1)
BUN SERPL-MCNC: 15 MG/DL (ref 6–20)
CALCIUM SERPL-MCNC: 10 MG/DL (ref 8.7–10.5)
CHLORIDE SERPL-SCNC: 105 MMOL/L (ref 95–110)
CO2 SERPL-SCNC: 26 MMOL/L (ref 23–29)
CREAT SERPL-MCNC: 1.3 MG/DL (ref 0.5–1.4)
DIFFERENTIAL METHOD: ABNORMAL
EOSINOPHIL # BLD AUTO: 0.8 K/UL (ref 0–0.5)
EOSINOPHIL NFR BLD: 10.4 % (ref 0–8)
ERYTHROCYTE [DISTWIDTH] IN BLOOD BY AUTOMATED COUNT: 17.5 % (ref 11.5–14.5)
EST. GFR  (AFRICAN AMERICAN): 57 ML/MIN/1.73 M^2
EST. GFR  (NON AFRICAN AMERICAN): 50 ML/MIN/1.73 M^2
GLUCOSE SERPL-MCNC: 107 MG/DL (ref 70–110)
HCT VFR BLD AUTO: 40.7 % (ref 37–48.5)
HGB BLD-MCNC: 13.8 G/DL (ref 12–16)
IMM GRANULOCYTES # BLD AUTO: 0.02 K/UL (ref 0–0.04)
IMM GRANULOCYTES NFR BLD AUTO: 0.3 % (ref 0–0.5)
LYMPHOCYTES # BLD AUTO: 3.4 K/UL (ref 1–4.8)
LYMPHOCYTES NFR BLD: 42.9 % (ref 18–48)
MCH RBC QN AUTO: 28.2 PG (ref 27–31)
MCHC RBC AUTO-ENTMCNC: 33.9 G/DL (ref 32–36)
MCV RBC AUTO: 83 FL (ref 82–98)
MONOCYTES # BLD AUTO: 0.8 K/UL (ref 0.3–1)
MONOCYTES NFR BLD: 10.5 % (ref 4–15)
NEUTROPHILS # BLD AUTO: 2.8 K/UL (ref 1.8–7.7)
NEUTROPHILS NFR BLD: 35.4 % (ref 38–73)
NRBC BLD-RTO: 0 /100 WBC
PLATELET # BLD AUTO: 367 K/UL (ref 150–450)
PMV BLD AUTO: 10.2 FL (ref 9.2–12.9)
POTASSIUM SERPL-SCNC: 4.6 MMOL/L (ref 3.5–5.1)
PROT SERPL-MCNC: 7.4 G/DL (ref 6–8.4)
RBC # BLD AUTO: 4.89 M/UL (ref 4–5.4)
SODIUM SERPL-SCNC: 138 MMOL/L (ref 136–145)
WBC # BLD AUTO: 7.97 K/UL (ref 3.9–12.7)

## 2022-01-26 PROCEDURE — 36415 COLL VENOUS BLD VENIPUNCTURE: CPT | Performed by: INTERNAL MEDICINE

## 2022-01-26 PROCEDURE — 80053 COMPREHEN METABOLIC PANEL: CPT | Performed by: INTERNAL MEDICINE

## 2022-01-26 PROCEDURE — 85025 COMPLETE CBC W/AUTO DIFF WBC: CPT | Performed by: INTERNAL MEDICINE

## 2022-01-28 ENCOUNTER — TELEPHONE (OUTPATIENT)
Dept: HEMATOLOGY/ONCOLOGY | Facility: CLINIC | Age: 47
End: 2022-01-28
Payer: MEDICAID

## 2022-01-28 ENCOUNTER — PATIENT MESSAGE (OUTPATIENT)
Dept: HEMATOLOGY/ONCOLOGY | Facility: CLINIC | Age: 47
End: 2022-01-28

## 2022-02-24 ENCOUNTER — OFFICE VISIT (OUTPATIENT)
Dept: FAMILY MEDICINE | Facility: CLINIC | Age: 47
End: 2022-02-24
Payer: MEDICAID

## 2022-02-24 ENCOUNTER — TELEPHONE (OUTPATIENT)
Dept: PAIN MEDICINE | Facility: CLINIC | Age: 47
End: 2022-02-24
Payer: MEDICAID

## 2022-02-24 ENCOUNTER — TELEPHONE (OUTPATIENT)
Dept: OBSTETRICS AND GYNECOLOGY | Facility: CLINIC | Age: 47
End: 2022-02-24
Payer: MEDICAID

## 2022-02-24 VITALS
TEMPERATURE: 98 F | HEART RATE: 78 BPM | SYSTOLIC BLOOD PRESSURE: 138 MMHG | BODY MASS INDEX: 39.68 KG/M2 | DIASTOLIC BLOOD PRESSURE: 100 MMHG | OXYGEN SATURATION: 98 % | WEIGHT: 293 LBS | HEIGHT: 72 IN

## 2022-02-24 DIAGNOSIS — Z76.89 ENCOUNTER TO ESTABLISH CARE: ICD-10-CM

## 2022-02-24 DIAGNOSIS — I10 ESSENTIAL HYPERTENSION: Primary | ICD-10-CM

## 2022-02-24 DIAGNOSIS — Z12.11 SCREENING FOR COLON CANCER: ICD-10-CM

## 2022-02-24 DIAGNOSIS — Z90.81 S/P SPLENECTOMY: ICD-10-CM

## 2022-02-24 DIAGNOSIS — Z23 NEED FOR PNEUMOCOCCAL VACCINATION: ICD-10-CM

## 2022-02-24 DIAGNOSIS — R21 RASH: ICD-10-CM

## 2022-02-24 DIAGNOSIS — M54.16 LUMBAR RADICULOPATHY: ICD-10-CM

## 2022-02-24 DIAGNOSIS — C91.40 HAIRY CELL LEUKEMIA OF SPLEEN: ICD-10-CM

## 2022-02-24 PROCEDURE — 1159F PR MEDICATION LIST DOCUMENTED IN MEDICAL RECORD: ICD-10-PCS | Mod: CPTII,,, | Performed by: FAMILY MEDICINE

## 2022-02-24 PROCEDURE — 4010F ACE/ARB THERAPY RXD/TAKEN: CPT | Mod: CPTII,,, | Performed by: FAMILY MEDICINE

## 2022-02-24 PROCEDURE — 4010F PR ACE/ARB THEARPY RXD/TAKEN: ICD-10-PCS | Mod: CPTII,,, | Performed by: FAMILY MEDICINE

## 2022-02-24 PROCEDURE — 90471 IMMUNIZATION ADMIN: CPT | Mod: PBBFAC,PO

## 2022-02-24 PROCEDURE — 99214 OFFICE O/P EST MOD 30 MIN: CPT | Mod: S$PBB,,, | Performed by: FAMILY MEDICINE

## 2022-02-24 PROCEDURE — 3008F BODY MASS INDEX DOCD: CPT | Mod: CPTII,,, | Performed by: FAMILY MEDICINE

## 2022-02-24 PROCEDURE — 1159F MED LIST DOCD IN RCRD: CPT | Mod: CPTII,,, | Performed by: FAMILY MEDICINE

## 2022-02-24 PROCEDURE — 3075F SYST BP GE 130 - 139MM HG: CPT | Mod: CPTII,,, | Performed by: FAMILY MEDICINE

## 2022-02-24 PROCEDURE — 99999 PR PBB SHADOW E&M-EST. PATIENT-LVL V: ICD-10-PCS | Mod: PBBFAC,,, | Performed by: FAMILY MEDICINE

## 2022-02-24 PROCEDURE — 3080F DIAST BP >= 90 MM HG: CPT | Mod: CPTII,,, | Performed by: FAMILY MEDICINE

## 2022-02-24 PROCEDURE — 3075F PR MOST RECENT SYSTOLIC BLOOD PRESS GE 130-139MM HG: ICD-10-PCS | Mod: CPTII,,, | Performed by: FAMILY MEDICINE

## 2022-02-24 PROCEDURE — 99214 PR OFFICE/OUTPT VISIT, EST, LEVL IV, 30-39 MIN: ICD-10-PCS | Mod: S$PBB,,, | Performed by: FAMILY MEDICINE

## 2022-02-24 PROCEDURE — 3080F PR MOST RECENT DIASTOLIC BLOOD PRESSURE >= 90 MM HG: ICD-10-PCS | Mod: CPTII,,, | Performed by: FAMILY MEDICINE

## 2022-02-24 PROCEDURE — 99215 OFFICE O/P EST HI 40 MIN: CPT | Mod: PBBFAC,PO | Performed by: FAMILY MEDICINE

## 2022-02-24 PROCEDURE — 3008F PR BODY MASS INDEX (BMI) DOCUMENTED: ICD-10-PCS | Mod: CPTII,,, | Performed by: FAMILY MEDICINE

## 2022-02-24 PROCEDURE — 99999 PR PBB SHADOW E&M-EST. PATIENT-LVL V: CPT | Mod: PBBFAC,,, | Performed by: FAMILY MEDICINE

## 2022-02-24 RX ORDER — HYDROCODONE BITARTRATE AND ACETAMINOPHEN 5; 325 MG/1; MG/1
1 TABLET ORAL EVERY 12 HOURS PRN
Qty: 10 TABLET | Refills: 0 | Status: SHIPPED | OUTPATIENT
Start: 2022-02-24 | End: 2022-07-28 | Stop reason: SDUPTHER

## 2022-02-24 RX ORDER — LOSARTAN POTASSIUM 25 MG/1
25 TABLET ORAL DAILY
Qty: 30 TABLET | Refills: 11 | Status: SHIPPED | OUTPATIENT
Start: 2022-02-24 | End: 2022-06-14 | Stop reason: SDUPTHER

## 2022-02-24 RX ORDER — CLOTRIMAZOLE AND BETAMETHASONE DIPROPIONATE 10; .64 MG/G; MG/G
CREAM TOPICAL 2 TIMES DAILY
COMMUNITY
Start: 2022-02-22

## 2022-02-24 RX ORDER — CETIRIZINE HYDROCHLORIDE 10 MG/1
10 TABLET ORAL DAILY PRN
COMMUNITY
Start: 2022-02-03

## 2022-02-24 NOTE — PROGRESS NOTES
Subjective:      Patient ID: Jaswant Yang is a 46 y.o. female.    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 Started on penicillin a few months ago and hasn't taken it since. Has followed derm who thought it may have been a reaction to penicillin. Also taken  diflucan 2 doses which did not help. Worsening.     Pain - was referred to pain management but unable to get injections due to immune issue/dental issue per patient. Using muscle relaxer but doesn't feel like this is helping her. Has tried gabapentin but not feeling well on this so stopped. Using lots of ibuprofen and tylenol with no relief. (recent CMP - decrease in GFR). Lower back pain that radiates to her right leg. Going back and fourth to urgent care to get stronger pain medication. Patient has not done physical therapy yet. Scheduled MRI of lower back. Has not completed this yet.     Seems like patient has difficulty making it to appointments mostly due to work.    Hair cell Leukemia - Dr. Hernandez    Has history of fiborids and history of abnormal pap smear -. Hasn't had menstrual cycle since last year in     Past Medical History:   Diagnosis Date    Anemia     Anemia     Back pain     Dental infection     GERD (gastroesophageal reflux disease) 2020    Hairy-cell leukemia of spleen        Past Surgical History:   Procedure Laterality Date     SECTION, CLASSIC      FLUOROSCOPY N/A 2020    Procedure: Spleenic Bleed;  Surgeon: Hiren Cabrera MD;  Location: [facility] CATH LAB;  Service: General;  Laterality: N/A;    INJECTION OF ANESTHETIC AGENT INTO TISSUE PLANE DEFINED BY TRANSVERSUS ABDOMINIS MUSCLE N/A 2020    Procedure: BLOCK, TRANSVERSUS ABDOMINIS PLANE;  Surgeon: Yumi Ferguson MD;  Location: [facility] OR;   Service: General;  Laterality: N/A;    SPLENECTOMY N/A 5/11/2020    Procedure: SPLENECTOMY;  Surgeon: Yumi Ferguson MD;  Location: [facility] OR;  Service: General;  Laterality: N/A;    THORACENTESIS Left 5/11/2020    Procedure: THORACENTESIS;  Surgeon: Yumi Ferguson MD;  Location: [facility] OR;  Service: General;  Laterality: Left;    TUBAL LIGATION         History reviewed. No pertinent family history.    Social History     Socioeconomic History    Marital status: Single   Tobacco Use    Smoking status: Former Smoker     Packs/day: 0.25     Types: Cigarettes    Smokeless tobacco: Never Used    Tobacco comment: no longer smoker   Substance and Sexual Activity    Alcohol use: Yes     Comment: occasional    Drug use: No    Sexual activity: Yes     Birth control/protection: Condom   Social History Narrative    Lives in a single family home with her fiance and her daughter.     Her mother and her son are her SDM's. ACP Documents are available in her chart.        Health Maintenance Topics with due status: Not Due       Topic Last Completion Date    Pneumococcal Vaccines (Age 0-64) 02/24/2022       Medication List with Changes/Refills   New Medications    HYDROCODONE-ACETAMINOPHEN (NORCO) 5-325 MG PER TABLET    Take 1 tablet by mouth every 12 (twelve) hours as needed for Pain.    LOSARTAN (COZAAR) 25 MG TABLET    Take 1 tablet (25 mg total) by mouth once daily.   Current Medications    ALBUTEROL (PROVENTIL/VENTOLIN HFA) 90 MCG/ACTUATION INHALER    Inhale 1-2 puffs into the lungs every 6 (six) hours as needed for Wheezing. Rescue    ALPRAZOLAM (XANAX) 0.5 MG TABLET    TAKE 1/2 TO 1 TABLET BY MOUTH TWICE DAILY AS NEEDED FOR ANXIETY.    CETIRIZINE (ZYRTEC) 10 MG TABLET    Take 10 mg by mouth daily as needed.    CHLORHEXIDINE (PERIDEX) 0.12 % SOLUTION    SMARTSIG:By Mouth    CLOTRIMAZOLE-BETAMETHASONE 1-0.05% (LOTRISONE) CREAM    Apply topically 2 (two) times daily.    CYCLOBENZAPRINE (FLEXERIL) 10 MG TABLET     TAKE 1/2 TO 1 TABLET BY MOUTH TWICE DAILY AS NEEDED FOR MUSCLE SPASMS. MAY CAUSE DROWSINESS    GABAPENTIN (NEURONTIN) 300 MG CAPSULE    Take 2 capsules (600 mg total) by mouth every evening.    HYDROXYZINE HCL (ATARAX) 25 MG TABLET    Take 1 tablet (25 mg total) by mouth 3 (three) times daily as needed for Itching.    IBUPROFEN (ADVIL,MOTRIN) 600 MG TABLET    Take 600 mg by mouth every 6 (six) hours as needed.    POLYETHYLENE GLYCOL (GLYCOLAX) 17 GRAM/DOSE POWDER    Take 17 g by mouth once daily.    PROMETHAZINE (PHENERGAN) 25 MG TABLET    Take 1 tablet (25 mg total) by mouth every 6 (six) hours as needed for Nausea.    SENNA (SENOKOT) 8.6 MG TABLET    Take 1 tablet by mouth 2 (two) times a day.    TIZANIDINE (ZANAFLEX) 4 MG TABLET    TAKE 1/2 TO 1 TABLET BY MOUTH TWICE DAILY AS NEEDED FOR MUSCLE SPASMS. MAY CAUSE DROWSINESS   Discontinued Medications    HYDROCODONE-ACETAMINOPHEN (NORCO)  MG PER TABLET    Take 1 tablet by mouth every 6 (six) hours as needed for Pain. Take no more than 4 per day       Review of patient's allergies indicates:   Allergen Reactions    Fentanyl Rash     Severe full body diffuse rash requiring systemic steroids    Vancomycin analogues Rash     Severe full body diffuse rash requiring systemic steroids    Rituximab Other (See Comments)     Sweating, abdominal pain, elevated blood pressure    Penicillin Hives    Tramadol Hives       Review of Systems   Constitutional: Positive for malaise/fatigue. Negative for fever.   HENT: Negative for congestion.    Eyes: Negative for blurred vision.   Respiratory: Negative for shortness of breath.    Cardiovascular: Negative for chest pain and leg swelling.   Gastrointestinal: Negative for abdominal pain, constipation and diarrhea.   Genitourinary: Negative for dysuria.   Musculoskeletal: Positive for back pain.   Skin: Positive for itching and rash.   Neurological: Negative for headaches.       Objective:     Vitals:    02/24/22 0923   BP:  (!) 138/100   Pulse: 78   Temp: 98 °F (36.7 °C)     Body mass index is 45 kg/m².    Physical Exam  Vitals and nursing note reviewed.   Constitutional:       General: She is not in acute distress.     Appearance: She is well-developed.   HENT:      Head: Normocephalic and atraumatic.      Right Ear: External ear normal.      Left Ear: External ear normal.      Nose: Nose normal.   Eyes:      Conjunctiva/sclera: Conjunctivae normal.      Pupils: Pupils are equal, round, and reactive to light.   Neck:      Thyroid: No thyromegaly.   Cardiovascular:      Rate and Rhythm: Normal rate and regular rhythm.      Heart sounds: Normal heart sounds. No murmur heard.  Pulmonary:      Effort: Pulmonary effort is normal. No respiratory distress.      Breath sounds: Normal breath sounds. No wheezing or rales.   Chest:      Chest wall: No tenderness.   Abdominal:      General: Bowel sounds are normal. There is no distension.      Palpations: Abdomen is soft.      Tenderness: There is no abdominal tenderness.   Musculoskeletal:        Legs:       Comments: TTP in area highlighted  Reflexes, strength, pulses and sensation intact   Lymphadenopathy:      Cervical: No cervical adenopathy.   Skin:     General: Skin is warm and dry.      Comments: Extensive hyperpigmented rash from neck down.   Neurological:      Mental Status: She is alert and oriented to person, place, and time.         Assessment and Plan:     Essential hypertension  Add losartan  Ambulatory logs of blood pressure readings recommended   DASH diet, weight loss, exercise     S/P splenectomy  Pneumonia vaccine today   Need for pneumococcal vaccination  -     (In Office Administered) Pneumococcal Polysaccharide Vaccine (23 Valent) (SQ/IM)    Hairy cell leukemia of spleen  Heme/onc - miss her last appointment - will get her rescheduled.    Screening for colon cancer  -     Case Request Endoscopy: COLONOSCOPY    Encounter to establish care  -     Ambulatory referral/consult  to Gynecology; Future; Expected date: 03/03/2022  Referral to gyn for pap smear     Lumbar radiculopathy  -     Ambulatory referral/consult to Physical/Occupational Therapy; Future; Expected date: 03/03/2022  -     HYDROcodone-acetaminophen (NORCO) 5-325 mg per tablet; Take 1 tablet by mouth every 12 (twelve) hours as needed for Pain.  Dispense: 10 tablet; Refill: 0  Avoid NSAIDs as affecting renal function  Tylenol -mild-mod pain and as needed norco for sever pain - did advise this would be the last script written for narcotic by me. Patient understood. Will get back in with pain management physician for further management.    Rash  -     Ambulatory referral/consult to Dermatology; Future; Expected date: 03/03/2022    Other orders  -     losartan (COZAAR) 25 MG tablet; Take 1 tablet (25 mg total) by mouth once daily.  Dispense: 30 tablet; Refill: 11        No follow-ups on file.

## 2022-02-24 NOTE — TELEPHONE ENCOUNTER
----- Message from Sada Villar sent at 2/24/2022 11:20 AM CST -----  Regarding: Referral (NP Medicaid)  Good Morning, patient has referral to establish care. Please schedule and call patient with appointment. Thanks/elr

## 2022-02-24 NOTE — TELEPHONE ENCOUNTER
Called patient to schedule an appointment with an OB/Gyn provider to establish care. Scheduled patient with Crys Albert on 3/2/22 at the [facility] location at 11:30 am. Patient verbalized understanding and agreed to date, time, and location.

## 2022-02-24 NOTE — TELEPHONE ENCOUNTER
Spoke with someone else , they informed me patient was in the store.  She informed me she would have patient call back  to get MRI scheduled  and follow up  appointment .    weight-bearing as tolerated

## 2022-02-24 NOTE — TELEPHONE ENCOUNTER
----- Message from Sada Villar sent at 2/24/2022 11:44 AM CST -----  Regarding: Follow Up Appt and Order for MRi (medicaid)  Patient needing appointment and MRI scheduled per Dr. Gallegos. She is established. Please schedule and call patient with appointment. Thanks/elr

## 2022-03-02 ENCOUNTER — TELEPHONE (OUTPATIENT)
Dept: HEMATOLOGY/ONCOLOGY | Facility: CLINIC | Age: 47
End: 2022-03-02
Payer: MEDICAID

## 2022-03-03 ENCOUNTER — OFFICE VISIT (OUTPATIENT)
Dept: HEMATOLOGY/ONCOLOGY | Facility: CLINIC | Age: 47
End: 2022-03-03
Payer: MEDICAID

## 2022-03-03 VITALS
HEART RATE: 70 BPM | DIASTOLIC BLOOD PRESSURE: 100 MMHG | OXYGEN SATURATION: 98 % | BODY MASS INDEX: 39.68 KG/M2 | HEIGHT: 72 IN | TEMPERATURE: 98 F | SYSTOLIC BLOOD PRESSURE: 142 MMHG | WEIGHT: 293 LBS

## 2022-03-03 DIAGNOSIS — L30.9 DERMATITIS: ICD-10-CM

## 2022-03-03 DIAGNOSIS — L29.9 PRURITUS: Primary | ICD-10-CM

## 2022-03-03 DIAGNOSIS — C91.40 HAIRY CELL LEUKEMIA NOT HAVING ACHIEVED REMISSION: ICD-10-CM

## 2022-03-03 PROCEDURE — 99999 PR PBB SHADOW E&M-EST. PATIENT-LVL IV: ICD-10-PCS | Mod: PBBFAC,,,

## 2022-03-03 PROCEDURE — 99214 OFFICE O/P EST MOD 30 MIN: CPT | Mod: PBBFAC

## 2022-03-03 PROCEDURE — 3008F BODY MASS INDEX DOCD: CPT | Mod: CPTII,,,

## 2022-03-03 PROCEDURE — 4010F PR ACE/ARB THEARPY RXD/TAKEN: ICD-10-PCS | Mod: CPTII,,,

## 2022-03-03 PROCEDURE — 3077F SYST BP >= 140 MM HG: CPT | Mod: CPTII,,,

## 2022-03-03 PROCEDURE — 1160F PR REVIEW ALL MEDS BY PRESCRIBER/CLIN PHARMACIST DOCUMENTED: ICD-10-PCS | Mod: CPTII,,,

## 2022-03-03 PROCEDURE — 4010F ACE/ARB THERAPY RXD/TAKEN: CPT | Mod: CPTII,,,

## 2022-03-03 PROCEDURE — 1160F RVW MEDS BY RX/DR IN RCRD: CPT | Mod: CPTII,,,

## 2022-03-03 PROCEDURE — 99999 PR PBB SHADOW E&M-EST. PATIENT-LVL IV: CPT | Mod: PBBFAC,,,

## 2022-03-03 PROCEDURE — 3008F PR BODY MASS INDEX (BMI) DOCUMENTED: ICD-10-PCS | Mod: CPTII,,,

## 2022-03-03 PROCEDURE — 99214 OFFICE O/P EST MOD 30 MIN: CPT | Mod: S$PBB,,,

## 2022-03-03 PROCEDURE — 99214 PR OFFICE/OUTPT VISIT, EST, LEVL IV, 30-39 MIN: ICD-10-PCS | Mod: S$PBB,,,

## 2022-03-03 PROCEDURE — 3080F PR MOST RECENT DIASTOLIC BLOOD PRESSURE >= 90 MM HG: ICD-10-PCS | Mod: CPTII,,,

## 2022-03-03 PROCEDURE — 3080F DIAST BP >= 90 MM HG: CPT | Mod: CPTII,,,

## 2022-03-03 PROCEDURE — 3077F PR MOST RECENT SYSTOLIC BLOOD PRESSURE >= 140 MM HG: ICD-10-PCS | Mod: CPTII,,,

## 2022-03-03 PROCEDURE — 1159F PR MEDICATION LIST DOCUMENTED IN MEDICAL RECORD: ICD-10-PCS | Mod: CPTII,,,

## 2022-03-03 PROCEDURE — 1159F MED LIST DOCD IN RCRD: CPT | Mod: CPTII,,,

## 2022-03-03 RX ORDER — HYDROXYZINE HYDROCHLORIDE 25 MG/1
25 TABLET, FILM COATED ORAL 3 TIMES DAILY PRN
Qty: 30 TABLET | Refills: 0 | Status: SHIPPED | OUTPATIENT
Start: 2022-03-03 | End: 2022-04-19

## 2022-03-03 RX ORDER — BETAMETHASONE DIPROPIONATE 0.5 MG/G
LOTION TOPICAL 2 TIMES DAILY
Qty: 60 ML | Refills: 1 | Status: SHIPPED | OUTPATIENT
Start: 2022-03-03 | End: 2022-06-22 | Stop reason: SDUPTHER

## 2022-03-03 NOTE — ASSESSMENT & PLAN NOTE
Initial bone marrow hypercellular (%) with extensive involvement by hairy cell leukemia. Presentation with splenic rupture and extensive involvement with bone marrow with borderline cytopenias (ANC 1.4, hgb 11, plt 120K) we proceeded with treatment with cladribine on 6/22/20; 7/6/20 initiated rituximab, not completed due to transfusion reaction.      Repeat bone marrow biopsy 4 months after completion of therapy show significant improvement with 1% residual hairy cell leukemia.   She did have evaluation by Dr. Norwood in [facility] 12/18/2020 who agreed with no indication for additional treatment at this time and will continue on surveillance.     No concerning cytopenias noted on most recent lab results

## 2022-03-03 NOTE — PROGRESS NOTES
Subjective:      DATE OF VISIT: 3/3/2022   ?   ?   Patient ID:?Jaswant Yang is a 46 y.o. female.?? MR#: [id number]   ?   PRIMARY PROVIDER: Dr. Hernandez  ?   CHIEF COMPLAINT: Follow-up?????   ?   ONCOLOGIC DIAGNOSIS: Hairy cell leukemia  ?   CURRENT TREATMENT: surveillance     PAST TREATMENT: 6/22/20 cladribine; 7/6/20 initiated rituximab, not completed due to transfusion reaction  05/11/2020 splenectomy    HPI    Ms. Yang is a 46-year-old AA female with a PMHx of HTN, GERD, and hairy cell leukemia. She presents today with her significant other for follow-up. Pt was last seen in clinic by  10/25/21. Today, she c/o itchy rash and pain to lower back and legs at @ 9/10. She is followed by pain management who has recommended steroid injections, however she has had to hold off on this due to ongoing dental work. Pt states she has seen dermatologist for rash but still finding no relief.  Pt has not established care with PCP and does not wish to use an [facility] provider at this time. Denies NVDC, fatigue, cp, sob, fever, chills, unintended weight loss, abdominal pain or fullness.      Review of Systems   Constitutional: Negative for activity change, appetite change, chills, diaphoresis, fatigue, fever and unexpected weight change.   HENT: Positive for dental problem. Negative for facial swelling, mouth sores, nosebleeds and sore throat.    Eyes: Negative for visual disturbance.   Respiratory: Negative for cough, chest tightness and shortness of breath.    Cardiovascular: Negative for chest pain and leg swelling.   Gastrointestinal: Negative for abdominal distention, abdominal pain, anal bleeding, blood in stool, constipation, diarrhea, nausea, rectal pain and vomiting.   Genitourinary: Negative for decreased urine volume, difficulty urinating, dysuria, hematuria and urgency.   Musculoskeletal: Positive for back pain and myalgias. Negative for arthralgias.   Skin: Positive for rash.   Neurological: Negative for  dizziness, tremors and weakness.   Hematological: Negative for adenopathy. Does not bruise/bleed easily.   Psychiatric/Behavioral: The patient is not nervous/anxious.        ?   A comprehensive 14-point review of systems was reviewed with patient and was negative other than as specified above.   ?     Objective:      Physical Exam  Vitals and nursing note reviewed.   Constitutional:       Appearance: She is well-developed.   HENT:      Head: Normocephalic and atraumatic.   Eyes:      General: No scleral icterus.     Conjunctiva/sclera: Conjunctivae normal.   Cardiovascular:      Rate and Rhythm: Normal rate.   Pulmonary:      Effort: Pulmonary effort is normal. No respiratory distress.   Abdominal:      General: There is no distension.      Palpations: Abdomen is soft.      Tenderness: There is no abdominal tenderness.   Musculoskeletal:         General: Normal range of motion.      Cervical back: Normal range of motion and neck supple.   Skin:     General: Skin is warm and dry.      Findings: Rash present.   Neurological:      Mental Status: She is alert and oriented to person, place, and time.      Cranial Nerves: No cranial nerve deficit.   Psychiatric:         Behavior: Behavior normal.           ?   Vitals:    03/03/22 1113   BP: (!) 142/100   Pulse: 70   Temp: 98.1 °F (36.7 °C)      ?   ECOG:?1   General appearance: Generally well appearing, in no acute distress.   Head, eyes, ears, nose, and throat: Oropharynx clear with moist mucous membranes.   Cardiovascular: Regular rate and rhythm, S1, S2, no audible murmurs.   Respiratory: Lungs clear to auscultation bilaterally.   Abdomen: nontender, nondistended.   Extremities: Warm, without edema.   Neurologic: Alert and oriented. Grossly normal strength, coordination, and gait.   Skin: No rashes, ecchymoses or petechial lesion.   Psychiatric: normal mood and affect, conversant and appropriate    ?   Laboratory:  ?   No visits with results within 1 Day(s) from this  visit.   Latest known visit with results is:   Lab Visit on 01/26/2022   Component Date Value Ref Range Status    WBC 01/26/2022 7.97  3.90 - 12.70 K/uL Final    RBC 01/26/2022 4.89  4.00 - 5.40 M/uL Final    Hemoglobin 01/26/2022 13.8  12.0 - 16.0 g/dL Final    Hematocrit 01/26/2022 40.7  37.0 - 48.5 % Final    MCV 01/26/2022 83  82 - 98 fL Final    MCH 01/26/2022 28.2  27.0 - 31.0 pg Final    MCHC 01/26/2022 33.9  32.0 - 36.0 g/dL Final    RDW 01/26/2022 17.5 (A) 11.5 - 14.5 % Final    Platelets 01/26/2022 367  150 - 450 K/uL Final    MPV 01/26/2022 10.2  9.2 - 12.9 fL Final    Immature Granulocytes 01/26/2022 0.3  0.0 - 0.5 % Final    Gran # (ANC) 01/26/2022 2.8  1.8 - 7.7 K/uL Final    Immature Grans (Abs) 01/26/2022 0.02  0.00 - 0.04 K/uL Final    Lymph # 01/26/2022 3.4  1.0 - 4.8 K/uL Final    Mono # 01/26/2022 0.8  0.3 - 1.0 K/uL Final    Eos # 01/26/2022 0.8 (A) 0.0 - 0.5 K/uL Final    Baso # 01/26/2022 0.04  0.00 - 0.20 K/uL Final    nRBC 01/26/2022 0  0 /100 WBC Final    Gran % 01/26/2022 35.4 (A) 38.0 - 73.0 % Final    Lymph % 01/26/2022 42.9  18.0 - 48.0 % Final    Mono % 01/26/2022 10.5  4.0 - 15.0 % Final    Eosinophil % 01/26/2022 10.4 (A) 0.0 - 8.0 % Final    Basophil % 01/26/2022 0.5  0.0 - 1.9 % Final    Differential Method 01/26/2022 Automated   Final    Sodium 01/26/2022 138  136 - 145 mmol/L Final    Potassium 01/26/2022 4.6  3.5 - 5.1 mmol/L Final    Chloride 01/26/2022 105  95 - 110 mmol/L Final    CO2 01/26/2022 26  23 - 29 mmol/L Final    Glucose 01/26/2022 107  70 - 110 mg/dL Final    BUN 01/26/2022 15  6 - 20 mg/dL Final    Creatinine 01/26/2022 1.3  0.5 - 1.4 mg/dL Final    Calcium 01/26/2022 10.0  8.7 - 10.5 mg/dL Final    Total Protein 01/26/2022 7.4  6.0 - 8.4 g/dL Final    Albumin 01/26/2022 4.5  3.5 - 5.2 g/dL Final    Total Bilirubin 01/26/2022 1.9 (A) 0.1 - 1.0 mg/dL Final    Alkaline Phosphatase 01/26/2022 61  55 - 135 U/L Final    AST  01/26/2022 19  10 - 40 U/L Final    ALT 01/26/2022 12  10 - 44 U/L Final    Anion Gap 01/26/2022 7 (A) 8 - 16 mmol/L Final    eGFR if  01/26/2022 57 (A) >60 mL/min/1.73 m^2 Final    eGFR if non African American 01/26/2022 50 (A) >60 mL/min/1.73 m^2 Final      ?   ?   Assessment/Plan:     Problem List Items Addressed This Visit        Derm    Pruritus - Primary     -some pruritus and erythema to back, chest, abdomen, and legs.   -follow-up with dermatology           Relevant Medications    hydrOXYzine HCL (ATARAX) 25 MG tablet    betamethasone dipropionate (DIPROLENE) 0.05 % lotion       Oncology    Hairy cell leukemia not having achieved remission     Initial bone marrow hypercellular (%) with extensive involvement by hairy cell leukemia. Presentation with splenic rupture and extensive involvement with bone marrow with borderline cytopenias (ANC 1.4, hgb 11, plt 120K) we proceeded with treatment with cladribine on 6/22/20; 7/6/20 initiated rituximab, not completed due to transfusion reaction.      Repeat bone marrow biopsy 4 months after completion of therapy show significant improvement with 1% residual hairy cell leukemia.   She did have evaluation by Dr. Norwood in [facility] 12/18/2020 who agreed with no indication for additional treatment at this time and will continue on surveillance.     No concerning cytopenias noted on most recent lab results           Relevant Orders    CBC Auto Differential    Comprehensive Metabolic Panel    Pathologist Interpretation Differential      Other Visit Diagnoses     Dermatitis                 Follow-Up: In 3 months with labs prior or sooner if needed    VIKTORIA Kaba

## 2022-03-09 DIAGNOSIS — Z12.31 OTHER SCREENING MAMMOGRAM: ICD-10-CM

## 2022-03-16 ENCOUNTER — HOSPITAL ENCOUNTER (EMERGENCY)
Facility: HOSPITAL | Age: 47
Discharge: HOME | End: 2022-03-16
Attending: EMERGENCY MEDICINE
Payer: MEDICAID

## 2022-03-16 VITALS
RESPIRATION RATE: 18 BRPM | DIASTOLIC BLOOD PRESSURE: 72 MMHG | WEIGHT: 293 LBS | BODY MASS INDEX: 39.68 KG/M2 | OXYGEN SATURATION: 100 % | SYSTOLIC BLOOD PRESSURE: 138 MMHG | TEMPERATURE: 99 F | HEART RATE: 64 BPM | HEIGHT: 72 IN

## 2022-03-16 DIAGNOSIS — L25.9 CONTACT DERMATITIS, UNSPECIFIED CONTACT DERMATITIS TYPE, UNSPECIFIED TRIGGER: Primary | ICD-10-CM

## 2022-03-16 DIAGNOSIS — K08.89 PAIN, DENTAL: ICD-10-CM

## 2022-03-16 LAB
ALBUMIN SERPL BCP-MCNC: 3.9 G/DL (ref 3.5–5.2)
ALP SERPL-CCNC: 63 U/L (ref 55–135)
ALT SERPL W/O P-5'-P-CCNC: 12 U/L (ref 10–44)
ANION GAP SERPL CALC-SCNC: 5 MMOL/L (ref 8–16)
AST SERPL-CCNC: 18 U/L (ref 10–40)
BASOPHILS # BLD AUTO: 0.01 K/UL (ref 0–0.2)
BASOPHILS NFR BLD: 0.1 % (ref 0–1.9)
BILIRUB SERPL-MCNC: 0.9 MG/DL (ref 0.1–1)
BUN SERPL-MCNC: 13 MG/DL (ref 6–20)
CALCIUM SERPL-MCNC: 9.3 MG/DL (ref 8.7–10.5)
CHLORIDE SERPL-SCNC: 109 MMOL/L (ref 95–110)
CO2 SERPL-SCNC: 28 MMOL/L (ref 23–29)
CREAT SERPL-MCNC: 1.3 MG/DL (ref 0.5–1.4)
DIFFERENTIAL METHOD: ABNORMAL
EOSINOPHIL # BLD AUTO: 1.8 K/UL (ref 0–0.5)
EOSINOPHIL NFR BLD: 23.5 % (ref 0–8)
ERYTHROCYTE [DISTWIDTH] IN BLOOD BY AUTOMATED COUNT: 17.9 % (ref 11.5–14.5)
EST. GFR  (AFRICAN AMERICAN): 57 ML/MIN/1.73 M^2
EST. GFR  (NON AFRICAN AMERICAN): 50 ML/MIN/1.73 M^2
GLUCOSE SERPL-MCNC: 84 MG/DL (ref 70–110)
HCT VFR BLD AUTO: 42.8 % (ref 37–48.5)
HCV AB SERPL QL IA: NEGATIVE
HEP C VIRUS HOLD SPECIMEN: NORMAL
HGB BLD-MCNC: 14 G/DL (ref 12–16)
HIV 1+2 AB+HIV1 P24 AG SERPL QL IA: NEGATIVE
IMM GRANULOCYTES # BLD AUTO: 0.01 K/UL (ref 0–0.04)
IMM GRANULOCYTES NFR BLD AUTO: 0.1 % (ref 0–0.5)
LYMPHOCYTES # BLD AUTO: 2.8 K/UL (ref 1–4.8)
LYMPHOCYTES NFR BLD: 36.7 % (ref 18–48)
MCH RBC QN AUTO: 27.4 PG (ref 27–31)
MCHC RBC AUTO-ENTMCNC: 32.7 G/DL (ref 32–36)
MCV RBC AUTO: 84 FL (ref 82–98)
MONOCYTES # BLD AUTO: 0.8 K/UL (ref 0.3–1)
MONOCYTES NFR BLD: 9.8 % (ref 4–15)
NEUTROPHILS # BLD AUTO: 2.3 K/UL (ref 1.8–7.7)
NEUTROPHILS NFR BLD: 29.8 % (ref 38–73)
NRBC BLD-RTO: 0 /100 WBC
PLATELET # BLD AUTO: 337 K/UL (ref 150–450)
PMV BLD AUTO: 9.7 FL (ref 9.2–12.9)
POTASSIUM SERPL-SCNC: 3.9 MMOL/L (ref 3.5–5.1)
PROT SERPL-MCNC: 6.7 G/DL (ref 6–8.4)
RBC # BLD AUTO: 5.11 M/UL (ref 4–5.4)
SODIUM SERPL-SCNC: 142 MMOL/L (ref 136–145)
WBC # BLD AUTO: 7.69 K/UL (ref 3.9–12.7)

## 2022-03-16 PROCEDURE — 80053 COMPREHEN METABOLIC PANEL: CPT | Performed by: REGISTERED NURSE

## 2022-03-16 PROCEDURE — 87389 HIV-1 AG W/HIV-1&-2 AB AG IA: CPT | Performed by: EMERGENCY MEDICINE

## 2022-03-16 PROCEDURE — 99284 EMERGENCY DEPT VISIT MOD MDM: CPT | Mod: 25

## 2022-03-16 PROCEDURE — 25000003 PHARM REV CODE 250: Performed by: EMERGENCY MEDICINE

## 2022-03-16 PROCEDURE — 63600175 PHARM REV CODE 636 W HCPCS: Performed by: EMERGENCY MEDICINE

## 2022-03-16 PROCEDURE — 96372 THER/PROPH/DIAG INJ SC/IM: CPT | Performed by: EMERGENCY MEDICINE

## 2022-03-16 PROCEDURE — 86803 HEPATITIS C AB TEST: CPT | Performed by: EMERGENCY MEDICINE

## 2022-03-16 PROCEDURE — 85025 COMPLETE CBC W/AUTO DIFF WBC: CPT | Performed by: REGISTERED NURSE

## 2022-03-16 RX ORDER — KETOROLAC TROMETHAMINE 10 MG/1
10 TABLET, FILM COATED ORAL EVERY 8 HOURS PRN
Qty: 9 TABLET | Refills: 0 | Status: SHIPPED | OUTPATIENT
Start: 2022-03-16 | End: 2022-10-20

## 2022-03-16 RX ORDER — METHYLPREDNISOLONE 4 MG/1
TABLET ORAL
Qty: 1 EACH | Refills: 0 | Status: SHIPPED | OUTPATIENT
Start: 2022-03-16 | End: 2022-10-19

## 2022-03-16 RX ORDER — KETOROLAC TROMETHAMINE 10 MG/1
10 TABLET, FILM COATED ORAL
Status: COMPLETED | OUTPATIENT
Start: 2022-03-16 | End: 2022-03-16

## 2022-03-16 RX ORDER — FAMOTIDINE 20 MG/1
20 TABLET, FILM COATED ORAL
Status: DISCONTINUED | OUTPATIENT
Start: 2022-03-16 | End: 2022-03-16

## 2022-03-16 RX ORDER — HYDROXYZINE HYDROCHLORIDE 25 MG/1
25 TABLET, FILM COATED ORAL 3 TIMES DAILY PRN
Qty: 30 TABLET | Refills: 0 | Status: SHIPPED | OUTPATIENT
Start: 2022-03-16 | End: 2022-10-20

## 2022-03-16 RX ORDER — DEXAMETHASONE SODIUM PHOSPHATE 4 MG/ML
8 INJECTION, SOLUTION INTRA-ARTICULAR; INTRALESIONAL; INTRAMUSCULAR; INTRAVENOUS; SOFT TISSUE
Status: DISCONTINUED | OUTPATIENT
Start: 2022-03-16 | End: 2022-03-16

## 2022-03-16 RX ORDER — CLINDAMYCIN HYDROCHLORIDE 150 MG/1
300 CAPSULE ORAL 4 TIMES DAILY
Qty: 56 CAPSULE | Refills: 0 | Status: SHIPPED | OUTPATIENT
Start: 2022-03-16 | End: 2022-03-23

## 2022-03-16 RX ORDER — DEXAMETHASONE SODIUM PHOSPHATE 4 MG/ML
8 INJECTION, SOLUTION INTRA-ARTICULAR; INTRALESIONAL; INTRAMUSCULAR; INTRAVENOUS; SOFT TISSUE
Status: COMPLETED | OUTPATIENT
Start: 2022-03-16 | End: 2022-03-16

## 2022-03-16 RX ADMIN — KETOROLAC TROMETHAMINE 10 MG: 10 TABLET, FILM COATED ORAL at 05:03

## 2022-03-16 RX ADMIN — DEXAMETHASONE SODIUM PHOSPHATE 8 MG: 4 INJECTION INTRA-ARTICULAR; INTRALESIONAL; INTRAMUSCULAR; INTRAVENOUS; SOFT TISSUE at 05:03

## 2022-03-16 NOTE — ED PROVIDER NOTES
SCRIBE #1 NOTE: I, Nicolas Darby, am scribing for, and in the presence of, Terrance Mercer Jr., MD. I have scribed the entire note.       History     Chief Complaint   Patient presents with    Rash     Pt has been having a rash for several months but has worsened over the last week.     Review of patient's allergies indicates:   Allergen Reactions    Fentanyl Rash     Severe full body diffuse rash requiring systemic steroids    Vancomycin analogues Rash     Severe full body diffuse rash requiring systemic steroids    Rituximab Other (See Comments)     Sweating, abdominal pain, elevated blood pressure    Penicillin Hives    Tramadol Hives         History of Present Illness     HPI    3/16/2022, 5:25 PM  History obtained from the patient      History of Present Illness: Jaswant Yang is a 46 y.o. female patient who presents to the Emergency Department for evaluation of generalized rash which onset gradually 1 month pta. Pt states her rash has been worsening over the last week. She notes she has started using a new fabric softener. Symptoms are constant and moderate in severity. No mitigating or exacerbating factors reported. No associated sxs reported. Patient denies any CP, fever, chills, SOB, abdominal pain, and all other sxs at this time. No prior Tx reported. No further complaints or concerns at this time.       Arrival mode: Personal vehicle    PCP: Arleth Gallegos MD        Past Medical History:  Past Medical History:   Diagnosis Date    Anemia     Anemia     Back pain     Dental infection     GERD (gastroesophageal reflux disease) 2020    Hairy-cell leukemia of spleen        Past Surgical History:  Past Surgical History:   Procedure Laterality Date     SECTION, CLASSIC      FLUOROSCOPY N/A 2020    Procedure: Spleenic Bleed;  Surgeon: Hiren Cabrera MD;  Location: [facility] CATH LAB;  Service: General;  Laterality: N/A;    INJECTION OF ANESTHETIC AGENT INTO TISSUE PLANE DEFINED  BY TRANSVERSUS ABDOMINIS MUSCLE N/A 5/11/2020    Procedure: BLOCK, TRANSVERSUS ABDOMINIS PLANE;  Surgeon: Yumi Ferguson MD;  Location: [facility] OR;  Service: General;  Laterality: N/A;    SPLENECTOMY N/A 5/11/2020    Procedure: SPLENECTOMY;  Surgeon: Yumi Ferguson MD;  Location: [facility] OR;  Service: General;  Laterality: N/A;    THORACENTESIS Left 5/11/2020    Procedure: THORACENTESIS;  Surgeon: Yumi Ferguson MD;  Location: [facility] OR;  Service: General;  Laterality: Left;    TUBAL LIGATION           Family History:  No family history on file.    Social History:  Social History     Tobacco Use    Smoking status: Former Smoker     Packs/day: 0.25     Types: Cigarettes    Smokeless tobacco: Never Used    Tobacco comment: no longer smoker   Substance and Sexual Activity    Alcohol use: Yes     Comment: occasional    Drug use: No    Sexual activity: Yes     Birth control/protection: Condom        Review of Systems     Review of Systems   Constitutional: Negative for chills and fever.   HENT: Negative for sore throat.    Respiratory: Negative for shortness of breath.    Cardiovascular: Negative for chest pain.   Gastrointestinal: Negative for nausea.   Genitourinary: Negative for dysuria.   Musculoskeletal: Negative for back pain.   Skin: Positive for rash (Generalized).   Neurological: Negative for weakness.   Hematological: Does not bruise/bleed easily.   All other systems reviewed and are negative.     Physical Exam     Initial Vitals   BP Pulse Resp Temp SpO2   03/16/22 1710 03/16/22 1602 03/16/22 1602 03/16/22 1710 03/16/22 1602   138/89 80 18 98.5 °F (36.9 °C) 98 %      MAP       --                 Physical Exam  Nursing Notes and Vital Signs Reviewed.  Constitutional: Patient is in no acute distress. Well-developed and well-nourished.  Head: Atraumatic. Normocephalic.  Eyes:  EOM intact.  No scleral icterus.  ENT: Mucous membranes are moist.  Nares clear   Mild gum swelling right lower rear  molar.  No pus or drainage.  Neck:  Full ROM. No JVD.  Cardiovascular: Regular rate. Regular rhythm No murmurs, rubs, or gallops. Distal pulses are 2+ and symmetric  Pulmonary/Chest: No respiratory distress. Clear to auscultation bilaterally. No wheezing or rales.  Equal chest wall rise bilaterally  Musculoskeletal: Moves all extremities. No obvious deformities.  5 x 5 strength in all extremities   Skin: Warm and dry. There is a scaly rash in both the upper extremities, abdomen and posterior trunk.  There is no burrows.  No lesions.  No  Infection.  This is in the areas covered by clothing.  Neurological:  Alert, awake, and appropriate.  Normal speech.  No acute focal neurological deficits are appreciated.  Two through 12 intact bilaterally.  Psychiatric: Normal affect. Good eye contact. Appropriate in content.     ED Course   Procedures  ED Vital Signs:  Vitals:    03/16/22 1602 03/16/22 1710   BP:  138/89   Pulse: 80    Resp: 18    Temp:  98.5 °F (36.9 °C)   TempSrc: Oral Oral   SpO2: 98%    Weight: (!) 149.1 kg (328 lb 11.3 oz)    Height: 6' (1.829 m)        Abnormal Lab Results:  Labs Reviewed   CBC W/ AUTO DIFFERENTIAL - Abnormal; Notable for the following components:       Result Value    RDW 17.9 (*)     Eos # 1.8 (*)     Gran % 29.8 (*)     Eosinophil % 23.5 (*)     All other components within normal limits    Narrative:     Release to patient->Immediate   HEP C VIRUS HOLD SPECIMEN    Narrative:     Release to patient->Immediate   HIV 1 / 2 ANTIBODY   HEPATITIS C ANTIBODY   COMPREHENSIVE METABOLIC PANEL        All Lab Results:  Results for orders placed or performed during the hospital encounter of 03/16/22   HCV Virus Hold Specimen   Result Value Ref Range    HEP C Virus Hold Specimen Hold for HCV sendout    CBC auto differential   Result Value Ref Range    WBC 7.69 3.90 - 12.70 K/uL    RBC 5.11 4.00 - 5.40 M/uL    Hemoglobin 14.0 12.0 - 16.0 g/dL    Hematocrit 42.8 37.0 - 48.5 %    MCV 84 82 - 98 fL    MCH  27.4 27.0 - 31.0 pg    MCHC 32.7 32.0 - 36.0 g/dL    RDW 17.9 (H) 11.5 - 14.5 %    Platelets 337 150 - 450 K/uL    MPV 9.7 9.2 - 12.9 fL    Immature Granulocytes 0.1 0.0 - 0.5 %    Gran # (ANC) 2.3 1.8 - 7.7 K/uL    Immature Grans (Abs) 0.01 0.00 - 0.04 K/uL    Lymph # 2.8 1.0 - 4.8 K/uL    Mono # 0.8 0.3 - 1.0 K/uL    Eos # 1.8 (H) 0.0 - 0.5 K/uL    Baso # 0.01 0.00 - 0.20 K/uL    nRBC 0 0 /100 WBC    Gran % 29.8 (L) 38.0 - 73.0 %    Lymph % 36.7 18.0 - 48.0 %    Mono % 9.8 4.0 - 15.0 %    Eosinophil % 23.5 (H) 0.0 - 8.0 %    Basophil % 0.1 0.0 - 1.9 %    Differential Method Automated             The Emergency Provider reviewed the vital signs and test results, which are outlined above.     ED Discussion     5:28 PM: Reassessed pt at this time. Discussed with pt all pertinent ED information and results. Discussed pt dx and plan of tx. Gave pt all f/u and return to the ED instructions. All questions and concerns were addressed at this time. Pt expresses understanding of information and instructions, and is comfortable with plan to discharge. Pt is stable for discharge.    I discussed with patient and/or family/caretaker that evaluation in the ED does not suggest any emergent or life threatening medical conditions requiring immediate intervention beyond what was provided in the ED, and I believe patient is safe for discharge.  Regardless, an unremarkable evaluation in the ED does not preclude the development or presence of a serious of life threatening condition. As such, patient was instructed to return immediately for any worsening or change in current symptoms.       Medical Decision Making:   Clinical Tests:   Lab Tests: Ordered and Reviewed           ED Medication(s):  Medications   ketorolac tablet 10 mg (has no administration in time range)   dexamethasone injection 8 mg (8 mg Intramuscular Given 3/16/22 2078)       New Prescriptions    CLINDAMYCIN (CLEOCIN) 150 MG CAPSULE    Take 2 capsules (300 mg total) by  mouth 4 (four) times daily. for 7 days    HYDROXYZINE HCL (ATARAX) 25 MG TABLET    Take 1 tablet (25 mg total) by mouth 3 (three) times daily as needed for Itching.    KETOROLAC (TORADOL) 10 MG TABLET    Take 1 tablet (10 mg total) by mouth every 8 (eight) hours as needed for Pain.    METHYLPREDNISOLONE (MEDROL DOSEPACK) 4 MG TABLET    Take as directed on the package.        Follow-up Information     Arleth Gallegos MD.    Specialty: Family Medicine  Contact information:  [address] [address]  [phone]                           5:30 PM    I discussed all findings with the patient.  I will stop her new additives to her laundry detergent you start steroids and Vistaril.  Is a follow-up with her doctor later this week for re-evaluation and assessment of her improvement or not.  She may require referral for allergy testing if no improvement.  She has no respiratory distress or having shortness of breath.  No wheezing or stridor.  She is safe for discharge in an standpoint.  She also has dental pain in the right lower jaw pending follow-up with a dentist.  No abscess noted on exam.  Will start antibiotics pain control close follow-up.  Patient verbalized understanding room with all instructions reliable.  She is safe discharge my opinion.      Scribe Attestation:   Scribe #1: I performed the above scribed service and the documentation accurately describes the services I performed. I attest to the accuracy of the note.     Attending:   Physician Attestation Statement for Scribe #1: I, Terrance Mercer Jr., MD, personally performed the services described in this documentation, as scribed by Nicolas Pastor, in my presence, and it is both accurate and complete.           Clinical Impression       ICD-10-CM ICD-9-CM   1. Contact dermatitis, unspecified contact dermatitis type, unspecified trigger  L25.9 692.9   2. Pain, dental  K08.89 525.9       Disposition:   Disposition: Discharged  Condition:  Stable         Terrance Mercer Jr., MD  03/16/22 6123

## 2022-03-16 NOTE — FIRST PROVIDER EVALUATION
Medical screening exam completed.  I have conducted a focused provider triage encounter, findings are as follows:    Brief history of present illness:  Generalized rash and pruritus, history of leukemia.  Patient reports the meds are not working.    Vitals:    03/16/22 1602   Pulse: 80   Resp: 18   TempSrc: Oral   SpO2: 98%   Weight: (!) 149.1 kg (328 lb 11.3 oz)   Height: 6' (1.829 m)       Pertinent physical exam:  No acute distress, vital signs stable    Brief workup plan:  Labs and further evaluation    Preliminary workup initiated; this workup will be continued and followed by the physician or advanced practice provider that is assigned to the patient when roomed.

## 2022-03-16 NOTE — DISCHARGE INSTRUCTIONS
Eliminate the new added a 2 year laundry detergent from her wash.  We washed all of her clothes prior to wearing.  Medrol Dosepak as prescribed.  Vistaril for itching.  Do not drive or operate machinery while taking Vistaril as this will make you fatigued.  Her dental pain, use Toradol for pain,.  Take no over-the-counters while taking Toradol.  The mycin for infection.  Follow up with her dentist as scheduled.  Return as needed for any worsening symptoms, problems, questions or concerns.  May also use Orajel to help with her dental pain

## 2022-04-11 ENCOUNTER — TELEPHONE (OUTPATIENT)
Dept: PREADMISSION TESTING | Facility: HOSPITAL | Age: 47
End: 2022-04-11
Payer: MEDICAID

## 2022-04-11 NOTE — TELEPHONE ENCOUNTER
----- Message from Rashmi Sanchez MD sent at 1/29/2021  5:54 AM EST -----  Call hgb a1c at 8 so ok  But like it below 7.5 so watch diet PAT call completed.  Patient educated on procedure instructions.  Medical history discussed and patient informed of arrival time of 1:00 PM on Thursday, April 14, 2022 at the [facility], and was made aware of the limited-visitor policy, and that  is to remain during the entire visit.  All questions and concerns addressed.  Endoscopy instructions reviewed. Instructed no solid food, only clear liquids, the day before the procedure, then at 6 PM, the day before the procedure, drink the first half of the bowel prep, then at 6 AM, the morning of procedure, drink the second half of the prep, then do not eat or drink anything until after the procedure.  Also instructed to only take blood pressure medication Losartain the morning of procedure with just a few small sips of wate.  Pre-procedure covid testing scheduled for 04/11/22 at [facility].  Patient verbalized understanding of all instructions.

## 2022-04-14 ENCOUNTER — HOSPITAL ENCOUNTER (OUTPATIENT)
Dept: RADIOLOGY | Facility: HOSPITAL | Age: 47
Discharge: HOME | End: 2022-04-14
Attending: FAMILY MEDICINE
Payer: MEDICAID

## 2022-04-14 VITALS — HEIGHT: 72 IN | BODY MASS INDEX: 39.68 KG/M2 | WEIGHT: 293 LBS

## 2022-04-14 DIAGNOSIS — Z12.31 OTHER SCREENING MAMMOGRAM: ICD-10-CM

## 2022-04-14 PROCEDURE — 77063 BREAST TOMOSYNTHESIS BI: CPT | Mod: 26,,, | Performed by: RADIOLOGY

## 2022-04-14 PROCEDURE — 77063 MAMMO DIGITAL SCREENING BILAT WITH TOMO: ICD-10-PCS | Mod: 26,,, | Performed by: RADIOLOGY

## 2022-04-14 PROCEDURE — 77067 MAMMO DIGITAL SCREENING BILAT WITH TOMO: ICD-10-PCS | Mod: 26,,, | Performed by: RADIOLOGY

## 2022-04-14 PROCEDURE — 77063 BREAST TOMOSYNTHESIS BI: CPT | Mod: TC

## 2022-04-14 PROCEDURE — 77067 SCR MAMMO BI INCL CAD: CPT | Mod: 26,,, | Performed by: RADIOLOGY

## 2022-04-14 PROCEDURE — 77067 SCR MAMMO BI INCL CAD: CPT | Mod: TC

## 2022-04-19 DIAGNOSIS — L29.9 PRURITUS: ICD-10-CM

## 2022-04-19 RX ORDER — HYDROXYZINE HYDROCHLORIDE 25 MG/1
TABLET, FILM COATED ORAL
Qty: 30 TABLET | Refills: 0 | Status: SHIPPED | OUTPATIENT
Start: 2022-04-19 | End: 2023-04-21 | Stop reason: SDUPTHER

## 2022-05-28 ENCOUNTER — PATIENT MESSAGE (OUTPATIENT)
Dept: FAMILY MEDICINE | Facility: CLINIC | Age: 47
End: 2022-05-28
Payer: MEDICAID

## 2022-05-30 ENCOUNTER — HOSPITAL ENCOUNTER (OUTPATIENT)
Dept: RADIOLOGY | Facility: HOSPITAL | Age: 47
Discharge: HOME | End: 2022-05-30
Attending: FAMILY MEDICINE
Payer: MEDICAID

## 2022-05-30 DIAGNOSIS — R92.8 ABNORMAL MAMMOGRAM: ICD-10-CM

## 2022-05-30 PROCEDURE — 77061 MAMMO DIGITAL DIAGNOSTIC RIGHT WITH TOMO: ICD-10-PCS | Mod: 26,RT,, | Performed by: RADIOLOGY

## 2022-05-30 PROCEDURE — 76642 ULTRASOUND BREAST LIMITED: CPT | Mod: TC,RT

## 2022-05-30 PROCEDURE — 77065 MAMMO DIGITAL DIAGNOSTIC RIGHT WITH TOMO: ICD-10-PCS | Mod: 26,RT,, | Performed by: RADIOLOGY

## 2022-05-30 PROCEDURE — 77065 DX MAMMO INCL CAD UNI: CPT | Mod: 26,RT,, | Performed by: RADIOLOGY

## 2022-05-30 PROCEDURE — 77061 BREAST TOMOSYNTHESIS UNI: CPT | Mod: 26,RT,, | Performed by: RADIOLOGY

## 2022-05-30 PROCEDURE — 76642 ULTRASOUND BREAST LIMITED: CPT | Mod: 26,RT,, | Performed by: RADIOLOGY

## 2022-05-30 PROCEDURE — 76642 US BREAST RIGHT LIMITED: ICD-10-PCS | Mod: 26,RT,, | Performed by: RADIOLOGY

## 2022-05-30 PROCEDURE — 77065 DX MAMMO INCL CAD UNI: CPT | Mod: TC,RT

## 2022-06-08 NOTE — PLAN OF CARE
A Healthy Lifestyle: Care Instructions  Your Care Instructions     A healthy lifestyle can help you feel good, stay at a healthy weight, and have plenty of energy for both work and play. A healthy lifestyle is something you can share with your whole family. A healthy lifestyle also can lower your risk for serious health problems, such as high blood pressure, heart disease, and diabetes. You can follow a few steps listed below to improve your health and the health of your family. Follow-up care is a key part of your treatment and safety. Be sure to make and go to all appointments, and call your doctor if you are having problems. It's also a good idea to know your test results and keep a list of the medicines you take. How can you care for yourself at home? · Do not eat too much sugar, fat, or fast foods. You can still have dessert and treats now and then. The goal is moderation. · Start small to improve your eating habits. Pay attention to portion sizes, drink less juice and soda pop, and eat more fruits and vegetables. ? Eat a healthy amount of food. A 3-ounce serving of meat, for example, is about the size of a deck of cards. Fill the rest of your plate with vegetables and whole grains. ? Limit the amount of soda and sports drinks you have every day. Drink more water when you are thirsty. ? Eat plenty of fruits and vegetables every day. Have an apple or some carrot sticks as an afternoon snack instead of a candy bar. Try to have fruits and/or vegetables at every meal.  · Make exercise part of your daily routine. You may want to start with simple activities, such as walking, bicycling, or slow swimming. Try to be active 30 to 60 minutes every day. You do not need to do all 30 to 60 minutes all at once. For example, you can exercise 3 times a day for 10 or 20 minutes.  Moderate exercise is safe for most people, but it is always a good idea to talk to your doctor before starting an exercise Initial assessment completed. Met with patient at her bedside. Patient ambulates independently and is independent with ADLs. Patient recieves chemo monthly. Last chemo infusion June 26th with Dr. Dawson covering for Dr. Gissell Keenan MD.  Patient denies any post hospital needs or services at this time.Patient lives with john and he will help with her care post hospitalization.    07/07/20 1500   Discharge Assessment   Assessment Type Discharge Planning Assessment   Confirmed/corrected address and phone number on facesheet? Yes   Assessment information obtained from? Patient   Expected Length of Stay (days) 2   Communicated expected length of stay with patient/caregiver yes   Prior to hospitilization cognitive status: Alert/Oriented   Prior to hospitalization functional status: Independent   Current cognitive status: Alert/Oriented   Current Functional Status: Independent   Facility Arrived From: home   Lives With significant other  (lives with significant other and son)   Able to Return to Prior Arrangements yes   Is patient able to care for self after discharge? Yes   Who are your caregiver(s) and their phone number(s)? jordan [place] [phone]   Patient's perception of discharge disposition home or selfcare   Readmission Within the Last 30 Days no previous admission in last 30 days   Patient currently being followed by outpatient case management? No   Patient currently receives any other outside agency services? No   Equipment Currently Used at Home none   Part D Coverage n/a   Do you have any problems affording any of your prescribed medications? No   Is the patient taking medications as prescribed? yes   Does the patient have transportation home? Yes   Transportation Anticipated family or friend will provide  (either mother or son)   Dialysis Name and Scheduled days n/a   Discharge Plan A Home;Home with family   Discharge Plan B Home;Home with family   DME Needed Upon Discharge  none    Patient/Family in Agreement with Plan yes   Transitional Care Folder, Discharge Planning Begins on Admission pamphlet, [facility], Advance Directive information given to patient. Communication board updated with CM name and contact information. Instructed patient to call with any questions or concerns. While with patient assisted patient with making appointment through MyChart with new PCP. Patient has [facility] Medicaid insurance.    Bedside delivery: yes  Mychart: yes         [id number] [place] [facility] [address]  [address] [address]  Phone: [phone] Fax: [phone]    Primary Doctor No  Payor: MEDICAID / Plan: [facility] ([place] MEDICA[place]) / Product Type: Managed Medicaid /     program.  · Keep moving. Oscar Fatimaks the lawn, work in the garden, or [employer]. Take the stairs instead of the elevator at work. · If you smoke, quit. People who smoke have an increased risk for heart attack, stroke, cancer, and other lung illnesses. Quitting is hard, but there are ways to boost your chance of quitting tobacco for good. ? Use nicotine gum, patches, or lozenges. ? Ask your doctor about stop-smoking programs and medicines. ? Keep trying. In addition to reducing your risk of diseases in the future, you will notice some benefits soon after you stop using tobacco. If you have shortness of breath or asthma symptoms, they will likely get better within a few weeks after you quit. · Limit how much alcohol you drink. Moderate amounts of alcohol (up to 2 drinks a day for men, 1 drink a day for women) are okay. But drinking too much can lead to liver problems, high blood pressure, and other health problems. Family health  If you have a family, there are many things you can do together to improve your health. · Eat meals together as a family as often as possible. · Eat healthy foods. This includes fruits, vegetables, lean meats and dairy, and whole grains. · Include your family in your fitness plan. Most people think of activities such as jogging or tennis as the way to fitness, but there are many ways you and your family can be more active. Anything that makes you breathe hard and gets your heart pumping is exercise. Here are some tips:  ? Walk to do errands or to take your child to school or the bus.  ? Go for a family bike ride after dinner instead of watching TV. Where can you learn more? Go to http://www.gray.com/  Enter E488 in the search box to learn more about \"A Healthy Lifestyle: Care Instructions. \"  Current as of: June 16, 2021               Content Version: 13.2  © 1499-1750 [employer].    Care instructions adapted under license by [employer] (which disclaims liability or warranty for this information). If you have questions about a medical condition or this instruction, always ask your healthcare professional. [address] any warranty or liability for your use of this information.

## 2022-06-09 ENCOUNTER — LAB VISIT (OUTPATIENT)
Dept: LAB | Facility: HOSPITAL | Age: 47
End: 2022-06-09
Payer: MEDICAID

## 2022-06-09 DIAGNOSIS — C91.40 HAIRY CELL LEUKEMIA NOT HAVING ACHIEVED REMISSION: ICD-10-CM

## 2022-06-09 LAB
ALBUMIN SERPL BCP-MCNC: 4.2 G/DL (ref 3.5–5.2)
ALP SERPL-CCNC: 65 U/L (ref 55–135)
ALT SERPL W/O P-5'-P-CCNC: 21 U/L (ref 10–44)
ANION GAP SERPL CALC-SCNC: 7 MMOL/L (ref 8–16)
AST SERPL-CCNC: 20 U/L (ref 10–40)
BASOPHILS # BLD AUTO: 0.05 K/UL (ref 0–0.2)
BASOPHILS NFR BLD: 0.6 % (ref 0–1.9)
BILIRUB SERPL-MCNC: 0.7 MG/DL (ref 0.1–1)
BUN SERPL-MCNC: 15 MG/DL (ref 6–20)
CALCIUM SERPL-MCNC: 9.5 MG/DL (ref 8.7–10.5)
CHLORIDE SERPL-SCNC: 104 MMOL/L (ref 95–110)
CO2 SERPL-SCNC: 28 MMOL/L (ref 23–29)
CREAT SERPL-MCNC: 1.2 MG/DL (ref 0.5–1.4)
DIFFERENTIAL METHOD: ABNORMAL
EOSINOPHIL # BLD AUTO: 1.7 K/UL (ref 0–0.5)
EOSINOPHIL NFR BLD: 21.2 % (ref 0–8)
ERYTHROCYTE [DISTWIDTH] IN BLOOD BY AUTOMATED COUNT: 18.4 % (ref 11.5–14.5)
EST. GFR  (AFRICAN AMERICAN): >60 ML/MIN/1.73 M^2
EST. GFR  (NON AFRICAN AMERICAN): 55 ML/MIN/1.73 M^2
GLUCOSE SERPL-MCNC: 101 MG/DL (ref 70–110)
HCT VFR BLD AUTO: 40.9 % (ref 37–48.5)
HGB BLD-MCNC: 13.5 G/DL (ref 12–16)
IMM GRANULOCYTES # BLD AUTO: 0.03 K/UL (ref 0–0.04)
IMM GRANULOCYTES NFR BLD AUTO: 0.4 % (ref 0–0.5)
LYMPHOCYTES # BLD AUTO: 3.1 K/UL (ref 1–4.8)
LYMPHOCYTES NFR BLD: 38.2 % (ref 18–48)
MCH RBC QN AUTO: 27.7 PG (ref 27–31)
MCHC RBC AUTO-ENTMCNC: 33 G/DL (ref 32–36)
MCV RBC AUTO: 84 FL (ref 82–98)
MONOCYTES # BLD AUTO: 0.6 K/UL (ref 0.3–1)
MONOCYTES NFR BLD: 7 % (ref 4–15)
NEUTROPHILS # BLD AUTO: 2.6 K/UL (ref 1.8–7.7)
NEUTROPHILS NFR BLD: 32.6 % (ref 38–73)
NRBC BLD-RTO: 0 /100 WBC
PLATELET # BLD AUTO: 338 K/UL (ref 150–450)
PMV BLD AUTO: 10 FL (ref 9.2–12.9)
POTASSIUM SERPL-SCNC: 4.6 MMOL/L (ref 3.5–5.1)
PROT SERPL-MCNC: 7.1 G/DL (ref 6–8.4)
RBC # BLD AUTO: 4.88 M/UL (ref 4–5.4)
SODIUM SERPL-SCNC: 139 MMOL/L (ref 136–145)
WBC # BLD AUTO: 8.01 K/UL (ref 3.9–12.7)

## 2022-06-09 PROCEDURE — 80053 COMPREHEN METABOLIC PANEL: CPT

## 2022-06-09 PROCEDURE — 85025 COMPLETE CBC W/AUTO DIFF WBC: CPT

## 2022-06-09 PROCEDURE — 85060 BLOOD SMEAR INTERPRETATION: CPT | Mod: ,,, | Performed by: STUDENT IN AN ORGANIZED HEALTH CARE EDUCATION/TRAINING PROGRAM

## 2022-06-09 PROCEDURE — 85060 PATHOLOGIST REVIEW: ICD-10-PCS | Mod: ,,, | Performed by: STUDENT IN AN ORGANIZED HEALTH CARE EDUCATION/TRAINING PROGRAM

## 2022-06-10 LAB
PATH REV BLD -IMP: NORMAL
PATH REV BLD -IMP: NORMAL

## 2022-06-14 ENCOUNTER — TELEPHONE (OUTPATIENT)
Dept: DERMATOLOGY | Facility: CLINIC | Age: 47
End: 2022-06-14
Payer: MEDICAID

## 2022-06-14 ENCOUNTER — OFFICE VISIT (OUTPATIENT)
Dept: FAMILY MEDICINE | Facility: CLINIC | Age: 47
End: 2022-06-14
Payer: MEDICAID

## 2022-06-14 ENCOUNTER — TELEPHONE (OUTPATIENT)
Dept: HEMATOLOGY/ONCOLOGY | Facility: CLINIC | Age: 47
End: 2022-06-14
Payer: MEDICAID

## 2022-06-14 ENCOUNTER — PATIENT MESSAGE (OUTPATIENT)
Dept: HEMATOLOGY/ONCOLOGY | Facility: CLINIC | Age: 47
End: 2022-06-14

## 2022-06-14 VITALS
TEMPERATURE: 98 F | HEART RATE: 98 BPM | SYSTOLIC BLOOD PRESSURE: 138 MMHG | WEIGHT: 293 LBS | OXYGEN SATURATION: 96 % | HEIGHT: 72 IN | DIASTOLIC BLOOD PRESSURE: 87 MMHG | BODY MASS INDEX: 39.68 KG/M2

## 2022-06-14 DIAGNOSIS — C91.40 HAIRY CELL LEUKEMIA OF SPLEEN: ICD-10-CM

## 2022-06-14 DIAGNOSIS — M54.16 LUMBAR RADICULOPATHY: ICD-10-CM

## 2022-06-14 DIAGNOSIS — L02.92 BOIL: ICD-10-CM

## 2022-06-14 DIAGNOSIS — L29.9 PRURITUS: ICD-10-CM

## 2022-06-14 DIAGNOSIS — F32.A DEPRESSION, UNSPECIFIED DEPRESSION TYPE: ICD-10-CM

## 2022-06-14 DIAGNOSIS — R21 DIFFUSE PAPULAR RASH: ICD-10-CM

## 2022-06-14 DIAGNOSIS — I10 ESSENTIAL HYPERTENSION: Primary | ICD-10-CM

## 2022-06-14 PROCEDURE — 4010F ACE/ARB THERAPY RXD/TAKEN: CPT | Mod: CPTII,,, | Performed by: FAMILY MEDICINE

## 2022-06-14 PROCEDURE — 3008F BODY MASS INDEX DOCD: CPT | Mod: CPTII,,, | Performed by: FAMILY MEDICINE

## 2022-06-14 PROCEDURE — 4010F PR ACE/ARB THEARPY RXD/TAKEN: ICD-10-PCS | Mod: CPTII,,, | Performed by: FAMILY MEDICINE

## 2022-06-14 PROCEDURE — 3079F PR MOST RECENT DIASTOLIC BLOOD PRESSURE 80-89 MM HG: ICD-10-PCS | Mod: CPTII,,, | Performed by: FAMILY MEDICINE

## 2022-06-14 PROCEDURE — 3008F PR BODY MASS INDEX (BMI) DOCUMENTED: ICD-10-PCS | Mod: CPTII,,, | Performed by: FAMILY MEDICINE

## 2022-06-14 PROCEDURE — 99214 OFFICE O/P EST MOD 30 MIN: CPT | Mod: S$PBB,,, | Performed by: FAMILY MEDICINE

## 2022-06-14 PROCEDURE — 3079F DIAST BP 80-89 MM HG: CPT | Mod: CPTII,,, | Performed by: FAMILY MEDICINE

## 2022-06-14 PROCEDURE — 99214 PR OFFICE/OUTPT VISIT, EST, LEVL IV, 30-39 MIN: ICD-10-PCS | Mod: S$PBB,,, | Performed by: FAMILY MEDICINE

## 2022-06-14 PROCEDURE — 99213 OFFICE O/P EST LOW 20 MIN: CPT | Mod: PBBFAC,PO | Performed by: FAMILY MEDICINE

## 2022-06-14 PROCEDURE — 99999 PR PBB SHADOW E&M-EST. PATIENT-LVL III: ICD-10-PCS | Mod: PBBFAC,,, | Performed by: FAMILY MEDICINE

## 2022-06-14 PROCEDURE — 3075F SYST BP GE 130 - 139MM HG: CPT | Mod: CPTII,,, | Performed by: FAMILY MEDICINE

## 2022-06-14 PROCEDURE — 3075F PR MOST RECENT SYSTOLIC BLOOD PRESS GE 130-139MM HG: ICD-10-PCS | Mod: CPTII,,, | Performed by: FAMILY MEDICINE

## 2022-06-14 PROCEDURE — 99999 PR PBB SHADOW E&M-EST. PATIENT-LVL III: CPT | Mod: PBBFAC,,, | Performed by: FAMILY MEDICINE

## 2022-06-14 RX ORDER — DOXYCYCLINE 100 MG/1
100 CAPSULE ORAL 2 TIMES DAILY
Qty: 14 CAPSULE | Refills: 0 | Status: SHIPPED | OUTPATIENT
Start: 2022-06-14 | End: 2022-06-21

## 2022-06-14 RX ORDER — LOSARTAN POTASSIUM 25 MG/1
25 TABLET ORAL DAILY
Qty: 90 TABLET | Refills: 3 | Status: SHIPPED | OUTPATIENT
Start: 2022-06-14 | End: 2023-06-30

## 2022-06-14 NOTE — PROGRESS NOTES
Subjective:      Patient ID: Jaswant Yang is a 46 y.o. female.    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 Added losartan at last visit. BP at goal today. Rash still present. Missed dermatology appointment due to working late and wasn't sure when it was scheduled. Pain management also called to schedule MRI and appointment with patient but patient did not call back. She also notes that she has a swelling right in front of her left ear that started a few days ago. No fevers, chills or discharge from the area. She also notes that her mood has been down due to above chronic issue and would like to speak with a therapist about this issue.    Has difficulty making it to appointments due to her job.     Past Medical History:   Diagnosis Date    Anemia     Anemia     Back pain     Dental infection     GERD (gastroesophageal reflux disease) 2020    Hairy-cell leukemia of spleen        Past Surgical History:   Procedure Laterality Date     SECTION, CLASSIC      FLUOROSCOPY N/A 2020    Procedure: Spleenic Bleed;  Surgeon: Hiren Cabrera MD;  Location: [facility] CATH LAB;  Service: General;  Laterality: N/A;    INJECTION OF ANESTHETIC AGENT INTO TISSUE PLANE DEFINED BY TRANSVERSUS ABDOMINIS MUSCLE N/A 2020    Procedure: BLOCK, TRANSVERSUS ABDOMINIS PLANE;  Surgeon: Yumi Ferguson MD;  Location: [facility] OR;  Service: General;  Laterality: N/A;    SPLENECTOMY N/A 2020    Procedure: SPLENECTOMY;  Surgeon: Yumi Ferguson MD;  Location: [facility] OR;  Service: General;  Laterality: N/A;    THORACENTESIS Left 2020    Procedure: THORACENTESIS;  Surgeon: Yumi Ferguson MD;  Location: [facility] OR;  Service: General;  Laterality: Left;    TUBAL LIGATION         No family history on file.    Social History     Socioeconomic History    Marital  status: Single   Tobacco Use    Smoking status: Former Smoker     Packs/day: 0.25     Types: Cigarettes    Smokeless tobacco: Never Used    Tobacco comment: no longer smoker   Substance and Sexual Activity    Alcohol use: Yes     Comment: occasional    Drug use: No    Sexual activity: Yes     Birth control/protection: Condom   Social History Narrative    Lives in a single family home with her fiance and her daughter.     Her mother and her son are her SDM's. ACP Documents are available in her chart.        Health Maintenance Topics with due status: Not Due       Topic Last Completion Date    Influenza Vaccine 10/21/2020    Pneumococcal Vaccines (Age 0-64) 02/24/2022    Mammogram 05/30/2022       Medication List with Changes/Refills   New Medications    DOXYCYCLINE (MONODOX) 100 MG CAPSULE    Take 1 capsule (100 mg total) by mouth 2 (two) times daily. Eat with medication for 7 days   Current Medications    ALBUTEROL (PROVENTIL/VENTOLIN HFA) 90 MCG/ACTUATION INHALER    Inhale 1-2 puffs into the lungs every 6 (six) hours as needed for Wheezing. Rescue    ALPRAZOLAM (XANAX) 0.5 MG TABLET    TAKE 1/2 TO 1 TABLET BY MOUTH TWICE DAILY AS NEEDED FOR ANXIETY.    BETAMETHASONE DIPROPIONATE (DIPROLENE) 0.05 % LOTION    Apply topically 2 (two) times daily.    CETIRIZINE (ZYRTEC) 10 MG TABLET    Take 10 mg by mouth daily as needed.    CHLORHEXIDINE (PERIDEX) 0.12 % SOLUTION    SMARTSIG:By Mouth    CLOTRIMAZOLE-BETAMETHASONE 1-0.05% (LOTRISONE) CREAM    Apply topically 2 (two) times daily.    CYCLOBENZAPRINE (FLEXERIL) 10 MG TABLET    TAKE 1/2 TO 1 TABLET BY MOUTH TWICE DAILY AS NEEDED FOR MUSCLE SPASMS. MAY CAUSE DROWSINESS    GABAPENTIN (NEURONTIN) 300 MG CAPSULE    TAKE 2 CAPSULES BY MOUTH EVERY EVENING.    HYDROCODONE-ACETAMINOPHEN (NORCO) 5-325 MG PER TABLET    Take 1 tablet by mouth every 12 (twelve) hours as needed for Pain.    HYDROXYZINE HCL (ATARAX) 25 MG TABLET    Take 1 tablet (25 mg total) by mouth 3 (three)  times daily as needed for Itching.    HYDROXYZINE HCL (ATARAX) 25 MG TABLET    TAKE 1 TABLET(25 MG) BY MOUTH THREE TIMES DAILY AS NEEDED FOR ITCHING    IBUPROFEN (ADVIL,MOTRIN) 600 MG TABLET    Take 600 mg by mouth every 6 (six) hours as needed.    KETOROLAC (TORADOL) 10 MG TABLET    Take 1 tablet (10 mg total) by mouth every 8 (eight) hours as needed for Pain.    METHYLPREDNISOLONE (MEDROL DOSEPACK) 4 MG TABLET    Take as directed on the package.    POLYETHYLENE GLYCOL (GLYCOLAX) 17 GRAM/DOSE POWDER    Take 17 g by mouth once daily.    PROMETHAZINE (PHENERGAN) 25 MG TABLET    Take 1 tablet (25 mg total) by mouth every 6 (six) hours as needed for Nausea.    SENNA (SENOKOT) 8.6 MG TABLET    Take 1 tablet by mouth 2 (two) times a day.    TIZANIDINE (ZANAFLEX) 4 MG TABLET    TAKE 1/2 TO 1 TABLET BY MOUTH TWICE DAILY AS NEEDED FOR MUSCLE SPASMS. MAY CAUSE DROWSINESS   Changed and/or Refilled Medications    Modified Medication Previous Medication    LOSARTAN (COZAAR) 25 MG TABLET losartan (COZAAR) 25 MG tablet       Take 1 tablet (25 mg total) by mouth once daily.    Take 1 tablet (25 mg total) by mouth once daily.       Review of patient's allergies indicates:   Allergen Reactions    Fentanyl Rash     Severe full body diffuse rash requiring systemic steroids    Vancomycin analogues Rash     Severe full body diffuse rash requiring systemic steroids    Rituximab Other (See Comments)     Sweating, abdominal pain, elevated blood pressure    Penicillin Hives    Tramadol Hives       Review of Systems   Constitutional: Negative for fever.   HENT: Negative for congestion.    Eyes: Negative for blurred vision.   Respiratory: Negative for shortness of breath.    Cardiovascular: Negative for chest pain and leg swelling.   Gastrointestinal: Negative for abdominal pain, constipation and diarrhea.   Genitourinary: Negative for dysuria.   Musculoskeletal: Positive for back pain.   Skin: Positive for itching and rash.    Neurological: Negative for headaches.   Psychiatric/Behavioral: Positive for depression.       Objective:     Vitals:    06/14/22 0813   BP: 138/87   Pulse: 98   Temp: 98 °F (36.7 °C)     Body mass index is 47.48 kg/m².    Physical Exam  Vitals and nursing note reviewed.   Constitutional:       General: She is not in acute distress.     Appearance: She is well-developed.   HENT:      Head: Normocephalic.     Cardiovascular:      Rate and Rhythm: Normal rate and regular rhythm.      Heart sounds: Normal heart sounds. No murmur heard.  Pulmonary:      Effort: Pulmonary effort is normal. No respiratory distress.      Breath sounds: Normal breath sounds. No wheezing.   Abdominal:      General: Bowel sounds are normal.      Palpations: Abdomen is soft.      Tenderness: There is no abdominal tenderness.   Skin:     General: Skin is warm and dry.   Neurological:      Mental Status: She is alert and oriented to person, place, and time.         Assessment and Plan:     Essential hypertension  Blood pressure controlled at today's visit   Continue with current medications   Ambulatory logs of blood pressure readings recommended   DASH diet, weight loss, exercise     Hairy cell leukemia of spleen  Following heme/onc  Has appointment scheduled for today    Lumbar radiculopathy  Given number to Dr. Covarrubias's office so that patient could call and get MRI and follow up appointment scheduled. Again, will no longer fill chronic pain medication for patien    Boil  -     doxycycline (MONODOX) 100 MG capsule; Take 1 capsule (100 mg total) by mouth 2 (two) times daily. Eat with medication for 7 days  Dispense: 14 capsule; Refill: 0  Warm compresses  Doxycyline  Follow up with derm    Pruritus  -     Ambulatory referral/consult to Dermatology; Future; Expected date: 06/21/2022  Diffuse papular rash  -     Ambulatory referral/consult to Dermatology; Future; Expected date: 06/21/2022  Given appointment while in office - aware of date/time.  Advised importance of making this appointment - patient understands    Depression, unspecified depression type  Given list of 4 clinics in [place] that take patient's insurance for psychiatry. Patient will call to get scheduled    Other orders  -     losartan (COZAAR) 25 MG tablet; Take 1 tablet (25 mg total) by mouth once daily.  Dispense: 90 tablet; Refill: 3        No follow-ups on file.

## 2022-06-14 NOTE — TELEPHONE ENCOUNTER
Spoke to pt about appt. scheduled pt. ----- Message from Sada Villar sent at 6/14/2022  8:41 AM CDT -----  Regarding: Established Patient Appointment (Medicaid)  Good Morning, patient was seen by Dr. Arleth Gallegos this morning. Has a referral for Pruritus,  Diffuse papular rash, requesting appointment after 9:00am. Please schedule and call patient at [phone]. Thanks/elr

## 2022-06-17 ENCOUNTER — TELEPHONE (OUTPATIENT)
Dept: PREADMISSION TESTING | Facility: HOSPITAL | Age: 47
End: 2022-06-17
Payer: MEDICAID

## 2022-06-17 NOTE — TELEPHONE ENCOUNTER
PAT call completed.  Patient educated on procedure instructions.  Medical history discussed and patient informed of arrival time of 11:30 AM on Friday, June 24, 2022 at the [facility], and was made aware of the limited-visitor policy, and that  is to remain during the entire visit.  All questions and concerns addressed.  Endoscopy instructions reviewed. Instructed no solid food, only clear liquids, the day before the procedure, then at 6 PM, the day before the procedure, drink the first half of the bowel prep, then at 2 AM, the morning of procedure, drink the second half of the prep, then do not eat or drink anything until after the procedure.  Also instructed to only take blood pressure medication Losartan the morning of procedure with just a few small sips of water.  Pre-procedure covid testing scheduled for 06/22/22 at [facility].  Patient verbalized understanding of all instructions.

## 2022-06-29 DIAGNOSIS — C91.40 HAIRY CELL LEUKEMIA NOT HAVING ACHIEVED REMISSION: Primary | ICD-10-CM

## 2022-07-25 ENCOUNTER — TELEPHONE (OUTPATIENT)
Dept: PREADMISSION TESTING | Facility: HOSPITAL | Age: 47
End: 2022-07-25
Payer: MEDICAID

## 2022-07-25 NOTE — TELEPHONE ENCOUNTER
PAT call completed.  Patient educated on procedure instructions.  Medical history discussed and patient informed of arrival time of 9:30 AM on Wednesday, July 27, 2022 at the [facility], and was made aware of the limited-visitor policy, and that  is to remain during the entire visit.  All questions and concerns addressed.  Endoscopy instructions reviewed. Instructed no solid food, only clear liquids, the day before the procedure, then at 6 PM, the day before the procedure, drink the first half of the bowel prep, then at 2 AM, the morning of procedure, drink the second half of the prep, then do not eat or drink anything until after the procedure.  Also instructed to only take blood pressure medications the morning of procedure with just a few small sips of water.  Rapid pre-procedure covid testing  needed upon arrival. Patient verbalized understanding of all instructions.

## 2022-07-27 DIAGNOSIS — Z13.9 ENCOUNTER FOR SCREENING: Primary | ICD-10-CM

## 2022-07-28 ENCOUNTER — OFFICE VISIT (OUTPATIENT)
Dept: HEMATOLOGY/ONCOLOGY | Facility: CLINIC | Age: 47
End: 2022-07-28
Payer: MEDICAID

## 2022-07-28 ENCOUNTER — LAB VISIT (OUTPATIENT)
Dept: LAB | Facility: HOSPITAL | Age: 47
End: 2022-07-28
Payer: MEDICAID

## 2022-07-28 VITALS
HEIGHT: 71 IN | TEMPERATURE: 98 F | BODY MASS INDEX: 41.02 KG/M2 | WEIGHT: 293 LBS | SYSTOLIC BLOOD PRESSURE: 158 MMHG | HEART RATE: 89 BPM | DIASTOLIC BLOOD PRESSURE: 107 MMHG | OXYGEN SATURATION: 97 %

## 2022-07-28 DIAGNOSIS — L73.9 ACUTE FOLLICULITIS: Primary | ICD-10-CM

## 2022-07-28 DIAGNOSIS — M54.16 LUMBAR RADICULOPATHY: ICD-10-CM

## 2022-07-28 DIAGNOSIS — C91.40 HAIRY CELL LEUKEMIA NOT HAVING ACHIEVED REMISSION: ICD-10-CM

## 2022-07-28 DIAGNOSIS — K04.7 DENTAL ABSCESS: ICD-10-CM

## 2022-07-28 LAB
ALBUMIN SERPL BCP-MCNC: 4.1 G/DL (ref 3.5–5.2)
ALP SERPL-CCNC: 53 U/L (ref 55–135)
ALT SERPL W/O P-5'-P-CCNC: 19 U/L (ref 10–44)
ANION GAP SERPL CALC-SCNC: 9 MMOL/L (ref 8–16)
AST SERPL-CCNC: 15 U/L (ref 10–40)
BASOPHILS # BLD AUTO: 0.03 K/UL (ref 0–0.2)
BASOPHILS NFR BLD: 0.4 % (ref 0–1.9)
BILIRUB SERPL-MCNC: 0.6 MG/DL (ref 0.1–1)
BUN SERPL-MCNC: 14 MG/DL (ref 6–20)
CALCIUM SERPL-MCNC: 9.6 MG/DL (ref 8.7–10.5)
CHLORIDE SERPL-SCNC: 104 MMOL/L (ref 95–110)
CO2 SERPL-SCNC: 25 MMOL/L (ref 23–29)
CREAT SERPL-MCNC: 1.2 MG/DL (ref 0.5–1.4)
DIFFERENTIAL METHOD: ABNORMAL
EOSINOPHIL # BLD AUTO: 1.3 K/UL (ref 0–0.5)
EOSINOPHIL NFR BLD: 15.2 % (ref 0–8)
ERYTHROCYTE [DISTWIDTH] IN BLOOD BY AUTOMATED COUNT: 17.8 % (ref 11.5–14.5)
EST. GFR  (AFRICAN AMERICAN): >60 ML/MIN/1.73 M^2
EST. GFR  (NON AFRICAN AMERICAN): 55 ML/MIN/1.73 M^2
GLUCOSE SERPL-MCNC: 104 MG/DL (ref 70–110)
HCT VFR BLD AUTO: 40.8 % (ref 37–48.5)
HGB BLD-MCNC: 13.5 G/DL (ref 12–16)
IMM GRANULOCYTES # BLD AUTO: 0.02 K/UL (ref 0–0.04)
IMM GRANULOCYTES NFR BLD AUTO: 0.2 % (ref 0–0.5)
LYMPHOCYTES # BLD AUTO: 3.5 K/UL (ref 1–4.8)
LYMPHOCYTES NFR BLD: 42.3 % (ref 18–48)
MCH RBC QN AUTO: 28.1 PG (ref 27–31)
MCHC RBC AUTO-ENTMCNC: 33.1 G/DL (ref 32–36)
MCV RBC AUTO: 85 FL (ref 82–98)
MONOCYTES # BLD AUTO: 0.8 K/UL (ref 0.3–1)
MONOCYTES NFR BLD: 9.5 % (ref 4–15)
NEUTROPHILS # BLD AUTO: 2.7 K/UL (ref 1.8–7.7)
NEUTROPHILS NFR BLD: 32.4 % (ref 38–73)
NRBC BLD-RTO: 0 /100 WBC
PLATELET # BLD AUTO: 314 K/UL (ref 150–450)
PMV BLD AUTO: 9.7 FL (ref 9.2–12.9)
POTASSIUM SERPL-SCNC: 4 MMOL/L (ref 3.5–5.1)
PROT SERPL-MCNC: 7.2 G/DL (ref 6–8.4)
RBC # BLD AUTO: 4.8 M/UL (ref 4–5.4)
SODIUM SERPL-SCNC: 138 MMOL/L (ref 136–145)
WBC # BLD AUTO: 8.35 K/UL (ref 3.9–12.7)

## 2022-07-28 PROCEDURE — 99999 PR PBB SHADOW E&M-EST. PATIENT-LVL IV: CPT | Mod: PBBFAC,,,

## 2022-07-28 PROCEDURE — 1159F PR MEDICATION LIST DOCUMENTED IN MEDICAL RECORD: ICD-10-PCS | Mod: CPTII,,,

## 2022-07-28 PROCEDURE — 4010F ACE/ARB THERAPY RXD/TAKEN: CPT | Mod: CPTII,,,

## 2022-07-28 PROCEDURE — 1159F MED LIST DOCD IN RCRD: CPT | Mod: CPTII,,,

## 2022-07-28 PROCEDURE — 85025 COMPLETE CBC W/AUTO DIFF WBC: CPT

## 2022-07-28 PROCEDURE — 99214 PR OFFICE/OUTPT VISIT, EST, LEVL IV, 30-39 MIN: ICD-10-PCS | Mod: S$PBB,,,

## 2022-07-28 PROCEDURE — 3077F SYST BP >= 140 MM HG: CPT | Mod: CPTII,,,

## 2022-07-28 PROCEDURE — 3008F PR BODY MASS INDEX (BMI) DOCUMENTED: ICD-10-PCS | Mod: CPTII,,,

## 2022-07-28 PROCEDURE — 1160F RVW MEDS BY RX/DR IN RCRD: CPT | Mod: CPTII,,,

## 2022-07-28 PROCEDURE — 99999 PR PBB SHADOW E&M-EST. PATIENT-LVL IV: ICD-10-PCS | Mod: PBBFAC,,,

## 2022-07-28 PROCEDURE — 36415 COLL VENOUS BLD VENIPUNCTURE: CPT

## 2022-07-28 PROCEDURE — 1160F PR REVIEW ALL MEDS BY PRESCRIBER/CLIN PHARMACIST DOCUMENTED: ICD-10-PCS | Mod: CPTII,,,

## 2022-07-28 PROCEDURE — 80053 COMPREHEN METABOLIC PANEL: CPT

## 2022-07-28 PROCEDURE — 3077F PR MOST RECENT SYSTOLIC BLOOD PRESSURE >= 140 MM HG: ICD-10-PCS | Mod: CPTII,,,

## 2022-07-28 PROCEDURE — 99214 OFFICE O/P EST MOD 30 MIN: CPT | Mod: S$PBB,,,

## 2022-07-28 PROCEDURE — 4010F PR ACE/ARB THEARPY RXD/TAKEN: ICD-10-PCS | Mod: CPTII,,,

## 2022-07-28 PROCEDURE — 3080F PR MOST RECENT DIASTOLIC BLOOD PRESSURE >= 90 MM HG: ICD-10-PCS | Mod: CPTII,,,

## 2022-07-28 PROCEDURE — 3080F DIAST BP >= 90 MM HG: CPT | Mod: CPTII,,,

## 2022-07-28 PROCEDURE — 99214 OFFICE O/P EST MOD 30 MIN: CPT | Mod: PBBFAC

## 2022-07-28 PROCEDURE — 3008F BODY MASS INDEX DOCD: CPT | Mod: CPTII,,,

## 2022-07-28 RX ORDER — MUPIROCIN 20 MG/G
OINTMENT TOPICAL 3 TIMES DAILY
Qty: 22 G | Refills: 0 | Status: SHIPPED | OUTPATIENT
Start: 2022-07-28

## 2022-07-28 RX ORDER — DOXYCYCLINE 100 MG/1
100 CAPSULE ORAL 2 TIMES DAILY
Qty: 14 CAPSULE | Refills: 0 | Status: SHIPPED | OUTPATIENT
Start: 2022-07-28 | End: 2022-10-19

## 2022-07-28 RX ORDER — HYDROCODONE BITARTRATE AND ACETAMINOPHEN 5; 325 MG/1; MG/1
1 TABLET ORAL EVERY 12 HOURS PRN
Qty: 10 TABLET | Refills: 0 | Status: SHIPPED | OUTPATIENT
Start: 2022-07-28 | End: 2023-01-31 | Stop reason: SDUPTHER

## 2022-08-15 PROBLEM — L73.9 ACUTE FOLLICULITIS: Status: ACTIVE | Noted: 2022-08-15

## 2022-08-15 NOTE — ASSESSMENT & PLAN NOTE
Pt c/o dental pain and presents with noticeable right-sided facial swelling.  Pt states she will make appt with dentist this week  Recommend f/u ASAP

## 2022-08-15 NOTE — ASSESSMENT & PLAN NOTE
Suspect folliculitis that has been worsened with manipulation  Mulitple ulcerations to inner thigh area  Pt strongly advised not to pick/scratch area  Recommend f/u with Dermatology

## 2022-08-15 NOTE — PROGRESS NOTES
Subjective:      DATE OF VISIT: 7/28/2022   ?   ?   Patient ID:?Jaswant Yang is a 46 y.o. female.?? MR#: [id number]   ?   PRIMARY PROVIDER: Dr. Hernandez  ?   CHIEF COMPLAINT: Follow-up?????   ?   ONCOLOGIC DIAGNOSIS: Hairy cell leukemia  ?   CURRENT TREATMENT: surveillance     PAST TREATMENT: 6/22/20 cladribine; 7/6/20 initiated rituximab, not completed due to transfusion reaction  05/11/2020 splenectomy    HPI    Ms. Yang is a 46-year-old AA female with a PMHx of HTN, GERD, and hairy cell leukemia. She presents today for follow-up. Pt was last seen in clinic by  10/25/21. Today, she c/o itchy rash and pain to lower back and legs at @ 9/10. She is followed by pain management who has recommended steroid injections, however she has had to hold off on this due to ongoing dental work. Pt states she has seen dermatologist for rash but still finding no relief.  Pt has not established care with PCP and does not wish to use an [facility] provider at this time.     Interval History: Pt presents today for lab review/routine follow-up. Pt c/o 08/10 pain r/t tooth infection, mouth pain, facial swelling, and sores to inner thighs. enies NVDC, fatigue, cp, sob, fever, chills, unintended weight loss, abdominal pain or fullness.      Review of Systems   Constitutional: Negative for activity change, appetite change, chills, diaphoresis, fatigue, fever and unexpected weight change.   HENT: Positive for dental problem and facial swelling. Negative for mouth sores, nosebleeds and sore throat.    Eyes: Negative for visual disturbance.   Respiratory: Negative for cough, chest tightness and shortness of breath.    Cardiovascular: Negative for chest pain and leg swelling.   Gastrointestinal: Negative for abdominal distention, abdominal pain, anal bleeding, blood in stool, constipation, diarrhea, nausea, rectal pain and vomiting.   Genitourinary: Negative for decreased urine volume, difficulty urinating, dysuria, hematuria and  urgency.   Musculoskeletal: Positive for back pain and myalgias. Negative for arthralgias.   Skin: Positive for wound.   Neurological: Negative for dizziness, tremors and weakness.   Hematological: Negative for adenopathy. Does not bruise/bleed easily.   Psychiatric/Behavioral: The patient is not nervous/anxious.        ?   A comprehensive 14-point review of systems was reviewed with patient and was negative other than as specified above.   ?     Objective:      Physical Exam  Vitals and nursing note reviewed.   Constitutional:       Appearance: She is well-developed. She is obese.   HENT:      Head: Normocephalic and atraumatic.      Jaw: Swelling present.      Right Ear: External ear normal.      Left Ear: External ear normal.      Nose: Nose normal.      Mouth/Throat:      Mouth: Mucous membranes are moist.      Dentition: Abnormal dentition. Dental tenderness present.   Eyes:      General: No scleral icterus.     Conjunctiva/sclera: Conjunctivae normal.   Cardiovascular:      Rate and Rhythm: Normal rate.   Pulmonary:      Effort: Pulmonary effort is normal. No respiratory distress.   Abdominal:      General: There is no distension.      Palpations: Abdomen is soft.      Tenderness: There is no abdominal tenderness.   Musculoskeletal:         General: Normal range of motion.      Cervical back: Normal range of motion and neck supple.      Right lower leg: No edema.      Left lower leg: No edema.   Skin:     General: Skin is warm and dry.      Findings: Lesion present. No rash.          Neurological:      Mental Status: She is alert and oriented to person, place, and time.      Cranial Nerves: No cranial nerve deficit.   Psychiatric:         Mood and Affect: Mood normal.         Behavior: Behavior normal.         Thought Content: Thought content normal.         Judgment: Judgment normal.           ?   Vitals:    07/28/22 1411   BP: (!) 158/107   Pulse: 89   Temp: 97.8 °F (36.6 °C)      ?   ECOG:?1   General  appearance: Generally well appearing, in no acute distress.   Head, eyes, ears, nose, and throat: Oropharynx clear with moist mucous membranes.   Cardiovascular: Regular rate and rhythm, S1, S2, no audible murmurs.   Respiratory: Lungs clear to auscultation bilaterally.   Abdomen: nontender, nondistended.   Extremities: Warm, without edema.   Neurologic: Alert and oriented. Grossly normal strength, coordination, and gait.   Skin: No rashes, ecchymoses or petechial lesion.   Psychiatric: normal mood and affect, conversant and appropriate    ?   Laboratory:  ?   Lab Visit on 07/28/2022   Component Date Value Ref Range Status    WBC 07/28/2022 8.35  3.90 - 12.70 K/uL Final    RBC 07/28/2022 4.80  4.00 - 5.40 M/uL Final    Hemoglobin 07/28/2022 13.5  12.0 - 16.0 g/dL Final    Hematocrit 07/28/2022 40.8  37.0 - 48.5 % Final    MCV 07/28/2022 85  82 - 98 fL Final    MCH 07/28/2022 28.1  27.0 - 31.0 pg Final    MCHC 07/28/2022 33.1  32.0 - 36.0 g/dL Final    RDW 07/28/2022 17.8 (A) 11.5 - 14.5 % Final    Platelets 07/28/2022 314  150 - 450 K/uL Final    MPV 07/28/2022 9.7  9.2 - 12.9 fL Final    Immature Granulocytes 07/28/2022 0.2  0.0 - 0.5 % Final    Gran # (ANC) 07/28/2022 2.7  1.8 - 7.7 K/uL Final    Immature Grans (Abs) 07/28/2022 0.02  0.00 - 0.04 K/uL Final    Lymph # 07/28/2022 3.5  1.0 - 4.8 K/uL Final    Mono # 07/28/2022 0.8  0.3 - 1.0 K/uL Final    Eos # 07/28/2022 1.3 (A) 0.0 - 0.5 K/uL Final    Baso # 07/28/2022 0.03  0.00 - 0.20 K/uL Final    nRBC 07/28/2022 0  0 /100 WBC Final    Gran % 07/28/2022 32.4 (A) 38.0 - 73.0 % Final    Lymph % 07/28/2022 42.3  18.0 - 48.0 % Final    Mono % 07/28/2022 9.5  4.0 - 15.0 % Final    Eosinophil % 07/28/2022 15.2 (A) 0.0 - 8.0 % Final    Basophil % 07/28/2022 0.4  0.0 - 1.9 % Final    Differential Method 07/28/2022 Automated   Final    Sodium 07/28/2022 138  136 - 145 mmol/L Final    Potassium 07/28/2022 4.0  3.5 - 5.1 mmol/L Final    Chloride  07/28/2022 104  95 - 110 mmol/L Final    CO2 07/28/2022 25  23 - 29 mmol/L Final    Glucose 07/28/2022 104  70 - 110 mg/dL Final    BUN 07/28/2022 14  6 - 20 mg/dL Final    Creatinine 07/28/2022 1.2  0.5 - 1.4 mg/dL Final    Calcium 07/28/2022 9.6  8.7 - 10.5 mg/dL Final    Total Protein 07/28/2022 7.2  6.0 - 8.4 g/dL Final    Albumin 07/28/2022 4.1  3.5 - 5.2 g/dL Final    Total Bilirubin 07/28/2022 0.6  0.1 - 1.0 mg/dL Final    Alkaline Phosphatase 07/28/2022 53 (A) 55 - 135 U/L Final    AST 07/28/2022 15  10 - 40 U/L Final    ALT 07/28/2022 19  10 - 44 U/L Final    Anion Gap 07/28/2022 9  8 - 16 mmol/L Final    eGFR if African American 07/28/2022 >60  >60 mL/min/1.73 m^2 Final    eGFR if non  07/28/2022 55 (A) >60 mL/min/1.73 m^2 Final      ?   ?   Assessment/Plan:     Problem List Items Addressed This Visit        ENT    Dental abscess     Pt c/o dental pain and presents with noticeable right-sided facial swelling.  Pt states she will make appt with dentist this week  Recommend f/u ASAP                Derm    Acute folliculitis - Primary     Suspect folliculitis that has been worsened with manipulation  Mulitple ulcerations to inner thigh area  Pt strongly advised not to pick/scratch area  Recommend f/u with Dermatology           Relevant Medications    doxycycline (VIBRAMYCIN) 100 MG Cap    mupirocin (BACTROBAN) 2 % ointment    HYDROcodone-acetaminophen (NORCO) 5-325 mg per tablet       Oncology    Hairy cell leukemia not having achieved remission     Initial bone marrow hypercellular (%) with extensive involvement by hairy cell leukemia. Presentation with splenic rupture and extensive involvement with bone marrow with borderline cytopenias (ANC 1.4, hgb 11, plt 120K) we proceeded with treatment with cladribine on 6/22/20; 7/6/20 initiated rituximab, not completed due to transfusion reaction.      Repeat bone marrow biopsy 4 months after completion of therapy show significant  improvement with 1% residual hairy cell leukemia.   She did have evaluation by Dr. Norwood in [facility] 12/18/2020 who agreed with no indication for additional treatment at this time and will continue on surveillance.     No concerning cytopenias noted on most recent lab results           Relevant Orders    CBC Auto Differential    Comprehensive Metabolic Panel      Other Visit Diagnoses     Lumbar radiculopathy                 Follow-Up: In 3 months with cbc, cmp, and smear prior     VIKTORIA Moreira  Hematology/Oncology

## 2022-10-19 ENCOUNTER — TELEPHONE (OUTPATIENT)
Dept: PAIN MEDICINE | Facility: CLINIC | Age: 47
End: 2022-10-19
Payer: MEDICAID

## 2022-10-19 ENCOUNTER — TELEPHONE (OUTPATIENT)
Dept: FAMILY MEDICINE | Facility: CLINIC | Age: 47
End: 2022-10-19
Payer: MEDICAID

## 2022-10-19 PROBLEM — L73.9 ACUTE FOLLICULITIS: Status: RESOLVED | Noted: 2022-08-15 | Resolved: 2022-10-19

## 2022-10-19 PROBLEM — N17.9 AKI (ACUTE KIDNEY INJURY): Status: RESOLVED | Noted: 2020-07-08 | Resolved: 2022-10-19

## 2022-10-19 PROBLEM — K04.7 DENTAL ABSCESS: Status: RESOLVED | Noted: 2020-07-06 | Resolved: 2022-10-19

## 2022-10-19 NOTE — TELEPHONE ENCOUNTER
----- Message from Kely Cornelius sent at 10/19/2022 11:55 AM CDT -----  Contact: Jaswant Michaud would like a call back at [phone], Regards to getting an appointment for a check up for a lance under neck and boils underneath her arm pit and vaginal area.    Thanks  Td

## 2022-10-19 NOTE — TELEPHONE ENCOUNTER
----- Message from Kely Cornelius sent at 10/19/2022 11:57 AM CDT -----  Contact: Jaswant Michaud would like a call back at [phone], regards to getting an appointment schedule for the injection that she has decide to do.    Thanks  Td

## 2022-10-19 NOTE — PROGRESS NOTES
"Subjective:      Jaswant Yang is a 47 y.o. female here today for:   SKIN RASH       PCP:  Arleth Gallegos MD     HPI    Jaswant is here with reports of skin rash under breasts.  Chronic intermittent issue with localized irritation and itching.  Skin now appears discolored.  Increased moisture/sweating.      Review of Systems   Constitutional:  Negative for chills and fever.   HENT:  Positive for dental problem.         Req DDS referral to [facility] --- history of chemo in 2020 with subsequent dental, tooth and root problems.   Musculoskeletal:         Req referral to Pain Mgmt -- Dr. Arya Abdi.  She was seeing Dr. Bautista who was recommending injections to her but she feels that had made her immune system worse.  She wishes to "see someone that won't recommend shots" as her only option.   Skin:  Positive for rash (under breasts) and wound (reports lump under chin, soft, NT).       Review of patient's allergies indicates:   Allergen Reactions    Fentanyl Rash     Severe full body diffuse rash requiring systemic steroids    Vancomycin analogues Rash     Severe full body diffuse rash requiring systemic steroids    Rituximab Other (See Comments)     Sweating, abdominal pain, elevated blood pressure    Penicillin Hives    Tramadol Hives       Patient Active Problem List   Diagnosis    GERD (gastroesophageal reflux disease)    Hairy cell leukemia of spleen    S/P splenectomy    Hairy cell leukemia not having achieved remission    Encounter for antineoplastic chemotherapy    Chemotherapy-induced neutropenia    Acute neoplasm-related pain    Therapeutic opioid-induced constipation (OIC)    Metabolic acidosis    Elevated blood pressure reading    Pruritus         Current Outpatient Medications:     ALPRAZolam (XANAX) 0.5 MG tablet, TAKE 1/2 TO 1 TABLET BY MOUTH TWICE DAILY AS NEEDED FOR ANXIETY., Disp: 30 tablet, Rfl: 5    betamethasone dipropionate (DIPROLENE) 0.05 % lotion, Apply topically 2 (two) times daily., Disp: 60 " mL, Rfl: 1    cetirizine (ZYRTEC) 10 MG tablet, Take 10 mg by mouth daily as needed., Disp: , Rfl:     chlorhexidine (PERIDEX) 0.12 % solution, SMARTSIG:By Mouth, Disp: , Rfl:     clotrimazole-betamethasone 1-0.05% (LOTRISONE) cream, Apply topically 2 (two) times daily., Disp: , Rfl:     cyclobenzaprine (FLEXERIL) 10 MG tablet, TAKE 1/2 TO 1 TABLET BY MOUTH TWICE DAILY AS NEEDED FOR MUSCLE SPASMS. MAY CAUSE DROWSINESS, Disp: 60 tablet, Rfl: 1    gabapentin (NEURONTIN) 300 MG capsule, TAKE 2 CAPSULES BY MOUTH EVERY EVENING., Disp: 60 capsule, Rfl: 11    HYDROcodone-acetaminophen (NORCO) 5-325 mg per tablet, Take 1 tablet by mouth every 12 (twelve) hours as needed for Pain., Disp: 10 tablet, Rfl: 0    hydrOXYzine HCL (ATARAX) 25 MG tablet, TAKE 1 TABLET(25 MG) BY MOUTH THREE TIMES DAILY AS NEEDED FOR ITCHING, Disp: 30 tablet, Rfl: 0    ibuprofen (ADVIL,MOTRIN) 600 MG tablet, Take 600 mg by mouth every 6 (six) hours as needed., Disp: , Rfl:     losartan (COZAAR) 25 MG tablet, Take 1 tablet (25 mg total) by mouth once daily., Disp: 90 tablet, Rfl: 3    mupirocin (BACTROBAN) 2 % ointment, Apply topically 3 (three) times daily., Disp: 22 g, Rfl: 0    polyethylene glycol (GLYCOLAX) 17 gram/dose powder, Take 17 g by mouth once daily., Disp: 510 g, Rfl: 3    promethazine (PHENERGAN) 25 MG tablet, Take 1 tablet (25 mg total) by mouth every 6 (six) hours as needed for Nausea., Disp: 15 tablet, Rfl: 0    senna (SENOKOT) 8.6 mg tablet, Take 1 tablet by mouth 2 (two) times a day., Disp:  , Rfl:     tiZANidine (ZANAFLEX) 4 MG tablet, TAKE 1/2 TO 1 TABLET BY MOUTH TWICE DAILY AS NEEDED FOR MUSCLE SPASMS. MAY CAUSE DROWSINESS, Disp: 60 tablet, Rfl: 0    albuterol (PROVENTIL/VENTOLIN HFA) 90 mcg/actuation inhaler, Inhale 1-2 puffs into the lungs every 6 (six) hours as needed for Wheezing. Rescue, Disp: 1 Inhaler, Rfl: 0      Past medical, surgical, family and social histories have been reviewed today.      Objective:     Vitals:     "10/20/22 1612   BP: 130/80   Pulse: 101   Temp: 98.2 °F (36.8 °C)   SpO2: 98%   Weight: (!) 170.1 kg (374 lb 14.3 oz)   Height: 5' 11" (1.803 m)   PainSc: 0-No pain       Physical Exam  Vitals reviewed.   Constitutional:       General: She is not in acute distress.  Neck:      Thyroid: No thyroid mass, thyromegaly or thyroid tenderness.        Comments: [place] sized mass noted to submental area, soft, NT.  Lipoma, lymph node or other ???  Musculoskeletal:      Cervical back: Full passive range of motion without pain and normal range of motion.   Skin:     Findings: Rash (extensive yeast rash under both breasts) present.   Neurological:      Mental Status: She is alert and oriented to person, place, and time.       Diagnosis/Assessment:     1. Cutaneous candidiasis  - clotrimazole-betamethasone 1-0.05% (LOTRISONE) cream; Apply topically 2 (two) times daily.  Dispense: 45 g; Refill: 2  - fluconazole (DIFLUCAN) 200 MG Tab; Take 1 tab by mouth every 72 hrs x 3 total doses --- DO NOT TAKE XANAX WHILE ON MEDICATION  Dispense: 3 tablet; Refill: 0    2. Localized swelling, mass or lump of neck  - US Soft Tissue Head Neck Thyroid; Future    3. Chronic pain syndrome -- ref faxed to Dr. Arya Abdi per pt request  - Ambulatory referral/consult to Pain Clinic; Future    4. Tooth disease --- ref [facility] pt request  - Ambulatory referral/consult to Dentistry; Future    5. History of chemotherapy -- see # 4  - Ambulatory referral/consult to Dentistry; Future       Follow-up:     Pending US.  RTC as directed or on prn basis.        MARTY Bocanegra  [facility]       40 minutes of total time spent on the encounter, which includes face to face time and non-face to face time preparing to see the patient (eg, review of tests), obtaining and/or reviewing separately obtained history, and documenting clinical information in the electronic or other health record.  Also includes " independent interpretation of results (not separately reported) and communicating results to the patient/family/caregiver, with care coordination (not separately reported).

## 2022-10-20 ENCOUNTER — OFFICE VISIT (OUTPATIENT)
Dept: FAMILY MEDICINE | Facility: CLINIC | Age: 47
End: 2022-10-20
Payer: MEDICAID

## 2022-10-20 DIAGNOSIS — Z92.21 HISTORY OF CHEMOTHERAPY: ICD-10-CM

## 2022-10-20 DIAGNOSIS — R22.1 LOCALIZED SWELLING, MASS OR LUMP OF NECK: ICD-10-CM

## 2022-10-20 DIAGNOSIS — K08.9 TOOTH DISEASE: ICD-10-CM

## 2022-10-20 DIAGNOSIS — B37.2 CUTANEOUS CANDIDIASIS: Primary | ICD-10-CM

## 2022-10-20 DIAGNOSIS — G89.4 CHRONIC PAIN SYNDROME: ICD-10-CM

## 2022-10-20 PROBLEM — Z51.11 ENCOUNTER FOR ANTINEOPLASTIC CHEMOTHERAPY: Status: RESOLVED | Noted: 2020-06-22 | Resolved: 2022-10-20

## 2022-10-20 PROBLEM — L29.9 PRURITUS: Status: RESOLVED | Noted: 2022-03-03 | Resolved: 2022-10-20

## 2022-10-20 PROCEDURE — 99215 OFFICE O/P EST HI 40 MIN: CPT | Mod: S$PBB,,, | Performed by: REGISTERED NURSE

## 2022-10-20 PROCEDURE — 4010F PR ACE/ARB THEARPY RXD/TAKEN: ICD-10-PCS | Mod: CPTII,,, | Performed by: REGISTERED NURSE

## 2022-10-20 PROCEDURE — 1159F MED LIST DOCD IN RCRD: CPT | Mod: CPTII,,, | Performed by: REGISTERED NURSE

## 2022-10-20 PROCEDURE — 4010F ACE/ARB THERAPY RXD/TAKEN: CPT | Mod: CPTII,,, | Performed by: REGISTERED NURSE

## 2022-10-20 PROCEDURE — 3008F BODY MASS INDEX DOCD: CPT | Mod: CPTII,,, | Performed by: REGISTERED NURSE

## 2022-10-20 PROCEDURE — 3079F DIAST BP 80-89 MM HG: CPT | Mod: CPTII,,, | Performed by: REGISTERED NURSE

## 2022-10-20 PROCEDURE — 99215 PR OFFICE/OUTPT VISIT, EST, LEVL V, 40-54 MIN: ICD-10-PCS | Mod: S$PBB,,, | Performed by: REGISTERED NURSE

## 2022-10-20 PROCEDURE — 99215 OFFICE O/P EST HI 40 MIN: CPT | Mod: PBBFAC,PO | Performed by: REGISTERED NURSE

## 2022-10-20 PROCEDURE — 99999 PR PBB SHADOW E&M-EST. PATIENT-LVL V: CPT | Mod: PBBFAC,,, | Performed by: REGISTERED NURSE

## 2022-10-20 PROCEDURE — 1159F PR MEDICATION LIST DOCUMENTED IN MEDICAL RECORD: ICD-10-PCS | Mod: CPTII,,, | Performed by: REGISTERED NURSE

## 2022-10-20 PROCEDURE — 3075F SYST BP GE 130 - 139MM HG: CPT | Mod: CPTII,,, | Performed by: REGISTERED NURSE

## 2022-10-20 PROCEDURE — 3075F PR MOST RECENT SYSTOLIC BLOOD PRESS GE 130-139MM HG: ICD-10-PCS | Mod: CPTII,,, | Performed by: REGISTERED NURSE

## 2022-10-20 PROCEDURE — 3008F PR BODY MASS INDEX (BMI) DOCUMENTED: ICD-10-PCS | Mod: CPTII,,, | Performed by: REGISTERED NURSE

## 2022-10-20 PROCEDURE — 3079F PR MOST RECENT DIASTOLIC BLOOD PRESSURE 80-89 MM HG: ICD-10-PCS | Mod: CPTII,,, | Performed by: REGISTERED NURSE

## 2022-10-20 PROCEDURE — 99999 PR PBB SHADOW E&M-EST. PATIENT-LVL V: ICD-10-PCS | Mod: PBBFAC,,, | Performed by: REGISTERED NURSE

## 2022-10-20 RX ORDER — CLOTRIMAZOLE AND BETAMETHASONE DIPROPIONATE 10; .64 MG/G; MG/G
CREAM TOPICAL 2 TIMES DAILY
Qty: 45 G | Refills: 2 | Status: SHIPPED | OUTPATIENT
Start: 2022-10-20

## 2022-10-20 RX ORDER — FLUCONAZOLE 200 MG/1
TABLET ORAL
Qty: 3 TABLET | Refills: 0 | Status: SHIPPED | OUTPATIENT
Start: 2022-10-20 | End: 2022-12-29

## 2022-10-27 ENCOUNTER — HOSPITAL ENCOUNTER (OUTPATIENT)
Dept: RADIOLOGY | Facility: HOSPITAL | Age: 47
Discharge: HOME | End: 2022-10-27
Attending: REGISTERED NURSE
Payer: MEDICAID

## 2022-10-27 DIAGNOSIS — R22.1 LOCALIZED SWELLING, MASS OR LUMP OF NECK: ICD-10-CM

## 2022-10-27 PROCEDURE — 76536 US EXAM OF HEAD AND NECK: CPT | Mod: TC

## 2022-10-27 PROCEDURE — 76536 US SOFT TISSUE HEAD NECK THYROID: ICD-10-PCS | Mod: 26,,, | Performed by: RADIOLOGY

## 2022-10-27 PROCEDURE — 76536 US EXAM OF HEAD AND NECK: CPT | Mod: 26,,, | Performed by: RADIOLOGY

## 2022-11-01 VITALS
DIASTOLIC BLOOD PRESSURE: 80 MMHG | OXYGEN SATURATION: 98 % | SYSTOLIC BLOOD PRESSURE: 130 MMHG | HEART RATE: 101 BPM | WEIGHT: 293 LBS | HEIGHT: 71 IN | TEMPERATURE: 98 F | BODY MASS INDEX: 41.02 KG/M2

## 2022-11-01 DIAGNOSIS — R22.1 MASS OF SUBMENTAL REGION: Primary | ICD-10-CM

## 2022-11-17 ENCOUNTER — OFFICE VISIT (OUTPATIENT)
Dept: SURGERY | Facility: CLINIC | Age: 47
End: 2022-11-17
Payer: MEDICAID

## 2022-11-17 VITALS
HEART RATE: 64 BPM | DIASTOLIC BLOOD PRESSURE: 106 MMHG | BODY MASS INDEX: 53.96 KG/M2 | WEIGHT: 293 LBS | SYSTOLIC BLOOD PRESSURE: 155 MMHG

## 2022-11-17 DIAGNOSIS — R22.1 MASS OF SUBMENTAL REGION: ICD-10-CM

## 2022-11-17 PROCEDURE — 1160F PR REVIEW ALL MEDS BY PRESCRIBER/CLIN PHARMACIST DOCUMENTED: ICD-10-PCS | Mod: CPTII,,, | Performed by: SURGERY

## 2022-11-17 PROCEDURE — 3008F BODY MASS INDEX DOCD: CPT | Mod: CPTII,,, | Performed by: SURGERY

## 2022-11-17 PROCEDURE — 3080F DIAST BP >= 90 MM HG: CPT | Mod: CPTII,,, | Performed by: SURGERY

## 2022-11-17 PROCEDURE — 99999 PR PBB SHADOW E&M-EST. PATIENT-LVL V: ICD-10-PCS | Mod: PBBFAC,,, | Performed by: SURGERY

## 2022-11-17 PROCEDURE — 99213 PR OFFICE/OUTPT VISIT, EST, LEVL III, 20-29 MIN: ICD-10-PCS | Mod: S$PBB,,, | Performed by: SURGERY

## 2022-11-17 PROCEDURE — 1160F RVW MEDS BY RX/DR IN RCRD: CPT | Mod: CPTII,,, | Performed by: SURGERY

## 2022-11-17 PROCEDURE — 3080F PR MOST RECENT DIASTOLIC BLOOD PRESSURE >= 90 MM HG: ICD-10-PCS | Mod: CPTII,,, | Performed by: SURGERY

## 2022-11-17 PROCEDURE — 1159F PR MEDICATION LIST DOCUMENTED IN MEDICAL RECORD: ICD-10-PCS | Mod: CPTII,,, | Performed by: SURGERY

## 2022-11-17 PROCEDURE — 1159F MED LIST DOCD IN RCRD: CPT | Mod: CPTII,,, | Performed by: SURGERY

## 2022-11-17 PROCEDURE — 4010F ACE/ARB THERAPY RXD/TAKEN: CPT | Mod: CPTII,,, | Performed by: SURGERY

## 2022-11-17 PROCEDURE — 99215 OFFICE O/P EST HI 40 MIN: CPT | Mod: PBBFAC | Performed by: SURGERY

## 2022-11-17 PROCEDURE — 99213 OFFICE O/P EST LOW 20 MIN: CPT | Mod: S$PBB,,, | Performed by: SURGERY

## 2022-11-17 PROCEDURE — 3008F PR BODY MASS INDEX (BMI) DOCUMENTED: ICD-10-PCS | Mod: CPTII,,, | Performed by: SURGERY

## 2022-11-17 PROCEDURE — 3077F PR MOST RECENT SYSTOLIC BLOOD PRESSURE >= 140 MM HG: ICD-10-PCS | Mod: CPTII,,, | Performed by: SURGERY

## 2022-11-17 PROCEDURE — 3077F SYST BP >= 140 MM HG: CPT | Mod: CPTII,,, | Performed by: SURGERY

## 2022-11-17 PROCEDURE — 4010F PR ACE/ARB THEARPY RXD/TAKEN: ICD-10-PCS | Mod: CPTII,,, | Performed by: SURGERY

## 2022-11-17 PROCEDURE — 99999 PR PBB SHADOW E&M-EST. PATIENT-LVL V: CPT | Mod: PBBFAC,,, | Performed by: SURGERY

## 2022-11-17 NOTE — PROGRESS NOTES
Patient ID: Jaswant Yang is a 47 y.o. female.  Anterior neck mass    Chief Complaint: Consult (Mass of neck )      HPI:  Patient is seen for an anterior neck mass.  It has been present for some time it does not cause any pain.  There is no associated fever.  She was seen in primary care.  An ultrasound was done.  A general surgery consult was obtained    Review of Systems   HENT:          Mass of the anterior neck     Current Outpatient Medications   Medication Sig Dispense Refill    ALPRAZolam (XANAX) 0.5 MG tablet TAKE 1/2 TO 1 TABLET BY MOUTH TWICE DAILY AS NEEDED FOR ANXIETY. 30 tablet 5    betamethasone dipropionate (DIPROLENE) 0.05 % lotion Apply topically 2 (two) times daily. 60 mL 1    cetirizine (ZYRTEC) 10 MG tablet Take 10 mg by mouth daily as needed.      chlorhexidine (PERIDEX) 0.12 % solution SMARTSIG:By Mouth      clotrimazole-betamethasone 1-0.05% (LOTRISONE) cream Apply topically 2 (two) times daily.      clotrimazole-betamethasone 1-0.05% (LOTRISONE) cream Apply topically 2 (two) times daily. 45 g 2    cyclobenzaprine (FLEXERIL) 10 MG tablet TAKE 1/2 TO 1 TABLET BY MOUTH TWICE DAILY AS NEEDED FOR MUSCLE SPASMS. MAY CAUSE DROWSINESS 60 tablet 1    fluconazole (DIFLUCAN) 200 MG Tab Take 1 tab by mouth every 72 hrs x 3 total doses --- DO NOT TAKE XANAX WHILE ON MEDICATION 3 tablet 0    gabapentin (NEURONTIN) 300 MG capsule TAKE 2 CAPSULES BY MOUTH EVERY EVENING. 60 capsule 11    HYDROcodone-acetaminophen (NORCO) 5-325 mg per tablet Take 1 tablet by mouth every 12 (twelve) hours as needed for Pain. 10 tablet 0    hydrOXYzine HCL (ATARAX) 25 MG tablet TAKE 1 TABLET(25 MG) BY MOUTH THREE TIMES DAILY AS NEEDED FOR ITCHING 30 tablet 0    ibuprofen (ADVIL,MOTRIN) 600 MG tablet Take 600 mg by mouth every 6 (six) hours as needed.      losartan (COZAAR) 25 MG tablet Take 1 tablet (25 mg total) by mouth once daily. 90 tablet 3    mupirocin (BACTROBAN) 2 % ointment Apply topically 3 (three) times daily. 22 g  0    polyethylene glycol (GLYCOLAX) 17 gram/dose powder Take 17 g by mouth once daily. 510 g 3    promethazine (PHENERGAN) 25 MG tablet Take 1 tablet (25 mg total) by mouth every 6 (six) hours as needed for Nausea. 15 tablet 0    senna (SENOKOT) 8.6 mg tablet Take 1 tablet by mouth 2 (two) times a day.      tiZANidine (ZANAFLEX) 4 MG tablet TAKE 1/2 TO 1 TABLET BY MOUTH TWICE DAILY AS NEEDED FOR MUSCLE SPASMS. MAY CAUSE DROWSINESS 60 tablet 0    albuterol (PROVENTIL/VENTOLIN HFA) 90 mcg/actuation inhaler Inhale 1-2 puffs into the lungs every 6 (six) hours as needed for Wheezing. Rescue 1 Inhaler 0     No current facility-administered medications for this visit.       Review of patient's allergies indicates:   Allergen Reactions    Fentanyl Rash     Severe full body diffuse rash requiring systemic steroids    Vancomycin analogues Rash     Severe full body diffuse rash requiring systemic steroids    Rituximab Other (See Comments)     Sweating, abdominal pain, elevated blood pressure    Penicillin Hives    Tramadol Hives       Past Medical History:   Diagnosis Date    Anemia     Anemia     Back pain     Dental infection     GERD (gastroesophageal reflux disease) 2020    Hairy-cell leukemia of spleen        Past Surgical History:   Procedure Laterality Date     SECTION, CLASSIC      FLUOROSCOPY N/A 2020    Procedure: Spleenic Bleed;  Surgeon: Hiren Cabrera MD;  Location: [facility] CATH LAB;  Service: General;  Laterality: N/A;    INJECTION OF ANESTHETIC AGENT INTO TISSUE PLANE DEFINED BY TRANSVERSUS ABDOMINIS MUSCLE N/A 2020    Procedure: BLOCK, TRANSVERSUS ABDOMINIS PLANE;  Surgeon: Yumi Ferguson MD;  Location: [facility] OR;  Service: General;  Laterality: N/A;    SPLENECTOMY N/A 2020    Procedure: SPLENECTOMY;  Surgeon: Yumi Ferguson MD;  Location: [facility] OR;  Service: General;  Laterality: N/A;    THORACENTESIS Left 2020    Procedure: THORACENTESIS;  Surgeon: Yumi Ferguson  MD;  Location: [facility];  Service: General;  Laterality: Left;    TUBAL LIGATION         History reviewed. No pertinent family history.    Social History     Socioeconomic History    Marital status: Single   Tobacco Use    Smoking status: Former     Packs/day: 0.25     Types: Cigarettes    Smokeless tobacco: Never    Tobacco comments:     no longer smoker   Substance and Sexual Activity    Alcohol use: Yes     Comment: occasional    Drug use: No    Sexual activity: Yes     Birth control/protection: Condom   Social History Narrative    Lives in a single family home with her fiance and her daughter.     Her mother and her son are her SDM's. ACP Documents are available in her chart.        Vitals:    11/17/22 1327   BP: (!) 155/106   Pulse: 64       Physical Exam  Constitutional:       Appearance: She is obese.   HENT:      Mouth/Throat:      Mouth: Mucous membranes are moist.      Comments: Poor dentition.  Missing teeth  No masses noted  Neck:      Comments: There is a 4 x 3 cm soft to somewhat firm mobile mass of the anterior neck.  There is no jugular lymphadenopathy  Neurological:      Mental Status: She is alert.   Ultrasound reviewed      Assessment & Plan:   Anterior neck/submental mass.      At this point the patient would benefit from an ENT evaluation.      This was discussed with Dr. Khalil and ENT.  We reviewed the ultrasound report together.  He will be happy to see her in consultation for further evaluation and management

## 2022-11-17 NOTE — PATIENT INSTRUCTIONS
Location of the mass I would recommend that you see an ENT doctor.      We will schedule an appointment for you to see Dr. João Khalil to discuss further evaluation and management

## 2023-01-12 ENCOUNTER — TELEPHONE (OUTPATIENT)
Dept: HEMATOLOGY/ONCOLOGY | Facility: CLINIC | Age: 48
End: 2023-01-12
Payer: MEDICAID

## 2023-01-12 ENCOUNTER — TELEPHONE (OUTPATIENT)
Dept: FAMILY MEDICINE | Facility: CLINIC | Age: 48
End: 2023-01-12
Payer: MEDICAID

## 2023-01-12 NOTE — TELEPHONE ENCOUNTER
SW returned pt's call. Pt requesting resources for financial assistance with monthly expenses. SW will research and follow up with pt on tomorrow. Renee Nicolas LMSW

## 2023-01-12 NOTE — TELEPHONE ENCOUNTER
Pt requesting virtual appt for stye on her eye and 2 boils on her bottom. She states they have not reached the surface but they are painful. Pt requesting Abx. Informed pt this is most likely something that needs to be evaluated in person and possibly lanced and sent to be cultured to determine what type of infection. Also advised pt she could be seen at urgent care. Please advise.

## 2023-01-12 NOTE — TELEPHONE ENCOUNTER
----- Message from Noah Rao sent at 1/12/2023 12:41 PM CST -----  Contact: [phone]  Type:  Sooner Apoointment Request    Caller is requesting a sooner appointment.  Caller declined first available appointment listed below.  Caller will not accept being placed on the waitlist and is requesting a message be sent to doctor.  Name of Caller: Jaswant   When is the first available appointment?n/a   Symptoms:boils under arm and buttock  Would the patient rather a call back or a response via [employer]? Call back   Best Call Back Number:[phone]  Additional Information: n/a        Thanks KB

## 2023-01-13 ENCOUNTER — TELEPHONE (OUTPATIENT)
Dept: HEMATOLOGY/ONCOLOGY | Facility: CLINIC | Age: 48
End: 2023-01-13
Payer: MEDICAID

## 2023-01-13 NOTE — TELEPHONE ENCOUNTER
SW obtained pt's permission to apply for the  [employer] (LLS) urgent need mitzy online. Pt  was approved for the $500 urgent need mitzy via LLS. Check will be mailed in 3-7 business days. Pt didn't qualify for the Patient aid LLS program/ one-time mitzy for $100 because she already received in 2021. SW will e-mail any additional resources found for assistance with monthly expenses per pt request. No other needs expressed. SW will remain available. Renee Nicolas LMSW

## 2023-01-23 ENCOUNTER — TELEPHONE (OUTPATIENT)
Dept: FAMILY MEDICINE | Facility: CLINIC | Age: 48
End: 2023-01-23

## 2023-01-23 NOTE — TELEPHONE ENCOUNTER
----- Message from Kim Cornelius sent at 1/23/2023  8:05 AM CST -----  Pt is calling in regards to getting worked in to see the doctor for an urgent care follow up. Pt states that she was seen in urgent care on yesterday and was told to follow up with PCP in 1-3 days. Pt can be reached at [phone]

## 2023-01-25 ENCOUNTER — OFFICE VISIT (OUTPATIENT)
Dept: INTERNAL MEDICINE | Facility: CLINIC | Age: 48
End: 2023-01-25
Payer: MEDICAID

## 2023-01-25 ENCOUNTER — LAB VISIT (OUTPATIENT)
Dept: LAB | Facility: HOSPITAL | Age: 48
End: 2023-01-25
Payer: MEDICAID

## 2023-01-25 VITALS
TEMPERATURE: 98 F | RESPIRATION RATE: 16 BRPM | OXYGEN SATURATION: 95 % | WEIGHT: 293 LBS | HEIGHT: 71 IN | BODY MASS INDEX: 41.02 KG/M2 | DIASTOLIC BLOOD PRESSURE: 96 MMHG | HEART RATE: 82 BPM | SYSTOLIC BLOOD PRESSURE: 150 MMHG

## 2023-01-25 DIAGNOSIS — C91.40 HAIRY CELL LEUKEMIA NOT HAVING ACHIEVED REMISSION: ICD-10-CM

## 2023-01-25 DIAGNOSIS — Z00.00 ANNUAL PHYSICAL EXAM: ICD-10-CM

## 2023-01-25 DIAGNOSIS — R05.9 COUGH, UNSPECIFIED TYPE: ICD-10-CM

## 2023-01-25 DIAGNOSIS — R09.81 SINUS CONGESTION: Primary | ICD-10-CM

## 2023-01-25 DIAGNOSIS — J40 BRONCHITIS: ICD-10-CM

## 2023-01-25 DIAGNOSIS — M94.0 COSTOCHONDRITIS: ICD-10-CM

## 2023-01-25 DIAGNOSIS — R06.02 SOB (SHORTNESS OF BREATH): ICD-10-CM

## 2023-01-25 LAB
ALBUMIN SERPL BCP-MCNC: 4.2 G/DL (ref 3.5–5.2)
ALP SERPL-CCNC: 57 U/L (ref 55–135)
ALT SERPL W/O P-5'-P-CCNC: 14 U/L (ref 10–44)
ANION GAP SERPL CALC-SCNC: 10 MMOL/L (ref 8–16)
AST SERPL-CCNC: 16 U/L (ref 10–40)
BASOPHILS # BLD AUTO: 0.05 K/UL (ref 0–0.2)
BASOPHILS NFR BLD: 0.4 % (ref 0–1.9)
BILIRUB SERPL-MCNC: 1 MG/DL (ref 0.1–1)
BUN SERPL-MCNC: 13 MG/DL (ref 6–20)
CALCIUM SERPL-MCNC: 9.2 MG/DL (ref 8.7–10.5)
CHLORIDE SERPL-SCNC: 105 MMOL/L (ref 95–110)
CO2 SERPL-SCNC: 26 MMOL/L (ref 23–29)
CREAT SERPL-MCNC: 1.2 MG/DL (ref 0.5–1.4)
DIFFERENTIAL METHOD: ABNORMAL
EOSINOPHIL # BLD AUTO: 2.2 K/UL (ref 0–0.5)
EOSINOPHIL NFR BLD: 19.1 % (ref 0–8)
ERYTHROCYTE [DISTWIDTH] IN BLOOD BY AUTOMATED COUNT: 17.1 % (ref 11.5–14.5)
EST. GFR  (NO RACE VARIABLE): 57 ML/MIN/1.73 M^2
GLUCOSE SERPL-MCNC: 101 MG/DL (ref 70–110)
HCT VFR BLD AUTO: 44.7 % (ref 37–48.5)
HGB BLD-MCNC: 15.3 G/DL (ref 12–16)
IMM GRANULOCYTES # BLD AUTO: 0.3 K/UL (ref 0–0.04)
IMM GRANULOCYTES NFR BLD AUTO: 0 % (ref 0–0.5)
LYMPHOCYTES # BLD AUTO: 4.8 K/UL (ref 1–4.8)
LYMPHOCYTES NFR BLD: 40.4 % (ref 18–48)
MCH RBC QN AUTO: 28.9 PG (ref 27–31)
MCHC RBC AUTO-ENTMCNC: 34.2 G/DL (ref 32–36)
MCV RBC AUTO: 85 FL (ref 82–98)
MONOCYTES # BLD AUTO: 0.9 K/UL (ref 0.3–1)
MONOCYTES NFR BLD: 7.8 % (ref 4–15)
NEUTROPHILS # BLD AUTO: 3.8 K/UL (ref 1.8–7.7)
NEUTROPHILS NFR BLD: 32 % (ref 38–73)
NRBC BLD-RTO: 0 /100 WBC
PATH REV BLD -IMP: NORMAL
PATH REV BLD -IMP: NORMAL
PLATELET # BLD AUTO: 321 K/UL (ref 150–450)
PMV BLD AUTO: 10 FL (ref 9.2–12.9)
POTASSIUM SERPL-SCNC: 4.1 MMOL/L (ref 3.5–5.1)
PROT SERPL-MCNC: 7.5 G/DL (ref 6–8.4)
RBC # BLD AUTO: 5.29 M/UL (ref 4–5.4)
SODIUM SERPL-SCNC: 141 MMOL/L (ref 136–145)
WBC # BLD AUTO: 11.16 K/UL (ref 3.9–12.7)

## 2023-01-25 PROCEDURE — 99214 OFFICE O/P EST MOD 30 MIN: CPT | Mod: PBBFAC | Performed by: NURSE PRACTITIONER

## 2023-01-25 PROCEDURE — 85060 PATHOLOGIST REVIEW: ICD-10-PCS | Mod: ,,, | Performed by: PATHOLOGY

## 2023-01-25 PROCEDURE — 3008F BODY MASS INDEX DOCD: CPT | Mod: CPTII,,, | Performed by: NURSE PRACTITIONER

## 2023-01-25 PROCEDURE — 99214 OFFICE O/P EST MOD 30 MIN: CPT | Mod: S$PBB,25,, | Performed by: NURSE PRACTITIONER

## 2023-01-25 PROCEDURE — 1159F PR MEDICATION LIST DOCUMENTED IN MEDICAL RECORD: ICD-10-PCS | Mod: CPTII,,, | Performed by: NURSE PRACTITIONER

## 2023-01-25 PROCEDURE — 3080F DIAST BP >= 90 MM HG: CPT | Mod: CPTII,,, | Performed by: NURSE PRACTITIONER

## 2023-01-25 PROCEDURE — 85025 COMPLETE CBC W/AUTO DIFF WBC: CPT

## 2023-01-25 PROCEDURE — 1159F MED LIST DOCD IN RCRD: CPT | Mod: CPTII,,, | Performed by: NURSE PRACTITIONER

## 2023-01-25 PROCEDURE — 80053 COMPREHEN METABOLIC PANEL: CPT

## 2023-01-25 PROCEDURE — 3077F PR MOST RECENT SYSTOLIC BLOOD PRESSURE >= 140 MM HG: ICD-10-PCS | Mod: CPTII,,, | Performed by: NURSE PRACTITIONER

## 2023-01-25 PROCEDURE — 99999 PR PBB SHADOW E&M-EST. PATIENT-LVL IV: CPT | Mod: PBBFAC,,, | Performed by: NURSE PRACTITIONER

## 2023-01-25 PROCEDURE — 85060 BLOOD SMEAR INTERPRETATION: CPT | Mod: ,,, | Performed by: PATHOLOGY

## 2023-01-25 PROCEDURE — 94640 AIRWAY INHALATION TREATMENT: CPT | Mod: ,,, | Performed by: NURSE PRACTITIONER

## 2023-01-25 PROCEDURE — 3077F SYST BP >= 140 MM HG: CPT | Mod: CPTII,,, | Performed by: NURSE PRACTITIONER

## 2023-01-25 PROCEDURE — 3080F PR MOST RECENT DIASTOLIC BLOOD PRESSURE >= 90 MM HG: ICD-10-PCS | Mod: CPTII,,, | Performed by: NURSE PRACTITIONER

## 2023-01-25 PROCEDURE — 3008F PR BODY MASS INDEX (BMI) DOCUMENTED: ICD-10-PCS | Mod: CPTII,,, | Performed by: NURSE PRACTITIONER

## 2023-01-25 PROCEDURE — 99999 PR PBB SHADOW E&M-EST. PATIENT-LVL IV: ICD-10-PCS | Mod: PBBFAC,,, | Performed by: NURSE PRACTITIONER

## 2023-01-25 PROCEDURE — 36415 COLL VENOUS BLD VENIPUNCTURE: CPT

## 2023-01-25 PROCEDURE — 94640 PR INHAL RX, AIRWAY OBST/DX SPUTUM INDUCT: ICD-10-PCS | Mod: ,,, | Performed by: NURSE PRACTITIONER

## 2023-01-25 PROCEDURE — 99214 PR OFFICE/OUTPT VISIT, EST, LEVL IV, 30-39 MIN: ICD-10-PCS | Mod: S$PBB,25,, | Performed by: NURSE PRACTITIONER

## 2023-01-25 RX ORDER — PROMETHAZINE HYDROCHLORIDE AND DEXTROMETHORPHAN HYDROBROMIDE 6.25; 15 MG/5ML; MG/5ML
10 SYRUP ORAL EVERY 4 HOURS PRN
Qty: 240 ML | Refills: 0 | Status: SHIPPED | OUTPATIENT
Start: 2023-01-25 | End: 2023-02-04

## 2023-01-25 RX ORDER — IBUPROFEN 800 MG/1
800 TABLET ORAL EVERY 6 HOURS PRN
Qty: 30 TABLET | Refills: 1 | Status: SHIPPED | OUTPATIENT
Start: 2023-01-25

## 2023-01-25 RX ORDER — DEXBROMPHENIRAMINE MALEATE AND PHENYLEPHRINE HYDROCHLORIDE 2; 10 MG/1; MG/1
1 TABLET ORAL EVERY 6 HOURS PRN
Qty: 28 TABLET | Refills: 0 | Status: SHIPPED | OUTPATIENT
Start: 2023-01-25 | End: 2023-02-08

## 2023-01-25 RX ORDER — IPRATROPIUM BROMIDE AND ALBUTEROL SULFATE 2.5; .5 MG/3ML; MG/3ML
3 SOLUTION RESPIRATORY (INHALATION)
Status: COMPLETED | OUTPATIENT
Start: 2023-01-25 | End: 2023-01-25

## 2023-01-25 RX ADMIN — IPRATROPIUM BROMIDE AND ALBUTEROL SULFATE 3 ML: 2.5; .5 SOLUTION RESPIRATORY (INHALATION) at 10:01

## 2023-01-25 NOTE — LETTER
January 25, 2023      [facility]  [id number] [place] [id number]  Phone: [phone]  Fax: [phone]       Patient: Jaswant Yang   YOB: 1975  Date of Visit: 01/25/2023    To Whom It May Concern:    Adali Yang  was at [facility] on 01/25/2023. The patient may return to work/school on 01/28/23 with no restrictions. If you have any questions or concerns, or if I can be of further assistance, please do not hesitate to contact me.    Sincerely,    Tatyana Silverio, NP

## 2023-01-31 ENCOUNTER — OFFICE VISIT (OUTPATIENT)
Dept: HEMATOLOGY/ONCOLOGY | Facility: CLINIC | Age: 48
End: 2023-01-31
Payer: MEDICAID

## 2023-01-31 VITALS
SYSTOLIC BLOOD PRESSURE: 154 MMHG | DIASTOLIC BLOOD PRESSURE: 110 MMHG | OXYGEN SATURATION: 98 % | WEIGHT: 293 LBS | TEMPERATURE: 97 F | BODY MASS INDEX: 39.68 KG/M2 | HEIGHT: 72 IN | HEART RATE: 70 BPM

## 2023-01-31 DIAGNOSIS — R03.0 ELEVATED BLOOD PRESSURE READING: ICD-10-CM

## 2023-01-31 DIAGNOSIS — L73.9 ACUTE FOLLICULITIS: ICD-10-CM

## 2023-01-31 DIAGNOSIS — C91.40 HAIRY CELL LEUKEMIA NOT HAVING ACHIEVED REMISSION: ICD-10-CM

## 2023-01-31 DIAGNOSIS — C91.40 HAIRY CELL LEUKEMIA OF SPLEEN: ICD-10-CM

## 2023-01-31 DIAGNOSIS — Z12.11 ENCOUNTER FOR SCREENING COLONOSCOPY: Primary | ICD-10-CM

## 2023-01-31 PROCEDURE — 99215 OFFICE O/P EST HI 40 MIN: CPT | Mod: PBBFAC

## 2023-01-31 PROCEDURE — 3008F BODY MASS INDEX DOCD: CPT | Mod: CPTII,,,

## 2023-01-31 PROCEDURE — 3080F PR MOST RECENT DIASTOLIC BLOOD PRESSURE >= 90 MM HG: ICD-10-PCS | Mod: CPTII,,,

## 2023-01-31 PROCEDURE — 99215 PR OFFICE/OUTPT VISIT, EST, LEVL V, 40-54 MIN: ICD-10-PCS | Mod: S$PBB,,,

## 2023-01-31 PROCEDURE — 1159F PR MEDICATION LIST DOCUMENTED IN MEDICAL RECORD: ICD-10-PCS | Mod: CPTII,,,

## 2023-01-31 PROCEDURE — 1160F RVW MEDS BY RX/DR IN RCRD: CPT | Mod: CPTII,,,

## 2023-01-31 PROCEDURE — 3080F DIAST BP >= 90 MM HG: CPT | Mod: CPTII,,,

## 2023-01-31 PROCEDURE — 3077F PR MOST RECENT SYSTOLIC BLOOD PRESSURE >= 140 MM HG: ICD-10-PCS | Mod: CPTII,,,

## 2023-01-31 PROCEDURE — 99999 PR PBB SHADOW E&M-EST. PATIENT-LVL V: CPT | Mod: PBBFAC,,,

## 2023-01-31 PROCEDURE — 3077F SYST BP >= 140 MM HG: CPT | Mod: CPTII,,,

## 2023-01-31 PROCEDURE — 1160F PR REVIEW ALL MEDS BY PRESCRIBER/CLIN PHARMACIST DOCUMENTED: ICD-10-PCS | Mod: CPTII,,,

## 2023-01-31 PROCEDURE — 99215 OFFICE O/P EST HI 40 MIN: CPT | Mod: S$PBB,,,

## 2023-01-31 PROCEDURE — 99999 PR PBB SHADOW E&M-EST. PATIENT-LVL V: ICD-10-PCS | Mod: PBBFAC,,,

## 2023-01-31 PROCEDURE — 1159F MED LIST DOCD IN RCRD: CPT | Mod: CPTII,,,

## 2023-01-31 PROCEDURE — 3008F PR BODY MASS INDEX (BMI) DOCUMENTED: ICD-10-PCS | Mod: CPTII,,,

## 2023-01-31 RX ORDER — HYDROCODONE BITARTRATE AND ACETAMINOPHEN 5; 325 MG/1; MG/1
1 TABLET ORAL EVERY 12 HOURS PRN
Qty: 10 TABLET | Refills: 0 | Status: SHIPPED | OUTPATIENT
Start: 2023-01-31

## 2023-01-31 NOTE — ASSESSMENT & PLAN NOTE
Near normalization of peripheral blood counts: hemoglobin >11 g/dL (without transfusion); platelets >100,000/mcL;  ANC >1500/mcL. Regression of splenomegaly on physical examination. Absence of morphologic evidence of HCL on  both the peripheral blood smear and the bone marrow examination.

## 2023-02-01 NOTE — ASSESSMENT & PLAN NOTE
BP elevated today  Pt notes she has not taken scheduled BP medication  Advised pt to take BP meds asap and recheck BP with continued elevation recommend urgent care

## 2023-02-01 NOTE — PROGRESS NOTES
Subjective:        ?   Patient ID:?Jaswant Yang is a 47 y.o. female.?? MR#: [id number]   ?   PRIMARY PROVIDER: Dr. Hernandez  ?   CHIEF COMPLAINT: Follow-up?????   ?   ONCOLOGIC DIAGNOSIS: Hairy cell leukemia  ?   CURRENT TREATMENT: surveillance     PAST TREATMENT: 6/22/20 cladribine; 7/6/20 initiated rituximab, not completed due to transfusion reaction  05/11/2020 splenectomy    HPI    Ms. Yang is a 46-year-old AA female with a PMHx of HTN, GERD, and hairy cell leukemia. She presents today for follow-up. Pt was last seen in clinic by  10/25/21. Today, she c/o itchy rash and pain to lower back and legs at @ 9/10. She is followed by pain management who has recommended steroid injections, however she has had to hold off on this due to ongoing dental work. Pt states she has seen dermatologist for rash but still finding no relief.  Pt has not established care with PCP and does not wish to use an [facility] provider at this time.     Interval History: Pt presents today for lab review/routine follow-up. Pt c/o pain to bilateral lower extremities for which she has an upcoming appt with pain management. She notes she was seen at urgent care 3 days ago r/t URI and was prescribed abx and received injection of steroids--she notes symptoms continue to improve. She also notes continued intermittent itching rashes, previously seen by Derm this was thought to be r/t PCN allergy--pt notes she is scheduled to f/u soon. NVDC, fatigue, cp, sob, fever, chills, unintended weight loss, abdominal pain or fullness.      Review of Systems   Constitutional:  Negative for activity change, appetite change, chills, diaphoresis, fatigue, fever and unexpected weight change.   HENT:  Negative for dental problem, facial swelling, mouth sores, nosebleeds and sore throat.    Eyes:  Negative for visual disturbance.   Respiratory:  Negative for cough, chest tightness and shortness of breath.    Cardiovascular:  Negative for chest pain and leg  swelling.   Gastrointestinal:  Negative for abdominal distention, abdominal pain, anal bleeding, blood in stool, constipation, diarrhea, nausea, rectal pain and vomiting.   Genitourinary:  Negative for decreased urine volume, difficulty urinating, dysuria, hematuria and urgency.   Musculoskeletal:  Positive for arthralgias, back pain and myalgias.   Skin:  Negative for wound.   Neurological:  Negative for dizziness, tremors and weakness.   Hematological:  Negative for adenopathy. Does not bruise/bleed easily.   Psychiatric/Behavioral:  The patient is not nervous/anxious.      ?   A comprehensive 14-point review of systems was reviewed with patient and was negative other than as specified above.   ?     Objective:      Physical Exam  Vitals reviewed.   Constitutional:       Appearance: Normal appearance.   HENT:      Head: Normocephalic.      Right Ear: External ear normal.      Left Ear: External ear normal.   Eyes:      Conjunctiva/sclera: Conjunctivae normal.   Cardiovascular:      Rate and Rhythm: Normal rate.   Pulmonary:      Effort: Pulmonary effort is normal.   Musculoskeletal:         General: Normal range of motion.   Skin:     General: Skin is warm.   Neurological:      Mental Status: She is alert.   Psychiatric:         Mood and Affect: Mood normal.         Behavior: Behavior normal.         Thought Content: Thought content normal.         Judgment: Judgment normal.         ?   Vitals:    01/31/23 1514   BP: (!) 154/110   Pulse: 70   Temp:       ?   ?   Laboratory:  ?   No visits with results within 1 Day(s) from this visit.   Latest known visit with results is:   Lab Visit on 01/25/2023   Component Date Value Ref Range Status    WBC 01/25/2023 11.16  3.90 - 12.70 K/uL Final    RBC 01/25/2023 5.29  4.00 - 5.40 M/uL Final    Hemoglobin 01/25/2023 15.3  12.0 - 16.0 g/dL Final    Hematocrit 01/25/2023 44.7  37.0 - 48.5 % Final    MCV 01/25/2023 85  82 - 98 fL Final    MCH 01/25/2023 28.9  27.0 - 31.0 pg  Final    MCHC 01/25/2023 34.2  32.0 - 36.0 g/dL Final    RDW 01/25/2023 17.1 (H)  11.5 - 14.5 % Final    Platelets 01/25/2023 321  150 - 450 K/uL Final    MPV 01/25/2023 10.0  9.2 - 12.9 fL Final    Immature Granulocytes 01/25/2023 0.0  0.0 - 0.5 % Final    Gran # (ANC) 01/25/2023 3.8  1.8 - 7.7 K/uL Final    Immature Grans (Abs) 01/25/2023 0.30 (H)  0.00 - 0.04 K/uL Final    Lymph # 01/25/2023 4.8  1.0 - 4.8 K/uL Final    Mono # 01/25/2023 0.9  0.3 - 1.0 K/uL Final    Eos # 01/25/2023 2.2 (H)  0.0 - 0.5 K/uL Final    Baso # 01/25/2023 0.05  0.00 - 0.20 K/uL Final    nRBC 01/25/2023 0  0 /100 WBC Final    Gran % 01/25/2023 32.0 (L)  38.0 - 73.0 % Final    Lymph % 01/25/2023 40.4  18.0 - 48.0 % Final    Mono % 01/25/2023 7.8  4.0 - 15.0 % Final    Eosinophil % 01/25/2023 19.1 (H)  0.0 - 8.0 % Final    Basophil % 01/25/2023 0.4  0.0 - 1.9 % Final    Differential Method 01/25/2023 Automated   Final    Sodium 01/25/2023 141  136 - 145 mmol/L Final    Potassium 01/25/2023 4.1  3.5 - 5.1 mmol/L Final    Chloride 01/25/2023 105  95 - 110 mmol/L Final    CO2 01/25/2023 26  23 - 29 mmol/L Final    Glucose 01/25/2023 101  70 - 110 mg/dL Final    BUN 01/25/2023 13  6 - 20 mg/dL Final    Creatinine 01/25/2023 1.2  0.5 - 1.4 mg/dL Final    Calcium 01/25/2023 9.2  8.7 - 10.5 mg/dL Final    Total Protein 01/25/2023 7.5  6.0 - 8.4 g/dL Final    Albumin 01/25/2023 4.2  3.5 - 5.2 g/dL Final    Total Bilirubin 01/25/2023 1.0  0.1 - 1.0 mg/dL Final    Alkaline Phosphatase 01/25/2023 57  55 - 135 U/L Final    AST 01/25/2023 16  10 - 40 U/L Final    ALT 01/25/2023 14  10 - 44 U/L Final    Anion Gap 01/25/2023 10  8 - 16 mmol/L Final    eGFR 01/25/2023 57 (A)  >60 mL/min/1.73 m^2 Final    Pathologist Review 01/25/2023 Review required   Final    Pathologist Review Peripheral Smear 01/25/2023 REVIEWED   Final      ?   ?   Assessment/Plan:     Problem List Items Addressed This Visit          Cardiac/Vascular    Elevated blood pressure  reading     BP elevated today  Pt notes she has not taken scheduled BP medication  Advised pt to take BP meds asap and recheck BP with continued elevation recommend urgent care            Oncology    Hairy cell leukemia of spleen     Initial bone marrow hypercellular (%) with extensive involvement by hairy cell leukemia. Presentation with splenic rupture and extensive involvement with bone marrow with borderline cytopenias (ANC 1.4, hgb 11, plt 120K)     Treated with cladribine on 6/22/20; 7/6/20 initiated rituximab, not completed due to transfusion reaction.      Repeat bone marrow biopsy 4 months after completion of therapy show significant improvement with 1% residual hairy cell leukemia.   She did have evaluation by Dr. Norwood in [facility] 12/18/2020 who agreed with no indication for additional treatment at this time and will continue on surveillance.     S/p spleenetomy 05/2020   Hgb, platelet, and anc remain WNL  Absence of morphologic evidence of HCL on  peripheral blood smear    No concerning cytopenias noted on most recent lab results      Follow-up in six months with repeat labs prior              Other Visit Diagnoses       Encounter for screening colonoscopy    -  Primary    Relevant Orders    Ambulatory referral/consult to Endo Procedure          Med Onc Chart Routing      Follow up with physician    Follow up with [facility] 6 months. with labs prior   Infusion scheduling note    Injection scheduling note    Labs CBC, CMP and other   Lab interval:     Imaging    Pharmacy appointment    Other referrals              VIKTORIA Moreira  Hematology/Oncology

## 2023-02-01 NOTE — ASSESSMENT & PLAN NOTE
Initial bone marrow hypercellular (%) with extensive involvement by hairy cell leukemia. Presentation with splenic rupture and extensive involvement with bone marrow with borderline cytopenias (ANC 1.4, hgb 11, plt 120K)     Treated with cladribine on 6/22/20; 7/6/20 initiated rituximab, not completed due to transfusion reaction.      Repeat bone marrow biopsy 4 months after completion of therapy show significant improvement with 1% residual hairy cell leukemia.   She did have evaluation by Dr. Norwood in [facility] 12/18/2020 who agreed with no indication for additional treatment at this time and will continue on surveillance.     S/p spleenetomy 05/2020   Hgb, platelet, and anc remain WNL  Absence of morphologic evidence of HCL on  peripheral blood smear    No concerning cytopenias noted on most recent lab results      Follow-up in six months with repeat labs prior

## 2023-02-27 ENCOUNTER — HOSPITAL ENCOUNTER (OUTPATIENT)
Dept: PREADMISSION TESTING | Facility: HOSPITAL | Age: 48
Discharge: HOME | End: 2023-02-27
Attending: COLON & RECTAL SURGERY
Payer: MEDICAID

## 2023-02-27 DIAGNOSIS — Z12.11 ENCOUNTER FOR SCREENING COLONOSCOPY: ICD-10-CM

## 2023-03-03 ENCOUNTER — TELEPHONE (OUTPATIENT)
Dept: HEMATOLOGY/ONCOLOGY | Facility: CLINIC | Age: 48
End: 2023-03-03
Payer: MEDICAID

## 2023-03-03 NOTE — TELEPHONE ENCOUNTER
Spoke with patient in regards to scheduling hos fu. Was able to get patient scheduled and patient verbalized confirmation.

## 2023-03-10 ENCOUNTER — TELEPHONE (OUTPATIENT)
Dept: FAMILY MEDICINE | Facility: CLINIC | Age: 48
End: 2023-03-10
Payer: MEDICAID

## 2023-03-14 ENCOUNTER — OFFICE VISIT (OUTPATIENT)
Dept: HEMATOLOGY/ONCOLOGY | Facility: CLINIC | Age: 48
End: 2023-03-14
Payer: MEDICAID

## 2023-03-14 VITALS
OXYGEN SATURATION: 99 % | SYSTOLIC BLOOD PRESSURE: 151 MMHG | TEMPERATURE: 98 F | DIASTOLIC BLOOD PRESSURE: 109 MMHG | HEIGHT: 72 IN | WEIGHT: 293 LBS | BODY MASS INDEX: 39.68 KG/M2 | HEART RATE: 72 BPM

## 2023-03-14 DIAGNOSIS — C91.40 HAIRY CELL LEUKEMIA OF SPLEEN: ICD-10-CM

## 2023-03-14 DIAGNOSIS — C91.40 HAIRY CELL LEUKEMIA NOT HAVING ACHIEVED REMISSION: Primary | ICD-10-CM

## 2023-03-14 DIAGNOSIS — K04.7 DENTAL ABSCESS: ICD-10-CM

## 2023-03-14 DIAGNOSIS — Z90.81 S/P SPLENECTOMY: ICD-10-CM

## 2023-03-14 PROCEDURE — 4010F PR ACE/ARB THEARPY RXD/TAKEN: ICD-10-PCS | Mod: CPTII,,, | Performed by: INTERNAL MEDICINE

## 2023-03-14 PROCEDURE — 3008F PR BODY MASS INDEX (BMI) DOCUMENTED: ICD-10-PCS | Mod: CPTII,,, | Performed by: INTERNAL MEDICINE

## 2023-03-14 PROCEDURE — 3080F PR MOST RECENT DIASTOLIC BLOOD PRESSURE >= 90 MM HG: ICD-10-PCS | Mod: CPTII,,, | Performed by: INTERNAL MEDICINE

## 2023-03-14 PROCEDURE — 3080F DIAST BP >= 90 MM HG: CPT | Mod: CPTII,,, | Performed by: INTERNAL MEDICINE

## 2023-03-14 PROCEDURE — 99999 PR PBB SHADOW E&M-EST. PATIENT-LVL V: ICD-10-PCS | Mod: PBBFAC,,, | Performed by: INTERNAL MEDICINE

## 2023-03-14 PROCEDURE — 4010F ACE/ARB THERAPY RXD/TAKEN: CPT | Mod: CPTII,,, | Performed by: INTERNAL MEDICINE

## 2023-03-14 PROCEDURE — 1159F MED LIST DOCD IN RCRD: CPT | Mod: CPTII,,, | Performed by: INTERNAL MEDICINE

## 2023-03-14 PROCEDURE — 1160F RVW MEDS BY RX/DR IN RCRD: CPT | Mod: CPTII,,, | Performed by: INTERNAL MEDICINE

## 2023-03-14 PROCEDURE — 3077F SYST BP >= 140 MM HG: CPT | Mod: CPTII,,, | Performed by: INTERNAL MEDICINE

## 2023-03-14 PROCEDURE — 3077F PR MOST RECENT SYSTOLIC BLOOD PRESSURE >= 140 MM HG: ICD-10-PCS | Mod: CPTII,,, | Performed by: INTERNAL MEDICINE

## 2023-03-14 PROCEDURE — 3008F BODY MASS INDEX DOCD: CPT | Mod: CPTII,,, | Performed by: INTERNAL MEDICINE

## 2023-03-14 PROCEDURE — 1160F PR REVIEW ALL MEDS BY PRESCRIBER/CLIN PHARMACIST DOCUMENTED: ICD-10-PCS | Mod: CPTII,,, | Performed by: INTERNAL MEDICINE

## 2023-03-14 PROCEDURE — 1159F PR MEDICATION LIST DOCUMENTED IN MEDICAL RECORD: ICD-10-PCS | Mod: CPTII,,, | Performed by: INTERNAL MEDICINE

## 2023-03-14 PROCEDURE — 99215 OFFICE O/P EST HI 40 MIN: CPT | Mod: PBBFAC | Performed by: INTERNAL MEDICINE

## 2023-03-14 PROCEDURE — 99999 PR PBB SHADOW E&M-EST. PATIENT-LVL V: CPT | Mod: PBBFAC,,, | Performed by: INTERNAL MEDICINE

## 2023-03-14 PROCEDURE — 99214 PR OFFICE/OUTPT VISIT, EST, LEVL IV, 30-39 MIN: ICD-10-PCS | Mod: S$PBB,,, | Performed by: INTERNAL MEDICINE

## 2023-03-14 PROCEDURE — 99214 OFFICE O/P EST MOD 30 MIN: CPT | Mod: S$PBB,,, | Performed by: INTERNAL MEDICINE

## 2023-03-14 RX ORDER — CLINDAMYCIN HYDROCHLORIDE 300 MG/1
300 CAPSULE ORAL 3 TIMES DAILY
Qty: 30 CAPSULE | Refills: 0 | Status: SHIPPED | OUTPATIENT
Start: 2023-03-14 | End: 2023-03-24

## 2023-03-14 NOTE — PROGRESS NOTES
Subjective:       Patient ID: Jaswant Yang is a 47 y.o. female.    Chief Complaint: Results and Leukemia    HPI:  47-year-old female history of recent odontogenic tooth infection involving right upper teeth.  Patient is scheduled to be seen in early April by dentist.  Patient feels that infection is returning history of hairy cell leukemia.    Past Medical History:   Diagnosis Date    Anemia     Anemia     Back pain     Dental infection     GERD (gastroesophageal reflux disease) 2020    Hairy-cell leukemia of spleen      History reviewed. No pertinent family history.  Social History     Socioeconomic History    Marital status: Single   Tobacco Use    Smoking status: Former     Packs/day: 0.25     Types: Cigarettes    Smokeless tobacco: Never    Tobacco comments:     no longer smoker   Substance and Sexual Activity    Alcohol use: Yes     Comment: occasional    Drug use: No    Sexual activity: Yes     Birth control/protection: Condom   Social History Narrative    Lives in a single family home with her fiance and her daughter.     Her mother and her son are her SDM's. ACP Documents are available in her chart.      Past Surgical History:   Procedure Laterality Date     SECTION, CLASSIC      FLUOROSCOPY N/A 2020    Procedure: Spleenic Bleed;  Surgeon: Hiren Cabrera MD;  Location: [facility] CATH LAB;  Service: General;  Laterality: N/A;    INJECTION OF ANESTHETIC AGENT INTO TISSUE PLANE DEFINED BY TRANSVERSUS ABDOMINIS MUSCLE N/A 2020    Procedure: BLOCK, TRANSVERSUS ABDOMINIS PLANE;  Surgeon: Yumi Ferguson MD;  Location: [facility] OR;  Service: General;  Laterality: N/A;    SPLENECTOMY N/A 2020    Procedure: SPLENECTOMY;  Surgeon: Yumi Ferguson MD;  Location: [facility] OR;  Service: General;  Laterality: N/A;    THORACENTESIS Left 2020    Procedure: THORACENTESIS;  Surgeon: Yumi Ferguson MD;  Location: [facility] OR;  Service: General;  Laterality: Left;    TUBAL  LIGATION         Labs:  Lab Results   Component Value Date    WBC 11.16 01/25/2023    HGB 15.3 01/25/2023    HCT 44.7 01/25/2023    MCV 85 01/25/2023     01/25/2023     BMP  Lab Results   Component Value Date     01/25/2023    K 4.1 01/25/2023     01/25/2023    CO2 26 01/25/2023    BUN 13 01/25/2023    CREATININE 1.2 01/25/2023    CALCIUM 9.2 01/25/2023    ANIONGAP 10 01/25/2023    ESTGFRAFRICA >60 07/28/2022    EGFRNONAA 55 (A) 07/28/2022     Lab Results   Component Value Date    ALT 14 01/25/2023    AST 16 01/25/2023    ALKPHOS 57 01/25/2023    BILITOT 1.0 01/25/2023       Lab Results   Component Value Date    IRON 164 (H) 08/05/2020    TIBC 334 08/05/2020    FERRITIN 279 08/05/2020     No results found for: UIPNHUAJ66  No results found for: FOLATE  No results found for: TSH      Review of Systems   Constitutional:  Negative for activity change, appetite change, chills, diaphoresis, fatigue, fever and unexpected weight change.   HENT:  Positive for dental problem. Negative for congestion, drooling, ear discharge, ear pain, facial swelling, hearing loss, mouth sores, nosebleeds, postnasal drip, rhinorrhea, sinus pressure, sneezing, sore throat, tinnitus, trouble swallowing and voice change.    Eyes:  Negative for photophobia, pain, discharge, redness, itching and visual disturbance.   Respiratory:  Negative for cough, choking, chest tightness, shortness of breath, wheezing and stridor.    Cardiovascular:  Negative for chest pain, palpitations and leg swelling.   Gastrointestinal:  Negative for abdominal distention, abdominal pain, anal bleeding, blood in stool, constipation, diarrhea, nausea, rectal pain and vomiting.   Endocrine: Negative for cold intolerance, heat intolerance, polydipsia, polyphagia and polyuria.   Genitourinary:  Negative for decreased urine volume, difficulty urinating, dyspareunia, dysuria, enuresis, flank pain, frequency, genital sores, hematuria, menstrual problem, pelvic  pain, urgency, vaginal bleeding, vaginal discharge and vaginal pain.   Musculoskeletal:  Negative for arthralgias, back pain, gait problem, joint swelling, myalgias, neck pain and neck stiffness.   Skin:  Negative for color change, pallor and rash.   Allergic/Immunologic: Negative for environmental allergies, food allergies and immunocompromised state.   Neurological:  Negative for dizziness, tremors, seizures, syncope, facial asymmetry, speech difficulty, weakness, light-headedness, numbness and headaches.   Hematological:  Negative for adenopathy. Does not bruise/bleed easily.   Psychiatric/Behavioral:  Positive for dysphoric mood. Negative for agitation, behavioral problems, confusion, decreased concentration, hallucinations, self-injury, sleep disturbance and suicidal ideas. The patient is nervous/anxious. The patient is not hyperactive.      Objective:      Physical Exam  Vitals reviewed.   Constitutional:       General: She is not in acute distress.     Appearance: She is well-developed. She is obese. She is not diaphoretic.   HENT:      Head: Normocephalic and atraumatic.      Right Ear: External ear normal.      Left Ear: External ear normal.      Nose: Nose normal.      Right Sinus: No maxillary sinus tenderness or frontal sinus tenderness.      Left Sinus: No maxillary sinus tenderness or frontal sinus tenderness.      Mouth/Throat:      Dentition: Abnormal dentition. Dental tenderness and dental caries present.      Pharynx: No oropharyngeal exudate.        Comments: Dental abscess possible forming in 2.  On the right upper front teeth  Eyes:      General: Lids are normal. No scleral icterus.        Right eye: No discharge.         Left eye: No discharge.      Conjunctiva/sclera: Conjunctivae normal.      Right eye: Right conjunctiva is not injected. No hemorrhage.     Left eye: Left conjunctiva is not injected. No hemorrhage.     Pupils: Pupils are equal, round, and reactive to light.   Neck:       Thyroid: No thyromegaly.      Vascular: No JVD.      Trachea: No tracheal deviation.   Cardiovascular:      Rate and Rhythm: Normal rate.   Pulmonary:      Effort: Pulmonary effort is normal. No respiratory distress.      Breath sounds: No stridor.   Chest:      Chest wall: No tenderness.   Abdominal:      General: Bowel sounds are normal. There is no distension.      Palpations: Abdomen is soft. There is no hepatomegaly, splenomegaly or mass.      Tenderness: There is no abdominal tenderness. There is no rebound.   Musculoskeletal:         General: No tenderness. Normal range of motion.      Cervical back: Normal range of motion and neck supple.   Lymphadenopathy:      Cervical: No cervical adenopathy.      Upper Body:      Right upper body: No supraclavicular adenopathy.      Left upper body: No supraclavicular adenopathy.   Skin:     General: Skin is dry.      Findings: No erythema or rash.   Neurological:      Mental Status: She is alert and oriented to person, place, and time.      Cranial Nerves: No cranial nerve deficit.      Coordination: Coordination normal.   Psychiatric:         Behavior: Behavior normal.         Thought Content: Thought content normal.         Judgment: Judgment normal.           Assessment:      1. Hairy cell leukemia not having achieved remission    2. Hairy cell leukemia of spleen    3. S/P splenectomy    4. Dental abscess           Plan:     Patient is allergic to penicillin started patient on clindamycin 300 mg t.i.d. times 10 days for appointment.  If the patient is having problems I have told her to go to the emergency room at our [facility] they have a contract for the [facility] with [facility] as well as oral surgery for dental evaluation in this patient she is immunocompromised with hairy cell leukemia RTC 3 months with labs      Med Onc Chart Routing      Follow up with physician 3 months. Return her primary physician Dr. Hernandez in 3 months with CBC CMP and  LDH.   Follow up with [facility]    Infusion scheduling note    Injection scheduling note    Labs CBC, CMP and LDH   Scheduling:  Preferred lab:  Lab interval:     Imaging    Pharmacy appointment    Other referrals            Cirilo Whittaker Jr, MD FACP

## 2023-03-16 ENCOUNTER — TELEPHONE (OUTPATIENT)
Dept: HEMATOLOGY/ONCOLOGY | Facility: CLINIC | Age: 48
End: 2023-03-16
Payer: MEDICAID

## 2023-03-16 NOTE — TELEPHONE ENCOUNTER
SW attempted to reach pt to discuss recent distress score. No answer. Left vm. SW will remain available.

## 2023-03-22 ENCOUNTER — TELEPHONE (OUTPATIENT)
Dept: HEMATOLOGY/ONCOLOGY | Facility: CLINIC | Age: 48
End: 2023-03-22
Payer: MEDICAID

## 2023-03-22 NOTE — TELEPHONE ENCOUNTER
----- Message from Loida Overton sent at 3/22/2023 10:14 AM CDT -----  The department's access is limited at this time and not accepting new medicaid, please offer Centralized scheduling number at [phone] who is a specialized call center who will help find a clinic to accommodate request.    ----- Message -----  From: Eva Burdick LPN  Sent: 3/22/2023  10:11 AM CDT  To: , #    Good morning,    Patient has referral for pain medicine. Will you please assist with scheduling patient appointment.    Thanks  Eva

## 2023-03-22 NOTE — TELEPHONE ENCOUNTER
SHOAIB intern called pt to discuss distress score of 7. Pt stated she is doing well but is having financial issues and needs help paying her rent. SW intern will see what she can find to help pt with paying rent. SW intern will remain available.

## 2023-03-22 NOTE — TELEPHONE ENCOUNTER
Called patient about pain medicine appointment. No answer, left voicemail with instructions and number to call for scheduling.

## 2023-03-30 ENCOUNTER — TELEPHONE (OUTPATIENT)
Dept: HEMATOLOGY/ONCOLOGY | Facility: CLINIC | Age: 48
End: 2023-03-30
Payer: MEDICAID

## 2023-03-30 NOTE — TELEPHONE ENCOUNTER
SWER received VM from patient to return call. SWER called on today and did not receive an answer. SWER left VM and will remain available.

## 2023-04-03 ENCOUNTER — TELEPHONE (OUTPATIENT)
Dept: FAMILY MEDICINE | Facility: CLINIC | Age: 48
End: 2023-04-03
Payer: MEDICAID

## 2023-04-03 NOTE — TELEPHONE ENCOUNTER
"Attempted to call pt to schedule pt for "My legs and knees hurts real bad that I hardly can walk sometimes" and no answer. Lvm for pt to call us back.  "

## 2023-04-13 ENCOUNTER — TELEPHONE (OUTPATIENT)
Dept: HEMATOLOGY/ONCOLOGY | Facility: CLINIC | Age: 48
End: 2023-04-13
Payer: MEDICAID

## 2023-04-13 NOTE — TELEPHONE ENCOUNTER
----- Message from Raya Aranda sent at 4/13/2023 10:19 AM CDT -----  Contact: Jaswant  .Type:  Patient Returning Call    Who Called:Jaswant  Who Left Message for Patient:Hope  Does the patient know what this is regarding?:scheduling appointment for Pain Management  Would the patient rather a call back or a response via [employer]? call  Best Call Back Number:.[phone]   Additional Information:   Thanks  LR

## 2023-04-13 NOTE — TELEPHONE ENCOUNTER
Returned patient phone call and she requested to have referral sent to [facility] in [place]. Informed her that I would send necessary paperwork to [facility] via fax. Patient verbalized understanding and had no other issues at this time.

## 2023-04-19 ENCOUNTER — PATIENT MESSAGE (OUTPATIENT)
Dept: ADMINISTRATIVE | Facility: HOSPITAL | Age: 48
End: 2023-04-19
Payer: MEDICAID

## 2023-04-19 ENCOUNTER — TELEPHONE (OUTPATIENT)
Dept: HEMATOLOGY/ONCOLOGY | Facility: CLINIC | Age: 48
End: 2023-04-19
Payer: MEDICAID

## 2023-04-19 NOTE — TELEPHONE ENCOUNTER
SHOAIB intern called pt to discuss the financial resources SW intern found for pt. Pt did not answer the phone and SHOAIB intern left a voicemail to call back. SHOAIB intern will remain available.

## 2023-04-20 ENCOUNTER — TELEPHONE (OUTPATIENT)
Dept: HEMATOLOGY/ONCOLOGY | Facility: CLINIC | Age: 48
End: 2023-04-20
Payer: MEDICAID

## 2023-04-20 NOTE — TELEPHONE ENCOUNTER
SHOAIB luevano called pt back and provided pt with financial resources [facility]. SHOAIB luevano provided pt with the phone numbers for both of those places. SHOAIB luevano will remain available.

## 2023-04-21 ENCOUNTER — OFFICE VISIT (OUTPATIENT)
Dept: DERMATOLOGY | Facility: CLINIC | Age: 48
End: 2023-04-21
Payer: MEDICAID

## 2023-04-21 DIAGNOSIS — L29.9 PRURITUS: ICD-10-CM

## 2023-04-21 DIAGNOSIS — L50.8 CHRONIC URTICARIA: Primary | ICD-10-CM

## 2023-04-21 PROCEDURE — 99214 PR OFFICE/OUTPT VISIT, EST, LEVL IV, 30-39 MIN: ICD-10-PCS | Mod: S$PBB,,, | Performed by: STUDENT IN AN ORGANIZED HEALTH CARE EDUCATION/TRAINING PROGRAM

## 2023-04-21 PROCEDURE — 4010F PR ACE/ARB THEARPY RXD/TAKEN: ICD-10-PCS | Mod: CPTII,,, | Performed by: STUDENT IN AN ORGANIZED HEALTH CARE EDUCATION/TRAINING PROGRAM

## 2023-04-21 PROCEDURE — 99213 OFFICE O/P EST LOW 20 MIN: CPT | Mod: PBBFAC,PO | Performed by: STUDENT IN AN ORGANIZED HEALTH CARE EDUCATION/TRAINING PROGRAM

## 2023-04-21 PROCEDURE — 99214 OFFICE O/P EST MOD 30 MIN: CPT | Mod: S$PBB,,, | Performed by: STUDENT IN AN ORGANIZED HEALTH CARE EDUCATION/TRAINING PROGRAM

## 2023-04-21 PROCEDURE — 1160F PR REVIEW ALL MEDS BY PRESCRIBER/CLIN PHARMACIST DOCUMENTED: ICD-10-PCS | Mod: CPTII,,, | Performed by: STUDENT IN AN ORGANIZED HEALTH CARE EDUCATION/TRAINING PROGRAM

## 2023-04-21 PROCEDURE — 99999 PR PBB SHADOW E&M-EST. PATIENT-LVL III: ICD-10-PCS | Mod: PBBFAC,,, | Performed by: STUDENT IN AN ORGANIZED HEALTH CARE EDUCATION/TRAINING PROGRAM

## 2023-04-21 PROCEDURE — 1160F RVW MEDS BY RX/DR IN RCRD: CPT | Mod: CPTII,,, | Performed by: STUDENT IN AN ORGANIZED HEALTH CARE EDUCATION/TRAINING PROGRAM

## 2023-04-21 PROCEDURE — 4010F ACE/ARB THERAPY RXD/TAKEN: CPT | Mod: CPTII,,, | Performed by: STUDENT IN AN ORGANIZED HEALTH CARE EDUCATION/TRAINING PROGRAM

## 2023-04-21 PROCEDURE — 99999 PR PBB SHADOW E&M-EST. PATIENT-LVL III: CPT | Mod: PBBFAC,,, | Performed by: STUDENT IN AN ORGANIZED HEALTH CARE EDUCATION/TRAINING PROGRAM

## 2023-04-21 PROCEDURE — 1159F PR MEDICATION LIST DOCUMENTED IN MEDICAL RECORD: ICD-10-PCS | Mod: CPTII,,, | Performed by: STUDENT IN AN ORGANIZED HEALTH CARE EDUCATION/TRAINING PROGRAM

## 2023-04-21 PROCEDURE — 1159F MED LIST DOCD IN RCRD: CPT | Mod: CPTII,,, | Performed by: STUDENT IN AN ORGANIZED HEALTH CARE EDUCATION/TRAINING PROGRAM

## 2023-04-21 RX ORDER — TRIAMCINOLONE ACETONIDE 1 MG/G
CREAM TOPICAL 2 TIMES DAILY
Qty: 454 G | Refills: 1 | OUTPATIENT
Start: 2023-04-21 | End: 2024-01-17

## 2023-04-21 RX ORDER — HYDROXYZINE HYDROCHLORIDE 25 MG/1
TABLET, FILM COATED ORAL
Qty: 30 TABLET | Refills: 2 | Status: SHIPPED | OUTPATIENT
Start: 2023-04-21

## 2023-04-21 NOTE — PROGRESS NOTES
Patient Information  Name: Jaswant Yang  : 1975  MRN: [id number]     Referring Physician:  Dr. Borja ref. provider found   Primary Care Physician:  Dr. Arleth Gallegos MD   Date of Visit: 2023      Subjective:       Jaswant Yang is a 47 y.o. female who presents for   Chief Complaint   Patient presents with    Rash     C/o rash on neck , itching all over      HPI  Patient with hx of urticaria, here for follow up. Last seen in . At that time, thought to be due to oral antibiotics. She has since not taken oral antibiotics since but still experiencing hives.    Patient was last seen:Visit date not found     Prior notes by myself reviewed.   Clinical documentation obtained by nursing staff reviewed.    Review of Systems   Skin:  Positive for itching and rash.      Objective:    Physical Exam   Constitutional: She appears well-developed and well-nourished. No distress.   Neurological: She is alert and oriented to person, place, and time. She is not disoriented.   Psychiatric: She has a normal mood and affect.   Skin:   Areas Examined (abnormalities noted in diagram):   Head / Face Inspection Performed  Neck Inspection Performed            Diagram Legend     Erythematous scaling macule/papule c/w actinic keratosis       Vascular papule c/w angioma      Pigmented verrucoid papule/plaque c/w seborrheic keratosis      Yellow umbilicated papule c/w sebaceous hyperplasia      Irregularly shaped tan macule c/w lentigo     1-2 mm smooth white papules consistent with Milia      Movable subcutaneous cyst with punctum c/w epidermal inclusion cyst      Subcutaneous movable cyst c/w pilar cyst      Firm pink to brown papule c/w dermatofibroma      Pedunculated fleshy papule(s) c/w skin tag(s)      Evenly pigmented macule c/w junctional nevus     Mildly variegated pigmented, slightly irregular-bordered macule c/w mildly atypical nevus      Flesh colored to evenly pigmented papule c/w intradermal nevus        Pink pearly papule/plaque c/w basal cell carcinoma      Erythematous hyperkeratotic cursted plaque c/w SCC      Surgical scar with no sign of skin cancer recurrence      Open and closed comedones      Inflammatory papules and pustules      Verrucoid papule consistent consistent with wart     Erythematous eczematous patches and plaques     Dystrophic onycholytic nail with subungual debris c/w onychomycosis     Umbilicated papule    Erythematous-base heme-crusted tan verrucoid plaque consistent with inflamed seborrheic keratosis     Erythematous Silvery Scaling Plaque c/w Psoriasis     See annotation      No images are attached to the encounter or orders placed in the encounter.    [] Data reviewed  [] Independent review of test  [] Management discussed with another provider    Assessment / Plan:        Chronic urticaria  -     Ambulatory referral/consult to Allergy; Future; Expected date: 04/28/2023 for workup of chronic urticaria  -     hydrOXYzine HCL (ATARAX) 25 MG tablet; TAKE 1 TABLET(25 MG) BY MOUTH THREE TIMES DAILY AS NEEDED FOR ITCHING  Dispense: 30 tablet; Refill: 2  - Rx TAC 0.1%     Pruritus  - See above           LOS NUMBER AND COMPLEXITY OF PROBLEMS    COMPLEXITY OF DATA RISK TOTAL TIME (m)   96427  [id number] [] 1 self-limited or minor problem [x] Minimal to none [] No treatment recommended or patient to monitor 15-29  10-19   23525  93993 Low  [] 2 or > self limited or minor problems  [] 1 stable chronic illness  [] 1 acute, uncomplicated illness or injury Limited (2)  [] Prior external notes from each unique source  [] Review result of each unique test  [] Order each unique test []  Low  OTC medications, minor skin biopsy 30-44 20-29   72277  33300 Moderate  [x]  1 or > chronic illness with progression, exacerbation or SE of treatment  []  2 or more stable chronic illnesses  []  1 acute illness with systemic symptoms  []  1 acute complicated injury  []  1 undiagnosed new problem with uncertain prognosis  Moderate (1/3 below)  []  3 or more data items        *Now includes assessment requiring independent historian  []  Independent interpretation of a test  []  Discuss management/test with another provider Moderate  [x]  Prescription drug mgmt  []  Minor surgery with risk discussed  []  Mgmt limited by social determinates 45-59  30-39   16618  75541 High  []  1 or more chronic illness with severe exacerbation, progression or SE of treatment  []  1 acute or chronic illness/injury that poses a threat to life or bodily function Extensive (2/3 below)  []  3 or more data items        *Now includes assessment requiring independent historian.  []  Independent interpretation of a test  []  Discuss management/test with another provider High  []  Major surgery with risk discussed  []  Drug therapy requiring intensive monitoring for toxicity  []  Hospitalization  []  Decision for DNR 60-74  40-54      No follow-ups on file.    Ondina Greenbreg MD, FAAD  [facility]

## 2023-05-18 NOTE — TELEPHONE ENCOUNTER
----- Message from Bernard Martin MD sent at 7/18/2020 12:12 PM CDT -----  Patient was seen in hospital     Please accommodate for follow up appointment after d/c in one week with routine labs     Thanks     Shyann Martin MD      Consent (Spinal Accessory)/Introductory Paragraph: The rationale for Mohs was explained to the patient and consent was obtained. The risks, benefits and alternatives to therapy were discussed in detail. Specifically, the risks of damage to the spinal accessory nerve, infection, scarring, bleeding, prolonged wound healing, incomplete removal, allergy to anesthesia, and recurrence were addressed. Prior to the procedure, the treatment site was clearly identified and confirmed by the patient. All components of Universal Protocol/PAUSE Rule completed.

## 2023-06-29 NOTE — TELEPHONE ENCOUNTER
Care Due:                  Date            Visit Type   Department     Provider  --------------------------------------------------------------------------------                                EP -                              PRIMARY      [facility]  Last Visit: 06-      CARE ([facility])   MEDICINE       Arleth Gallegos  Next Visit: None Scheduled  None         None Found                                                            Last  Test          Frequency    Reason                     Performed    Due Date  --------------------------------------------------------------------------------    Office Visit  12 months..  losartan.................  06- 06-    [facility] Embedded Care Due Messages. Reference number: [id number].   6/29/2023 5:16:00 PM CDT

## 2023-06-30 RX ORDER — LOSARTAN POTASSIUM 25 MG/1
TABLET ORAL
Qty: 30 TABLET | Refills: 1 | Status: SHIPPED | OUTPATIENT
Start: 2023-06-30 | End: 2023-09-11 | Stop reason: SDUPTHER

## 2023-06-30 NOTE — TELEPHONE ENCOUNTER
Call this patient and get an  updated blood pressure.   Update the chart   Schedule follow up with me

## 2023-06-30 NOTE — TELEPHONE ENCOUNTER
Refill Routing Note   Medication(s) are not appropriate for processing by [facility] for the following reason(s):      Required vitals abnormal    [facility] action(s):  Defer Appointment due            Appointments  past 12m or future 3m with PCP    Date Provider   Last Visit   6/14/2022 Arleth Gallegos MD   Next Visit   Visit date not found Arleth Gallegos MD   ED visits in past 90 days: 0        Note composed:10:51 PM 06/29/2023

## 2023-07-12 NOTE — PROGRESS NOTES
Jaswant DUNN Trey  2023  [id number]    Arleth Gallegos MD  Patient Care Team:  Arleth Gallegos MD as PCP - General (Family Medicine)  Guerda Hernandez MD as Consulting Physician (Hematology and Oncology)  Brooke Awan LMSW as  (Hematology and Oncology)          Visit Type:an urgent visit for a new problem    Chief Complaint:  Chief Complaint   Patient presents with    Shortness of Breath    Cough    Nasal Congestion    Chest Pain       History of Present Illness:    47 year old female presents with complaint of SOB, cough nasal congestion and chest pain from coughing for 5 days. Pt was seen at Urgent Care  tested negative for covid and flu and negative chest xray. Pt was given a breathing treatment and steroid shot and a medrol dose pack, zpack, promethazine and inhaler.  Denies ear pain, fever,  chills, body aches,  nausea, vomiting, diarrhea, abdominal pain, weakness.      History:  Past Medical History:   Diagnosis Date    Anemia     Anemia     Back pain     Dental infection     GERD (gastroesophageal reflux disease) 2020    Hairy-cell leukemia of spleen      Past Surgical History:   Procedure Laterality Date     SECTION, CLASSIC      FLUOROSCOPY N/A 2020    Procedure: Spleenic Bleed;  Surgeon: Hiren Cabrera MD;  Location: [facility] CATH LAB;  Service: General;  Laterality: N/A;    INJECTION OF ANESTHETIC AGENT INTO TISSUE PLANE DEFINED BY TRANSVERSUS ABDOMINIS MUSCLE N/A 2020    Procedure: BLOCK, TRANSVERSUS ABDOMINIS PLANE;  Surgeon: Yumi Ferguson MD;  Location: [facility] OR;  Service: General;  Laterality: N/A;    SPLENECTOMY N/A 2020    Procedure: SPLENECTOMY;  Surgeon: Yumi Ferguson MD;  Location: [facility] OR;  Service: General;  Laterality: N/A;    THORACENTESIS Left 2020    Procedure: THORACENTESIS;  Surgeon: Yumi Ferguson MD;  Location: [facility] OR;  Service: General;  Laterality: Left;    TUBAL LIGATION       History reviewed. No  There are no exam notes on file for this visit.      Doroteo is a 78 year old who is being evaluated via a billable telephone visit.      What phone number would you like to be contacted at? [phone]  How would you like to obtain your AVS? Azam    Distant Location (provider location):  On-site    Diagnoses and associated orders for this visit:  Pseudodementia    Memory problem    Mixed conductive and sensorineural hearing loss of both ears      Continue with Aricept 10 mg daily and Zoloft 100 mg daily.  Change prescriptions for 90-day supplies with refills as needed.  Could certainly touch base again before they take off in October but if things are going well and things are stable, we could touch base next spring.    Yong Diaz MD     Subjective   Doroteo is a 78 year old, presenting for the following health issues:  No chief complaint on file.         No data to display              HPI   Geoff is having a telephone visit today for medication follow-up.  I last saw him 2 months ago in the office when they got back to their cabin. He has been on sertraline since last fall to see if that would help with memory on the presumption that he might have pseudodementia.  He seemed to do reasonably well over the winter and we had a couple virtual visits.  He continues to have some OCD traits. He and his wife agree that he has less irritability but seems to still have some trouble with forgetfulness.  His wife wonders if perhaps it might just be his hearing.  He does note some significant hearing loss.  At his last visit, we elected to continue Zoloft 100 mg daily and do a trial of Aricept for a month at 5 mg, then up to 10 mg.  We also sent a referral to Dr. Blair.  He has not yet seen Dr. Blair.    He continues on 100 mg of Zoloft.  He had been on Aricept 5 mg daily for 1 month. He increased to 10 mg and continues on that.  He is doing markedly better. He is no longer mumbling. His driving is better.  They  are very pleased.  They are hoping to go to [facility] sometime this winter.  They wondered about immunizations and I told him to check the [facility] website.    They had some interest in possibly any drug trials.  I told them I keep my eyes open to see if there is anything coming up but at this point we probably would stay the course.        9/1/2022    12:53 PM 11/9/2022    10:01 AM 12/6/2022    10:15 AM   PHQ   PHQ-9 Total Score 0 0 0   Q9: Thoughts of better off dead/self-harm past 2 weeks Not at all Not at all Not at all          No data to display                      Review of Systems   ROS is negative except as noted above                Objective           Vitals:  No vitals were obtained today due to virtual visit.    Physical Exam   healthy, alert and no distress  PSYCH: Alert and oriented times 3; coherent speech, normal   rate and volume, able to articulate logical thoughts, able   to abstract reason, no tangential thoughts, no hallucinations   or delusions  His affect is normal  RESP: No cough, no audible wheezing, able to talk in full sentences  Remainder of exam unable to be completed due to telephone visits                Phone call duration: 15 minutes       pertinent family history.  Social History     Socioeconomic History    Marital status: Single   Tobacco Use    Smoking status: Former     Packs/day: 0.25     Types: Cigarettes    Smokeless tobacco: Never    Tobacco comments:     no longer smoker   Substance and Sexual Activity    Alcohol use: Yes     Comment: occasional    Drug use: No    Sexual activity: Yes     Birth control/protection: Condom   Social History Narrative    Lives in a single family home with her fiance and her daughter.     Her mother and her son are her SDM's. ACP Documents are available in her chart.      Patient Active Problem List   Diagnosis    GERD (gastroesophageal reflux disease)    Hairy cell leukemia of spleen    S/P splenectomy    Hairy cell leukemia not having achieved remission    Chemotherapy-induced neutropenia    Acute neoplasm-related pain    Therapeutic opioid-induced constipation (OIC)    Metabolic acidosis    Elevated blood pressure reading     Review of patient's allergies indicates:   Allergen Reactions    Fentanyl Rash     Severe full body diffuse rash requiring systemic steroids    Vancomycin analogues Rash     Severe full body diffuse rash requiring systemic steroids    Rituximab Other (See Comments)     Sweating, abdominal pain, elevated blood pressure    Penicillin Hives    Tramadol Hives       The following were reviewed at this visit: active problem list, medication list, allergies, family history, social history, and health maintenance.    Medications:  Current Outpatient Medications on File Prior to Visit   Medication Sig Dispense Refill    ALPRAZolam (XANAX) 0.5 MG tablet TAKE 1/2 TO 1 TABLET BY MOUTH TWICE DAILY AS NEEDED FOR ANXIETY. 30 tablet 5    betamethasone dipropionate (DIPROLENE) 0.05 % lotion Apply topically 2 (two) times daily. 60 mL 1    cetirizine (ZYRTEC) 10 MG tablet Take 10 mg by mouth daily as needed.      chlorhexidine (PERIDEX) 0.12 % solution SMARTSIG:By Mouth      clotrimazole-betamethasone  1-0.05% (LOTRISONE) cream Apply topically 2 (two) times daily.      clotrimazole-betamethasone 1-0.05% (LOTRISONE) cream Apply topically 2 (two) times daily. 45 g 2    cyclobenzaprine (FLEXERIL) 10 MG tablet TAKE 1/2 TO 1 TABLET BY MOUTH TWICE DAILY AS NEEDED FOR MUSCLE SPASMS. MAY CAUSE DROWSINESS 60 tablet 1    fluconazole (DIFLUCAN) 200 MG Tab TAKE 1 TABLET BY MOUTH EVERY 72 HOURS FOR 3 DOSES. DO NOT TAKE XANAX WHILE ON MEDICATION 3 tablet 0    gabapentin (NEURONTIN) 300 MG capsule TAKE 2 CAPSULES BY MOUTH EVERY EVENING. 60 capsule 11    HYDROcodone-acetaminophen (NORCO) 5-325 mg per tablet Take 1 tablet by mouth every 12 (twelve) hours as needed for Pain. 10 tablet 0    hydrOXYzine HCL (ATARAX) 25 MG tablet TAKE 1 TABLET(25 MG) BY MOUTH THREE TIMES DAILY AS NEEDED FOR ITCHING 30 tablet 0    ibuprofen (ADVIL,MOTRIN) 600 MG tablet Take 600 mg by mouth every 6 (six) hours as needed.      losartan (COZAAR) 25 MG tablet Take 1 tablet (25 mg total) by mouth once daily. 90 tablet 3    polyethylene glycol (GLYCOLAX) 17 gram/dose powder Take 17 g by mouth once daily. 510 g 3    promethazine (PHENERGAN) 25 MG tablet Take 1 tablet (25 mg total) by mouth every 6 (six) hours as needed for Nausea. 15 tablet 0    senna (SENOKOT) 8.6 mg tablet Take 1 tablet by mouth 2 (two) times a day.      tiZANidine (ZANAFLEX) 4 MG tablet TAKE 1/2 TO 1 TABLET BY MOUTH TWICE DAILY AS NEEDED FOR MUSCLE SPASMS. MAY CAUSE DROWSINESS 60 tablet 0    albuterol (PROVENTIL/VENTOLIN HFA) 90 mcg/actuation inhaler Inhale 1-2 puffs into the lungs every 6 (six) hours as needed for Wheezing. Rescue 1 Inhaler 0    mupirocin (BACTROBAN) 2 % ointment Apply topically 3 (three) times daily. (Patient not taking: Reported on 1/25/2023) 22 g 0     No current facility-administered medications on file prior to visit.       Medications have been reviewed and reconciled with patient at this visit.  Barriers to medications reviewed with patient.    Adverse reactions to  current medications reviewed with patient..    Over the counter medications reviewed and reconciled with patient.    Exam:  Wt Readings from Last 3 Encounters:   01/25/23 (!) 168.9 kg (372 lb 7.5 oz)   11/17/22 (!) 175.5 kg (386 lb 14.5 oz)   10/20/22 (!) 170.1 kg (374 lb 14.3 oz)     Temp Readings from Last 3 Encounters:   01/25/23 98.2 °F (36.8 °C) (Tympanic)   10/20/22 98.2 °F (36.8 °C)   07/28/22 97.8 °F (36.6 °C) (Temporal)     BP Readings from Last 3 Encounters:   01/25/23 (!) 150/96   11/17/22 (!) 155/106   10/20/22 130/80     Pulse Readings from Last 3 Encounters:   01/25/23 82   11/17/22 64   10/20/22 101     Body mass index is 51.95 kg/m².      Review of Systems   HENT:  Positive for congestion.    Respiratory:  Positive for cough and shortness of breath.    Physical Exam  Constitutional:       Appearance: Normal appearance. She is obese.   HENT:      Head: Normocephalic and atraumatic.      Right Ear: Tympanic membrane, ear canal and external ear normal.      Left Ear: Tympanic membrane, ear canal and external ear normal.      Nose: Congestion and rhinorrhea present.      Mouth/Throat:      Mouth: Mucous membranes are moist.      Pharynx: Oropharynx is clear.   Eyes:      Extraocular Movements: Extraocular movements intact.      Conjunctiva/sclera: Conjunctivae normal.      Pupils: Pupils are equal, round, and reactive to light.   Cardiovascular:      Rate and Rhythm: Normal rate and regular rhythm.      Pulses: Normal pulses.      Heart sounds: Normal heart sounds.   Pulmonary:      Effort: Pulmonary effort is normal.      Breath sounds: Normal breath sounds.   Abdominal:      General: Bowel sounds are normal.      Palpations: Abdomen is soft.   Musculoskeletal:         General: Normal range of motion.      Cervical back: Normal range of motion and neck supple.   Skin:     General: Skin is warm and dry.      Capillary Refill: Capillary refill takes less than 2 seconds.   Neurological:      General: No  focal deficit present.      Mental Status: She is alert and oriented to person, place, and time.   Psychiatric:         Mood and Affect: Mood normal.         Behavior: Behavior normal.         Thought Content: Thought content normal.         Judgment: Judgment normal.       Laboratory Reviewed ({Yes)  Lab Results   Component Value Date    WBC 8.35 07/28/2022    HGB 13.5 07/28/2022    HCT 40.8 07/28/2022     07/28/2022    ALT 19 07/28/2022    AST 15 07/28/2022     07/28/2022    K 4.0 07/28/2022     07/28/2022    CREATININE 1.2 07/28/2022    BUN 14 07/28/2022    CO2 25 07/28/2022    INR 1.0 10/07/2020       Jaswant was seen today for shortness of breath, cough, nasal congestion and chest pain.    Diagnoses and all orders for this visit:    Sinus congestion  -     dexbrompheniramine-phenyleph (ALA-HIST PE) 2-10 mg Tab; Take 1 tablet by mouth every 6 (six) hours as needed (sinus congestion).  -     albuterol-ipratropium 2.5 mg-0.5 mg/3 mL nebulizer solution 3 mL    Cough, unspecified type  -     promethazine-dextromethorphan (PROMETHAZINE-DM) 6.25-15 mg/5 mL Syrp; Take 10 mLs by mouth every 4 (four) hours as needed.  -     albuterol-ipratropium 2.5 mg-0.5 mg/3 mL nebulizer solution 3 mL    Annual physical exam  -     Lipid panel; Future  -     HEMOGLOBIN A1C; Future    SOB (shortness of breath)  -     albuterol-ipratropium 2.5 mg-0.5 mg/3 mL nebulizer solution 3 mL    Bronchitis    Costochondritis  -     ibuprofen (ADVIL,MOTRIN) 800 MG tablet; Take 1 tablet (800 mg total) by mouth every 6 (six) hours as needed for Pain.          Care Plan/Goals: Reviewed    Goals    None       Jaswant was seen today for shortness of breath, cough, nasal congestion and chest pain.    Diagnoses and all orders for this visit:    Sinus congestion  -     dexbrompheniramine-phenyleph (ALA-HIST PE) 2-10 mg Tab; Take 1 tablet by mouth every 6 (six) hours as needed (sinus congestion).  -     albuterol-ipratropium 2.5 mg-0.5 mg/3  mL nebulizer solution 3 mL    Cough, unspecified type  -     promethazine-dextromethorphan (PROMETHAZINE-DM) 6.25-15 mg/5 mL Syrp; Take 10 mLs by mouth every 4 (four) hours as needed.  -     albuterol-ipratropium 2.5 mg-0.5 mg/3 mL nebulizer solution 3 mL    Annual physical exam  -     Lipid panel; Future  -     HEMOGLOBIN A1C; Future    SOB (shortness of breath)  -     albuterol-ipratropium 2.5 mg-0.5 mg/3 mL nebulizer solution 3 mL    Bronchitis    Costochondritis  -     ibuprofen (ADVIL,MOTRIN) 800 MG tablet; Take 1 tablet (800 mg total) by mouth every 6 (six) hours as needed for Pain.       Follow up: Follow up if symptoms worsen or fail to improve.    After visit summary was printed and given to patient upon discharge today.  Patient goals and care plan are included in After Visit Summary.

## 2023-07-20 ENCOUNTER — TELEPHONE (OUTPATIENT)
Dept: HEMATOLOGY/ONCOLOGY | Facility: CLINIC | Age: 48
End: 2023-07-20
Payer: MEDICAID

## 2023-07-20 NOTE — TELEPHONE ENCOUNTER
ADITYA returned patient's call on today. It appears ADITYA woke patient up. She requesting assistance with housing and financial resources. ADITYA reports she will research grants and services and email to her. Pt confirmed her email. ADITYA will remain available.

## 2023-07-26 ENCOUNTER — TELEPHONE (OUTPATIENT)
Dept: FAMILY MEDICINE | Facility: CLINIC | Age: 48
End: 2023-07-26
Payer: MEDICAID

## 2023-07-26 NOTE — TELEPHONE ENCOUNTER
----- Message from Kimberly Díaz sent at 7/26/2023  8:02 AM CDT -----  Contact: Jaswant  Type:  Sooner Apoointment Request    Caller is requesting a sooner appointment. Caller will not accept being placed on the waitlist and is requesting a message be sent to doctor.  Name of Caller: Jaswant  When is the first available appointment?unknown  Symptoms:check up, medication visit  Would the patient rather a call back or a response via [facility]? call  Best Call Back Number:[phone]   Additional Information: Patient request to schedule soonest available appointment. Please call patient back to assist.

## 2023-07-28 ENCOUNTER — LAB VISIT (OUTPATIENT)
Dept: LAB | Facility: HOSPITAL | Age: 48
End: 2023-07-28
Attending: INTERNAL MEDICINE
Payer: MEDICAID

## 2023-07-28 DIAGNOSIS — C91.40 HAIRY CELL LEUKEMIA NOT HAVING ACHIEVED REMISSION: ICD-10-CM

## 2023-07-28 LAB
ALBUMIN SERPL BCP-MCNC: 4.1 G/DL (ref 3.5–5.2)
ALP SERPL-CCNC: 69 U/L (ref 55–135)
ALT SERPL W/O P-5'-P-CCNC: 21 U/L (ref 10–44)
ANION GAP SERPL CALC-SCNC: 9 MMOL/L (ref 8–16)
AST SERPL-CCNC: 18 U/L (ref 10–40)
BASOPHILS # BLD AUTO: 0.05 K/UL (ref 0–0.2)
BASOPHILS NFR BLD: 0.9 % (ref 0–1.9)
BILIRUB SERPL-MCNC: 0.7 MG/DL (ref 0.1–1)
BUN SERPL-MCNC: 20 MG/DL (ref 6–20)
CALCIUM SERPL-MCNC: 9.6 MG/DL (ref 8.7–10.5)
CHLORIDE SERPL-SCNC: 106 MMOL/L (ref 95–110)
CO2 SERPL-SCNC: 26 MMOL/L (ref 23–29)
CREAT SERPL-MCNC: 1.5 MG/DL (ref 0.5–1.4)
DIFFERENTIAL METHOD: ABNORMAL
EOSINOPHIL # BLD AUTO: 0.8 K/UL (ref 0–0.5)
EOSINOPHIL NFR BLD: 14.4 % (ref 0–8)
ERYTHROCYTE [DISTWIDTH] IN BLOOD BY AUTOMATED COUNT: 17.1 % (ref 11.5–14.5)
EST. GFR  (NO RACE VARIABLE): 43 ML/MIN/1.73 M^2
GLUCOSE SERPL-MCNC: 100 MG/DL (ref 70–110)
HCT VFR BLD AUTO: 45.2 % (ref 37–48.5)
HGB BLD-MCNC: 14.8 G/DL (ref 12–16)
IMM GRANULOCYTES # BLD AUTO: 0 K/UL (ref 0–0.04)
IMM GRANULOCYTES NFR BLD AUTO: 0 % (ref 0–0.5)
LDH SERPL L TO P-CCNC: 222 U/L (ref 110–260)
LYMPHOCYTES # BLD AUTO: 2.2 K/UL (ref 1–4.8)
LYMPHOCYTES NFR BLD: 39.5 % (ref 18–48)
MCH RBC QN AUTO: 27.8 PG (ref 27–31)
MCHC RBC AUTO-ENTMCNC: 32.7 G/DL (ref 32–36)
MCV RBC AUTO: 85 FL (ref 82–98)
MONOCYTES # BLD AUTO: 0.5 K/UL (ref 0.3–1)
MONOCYTES NFR BLD: 9.5 % (ref 4–15)
NEUTROPHILS # BLD AUTO: 2 K/UL (ref 1.8–7.7)
NEUTROPHILS NFR BLD: 35.7 % (ref 38–73)
NRBC BLD-RTO: 0 /100 WBC
PLATELET # BLD AUTO: 293 K/UL (ref 150–450)
PMV BLD AUTO: 9.9 FL (ref 9.2–12.9)
POTASSIUM SERPL-SCNC: 4.8 MMOL/L (ref 3.5–5.1)
PROT SERPL-MCNC: 7.5 G/DL (ref 6–8.4)
RBC # BLD AUTO: 5.33 M/UL (ref 4–5.4)
SODIUM SERPL-SCNC: 141 MMOL/L (ref 136–145)
WBC # BLD AUTO: 5.57 K/UL (ref 3.9–12.7)

## 2023-07-28 PROCEDURE — 80053 COMPREHEN METABOLIC PANEL: CPT | Performed by: INTERNAL MEDICINE

## 2023-07-28 PROCEDURE — 36415 COLL VENOUS BLD VENIPUNCTURE: CPT | Performed by: INTERNAL MEDICINE

## 2023-07-28 PROCEDURE — 83615 LACTATE (LD) (LDH) ENZYME: CPT | Performed by: INTERNAL MEDICINE

## 2023-07-28 PROCEDURE — 85025 COMPLETE CBC W/AUTO DIFF WBC: CPT | Performed by: INTERNAL MEDICINE

## 2023-09-07 ENCOUNTER — TELEPHONE (OUTPATIENT)
Dept: INFUSION THERAPY | Facility: HOSPITAL | Age: 48
End: 2023-09-07
Payer: MEDICAID

## 2023-09-07 ENCOUNTER — TELEPHONE (OUTPATIENT)
Dept: HEMATOLOGY/ONCOLOGY | Facility: CLINIC | Age: 48
End: 2023-09-07
Payer: MEDICAID

## 2023-09-07 NOTE — TELEPHONE ENCOUNTER
----- Message from Eva Burdick LPN sent at 9/7/2023 11:23 AM CDT -----  Contact: [place]-[phone]  I cannot schedule. Will you please call and schedule.    Thanks  Eva  ----- Message -----  From: Blanquita Esquivel  Sent: 9/7/2023  10:16 AM CDT  To: [facility] Staff      Patient: Jaswant DUNN Trey-    Reason: The patient is requesting a call back from the nurse to get assistance with scheduling an     appointment for follow-up.     Comments: Please call the patient back to advise.

## 2023-09-07 NOTE — TELEPHONE ENCOUNTER
Returned patient phone call and informed her that per MD she does not need to go to ER. Only be seen in follow up. I was unable to scheduled patient due to insurance. Message was sent to schedulers. Patient verbalized understanding and had no other issues at this time.

## 2023-09-11 ENCOUNTER — OFFICE VISIT (OUTPATIENT)
Dept: HEMATOLOGY/ONCOLOGY | Facility: CLINIC | Age: 48
End: 2023-09-11
Payer: MEDICAID

## 2023-09-11 ENCOUNTER — LAB VISIT (OUTPATIENT)
Dept: LAB | Facility: HOSPITAL | Age: 48
End: 2023-09-11
Attending: INTERNAL MEDICINE
Payer: MEDICAID

## 2023-09-11 VITALS
DIASTOLIC BLOOD PRESSURE: 96 MMHG | SYSTOLIC BLOOD PRESSURE: 139 MMHG | TEMPERATURE: 98 F | HEART RATE: 73 BPM | RESPIRATION RATE: 18 BRPM | BODY MASS INDEX: 53.97 KG/M2 | WEIGHT: 293 LBS | OXYGEN SATURATION: 97 %

## 2023-09-11 DIAGNOSIS — I10 ESSENTIAL HYPERTENSION: ICD-10-CM

## 2023-09-11 DIAGNOSIS — G62.9 PERIPHERAL POLYNEUROPATHY: Primary | ICD-10-CM

## 2023-09-11 DIAGNOSIS — C91.40 HAIRY CELL LEUKEMIA NOT HAVING ACHIEVED REMISSION: ICD-10-CM

## 2023-09-11 LAB
ALBUMIN SERPL BCP-MCNC: 4 G/DL (ref 3.5–5.2)
ALP SERPL-CCNC: 68 U/L (ref 55–135)
ALT SERPL W/O P-5'-P-CCNC: 17 U/L (ref 10–44)
ANION GAP SERPL CALC-SCNC: 10 MMOL/L (ref 8–16)
AST SERPL-CCNC: 18 U/L (ref 10–40)
BASOPHILS # BLD AUTO: 0.03 K/UL (ref 0–0.2)
BASOPHILS NFR BLD: 0.4 % (ref 0–1.9)
BILIRUB SERPL-MCNC: 1.3 MG/DL (ref 0.1–1)
BUN SERPL-MCNC: 15 MG/DL (ref 6–20)
CALCIUM SERPL-MCNC: 9.1 MG/DL (ref 8.7–10.5)
CHLORIDE SERPL-SCNC: 105 MMOL/L (ref 95–110)
CO2 SERPL-SCNC: 27 MMOL/L (ref 23–29)
CREAT SERPL-MCNC: 1.3 MG/DL (ref 0.5–1.4)
DIFFERENTIAL METHOD: ABNORMAL
EOSINOPHIL # BLD AUTO: 0.4 K/UL (ref 0–0.5)
EOSINOPHIL NFR BLD: 6.2 % (ref 0–8)
ERYTHROCYTE [DISTWIDTH] IN BLOOD BY AUTOMATED COUNT: 16.6 % (ref 11.5–14.5)
EST. GFR  (NO RACE VARIABLE): 51 ML/MIN/1.73 M^2
GLUCOSE SERPL-MCNC: 100 MG/DL (ref 70–110)
HCT VFR BLD AUTO: 44.2 % (ref 37–48.5)
HGB BLD-MCNC: 14.6 G/DL (ref 12–16)
IMM GRANULOCYTES # BLD AUTO: 0.02 K/UL (ref 0–0.04)
IMM GRANULOCYTES NFR BLD AUTO: 0.3 % (ref 0–0.5)
LDH SERPL L TO P-CCNC: 243 U/L (ref 110–260)
LYMPHOCYTES # BLD AUTO: 2.4 K/UL (ref 1–4.8)
LYMPHOCYTES NFR BLD: 35.3 % (ref 18–48)
MCH RBC QN AUTO: 27.8 PG (ref 27–31)
MCHC RBC AUTO-ENTMCNC: 33 G/DL (ref 32–36)
MCV RBC AUTO: 84 FL (ref 82–98)
MONOCYTES # BLD AUTO: 0.6 K/UL (ref 0.3–1)
MONOCYTES NFR BLD: 9.2 % (ref 4–15)
NEUTROPHILS # BLD AUTO: 3.3 K/UL (ref 1.8–7.7)
NEUTROPHILS NFR BLD: 48.6 % (ref 38–73)
NRBC BLD-RTO: 0 /100 WBC
PLATELET # BLD AUTO: 298 K/UL (ref 150–450)
PMV BLD AUTO: 9.9 FL (ref 9.2–12.9)
POTASSIUM SERPL-SCNC: 4.6 MMOL/L (ref 3.5–5.1)
PROT SERPL-MCNC: 7.3 G/DL (ref 6–8.4)
RBC # BLD AUTO: 5.25 M/UL (ref 4–5.4)
SODIUM SERPL-SCNC: 142 MMOL/L (ref 136–145)
WBC # BLD AUTO: 6.82 K/UL (ref 3.9–12.7)

## 2023-09-11 PROCEDURE — 1159F MED LIST DOCD IN RCRD: CPT | Mod: CPTII,,, | Performed by: INTERNAL MEDICINE

## 2023-09-11 PROCEDURE — 4010F PR ACE/ARB THEARPY RXD/TAKEN: ICD-10-PCS | Mod: CPTII,,, | Performed by: INTERNAL MEDICINE

## 2023-09-11 PROCEDURE — 3080F PR MOST RECENT DIASTOLIC BLOOD PRESSURE >= 90 MM HG: ICD-10-PCS | Mod: CPTII,,, | Performed by: INTERNAL MEDICINE

## 2023-09-11 PROCEDURE — 3075F SYST BP GE 130 - 139MM HG: CPT | Mod: CPTII,,, | Performed by: INTERNAL MEDICINE

## 2023-09-11 PROCEDURE — 3008F BODY MASS INDEX DOCD: CPT | Mod: CPTII,,, | Performed by: INTERNAL MEDICINE

## 2023-09-11 PROCEDURE — 99999 PR PBB SHADOW E&M-EST. PATIENT-LVL IV: ICD-10-PCS | Mod: PBBFAC,,, | Performed by: INTERNAL MEDICINE

## 2023-09-11 PROCEDURE — 3008F PR BODY MASS INDEX (BMI) DOCUMENTED: ICD-10-PCS | Mod: CPTII,,, | Performed by: INTERNAL MEDICINE

## 2023-09-11 PROCEDURE — 3075F PR MOST RECENT SYSTOLIC BLOOD PRESS GE 130-139MM HG: ICD-10-PCS | Mod: CPTII,,, | Performed by: INTERNAL MEDICINE

## 2023-09-11 PROCEDURE — 1159F PR MEDICATION LIST DOCUMENTED IN MEDICAL RECORD: ICD-10-PCS | Mod: CPTII,,, | Performed by: INTERNAL MEDICINE

## 2023-09-11 PROCEDURE — 3080F DIAST BP >= 90 MM HG: CPT | Mod: CPTII,,, | Performed by: INTERNAL MEDICINE

## 2023-09-11 PROCEDURE — 36415 COLL VENOUS BLD VENIPUNCTURE: CPT | Performed by: INTERNAL MEDICINE

## 2023-09-11 PROCEDURE — 99214 OFFICE O/P EST MOD 30 MIN: CPT | Mod: PBBFAC | Performed by: INTERNAL MEDICINE

## 2023-09-11 PROCEDURE — 80053 COMPREHEN METABOLIC PANEL: CPT | Performed by: INTERNAL MEDICINE

## 2023-09-11 PROCEDURE — 85025 COMPLETE CBC W/AUTO DIFF WBC: CPT | Performed by: INTERNAL MEDICINE

## 2023-09-11 PROCEDURE — 4010F ACE/ARB THERAPY RXD/TAKEN: CPT | Mod: CPTII,,, | Performed by: INTERNAL MEDICINE

## 2023-09-11 PROCEDURE — 99999 PR PBB SHADOW E&M-EST. PATIENT-LVL IV: CPT | Mod: PBBFAC,,, | Performed by: INTERNAL MEDICINE

## 2023-09-11 PROCEDURE — 83615 LACTATE (LD) (LDH) ENZYME: CPT | Performed by: INTERNAL MEDICINE

## 2023-09-11 PROCEDURE — 99214 OFFICE O/P EST MOD 30 MIN: CPT | Mod: S$PBB,,, | Performed by: INTERNAL MEDICINE

## 2023-09-11 PROCEDURE — 99214 PR OFFICE/OUTPT VISIT, EST, LEVL IV, 30-39 MIN: ICD-10-PCS | Mod: S$PBB,,, | Performed by: INTERNAL MEDICINE

## 2023-09-11 RX ORDER — GABAPENTIN 300 MG/1
600 CAPSULE ORAL NIGHTLY
Qty: 60 CAPSULE | Refills: 0 | Status: SHIPPED | OUTPATIENT
Start: 2023-09-11 | End: 2023-11-08

## 2023-09-11 RX ORDER — LOSARTAN POTASSIUM 25 MG/1
25 TABLET ORAL DAILY
Qty: 30 TABLET | Refills: 0 | Status: SHIPPED | OUTPATIENT
Start: 2023-09-11

## 2023-09-11 NOTE — PROGRESS NOTES
"  Subjective:      DATE OF VISIT: 9/11/23     ?  Patient ID:?Jaswant Yang is a 47 y.o. female.?? MR#: [id number]   ?   PRIMARY ONCOLOGIST: Dr. Hernandez    ? Primary Care Providers:  Arleth Gallegos MD, MD (General)     CHIEF COMPLAINT: ?  Follow-up?    ?   ONCOLOGIC DIAGNOSIS:  Hairy cell leukemia, in remission  ?   CURRENT TREATMENT: surveillance    PAST TREATMENT:  6/22/20 cladribine; 7/6/20 initiated rituximab, not completed due to transfusion reaction  05/11/2020 splenectomy  ?   ONCOLOGIC HISTORY:   ?   Ms Yang is a 44 year old woman with obesity and longstanding history of anemia, without treatment or further evaluation.  She does not follow with primary care and no other known diagnoses.  She presented to emergency room with left-sided abdominal pain for 2 days which she thought initially due to gas/indigestion.  She denies any associated symptoms.  CT scan revealed splenomegaly to 17 cm and concern for splenic laceration.  She was taken to the operating room on 05/11/20 where she was noted to have "Splenic rupture with capsular tear at anterior superior border of the spleen. Reactive ascites. No massive hemoperitoneum. Abnormal tissue extending to the hilum, sent for frozen section. Unable to definitively determine if lymphoma on frozen section. Splenectomy performed. No other disease noted within the abdomen."    05/11/2020 splenic lymph node excisional biopsy with pathology months consistent with hairy cell leukemia.    6/3/20 bone marrow biopsy/aspirate with hypercellular marrow % with extensive involvement by hairy cell leukemia    6/22/20 cladribine; 7/6/20 initiated rituximab, not completed due to transfusion reaction    Follow-up     Ms. Yang has been doing well since our last visit trying to establish primary care and dental care.  No new rash.  Occasional dry skin.    Review of Systems    ?   A comprehensive 14-point review of systems was reviewed with patient and was negative " other than as specified above.   ?     Objective:      Physical Exam    BP (!) 139/96   Pulse 73   Temp 97.8 °F (36.6 °C)   Resp 18   Wt (!) 180.5 kg (397 lb 14.9 oz)   SpO2 97%   BMI 53.97 kg/m²      ECOG:?0   General appearance: Generally well appearing, in no acute distress.   Head, eyes, ears, nose, and throat: Pupils round, sclerae anicteric.   Cardiovascular:  Regular rate and rhythm, S1, S2, no audible murmurs.   Respiratory: Lungs clear to auscultation bilaterally.   Abdomen:  Obese, nontender  Extremities: Warm, without edema.   Neurologic: Alert and oriented. Grossly normal strength, coordination, and gait.   Skin:  no rash   ?   Laboratory:  ?       Lab Visit on 09/11/2023   Component Date Value Ref Range Status    Sodium 09/11/2023 142  136 - 145 mmol/L Final    Potassium 09/11/2023 4.6  3.5 - 5.1 mmol/L Final    Chloride 09/11/2023 105  95 - 110 mmol/L Final    CO2 09/11/2023 27  23 - 29 mmol/L Final    Glucose 09/11/2023 100  70 - 110 mg/dL Final    BUN 09/11/2023 15  6 - 20 mg/dL Final    Creatinine 09/11/2023 1.3  0.5 - 1.4 mg/dL Final    Calcium 09/11/2023 9.1  8.7 - 10.5 mg/dL Final    Total Protein 09/11/2023 7.3  6.0 - 8.4 g/dL Final    Albumin 09/11/2023 4.0  3.5 - 5.2 g/dL Final    Total Bilirubin 09/11/2023 1.3 (H)  0.1 - 1.0 mg/dL Final    Alkaline Phosphatase 09/11/2023 68  55 - 135 U/L Final    AST 09/11/2023 18  10 - 40 U/L Final    ALT 09/11/2023 17  10 - 44 U/L Final    eGFR 09/11/2023 51 (A)  >60 mL/min/1.73 m^2 Final    Anion Gap 09/11/2023 10  8 - 16 mmol/L Final    WBC 09/11/2023 6.82  3.90 - 12.70 K/uL Final    RBC 09/11/2023 5.25  4.00 - 5.40 M/uL Final    Hemoglobin 09/11/2023 14.6  12.0 - 16.0 g/dL Final    Hematocrit 09/11/2023 44.2  37.0 - 48.5 % Final    MCV 09/11/2023 84  82 - 98 fL Final    MCH 09/11/2023 27.8  27.0 - 31.0 pg Final    MCHC 09/11/2023 33.0  32.0 - 36.0 g/dL Final    RDW 09/11/2023 16.6 (H)  11.5 - 14.5 % Final    Platelets  09/11/2023 298  150 - 450 K/uL Final    MPV 09/11/2023 9.9  9.2 - 12.9 fL Final    Immature Granulocytes 09/11/2023 0.3  0.0 - 0.5 % Final    Gran # (ANC) 09/11/2023 3.3  1.8 - 7.7 K/uL Final    Immature Grans (Abs) 09/11/2023 0.02  0.00 - 0.04 K/uL Final    Lymph # 09/11/2023 2.4  1.0 - 4.8 K/uL Final    Mono # 09/11/2023 0.6  0.3 - 1.0 K/uL Final    Eos # 09/11/2023 0.4  0.0 - 0.5 K/uL Final    Baso # 09/11/2023 0.03  0.00 - 0.20 K/uL Final    nRBC 09/11/2023 0  0 /100 WBC Final    Gran % 09/11/2023 48.6  38.0 - 73.0 % Final    Lymph % 09/11/2023 35.3  18.0 - 48.0 % Final    Mono % 09/11/2023 9.2  4.0 - 15.0 % Final    Eosinophil % 09/11/2023 6.2  0.0 - 8.0 % Final    Basophil % 09/11/2023 0.4  0.0 - 1.9 % Final    Differential Method 09/11/2023 Automated   Final    LD 09/11/2023 243  110 - 260 U/L Final      ?     Chemistry        Component Value Date/Time     09/11/2023 1104    K 4.6 09/11/2023 1104     09/11/2023 1104    CO2 27 09/11/2023 1104    BUN 15 09/11/2023 1104    CREATININE 1.3 09/11/2023 1104     09/11/2023 1104        Component Value Date/Time    CALCIUM 9.1 09/11/2023 1104    ALKPHOS 68 09/11/2023 1104    AST 18 09/11/2023 1104    ALT 17 09/11/2023 1104    BILITOT 1.3 (H) 09/11/2023 1104    ESTGFRAFRICA >60 07/28/2022 1339    EGFRNONAA 55 (A) 07/28/2022 1339          ?   ?   Imaging:  ?  5/11/20  CT ABDOMEN PELVIS WITHOUT CONTRAST    CLINICAL HISTORY:  Abd pain, fever, abscess suspected;    TECHNIQUE:  Low dose axial images, sagittal and coronal reformations were obtained from the lung bases to the pubic symphysis.  Oral contrast was not administered.    COMPARISON:  05/11/2020    FINDINGS:  Heart: Mild cardiomegaly..  Trace effusion.    Lung Bases: Large left pleural effusion with left lower lobe atelectasis or airspace disease.    Liver: Normal size and attenuation. No focal lesions.    Gallbladder: No calcified gallstones.    Bile Ducts: No  dilatation.    Pancreas: No obvious mass. No peripancreatic fat stranding.    Spleen: Splenomegaly measuring up to 17 cm..  Area of decreased attenuation within the lower portion of the spleen concerning for splenic laceration and surrounding perisplenic hematoma.  High attenuating fluid surrounds the spleen.  Active bleed is not excluded on noncontrast imaging.  CTA can be obtained as clinically warranted.    Adrenals: Normal.    Kidneys/Ureters: No mass, hydroureteronephrosis, or nephroureterolithiasis.  4 cm hypodensity within the interpolar region right kidney consistent with a cyst.    Bladder: No wall thickening.    Reproductive organs: Enlarged myomatous uterus is suspected.    GI Tract/Mesentery: No evidence of bowel obstruction or inflammation.  Stranding is seen within the mesentery of the left upper quadrant which could reflect local inflammation or posttraumatic injury.  No evidence of bowel obstruction.  Moderate constipation noted.    Peritoneal Space: Scattered fluid is seen within the pelvis anterior to the uterus as well as within the cul-de-sac which likely reflects hemoperitoneum.    Retroperitoneum: Small amount of fluid is seen tracking along the left pericolic gutter likely reflecting extension of perisplenic hematoma.  Small amount of fluid is seen within the cul-de-sac.  Small amount of fluid also suspected within the lorrie hepatis region.    Abdominal wall: Normal.    Vasculature: No aneurysm.    Bones: No acute fracture.  No rib fractures are seen.  No suspicious lytic or sclerotic lesions.      Impression       Area of decreased attenuation within the lower portion of the spleen concerning for splenic laceration and surrounding perisplenic hematoma. High attenuating fluid surrounds the spleen. Active bleed is not excluded on noncontrast imaging. CTA can be obtained as clinically warranted.    Splenomegaly.    Scattered fluid within the pelvis likely reflects mild  hemoperitoneum.    Stranding is seen within the mesentery of the left upper quadrant which could reflect local inflammation or reflect traumatic injury.           Pathology:    1.  Splenic lymph node (excision):   - Negative for carcinoma   - See separately submitted flow cytometry report   - Review by a hematopathologist will be performed and results issued in a   supplemental report   2.  Spleen (splenectomy):   - Benign spleen with hemorrhage and associated reactive changes  VC       Comment: Interpreted by: Jose Silva M.D., Signed on 05/22/2020 at 11:49   Supplemental Diagnosis 1. SPLENIC LYMPH NODE, EXCISION:   --HAIRY CELL LEUKEMIA (SEE COMMENT).   2. SPLEEN, SPLENECTOMY:   --HAIRY CELL LEUKEMIA (SEE COMMENT).   COMMENT:   1.  Splenic lymph node:   Histologic sections of the specimen show lymph node partially effaced by   diffuse infiltrates of atypical lymphoid cells displaying abundant cytoplasm   and irregular nuclear contour.  Lymphoid follicles are seen.   Flow cytometric analysis of the lymph node detects a kappa light chain   restricted B lymphocyte population expressing CD19, CD20, .   CD10 and   CD5 are negative. Flow differential:  Lymphocytes 70.2%, Monocytes 13.4%,   Granulocytes  12.9%, Blast  0.3%, Debris/nRBC 1.9%,  Viability 84.5%.   2.  Spleen:   Histologic sections of the spleen show atypical lymphoid infiltrates in the   right pulp.  The lymphoid cells display abundant cytoplasm and irregular   nuclear contour.  Red blood cell lakes are seen.  A lymph node is identified,   displaying morphologic features cellular to part 1.   Immunohistochemical stains are performed with adequate controls for greater   sensitivity and further architectural assessment.   1).  The atypical lymphoid cells in the lymph nodes are positive for CD20,   BCL2, and cyclin D1 (weak); negative for CD5, CD10, CD23, CD30, and BCL6.   Proliferation index (Ki-67) is moderate.  CD3-positive T lymphocytes are   seen.   "-positive plasma cells are polytypic by immunoglobulin kappa and   lambda light chain in situ hybridization study.   2).  The atypical lymphoid cells in the spleen are positive for CD20, cyclin   D1 (weak),  ([facility]), CD25 ([facility]),   BRAF V600E ([facility]); negative for CD5, CD10, and CD23.   Proliferation index (Ki-67) is moderate.  CD3-positive T lymphocytes are seen.   Taken together, the overall findings are consistent with hairy cell leukemia   involving spleen and lymph node.  VC      Gross 1:  Surgery ID:  [id number];  Pathology ID:  [id number]   1.  Received fresh for frozen section labeled "splenic lymph node" is a 1.5 x   0.3 x 0.3 cm piece of yellow fatty tissue.  A portion is submitted in RPMI   for flow cytometry, and a portion is submitted for frozen section diagnosis.   Frozen section remnant is embedded in cassette 1790, 1 FSA.  The remainder is   entirely embedded in cassette 1790, 1B.   Grossed by: Ricardo Wilson    2: Surgery ID:  [id number];  Pathology ID:  [id number]   2.  Received in formalin labeled "spleen" is an 892 g, 17.8 by 12 x 6.5 cm   spleen.  The capsule is purple blue, smooth, and loosely adhered to spleen,   with moderate amounts of adhered clotted blood. There is a 16 x 13.5 region   on the diaphragmatic surface without capsule. Sectioning reveals subcapsular   hemorrhage, which impacts the splenic parenchyma. The parenchyma displays a   beefy red cut surface.  Within the hilar fat is a 0.7 cm candidate lymph   node.  No distinct masses grossly identified.  Representative sections are   embedded in cassette 1790, 2A-2D.   2A-2B:  Splenic parenchyma with capsule and subcapsular hemorrhage   2C:  Hilar fat and splenic parenchyma without capsule   2D:  Lymph node, bisected            ?   Assessment/Plan:       1. Peripheral polyneuropathy    2. Hairy cell leukemia not having achieved remission    3. Essential hypertension            Plan: "     # Hairy cell leukemia:  Initial bone marrow hypercellular (%) with extensive involvement by hairy cell leukemia. Presentation with splenic rupture s/p splenectomy; extensive involvement with bone marrow with borderline cytopenias (ANC 1.4, hgb 11, plt 120K) we proceeded with treatment with cladribine on 6/22/20; 7/6/20 initiated rituximab, not completed due to transfusion reaction.     Repeat bone marrow biopsy 4 months after completion of therapy show significant improvement with 1% residual hairy cell leukemia.      No clinical or lab concerns today, no cytopenia ANC >3 constitutional symptoms or new rash.  Clinically in remission recommend continued surveillance.      Lower extremity pain previously treated with gabapentin and hypertension on medical management generally in good range on this medication today with mild diastolic elevation.    Encouraged her to establish care with new primary care doctor and refilled prescription for month supply.        Follow-Up:   Route Chart for Scheduling    Med Onc Chart Routing      Follow up with physician    Follow up with [facility] 3 months.   Infusion scheduling note    Injection scheduling note    Labs CBC, CMP and LDH   Scheduling:  Preferred lab:  Lab interval:     Imaging    Pharmacy appointment    Other referrals

## 2023-09-25 ENCOUNTER — PATIENT MESSAGE (OUTPATIENT)
Dept: ADMINISTRATIVE | Facility: HOSPITAL | Age: 48
End: 2023-09-25
Payer: MEDICAID

## 2023-11-08 DIAGNOSIS — C91.40 HAIRY CELL LEUKEMIA NOT HAVING ACHIEVED REMISSION: ICD-10-CM

## 2023-11-08 RX ORDER — GABAPENTIN 300 MG/1
CAPSULE ORAL
Qty: 60 CAPSULE | Refills: 0 | Status: SHIPPED | OUTPATIENT
Start: 2023-11-08

## 2023-11-28 ENCOUNTER — TELEPHONE (OUTPATIENT)
Dept: HEMATOLOGY/ONCOLOGY | Facility: CLINIC | Age: 48
End: 2023-11-28
Payer: MEDICAID

## 2023-12-12 ENCOUNTER — TELEPHONE (OUTPATIENT)
Dept: INFUSION THERAPY | Facility: HOSPITAL | Age: 48
End: 2023-12-12
Payer: MEDICAID

## 2023-12-12 NOTE — TELEPHONE ENCOUNTER
----- Message from Marina Burdick sent at 12/12/2023 10:11 AM CST -----  Contact: self [phone]  Patient called in this morning to reschedule lab and appointment can you get her scheduled? Please call back [phone] thanks itw

## 2023-12-13 DIAGNOSIS — C91.40 HAIRY CELL LEUKEMIA OF SPLEEN: Primary | ICD-10-CM

## 2024-01-17 ENCOUNTER — HOSPITAL ENCOUNTER (EMERGENCY)
Facility: HOSPITAL | Age: 49
Discharge: HOME | End: 2024-01-17
Attending: EMERGENCY MEDICINE
Payer: MEDICAID

## 2024-01-17 VITALS
WEIGHT: 293 LBS | HEART RATE: 67 BPM | RESPIRATION RATE: 17 BRPM | TEMPERATURE: 98 F | SYSTOLIC BLOOD PRESSURE: 158 MMHG | OXYGEN SATURATION: 100 % | DIASTOLIC BLOOD PRESSURE: 94 MMHG | BODY MASS INDEX: 54.6 KG/M2

## 2024-01-17 DIAGNOSIS — R31.9 URINARY TRACT INFECTION WITH HEMATURIA, SITE UNSPECIFIED: Primary | ICD-10-CM

## 2024-01-17 DIAGNOSIS — N39.0 URINARY TRACT INFECTION WITH HEMATURIA, SITE UNSPECIFIED: Primary | ICD-10-CM

## 2024-01-17 DIAGNOSIS — I10 HYPERTENSION, UNSPECIFIED TYPE: ICD-10-CM

## 2024-01-17 DIAGNOSIS — R21 RASH AND NONSPECIFIC SKIN ERUPTION: ICD-10-CM

## 2024-01-17 LAB
BACTERIA #/AREA URNS HPF: ABNORMAL /HPF
BILIRUB UR QL STRIP: NEGATIVE
CLARITY UR: ABNORMAL
COLOR UR: YELLOW
GLUCOSE UR QL STRIP: NEGATIVE
HGB UR QL STRIP: NEGATIVE
HYALINE CASTS #/AREA URNS LPF: 0 /LPF
KETONES UR QL STRIP: NEGATIVE
LEUKOCYTE ESTERASE UR QL STRIP: ABNORMAL
MICROSCOPIC COMMENT: ABNORMAL
NITRITE UR QL STRIP: POSITIVE
PH UR STRIP: >8 [PH] (ref 5–8)
PROT UR QL STRIP: ABNORMAL
RBC #/AREA URNS HPF: 9 /HPF (ref 0–4)
SP GR UR STRIP: 1.03 (ref 1–1.03)
SQUAMOUS #/AREA URNS HPF: 6 /HPF
TRI-PHOS CRY URNS QL MICRO: ABNORMAL
URN SPEC COLLECT METH UR: ABNORMAL
UROBILINOGEN UR STRIP-ACNC: NEGATIVE EU/DL
WBC #/AREA URNS HPF: 63 /HPF (ref 0–5)

## 2024-01-17 PROCEDURE — 81000 URINALYSIS NONAUTO W/SCOPE: CPT | Performed by: NURSE PRACTITIONER

## 2024-01-17 PROCEDURE — 87086 URINE CULTURE/COLONY COUNT: CPT | Performed by: NURSE PRACTITIONER

## 2024-01-17 PROCEDURE — 87088 URINE BACTERIA CULTURE: CPT | Performed by: NURSE PRACTITIONER

## 2024-01-17 PROCEDURE — 87077 CULTURE AEROBIC IDENTIFY: CPT | Performed by: NURSE PRACTITIONER

## 2024-01-17 PROCEDURE — 99284 EMERGENCY DEPT VISIT MOD MDM: CPT

## 2024-01-17 PROCEDURE — 87186 SC STD MICRODIL/AGAR DIL: CPT | Performed by: NURSE PRACTITIONER

## 2024-01-17 RX ORDER — CEFUROXIME AXETIL 500 MG/1
500 TABLET ORAL 2 TIMES DAILY
Qty: 14 TABLET | Refills: 0 | Status: SHIPPED | OUTPATIENT
Start: 2024-01-17 | End: 2024-01-24

## 2024-01-17 RX ORDER — TRIAMCINOLONE ACETONIDE 1 MG/G
CREAM TOPICAL 2 TIMES DAILY
Qty: 80 G | Refills: 0 | Status: SHIPPED | OUTPATIENT
Start: 2024-01-17

## 2024-01-17 NOTE — Clinical Note
"Jaswant Moramaggy Yang was seen and treated in our emergency department on 1/17/2024.  She may return to work on 01/19/2024.       If you have any questions or concerns, please don't hesitate to call.      Tadeo Marie MD" Michele Torres  6/9/22     Chief Complaint   Patient presents with    Diabetes     physical        Allergies   Allergen Reactions    Pcn [Penicillins]     Sulfa Antibiotics         Current Outpatient Medications   Medication Sig Dispense Refill    vitamin D (ERGOCALCIFEROL) 1.25 MG (24294 UT) CAPS capsule Take 1 capsule by mouth once a week 12 capsule 3    Prenatal Ca Carb-B6-B12-FA (COMBI RX PO) 0.05-0.14 mg      amLODIPine (NORVASC) 5 MG tablet Take 1 tablet by mouth daily 90 tablet 3    levothyroxine (SYNTHROID) 25 MCG tablet Take 1 tablet by mouth Daily 90 tablet 3    atorvastatin (LIPITOR) 10 MG tablet Take one tab Monday,wenesday,friday 12 tablet 3    insulin lispro (HUMALOG) 100 UNIT/ML SOLN injection vial 100 units daily via insulin pump 30 mL 5    estradiol-norethindrone (COMBIPATCH) 0.05-0.14 MG/DAY Place 1 patch onto the skin twice a week. 8 patch 2    insulin lispro (HUMALOG) 100 UNIT/ML injection vial Inject 3 Units into the skin continuous Indications: .8 units an hour pt adjusts as she eats Pt uses 140 units every 3 - 4 days in insulin pump 6 each 5    Continuous Blood Gluc Sensor (GUARDIAN SENSOR 3) MISC Change every 4 days 5 each 5    lisinopril (PRINIVIL;ZESTRIL) 20 MG tablet Take 1 tablet by mouth daily 90 tablet 3    blood glucose test strips (ACCU-CHEK GUIDE) strip 1 each by In Vitro route 4 times daily As needed. 150 each 11    Accu-Chek FastClix Lancets MISC To test 4x/day 200 each 11    Calcium Carbonate (CALCIUM 500 PO) Take by mouth      LANCETS by Does not apply route. Pt tests 3 times daily      INSULIN INFUSION PUMP by Does not apply route. HUMALOG INSULIN      Ascorbic Acid (VITAMIN C) 500 MG tablet Take 500 mg by mouth daily.  Cyanocobalamin (VITAMIN B-12 CR PO) Take 1 tablet by mouth daily.  VITAMIN E 400 mg by Does not apply route daily. No current facility-administered medications for this visit.         HPI: Patient comes in for annual preventative physical.  She did not get her previsit labs done. Her blood sugars have been under good control, and she follows up with her endocrinologist for management of her type 1 diabetes mellitus. Also she recently saw her new gynecologist and she was concerned because she was on estrogen replacement therapy and is a cigarette smoker. She offered alternatives such as clonidine, Effexor, gabapentin. For now patient is still using her estrogen patch. Also she is concerned because she is had a couple of episodes when she is exerting herself when she notes bilateral arm pain and dizziness. She denies any chest pain or shortness of breath. She remains on all of her usual meds and supplements the same as listed on her med list, which was reviewed with her. Review of Systems: as per HPI. Otherwise review of systems otherwise unremarkable. Physical Exam:    Patient is a 62 y.o. female. Patient appears to be in no distress. Breathing comfortably. Ambulates without assistance. HEENT: normal.  Neck supple, no adenopathy or bruits. Heart RR, no MGR. Lungs clear. Abd: normal  Ext: no edema. Peripheral pulses: normal.  No neurologic deficits noted. ECG: Within normal limits. Assessment:    Acquired hypothyroidism, currently on replacement therapy with levothyroxine 25 mcg daily. Mixed hyperlipidemia, currently on atorvastatin 10 mg daily. Essential hypertension, controlled on current meds. Rule out anginal pain ([address] manifested by bilateral arm pain with exertion.  -     EKG 12 Lead    Dizziness with exertion. Rule out carotid artery stenosis, in view of multiple risk factors for atherosclerosis. Menopausal hot flushes, currently treated with topical estrogen patch twice weekly. Discussion Notes: She will continue her usual meds and supplements the same as listed on her med list.  She is strongly advised to quit smoking especially if she chooses to stay on the estrogen patch. She is not sure if she would like to try one of the other options for her hot flashes including clonidine, Effexor, or gabapentin. In view of her history of bilateral arm pain with exertion we will schedule her for a stress test and also in view of her symptom of dizziness associated with her vascular risk factors we will schedule her for carotid ultrasound and will make further recommendations depending on those results. Also she is due for labs including a CMP, lipid panel, CBC, free T4, and TSH. She will get that done within the next week and will make further recommendations depending on those results.

## 2024-01-18 NOTE — ED NOTES
Bed: 21  Expected date:   Expected time:   Means of arrival: Personal Transportation  Comments:     Dmitri Reynolds, HILLARY  01/17/24 1929

## 2024-01-20 LAB — BACTERIA UR CULT: ABNORMAL

## 2024-02-13 ENCOUNTER — TELEPHONE (OUTPATIENT)
Dept: HEMATOLOGY/ONCOLOGY | Facility: CLINIC | Age: 49
End: 2024-02-13
Payer: MEDICAID

## 2024-02-13 NOTE — TELEPHONE ENCOUNTER
SW attempted to reach pt to inform that the Urgent Need LLS mitzy is open. No answer. SW will remain available.

## 2024-02-15 ENCOUNTER — LAB VISIT (OUTPATIENT)
Dept: LAB | Facility: HOSPITAL | Age: 49
End: 2024-02-15
Attending: INTERNAL MEDICINE
Payer: MEDICAID

## 2024-02-15 DIAGNOSIS — C91.40 HAIRY CELL LEUKEMIA OF SPLEEN: ICD-10-CM

## 2024-02-15 LAB
ALBUMIN SERPL BCP-MCNC: 4 G/DL (ref 3.5–5.2)
ALP SERPL-CCNC: 62 U/L (ref 55–135)
ALT SERPL W/O P-5'-P-CCNC: 20 U/L (ref 10–44)
ANION GAP SERPL CALC-SCNC: 9 MMOL/L (ref 8–16)
AST SERPL-CCNC: 20 U/L (ref 10–40)
BASOPHILS # BLD AUTO: 0.03 K/UL (ref 0–0.2)
BASOPHILS NFR BLD: 0.4 % (ref 0–1.9)
BILIRUB SERPL-MCNC: 1.2 MG/DL (ref 0.1–1)
BUN SERPL-MCNC: 16 MG/DL (ref 6–20)
CALCIUM SERPL-MCNC: 9.1 MG/DL (ref 8.7–10.5)
CHLORIDE SERPL-SCNC: 108 MMOL/L (ref 95–110)
CO2 SERPL-SCNC: 26 MMOL/L (ref 23–29)
CREAT SERPL-MCNC: 1.1 MG/DL (ref 0.5–1.4)
DIFFERENTIAL METHOD BLD: ABNORMAL
EOSINOPHIL # BLD AUTO: 1.3 K/UL (ref 0–0.5)
EOSINOPHIL NFR BLD: 15.9 % (ref 0–8)
ERYTHROCYTE [DISTWIDTH] IN BLOOD BY AUTOMATED COUNT: 16.3 % (ref 11.5–14.5)
EST. GFR  (NO RACE VARIABLE): >60 ML/MIN/1.73 M^2
GLUCOSE SERPL-MCNC: 102 MG/DL (ref 70–110)
HCT VFR BLD AUTO: 44.2 % (ref 37–48.5)
HGB BLD-MCNC: 14.7 G/DL (ref 12–16)
IMM GRANULOCYTES # BLD AUTO: 0.02 K/UL (ref 0–0.04)
IMM GRANULOCYTES NFR BLD AUTO: 0.2 % (ref 0–0.5)
LDH SERPL L TO P-CCNC: 267 U/L (ref 110–260)
LYMPHOCYTES # BLD AUTO: 3 K/UL (ref 1–4.8)
LYMPHOCYTES NFR BLD: 36.5 % (ref 18–48)
MAGNESIUM SERPL-MCNC: 1.9 MG/DL (ref 1.6–2.6)
MCH RBC QN AUTO: 28.5 PG (ref 27–31)
MCHC RBC AUTO-ENTMCNC: 33.3 G/DL (ref 32–36)
MCV RBC AUTO: 86 FL (ref 82–98)
MONOCYTES # BLD AUTO: 0.8 K/UL (ref 0.3–1)
MONOCYTES NFR BLD: 10.2 % (ref 4–15)
NEUTROPHILS # BLD AUTO: 3 K/UL (ref 1.8–7.7)
NEUTROPHILS NFR BLD: 36.8 % (ref 38–73)
NRBC BLD-RTO: 0 /100 WBC
PHOSPHATE SERPL-MCNC: 3.1 MG/DL (ref 2.7–4.5)
PLATELET # BLD AUTO: 298 K/UL (ref 150–450)
PMV BLD AUTO: 10.3 FL (ref 9.2–12.9)
POTASSIUM SERPL-SCNC: 3.8 MMOL/L (ref 3.5–5.1)
PROT SERPL-MCNC: 7.2 G/DL (ref 6–8.4)
RBC # BLD AUTO: 5.15 M/UL (ref 4–5.4)
SODIUM SERPL-SCNC: 143 MMOL/L (ref 136–145)
WBC # BLD AUTO: 8.13 K/UL (ref 3.9–12.7)

## 2024-02-15 PROCEDURE — 85025 COMPLETE CBC W/AUTO DIFF WBC: CPT | Performed by: INTERNAL MEDICINE

## 2024-02-15 PROCEDURE — 84100 ASSAY OF PHOSPHORUS: CPT | Performed by: INTERNAL MEDICINE

## 2024-02-15 PROCEDURE — 80053 COMPREHEN METABOLIC PANEL: CPT | Performed by: INTERNAL MEDICINE

## 2024-02-15 PROCEDURE — 36415 COLL VENOUS BLD VENIPUNCTURE: CPT | Performed by: INTERNAL MEDICINE

## 2024-02-15 PROCEDURE — 83615 LACTATE (LD) (LDH) ENZYME: CPT | Performed by: INTERNAL MEDICINE

## 2024-02-15 PROCEDURE — 83735 ASSAY OF MAGNESIUM: CPT | Performed by: INTERNAL MEDICINE

## 2024-04-11 NOTE — TELEPHONE ENCOUNTER
Called patient to rescheduled missed appt no answer, no voicemail setup I will mail new follow appt to patient   Your labs show urinalysis is a poor specimen.  Epithelial cells means skin cells and contamination.  Your white blood cell count does remain a little low. it is likely still low due to recent viral infection. You are also mildly anemic, but this is not new. In most women, this is iron deficiency.   I have added some additional blood tests to rule out other infections and nutritional deficiencies which could cause low blood count. At your convenience, please come by the labs to check these additional tests.  At the present time, this is not an urgent issue, but I do want to continue to monitor the counts.      hbv, hcv, hiv, b12, folate, copper,

## 2024-04-23 NOTE — ASSESSMENT & PLAN NOTE
ANTICOAGULATION MANAGEMENT     Ajith Kumar 83 Macrina Amos RN  Anticoagulation Clinic  Marlon Lizama MD Referring Family Medicine [phone]               Multifactorial : most likely due to vanc  Nephrology on board   Kidney function trending up

## 2024-05-30 ENCOUNTER — TELEPHONE (OUTPATIENT)
Dept: INFUSION THERAPY | Facility: HOSPITAL | Age: 49
End: 2024-05-30

## 2024-05-30 NOTE — TELEPHONE ENCOUNTER
Unable to reach patient. Unable to leave VM due to VM not being set up.   Patient isn't very active on MyChart.

## 2024-05-31 ENCOUNTER — PATIENT MESSAGE (OUTPATIENT)
Dept: INFUSION THERAPY | Facility: HOSPITAL | Age: 49
End: 2024-05-31

## 2024-06-17 ENCOUNTER — HOSPITAL ENCOUNTER (EMERGENCY)
Facility: HOSPITAL | Age: 49
Discharge: HOME | End: 2024-06-18
Attending: EMERGENCY MEDICINE
Payer: MEDICAID

## 2024-06-17 DIAGNOSIS — R10.9 RIGHT FLANK PAIN: Primary | ICD-10-CM

## 2024-06-17 DIAGNOSIS — N10 ACUTE PYELONEPHRITIS: ICD-10-CM

## 2024-06-17 LAB
ALBUMIN SERPL BCP-MCNC: 3.7 G/DL (ref 3.5–5.2)
ALP SERPL-CCNC: 58 U/L (ref 55–135)
ALT SERPL W/O P-5'-P-CCNC: 26 U/L (ref 10–44)
AMORPH CRY URNS QL MICRO: ABNORMAL
ANION GAP SERPL CALC-SCNC: 8 MMOL/L (ref 8–16)
AST SERPL-CCNC: 21 U/L (ref 10–40)
B-HCG UR QL: NEGATIVE
BACTERIA #/AREA URNS HPF: ABNORMAL /HPF
BASOPHILS # BLD AUTO: 0.03 K/UL (ref 0–0.2)
BASOPHILS NFR BLD: 0.4 % (ref 0–1.9)
BILIRUB SERPL-MCNC: 0.8 MG/DL (ref 0.1–1)
BILIRUB UR QL STRIP: NEGATIVE
BUN SERPL-MCNC: 13 MG/DL (ref 6–20)
CALCIUM SERPL-MCNC: 9 MG/DL (ref 8.7–10.5)
CHLORIDE SERPL-SCNC: 107 MMOL/L (ref 95–110)
CLARITY UR: ABNORMAL
CO2 SERPL-SCNC: 23 MMOL/L (ref 23–29)
COLOR UR: YELLOW
CREAT SERPL-MCNC: 1.2 MG/DL (ref 0.5–1.4)
DIFFERENTIAL METHOD BLD: ABNORMAL
EOSINOPHIL # BLD AUTO: 1.3 K/UL (ref 0–0.5)
EOSINOPHIL NFR BLD: 18.3 % (ref 0–8)
ERYTHROCYTE [DISTWIDTH] IN BLOOD BY AUTOMATED COUNT: 16.2 % (ref 11.5–14.5)
EST. GFR  (NO RACE VARIABLE): 56 ML/MIN/1.73 M^2
GLUCOSE SERPL-MCNC: 106 MG/DL (ref 70–110)
GLUCOSE UR QL STRIP: NEGATIVE
HCT VFR BLD AUTO: 45.6 % (ref 37–48.5)
HCV AB SERPL QL IA: NEGATIVE
HGB BLD-MCNC: 15.6 G/DL (ref 12–16)
HGB UR QL STRIP: NEGATIVE
HIV 1+2 AB+HIV1 P24 AG SERPL QL IA: NEGATIVE
HYALINE CASTS #/AREA URNS LPF: 2 /LPF
IMM GRANULOCYTES # BLD AUTO: 0.01 K/UL (ref 0–0.04)
IMM GRANULOCYTES NFR BLD AUTO: 0.1 % (ref 0–0.5)
KETONES UR QL STRIP: NEGATIVE
LEUKOCYTE ESTERASE UR QL STRIP: ABNORMAL
LYMPHOCYTES # BLD AUTO: 2.6 K/UL (ref 1–4.8)
LYMPHOCYTES NFR BLD: 37.2 % (ref 18–48)
MCH RBC QN AUTO: 29.3 PG (ref 27–31)
MCHC RBC AUTO-ENTMCNC: 34.2 G/DL (ref 32–36)
MCV RBC AUTO: 86 FL (ref 82–98)
MICROSCOPIC COMMENT: ABNORMAL
MONOCYTES # BLD AUTO: 0.6 K/UL (ref 0.3–1)
MONOCYTES NFR BLD: 9.3 % (ref 4–15)
NEUTROPHILS # BLD AUTO: 2.4 K/UL (ref 1.8–7.7)
NEUTROPHILS NFR BLD: 34.7 % (ref 38–73)
NITRITE UR QL STRIP: NEGATIVE
NRBC BLD-RTO: 0 /100 WBC
PH UR STRIP: >8 [PH] (ref 5–8)
PLATELET # BLD AUTO: 293 K/UL (ref 150–450)
PMV BLD AUTO: 10.2 FL (ref 9.2–12.9)
POTASSIUM SERPL-SCNC: 3.8 MMOL/L (ref 3.5–5.1)
PROT SERPL-MCNC: 6.9 G/DL (ref 6–8.4)
PROT UR QL STRIP: ABNORMAL
RBC # BLD AUTO: 5.32 M/UL (ref 4–5.4)
RBC #/AREA URNS HPF: 0 /HPF (ref 0–4)
SODIUM SERPL-SCNC: 138 MMOL/L (ref 136–145)
SP GR UR STRIP: >1.03 (ref 1–1.03)
SQUAMOUS #/AREA URNS HPF: 3 /HPF
TRI-PHOS CRY URNS QL MICRO: ABNORMAL
UNIDENT CRYS URNS QL MICRO: ABNORMAL
URN SPEC COLLECT METH UR: ABNORMAL
UROBILINOGEN UR STRIP-ACNC: NEGATIVE EU/DL
WBC # BLD AUTO: 6.89 K/UL (ref 3.9–12.7)
WBC #/AREA URNS HPF: 4 /HPF (ref 0–5)

## 2024-06-17 PROCEDURE — 25000003 PHARM REV CODE 250: Performed by: EMERGENCY MEDICINE

## 2024-06-17 PROCEDURE — 87077 CULTURE AEROBIC IDENTIFY: CPT | Performed by: NURSE PRACTITIONER

## 2024-06-17 PROCEDURE — 87186 SC STD MICRODIL/AGAR DIL: CPT | Performed by: NURSE PRACTITIONER

## 2024-06-17 PROCEDURE — 87389 HIV-1 AG W/HIV-1&-2 AB AG IA: CPT | Performed by: EMERGENCY MEDICINE

## 2024-06-17 PROCEDURE — 87086 URINE CULTURE/COLONY COUNT: CPT | Performed by: NURSE PRACTITIONER

## 2024-06-17 PROCEDURE — 81000 URINALYSIS NONAUTO W/SCOPE: CPT | Performed by: NURSE PRACTITIONER

## 2024-06-17 PROCEDURE — 85025 COMPLETE CBC W/AUTO DIFF WBC: CPT | Performed by: EMERGENCY MEDICINE

## 2024-06-17 PROCEDURE — 86803 HEPATITIS C AB TEST: CPT | Performed by: EMERGENCY MEDICINE

## 2024-06-17 PROCEDURE — 99285 EMERGENCY DEPT VISIT HI MDM: CPT | Mod: 25

## 2024-06-17 PROCEDURE — 96367 TX/PROPH/DG ADDL SEQ IV INF: CPT

## 2024-06-17 PROCEDURE — 96375 TX/PRO/DX INJ NEW DRUG ADDON: CPT

## 2024-06-17 PROCEDURE — 81025 URINE PREGNANCY TEST: CPT | Performed by: NURSE PRACTITIONER

## 2024-06-17 PROCEDURE — 87088 URINE BACTERIA CULTURE: CPT | Performed by: NURSE PRACTITIONER

## 2024-06-17 PROCEDURE — 80053 COMPREHEN METABOLIC PANEL: CPT | Performed by: EMERGENCY MEDICINE

## 2024-06-17 PROCEDURE — 96365 THER/PROPH/DIAG IV INF INIT: CPT

## 2024-06-17 PROCEDURE — 63600175 PHARM REV CODE 636 W HCPCS: Performed by: EMERGENCY MEDICINE

## 2024-06-17 RX ORDER — MORPHINE SULFATE 4 MG/ML
4 INJECTION, SOLUTION INTRAMUSCULAR; INTRAVENOUS
Status: COMPLETED | OUTPATIENT
Start: 2024-06-17 | End: 2024-06-17

## 2024-06-17 RX ORDER — NAPROXEN 250 MG/1
250 TABLET ORAL 2 TIMES DAILY WITH MEALS
Qty: 14 TABLET | Refills: 0 | Status: SHIPPED | OUTPATIENT
Start: 2024-06-17 | End: 2024-06-24

## 2024-06-17 RX ORDER — METHOCARBAMOL 500 MG/1
500 TABLET, FILM COATED ORAL 3 TIMES DAILY PRN
Qty: 15 TABLET | Refills: 0 | Status: SHIPPED | OUTPATIENT
Start: 2024-06-17 | End: 2024-06-22

## 2024-06-17 RX ORDER — DIPHENHYDRAMINE HYDROCHLORIDE 50 MG/ML
25 INJECTION INTRAMUSCULAR; INTRAVENOUS
Status: COMPLETED | OUTPATIENT
Start: 2024-06-17 | End: 2024-06-17

## 2024-06-17 RX ORDER — KETOROLAC TROMETHAMINE 30 MG/ML
15 INJECTION, SOLUTION INTRAMUSCULAR; INTRAVENOUS
Status: COMPLETED | OUTPATIENT
Start: 2024-06-17 | End: 2024-06-17

## 2024-06-17 RX ORDER — CEFDINIR 300 MG/1
300 CAPSULE ORAL 2 TIMES DAILY
Qty: 14 CAPSULE | Refills: 0 | Status: SHIPPED | OUTPATIENT
Start: 2024-06-17 | End: 2024-06-24

## 2024-06-17 RX ORDER — DEXAMETHASONE SODIUM PHOSPHATE 4 MG/ML
8 INJECTION, SOLUTION INTRA-ARTICULAR; INTRALESIONAL; INTRAMUSCULAR; INTRAVENOUS; SOFT TISSUE
Status: COMPLETED | OUTPATIENT
Start: 2024-06-17 | End: 2024-06-17

## 2024-06-17 RX ORDER — ONDANSETRON HYDROCHLORIDE 2 MG/ML
4 INJECTION, SOLUTION INTRAVENOUS
Status: COMPLETED | OUTPATIENT
Start: 2024-06-17 | End: 2024-06-17

## 2024-06-17 RX ORDER — HYDROCODONE BITARTRATE AND ACETAMINOPHEN 5; 325 MG/1; MG/1
1 TABLET ORAL EVERY 4 HOURS PRN
Qty: 12 TABLET | Refills: 0 | Status: SHIPPED | OUTPATIENT
Start: 2024-06-17

## 2024-06-17 RX ADMIN — DIPHENHYDRAMINE HYDROCHLORIDE 25 MG: 50 INJECTION, SOLUTION INTRAMUSCULAR; INTRAVENOUS at 09:06

## 2024-06-17 RX ADMIN — METHOCARBAMOL 1000 MG: 100 INJECTION, SOLUTION INTRAMUSCULAR; INTRAVENOUS at 11:06

## 2024-06-17 RX ADMIN — DEXAMETHASONE SODIUM PHOSPHATE 8 MG: 4 INJECTION INTRA-ARTICULAR; INTRALESIONAL; INTRAMUSCULAR; INTRAVENOUS; SOFT TISSUE at 10:06

## 2024-06-17 RX ADMIN — ONDANSETRON 4 MG: 2 INJECTION INTRAMUSCULAR; INTRAVENOUS at 09:06

## 2024-06-17 RX ADMIN — CEFTRIAXONE 2 G: 2 INJECTION, POWDER, FOR SOLUTION INTRAMUSCULAR; INTRAVENOUS at 10:06

## 2024-06-17 RX ADMIN — MORPHINE SULFATE 4 MG: 4 INJECTION INTRAVENOUS at 09:06

## 2024-06-17 RX ADMIN — KETOROLAC TROMETHAMINE 15 MG: 30 INJECTION, SOLUTION INTRAMUSCULAR at 10:06

## 2024-06-17 NOTE — FIRST PROVIDER EVALUATION
Medical screening examination initiated.  I have conducted a focused provider triage encounter, findings are as follows:    Brief history of present illness:  Patient presents for right-sided flank pain    Vitals:    06/17/24 1646   BP: (!) 172/101   BP Location: Right arm   Patient Position: Sitting   Pulse: 82   Resp: 16   Temp: 98.2 °F (36.8 °C)   TempSrc: Oral   SpO2: 99%   Weight: (!) 147.6 kg (325 lb 6.4 oz)       Pertinent physical exam:  No acute distress    Brief workup plan:  Labs, imaging, further eval    Preliminary workup initiated; this workup will be continued and followed by the physician or advanced practice provider that is assigned to the patient when roomed.

## 2024-06-18 VITALS
DIASTOLIC BLOOD PRESSURE: 85 MMHG | RESPIRATION RATE: 18 BRPM | WEIGHT: 293 LBS | OXYGEN SATURATION: 97 % | TEMPERATURE: 98 F | BODY MASS INDEX: 44.13 KG/M2 | HEART RATE: 57 BPM | SYSTOLIC BLOOD PRESSURE: 150 MMHG

## 2024-06-18 NOTE — ED PROVIDER NOTES
.SCRIBE #1 NOTE: I, Coco Saleh, am scribing for, and in the presence of, Marquita Chambers DO. I have scribed the entire note.       History     Chief Complaint   Patient presents with    Flank Pain     Pt states she is having pain on her right flank. Pt states the pain has been going on for the past 10 days. Pt states she is also not urinating as much as she thinks she supposed to.      Review of patient's allergies indicates:   Allergen Reactions    Fentanyl Rash     Severe full body diffuse rash requiring systemic steroids    Vancomycin analogues Rash     Severe full body diffuse rash requiring systemic steroids    Rituximab Other (See Comments)     Sweating, abdominal pain, elevated blood pressure    Penicillin Hives    Tramadol Hives         History of Present Illness     HPI    6/17/2024, 8:48 PM  History obtained from the patient      History of Present Illness: Jaswant Yang is a 48 y.o. 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 The patient denies any trauma. No mitigating or exacerbating factors reported. Associated sxs include difficulty urinating/decreased urine output/urinary hesitancy and diaphoresis. The patient additionally reports 20 lbs of unintentional weight loss. She denies any trauma, hematuria, saddle paresthesia, and abdominal pain. Prior Tx includes Tramadol, Tylenol, Ibuprofen, Motrin, Midol, and muscle relaxers with no relief. The patient denies IVDU. She is seeing her Hem/Onc in 4 days. No further complaints or concerns at this time.       Arrival mode: Personal vehicle    PCP: Arleth Gallegos MD        Past Medical History:  Past Medical History:   Diagnosis Date    Anemia     Anemia     Back pain     Dental infection     GERD (gastroesophageal reflux disease) 5/14/2020    Hairy-cell leukemia of spleen        Past  Surgical History:  Past Surgical History:   Procedure Laterality Date     SECTION, CLASSIC      FLUOROSCOPY N/A 2020    Procedure: Spleenic Bleed;  Surgeon: Hiren Cabrera MD;  Location: [facility] CATH LAB;  Service: General;  Laterality: N/A;    INJECTION OF ANESTHETIC AGENT INTO TISSUE PLANE DEFINED BY TRANSVERSUS ABDOMINIS MUSCLE N/A 2020    Procedure: BLOCK, TRANSVERSUS ABDOMINIS PLANE;  Surgeon: Yumi Ferguson MD;  Location: [facility] OR;  Service: General;  Laterality: N/A;    SPLENECTOMY N/A 2020    Procedure: SPLENECTOMY;  Surgeon: Yumi Ferguson MD;  Location: [facility] OR;  Service: General;  Laterality: N/A;    THORACENTESIS Left 2020    Procedure: THORACENTESIS;  Surgeon: Yumi Ferguson MD;  Location: [facility] OR;  Service: General;  Laterality: Left;    TUBAL LIGATION           Family History:  No family history on file.    Social History:  Social History     Tobacco Use    Smoking status: Former     Current packs/day: 0.25     Types: Cigarettes    Smokeless tobacco: Never    Tobacco comments:     no longer smoker   Substance and Sexual Activity    Alcohol use: Yes     Comment: occasional    Drug use: No    Sexual activity: Yes     Birth control/protection: Condom        Review of Systems     Review of Systems   Constitutional:  Positive for diaphoresis and unexpected weight change (20 lb loss).   Gastrointestinal:  Negative for abdominal pain.   Genitourinary:  Positive for difficulty urinating and flank pain (right). Negative for hematuria.   Musculoskeletal:  Positive for back pain (lower right).   Neurological:  Negative for numbness (saddle paresthesia).      Physical Exam     Initial Vitals [24 1646]   BP Pulse Resp Temp SpO2   (!) 172/101 82 16 98.2 °F (36.8 °C) 99 %      MAP       --          Physical Exam  Nursing Notes and Vital Signs Reviewed.  Constitutional: Patient is in mild distress. Well-developed and well-nourished.  Head: Atraumatic.  Normocephalic.  Eyes: PERRL. EOM intact. Conjunctivae are not pale. No scleral icterus.  ENT: Mucous membranes are moist. Oropharynx is clear and symmetric.    Neck: Supple. Full ROM. No lymphadenopathy.  Cardiovascular: Regular rate. Regular rhythm. No murmurs, rubs, or gallops. Distal pulses are 2+ and symmetric.  Pulmonary/Chest: No respiratory distress. Clear to auscultation bilaterally. No wheezing or rales.  Abdominal: Soft and non-distended.  There is no tenderness.  No rebound, guarding, or rigidity. Good bowel sounds.  Genitourinary: Right CVA tenderness  Musculoskeletal: Moves all extremities. No obvious deformities. No edema. No calf tenderness.  Skin: Warm and dry.  Neurological:  Alert, awake, and appropriate.  Normal speech.  No acute focal neurological deficits are appreciated.  Psychiatric: Normal affect. Good eye contact. Appropriate in content.     ED Course   Procedures  ED Vital Signs:  Vitals:    06/17/24 1646 06/17/24 2130 06/17/24 2134 06/17/24 2200   BP: (!) 172/101 (!) 149/93  (!) 147/88   Pulse: 82 62  64   Resp: 16  20 18   Temp: 98.2 °F (36.8 °C)   98.2 °F (36.8 °C)   TempSrc: Oral   Oral   SpO2: 99% 98%  99%   Weight: (!) 147.6 kg (325 lb 6.4 oz)       06/17/24 2300 06/17/24 2330   BP: (!) 146/86 (!) 150/85   Pulse: (!) 58 (!) 57   Resp:     Temp:     TempSrc:     SpO2: 95% 97%   Weight:         Abnormal Lab Results:  Labs Reviewed   URINALYSIS, REFLEX TO URINE CULTURE - Abnormal; Notable for the following components:       Result Value    Appearance, UA Hazy (*)     pH, UA >8.0 (*)     Specific Gravity, UA >1.030 (*)     Protein, UA 2+ (*)     Leukocytes, UA 2+ (*)     All other components within normal limits    Narrative:     Specimen Source->Urine   CBC W/ AUTO DIFFERENTIAL - Abnormal; Notable for the following components:    RDW 16.2 (*)     Eos # 1.3 (*)     Gran % 34.7 (*)     Eosinophil % 18.3 (*)     All other components within normal limits    Narrative:     Release to  patient->Immediate   COMPREHENSIVE METABOLIC PANEL - Abnormal; Notable for the following components:    eGFR 56 (*)     All other components within normal limits    Narrative:     Release to patient->Immediate   URINALYSIS MICROSCOPIC - Abnormal; Notable for the following components:    Bacteria Many (*)     Hyaline Casts, UA 2 (*)     All other components within normal limits    Narrative:     Specimen Source->Urine   CULTURE, URINE   CULTURE, URINE   PREGNANCY TEST, URINE RAPID    Narrative:     Specimen Source->Urine   HIV 1 / 2 ANTIBODY    Narrative:     Release to patient->Immediate   HEPATITIS C ANTIBODY    Narrative:     Release to patient->Immediate   HEP C VIRUS HOLD SPECIMEN        All Lab Results:  Results for orders placed or performed during the hospital encounter of 06/17/24   Urinalysis, Reflex to Urine Culture Urine, Clean Catch    Specimen: Urine   Result Value Ref Range    Specimen UA Urine, Clean Catch     Color, UA Yellow Yellow, Straw, Lissette    Appearance, UA Hazy (A) Clear    pH, UA >8.0 (A) 5.0 - 8.0    Specific Gravity, UA >1.030 (A) 1.005 - 1.030    Protein, UA 2+ (A) Negative    Glucose, UA Negative Negative    Ketones, UA Negative Negative    Bilirubin (UA) Negative Negative    Occult Blood UA Negative Negative    Nitrite, UA Negative Negative    Urobilinogen, UA Negative <2.0 EU/dL    Leukocytes, UA 2+ (A) Negative   Pregnancy, urine rapid (UPT)   Result Value Ref Range    Preg Test, Ur Negative    CBC auto differential   Result Value Ref Range    WBC 6.89 3.90 - 12.70 K/uL    RBC 5.32 4.00 - 5.40 M/uL    Hemoglobin 15.6 12.0 - 16.0 g/dL    Hematocrit 45.6 37.0 - 48.5 %    MCV 86 82 - 98 fL    MCH 29.3 27.0 - 31.0 pg    MCHC 34.2 32.0 - 36.0 g/dL    RDW 16.2 (H) 11.5 - 14.5 %    Platelets 293 150 - 450 K/uL    MPV 10.2 9.2 - 12.9 fL    Immature Granulocytes 0.1 0.0 - 0.5 %    Gran # (ANC) 2.4 1.8 - 7.7 K/uL    Immature Grans (Abs) 0.01 0.00 - 0.04 K/uL    Lymph # 2.6 1.0 - 4.8 K/uL     Mono # 0.6 0.3 - 1.0 K/uL    Eos # 1.3 (H) 0.0 - 0.5 K/uL    Baso # 0.03 0.00 - 0.20 K/uL    nRBC 0 0 /100 WBC    Gran % 34.7 (L) 38.0 - 73.0 %    Lymph % 37.2 18.0 - 48.0 %    Mono % 9.3 4.0 - 15.0 %    Eosinophil % 18.3 (H) 0.0 - 8.0 %    Basophil % 0.4 0.0 - 1.9 %    Differential Method Automated    Comprehensive metabolic panel   Result Value Ref Range    Sodium 138 136 - 145 mmol/L    Potassium 3.8 3.5 - 5.1 mmol/L    Chloride 107 95 - 110 mmol/L    CO2 23 23 - 29 mmol/L    Glucose 106 70 - 110 mg/dL    BUN 13 6 - 20 mg/dL    Creatinine 1.2 0.5 - 1.4 mg/dL    Calcium 9.0 8.7 - 10.5 mg/dL    Total Protein 6.9 6.0 - 8.4 g/dL    Albumin 3.7 3.5 - 5.2 g/dL    Total Bilirubin 0.8 0.1 - 1.0 mg/dL    Alkaline Phosphatase 58 55 - 135 U/L    AST 21 10 - 40 U/L    ALT 26 10 - 44 U/L    eGFR 56 (A) >60 mL/min/1.73 m^2    Anion Gap 8 8 - 16 mmol/L   HIV 1/2 Ag/Ab (4th Gen)   Result Value Ref Range    HIV 1/2 Ag/Ab Negative Negative   Hepatitis C Antibody   Result Value Ref Range    Hepatitis C Ab Negative Negative   Urinalysis Microscopic   Result Value Ref Range    RBC, UA 0 0 - 4 /hpf    WBC, UA 4 0 - 5 /hpf    Bacteria Many (A) None-Occ /hpf    Squam Epithel, UA 3 /hpf    Hyaline Casts, UA 2 (A) 0-1/lpf /lpf    Triplephos Mayda, UA Few None-Moderate    Amorphous, UA Rare None-Moderate    Unclass Mayda UA Occasional None-Moderate    Microscopic Comment SEE COMMENT          Imaging Results:  Imaging Results              CT Renal Stone Study ABD Pelvis WO (Final result)  Result time 06/17/24 21:52:42      Final result by Tio Amaro MD (06/17/24 21:52:42)                   Impression:      Spleen is absent    5 cm right renal cyst    Fibroid uterus    Interval improvement in fat stranding adjacent to the pancreas.    All CT scans   are performed using dose optimization techniques including the following: automated exposure control; adjustment of the mA and/or kV; use of iterative reconstruction technique.  Dose  modulation was employed for ALARA by means of: Automated exposure control; adjustment of the mA and/or kV according to patient size (this includes techniques or standardized protocols for targeted exams where dose is matched to indication/reason for exam; i.e. extremities or head); and/or use of iterative reconstructive technique.      Electronically signed by: Tio Amaro  Date:    06/17/2024  Time:    21:52               Narrative:    EXAMINATION:  CT RENAL STONE STUDY ABD PELVIS WO    CLINICAL HISTORY:  Flank pain, kidney stone suspected;    TECHNIQUE:  Low dose axial images, sagittal and coronal reformations were obtained from the lung bases to the pubic symphysis.  Contrast was not administered.    COMPARISON:  Prior    FINDINGS:  Heart: Normal in size. No pericardial effusion.    Lung Bases: Well aerated, without consolidation or pleural fluid.    Liver: Normal in size and attenuation, with no focal hepatic lesions.    Gallbladder: No calcified gallstones.    Bile Ducts: No evidence of dilated ducts.    Pancreas: Interval improvement in pancreatic fat stranding    Spleen: Absent    Adrenals: Unremarkable.    Kidneys/ Ureters: Large right renal cyst measuring 5.4 cm.  Spleen is absent.    Bladder: No evidence of wall thickening.    Reproductive organs: Uterine fibroid suspected with enlarged lobulated uterus    GI Tract/Mesentery: No evidence of bowel obstruction or inflammation.    Peritoneal Space: No ascites. No free air.    Retroperitoneum: No significant adenopathy.    Abdominal wall: Unremarkable.    Vasculature: No  aneurysm.  Slight atherosclerotic changes    Bones: No acute fracture.                                                The Emergency Provider reviewed the vital signs and test results, which are outlined above.     ED Discussion     11:47 PM: Reassessed pt at this time. Discussed with pt all pertinent ED information and results. Discussed pt dx and plan of tx. Gave pt all f/u and return to  the ED instructions. All questions and concerns were addressed at this time. Pt expresses understanding of information and instructions, and is comfortable with plan to discharge. Pt is stable for discharge.    I discussed with patient and/or family/caretaker that evaluation in the ED does not suggest any emergent or life threatening medical conditions requiring immediate intervention beyond what was provided in the ED, and I believe patient is safe for discharge.  Regardless, an unremarkable evaluation in the ED does not preclude the development or presence of a serious of life threatening condition. As such, patient was instructed to return immediately for any worsening or change in current symptoms.      ED Course as of 06/18/24 0025   Mon Jun 17, 2024 2107 Bacteria, UA(!): Many [NF]   2107 Leukocyte Esterase, UA(!): 2+ [NF]   2222 Previous urine culture shows Proteus mirabilis that is pansensitive. [NF]   2225 Pain is 7/10 despite morphine.  I will add on dexamethasone, ketorolac, and Robaxin.  UA with leukocyte esterase and many bacteria with no reds or whites however given her history of asplenia I will send for culture and treat accordingly based on previous urine cultures.  She was afebrile with no leukocytosis or other findings to suggest sepsis.  No findings to suggest pyelonephritis or kidney stone on CT.  No spinal abnormalities noted.  Normal renal function on CMP.  Pregnancy negative. [NF]      ED Course User Index  [NF] Marquita Chambers., DO     Medical Decision Making  Amount and/or Complexity of Data Reviewed  Labs: ordered. Decision-making details documented in ED Course.  Radiology: ordered. Decision-making details documented in ED Course.    Risk  Prescription drug management.       Additional MDM:   Differential Diagnosis:   Includes but is not limited to kidney stone, pyelonephritis, UTI, musculoskeletal pain, compression fracture, malignant fracture, metastasis, kidney abnormality.              ED Medication(s):  Medications   morphine injection 4 mg (4 mg Intravenous Given 6/17/24 2134)   ondansetron injection 4 mg (4 mg Intravenous Given 6/17/24 2134)   diphenhydrAMINE injection 25 mg (25 mg Intravenous Given 6/17/24 2139)   cefTRIAXone (ROCEPHIN) 2 g in dextrose 5 % in water (D5W) 100 mL IVPB (MB+) (0 g Intravenous Stopped 6/17/24 2315)   ketorolac injection 15 mg (15 mg Intravenous Given 6/17/24 2238)   dexAMETHasone injection 8 mg (8 mg Intravenous Given 6/17/24 2238)   methocarbamoL (ROBAXIN) 1,000 mg in dextrose 5 % (D5W) 100 mL IVPB (0 mg Intravenous Stopped 6/17/24 2350)       Discharge Medication List as of 6/17/2024 11:45 PM        START taking these medications    Details   cefdinir (OMNICEF) 300 MG capsule Take 1 capsule (300 mg total) by mouth 2 (two) times daily. for 7 days, Starting Mon 6/17/2024, Until Mon 6/24/2024, Print      methocarbamoL (ROBAXIN) 500 MG Tab Take 1 tablet (500 mg total) by mouth 3 (three) times daily as needed (Pain)., Starting Mon 6/17/2024, Until Sat 6/22/2024 at 2359, Print      naproxen (NAPROSYN) 250 MG tablet Take 1 tablet (250 mg total) by mouth 2 (two) times daily with meals. for 7 days, Starting Mon 6/17/2024, Until Mon 6/24/2024, Print              Follow-up Information       Schedule an appointment as soon as possible for a visit  with Arleth Gallegos MD.    Specialty: Family Medicine  Contact information:  [address] [facility] [address] [phone]               [facility] - Emergency Dept..    Specialty: Emergency Medicine  Why: As needed, If symptoms worsen  Contact information:  [address] [facility] [address] [phone]                               Scribe Attestation:   Scribe #1: I performed the above scribed service and the documentation accurately describes the services I performed. I attest to the accuracy of the note.     Attending:   Physician Attestation Statement for Scribe #1: Reyes CHAVEZ Nicole  DO LIVIA, personally performed the services described in this documentation, as scribed by Coco Saleh, in my presence, and it is both accurate and complete.           Clinical Impression       ICD-10-CM ICD-9-CM   1. Right flank pain  R10.9 789.09   2. Acute pyelonephritis  N10 590.10       Disposition:   Disposition: Discharged  Condition: Stable         Marquita Chambers,   06/18/24 0025       Marquita Chambers,   06/18/24 0026

## 2024-06-18 NOTE — ED NOTES
Checked to see if pt got urine sample yet. Pt stated she cannot provide sample at the moment. Will check back.

## 2024-06-20 LAB — BACTERIA UR CULT: ABNORMAL

## 2024-06-21 ENCOUNTER — TELEPHONE (OUTPATIENT)
Dept: HEMATOLOGY/ONCOLOGY | Facility: CLINIC | Age: 49
End: 2024-06-21
Payer: MEDICAID

## 2024-06-21 NOTE — TELEPHONE ENCOUNTER
11:03 am-SHOAIB contacted pt to inform that urgent need LLS mitzy is open. Pt gave permission for SW to apply but stated that her address has changed, and current address on file is her son's address. SW explained that LLS may request proof of address. Pt stated there apt had mold so she did not renew her lease. Pt stated that she has no income at this time. Pt also stated that she was not seen today because she was told that she lost her DARIUS 5/31/24 and was not aware. SHOAIB provided pt with number to call [facility] at [phone] to re-apply. SHOAIB also advised pt to see a  apply for financial assistance in the interim. Pt stated that she SW will f/u with pt once LLS nacho is completed. SW will remain available.     2:25 pm-Pt informed that LLS application was completed but pages 1& 2 of tax return is needed. Pt will try to find copies for SHOAIB to upload into LLS portal. SW will remain available.

## 2024-06-24 ENCOUNTER — TELEPHONE (OUTPATIENT)
Dept: HEMATOLOGY/ONCOLOGY | Facility: CLINIC | Age: 49
End: 2024-06-24

## 2024-06-24 NOTE — TELEPHONE ENCOUNTER
SW returned pt's vm call. Pt stated that she was able to retrieve tax transcript from IRS website and will email to SW so that SW can upload into S portal. SW will upload once received and f/u re: nacho decision for the $500 urgent need mitzy. SW will remain available.

## 2024-06-25 ENCOUNTER — TELEPHONE (OUTPATIENT)
Dept: HEMATOLOGY/ONCOLOGY | Facility: CLINIC | Age: 49
End: 2024-06-25

## 2024-06-28 NOTE — TELEPHONE ENCOUNTER
SW received call from pt stating that she spoke with LLS and they want additional info. Pt stated that she will email to SW so that SW can upload in LLS portal.

## 2024-07-09 ENCOUNTER — TELEPHONE (OUTPATIENT)
Dept: HEMATOLOGY/ONCOLOGY | Facility: CLINIC | Age: 49
End: 2024-07-09
Payer: MEDICAID

## 2024-07-09 NOTE — TELEPHONE ENCOUNTER
8:45 am-Pt called to inform SW that she emailed copy of her W2. SW received, and will upload into LLS portal for decision re: $500 urgent need LLS mitzy. No other needs expressed. SW will remain available.     12:15 pm-Pt approved for $500 urgent need LLS mitzy. Pt will call to check status of DARIUS. SW emailed pt info on [employer] for housing assistance. Pt will call/visit [employer] to apply. SW will remain available.

## 2024-07-15 ENCOUNTER — TELEPHONE (OUTPATIENT)
Dept: HEMATOLOGY/ONCOLOGY | Facility: CLINIC | Age: 49
End: 2024-07-15
Payer: MEDICAID

## 2024-07-15 DIAGNOSIS — C91.40 HAIRY CELL LEUKEMIA OF SPLEEN: Primary | ICD-10-CM

## 2024-07-15 NOTE — TELEPHONE ENCOUNTER
----- Message from Gideon Obrien sent at 7/12/2024  5:34 PM CDT -----  Type:  Appointment Request         Name of Caller:pt  When is the first available appointment?No access  Symptoms:hairy cell leukemia (Dr. Hernandez)  Would the patient rather a call back or a response via [facility]? call  Best Call Back Number:[phone]

## 2024-07-18 ENCOUNTER — LAB VISIT (OUTPATIENT)
Dept: LAB | Facility: HOSPITAL | Age: 49
End: 2024-07-18
Attending: INTERNAL MEDICINE
Payer: MEDICAID

## 2024-07-18 DIAGNOSIS — C91.40 HAIRY CELL LEUKEMIA OF SPLEEN: ICD-10-CM

## 2024-07-18 LAB
ALBUMIN SERPL BCP-MCNC: 4.1 G/DL (ref 3.5–5.2)
ALP SERPL-CCNC: 66 U/L (ref 55–135)
ALT SERPL W/O P-5'-P-CCNC: 17 U/L (ref 10–44)
ANION GAP SERPL CALC-SCNC: 9 MMOL/L (ref 8–16)
AST SERPL-CCNC: 19 U/L (ref 10–40)
BASOPHILS # BLD AUTO: 0.03 K/UL (ref 0–0.2)
BASOPHILS NFR BLD: 0.4 % (ref 0–1.9)
BILIRUB SERPL-MCNC: 0.9 MG/DL (ref 0.1–1)
BUN SERPL-MCNC: 9 MG/DL (ref 6–20)
CALCIUM SERPL-MCNC: 9.4 MG/DL (ref 8.7–10.5)
CHLORIDE SERPL-SCNC: 107 MMOL/L (ref 95–110)
CO2 SERPL-SCNC: 24 MMOL/L (ref 23–29)
CREAT SERPL-MCNC: 1 MG/DL (ref 0.5–1.4)
CRP SERPL-MCNC: 0.3 MG/L (ref 0–8.2)
DIFFERENTIAL METHOD BLD: ABNORMAL
EOSINOPHIL # BLD AUTO: 1.7 K/UL (ref 0–0.5)
EOSINOPHIL NFR BLD: 21.7 % (ref 0–8)
ERYTHROCYTE [DISTWIDTH] IN BLOOD BY AUTOMATED COUNT: 16.3 % (ref 11.5–14.5)
EST. GFR  (NO RACE VARIABLE): >60 ML/MIN/1.73 M^2
FERRITIN SERPL-MCNC: 51 NG/ML (ref 20–300)
GLUCOSE SERPL-MCNC: 97 MG/DL (ref 70–110)
HCT VFR BLD AUTO: 44 % (ref 37–48.5)
HGB BLD-MCNC: 15 G/DL (ref 12–16)
IMM GRANULOCYTES # BLD AUTO: 0.02 K/UL (ref 0–0.04)
IMM GRANULOCYTES NFR BLD AUTO: 0.3 % (ref 0–0.5)
IRON SERPL-MCNC: 84 UG/DL (ref 30–160)
LDH SERPL L TO P-CCNC: 289 U/L (ref 110–260)
LYMPHOCYTES # BLD AUTO: 2.6 K/UL (ref 1–4.8)
LYMPHOCYTES NFR BLD: 34.2 % (ref 18–48)
MCH RBC QN AUTO: 29 PG (ref 27–31)
MCHC RBC AUTO-ENTMCNC: 34.1 G/DL (ref 32–36)
MCV RBC AUTO: 85 FL (ref 82–98)
MONOCYTES # BLD AUTO: 0.6 K/UL (ref 0.3–1)
MONOCYTES NFR BLD: 7.4 % (ref 4–15)
NEUTROPHILS # BLD AUTO: 2.8 K/UL (ref 1.8–7.7)
NEUTROPHILS NFR BLD: 36 % (ref 38–73)
NRBC BLD-RTO: 0 /100 WBC
PLATELET # BLD AUTO: 273 K/UL (ref 150–450)
PMV BLD AUTO: 10.1 FL (ref 9.2–12.9)
POTASSIUM SERPL-SCNC: 3.6 MMOL/L (ref 3.5–5.1)
PROT SERPL-MCNC: 7.4 G/DL (ref 6–8.4)
RBC # BLD AUTO: 5.18 M/UL (ref 4–5.4)
SATURATED IRON: 22 % (ref 20–50)
SODIUM SERPL-SCNC: 140 MMOL/L (ref 136–145)
TOTAL IRON BINDING CAPACITY: 377 UG/DL (ref 250–450)
TRANSFERRIN SERPL-MCNC: 255 MG/DL (ref 200–375)
WBC # BLD AUTO: 7.66 K/UL (ref 3.9–12.7)

## 2024-07-18 PROCEDURE — 82232 ASSAY OF BETA-2 PROTEIN: CPT | Performed by: INTERNAL MEDICINE

## 2024-07-18 PROCEDURE — 84165 PROTEIN E-PHORESIS SERUM: CPT | Mod: 26,,, | Performed by: PATHOLOGY

## 2024-07-18 PROCEDURE — 80053 COMPREHEN METABOLIC PANEL: CPT | Performed by: INTERNAL MEDICINE

## 2024-07-18 PROCEDURE — 82728 ASSAY OF FERRITIN: CPT | Performed by: INTERNAL MEDICINE

## 2024-07-18 PROCEDURE — 86140 C-REACTIVE PROTEIN: CPT | Performed by: INTERNAL MEDICINE

## 2024-07-18 PROCEDURE — 83521 IG LIGHT CHAINS FREE EACH: CPT | Performed by: INTERNAL MEDICINE

## 2024-07-18 PROCEDURE — 83010 ASSAY OF HAPTOGLOBIN QUANT: CPT | Performed by: INTERNAL MEDICINE

## 2024-07-18 PROCEDURE — 83540 ASSAY OF IRON: CPT | Performed by: INTERNAL MEDICINE

## 2024-07-18 PROCEDURE — 85025 COMPLETE CBC W/AUTO DIFF WBC: CPT | Performed by: INTERNAL MEDICINE

## 2024-07-18 PROCEDURE — 84165 PROTEIN E-PHORESIS SERUM: CPT | Performed by: INTERNAL MEDICINE

## 2024-07-18 PROCEDURE — 83615 LACTATE (LD) (LDH) ENZYME: CPT | Performed by: INTERNAL MEDICINE

## 2024-07-19 LAB
ALBUMIN SERPL ELPH-MCNC: 4.25 G/DL (ref 3.35–5.55)
ALPHA1 GLOB SERPL ELPH-MCNC: 0.19 G/DL (ref 0.17–0.41)
ALPHA2 GLOB SERPL ELPH-MCNC: 0.55 G/DL (ref 0.43–0.99)
B-GLOBULIN SERPL ELPH-MCNC: 0.67 G/DL (ref 0.5–1.1)
B2 MICROGLOB SERPL-MCNC: 2.2 UG/ML (ref 0–2.5)
GAMMA GLOB SERPL ELPH-MCNC: 1.34 G/DL (ref 0.67–1.58)
HAPTOGLOB SERPL-MCNC: 14 MG/DL (ref 30–250)
KAPPA LC SER QL IA: 3.27 MG/DL (ref 0.33–1.94)
KAPPA LC/LAMBDA SER IA: 1.63 (ref 0.26–1.65)
LAMBDA LC SER QL IA: 2.01 MG/DL (ref 0.57–2.63)
PROT SERPL-MCNC: 7 G/DL (ref 6–8.4)

## 2024-07-22 LAB — PATHOLOGIST INTERPRETATION SPE: NORMAL

## 2024-07-24 ENCOUNTER — OFFICE VISIT (OUTPATIENT)
Dept: HEMATOLOGY/ONCOLOGY | Facility: CLINIC | Age: 49
End: 2024-07-24
Payer: MEDICAID

## 2024-07-24 ENCOUNTER — LAB VISIT (OUTPATIENT)
Dept: LAB | Facility: HOSPITAL | Age: 49
End: 2024-07-24
Attending: INTERNAL MEDICINE
Payer: MEDICAID

## 2024-07-24 VITALS
DIASTOLIC BLOOD PRESSURE: 93 MMHG | BODY MASS INDEX: 41.02 KG/M2 | TEMPERATURE: 97 F | HEART RATE: 85 BPM | SYSTOLIC BLOOD PRESSURE: 129 MMHG | WEIGHT: 293 LBS | OXYGEN SATURATION: 96 % | HEIGHT: 71 IN

## 2024-07-24 DIAGNOSIS — C91.40 HAIRY CELL LEUKEMIA OF SPLEEN: Primary | ICD-10-CM

## 2024-07-24 DIAGNOSIS — G62.9 PERIPHERAL POLYNEUROPATHY: ICD-10-CM

## 2024-07-24 DIAGNOSIS — Z90.81 S/P SPLENECTOMY: ICD-10-CM

## 2024-07-24 DIAGNOSIS — C91.40 HAIRY CELL LEUKEMIA OF SPLEEN: ICD-10-CM

## 2024-07-24 DIAGNOSIS — C91.40 HAIRY CELL LEUKEMIA NOT HAVING ACHIEVED REMISSION: ICD-10-CM

## 2024-07-24 DIAGNOSIS — G89.3 ACUTE NEOPLASM-RELATED PAIN: ICD-10-CM

## 2024-07-24 PROCEDURE — 86225 DNA ANTIBODY NATIVE: CPT | Performed by: INTERNAL MEDICINE

## 2024-07-24 PROCEDURE — 86235 NUCLEAR ANTIGEN ANTIBODY: CPT | Performed by: INTERNAL MEDICINE

## 2024-07-24 PROCEDURE — 86225 DNA ANTIBODY NATIVE: CPT | Mod: 59 | Performed by: INTERNAL MEDICINE

## 2024-07-24 PROCEDURE — 86880 COOMBS TEST DIRECT: CPT | Performed by: INTERNAL MEDICINE

## 2024-07-24 PROCEDURE — 4010F ACE/ARB THERAPY RXD/TAKEN: CPT | Mod: CPTII,,, | Performed by: INTERNAL MEDICINE

## 2024-07-24 PROCEDURE — 36415 COLL VENOUS BLD VENIPUNCTURE: CPT | Performed by: INTERNAL MEDICINE

## 2024-07-24 PROCEDURE — 99999 PR PBB SHADOW E&M-EST. PATIENT-LVL V: CPT | Mod: PBBFAC,,, | Performed by: INTERNAL MEDICINE

## 2024-07-24 PROCEDURE — 86038 ANTINUCLEAR ANTIBODIES: CPT | Performed by: INTERNAL MEDICINE

## 2024-07-24 PROCEDURE — 3074F SYST BP LT 130 MM HG: CPT | Mod: CPTII,,, | Performed by: INTERNAL MEDICINE

## 2024-07-24 PROCEDURE — 1160F RVW MEDS BY RX/DR IN RCRD: CPT | Mod: CPTII,,, | Performed by: INTERNAL MEDICINE

## 2024-07-24 PROCEDURE — 1159F MED LIST DOCD IN RCRD: CPT | Mod: CPTII,,, | Performed by: INTERNAL MEDICINE

## 2024-07-24 PROCEDURE — 99215 OFFICE O/P EST HI 40 MIN: CPT | Mod: PBBFAC | Performed by: INTERNAL MEDICINE

## 2024-07-24 PROCEDURE — 86039 ANTINUCLEAR ANTIBODIES (ANA): CPT | Performed by: INTERNAL MEDICINE

## 2024-07-24 PROCEDURE — 3008F BODY MASS INDEX DOCD: CPT | Mod: CPTII,,, | Performed by: INTERNAL MEDICINE

## 2024-07-24 PROCEDURE — 82085 ASSAY OF ALDOLASE: CPT | Performed by: INTERNAL MEDICINE

## 2024-07-24 PROCEDURE — 99214 OFFICE O/P EST MOD 30 MIN: CPT | Mod: S$PBB,,, | Performed by: INTERNAL MEDICINE

## 2024-07-24 PROCEDURE — 3080F DIAST BP >= 90 MM HG: CPT | Mod: CPTII,,, | Performed by: INTERNAL MEDICINE

## 2024-07-24 NOTE — PROGRESS NOTES
Subjective:       Patient ID: Jaswant Yang is a 48 y.o. female.    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  Patient has persistent rash on her skin pain in intermittent low back as well as recurrent urinary tract infections was asked to see the patient for evaluation ECOG status 2    Past Medical History:   Diagnosis Date    Anemia     Anemia     Back pain     Dental infection     GERD (gastroesophageal reflux disease) 5/14/2020    Hairy-cell leukemia of spleen      No family history on file.  Social History     Socioeconomic History    Marital status: Single   Tobacco Use    Smoking status: Former     Current packs/day: 0.25     Types: Cigarettes    Smokeless tobacco: Never    Tobacco comments:     no longer smoker   Substance and Sexual Activity    Alcohol use: Yes     Comment: occasional    Drug use: No    Sexual activity: Yes     Birth control/protection: Condom   Social History Narrative    Lives in a single family home with her fiance and her daughter.     Her mother and her son are her SDM's. ACP Documents are available in her chart.      Social Determinants of Health     Financial Resource Strain: High Risk (2/14/2024)    Overall Financial Resource Strain (CARDIA)     Difficulty of Paying Living Expenses: Very hard   Food Insecurity: Food Insecurity Present (2/14/2024)    Hunger Vital Sign     Worried About Running Out of Food in the Last Year: Often true     Ran Out of Food in the Last Year: Often true   Transportation Needs: Unmet Transportation Needs (2/14/2024)    PRAPARE - Transportation     Lack of Transportation (Medical): Yes     Lack of Transportation (Non-Medical): Yes   Physical Activity: Insufficiently Active (2/14/2024)    Exercise Vital Sign     Days of Exercise per Week: 4 days     Minutes of Exercise per Session: 30 min   Stress: Stress Concern Present (2/14/2024)    [facility] of  "Occupational Health - Occupational Stress Questionnaire     Feeling of Stress : Very much   Housing Stability: High Risk (2024)    Housing Stability Vital Sign     Unable to Pay for Housing in the Last Year: Yes     Number of Places Lived in the Last Year: 3     Unstable Housing in the Last Year: Yes     Past Surgical History:   Procedure Laterality Date     SECTION, CLASSIC      FLUOROSCOPY N/A 2020    Procedure: Spleenic Bleed;  Surgeon: Hiren Cabrera MD;  Location: [facility] CATH LAB;  Service: General;  Laterality: N/A;    INJECTION OF ANESTHETIC AGENT INTO TISSUE PLANE DEFINED BY TRANSVERSUS ABDOMINIS MUSCLE N/A 2020    Procedure: BLOCK, TRANSVERSUS ABDOMINIS PLANE;  Surgeon: Yumi Ferguson MD;  Location: [facility] OR;  Service: General;  Laterality: N/A;    SPLENECTOMY N/A 2020    Procedure: SPLENECTOMY;  Surgeon: Yumi Ferguson MD;  Location: [facility] OR;  Service: General;  Laterality: N/A;    THORACENTESIS Left 2020    Procedure: THORACENTESIS;  Surgeon: Yumi Ferguson MD;  Location: [facility] OR;  Service: General;  Laterality: Left;    TUBAL LIGATION         Labs:  Lab Results   Component Value Date    WBC 7.66 2024    HGB 15.0 2024    HCT 44.0 2024    MCV 85 2024     2024     BMP  Lab Results   Component Value Date     2024    K 3.6 2024     2024    CO2 24 2024    BUN 9 2024    CREATININE 1.0 2024    CALCIUM 9.4 2024    ANIONGAP 9 2024    ESTGFRAFRICA >60 2022    EGFRNONAA 55 (A) 2022     Lab Results   Component Value Date    ALT 17 2024    AST 19 2024    ALKPHOS 66 2024    BILITOT 0.9 2024       Lab Results   Component Value Date    IRON 84 2024    TIBC 377 2024    FERRITIN 51 2024     No results found for: "PTNWMKKR14"  No results found for: "FOLATE"  Lab Results   Component Value Date    TSH 3.010 10/18/2023 "         Review of Systems   Constitutional:  Positive for fatigue. Negative for activity change, appetite change, chills, diaphoresis, fever and unexpected weight change.   HENT:  Negative for congestion, dental problem, drooling, ear discharge, ear pain, facial swelling, hearing loss, mouth sores, nosebleeds, postnasal drip, rhinorrhea, sinus pressure, sneezing, sore throat, tinnitus, trouble swallowing and voice change.    Eyes:  Negative for photophobia, pain, discharge, redness, itching and visual disturbance.   Respiratory:  Negative for cough, choking, chest tightness, shortness of breath, wheezing and stridor.    Cardiovascular:  Negative for chest pain, palpitations and leg swelling.   Gastrointestinal:  Negative for abdominal distention, abdominal pain, anal bleeding, blood in stool, constipation, diarrhea, nausea, rectal pain and vomiting.   Endocrine: Negative for cold intolerance, heat intolerance, polydipsia, polyphagia and polyuria.   Genitourinary:  Negative for decreased urine volume, difficulty urinating, dyspareunia, dysuria, enuresis, flank pain, frequency, genital sores, hematuria, menstrual problem, pelvic pain, urgency, vaginal bleeding, vaginal discharge and vaginal pain.   Musculoskeletal:  Positive for back pain. Negative for arthralgias, gait problem, joint swelling, myalgias, neck pain and neck stiffness.   Skin:  Positive for rash. Negative for color change and pallor.   Allergic/Immunologic: Negative for environmental allergies, food allergies and immunocompromised state.   Neurological:  Positive for weakness. Negative for dizziness, tremors, seizures, syncope, facial asymmetry, speech difficulty, light-headedness, numbness and headaches.   Hematological:  Negative for adenopathy. Does not bruise/bleed easily.   Psychiatric/Behavioral:  Negative for agitation, behavioral problems, confusion, decreased concentration, dysphoric mood, hallucinations, self-injury, sleep disturbance and  suicidal ideas. The patient is not nervous/anxious and is not hyperactive.        Objective:      Physical Exam  Vitals reviewed.   Constitutional:       General: She is not in acute distress.     Appearance: Normal appearance. She is well-developed. She is obese. She is not diaphoretic.   HENT:      Head: Normocephalic and atraumatic.      Right Ear: External ear normal.      Left Ear: External ear normal.      Nose: Nose normal.      Right Sinus: No maxillary sinus tenderness or frontal sinus tenderness.      Left Sinus: No maxillary sinus tenderness or frontal sinus tenderness.      Mouth/Throat:      Pharynx: No oropharyngeal exudate.   Eyes:      General: Lids are normal. No scleral icterus.        Right eye: No discharge.         Left eye: No discharge.      Conjunctiva/sclera: Conjunctivae normal.      Right eye: Right conjunctiva is not injected. No hemorrhage.     Left eye: Left conjunctiva is not injected. No hemorrhage.     Pupils: Pupils are equal, round, and reactive to light.   Neck:      Thyroid: No thyromegaly.      Vascular: No JVD.      Trachea: No tracheal deviation.   Cardiovascular:      Rate and Rhythm: Normal rate.   Pulmonary:      Effort: Pulmonary effort is normal. No respiratory distress.      Breath sounds: No stridor.   Chest:      Chest wall: No tenderness.   Abdominal:      General: Bowel sounds are normal. There is no distension.      Palpations: Abdomen is soft. There is no hepatomegaly, splenomegaly or mass.      Tenderness: There is no abdominal tenderness. There is no rebound.   Musculoskeletal:         General: No tenderness. Normal range of motion.      Cervical back: Normal range of motion and neck supple.   Lymphadenopathy:      Cervical: No cervical adenopathy.      Upper Body:      Right upper body: No supraclavicular adenopathy.      Left upper body: No supraclavicular adenopathy.   Skin:     General: Skin is dry.      Findings: No erythema or rash.   Neurological:       Mental Status: She is alert and oriented to person, place, and time.      Cranial Nerves: No cranial nerve deficit.      Motor: Weakness present.      Coordination: Coordination abnormal.      Gait: Gait abnormal.   Psychiatric:         Behavior: Behavior normal.         Thought Content: Thought content normal.         Judgment: Judgment normal.             Assessment:      1. Hairy cell leukemia of spleen    2. Peripheral polyneuropathy    3. S/P splenectomy    4. Hairy cell leukemia not having achieved remission    5. Acute neoplasm-related pain           Med Onc Chart Routing      Follow up with physician . Return to clinic to see me 3-4 weeks   Follow up with LINN    Infusion scheduling note    Injection scheduling note    Labs   Scheduling:  Preferred lab:  Lab interval:  Lab draw today of MERRILL as well as direct anti Thymoglobulin   Imaging    Pharmacy appointment    Other referrals         Needs to be seen by Urology and Dermatology ASAP              Plan:     Previous history of hairy cell leukemia treated in 2020 not see repeat bone marrows that has been done since.  At this point patient has symptoms or mainly rash in face in proximal muscle weakness.  Will proceed with ADA MERRILL aldolase and will ask dermatology to assess.  Patient can place hydrocortisone cream symptoms persist.  Would like to see in three-week after urology evaluation recent CT renal studies demonstrating no evidence of retroperitoneal nodes of hydronephrosis.  With recurrent urinary tract infections will need to reassess by Urology        Cirilo Whittaker Jr, MD FACP

## 2024-07-25 ENCOUNTER — TELEPHONE (OUTPATIENT)
Dept: HEMATOLOGY/ONCOLOGY | Facility: CLINIC | Age: 49
End: 2024-07-25
Payer: MEDICAID

## 2024-07-25 LAB
ANA PATTERN 1: NORMAL
ANA SER QL IF: POSITIVE
ANA TITR SER IF: NORMAL {TITER}
DAT IGG-SP REAG RBC-IMP: NORMAL

## 2024-07-25 NOTE — TELEPHONE ENCOUNTER
SW obtained pt's permission to apply for the CLL/hairy cell leukemia mitzy. Pt approved for $ 4,500 S mitzy which can be used for the following:     Medical insurance premiums  Treatment-related co-pays, deductibles, and co-insurance (for expenses covered by the program)  Co-pays for prescription medication related to prescribed treatment  Co-pays for non-diagnostic labs, scans, and tests.  Pt should receive correspondence via mail. SW will remain available.

## 2024-07-26 ENCOUNTER — TELEPHONE (OUTPATIENT)
Dept: DERMATOLOGY | Facility: CLINIC | Age: 49
End: 2024-07-26
Payer: MEDICAID

## 2024-07-26 ENCOUNTER — PATIENT MESSAGE (OUTPATIENT)
Dept: DERMATOLOGY | Facility: CLINIC | Age: 49
End: 2024-07-26
Payer: MEDICAID

## 2024-07-26 LAB — ALDOLASE SERPL-CCNC: 5.9 U/L (ref 1.2–7.6)

## 2024-07-26 NOTE — TELEPHONE ENCOUNTER
----- Message from Mitzi Cratbree LPN sent at 7/24/2024  1:37 PM CDT -----  Regarding: FW: Oncology CC'd Chart  Please see Dr. Whittaker note below.  Need appts ASAP  ----- Message -----  From: Cirilo Whittaker MD  Sent: 7/24/2024   1:20 PM CDT  To: [facility] Hematology/Oncology Scheduling Pool  Subject: Oncology CC'd Chart

## 2024-07-26 NOTE — TELEPHONE ENCOUNTER
Contacted pt to possibly work her into provider's schedule but was unable to reach pt. No answer. No voice box set up. Message pt to see if she is still interested in seeing a provider

## 2024-07-29 LAB
ANTI SM ANTIBODY: 0.08 RATIO (ref 0–0.99)
ANTI SM/RNP ANTIBODY: 0.12 RATIO (ref 0–0.99)
ANTI-SM INTERPRETATION: NEGATIVE
ANTI-SM/RNP INTERPRETATION: NEGATIVE
ANTI-SSA ANTIBODY: 0.07 RATIO (ref 0–0.99)
ANTI-SSA INTERPRETATION: NEGATIVE
ANTI-SSB ANTIBODY: 0.04 RATIO (ref 0–0.99)
ANTI-SSB INTERPRETATION: NEGATIVE
DNA TITER: NORMAL
DSDNA AB SER-ACNC: POSITIVE [IU]/ML

## 2024-07-30 ENCOUNTER — TELEPHONE (OUTPATIENT)
Dept: INFUSION THERAPY | Facility: HOSPITAL | Age: 49
End: 2024-07-30
Payer: MEDICAID

## 2024-07-30 ENCOUNTER — TELEPHONE (OUTPATIENT)
Dept: DERMATOLOGY | Facility: CLINIC | Age: 49
End: 2024-07-30
Payer: MEDICAID

## 2024-07-30 ENCOUNTER — TELEPHONE (OUTPATIENT)
Dept: HEMATOLOGY/ONCOLOGY | Facility: CLINIC | Age: 49
End: 2024-07-30
Payer: MEDICAID

## 2024-07-30 ENCOUNTER — PATIENT MESSAGE (OUTPATIENT)
Dept: INFUSION THERAPY | Facility: HOSPITAL | Age: 49
End: 2024-07-30
Payer: MEDICAID

## 2024-07-30 DIAGNOSIS — M32.9 LUPUS: Primary | ICD-10-CM

## 2024-07-30 NOTE — TELEPHONE ENCOUNTER
Attempted to call pt .no answer. Unable to lvm     ----- Message from Mitzi Crabtree LPN sent at 7/24/2024  1:37 PM CDT -----  Regarding: FW: Oncology CC'd Chart  Please see Dr. Whittaker note below.  Need appts ASAP  ----- Message -----  From: Cirilo Whittaker MD  Sent: 7/24/2024   1:20 PM CDT  To: [facility] Hematology/Oncology Scheduling Pool  Subject: Oncology CC'd Chart

## 2024-07-30 NOTE — TELEPHONE ENCOUNTER
----- Message from Cirilo Whittaker MD sent at 7/30/2024  7:01 AM CDT -----  Needs rhem appt nre lupus order in

## 2024-08-02 ENCOUNTER — TELEPHONE (OUTPATIENT)
Dept: HEMATOLOGY/ONCOLOGY | Facility: CLINIC | Age: 49
End: 2024-08-02
Payer: MEDICAID

## 2024-08-02 NOTE — TELEPHONE ENCOUNTER
Pt confirmed that she received email from [employer] that included flyer informing her that the [employer] housing choice voucher program is open for applications. No other needs expressed. SW will remain available.

## 2024-08-05 ENCOUNTER — PATIENT MESSAGE (OUTPATIENT)
Dept: URGENT CARE | Facility: CLINIC | Age: 49
End: 2024-08-05

## 2024-08-06 ENCOUNTER — ON-DEMAND VIRTUAL (OUTPATIENT)
Dept: URGENT CARE | Facility: CLINIC | Age: 49
End: 2024-08-06
Payer: MEDICAID

## 2024-08-06 DIAGNOSIS — R21 RASH: Primary | ICD-10-CM

## 2024-08-06 DIAGNOSIS — R10.9 RIGHT FLANK PAIN: ICD-10-CM

## 2024-08-06 PROCEDURE — 99212 OFFICE O/P EST SF 10 MIN: CPT | Mod: 95,,, | Performed by: NURSE PRACTITIONER

## 2024-08-07 ENCOUNTER — HOSPITAL ENCOUNTER (EMERGENCY)
Facility: HOSPITAL | Age: 49
Discharge: HOME | End: 2024-08-07
Attending: EMERGENCY MEDICINE
Payer: MEDICAID

## 2024-08-07 VITALS
WEIGHT: 293 LBS | OXYGEN SATURATION: 99 % | DIASTOLIC BLOOD PRESSURE: 79 MMHG | HEIGHT: 71 IN | SYSTOLIC BLOOD PRESSURE: 135 MMHG | RESPIRATION RATE: 18 BRPM | HEART RATE: 80 BPM | TEMPERATURE: 99 F | BODY MASS INDEX: 41.02 KG/M2

## 2024-08-07 DIAGNOSIS — R07.81 RIB PAIN: Primary | ICD-10-CM

## 2024-08-07 DIAGNOSIS — H10.9 CONJUNCTIVITIS OF RIGHT EYE, UNSPECIFIED CONJUNCTIVITIS TYPE: ICD-10-CM

## 2024-08-07 PROCEDURE — 25000003 PHARM REV CODE 250: Performed by: EMERGENCY MEDICINE

## 2024-08-07 PROCEDURE — 99284 EMERGENCY DEPT VISIT MOD MDM: CPT | Mod: 25

## 2024-08-07 RX ORDER — HYDROCODONE BITARTRATE AND ACETAMINOPHEN 5; 325 MG/1; MG/1
1 TABLET ORAL EVERY 4 HOURS PRN
Qty: 11 TABLET | Refills: 0 | Status: SHIPPED | OUTPATIENT
Start: 2024-08-07 | End: 2024-08-12

## 2024-08-07 RX ORDER — HYDROCODONE BITARTRATE AND ACETAMINOPHEN 5; 325 MG/1; MG/1
1 TABLET ORAL
Status: COMPLETED | OUTPATIENT
Start: 2024-08-07 | End: 2024-08-07

## 2024-08-07 RX ORDER — ERYTHROMYCIN 5 MG/G
OINTMENT OPHTHALMIC EVERY 4 HOURS
Qty: 3.5 G | Refills: 0 | Status: SHIPPED | OUTPATIENT
Start: 2024-08-07 | End: 2024-08-16

## 2024-08-07 RX ADMIN — HYDROCODONE BITARTRATE AND ACETAMINOPHEN 1 TABLET: 5; 325 TABLET ORAL at 08:08

## 2024-08-07 NOTE — ED PROVIDER NOTES
SCRIBE #1 NOTE: I, Kayleigh Meléndez, am scribing for, and in the presence of, Corey Pino MD. I have scribed the entire note.       History     Chief Complaint   Patient presents with    Rash     Rash on face that developed approximately 4 days ago. Pt c/o itching and tightness on her face. Erythema noted to pt's face and left eye swelling. Pt states she was recently diagnosed with Lupus. Butterfly rash apparent on patient's face    Chest Pain     Sharp pain in right rib cage that radiates to the back. Tender to palpation    Eye Pain     Left eye pain with light yellow drainage     Review of patient's allergies indicates:   Allergen Reactions    Fentanyl Rash     Severe full body diffuse rash requiring systemic steroids    Vancomycin analogues Rash     Severe full body diffuse rash requiring systemic steroids    Rituximab Other (See Comments)     Sweating, abdominal pain, elevated blood pressure    Penicillin Hives    Tramadol Hives    Vancomycin Rash     Kidney failure         History of Present Illness     HPI    8/7/2024, 6:27 AM  History obtained from the patient      History of Present Illness: Jaswant Yang is a 48 y.o. female patient with a PMHx of anemia, GERD, and hairy-cell leukemia of spleen who presents to the Emergency Department for evaluation of a facial rash which onset gradually 4 days ago. Pt also reports having sharp CP in her right rib cage area. She also mentions having left eye pain with light yellow drainage. Symptoms are constant and moderate in severity. Pt mentions her CP becomes sharp upon breathing in. Associated sxs include erythema, itching, and left eye swelling. Patient denies any fever, congestion, SOB, abd pain, and all other sxs at this time. No prior Tx. Pt mentions she is in remission for leukemia. She also states she was recently dx with lupus by her oncologist, Dr. Whittaker. No further complaints or concerns at this time.       Arrival mode: Personal vehicle      PCP:  Arleth Gallegos MD        Past Medical History:  Past Medical History:   Diagnosis Date    Anemia     Anemia     Back pain     Dental infection     GERD (gastroesophageal reflux disease) 2020    Hairy-cell leukemia of spleen        Past Surgical History:  Past Surgical History:   Procedure Laterality Date     SECTION, CLASSIC      FLUOROSCOPY N/A 2020    Procedure: Spleenic Bleed;  Surgeon: Hiren Cabrera MD;  Location: [facility] CATH LAB;  Service: General;  Laterality: N/A;    INJECTION OF ANESTHETIC AGENT INTO TISSUE PLANE DEFINED BY TRANSVERSUS ABDOMINIS MUSCLE N/A 2020    Procedure: BLOCK, TRANSVERSUS ABDOMINIS PLANE;  Surgeon: Yumi Ferguson MD;  Location: [facility] OR;  Service: General;  Laterality: N/A;    SPLENECTOMY N/A 2020    Procedure: SPLENECTOMY;  Surgeon: Yumi Ferguson MD;  Location: [facility] OR;  Service: General;  Laterality: N/A;    THORACENTESIS Left 2020    Procedure: THORACENTESIS;  Surgeon: Yumi Ferguson MD;  Location: [facility] OR;  Service: General;  Laterality: Left;    TUBAL LIGATION           Family History:  No family history on file.    Social History:  Social History     Tobacco Use    Smoking status: Former     Current packs/day: 0.25     Types: Cigarettes    Smokeless tobacco: Never    Tobacco comments:     no longer smoker   Substance and Sexual Activity    Alcohol use: Yes     Comment: occasional    Drug use: No    Sexual activity: Yes     Birth control/protection: Condom        Review of Systems     Review of Systems   Constitutional:  Negative for fever.   HENT:  Negative for congestion and sore throat.    Eyes:  Positive for pain (left eye) and discharge (light yellow).        (+) left eye swelling   Respiratory:  Negative for cough and shortness of breath.    Cardiovascular:  Positive for chest pain (right-sided).   Gastrointestinal:  Negative for abdominal pain and nausea.   Genitourinary:  Negative for dysuria.   Musculoskeletal:   "Negative for back pain.   Skin:  Positive for rash (general, facial).        (+) erythema  (+) itching   Neurological:  Negative for weakness and headaches.   Hematological:  Does not bruise/bleed easily.   All other systems reviewed and are negative.       Physical Exam     Initial Vitals [08/07/24 0549]   BP Pulse Resp Temp SpO2   (!) 152/91 99 19 98.5 °F (36.9 °C) 96 %      MAP       --          Physical Exam  Nursing Notes and Vital Signs Reviewed.  Constitutional: Patient is in no acute distress. Well-developed and well-nourished.  Head: Atraumatic. Normocephalic.  Eyes: PERRL. EOM intact. No scleral icterus. Swelling to left lower eyelid with slight drainage.  ENT: Mucous membranes are moist. Oropharynx is clear and symmetric.    Neck: Supple. Full ROM. No lymphadenopathy.  Cardiovascular: Regular rate. Regular rhythm. No murmurs, rubs, or gallops. Distal pulses are 2+ and symmetric.  Pulmonary/Chest: No respiratory distress. Clear to auscultation bilaterally. No wheezing or rales. Right lateral rib tenderness. No crepitus or stepoffs.  Reproduces the pain  Abdominal: Soft and non-distended. There is no tenderness. No rebound, guarding, or rigidity. Good bowel sounds.   Genitourinary: No CVA tenderness  Musculoskeletal: Moves all extremities. No obvious deformities. No edema. No calf tenderness.  Skin: Warm and dry.  Neurological:  Alert, awake, and appropriate.  Normal speech.  No acute focal neurological deficits are appreciated.  Psychiatric: Normal affect. Good eye contact. Appropriate in content.     ED Course   Procedures  ED Vital Signs:  Vitals:    08/07/24 0549 08/07/24 0630 08/07/24 0700 08/07/24 0844   BP: (!) 152/91 (!) 146/82 (!) 143/92 135/79   Pulse: 99 89 76 80   Resp: 19 18 18 18   Temp: 98.5 °F (36.9 °C)      TempSrc: Oral      SpO2: 96% 98% 100% 99%   Weight: (!) 172.5 kg (380 lb 3.2 oz)      Height: 5' 11" (1.803 m)          Abnormal Lab Results:  Labs Reviewed - No data to display     All " Lab Results:  Results for orders placed or performed in visit on 07/24/24   MERRILL Screen w/Reflex   Result Value Ref Range    MERRILL Screen Positive (A) Negative <1:80   ALDOLASE   Result Value Ref Range    Aldolase 5.9 1.2 - 7.6 U/L   MERRILL Pattern 1   Result Value Ref Range    MERRILL PATTERN 1 Homogeneous    MERRILL Profile   Result Value Ref Range    Anti Sm Antibody 0.08 0.00 - 0.99 Ratio    Anti-Sm Interpretation Negative Negative    Anti-SSA Antibody 0.07 0.00 - 0.99 Ratio    Anti-SSA Interpretation Negative Negative    Anti-SSB Antibody 0.04 0.00 - 0.99 Ratio    Anti-SSB Interpretation Negative Negative    ds DNA Ab Positive (A) Negative 1:10    Anti Sm/RNP Antibody 0.12 0.00 - 0.99 Ratio    Anti-Sm/RNP Interpretation Negative Negative   MERRILL Titer 1   Result Value Ref Range    MERRILL Titer 1 1:80    DNA Titer   Result Value Ref Range    DNA Titer 1:10    DIRECT ANTIGLOBULIN TEST   Result Value Ref Range    Direct Azam (OLAF) NEG          Imaging Results:  Imaging Results              X-Ray Chest PA And Lateral (Final result)  Result time 08/07/24 07:42:10      Final result by Hiren Cabrera MD (08/07/24 07:42:10)                   Impression:      Cardiomegaly and mild pulmonary vascular congestion.      Electronically signed by: Hiren Cabrera MD  Date:    08/07/2024  Time:    07:42               Narrative:    EXAMINATION:  XR CHEST PA AND LATERAL    CLINICAL HISTORY:  chest pain;    COMPARISON:  07/12/2020    FINDINGS:  Cardiac silhouette is enlarged but stable.    Mild pulmonary vascular congestion.  The lungs demonstrate no evidence of active disease.  No evidence of pleural effusion or pneumothorax.  Bones appear intact.  Moderate degenerative changes and moderate atherosclerotic disease.                                                  The Emergency Provider reviewed the vital signs and test results, which are outlined above.     ED Discussion       8:00 AM: Reassessed pt at this time.  Discussed with pt all  pertinent ED information and results. Discussed pt dx and plan of tx. Gave pt all f/u and return to the ED instructions. All questions and concerns were addressed at this time. Pt expresses understanding of information and instructions, and is comfortable with plan to discharge. Pt is stable for discharge.    I discussed with patient and/or family/caretaker that evaluation in the ED does not suggest any emergent or life threatening medical conditions requiring immediate intervention beyond what was provided in the ED, and I believe patient is safe for discharge.  Regardless, an unremarkable evaluation in the ED does not preclude the development or presence of a serious of life threatening condition. As such, patient was instructed to return immediately for any worsening or change in current symptoms.         Medical Decision Making  DDx: conjunctivitis, rib pain    Amount and/or Complexity of Data Reviewed  Labs: ordered. Decision-making details documented in ED Course.  Radiology: ordered. Decision-making details documented in ED Course.    Risk  Prescription drug management.                ED Medication(s):  Medications   HYDROcodone-acetaminophen 5-325 mg per tablet 1 tablet (1 tablet Oral Given 8/7/24 0844)       Discharge Medication List as of 8/7/2024  8:48 AM        START taking these medications    Details   erythromycin (ROMYCIN) ophthalmic ointment Place into the right eye every 4 (four) hours., Starting Wed 8/7/2024, Normal              Follow-up Information       Arleth Gallegos MD.    Specialty: Family Medicine  Contact information:  [id number]  [facility] [address] [phone]                                 Scribe Attestation:   Scribe #1: I performed the above scribed service and the documentation accurately describes the services I performed. I attest to the accuracy of the note.     Attending:   Physician Attestation Statement for Scribe #1: I, Corey Pino MD, personally  performed the services described in this documentation, as scribed by Kayleigh Meléndez, in my presence, and it is both accurate and complete.           Clinical Impression       ICD-10-CM ICD-9-CM   1. Rib pain  R07.81 786.50   2. Conjunctivitis of right eye, unspecified conjunctivitis type  H10.9 372.30       Disposition:   Disposition: Discharged  Condition: Stable         Corey Pino MD  08/07/24 0908

## 2024-08-08 ENCOUNTER — OFFICE VISIT (OUTPATIENT)
Dept: DERMATOLOGY | Facility: CLINIC | Age: 49
End: 2024-08-08
Payer: MEDICAID

## 2024-08-08 DIAGNOSIS — M25.50 ARTHRALGIA, UNSPECIFIED JOINT: ICD-10-CM

## 2024-08-08 DIAGNOSIS — L98.9 DISEASE OF SKIN AND SUBCUTANEOUS TISSUE: Primary | ICD-10-CM

## 2024-08-08 DIAGNOSIS — L30.4 INTERTRIGO: ICD-10-CM

## 2024-08-08 DIAGNOSIS — R21 MALAR RASH: ICD-10-CM

## 2024-08-08 DIAGNOSIS — R76.8 DS DNA ANTIBODY POSITIVE: ICD-10-CM

## 2024-08-08 PROCEDURE — 99999 PR PBB SHADOW E&M-EST. PATIENT-LVL V: CPT | Mod: PBBFAC,,, | Performed by: DERMATOLOGY

## 2024-08-08 PROCEDURE — 99215 OFFICE O/P EST HI 40 MIN: CPT | Mod: PBBFAC | Performed by: DERMATOLOGY

## 2024-08-08 RX ORDER — FLUCONAZOLE 200 MG/1
200 TABLET ORAL
Qty: 4 TABLET | Refills: 0 | Status: SHIPPED | OUTPATIENT
Start: 2024-08-08 | End: 2024-09-07

## 2024-08-08 RX ORDER — TRIAMCINOLONE ACETONIDE 0.25 MG/G
CREAM TOPICAL 2 TIMES DAILY
Qty: 454 G | Refills: 1 | Status: SHIPPED | OUTPATIENT
Start: 2024-08-08

## 2024-08-08 RX ORDER — KETOCONAZOLE 20 MG/G
CREAM TOPICAL
Qty: 60 G | Refills: 3 | Status: SHIPPED | OUTPATIENT
Start: 2024-08-08

## 2024-08-08 RX ORDER — PIMECROLIMUS 10 MG/G
CREAM TOPICAL 2 TIMES DAILY
Qty: 30 G | Refills: 2 | Status: SHIPPED | OUTPATIENT
Start: 2024-08-08

## 2024-08-12 ENCOUNTER — ON-DEMAND VIRTUAL (OUTPATIENT)
Dept: URGENT CARE | Facility: CLINIC | Age: 49
End: 2024-08-12
Payer: MEDICAID

## 2024-08-12 DIAGNOSIS — L02.92 BOIL: ICD-10-CM

## 2024-08-12 DIAGNOSIS — J01.80 OTHER SUBACUTE SINUSITIS: Primary | ICD-10-CM

## 2024-08-12 PROCEDURE — 99202 OFFICE O/P NEW SF 15 MIN: CPT | Mod: 95,,, | Performed by: FAMILY MEDICINE

## 2024-08-13 RX ORDER — DOXYCYCLINE 100 MG/1
100 CAPSULE ORAL 2 TIMES DAILY
Qty: 14 CAPSULE | Refills: 0 | Status: SHIPPED | OUTPATIENT
Start: 2024-08-13 | End: 2024-08-20

## 2024-08-13 NOTE — PROGRESS NOTES
Subjective:      Patient ID: Jaswant Yang is a 48 y.o. female.    Vitals:  vitals were not taken for this visit.     Chief Complaint: Sinus Problem (skin) and Rash      Visit Type: TELE AUDIOVISUAL    Present with the patient at the time of consultation: TELEMED PRESENT WITH PATIENT: None    Past Medical History:   Diagnosis Date    Anemia     Anemia     Back pain     Dental infection     GERD (gastroesophageal reflux disease) 2020    Hairy-cell leukemia of spleen      Past Surgical History:   Procedure Laterality Date     SECTION, CLASSIC      FLUOROSCOPY N/A 2020    Procedure: Spleenic Bleed;  Surgeon: Hiren Cabrera MD;  Location: [facility] CATH LAB;  Service: General;  Laterality: N/A;    INJECTION OF ANESTHETIC AGENT INTO TISSUE PLANE DEFINED BY TRANSVERSUS ABDOMINIS MUSCLE N/A 2020    Procedure: BLOCK, TRANSVERSUS ABDOMINIS PLANE;  Surgeon: Yumi Ferguson MD;  Location: [facility] OR;  Service: General;  Laterality: N/A;    SPLENECTOMY N/A 2020    Procedure: SPLENECTOMY;  Surgeon: Yumi Ferguson MD;  Location: [facility] OR;  Service: General;  Laterality: N/A;    THORACENTESIS Left 2020    Procedure: THORACENTESIS;  Surgeon: Yumi Ferguson MD;  Location: [facility] OR;  Service: General;  Laterality: Left;    TUBAL LIGATION       Review of patient's allergies indicates:   Allergen Reactions    Fentanyl Rash     Severe full body diffuse rash requiring systemic steroids    Vancomycin analogues Rash     Severe full body diffuse rash requiring systemic steroids    Rituximab Other (See Comments)     Sweating, abdominal pain, elevated blood pressure    Penicillin Hives    Tramadol Hives    Vancomycin Rash     Kidney failure     Current Outpatient Medications on File Prior to Visit   Medication Sig Dispense Refill    albuterol (PROVENTIL/VENTOLIN HFA) 90 mcg/actuation inhaler Inhale 1-2 puffs into the lungs every 6 (six) hours as needed for Wheezing. Rescue 1 Inhaler 0    ALPRAZolam  (XANAX) 0.5 MG tablet TAKE 1/2 TO 1 TABLET BY MOUTH TWICE DAILY AS NEEDED FOR ANXIETY. 30 tablet 5    betamethasone dipropionate (DIPROLENE) 0.05 % lotion Apply topically 2 (two) times daily. 60 mL 1    cetirizine (ZYRTEC) 10 MG tablet Take 10 mg by mouth daily as needed.      chlorhexidine (PERIDEX) 0.12 % solution SMARTSIG:By Mouth      clotrimazole-betamethasone 1-0.05% (LOTRISONE) cream Apply topically 2 (two) times daily.      clotrimazole-betamethasone 1-0.05% (LOTRISONE) cream Apply topically 2 (two) times daily. 45 g 2    cyclobenzaprine (FLEXERIL) 10 MG tablet TAKE 1/2 TO 1 TABLET BY MOUTH TWICE DAILY AS NEEDED FOR MUSCLE SPASMS. MAY CAUSE DROWSINESS 60 tablet 1    erythromycin (ROMYCIN) ophthalmic ointment Place into the right eye every 4 (four) hours. 3.5 g 0    fluconazole (DIFLUCAN) 200 MG Tab TAKE 1 TABLET BY MOUTH EVERY 72 HOURS FOR 3 DOSES. DO NOT TAKE XANAX WHILE ON MEDICATION 3 tablet 0    fluconazole (DIFLUCAN) 200 MG Tab Take 1 tablet (200 mg total) by mouth every 7 days. 4 tablet 0    gabapentin (NEURONTIN) 300 MG capsule TAKE 2 CAPSULES(600 MG) BY MOUTH EVERY EVENING 60 capsule 0    [] HYDROcodone-acetaminophen (NORCO) 5-325 mg per tablet Take 1 tablet by mouth every 4 (four) hours as needed for Pain. 11 tablet 0    hydrOXYzine HCL (ATARAX) 25 MG tablet TAKE 1 TABLET(25 MG) BY MOUTH THREE TIMES DAILY AS NEEDED FOR ITCHING 30 tablet 2    ibuprofen (ADVIL,MOTRIN) 600 MG tablet Take 600 mg by mouth every 6 (six) hours as needed.      ibuprofen (ADVIL,MOTRIN) 800 MG tablet Take 1 tablet (800 mg total) by mouth every 6 (six) hours as needed for Pain. 30 tablet 1    ketoconazole (NIZORAL) 2 % cream AAA bid. 60 g 3    losartan (COZAAR) 25 MG tablet Take 1 tablet (25 mg total) by mouth once daily. 30 tablet 0    mupirocin (BACTROBAN) 2 % ointment Apply topically 3 (three) times daily. 22 g 0    pimecrolimus (ELIDEL) 1 % cream Apply topically 2 (two) times daily. As needed for rash on face 30 g 2     polyethylene glycol (GLYCOLAX) 17 gram/dose powder Take 17 g by mouth once daily. 510 g 3    promethazine (PHENERGAN) 25 MG tablet Take 1 tablet (25 mg total) by mouth every 6 (six) hours as needed for Nausea. 15 tablet 0    senna (SENOKOT) 8.6 mg tablet Take 1 tablet by mouth 2 (two) times a day.      tiZANidine (ZANAFLEX) 4 MG tablet TAKE 1/2 TO 1 TABLET BY MOUTH TWICE DAILY AS NEEDED FOR MUSCLE SPASMS. MAY CAUSE DROWSINESS 60 tablet 0    triamcinolone acetonide 0.025% (KENALOG) 0.025 % cream Apply topically 2 (two) times daily. For only up to 1 week per course on face as needed for rash, for up to 2-4 weeks per course for rash on body 454 g 1    triamcinolone acetonide 0.1% (KENALOG) 0.1 % cream Apply topically 2 (two) times daily. TMC 0.1% 80 gm in 16 oz Aveeno lotion - apply BID prn rash/pruritus. 80 g 0     No current facility-administered medications on file prior to visit.     No family history on file.        Ohs Peq Odvv Intake    8/12/2024 11:15 PM CDT - Filed by Patient   What is your current physical address in the event of a medical emergency? [address] [place] [address]   Are you able to take your vital signs? No   Please attach any relevant images or files          H/o Lupus    Currently experiencing  a flare    (+) Sinus symptoms     Started 3 days ago    Pressure around frontal and maxillary sinus    Wakes up coughing, with thick phlegm    Also concerned about a boil that began    (+) inner thight-right  Started 2 days ago      H/o Hairy Cell Leukemia with last chemo back in 2020        Constitution: Negative for fever.   HENT:  Positive for congestion, sinus pain, sinus pressure and sore throat.    Skin:  Positive for lesion and erythema.        Objective:   The physical exam was conducted virtually.  Physical Exam   HENT:   Nose: Congestion present.   Abdominal: Normal appearance.   Neurological: She is alert.   Skin: erythema         Comments: Right inner thigh       Assessment:     1. Other  subacute sinusitis    2. Boil        Plan:     Will treat accordingly    ? If patient has hidradenitis suppurativa

## 2024-08-16 ENCOUNTER — OFFICE VISIT (OUTPATIENT)
Dept: RHEUMATOLOGY | Facility: CLINIC | Age: 49
End: 2024-08-16
Payer: MEDICAID

## 2024-08-16 ENCOUNTER — LAB VISIT (OUTPATIENT)
Dept: LAB | Facility: HOSPITAL | Age: 49
End: 2024-08-16
Attending: STUDENT IN AN ORGANIZED HEALTH CARE EDUCATION/TRAINING PROGRAM
Payer: MEDICAID

## 2024-08-16 ENCOUNTER — TELEPHONE (OUTPATIENT)
Dept: HEMATOLOGY/ONCOLOGY | Facility: CLINIC | Age: 49
End: 2024-08-16
Payer: MEDICAID

## 2024-08-16 VITALS
HEART RATE: 86 BPM | DIASTOLIC BLOOD PRESSURE: 113 MMHG | WEIGHT: 293 LBS | BODY MASS INDEX: 41.02 KG/M2 | SYSTOLIC BLOOD PRESSURE: 157 MMHG | HEIGHT: 71 IN

## 2024-08-16 DIAGNOSIS — R68.2 DRY MOUTH: ICD-10-CM

## 2024-08-16 DIAGNOSIS — R21 FACIAL RASH: ICD-10-CM

## 2024-08-16 DIAGNOSIS — R76.8 POSITIVE ANA (ANTINUCLEAR ANTIBODY): ICD-10-CM

## 2024-08-16 DIAGNOSIS — R76.8 POSITIVE DOUBLE STRANDED DNA ANTIBODY TEST: ICD-10-CM

## 2024-08-16 DIAGNOSIS — M32.9 LUPUS: ICD-10-CM

## 2024-08-16 DIAGNOSIS — K04.7 DENTAL INFECTION: ICD-10-CM

## 2024-08-16 DIAGNOSIS — I10 ESSENTIAL HYPERTENSION: ICD-10-CM

## 2024-08-16 DIAGNOSIS — M79.12 MYALGIA OF AUXILIARY MUSCLES, HEAD AND NECK: ICD-10-CM

## 2024-08-16 DIAGNOSIS — C91.40 HAIRY CELL LEUKEMIA NOT HAVING ACHIEVED REMISSION: ICD-10-CM

## 2024-08-16 DIAGNOSIS — M54.16 LUMBAR RADICULOPATHY: Primary | ICD-10-CM

## 2024-08-16 LAB
ALBUMIN SERPL BCP-MCNC: 4.2 G/DL (ref 3.5–5.2)
ALP SERPL-CCNC: 68 U/L (ref 55–135)
ALT SERPL W/O P-5'-P-CCNC: 20 U/L (ref 10–44)
ANION GAP SERPL CALC-SCNC: 11 MMOL/L (ref 8–16)
AST SERPL-CCNC: 22 U/L (ref 10–40)
BASOPHILS # BLD AUTO: 0.05 K/UL (ref 0–0.2)
BASOPHILS NFR BLD: 0.7 % (ref 0–1.9)
BILIRUB SERPL-MCNC: 0.8 MG/DL (ref 0.1–1)
BILIRUB UR QL STRIP: ABNORMAL
BUN SERPL-MCNC: 9 MG/DL (ref 6–20)
C3 SERPL-MCNC: 70 MG/DL (ref 50–180)
C4 SERPL-MCNC: 11 MG/DL (ref 11–44)
CALCIUM SERPL-MCNC: 9.7 MG/DL (ref 8.7–10.5)
CHLORIDE SERPL-SCNC: 107 MMOL/L (ref 95–110)
CK SERPL-CCNC: 66 U/L (ref 20–180)
CLARITY UR: ABNORMAL
CO2 SERPL-SCNC: 24 MMOL/L (ref 23–29)
COLOR UR: YELLOW
CREAT SERPL-MCNC: 1.2 MG/DL (ref 0.5–1.4)
CREAT UR-MCNC: 405 MG/DL (ref 15–325)
CRP SERPL-MCNC: 0.3 MG/L (ref 0–8.2)
DAT IGG-SP REAG RBC-IMP: NORMAL
DIFFERENTIAL METHOD BLD: ABNORMAL
EOSINOPHIL # BLD AUTO: 2.1 K/UL (ref 0–0.5)
EOSINOPHIL NFR BLD: 27.7 % (ref 0–8)
ERYTHROCYTE [DISTWIDTH] IN BLOOD BY AUTOMATED COUNT: 17.6 % (ref 11.5–14.5)
ERYTHROCYTE [SEDIMENTATION RATE] IN BLOOD BY PHOTOMETRIC METHOD: <2 MM/HR (ref 0–36)
EST. GFR  (NO RACE VARIABLE): 56.2 ML/MIN/1.73 M^2
GLUCOSE SERPL-MCNC: 85 MG/DL (ref 70–110)
GLUCOSE UR QL STRIP: NEGATIVE
HCT VFR BLD AUTO: 48.8 % (ref 37–48.5)
HGB BLD-MCNC: 15.8 G/DL (ref 12–16)
HGB UR QL STRIP: ABNORMAL
IMM GRANULOCYTES # BLD AUTO: 0.02 K/UL (ref 0–0.04)
IMM GRANULOCYTES NFR BLD AUTO: 0.3 % (ref 0–0.5)
KETONES UR QL STRIP: ABNORMAL
LEUKOCYTE ESTERASE UR QL STRIP: NEGATIVE
LYMPHOCYTES # BLD AUTO: 2.6 K/UL (ref 1–4.8)
LYMPHOCYTES NFR BLD: 34.4 % (ref 18–48)
MCH RBC QN AUTO: 28.8 PG (ref 27–31)
MCHC RBC AUTO-ENTMCNC: 32.4 G/DL (ref 32–36)
MCV RBC AUTO: 89 FL (ref 82–98)
MONOCYTES # BLD AUTO: 0.5 K/UL (ref 0.3–1)
MONOCYTES NFR BLD: 7.1 % (ref 4–15)
NEUTROPHILS # BLD AUTO: 2.3 K/UL (ref 1.8–7.7)
NEUTROPHILS NFR BLD: 29.8 % (ref 38–73)
NITRITE UR QL STRIP: NEGATIVE
NRBC BLD-RTO: 0 /100 WBC
PH UR STRIP: 6 [PH] (ref 5–8)
PLATELET # BLD AUTO: 323 K/UL (ref 150–450)
PMV BLD AUTO: 11.2 FL (ref 9.2–12.9)
POTASSIUM SERPL-SCNC: 3.6 MMOL/L (ref 3.5–5.1)
PROT SERPL-MCNC: 7.7 G/DL (ref 6–8.4)
PROT UR QL STRIP: NEGATIVE
PROT UR-MCNC: 21 MG/DL (ref 0–15)
PROT/CREAT UR: 0.05 MG/G{CREAT} (ref 0–0.2)
RBC # BLD AUTO: 5.48 M/UL (ref 4–5.4)
SODIUM SERPL-SCNC: 142 MMOL/L (ref 136–145)
SP GR UR STRIP: >=1.03 (ref 1–1.03)
URN SPEC COLLECT METH UR: ABNORMAL
WBC # BLD AUTO: 7.61 K/UL (ref 3.9–12.7)

## 2024-08-16 PROCEDURE — 86147 CARDIOLIPIN ANTIBODY EA IG: CPT | Mod: 59 | Performed by: STUDENT IN AN ORGANIZED HEALTH CARE EDUCATION/TRAINING PROGRAM

## 2024-08-16 PROCEDURE — 86140 C-REACTIVE PROTEIN: CPT | Performed by: STUDENT IN AN ORGANIZED HEALTH CARE EDUCATION/TRAINING PROGRAM

## 2024-08-16 PROCEDURE — 86160 COMPLEMENT ANTIGEN: CPT | Performed by: STUDENT IN AN ORGANIZED HEALTH CARE EDUCATION/TRAINING PROGRAM

## 2024-08-16 PROCEDURE — 86480 TB TEST CELL IMMUN MEASURE: CPT | Performed by: STUDENT IN AN ORGANIZED HEALTH CARE EDUCATION/TRAINING PROGRAM

## 2024-08-16 PROCEDURE — 86225 DNA ANTIBODY NATIVE: CPT | Mod: 59 | Performed by: STUDENT IN AN ORGANIZED HEALTH CARE EDUCATION/TRAINING PROGRAM

## 2024-08-16 PROCEDURE — 82550 ASSAY OF CK (CPK): CPT | Performed by: STUDENT IN AN ORGANIZED HEALTH CARE EDUCATION/TRAINING PROGRAM

## 2024-08-16 PROCEDURE — 86593 SYPHILIS TEST NON-TREP QUANT: CPT | Performed by: STUDENT IN AN ORGANIZED HEALTH CARE EDUCATION/TRAINING PROGRAM

## 2024-08-16 PROCEDURE — 99999 PR PBB SHADOW E&M-EST. PATIENT-LVL V: CPT | Mod: PBBFAC,,, | Performed by: STUDENT IN AN ORGANIZED HEALTH CARE EDUCATION/TRAINING PROGRAM

## 2024-08-16 PROCEDURE — 86235 NUCLEAR ANTIGEN ANTIBODY: CPT | Mod: 59 | Performed by: STUDENT IN AN ORGANIZED HEALTH CARE EDUCATION/TRAINING PROGRAM

## 2024-08-16 PROCEDURE — 86225 DNA ANTIBODY NATIVE: CPT | Performed by: STUDENT IN AN ORGANIZED HEALTH CARE EDUCATION/TRAINING PROGRAM

## 2024-08-16 PROCEDURE — 85613 RUSSELL VIPER VENOM DILUTED: CPT | Performed by: STUDENT IN AN ORGANIZED HEALTH CARE EDUCATION/TRAINING PROGRAM

## 2024-08-16 PROCEDURE — 86682 HELMINTH ANTIBODY: CPT | Performed by: STUDENT IN AN ORGANIZED HEALTH CARE EDUCATION/TRAINING PROGRAM

## 2024-08-16 PROCEDURE — 86704 HEP B CORE ANTIBODY TOTAL: CPT | Performed by: STUDENT IN AN ORGANIZED HEALTH CARE EDUCATION/TRAINING PROGRAM

## 2024-08-16 PROCEDURE — 87340 HEPATITIS B SURFACE AG IA: CPT | Performed by: STUDENT IN AN ORGANIZED HEALTH CARE EDUCATION/TRAINING PROGRAM

## 2024-08-16 PROCEDURE — 85025 COMPLETE CBC W/AUTO DIFF WBC: CPT | Performed by: STUDENT IN AN ORGANIZED HEALTH CARE EDUCATION/TRAINING PROGRAM

## 2024-08-16 PROCEDURE — 85730 THROMBOPLASTIN TIME PARTIAL: CPT | Performed by: STUDENT IN AN ORGANIZED HEALTH CARE EDUCATION/TRAINING PROGRAM

## 2024-08-16 PROCEDURE — 99215 OFFICE O/P EST HI 40 MIN: CPT | Mod: PBBFAC | Performed by: STUDENT IN AN ORGANIZED HEALTH CARE EDUCATION/TRAINING PROGRAM

## 2024-08-16 PROCEDURE — 82085 ASSAY OF ALDOLASE: CPT | Performed by: STUDENT IN AN ORGANIZED HEALTH CARE EDUCATION/TRAINING PROGRAM

## 2024-08-16 PROCEDURE — 85652 RBC SED RATE AUTOMATED: CPT | Performed by: STUDENT IN AN ORGANIZED HEALTH CARE EDUCATION/TRAINING PROGRAM

## 2024-08-16 PROCEDURE — 82570 ASSAY OF URINE CREATININE: CPT | Performed by: STUDENT IN AN ORGANIZED HEALTH CARE EDUCATION/TRAINING PROGRAM

## 2024-08-16 PROCEDURE — 86160 COMPLEMENT ANTIGEN: CPT | Mod: 59 | Performed by: STUDENT IN AN ORGANIZED HEALTH CARE EDUCATION/TRAINING PROGRAM

## 2024-08-16 PROCEDURE — 83516 IMMUNOASSAY NONANTIBODY: CPT | Mod: 59 | Performed by: STUDENT IN AN ORGANIZED HEALTH CARE EDUCATION/TRAINING PROGRAM

## 2024-08-16 PROCEDURE — 86038 ANTINUCLEAR ANTIBODIES: CPT | Performed by: STUDENT IN AN ORGANIZED HEALTH CARE EDUCATION/TRAINING PROGRAM

## 2024-08-16 PROCEDURE — 86146 BETA-2 GLYCOPROTEIN ANTIBODY: CPT | Performed by: STUDENT IN AN ORGANIZED HEALTH CARE EDUCATION/TRAINING PROGRAM

## 2024-08-16 PROCEDURE — 86706 HEP B SURFACE ANTIBODY: CPT | Mod: 91 | Performed by: STUDENT IN AN ORGANIZED HEALTH CARE EDUCATION/TRAINING PROGRAM

## 2024-08-16 PROCEDURE — 86880 COOMBS TEST DIRECT: CPT | Performed by: STUDENT IN AN ORGANIZED HEALTH CARE EDUCATION/TRAINING PROGRAM

## 2024-08-16 PROCEDURE — 81003 URINALYSIS AUTO W/O SCOPE: CPT | Performed by: STUDENT IN AN ORGANIZED HEALTH CARE EDUCATION/TRAINING PROGRAM

## 2024-08-16 PROCEDURE — 80053 COMPREHEN METABOLIC PANEL: CPT | Performed by: STUDENT IN AN ORGANIZED HEALTH CARE EDUCATION/TRAINING PROGRAM

## 2024-08-16 RX ORDER — CYCLOBENZAPRINE HCL 10 MG
TABLET ORAL
Qty: 60 TABLET | Refills: 1 | Status: SHIPPED | OUTPATIENT
Start: 2024-08-16

## 2024-08-16 RX ORDER — GABAPENTIN 300 MG/1
600 CAPSULE ORAL NIGHTLY
Qty: 60 CAPSULE | Refills: 1 | Status: SHIPPED | OUTPATIENT
Start: 2024-08-16

## 2024-08-16 RX ORDER — LOSARTAN POTASSIUM 25 MG/1
25 TABLET ORAL DAILY
Qty: 30 TABLET | Refills: 1 | Status: SHIPPED | OUTPATIENT
Start: 2024-08-16

## 2024-08-16 NOTE — PROGRESS NOTES
Subjective:      Patient ID: Jaswant Yang is a 48 y.o. female.    Chief Complaint: Abnormal Lab (Possible lupus)    HPI:   Patient with HTN, 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 Presents for Rheumatology evaluation for MERRILL+, dsDNA+ in the setting of new facial rash.  Diagnosed with hairy cell leukemia in 2020, s/p 6/22/20 cladribine; 7/6/20 initiated rituximab, not completed due to transfusion reaction. F/u BMbx stable.    In 07/2024 she broke out in rash over the face. Very itchy so she scratches and it bleeds. Same rash had occurred another time previously in 04/2024 and resolved with a previous prescription cream she had. Current rash spread breaking out all over the neck and torso. Saw Dermatology and was thought to have an SLE or dermatomyositis type rash with heliotrope rash over the eyelids. The rashes are improving with topical steroids.    Has had pain in the lower extremities for years. Low back pain with radiation down the legs. Pain in calves, knees. Either foot goes numb when sleeping. Had one visit with Dr. Bautista in [facility] in the past years ago and has not followed up.    Endorses many dental issues. Abscesses, teeth falling out, etc. Medicaid not covering dental so she hasn't been able to address it.    +skin rashes, ?malar rash. No photosensitivity   No telangiectasias   No calcinosis   No psoriasis   No patchy alopecia   No oral or nasal ulcers   +/- dry eyes +dry mouth, +dental issues with infections and loss of teeth.  No pleurisy, chest pain, dyspnea, cough  No dysphagia, diplopia, dysphonia  No muscle weakness   No nausea, vomiting, diarrhea, constipation   No acid reflux  No Raynaud's  No digital ulcers   No cytopenias   No renal issues   No blood clots   No fever, chills, weight loss. +loss of appetite. +night sweats few times a week since after chemo.  No pregnancy losses, pre-term deliveries, or pregnancy complications   No new onset headaches   No recurrent  "conjunctivitis, uveitis, scleritis, or episcleritis   No chronic or bloody diarrhea. No Ulcerative Colitis or Crohn's (IBD)  No vaginal or penile and urethral discharge/STDs/ulcers   No unexplained lymphadenopathy  No parotitis   No seizures, strokes, psychosis  No sclerodactyly  No puffy hands  No perioral tightness     Social Hx: Former very light smoker 2-3 cigarettes weekly.  Family Hx: No family history of autoimmune disease      Objective:   BP (!) 157/113   Pulse 86   Ht 5' 11" (1.803 m)   Wt (!) 172.3 kg (379 lb 13.6 oz)   BMI 52.98 kg/m²   Physical Exam  Constitutional:       General: She is not in acute distress.     Appearance: Normal appearance.   HENT:      Head: Normocephalic and atraumatic.      Mouth/Throat:      Mouth: Mucous membranes are moist.      Pharynx: Oropharynx is clear.   Cardiovascular:      Rate and Rhythm: Normal rate and regular rhythm.      Heart sounds: Normal heart sounds.   Pulmonary:      Effort: Pulmonary effort is normal.      Breath sounds: Normal breath sounds.   Abdominal:      Palpations: Abdomen is soft.      Tenderness: There is no abdominal tenderness.   Musculoskeletal:         General: No swelling or tenderness. Normal range of motion.      Cervical back: Normal range of motion.      Comments: No synovitis or joint tenderness.     Strength exam:  Neck: 5/5  Proximal upper extremities: 5/5  Distal upper extremities: 5/5  Proximal lower extremities: 5/5  Distal lower extremities: 5/5   Skin:     Comments: Rashes on the face and torso, appear improved since Dermatology photos. No longer with eyelid rash.   Neurological:      Mental Status: She is alert.             Assessment and Plan:     Problem List Items Addressed This Visit          Unprioritized    Hairy cell leukemia not having achieved remission    Relevant Medications    gabapentin (NEURONTIN) 300 MG capsule     Other Visit Diagnoses       Lumbar radiculopathy    -  Primary    Relevant Orders    Ambulatory " referral/consult to Pain Clinic    Lupus        Positive MERRILL (antinuclear antibody)        Relevant Orders    MERRILL Screen w/Reflex    Anti-DNA Ab, Double-Stranded    Beta-2 Glycoprotein Abs (IgA, IgG, IgM)    C3 Complement    C4 Complement    Cardiolipin antibody    CBC Auto Differential    Comprehensive Metabolic Panel    C-Reactive Protein    DRVVT    Hepatitis B surface antigen    HBcAB    Hepatitis B surface antibody    Strongyloides IgG Antibodies    Quantiferon Gold TB    Treponema Pallidium Antibodies IgG, IgM    Protein/Creatinine Ratio, Urine    Sedimentation rate    Aldolase    CK    MyoMarker Panel 3    Urinalysis    Direct antiglobulin test    Positive double stranded DNA antibody test        Relevant Orders    MERRILL Screen w/Reflex    Anti-DNA Ab, Double-Stranded    Beta-2 Glycoprotein Abs (IgA, IgG, IgM)    C3 Complement    C4 Complement    Cardiolipin antibody    CBC Auto Differential    Comprehensive Metabolic Panel    C-Reactive Protein    DRVVT    Hepatitis B surface antigen    HBcAB    Hepatitis B surface antibody    Strongyloides IgG Antibodies    Quantiferon Gold TB    Treponema Pallidium Antibodies IgG, IgM    Protein/Creatinine Ratio, Urine    Sedimentation rate    Aldolase    CK    MyoMarker Panel 3    Urinalysis    Direct antiglobulin test    Facial rash        Relevant Orders    MERRILL Screen w/Reflex    Anti-DNA Ab, Double-Stranded    Beta-2 Glycoprotein Abs (IgA, IgG, IgM)    C3 Complement    C4 Complement    Cardiolipin antibody    CBC Auto Differential    Comprehensive Metabolic Panel    C-Reactive Protein    DRVVT    Hepatitis B surface antigen    HBcAB    Hepatitis B surface antibody    Strongyloides IgG Antibodies    Quantiferon Gold TB    Treponema Pallidium Antibodies IgG, IgM    Protein/Creatinine Ratio, Urine    Sedimentation rate    Aldolase    CK    MyoMarker Panel 3    Urinalysis    Direct antiglobulin test    Essential hypertension        Relevant Medications    losartan (COZAAR)  25 MG tablet    Myalgia of auxiliary muscles, head and neck        Relevant Medications    cyclobenzaprine (FLEXERIL) 10 MG tablet    Dry mouth        Relevant Orders    Ambulatory referral/consult to ENT    Dental infection        Relevant Orders    Ambulatory referral/consult to Oral Maxillofacial Surgery            Patient with HTN, 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 Presents for Rheumatology evaluation for MERRILL+, dsDNA+ in the setting of new facial rash.    MERRILL+ 1:80  dsDNA+ 1:10  Sm, RNP, SSA, SSB negative.    During hairy cell leukemia and treatment she had leukopenia, anemia, renal failure (she reports due to vancomycin use) with 1 session of dialysis. Outside of this, WBC and lymphocyte count, Hb, platelets have been adequate. Renal function recovered.    Reviewed photo from Dermatology visit. 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 However I wouldn't expect that to resolve so quickly with topicals only. She has had several hive-like reactions to medications in the past so I wonder if this is a drug eruption from the many OTC medications (BC powder, etc) she takes for chronic back pain. Has severe dry mouth and dental disease. No synovitis, joint tenderness, or weakness on exam.      Plan:  Will start the lab workup for lupus and dermatomyositis. Repeat MERRILL and dsDNA. I question if these are false positives in such low titers in the setting of acute inflammation of the skin.   Try Biotene products OTC for dry mouth.  Referral to Pain Clinic for low back pain with radiation to BLE.  Referral to ENT for minor salivary gland biopsy.  Referral to [facility] to see if they can help with mouth pain and dental issues. She feels like she has a mouth abscess. Medicaid not covering dental visits.          Follow up in about 4 weeks (around 9/13/2024).       I spent a total of 60 minutes on the day of the visit.  This includes face to face time and non-face to face time preparing to see  the patient (eg, review of tests), obtaining and/or reviewing separately obtained history, documenting clinical information in the electronic or other health record, independently interpreting results and communicating results to the patient/family/caregiver, or care coordinator.

## 2024-08-16 NOTE — TELEPHONE ENCOUNTER
11:28 am - Swer received call from pt today. Pt requested assistance with Lyft today. Pt reports her appt today is very important. Pt reports she called and scheduled with her insurance, however her transportation has her being picked up at her son's address and she reports she receives mail there but is inbetween housing and will need to be picked up at: [address] [place]. Malikr scheduled pt Lyft d/t circumstances. Swer to assist with pt return ride home as well. Swer will remain available.

## 2024-08-17 LAB
HBV CORE AB SERPL QL IA: NORMAL
HBV SURFACE AB SER-ACNC: 115.91 MIU/ML
HBV SURFACE AB SER-ACNC: REACTIVE M[IU]/ML
HBV SURFACE AG SERPL QL IA: NORMAL
TREPONEMA PALLIDUM IGG+IGM AB [PRESENCE] IN SERUM OR PLASMA BY IMMUNOASSAY: NONREACTIVE

## 2024-08-19 LAB
ANA SER QL IF: NORMAL
B2 GLYCOPROT1 IGA SER QL: 11 U/ML
B2 GLYCOPROT1 IGG SER QL: 1.9 U/ML
B2 GLYCOPROT1 IGM SER QL: <2.4 U/ML
CARDIOLIPIN IGG SER IA-ACNC: <9.4 GPL (ref 0–14.99)
CARDIOLIPIN IGM SER IA-ACNC: <9.4 MPL (ref 0–12.49)
GAMMA INTERFERON BACKGROUND BLD IA-ACNC: 0.04 IU/ML
M TB IFN-G CD4+ BCKGRND COR BLD-ACNC: 0.02 IU/ML
M TB IFN-G CD4+ BCKGRND COR BLD-ACNC: 0.02 IU/ML
MITOGEN IGNF BCKGRD COR BLD-ACNC: 9.96 IU/ML
TB GOLD PLUS: NEGATIVE

## 2024-08-20 LAB
ALDOLASE SERPL-CCNC: 5.6 U/L (ref 1.2–7.6)
DNA TITER: NORMAL
DSDNA AB SER-ACNC: POSITIVE [IU]/ML
STRONGYLOIDES ANTIBODY IGG: NEGATIVE

## 2024-08-21 LAB
CONFIRM DRVVT STA-STACLOT: NORMAL S
DRVVT SCREEN TO CONFIRM RATIO: NORMAL {RATIO}
HEPARIN NT PPP QL: NORMAL
LA 3 SCREEN W REFLEX-IMP: NORMAL
LMW HEPARIN IND PLT AB SER QL: NORMAL
MIXING DRVVT/NORMAL: NORMAL %
NEUTRALIZED DRVVT SCREEN RATIO: NORMAL
PROTHROMBIN TIME: 12.2 S (ref 12–15.5)
SCREEN APTT/NORMAL: 0.9
SCREEN APTT/NORMAL: NORMAL
SCREEN DRVVT/NORMAL: 0.8 %
THROMBIN TIME: NORMAL S

## 2024-08-22 ENCOUNTER — OFFICE VISIT (OUTPATIENT)
Dept: HEMATOLOGY/ONCOLOGY | Facility: CLINIC | Age: 49
End: 2024-08-22
Payer: MEDICAID

## 2024-08-22 VITALS
TEMPERATURE: 98 F | HEIGHT: 71 IN | HEART RATE: 83 BPM | BODY MASS INDEX: 41.02 KG/M2 | WEIGHT: 293 LBS | OXYGEN SATURATION: 99 % | DIASTOLIC BLOOD PRESSURE: 103 MMHG | SYSTOLIC BLOOD PRESSURE: 145 MMHG

## 2024-08-22 DIAGNOSIS — R03.0 ELEVATED BLOOD PRESSURE READING: ICD-10-CM

## 2024-08-22 DIAGNOSIS — Z90.81 S/P SPLENECTOMY: ICD-10-CM

## 2024-08-22 DIAGNOSIS — C91.40 HAIRY CELL LEUKEMIA OF SPLEEN: Primary | ICD-10-CM

## 2024-08-22 PROCEDURE — 3077F SYST BP >= 140 MM HG: CPT | Mod: CPTII,,, | Performed by: INTERNAL MEDICINE

## 2024-08-22 PROCEDURE — 99214 OFFICE O/P EST MOD 30 MIN: CPT | Mod: PBBFAC | Performed by: INTERNAL MEDICINE

## 2024-08-22 PROCEDURE — 3080F DIAST BP >= 90 MM HG: CPT | Mod: CPTII,,, | Performed by: INTERNAL MEDICINE

## 2024-08-22 PROCEDURE — 4010F ACE/ARB THERAPY RXD/TAKEN: CPT | Mod: CPTII,,, | Performed by: INTERNAL MEDICINE

## 2024-08-22 PROCEDURE — 99999 PR PBB SHADOW E&M-EST. PATIENT-LVL IV: CPT | Mod: PBBFAC,,, | Performed by: INTERNAL MEDICINE

## 2024-08-22 PROCEDURE — 1159F MED LIST DOCD IN RCRD: CPT | Mod: CPTII,,, | Performed by: INTERNAL MEDICINE

## 2024-08-22 PROCEDURE — 1160F RVW MEDS BY RX/DR IN RCRD: CPT | Mod: CPTII,,, | Performed by: INTERNAL MEDICINE

## 2024-08-22 PROCEDURE — 99214 OFFICE O/P EST MOD 30 MIN: CPT | Mod: S$PBB,,, | Performed by: INTERNAL MEDICINE

## 2024-08-22 PROCEDURE — 3008F BODY MASS INDEX DOCD: CPT | Mod: CPTII,,, | Performed by: INTERNAL MEDICINE

## 2024-08-22 NOTE — PROGRESS NOTES
Subjective:       Patient ID: Jaswant Yang is a 48 y.o. female.    Chief Complaint: Results and Anemia    HPI:  48-year-old female previous history of lupus followed by Rheumatology.  Patient returns for review patient has history of hairy cell indolent no treatment warranted at present no abnormalities in terms of deterioration in blood counts    Past Medical History:   Diagnosis Date    Anemia     Anemia     Back pain     Dental infection     GERD (gastroesophageal reflux disease) 5/14/2020    Hairy-cell leukemia of spleen      No family history on file.  Social History     Socioeconomic History    Marital status: Single   Tobacco Use    Smoking status: Former     Current packs/day: 0.25     Types: Cigarettes    Smokeless tobacco: Never    Tobacco comments:     no longer smoker   Substance and Sexual Activity    Alcohol use: Yes     Comment: occasional    Drug use: No    Sexual activity: Yes     Birth control/protection: Condom   Social History Narrative    Lives in a single family home with her fiance and her daughter.     Her mother and her son are her SDM's. ACP Documents are available in her chart.      Social Determinants of Health     Financial Resource Strain: High Risk (2/14/2024)    Overall Financial Resource Strain (CARDIA)     Difficulty of Paying Living Expenses: Very hard   Food Insecurity: Food Insecurity Present (2/14/2024)    Hunger Vital Sign     Worried About Running Out of Food in the Last Year: Often true     Ran Out of Food in the Last Year: Often true   Transportation Needs: Unmet Transportation Needs (2/14/2024)    PRAPARE - Transportation     Lack of Transportation (Medical): Yes     Lack of Transportation (Non-Medical): Yes   Physical Activity: Insufficiently Active (2/14/2024)    Exercise Vital Sign     Days of Exercise per Week: 4 days     Minutes of Exercise per Session: 30 min   Stress: Stress Concern Present (2/14/2024)    [employer] [place] [employer] - Occupational  "Stress Questionnaire     Feeling of Stress : Very much   Housing Stability: High Risk (2024)    Housing Stability Vital Sign     Unable to Pay for Housing in the Last Year: Yes     Number of Places Lived in the Last Year: 3     Unstable Housing in the Last Year: Yes     Past Surgical History:   Procedure Laterality Date     SECTION, CLASSIC      FLUOROSCOPY N/A 2020    Procedure: Spleenic Bleed;  Surgeon: Hiren Cabrera MD;  Location: [facility] CATH LAB;  Service: General;  Laterality: N/A;    INJECTION OF ANESTHETIC AGENT INTO TISSUE PLANE DEFINED BY TRANSVERSUS ABDOMINIS MUSCLE N/A 2020    Procedure: BLOCK, TRANSVERSUS ABDOMINIS PLANE;  Surgeon: Yumi Ferguson MD;  Location: [facility] OR;  Service: General;  Laterality: N/A;    SPLENECTOMY N/A 2020    Procedure: SPLENECTOMY;  Surgeon: Yumi Ferguson MD;  Location: [facility] OR;  Service: General;  Laterality: N/A;    THORACENTESIS Left 2020    Procedure: THORACENTESIS;  Surgeon: Yumi Ferguson MD;  Location: [facility] OR;  Service: General;  Laterality: Left;    TUBAL LIGATION         Labs:  Lab Results   Component Value Date    WBC 7.61 2024    HGB 15.8 2024    HCT 48.8 (H) 2024    MCV 89 2024     2024     BMP  Lab Results   Component Value Date     2024    K 3.6 2024     2024    CO2 24 2024    BUN 9 2024    CREATININE 1.2 2024    CALCIUM 9.7 2024    ANIONGAP 11 2024    ESTGFRAFRICA >60 2022    EGFRNONAA 55 (A) 2022     Lab Results   Component Value Date    ALT 20 2024    AST 22 2024    ALKPHOS 68 2024    BILITOT 0.8 2024       Lab Results   Component Value Date    IRON 84 2024    TIBC 377 2024    FERRITIN 51 2024     No results found for: "EMYAOUBH25"  No results found for: "FOLATE"  Lab Results   Component Value Date    TSH 3.010 10/18/2023         Review of Systems   Constitutional: "  Negative for activity change, appetite change, chills, diaphoresis, fatigue, fever and unexpected weight change.   HENT:  Negative for congestion, dental problem, drooling, ear discharge, ear pain, facial swelling, hearing loss, mouth sores, nosebleeds, postnasal drip, rhinorrhea, sinus pressure, sneezing, sore throat, tinnitus, trouble swallowing and voice change.    Eyes:  Negative for photophobia, pain, discharge, redness, itching and visual disturbance.   Respiratory:  Negative for cough, choking, chest tightness, shortness of breath, wheezing and stridor.    Cardiovascular:  Negative for chest pain, palpitations and leg swelling.   Gastrointestinal:  Negative for abdominal distention, abdominal pain, anal bleeding, blood in stool, constipation, diarrhea, nausea, rectal pain and vomiting.   Endocrine: Negative for cold intolerance, heat intolerance, polydipsia, polyphagia and polyuria.   Genitourinary:  Negative for decreased urine volume, difficulty urinating, dyspareunia, dysuria, enuresis, flank pain, frequency, genital sores, hematuria, menstrual problem, pelvic pain, urgency, vaginal bleeding, vaginal discharge and vaginal pain.   Musculoskeletal:  Negative for arthralgias, back pain, gait problem, joint swelling, myalgias, neck pain and neck stiffness.   Skin:  Negative for color change, pallor and rash.   Allergic/Immunologic: Negative for environmental allergies, food allergies and immunocompromised state.   Neurological:  Negative for dizziness, tremors, seizures, syncope, facial asymmetry, speech difficulty, weakness, light-headedness, numbness and headaches.   Hematological:  Negative for adenopathy. Does not bruise/bleed easily.   Psychiatric/Behavioral:  Negative for agitation, behavioral problems, confusion, decreased concentration, dysphoric mood, hallucinations, self-injury, sleep disturbance and suicidal ideas. The patient is not nervous/anxious and is not hyperactive.        Objective:       Physical Exam  Vitals reviewed.   Constitutional:       General: She is not in acute distress.     Appearance: Normal appearance. She is well-developed. She is obese. She is not diaphoretic.   HENT:      Head: Normocephalic and atraumatic.      Right Ear: External ear normal.      Left Ear: External ear normal.      Nose: Nose normal.      Right Sinus: No maxillary sinus tenderness or frontal sinus tenderness.      Left Sinus: No maxillary sinus tenderness or frontal sinus tenderness.      Mouth/Throat:      Pharynx: No oropharyngeal exudate.   Eyes:      General: Lids are normal. No scleral icterus.        Right eye: No discharge.         Left eye: No discharge.      Conjunctiva/sclera: Conjunctivae normal.      Right eye: Right conjunctiva is not injected. No hemorrhage.     Left eye: Left conjunctiva is not injected. No hemorrhage.     Pupils: Pupils are equal, round, and reactive to light.   Neck:      Thyroid: No thyromegaly.      Vascular: No JVD.      Trachea: No tracheal deviation.   Cardiovascular:      Rate and Rhythm: Normal rate.   Pulmonary:      Effort: Pulmonary effort is normal. No respiratory distress.      Breath sounds: No stridor.   Chest:      Chest wall: No tenderness.   Abdominal:      General: Bowel sounds are normal. There is no distension.      Palpations: Abdomen is soft. There is no hepatomegaly, splenomegaly or mass.      Tenderness: There is no abdominal tenderness. There is no rebound.   Musculoskeletal:         General: No tenderness. Normal range of motion.      Cervical back: Normal range of motion and neck supple.   Lymphadenopathy:      Cervical: No cervical adenopathy.      Upper Body:      Right upper body: No supraclavicular adenopathy.      Left upper body: No supraclavicular adenopathy.   Skin:     General: Skin is dry.      Findings: No erythema or rash.   Neurological:      Mental Status: She is alert and oriented to person, place, and time.      Cranial Nerves: No  cranial nerve deficit.      Coordination: Coordination normal.   Psychiatric:         Behavior: Behavior normal.         Thought Content: Thought content normal.         Judgment: Judgment normal.             Assessment:      1. Hairy cell leukemia of spleen    2. Elevated blood pressure reading    3. S/P splenectomy           Med Onc Chart Routing      Follow up with physician . Return 4-6 months can be seen either by MD APAP CBC CMP C-reactive protein and LDH   Follow up with [facility]    Infusion scheduling note    Injection scheduling note    Labs    Imaging    Pharmacy appointment    Other referrals                   Plan:     Reviewed natural history of hairy cell leukemia in elimination no deterioration in counts.  At this time continue follow-up through Rheumatology will be seen every 4-6 months with MD APAP alternating.        Cirilo Whittaker Jr, MD FACP

## 2024-09-06 ENCOUNTER — TELEPHONE (OUTPATIENT)
Dept: HEMATOLOGY/ONCOLOGY | Facility: CLINIC | Age: 49
End: 2024-09-06
Payer: MEDICAID

## 2024-09-06 NOTE — TELEPHONE ENCOUNTER
SW returned pt's call. Pt needing resources for help with 1st month rent/deposit so that she can get her own place. Pt stated that she had to move from son's home due to too many visitors. Pt stated that she has called [address] already, but not much to offer, and [employer] requires homelessness in a car, on the streets or in a shelter for 3 months. [employer]OA[employer] provided pt number to [employer]  for possible assistance [phone].

## 2024-09-10 ENCOUNTER — HOSPITAL ENCOUNTER (EMERGENCY)
Facility: HOSPITAL | Age: 49
Discharge: HOME | End: 2024-09-10
Attending: EMERGENCY MEDICINE
Payer: MEDICAID

## 2024-09-10 VITALS
HEART RATE: 81 BPM | OXYGEN SATURATION: 100 % | SYSTOLIC BLOOD PRESSURE: 194 MMHG | HEIGHT: 71 IN | BODY MASS INDEX: 41.02 KG/M2 | WEIGHT: 293 LBS | DIASTOLIC BLOOD PRESSURE: 95 MMHG | RESPIRATION RATE: 16 BRPM | TEMPERATURE: 98 F

## 2024-09-10 DIAGNOSIS — R21 RASH: Primary | ICD-10-CM

## 2024-09-10 PROCEDURE — 99284 EMERGENCY DEPT VISIT MOD MDM: CPT

## 2024-09-10 RX ORDER — PREDNISONE 20 MG/1
TABLET ORAL
Qty: 12 TABLET | Refills: 0 | Status: SHIPPED | OUTPATIENT
Start: 2024-09-10 | End: 2024-09-20

## 2024-09-10 RX ORDER — HYDROCODONE BITARTRATE AND ACETAMINOPHEN 5; 325 MG/1; MG/1
1 TABLET ORAL EVERY 4 HOURS PRN
Qty: 5 TABLET | Refills: 0 | Status: SHIPPED | OUTPATIENT
Start: 2024-09-10

## 2024-09-10 RX ORDER — CEPHALEXIN 500 MG/1
500 CAPSULE ORAL EVERY 8 HOURS
Qty: 21 CAPSULE | Refills: 0 | Status: SHIPPED | OUTPATIENT
Start: 2024-09-10 | End: 2024-09-20

## 2024-09-10 NOTE — ED PROVIDER NOTES
History      Chief Complaint   Patient presents with    Rash     Pt has a rash around her face for the past 2 weeks. Pt was seen by dermatology for the rash and given a cream for it but pt feels the cream has made it worse.        Review of patient's allergies indicates:   Allergen Reactions    Fentanyl Rash     Severe full body diffuse rash requiring systemic steroids    Vancomycin analogues Rash     Severe full body diffuse rash requiring systemic steroids    Rituximab Other (See Comments)     Sweating, abdominal pain, elevated blood pressure    Penicillin Hives    Tramadol Hives    Vancomycin Rash     Kidney failure        HPI   HPI    9/10/2024, 3:25 PM   History obtained from the patient      History of Present Illness: Jaswant Yang is a 48 y.o. female patient who presents to the Emergency Department for painful rash to face that she has been applying neosporin to for 2 weeks. She does have lupus.    No further complaints or concerns at this time.           PCP: Arleth Gallegos MD       Past Medical History:  Past Medical History:   Diagnosis Date    Anemia     Anemia     Back pain     Dental infection     GERD (gastroesophageal reflux disease) 2020    Hairy-cell leukemia of spleen          Past Surgical History:  Past Surgical History:   Procedure Laterality Date     SECTION, CLASSIC      FLUOROSCOPY N/A 2020    Procedure: Spleenic Bleed;  Surgeon: Hiren Cabrera MD;  Location: [facility] CATH LAB;  Service: General;  Laterality: N/A;    INJECTION OF ANESTHETIC AGENT INTO TISSUE PLANE DEFINED BY TRANSVERSUS ABDOMINIS MUSCLE N/A 2020    Procedure: BLOCK, TRANSVERSUS ABDOMINIS PLANE;  Surgeon: Yuim Ferguson MD;  Location: [facility] OR;  Service: General;  Laterality: N/A;    SPLENECTOMY N/A 2020    Procedure: SPLENECTOMY;  Surgeon: Yumi Ferguson MD;  Location: [facility] OR;  Service: General;  Laterality: N/A;    THORACENTESIS Left 2020    Procedure: THORACENTESIS;   "Surgeon: Yumi Ferguson MD;  Location: [facility];  Service: General;  Laterality: Left;    TUBAL LIGATION             Family History:  No family history on file.        Social History:  Social History     Tobacco Use    Smoking status: Former     Current packs/day: 0.25     Types: Cigarettes    Smokeless tobacco: Never    Tobacco comments:     no longer smoker   Substance and Sexual Activity    Alcohol use: Yes     Comment: occasional    Drug use: No    Sexual activity: Yes     Birth control/protection: Condom       ROS     Review of Systems   Skin:  Positive for rash.       Physical Exam      Initial Vitals [09/10/24 0811]   BP Pulse Resp Temp SpO2   (!) 194/95 81 16 98 °F (36.7 °C) 100 %      MAP       --         Physical Exam  Vital signs and nursing notes reviewed.  Constitutional: Patient is in NAD. Awake and alert. Well-developed and well-nourished.  Head: Atraumatic. Normocephalic.  Eyes:  EOM intact. Conjunctivae nl. No scleral icterus.  ENT: Mucous membranes are moist.   Neck: Supple.  No meningismus  Cardiovascular: Regular rate and rhythm. No murmurs, rubs, or gallops.   Pulmonary/Chest: No respiratory distress. Clear to auscultation bilaterally. No wheezing, rales, or rhonchi.  Abdominal: Soft. Non-distended. No TTP. No rebound, guarding, or rigidity. Good bowel sounds.  Genitourinary: No CVA tenderness  Musculoskeletal: Moves all extremities. No edema.   Skin: Warm and dry.  Erythematous rash to face, malar, but does not spare above lip  Neurological: Awake and alert. No acute focal neurological deficits are appreciated.  Psychiatric: Normal affect. Good eye contact. Appropriate in content.      ED Course          Procedures  ED Vital Signs:  Vitals:    09/10/24 0811   BP: (!) 194/95   Pulse: 81   Resp: 16   Temp: 98 °F (36.7 °C)   TempSrc: Oral   SpO2: 100%   Weight: (!) 172 kg (379 lb 3.1 oz)   Height: 5' 11" (1.803 m)                 Imaging Results:  Imaging Results    None            The " Emergency Provider reviewed the vital signs and test results, which are outlined above.    ED Discussion             Medication(s) given in the ER:  Medications - No data to display         Follow-up Information       Arleth Gallegos MD In 2 days.    Specialty: Family Medicine  Contact information:  [address]  [place] [address]  [phone]               Cl[facility], [facility] Dermat[facility]. Schedule an appointment as soon as possible for a visit in 2 days.    Specialty: Dermatology  Contact information:  [address]  [place] [address] [phone]                                    Medication List        START taking these medications      cephALEXin 500 MG capsule  Commonly known as: KEFLEX  Take 1 capsule (500 mg total) by mouth every 8 (eight) hours. for 7 days     HYDROcodone-acetaminophen 5-325 mg per tablet  Commonly known as: NORCO  Take 1 tablet by mouth every 4 (four) hours as needed for Pain.     predniSONE 20 MG tablet  Commonly known as: DELTASONE  Take 3 tablets (60 mg total) by mouth once daily for 1 day, THEN 2 tablets (40 mg total) once daily for 3 days, THEN 1 tablet (20 mg total) once daily for 3 days.  Start taking on: September 10, 2024            ASK your doctor about these medications      albuterol 90 mcg/actuation inhaler  Commonly known as: PROVENTIL/VENTOLIN HFA  Inhale 1-2 puffs into the lungs every 6 (six) hours as needed for Wheezing. Rescue     ALPRAZolam 0.5 MG tablet  Commonly known as: XANAX  TAKE 1/2 TO 1 TABLET BY MOUTH TWICE DAILY AS NEEDED FOR ANXIETY.     betamethasone dipropionate 0.05 % lotion  Commonly known as: DIPROLENE  Apply topically 2 (two) times daily.     cetirizine 10 MG tablet  Commonly known as: ZYRTEC     chlorhexidine 0.12 % solution  Commonly known as: PERIDEX     clotrimazole-betamethasone 1-0.05% cream  Commonly known as: LOTRISONE  Apply topically 2 (two) times daily.     cyclobenzaprine 10 MG tablet  Commonly known as: FLEXERIL  TAKE 1/2 TO 1  TABLET BY MOUTH TWICE DAILY AS NEEDED FOR MUSCLE SPASMS. MAY CAUSE DROWSINESS     gabapentin 300 MG capsule  Commonly known as: NEURONTIN  Take 2 capsules (600 mg total) by mouth every evening.     hydrOXYzine HCL 25 MG tablet  Commonly known as: ATARAX  TAKE 1 TABLET(25 MG) BY MOUTH THREE TIMES DAILY AS NEEDED FOR ITCHING     ketoconazole 2 % cream  Commonly known as: NIZORAL  AAA bid.     losartan 25 MG tablet  Commonly known as: COZAAR  Take 1 tablet (25 mg total) by mouth once daily.     mupirocin 2 % ointment  Commonly known as: BACTROBAN  Apply topically 3 (three) times daily.     pimecrolimus 1 % cream  Commonly known as: ELIDEL  Apply topically 2 (two) times daily. As needed for rash on face     promethazine 25 MG tablet  Commonly known as: PHENERGAN  Take 1 tablet (25 mg total) by mouth every 6 (six) hours as needed for Nausea.     triamcinolone acetonide 0.025% 0.025 % cream  Commonly known as: KENALOG  Apply topically 2 (two) times daily. For only up to 1 week per course on face as needed for rash, for up to 2-4 weeks per course for rash on body               Where to Get Your Medications        These medications were sent to [id number] [place], [place] - [address] [facility] & [address] [address]      Phone: [phone]   cephALEXin 500 MG capsule  HYDROcodone-acetaminophen 5-325 mg per tablet  predniSONE 20 MG tablet             Medical Decision Making        All findings were reviewed with the patient/family in detail.   All remaining questions and concerns were addressed at that time.  Patient/family has been counseled regarding the need for follow-up as well as the indication to return to the emergency room should new or worrisome developments occur.        MDM                 Clinical Impression:        ICD-10-CM ICD-9-CM   1. Rash  R21 782.1               Jane Lynch PA-C  09/10/24 1539

## 2024-09-20 ENCOUNTER — OFFICE VISIT (OUTPATIENT)
Dept: RHEUMATOLOGY | Facility: CLINIC | Age: 49
End: 2024-09-20
Payer: MEDICAID

## 2024-09-20 DIAGNOSIS — R21 FACIAL RASH: ICD-10-CM

## 2024-09-20 DIAGNOSIS — R76.8 POSITIVE DOUBLE STRANDED DNA ANTIBODY TEST: ICD-10-CM

## 2024-09-20 DIAGNOSIS — R68.2 DRY MOUTH: ICD-10-CM

## 2024-09-20 DIAGNOSIS — R76.8 POSITIVE ANA (ANTINUCLEAR ANTIBODY): Primary | ICD-10-CM

## 2024-09-20 NOTE — PROGRESS NOTES
The patient location is: [place]  The chief complaint leading to consultation is: rash    Visit type: audiovisual    Face to Face time with patient: 15 minutes  40 minutes of total time spent on the encounter, which includes face to face time and non-face to face time preparing to see the patient (eg, review of tests), Obtaining and/or reviewing separately obtained history, Documenting clinical information in the electronic or other health record, Independently interpreting results (not separately reported) and communicating results to the patient/family/caregiver, or Care coordination (not separately reported).         Each patient to whom he or she provides medical services by telemedicine is:  (1) informed of the relationship between the physician and patient and the respective role of any other health care provider with respect to management of the patient; and (2) notified that he or she may decline to receive medical services by telemedicine and may withdraw from such care at any time.    Notes:       Subjective:      Patient ID: Jaswant Yang is a 48 y.o. female.    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 Presents for Rheumatology follow up for MERRILL+, dsDNA+ in the setting of new facial rash.    Interval Hx:  Had another breakout of bright red rash all over her mouth. Reports that prior to the breakout, her face was very itchy and she was scratching to the point of causing sores. Therefore she was putting neosporin over her face. She went to the ED and was told to stop using neosporin on the face. She was sent home with Keflex and prednisone (60 mg x 1d, 40 mg x 3d, 20 mg x 3d). Also has been using topical Kenalog, ketoconazole, and vaseline as instructed by Dermatology. Rash is only slightly better with all this. The rash, swelling, and itching surrounding the eyelids is back. Also continues to have severe dry mouth with a new sore on the  hard palate due to the dryness. Rheumatology ROS is otherwise negative.      Initial Hx:  Diagnosed with hairy cell leukemia in 2020, s/p 6/22/20 cladribine; 7/6/20 initiated rituximab, not completed due to transfusion reaction. F/u BMbx stable.    In 07/2024 she broke out in rash over the face. Very itchy so she scratches and it bleeds. Same rash had occurred another time previously in 04/2024 and resolved with a previous prescription cream she had. Current rash spread breaking out all over the neck and torso. Saw Dermatology and was thought to have an SLE or dermatomyositis type rash with heliotrope rash over the eyelids. The rashes are improving with topical steroids.    Has had pain in the lower extremities for years. Low back pain with radiation down the legs. Pain in calves, knees. Either foot goes numb when sleeping. Had one visit with Dr. Bautista in [facility] in the past years ago and has not followed up.    Endorses many dental issues. Abscesses, teeth falling out, etc. Medicaid not covering dental so she hasn't been able to address it.    +skin rashes, ?malar rash. No photosensitivity   No telangiectasias   No calcinosis   No psoriasis   No patchy alopecia   No oral or nasal ulcers   +/- dry eyes +dry mouth, +dental issues with infections and loss of teeth.  No pleurisy, chest pain, dyspnea, cough  No dysphagia, diplopia, dysphonia  No muscle weakness   No nausea, vomiting, diarrhea, constipation   No acid reflux  No Raynaud's  No digital ulcers   No cytopenias   No renal issues   No blood clots   No fever, chills, weight loss. +loss of appetite. +night sweats few times a week since after chemo.  No pregnancy losses, pre-term deliveries, or pregnancy complications   No new onset headaches   No recurrent conjunctivitis, uveitis, scleritis, or episcleritis   No chronic or bloody diarrhea. No Ulcerative Colitis or Crohn's (IBD)  No vaginal or penile and urethral discharge/STDs/ulcers   No unexplained  lymphadenopathy  No parotitis   No seizures, strokes, psychosis  No sclerodactyly  No puffy hands  No perioral tightness     Social Hx: Former very light smoker 2-3 cigarettes weekly.  Family Hx: No family history of autoimmune disease      Objective:   LMP  (LMP Unknown)   Physical Exam        Assessment and Plan:     Problem List Items Addressed This Visit    None  Visit Diagnoses       Positive MERRILL (antinuclear antibody)    -  Primary    Relevant Orders    Anti-DNA Ab, Double-Stranded    C3 Complement    C4 Complement    CBC Auto Differential    Comprehensive Metabolic Panel    C-Reactive Protein    Protein/Creatinine Ratio, Urine    Sedimentation rate    Urinalysis    Positive double stranded DNA antibody test        Relevant Orders    Anti-DNA Ab, Double-Stranded    C3 Complement    C4 Complement    CBC Auto Differential    Comprehensive Metabolic Panel    C-Reactive Protein    Protein/Creatinine Ratio, Urine    Sedimentation rate    Urinalysis    Facial rash        Relevant Orders    Anti-DNA Ab, Double-Stranded    C3 Complement    C4 Complement    CBC Auto Differential    Comprehensive Metabolic Panel    C-Reactive Protein    Protein/Creatinine Ratio, Urine    Sedimentation rate    Urinalysis    Dry mouth        Relevant Orders    Anti-DNA Ab, Double-Stranded    C3 Complement    C4 Complement    CBC Auto Differential    Comprehensive Metabolic Panel    C-Reactive Protein    Protein/Creatinine Ratio, Urine    Sedimentation rate    Urinalysis              Patient with HTN, 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 Presents for Rheumatology evaluation for MERRILL+, dsDNA+ in the setting of new facial rash.    MERRILL+ 1:80, repeat is negative.  dsDNA+ 1:10, repeat is 1:20  Negative Sm, RNP, SSA, SSB, myomarker panel, complements.  Normal CPK and aldolase.  No significant proteinuria.  Pre-DMARD labs negative.  APS labs:   Negative DRVVT and cadiolipin   Mild elevation in B2GP IgA of 11. Not clinically  significant.      During hairy cell leukemia and treatment she had leukopenia, anemia, renal failure (she reports due to vancomycin use) with 1 session of dialysis. Outside of this, WBC and lymphocyte count, Hb, platelets have been adequate. Renal function recovered.    Reviewed photo from Dermatology visit. 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 However I wouldn't expect that to resolve so quickly with topicals only. She has had several hive-like reactions to medications in the past so I wonder if this is a drug eruption from the many OTC medications (BC powder, etc) she takes for chronic back pain. Has severe dry mouth and dental disease.    No synovitis, joint tenderness, or weakness on exam.    No improvement of rash with prednisone for a week.    She has no synovitis or weakness on exam, normal muscle enzymes, low inflammation, negative myomarker panel, and negative repeat MERRILL. dsDNA continues to be slightly positive but no evidence of proteinuria. So I don't have convincing evidence of lupus or dermatomyositis. She might have Sjogren's Disease but would need a biopsy to prove it because her serologies are negative. ENT probably can't see her here because she has Medicaid.    Plan:  Trial of hydroxychloroquine 200 mg twice daily to see if this helps the rash.  She will contact her insurance and see which ENT they cover and I will refer to them for minor salivary gland biopsy.  Follow up with Dermatology for biopsy of the rash.  Try Biotene products OTC for dry mouth.  Safety and monitoring labs in 3 months.        Follow up in about 3 months (around 12/20/2024).       I spent a total of 40 minutes on the day of the visit.  This includes face to face time and non-face to face time preparing to see the patient (eg, review of tests), obtaining and/or reviewing separately obtained history, documenting clinical information in the electronic or other health record, independently interpreting  results and communicating results to the patient/family/caregiver, or care coordinator.

## 2024-10-11 ENCOUNTER — TELEPHONE (OUTPATIENT)
Dept: HEMATOLOGY/ONCOLOGY | Facility: CLINIC | Age: 49
End: 2024-10-11
Payer: MEDICAID

## 2024-10-11 NOTE — TELEPHONE ENCOUNTER
SW returned pt's call. Pt needs transportation assistance to appt on 10/14/24. Pt stated that her insurance would not allow her to arrange due to the address being different than what's on file. Pt stated that she's staying with different family members until her dtr finds her an apt. Lyft ride scheduled per pt request. Pt will need to call SW dept for return ride home. SW will remain available.     scheduled on 10/14, 09:00 AM CDT   will arrive around 09:00 AM CDT.  Edit ride  Schedule return  Cancel ride  Leonardo    Full name  Jaswant Yang  Mobile phone number  [phone]  Ride type  Lyft (4 seats)  Route    Pickup  [address], [facility]  Drop-off  [address] [facility], [place], [place], [facility]  Addresses may vary from the original request due to minor pickup and drop-off changes. For example, an address may vary if a rider is picked up across the street.      Note to   [address]. Located around back of [facility].  Tip added  $0.00  Request details    Coordinator name  Renee Nicolas  Date and time  10/11/24, 09:43 AM CDT  Scheduled ride ID  [id number]  Internal note  No note added

## 2024-10-14 ENCOUNTER — HOSPITAL ENCOUNTER (OUTPATIENT)
Dept: RADIOLOGY | Facility: HOSPITAL | Age: 49
Discharge: HOME | End: 2024-10-14
Attending: FAMILY MEDICINE
Payer: MEDICAID

## 2024-10-14 ENCOUNTER — OFFICE VISIT (OUTPATIENT)
Dept: PRIMARY CARE CLINIC | Facility: CLINIC | Age: 49
End: 2024-10-14
Payer: MEDICAID

## 2024-10-14 VITALS
SYSTOLIC BLOOD PRESSURE: 124 MMHG | BODY MASS INDEX: 41.02 KG/M2 | TEMPERATURE: 98 F | DIASTOLIC BLOOD PRESSURE: 100 MMHG | HEART RATE: 85 BPM | HEIGHT: 71 IN | WEIGHT: 293 LBS | OXYGEN SATURATION: 97 %

## 2024-10-14 DIAGNOSIS — R68.84 MAXILLARY PAIN: ICD-10-CM

## 2024-10-14 DIAGNOSIS — M79.645 PAIN OF LEFT THUMB: ICD-10-CM

## 2024-10-14 DIAGNOSIS — I10 ESSENTIAL HYPERTENSION: Primary | Chronic | ICD-10-CM

## 2024-10-14 DIAGNOSIS — N30.00 ACUTE CYSTITIS WITHOUT HEMATURIA: ICD-10-CM

## 2024-10-14 DIAGNOSIS — B37.9 YEAST INFECTION: ICD-10-CM

## 2024-10-14 DIAGNOSIS — Z12.31 BREAST CANCER SCREENING BY MAMMOGRAM: ICD-10-CM

## 2024-10-14 PROCEDURE — 99215 OFFICE O/P EST HI 40 MIN: CPT | Mod: PBBFAC,25,PN | Performed by: FAMILY MEDICINE

## 2024-10-14 PROCEDURE — 73130 X-RAY EXAM OF HAND: CPT | Mod: TC,PN,LT

## 2024-10-14 PROCEDURE — 73130 X-RAY EXAM OF HAND: CPT | Mod: 26,LT,, | Performed by: RADIOLOGY

## 2024-10-14 PROCEDURE — 99999 PR PBB SHADOW E&M-EST. PATIENT-LVL V: CPT | Mod: PBBFAC,,, | Performed by: FAMILY MEDICINE

## 2024-10-14 RX ORDER — FLUCONAZOLE 150 MG/1
150 TABLET ORAL
Qty: 3 TABLET | Refills: 0 | Status: SHIPPED | OUTPATIENT
Start: 2024-10-14 | End: 2024-10-21

## 2024-10-14 RX ORDER — LOSARTAN POTASSIUM 50 MG/1
50 TABLET ORAL DAILY
Qty: 90 TABLET | Refills: 1 | Status: SHIPPED | OUTPATIENT
Start: 2024-10-14

## 2024-10-14 RX ORDER — SULFAMETHOXAZOLE AND TRIMETHOPRIM 800; 160 MG/1; MG/1
1 TABLET ORAL 2 TIMES DAILY
Qty: 6 TABLET | Refills: 0 | Status: SHIPPED | OUTPATIENT
Start: 2024-10-14 | End: 2024-10-17

## 2024-10-14 NOTE — PROGRESS NOTES
Subjective:      Chief Complaint   Patient presents with    Establish Care     Poss UTI / Discuss injections from spleen surgery      Patient ID: Jaswant Yang is a 49 y.o. female.  History of Present Illness    CHIEF COMPLAINT:  Jaswant presents today for follow up.    PAIN MANAGEMENT:  She reports severe maxillary bone pain, inadequately managed with medications including VCs, ibuprofen, and Tylenol. Prescribed gabapentin has been ineffective and does not induce sleepiness as expected. She expresses frustration with current pain management strategies and considered an emergency room visit for pain relief prior to this appointment.     ARTHRALGIA and MYALGIA: She reports previous pain management treatment with Dr. Dmitri Bautista, who attempted injections in her knees and arms for pain relief. 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 She experiences teeth breaking due to fragility, severe mouth pain, and difficulty swallowing. She reports persistent dry mouth, sometimes waking up unable to swallow. A biopsy has been suggested to investigate the cause of her dry mouth. A referral to an oral surgeon has been recommended.    AUTOIMMUNE AND ONCOLOGICAL HISTORY:  She has a history of lupus, previously experiencing a butterfly rash on her face and a skin rash all over her body, but denies recent flare-ups. 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   MEDICATION ISSUES:  She reports an adverse reaction to vancomycin resulting in kidney shutdown after chemotherapy, requiring hospitalization.  CARDIOVASCULAR:  She reports not taking her blood pressure medication regularly, sometimes doubling up on the dose when it is so elevated.  MUSCULOSKELETAL:  She reports swelling in her finger after hitting it, with difficulty bending the affected finger and  shooting pain at night. She notes significant limitations in finger mobility compared to her unaffected fingers.      ROS:  General: -fever, -chills, -fatigue, -weight gain, -weight loss  Eyes: -vision changes, -redness, -discharge  ENT: -ear pain, -nasal congestion, -sore throat, +dry mouth  Cardiovascular: -chest pain, -palpitations, -lower extremity edema  Respiratory: -cough, -shortness of breath  Gastrointestinal: -abdominal pain, -nausea, -vomiting, -diarrhea, -constipation, -blood in stool  Genitourinary: -dysuria, -hematuria, -frequency  Musculoskeletal: +joint pain, -muscle pain  Skin: -rash, -lesion  Neurological: -headache, -dizziness, -numbness, -tingling  Psychiatric: -anxiety, -depression, -sleep difficulty       Jaswant Yang's allergies, medications, history, and problem list were updated as appropriate.  Past Medical History:   Diagnosis Date    Anemia     Anemia     Back pain     Dental infection     GERD (gastroesophageal reflux disease) 05/14/2020    Hairy-cell leukemia of spleen     Hypertension     Unspecified chronic bronchitis      No family history on file.  Social History     Socioeconomic History    Marital status: Single   Tobacco Use    Smoking status: Former     Current packs/day: 0.25     Types: Cigarettes     Passive exposure: Never    Smokeless tobacco: Never    Tobacco comments:     no longer smoker   Substance and Sexual Activity    Alcohol use: Not Currently     Comment: occasional    Drug use: No    Sexual activity: Yes     Birth control/protection: Condom   Social History Narrative    Lives in a single family home with her fiance and her daughter.     Her mother and her son are her SDM's. ACP Documents are available in her chart.      Social Drivers of Health     Financial Resource Strain: High Risk (2/14/2024)    Overall Financial Resource Strain (CARDIA)     Difficulty of Paying Living Expenses: Very hard   Food Insecurity: Food Insecurity Present (2/14/2024)    Hunger  Vital Sign     Worried About Running Out of Food in the Last Year: Often true     Ran Out of Food in the Last Year: Often true   Transportation Needs: Unmet Transportation Needs (2/14/2024)    PRAPARE - Transportation     Lack of Transportation (Medical): Yes     Lack of Transportation (Non-Medical): Yes   Physical Activity: Insufficiently Active (2/14/2024)    Exercise Vital Sign     Days of Exercise per Week: 4 days     Minutes of Exercise per Session: 30 min   Stress: Stress Concern Present (2/14/2024)    [employer] - Occupational Stress Questionnaire     Feeling of Stress : Very much   Housing Stability: High Risk (2/14/2024)    Housing Stability Vital Sign     Unable to Pay for Housing in the Last Year: Yes     Number of Places Lived in the Last Year: 3     Unstable Housing in the Last Year: Yes     Outpatient Encounter Medications as of 10/14/2024   Medication Sig Dispense Refill    ALPRAZolam (XANAX) 0.5 MG tablet TAKE 1/2 TO 1 TABLET BY MOUTH TWICE DAILY AS NEEDED FOR ANXIETY. 30 tablet 5    cetirizine (ZYRTEC) 10 MG tablet Take 10 mg by mouth daily as needed.      chlorhexidine (PERIDEX) 0.12 % solution SMARTSIG:By Mouth      cyclobenzaprine (FLEXERIL) 10 MG tablet TAKE 1/2 TO 1 TABLET BY MOUTH TWICE DAILY AS NEEDED FOR MUSCLE SPASMS. MAY CAUSE DROWSINESS 60 tablet 1    gabapentin (NEURONTIN) 300 MG capsule Take 2 capsules (600 mg total) by mouth every evening. 60 capsule 1    HYDROcodone-acetaminophen (NORCO) 5-325 mg per tablet Take 1 tablet by mouth every 4 (four) hours as needed for Pain. 5 tablet 0    hydrOXYzine HCL (ATARAX) 25 MG tablet TAKE 1 TABLET(25 MG) BY MOUTH THREE TIMES DAILY AS NEEDED FOR ITCHING 30 tablet 2    ketoconazole (NIZORAL) 2 % cream AAA bid. 60 g 3    promethazine (PHENERGAN) 25 MG tablet Take 1 tablet (25 mg total) by mouth every 6 (six) hours as needed for Nausea. 15 tablet 0    triamcinolone acetonide 0.025% (KENALOG) 0.025 % cream Apply topically  "2 (two) times daily. For only up to 1 week per course on face as needed for rash, for up to 2-4 weeks per course for rash on body 454 g 1    [DISCONTINUED] losartan (COZAAR) 25 MG tablet Take 1 tablet (25 mg total) by mouth once daily. 30 tablet 1    albuterol (PROVENTIL/VENTOLIN HFA) 90 mcg/actuation inhaler Inhale 1-2 puffs into the lungs every 6 (six) hours as needed for Wheezing. Rescue 1 Inhaler 0    betamethasone dipropionate (DIPROLENE) 0.05 % lotion Apply topically 2 (two) times daily. (Patient not taking: Reported on 10/14/2024) 60 mL 1    clotrimazole-betamethasone 1-0.05% (LOTRISONE) cream Apply topically 2 (two) times daily. (Patient not taking: Reported on 10/14/2024) 45 g 2    fluconazole (DIFLUCAN) 150 MG Tab Take 1 tablet (150 mg total) by mouth every 72 hours. for 3 doses 3 tablet 0    losartan (COZAAR) 50 MG tablet Take 1 tablet (50 mg total) by mouth once daily. 90 tablet 1    mupirocin (BACTROBAN) 2 % ointment Apply topically 3 (three) times daily. (Patient not taking: Reported on 10/14/2024) 22 g 0    pimecrolimus (ELIDEL) 1 % cream Apply topically 2 (two) times daily. As needed for rash on face 30 g 2    sulfamethoxazole-trimethoprim 800-160mg (BACTRIM DS) 800-160 mg Tab Take 1 tablet by mouth 2 (two) times daily. for 3 days 6 tablet 0    [DISCONTINUED] cephALEXin (KEFLEX) 500 MG capsule Take 1 capsule (500 mg total) by mouth every 8 (eight) hours. for 7 days 21 capsule 0    [DISCONTINUED] predniSONE (DELTASONE) 20 MG tablet Take 3 tablets (60 mg total) by mouth once daily for 1 day, THEN 2 tablets (40 mg total) once daily for 3 days, THEN 1 tablet (20 mg total) once daily for 3 days. 12 tablet 0     No facility-administered encounter medications on file as of 10/14/2024.          Objective:      BP (!) 124/100   Pulse 85   Temp 97.9 °F (36.6 °C)   Ht 5' 11" (1.803 m)   Wt (!) 176.3 kg (388 lb 9.6 oz)   SpO2 97%   BMI 54.20 kg/m²   Physical Exam  Vitals and nursing note reviewed. "   Constitutional:       General: She is not in acute distress.  HENT:      Head:     Cardiovascular:      Rate and Rhythm: Tachycardia present.   Pulmonary:      Effort: No respiratory distress.      Breath sounds: No wheezing or rhonchi.   Musculoskeletal:         General: No swelling or deformity.      Left hand: Tenderness present. Decreased range of motion.        Arms:    Neurological:      General: No focal deficit present.      Mental Status: She is alert.   Psychiatric:         Behavior: Behavior normal.         Thought Content: Thought content normal.        Physical Exam    MSK: Hand/Wrist - Left: Swelling in MCP joint.        Results for orders placed or performed in visit on 08/16/24   MERRILL Screen w/Reflex    Collection Time: 08/16/24  3:17 PM   Result Value Ref Range    MERRILL Screen Negative <1:80 Negative <1:80   Anti-DNA Ab, Double-Stranded    Collection Time: 08/16/24  3:17 PM   Result Value Ref Range    ds DNA Ab Positive (A) Negative 1:10   Beta-2 Glycoprotein Abs (IgA, IgG, IgM)    Collection Time: 08/16/24  3:17 PM   Result Value Ref Range    Beta-2 Glyco 1 IgG 1.9 <7.0 U/mL    Beta-2 Glyco 1 IgM <2.4 <7.0 U/mL    Beta-2 Glyco 1 IgA 11.0 (H) <7.0 U/mL   C3 Complement    Collection Time: 08/16/24  3:17 PM   Result Value Ref Range    Complement (C-3) 70 50 - 180 mg/dL   C4 Complement    Collection Time: 08/16/24  3:17 PM   Result Value Ref Range    Complement (C-4) 11 11 - 44 mg/dL   Cardiolipin antibody    Collection Time: 08/16/24  3:17 PM   Result Value Ref Range    APA Isotype IgG <9.40 0.00 - 14.99 GPL    APA Isotype IgM <9.40 0.00 - 12.49 MPL   CBC Auto Differential    Collection Time: 08/16/24  3:17 PM   Result Value Ref Range    WBC 7.61 3.90 - 12.70 K/uL    RBC 5.48 (H) 4.00 - 5.40 M/uL    Hemoglobin 15.8 12.0 - 16.0 g/dL    Hematocrit 48.8 (H) 37.0 - 48.5 %    MCV 89 82 - 98 fL    MCH 28.8 27.0 - 31.0 pg    MCHC 32.4 32.0 - 36.0 g/dL    RDW 17.6 (H) 11.5 - 14.5 %    Platelets 323 150 - 450  K/uL    MPV 11.2 9.2 - 12.9 fL    Immature Granulocytes 0.3 0.0 - 0.5 %    Gran # (ANC) 2.3 1.8 - 7.7 K/uL    Immature Grans (Abs) 0.02 0.00 - 0.04 K/uL    Lymph # 2.6 1.0 - 4.8 K/uL    Mono # 0.5 0.3 - 1.0 K/uL    Eos # 2.1 (H) 0.0 - 0.5 K/uL    Baso # 0.05 0.00 - 0.20 K/uL    nRBC 0 0 /100 WBC    Gran % 29.8 (L) 38.0 - 73.0 %    Lymph % 34.4 18.0 - 48.0 %    Mono % 7.1 4.0 - 15.0 %    Eosinophil % 27.7 (H) 0.0 - 8.0 %    Basophil % 0.7 0.0 - 1.9 %    Differential Method Automated    Comprehensive Metabolic Panel    Collection Time: 08/16/24  3:17 PM   Result Value Ref Range    Sodium 142 136 - 145 mmol/L    Potassium 3.6 3.5 - 5.1 mmol/L    Chloride 107 95 - 110 mmol/L    CO2 24 23 - 29 mmol/L    Glucose 85 70 - 110 mg/dL    BUN 9 6 - 20 mg/dL    Creatinine 1.2 0.5 - 1.4 mg/dL    Calcium 9.7 8.7 - 10.5 mg/dL    Total Protein 7.7 6.0 - 8.4 g/dL    Albumin 4.2 3.5 - 5.2 g/dL    Total Bilirubin 0.8 0.1 - 1.0 mg/dL    Alkaline Phosphatase 68 55 - 135 U/L    AST 22 10 - 40 U/L    ALT 20 10 - 44 U/L    eGFR 56.2 (A) >60 mL/min/1.73 m^2    Anion Gap 11 8 - 16 mmol/L   C-Reactive Protein    Collection Time: 08/16/24  3:17 PM   Result Value Ref Range    CRP 0.3 0.0 - 8.2 mg/L   DRVVT    Collection Time: 08/16/24  3:17 PM   Result Value Ref Range    PT Lupus Anticoagulant 12.2 12.0 - 15.5 s    PTT-LA RATIO 0.90 <=1.20    DRVVT Screen 0.80 <=1.20    Anti-Xa Qualitative Interpretation Not Performed Not Present    Thrombin Time Not Performed <=19.5 s    Anticoagulant Medication Neutralization Not Performed Not Performed    PTT Heparin Neutralized Not Performed <=1.20    Neutralized dRVVT Screen Ratio Not Performed <=1.20    DRVVT 1:1 Mix Not Performed <=1.20    dRVVT Confirm Not Performed <=1.20    Hex Phosph Neut Test Not Performed <=7.9 s    Lupus Anticoagulant Interpretation See Note    Hepatitis B surface antigen    Collection Time: 08/16/24  3:17 PM   Result Value Ref Range    Hepatitis B Surface Ag Non-reactive  Non-reactive   HBcAB    Collection Time: 08/16/24  3:17 PM   Result Value Ref Range    Hep B Core Total Ab Non-reactive Non-reactive   Hepatitis B surface antibody    Collection Time: 08/16/24  3:17 PM   Result Value Ref Range    Hep B S Ab 115.91 mIU/mL    Hep B S Ab Reactive    Strongyloides IgG Antibodies    Collection Time: 08/16/24  3:17 PM   Result Value Ref Range    Strongyloides Ab IgG Negative Negative   Quantiferon Gold TB    Collection Time: 08/16/24  3:17 PM   Result Value Ref Range    NIL 0.70239 IU/mL    TB1 - Nil 0.018 IU/mL    TB2 - Nil 0.018 IU/mL    Mitogen - Nil 9.961 IU/mL    TB Gold Plus Negative Negative   Treponema Pallidium Antibodies IgG, IgM    Collection Time: 08/16/24  3:17 PM   Result Value Ref Range    Treponema Pallidum Antibodies (IgG, IgM) Nonreactive Nonreactive   Sedimentation rate    Collection Time: 08/16/24  3:17 PM   Result Value Ref Range    Sed Rate <2 0 - 36 mm/Hr   Aldolase    Collection Time: 08/16/24  3:17 PM   Result Value Ref Range    Aldolase 5.6 1.2 - 7.6 U/L   CK    Collection Time: 08/16/24  3:17 PM   Result Value Ref Range    CPK 66 20 - 180 U/L   MyoMarker Panel 3    Collection Time: 08/16/24  3:17 PM   Result Value Ref Range    Anti-Jennifer-1 Antibody <20 <20 Units    PL-7 Negative Negative    PL-12 Negative Negative    EJ Negative Negative    OJ Negative Negative    SRP Negative Negative    MI-2 Negative Negative    TIF1 GAMMA (P155/140) <20 <20 Units    MDA-5 (P140) (CADM-140) <20 <20 Units    NXP-2 (P140) <20 <20 Units    Anti-PM/Scl Ab <20 <20 Units    Fibrillarin (U3 RNP) Negative Negative    U2 snRNP Negative Negative    Anti-U1-RNP  Ab <20 <20 Units    Ku Negative Negative    Anti-SS-A 52 kD Ab, IgG <20 <20 Units   DNA Titer    Collection Time: 08/16/24  3:17 PM   Result Value Ref Range    DNA Titer 1:20    Direct antiglobulin test    Collection Time: 08/16/24  3:17 PM   Result Value Ref Range    Direct Azam (OLAF) NEG    Protein/Creatinine Ratio, Urine     Collection Time: 08/16/24  3:44 PM   Result Value Ref Range    Protein, Urine Random 21 (H) 0 - 15 mg/dL    Creatinine, Urine 405.0 (H) 15.0 - 325.0 mg/dL    Prot/Creat Ratio, Urine 0.05 0.00 - 0.20   Urinalysis    Collection Time: 08/16/24  3:44 PM   Result Value Ref Range    Specimen UA Urine, Clean Catch     Color, UA Yellow Yellow, Straw, Lissette    Appearance, UA Hazy (A) Clear    pH, UA 6.0 5.0 - 8.0    Specific Gravity, UA >=1.030 (A) 1.005 - 1.030    Protein, UA Negative Negative    Glucose, UA Negative Negative    Ketones, UA Trace (A) Negative    Bilirubin (UA) 1+ (A) Negative    Occult Blood UA Trace (A) Negative    Nitrite, UA Negative Negative    Leukocytes, UA Negative Negative     Assessment/Plan:         1. Essential hypertension    2. Breast cancer screening by mammogram    3. Acute cystitis without hematuria    4. Pain of left thumb    5. Maxillary pain    6. Yeast infection      Essential hypertension  Comments:  not controlled, see new med  Orders:  -     Lipid Panel; Future; Expected date: 10/14/2024  -     Microalbumin/Creatinine Ratio, Urine; Future; Expected date: 10/14/2024  -     losartan (COZAAR) 50 MG tablet; Take 1 tablet (50 mg total) by mouth once daily.  Dispense: 90 tablet; Refill: 1    Breast cancer screening by mammogram  -     Mammo Digital Screening Bilat w/ Kerwin; Future; Expected date: 10/14/2024    Acute cystitis without hematuria  Comments:  new  Orders:  -     Urinalysis, Reflex to Urine Culture Urine, Clean Catch  -     sulfamethoxazole-trimethoprim 800-160mg (BACTRIM DS) 800-160 mg Tab; Take 1 tablet by mouth 2 (two) times daily. for 3 days  Dispense: 6 tablet; Refill: 0    Pain of left thumb  -     X-Ray Hand Complete Left; Future; Expected date: 10/14/2024    Maxillary pain  -     Ambulatory referral/consult to Oral Maxillofacial Surgery; Future; Expected date: 10/21/2024    Yeast infection  -     fluconazole (DIFLUCAN) 150 MG Tab; Take 1 tablet (150 mg total) by mouth  every 72 hours. for 3 doses  Dispense: 3 tablet; Refill: 0      HYPERTENSION:  - Assessed blood pressure medication regimen, noting current dose is low and trend is suboptimal.  - Jaswant to monitor blood pressure regularly.  - Increased blood pressure medication to 50 mg.  - Follow up in 2 weeks for blood pressure check.    CHRONIC PAIN:  - Evaluated chronic pain, noting complex presentation potentially related to lupus and previous leukemia treatment.  - Continued gabapentin for pain management.  UTI; asking for diflucan for expected yeast infection with antibiotic  LUPUS:  - Reviewed history of lupus manifestations, including skin rashes and oral symptoms.  - Explained variability of lupus symptoms among different patients.    DRY MOUTH:  - Considered potential causes of dry mouth, including Sjogren's syndrome, though labs was negative.  -  JOINT PAIN:  - Ordered X-ray of left 1st MCP joint.  ORAL SURGERY REFERRAL:  - Referred to [facility] for oral surgery evaluation.           I have reviewed all of the patient's clinical history available in care everywhere and Epic and have utilized this in my evaluation and management recommendations today.      Treatment options and alternatives were discussed with the patient. Patient was given ample time to ask questions. All questions were answered. Voices understanding and acceptance of this advice. Will call back if any further questions or concerns.         Portions of the record may have been created with voice recognition software. Occasional wrong-word or sound-a-like substitutions may have occurred due to the inherent limitations of voice recognition software. Read the chart carefully and recognize, using context, where substitutions have occurred.      This note was generated with the assistance of ambient listening technology. Verbal consent was obtained by the patient and accompanying visitor(s) for the recording of patient appointment to facilitate this note. I  attest to having reviewed and edited the generated note for accuracy, though some syntax or spelling errors may persist. Please contact the author of this note for any clarification.            Vicky Justin MD  [facility],

## 2024-10-14 NOTE — PATIENT INSTRUCTIONS
DASH Diet   About this topic   DASH stands for Dietary Approaches to Stop Hypertension. The DASH diet may help you lower blood pressure. It may also help keep you from getting high blood pressure. You will eat less fat and more fiber on the DASH diet.  This diet gives you more minerals that fight high blood pressure. Some nutrients in this diet are:  Potassium ? Acts to help you get rid of salt. This may help to lower blood pressure.  Calcium ? Makes blood vessels and muscles work the right way  Magnesium - Helps blood vessels relax  Fiber ? Helps you feel full. It also helps digestion.  What will the results be?   The DASH diet may help you:  Lower your blood pressure and cholesterol  Lower your risk for cancer, heart disease, heart attack, and stroke. It may also lower your risk for heart failure, kidney stones, and diabetes.  Lose weight or keep a healthy weight  What lifestyle changes are needed?   Add regular exercise to get the most help from this diet.  Try to lower stress. Find ways to relax.  Stop smoking. Avoid secondhand smoke.  Limit alcohol intake.  What changes to diet are needed?   Know about poor eating habits. Then, you can fix them as you work with the program.  This diet encourages fruits and vegetables, whole grains, lean meats, healthy fats, and low-fat or fat-free dairy products.  This diet is lower in saturated fats, trans-fats, cholesterol, added sugars, and sodium.  Who should use this diet?   This eating plan is good for the whole family. It is also good for people with high blood pressure and those at risk for high blood pressure.  What foods are good to eat?   Grains: Try to eat 6 to 8 servings of whole grain, high fiber foods each day. These are bread, cereals, brown rice, or pasta.  Fruits and vegetables: Eat 4 to 5 servings each day. Try to pick many kinds and colors. Fresh or frozen are best. Look for low sodium or salt-free if you choose canned.  Dairy: Try to eat 2 to 3 servings of  fat free and low fat milk products each day.  Lean meats, poultry, and seafood: Try to eat 6 servings or less of lean meats, poultry, and seafood each day. Try to choose more low fat or lean meats like chicken and turkey. Eat less red meat. Eat more fish instead.  Nuts, seeds, and legumes (dry beans and peas): Try to eat 4 to 5 servings each week. Try to pick nuts such as almonds and walnuts, sunflower seeds, peanut butter, soy beans, lentils, kidney beans, and split peas.  Fats and oils: Try to eat 2 to 3 servings of fats and oils each day. Eat good fats found in fish, nuts, and avocados. Try using olive oil or vegetable oils such as canola oil. Other good oils to try are corn, safflower, sunflower, or soybean oils. Use low-sodium and low-fat salad dressing and mayonnaise.  Condiments: Pepper, herbs, spices, vinegar, lemon or lime juices are great for seasoning. Be careful to choose low-sodium or salt-free products if you use broths, soups, or soy sauce.  Sweets: Try to eat less than 5 servings each week. Choose low-fat and trans fat-free desserts. These are things like fruit flavored gelatin, sorbet, jelly beans, aron crackers, animal crackers, low-fat fig bars, and good snaps. Eat fruit to satisfy your desire for sweets.     What foods should be limited or avoided?   Grains: Salted breads, rolls, crackers, quick breads, self-rising flours, biscuit mixes, regular bread crumbs Maintain/Continue a heathy lifestyle.  Choose a diet rich in fruits, vegetables, and low-fat dairy products, but low in meats, sweets, and refined grains   Be more active. If you are able, walk for 35 mins 5 times a week.

## 2024-10-18 ENCOUNTER — TELEPHONE (OUTPATIENT)
Dept: HEMATOLOGY/ONCOLOGY | Facility: CLINIC | Age: 49
End: 2024-10-18
Payer: MEDICAID

## 2024-10-18 NOTE — TELEPHONE ENCOUNTER
Pt contacted SW in need of food/transportation to get food. SW provided Lyft to the [employer] so that pt can p/u a 10 lb food bag and 1 case of Boost Breeze. Pt will also call [employer] to get linked to food [place] that are in her zip code. No other needs expressed. SW will remain available.

## 2024-10-22 ENCOUNTER — TELEPHONE (OUTPATIENT)
Dept: PRIMARY CARE CLINIC | Facility: CLINIC | Age: 49
End: 2024-10-22
Payer: MEDICAID

## 2024-10-22 NOTE — TELEPHONE ENCOUNTER
Called to inform pt of labs. Pt voiced understanding.       ----- Message from Vicky Justin MD sent at 10/21/2024  8:35 PM CDT -----  Your xray is normal without any acute findings.

## 2024-10-29 ENCOUNTER — CLINICAL SUPPORT (OUTPATIENT)
Dept: PRIMARY CARE CLINIC | Facility: CLINIC | Age: 49
End: 2024-10-29
Payer: MEDICAID

## 2024-10-29 VITALS — DIASTOLIC BLOOD PRESSURE: 72 MMHG | SYSTOLIC BLOOD PRESSURE: 120 MMHG

## 2024-10-29 DIAGNOSIS — I10 ESSENTIAL HYPERTENSION: Primary | ICD-10-CM

## 2024-10-29 DIAGNOSIS — M79.12 MYALGIA OF AUXILIARY MUSCLES, HEAD AND NECK: ICD-10-CM

## 2024-10-29 DIAGNOSIS — I10 ESSENTIAL HYPERTENSION: ICD-10-CM

## 2024-10-29 PROCEDURE — 99211 OFF/OP EST MAY X REQ PHY/QHP: CPT | Mod: PBBFAC,PN

## 2024-10-29 PROCEDURE — 99999 PR PBB SHADOW E&M-EST. PATIENT-LVL I: CPT | Mod: PBBFAC,,,

## 2024-10-30 ENCOUNTER — TELEPHONE (OUTPATIENT)
Dept: HEMATOLOGY/ONCOLOGY | Facility: CLINIC | Age: 49
End: 2024-10-30
Payer: MEDICAID

## 2024-10-30 RX ORDER — CYCLOBENZAPRINE HCL 10 MG
TABLET ORAL
Qty: 60 TABLET | Refills: 1 | Status: SHIPPED | OUTPATIENT
Start: 2024-10-30

## 2024-10-30 RX ORDER — LOSARTAN POTASSIUM 25 MG/1
25 TABLET ORAL
Qty: 30 TABLET | Refills: 1 | OUTPATIENT
Start: 2024-10-30

## 2024-11-10 ENCOUNTER — HOSPITAL ENCOUNTER (EMERGENCY)
Facility: HOSPITAL | Age: 49
Discharge: HOME | End: 2024-11-10
Attending: EMERGENCY MEDICINE
Payer: MEDICAID

## 2024-11-10 VITALS
TEMPERATURE: 98 F | SYSTOLIC BLOOD PRESSURE: 160 MMHG | HEART RATE: 89 BPM | DIASTOLIC BLOOD PRESSURE: 99 MMHG | BODY MASS INDEX: 41.02 KG/M2 | WEIGHT: 293 LBS | RESPIRATION RATE: 18 BRPM | OXYGEN SATURATION: 97 % | HEIGHT: 71 IN

## 2024-11-10 DIAGNOSIS — L02.91 ABSCESS: Primary | ICD-10-CM

## 2024-11-10 PROCEDURE — 25000003 PHARM REV CODE 250

## 2024-11-10 PROCEDURE — 99284 EMERGENCY DEPT VISIT MOD MDM: CPT

## 2024-11-10 PROCEDURE — 10060 I&D ABSCESS SIMPLE/SINGLE: CPT

## 2024-11-10 PROCEDURE — 63600175 PHARM REV CODE 636 W HCPCS

## 2024-11-10 RX ORDER — LIDOCAINE HYDROCHLORIDE 10 MG/ML
10 INJECTION, SOLUTION EPIDURAL; INFILTRATION; INTRACAUDAL; PERINEURAL
Status: COMPLETED | OUTPATIENT
Start: 2024-11-10 | End: 2024-11-10

## 2024-11-10 RX ORDER — DOXYCYCLINE HYCLATE 100 MG
100 TABLET ORAL EVERY 12 HOURS
Status: DISCONTINUED | OUTPATIENT
Start: 2024-11-10 | End: 2024-11-11 | Stop reason: HOSPADM

## 2024-11-10 RX ORDER — HYDROCODONE BITARTRATE AND ACETAMINOPHEN 5; 325 MG/1; MG/1
1 TABLET ORAL
Status: COMPLETED | OUTPATIENT
Start: 2024-11-10 | End: 2024-11-10

## 2024-11-10 RX ORDER — DOXYCYCLINE 100 MG/1
100 CAPSULE ORAL 2 TIMES DAILY
Qty: 20 CAPSULE | Refills: 0 | Status: SHIPPED | OUTPATIENT
Start: 2024-11-10 | End: 2024-11-20

## 2024-11-10 RX ORDER — HYDROCODONE BITARTRATE AND ACETAMINOPHEN 5; 325 MG/1; MG/1
1 TABLET ORAL EVERY 6 HOURS PRN
Qty: 12 TABLET | Refills: 0 | Status: SHIPPED | OUTPATIENT
Start: 2024-11-10

## 2024-11-10 RX ADMIN — HYDROCODONE BITARTRATE AND ACETAMINOPHEN 1 TABLET: 5; 325 TABLET ORAL at 08:11

## 2024-11-10 RX ADMIN — LIDOCAINE HYDROCHLORIDE 100 MG: 10 SOLUTION INTRAVENOUS at 08:11

## 2024-11-10 RX ADMIN — DOXYCYCLINE HYCLATE 100 MG: 100 TABLET, COATED ORAL at 08:11

## 2024-11-10 NOTE — Clinical Note
"Jaswant Kent" Trey was seen and treated in our emergency department on 11/10/2024.  She may return to work on 11/12/2024.       If you have any questions or concerns, please don't hesitate to call.      Lili Crook PA-C"

## 2024-11-11 NOTE — ED PROVIDER NOTES
Encounter Date: 11/10/2024       History     Chief Complaint   Patient presents with    Abscess     Facial abscess to R side of mouth just below lip w/ redness/swelling. First noticed on the , states that it was small at the time but it has gotten progressively worse.      49 year old patient presenting to the emergency room with an abscess located on the right side of chin. Patient states she initially thought it was a small insect bite; first noticed it on 2024. She states it has significantly enlarged and become more painful over the last two days. Denies history of MRSA; also denies recent fever, headaches, spontaneous drainage, and all other symptoms.     The history is provided by the patient.     Review of patient's allergies indicates:   Allergen Reactions    Fentanyl Rash     Severe full body diffuse rash requiring systemic steroids    Vancomycin analogues Rash     Severe full body diffuse rash requiring systemic steroids    Rituximab Other (See Comments)     Sweating, abdominal pain, elevated blood pressure    Penicillin Hives    Tramadol Hives    Vancomycin Rash     Kidney failure     Past Medical History:   Diagnosis Date    Anemia     Anemia     Back pain     Dental infection     GERD (gastroesophageal reflux disease) 2020    Hairy-cell leukemia of spleen     Hypertension     Unspecified chronic bronchitis      Past Surgical History:   Procedure Laterality Date     SECTION, CLASSIC      FLUOROSCOPY N/A 2020    Procedure: Spleenic Bleed;  Surgeon: Hiren Cabrera MD;  Location: [facility] CATH LAB;  Service: General;  Laterality: N/A;    INJECTION OF ANESTHETIC AGENT INTO TISSUE PLANE DEFINED BY TRANSVERSUS ABDOMINIS MUSCLE N/A 2020    Procedure: BLOCK, TRANSVERSUS ABDOMINIS PLANE;  Surgeon: Yumi Ferguson MD;  Location: [facility] OR;  Service: General;  Laterality: N/A;    SPLENECTOMY N/A 2020    Procedure: SPLENECTOMY;  Surgeon: Yumi Ferguson MD;  Location: [facility]  OR;  Service: General;  Laterality: N/A;    THORACENTESIS Left 5/11/2020    Procedure: THORACENTESIS;  Surgeon: Yumi Ferguson MD;  Location: [facility] OR;  Service: General;  Laterality: Left;    TUBAL LIGATION       No family history on file.  Social History     Tobacco Use    Smoking status: Former     Current packs/day: 0.25     Types: Cigarettes     Passive exposure: Never    Smokeless tobacco: Never    Tobacco comments:     no longer smoker   Substance Use Topics    Alcohol use: Not Currently     Comment: occasional    Drug use: No     Review of Systems   Constitutional:  Negative for fever.   HENT:  Negative for sore throat.    Respiratory:  Negative for shortness of breath.    Cardiovascular:  Negative for chest pain.   Gastrointestinal:  Negative for nausea.   Genitourinary:  Negative for dysuria.   Musculoskeletal:  Negative for back pain.   Skin:  Negative for rash.        + abscess   Neurological:  Negative for weakness.   Hematological:  Does not bruise/bleed easily.   All other systems reviewed and are negative.      Physical Exam     Initial Vitals [11/10/24 1941]   BP Pulse Resp Temp SpO2   (!) 160/99 89 14 98 °F (36.7 °C) 97 %      MAP       --         Physical Exam    Nursing note reviewed.  Constitutional: She appears well-developed and well-nourished.  Non-toxic appearance. She does not have a sickly appearance. She does not appear ill. No distress.   HENT:   Head: Normocephalic and atraumatic.   Right Ear: Hearing normal.   Left Ear: Hearing normal.   Nose: Nose normal. Mouth/Throat: Uvula is midline and oropharynx is clear and moist.   Eyes: Conjunctivae, EOM and lids are normal. Pupils are equal, round, and reactive to light.   Neck: Trachea normal. Neck supple.   Normal range of motion.   Full passive range of motion without pain.     Cardiovascular:  Normal rate, regular rhythm, normal heart sounds, intact distal pulses and normal pulses.           Pulmonary/Chest: Effort normal and breath  sounds normal.   Abdominal: Abdomen is soft. Bowel sounds are normal. There is no abdominal tenderness. There is no rebound and no guarding.   Musculoskeletal:         General: Normal range of motion.      Cervical back: Normal, full passive range of motion without pain, normal range of motion and neck supple.     Neurological: She is alert and oriented to person, place, and time. She has normal strength and normal reflexes. No cranial nerve deficit or sensory deficit. GCS eye subscore is 4. GCS verbal subscore is 5. GCS motor subscore is 6.   Skin: Skin is warm and dry. Abscess noted.   4 cm area of induration with fluctuance and TTP; abscess located on the right side of chin.    Psychiatric: She has a normal mood and affect. Her behavior is normal. Thought content normal.         ED Course   I & D - Incision and Drainage    Date/Time: 11/10/2024 9:03 PM  Location procedure was performed: [facility] EMERGENCY DEPARTMENT    Performed by: Lili Crook PA-C  Authorized by: Hiren Armando MD  Consent Done: Yes  Consent: Verbal consent obtained.  Risks and benefits: risks, benefits and alternatives were discussed  Consent given by: patient  Type: abscess  Body area: head/neck  Location details: face  Anesthesia: local infiltration    Anesthesia:  Local Anesthetic: lidocaine 1% without epinephrine  Anesthetic total: 2 mL  Scalpel size: 11  Incision type: single straight  Incision depth: dermal  Complexity: simple  Drainage: bloody and purulent  Drainage amount: scant  Wound treatment: incision, drainage, expression of material and wound packed  Packing material: 1/4 in gauze  Complications: No  Specimens: No    Incision depth: dermal        Labs Reviewed - No data to display       Imaging Results    None          Medications   LIDOcaine (PF) 10 mg/ml (1%) injection 100 mg (has no administration in time range)   doxycycline tablet 100 mg (has no administration in time range)   HYDROcodone-acetaminophen 5-325 mg per tablet  1 tablet (1 tablet Oral Given 11/10/24 2025)     Medical Decision Making  Amount and/or Complexity of Data Reviewed  Discussion of management or test interpretation with external provider(s): I&D performed; patient tolerated procedure well. Wound packed; patient instructed to remove packing in two days. Patient prescribed Norco for pain and was placed on doxycycline; first dose of doxycycline given in ED. Patient is to follow up with primary care; states she has an appointment on 11/15/2024. Instructed to return to ED if she experiences fever, increased pain, trismus, or headaches.     Risk  Prescription drug management.                                      Clinical Impression:  Final diagnoses:  [L02.91] Abscess (Primary)          ED Disposition Condition    Discharge Stable          ED Prescriptions       Medication Sig Dispense Start Date End Date Auth. Provider    doxycycline (VIBRAMYCIN) 100 MG Cap Take 1 capsule (100 mg total) by mouth 2 (two) times daily. for 10 days 20 capsule 11/10/2024 11/20/2024 Lili Crook PA-C    HYDROcodone-acetaminophen (NORCO) 5-325 mg per tablet Take 1 tablet by mouth every 6 (six) hours as needed for Pain. 12 tablet 11/10/2024 -- Lili Crook PA-C          Follow-up Information       Follow up With Specialties Details Why Contact Info    Vicky Justin MD Family Medicine In 1 week  [address]  [address]  [facility] [address]  [phone]               Lili Crook PA-C  11/10/24 8134

## 2024-11-11 NOTE — DISCHARGE INSTRUCTIONS
You may remove packing in 2 days. Follow up with PCP this week. If you experience fever, increased pain, redness, or swelling, or difficulty opening your mouth, return to emergency department.

## 2024-11-14 ENCOUNTER — DOCUMENTATION ONLY (OUTPATIENT)
Dept: HEMATOLOGY/ONCOLOGY | Facility: CLINIC | Age: 49
End: 2024-11-14
Payer: MEDICAID

## 2024-11-14 NOTE — PROGRESS NOTES
Lyft ride scheduled per pt request. Pt will need to call SW dept for return ride home. SW will remain available.     scheduled on 11/15, 09:30 AM CST   will arrive around 09:30 AM CST.  Edit ride  Schedule return  Cancel ride  Leonardo    Full name  Jaswant Yang  Mobile phone number  [phone]  Ride type  Lyft (4 seats)  Route    Pickup  [address], [place], [facility]  Drop-off  [address], [place], [facility]  Addresses may vary from the original request due to minor pickup and drop-off changes. For example, an address may vary if a rider is picked up across the street.      Note to   [address]. Located around back of building close to front office.  Tip added  $0.00  Request details    Coordinator name  Renee Nicolas  Date and time  11/14/24, 10:07 AM CST  Scheduled ride ID  [id number]  Internal note  No note added

## 2024-11-15 ENCOUNTER — OFFICE VISIT (OUTPATIENT)
Dept: PRIMARY CARE CLINIC | Facility: CLINIC | Age: 49
End: 2024-11-15
Payer: MEDICAID

## 2024-11-15 VITALS
HEIGHT: 71 IN | DIASTOLIC BLOOD PRESSURE: 88 MMHG | TEMPERATURE: 98 F | BODY MASS INDEX: 41.02 KG/M2 | OXYGEN SATURATION: 99 % | HEART RATE: 91 BPM | SYSTOLIC BLOOD PRESSURE: 130 MMHG | WEIGHT: 293 LBS

## 2024-11-15 DIAGNOSIS — G89.4 PAIN SYNDROME, CHRONIC: ICD-10-CM

## 2024-11-15 DIAGNOSIS — Z01.419 WELL WOMAN EXAM: ICD-10-CM

## 2024-11-15 DIAGNOSIS — K08.9 EXTRACTION OF TOOTH NEEDED: ICD-10-CM

## 2024-11-15 DIAGNOSIS — Z12.11 COLON CANCER SCREENING: ICD-10-CM

## 2024-11-15 DIAGNOSIS — I10 ESSENTIAL HYPERTENSION: Chronic | ICD-10-CM

## 2024-11-15 DIAGNOSIS — M54.16 BILATERAL LUMBAR RADICULOPATHY: Primary | ICD-10-CM

## 2024-11-15 PROCEDURE — 99999 PR PBB SHADOW E&M-EST. PATIENT-LVL V: CPT | Mod: PBBFAC,,, | Performed by: FAMILY MEDICINE

## 2024-11-15 PROCEDURE — 99215 OFFICE O/P EST HI 40 MIN: CPT | Mod: PBBFAC,PN | Performed by: FAMILY MEDICINE

## 2024-11-15 RX ORDER — GABAPENTIN 600 MG/1
600 TABLET ORAL NIGHTLY
Qty: 30 TABLET | Refills: 2 | Status: SHIPPED | OUTPATIENT
Start: 2024-11-15

## 2024-11-15 NOTE — PROGRESS NOTES
Subjective:      Chief Complaint   Patient presents with    Hypertension     Abscess on right side cheek/ lanced 11/10/24 taking antibiotics still painful      Patient ID: Jaswant Yang is a 49 y.o. female.  History of Present Illness      Jaswant presents for follow-up on multiple health issues, including dental issues, chronic pain, and medication refills.    HPI:  Jaswant reports ongoing dental issues. She describes oral rigidity and pain on opening. Abscess to the right cheek is resolving, forming a dry eschar. 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    Jaswant has severe chronic pain in her back, hips, and knees, occasionally limiting her ability to rise from seated positions or stand for extended periods. She reports bilateral radiating pain from her back to her legs. The severity of her pain has necessitated multiple emergency room visits for pain management.    Jaswant is managing lupus, primarily affecting her skin with intermittent rashes treated topically. No organ involvement is reported.    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 She is currently in remission but reports persistent alopecia, which she attributes to chemotherapy sequelae.    Jaswant takes gabapentin 300mg, 2 capsules at night (600mg total) for pain management and sleep aid. She reports the medication effectively mitigates pain, facilitating sleep.        MEDICATIONS:  Jaswant is on Gabapentin 300 mg, taking 2 capsules at night (600 mg total) for leg pain and to help with sleep.     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 Jaswant has received a flu vaccine.    SURGICAL HISTORY:  Jaswant underwent a splenectomy in 2020, which was indicated for her leukemia diagnosis.    TEST RESULTS:  She undergoes routine urine drug screens every 3 months for controlled substance monitoring.    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 She is currently seeking employment in one-on-one patient care due to health limitations.      ROS:  General: -fever, -chills, -fatigue, -weight gain, -weight loss  Eyes: -vision changes, -redness, -discharge  ENT: -ear pain, -nasal congestion, -sore throat, +dry mouth  Cardiovascular: -chest pain, -palpitations, -lower extremity edema  Respiratory: -cough, -shortness of breath  Gastrointestinal: -abdominal pain, -nausea, -vomiting, -diarrhea, -constipation, -blood in stool  Genitourinary: -dysuria, -hematuria, -frequency  Musculoskeletal: -joint pain, -muscle pain  Skin: +rash, -lesion  Neurological: -headache, -dizziness, -numbness, -tingling  Psychiatric: -anxiety, -depression, -sleep difficulty       Jaswant Yang's allergies, medications, history, and problem list were updated as appropriate.  Past Medical History:   Diagnosis Date    Anemia     Anemia     Back pain     Dental infection     GERD (gastroesophageal reflux disease) 05/14/2020    Hairy-cell leukemia of spleen     Hypertension     Unspecified chronic bronchitis      No family history on file.  Social History     Socioeconomic History    Marital status: Single   Tobacco Use    Smoking status: Former     Current packs/day: 0.25     Types: Cigarettes     Passive exposure: Never    Smokeless tobacco: Never    Tobacco comments:     no longer smoker   Substance and Sexual Activity    Alcohol use: Not Currently     Comment: occasional    Drug use: No    Sexual activity: Yes     Birth control/protection: Condom   Social History Narrative    Lives in a single family home with her fiance and her daughter.     Her mother and her son are her SDM's. ACP Documents are available in her chart.      Social Drivers of Health     Financial Resource Strain: High Risk (2/14/2024)    Overall Financial Resource Strain (CARDIA)     Difficulty of Paying Living Expenses: Very hard   Food Insecurity: Food  Insecurity Present (2/14/2024)    Hunger Vital Sign     Worried About Running Out of Food in the Last Year: Often true     Ran Out of Food in the Last Year: Often true   Transportation Needs: Unmet Transportation Needs (2/14/2024)    PRAPARE - Transportation     Lack of Transportation (Medical): Yes     Lack of Transportation (Non-Medical): Yes   Physical Activity: Insufficiently Active (2/14/2024)    Exercise Vital Sign     Days of Exercise per Week: 4 days     Minutes of Exercise per Session: 30 min   Stress: Stress Concern Present (2/14/2024)    [facility] of Occupational Health - Occupational Stress Questionnaire     Feeling of Stress : Very much   Housing Stability: High Risk (2/14/2024)    Housing Stability Vital Sign     Unable to Pay for Housing in the Last Year: Yes     Number of Places Lived in the Last Year: 3     Unstable Housing in the Last Year: Yes     Outpatient Encounter Medications as of 11/15/2024   Medication Sig Dispense Refill    albuterol (PROVENTIL/VENTOLIN HFA) 90 mcg/actuation inhaler Inhale 1-2 puffs into the lungs every 6 (six) hours as needed for Wheezing. Rescue 1 Inhaler 0    ALPRAZolam (XANAX) 0.5 MG tablet TAKE 1/2 TO 1 TABLET BY MOUTH TWICE DAILY AS NEEDED FOR ANXIETY. 30 tablet 5    chlorhexidine (PERIDEX) 0.12 % solution SMARTSIG:By Mouth      clotrimazole-betamethasone 1-0.05% (LOTRISONE) cream Apply topically 2 (two) times daily. 45 g 2    cyclobenzaprine (FLEXERIL) 10 MG tablet TAKE 1/2 TO 1 TABLET BY MOUTH TWICE DAILY AS NEEDED FOR MUSCLE SPASMS. MAY CAUSE DROWSINESS 60 tablet 1    doxycycline (VIBRAMYCIN) 100 MG Cap Take 1 capsule (100 mg total) by mouth 2 (two) times daily. for 10 days 20 capsule 0    HYDROcodone-acetaminophen (NORCO) 5-325 mg per tablet Take 1 tablet by mouth every 4 (four) hours as needed for Pain. 5 tablet 0    HYDROcodone-acetaminophen (NORCO) 5-325 mg per tablet Take 1 tablet by mouth every 6 (six) hours as needed for Pain. 12 tablet 0     ketoconazole (NIZORAL) 2 % cream AAA bid. 60 g 3    losartan (COZAAR) 50 MG tablet Take 1 tablet (50 mg total) by mouth once daily. 90 tablet 1    mupirocin (BACTROBAN) 2 % ointment Apply topically 3 (three) times daily. 22 g 0    pimecrolimus (ELIDEL) 1 % cream Apply topically 2 (two) times daily. As needed for rash on face 30 g 2    promethazine (PHENERGAN) 25 MG tablet Take 1 tablet (25 mg total) by mouth every 6 (six) hours as needed for Nausea. 15 tablet 0    triamcinolone acetonide 0.025% (KENALOG) 0.025 % cream Apply topically 2 (two) times daily. For only up to 1 week per course on face as needed for rash, for up to 2-4 weeks per course for rash on body 454 g 1    [DISCONTINUED] gabapentin (NEURONTIN) 300 MG capsule Take 2 capsules (600 mg total) by mouth every evening. 60 capsule 1    betamethasone dipropionate (DIPROLENE) 0.05 % lotion Apply topically 2 (two) times daily. (Patient not taking: Reported on 10/14/2024) 60 mL 1    cetirizine (ZYRTEC) 10 MG tablet Take 10 mg by mouth daily as needed. (Patient not taking: Reported on 11/15/2024)      [] fluconazole (DIFLUCAN) 150 MG Tab Take 1 tablet (150 mg total) by mouth every 72 hours. for 3 doses 3 tablet 0    gabapentin (NEURONTIN) 600 MG tablet Take 1 tablet (600 mg total) by mouth every evening. 30 tablet 2    hydrOXYzine HCL (ATARAX) 25 MG tablet TAKE 1 TABLET(25 MG) BY MOUTH THREE TIMES DAILY AS NEEDED FOR ITCHING (Patient not taking: Reported on 11/15/2024) 30 tablet 2    [] sulfamethoxazole-trimethoprim 800-160mg (BACTRIM DS) 800-160 mg Tab Take 1 tablet by mouth 2 (two) times daily. for 3 days 6 tablet 0    [DISCONTINUED] cyclobenzaprine (FLEXERIL) 10 MG tablet TAKE 1/2 TO 1 TABLET BY MOUTH TWICE DAILY AS NEEDED FOR MUSCLE SPASMS. MAY CAUSE DROWSINESS 60 tablet 1    [DISCONTINUED] doxycycline tablet 100 mg        No facility-administered encounter medications on file as of 11/15/2024.          Objective:      /88   Pulse 91    "Temp 97.7 °F (36.5 °C)   Ht 5' 11" (1.803 m)   Wt (!) 176.9 kg (390 lb)   SpO2 99%   BMI 54.39 kg/m²   Physical Exam  Vitals and nursing note reviewed.   Constitutional:       General: She is not in acute distress.  HENT:      Head: Normocephalic and atraumatic.   Cardiovascular:      Rate and Rhythm: Normal rate and regular rhythm.      Pulses: Normal pulses.      Heart sounds: No murmur heard.  Pulmonary:      Effort: Pulmonary effort is normal. No respiratory distress.      Breath sounds: Normal breath sounds. No wheezing or rhonchi.   Musculoskeletal:         General: No swelling or deformity.      Right lower leg: No edema.      Left lower leg: No edema.   Neurological:      General: No focal deficit present.      Mental Status: She is alert.      Cranial Nerves: No cranial nerve deficit.   Psychiatric:         Behavior: Behavior normal.         Thought Content: Thought content normal.                     Results for orders placed or performed in visit on 10/14/24   Lipid Panel    Collection Time: 10/14/24 10:41 AM   Result Value Ref Range    Cholesterol 215 (H) 120 - 199 mg/dL    Triglycerides 191 (H) 30 - 150 mg/dL    HDL 57 40 - 75 mg/dL    LDL Cholesterol 119.8 63.0 - 159.0 mg/dL    HDL/Cholesterol Ratio 26.5 20.0 - 50.0 %    Total Cholesterol/HDL Ratio 3.8 2.0 - 5.0    Non-HDL Cholesterol 158 mg/dL     Assessment/Plan:         1. Bilateral lumbar radiculopathy    2. Well woman exam    3. Essential hypertension    4. Extraction of tooth needed    5. Pain syndrome, chronic    6. Colon cancer screening      Bilateral lumbar radiculopathy  -     gabapentin (NEURONTIN) 600 MG tablet; Take 1 tablet (600 mg total) by mouth every evening.  Dispense: 30 tablet; Refill: 2    Well woman exam  -     Ambulatory referral/consult to Gynecology; Future; Expected date: 11/22/2024    Essential hypertension  Comments:  not controlled, see new med    Extraction of tooth needed  -     Ambulatory referral/consult to Oral " Maxillofacial Surgery; Future; Expected date: 11/22/2024    Pain syndrome, chronic  -     Toxicology Screen, Urine; Future; Expected date: 11/15/2024    Colon cancer screening  -     Cologuard Screening (Multitarget Stool DNA); Future; Expected date: 11/15/2024       DENTAL ISSUES:She reports that extraction of the teeth has been recommended in the past.  Evaluated cheek abscess, noting it was still hard but had formed a dry scab.  Discussed the need for oral surgery due to dental issues and lack of bone support.  Resubmitted referral for oral surgeon, this time to [facility].  Contact office within 1 week if no response received regarding oral surgery referral.    CHRONIC PAIN (BACK AND LEGS):  Evaluated chronic pain issues, particularly in back and legs.  Assessed need for pain management referral due to ongoing severe pain affecting daily activities.  Considered alternative pain management options, including back injections.  Referred to pain management for potential back injections.    MEDICATION MANAGEMENT:  Reviewed medication history, including gabapentin use and its controlled substance status.  Educated patient on gabapentin being a controlled substance and the associated protocols.  Jaswant to  gabapentin refill from [facility] pharmacy.  Changed gabapentin to 600 mg tablets, 1 tablet at night, with 2 refills provided.  Urine drug screen ordered (to be completed at next visit).    LEUKEMIA AND LUPUS:  Considered complex medical history, including leukemia, lupus, and dental issues.    CANCER SCREENING:  Explained the Cologuard test process for colon cancer screening.  Jaswant to complete Cologuard test for colon cancer screening, avoiding Friday for sample collection.  Cologuard test ordered for colon cancer screening.    GENERAL HEALTH ASSESSMENT:  Assessed blood pressure, noting it was good today.    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    FOLLOW-UP:  Follow up in 3 months, can be virtual.  Follow up for Pap smear.            I have reviewed all of the patient's clinical history available in care everywhere and Epic and have utilized this in my evaluation and management recommendations today.      Treatment options and alternatives were discussed with the patient. Patient was given ample time to ask questions. All questions were answered. Voices understanding and acceptance of this advice. Will call back if any further questions or concerns.     Portions of the record may have been created with voice recognition software. Occasional wrong-word or sound-a-like substitutions may have occurred due to the inherent limitations of voice recognition software. Read the chart carefully and recognize, using context, where substitutions have occurred.      This note was generated with the assistance of ambient listening technology. Verbal consent was obtained by the patient and accompanying visitor(s) for the recording of patient appointment to facilitate this note. I attest to having reviewed and edited the generated note for accuracy, though some syntax or spelling errors may persist. Please contact the author of this note for any clarification.            Vicky Justin MD  [facility],

## 2025-01-31 ENCOUNTER — TELEPHONE (OUTPATIENT)
Dept: HEMATOLOGY/ONCOLOGY | Facility: CLINIC | Age: 50
End: 2025-01-31
Payer: MEDICAID

## 2025-01-31 NOTE — TELEPHONE ENCOUNTER
SW received pt's call. Pt stated that she needs financial assistance to pay for hotel stay. Pt stated that she has been living in an extended day hotel and working odd jobs to pay for it, but her check will not come until Monday due to the the 1st falling on a Monday. SW advised pt to call the [address] [place] [address] and [facility] to see if they have any available resources, as well as call employer to see if they can give her a letter stating when she will receive check, as SW dept does not have immediate funds. SW will remain available.

## 2025-02-19 ENCOUNTER — TELEPHONE (OUTPATIENT)
Dept: HEMATOLOGY/ONCOLOGY | Facility: CLINIC | Age: 50
End: 2025-02-19
Payer: MEDICAID

## 2025-02-19 NOTE — TELEPHONE ENCOUNTER
----- Message from [employer] [place] sent at 2/19/2025 10:41 AM CST -----  Contact: self  Type:  Sooner Apoointment RequestCaller is requesting a sooner appointment.  Caller declined first available appointment listed below.  Caller will not accept being placed on the waitlist and is requesting a message be sent to doctor.Name of Caller:Jaswant Ibrahim is the first available appointment?not generatedSymptoms:follow upWould the patient rather a call back or a response via [facility]? Call [facility] Call Back Number:[phone] Information: the patient would like a call to reschedule the appointment she missed

## 2025-02-19 NOTE — TELEPHONE ENCOUNTER
SW received pt's call. Pt calling to update phone number to [phone] and to reschedule missed appt's. Pt stated that she is still at an extended hotel but the phone was not working there. SW updated phone number, and transferred pt to appt line for rescheduling. No other needs expressed. SW will remain available.

## 2025-03-13 ENCOUNTER — PATIENT MESSAGE (OUTPATIENT)
Dept: RHEUMATOLOGY | Facility: CLINIC | Age: 50
End: 2025-03-13
Payer: MEDICAID

## 2025-03-18 ENCOUNTER — TELEPHONE (OUTPATIENT)
Dept: HEMATOLOGY/ONCOLOGY | Facility: CLINIC | Age: 50
End: 2025-03-18
Payer: MEDICAID

## 2025-03-18 NOTE — TELEPHONE ENCOUNTER
SW returned pt's vm call. Pt needed to confirm appt date/times and to inquire about transportation and LLS urgent need mitzy. Pt informed of appt date/times on 3/20/25 and that SW dept is currently out of funds for Lyft at this time. Pt will see if her son can transport her. Pt is not eligible to re-apply for LLS urgent need until after 7/9/25 if mitzy is open. Pt stated that she was able to find a job and has been working for 1-2 weeks now. No other needs expressed. SW will remain available.

## 2025-03-20 ENCOUNTER — LAB VISIT (OUTPATIENT)
Dept: LAB | Facility: HOSPITAL | Age: 50
End: 2025-03-20
Attending: INTERNAL MEDICINE
Payer: MEDICAID

## 2025-03-20 ENCOUNTER — OFFICE VISIT (OUTPATIENT)
Dept: HEMATOLOGY/ONCOLOGY | Facility: CLINIC | Age: 50
End: 2025-03-20
Payer: MEDICAID

## 2025-03-20 VITALS
SYSTOLIC BLOOD PRESSURE: 153 MMHG | DIASTOLIC BLOOD PRESSURE: 101 MMHG | TEMPERATURE: 97 F | BODY MASS INDEX: 41.02 KG/M2 | WEIGHT: 293 LBS | HEIGHT: 71 IN | OXYGEN SATURATION: 95 % | HEART RATE: 89 BPM

## 2025-03-20 DIAGNOSIS — Z90.81 S/P SPLENECTOMY: ICD-10-CM

## 2025-03-20 DIAGNOSIS — C91.40 HAIRY CELL LEUKEMIA OF SPLEEN: ICD-10-CM

## 2025-03-20 DIAGNOSIS — J01.00 ACUTE NON-RECURRENT MAXILLARY SINUSITIS: ICD-10-CM

## 2025-03-20 DIAGNOSIS — J32.9 SINUSITIS, UNSPECIFIED CHRONICITY, UNSPECIFIED LOCATION: ICD-10-CM

## 2025-03-20 DIAGNOSIS — C91.40 HAIRY CELL LEUKEMIA OF SPLEEN: Primary | ICD-10-CM

## 2025-03-20 LAB
ALBUMIN SERPL BCP-MCNC: 4.2 G/DL (ref 3.5–5.2)
ALP SERPL-CCNC: 60 U/L (ref 40–150)
ALT SERPL W/O P-5'-P-CCNC: 21 U/L (ref 10–44)
ANION GAP SERPL CALC-SCNC: 7 MMOL/L (ref 8–16)
AST SERPL-CCNC: 19 U/L (ref 10–40)
BASOPHILS # BLD AUTO: 0.03 K/UL (ref 0–0.2)
BASOPHILS NFR BLD: 0.4 % (ref 0–1.9)
BILIRUB SERPL-MCNC: 1.2 MG/DL (ref 0.1–1)
BUN SERPL-MCNC: 18 MG/DL (ref 6–20)
CALCIUM SERPL-MCNC: 8.8 MG/DL (ref 8.7–10.5)
CHLORIDE SERPL-SCNC: 105 MMOL/L (ref 95–110)
CO2 SERPL-SCNC: 24 MMOL/L (ref 23–29)
CREAT SERPL-MCNC: 1.1 MG/DL (ref 0.5–1.4)
CRP SERPL-MCNC: 0.3 MG/L (ref 0–8.2)
DIFFERENTIAL METHOD BLD: ABNORMAL
EOSINOPHIL # BLD AUTO: 1.2 K/UL (ref 0–0.5)
EOSINOPHIL NFR BLD: 17 % (ref 0–8)
ERYTHROCYTE [DISTWIDTH] IN BLOOD BY AUTOMATED COUNT: 15.9 % (ref 11.5–14.5)
EST. GFR  (NO RACE VARIABLE): >60 ML/MIN/1.73 M^2
GLUCOSE SERPL-MCNC: 118 MG/DL (ref 70–110)
HCT VFR BLD AUTO: 44 % (ref 37–48.5)
HGB BLD-MCNC: 14.9 G/DL (ref 12–16)
IMM GRANULOCYTES # BLD AUTO: 0.01 K/UL (ref 0–0.04)
IMM GRANULOCYTES NFR BLD AUTO: 0.1 % (ref 0–0.5)
INFLUENZA A, MOLECULAR: NEGATIVE
INFLUENZA B, MOLECULAR: NEGATIVE
LDH SERPL L TO P-CCNC: 223 U/L (ref 110–260)
LYMPHOCYTES # BLD AUTO: 2.6 K/UL (ref 1–4.8)
LYMPHOCYTES NFR BLD: 35.8 % (ref 18–48)
MCH RBC QN AUTO: 28.5 PG (ref 27–31)
MCHC RBC AUTO-ENTMCNC: 33.9 G/DL (ref 32–36)
MCV RBC AUTO: 84 FL (ref 82–98)
MONOCYTES # BLD AUTO: 0.7 K/UL (ref 0.3–1)
MONOCYTES NFR BLD: 9.4 % (ref 4–15)
NEUTROPHILS # BLD AUTO: 2.7 K/UL (ref 1.8–7.7)
NEUTROPHILS NFR BLD: 37.3 % (ref 38–73)
NRBC BLD-RTO: 0 /100 WBC
PLATELET # BLD AUTO: 318 K/UL (ref 150–450)
PMV BLD AUTO: 9.6 FL (ref 9.2–12.9)
POTASSIUM SERPL-SCNC: 4.2 MMOL/L (ref 3.5–5.1)
PROT SERPL-MCNC: 7.2 G/DL (ref 6–8.4)
RBC # BLD AUTO: 5.22 M/UL (ref 4–5.4)
SARS-COV-2 RDRP RESP QL NAA+PROBE: NEGATIVE
SODIUM SERPL-SCNC: 136 MMOL/L (ref 136–145)
SPECIMEN SOURCE: NORMAL
WBC # BLD AUTO: 7.12 K/UL (ref 3.9–12.7)

## 2025-03-20 PROCEDURE — 36415 COLL VENOUS BLD VENIPUNCTURE: CPT | Performed by: INTERNAL MEDICINE

## 2025-03-20 PROCEDURE — 87635 SARS-COV-2 COVID-19 AMP PRB: CPT | Performed by: INTERNAL MEDICINE

## 2025-03-20 PROCEDURE — 99999 PR PBB SHADOW E&M-EST. PATIENT-LVL IV: CPT | Mod: PBBFAC,,, | Performed by: INTERNAL MEDICINE

## 2025-03-20 PROCEDURE — 86140 C-REACTIVE PROTEIN: CPT | Performed by: INTERNAL MEDICINE

## 2025-03-20 PROCEDURE — 87502 INFLUENZA DNA AMP PROBE: CPT | Performed by: INTERNAL MEDICINE

## 2025-03-20 PROCEDURE — 83615 LACTATE (LD) (LDH) ENZYME: CPT | Performed by: INTERNAL MEDICINE

## 2025-03-20 PROCEDURE — 99214 OFFICE O/P EST MOD 30 MIN: CPT | Mod: PBBFAC | Performed by: INTERNAL MEDICINE

## 2025-03-20 PROCEDURE — 85025 COMPLETE CBC W/AUTO DIFF WBC: CPT | Performed by: INTERNAL MEDICINE

## 2025-03-20 PROCEDURE — 80053 COMPREHEN METABOLIC PANEL: CPT | Performed by: INTERNAL MEDICINE

## 2025-03-20 RX ORDER — AMOXICILLIN AND CLAVULANATE POTASSIUM 500; 125 MG/1; MG/1
1 TABLET, FILM COATED ORAL 2 TIMES DAILY
Qty: 20 TABLET | Refills: 0 | Status: SHIPPED | OUTPATIENT
Start: 2025-03-20 | End: 2025-03-30

## 2025-03-20 NOTE — PROGRESS NOTES
Subjective:       Patient ID: Jaswant Yang is a 49 y.o. female.    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  Patient denies any fevers chills night sweats in her any cough.  Does have several teeth that need to be extracted upper plate.    Past Medical History:   Diagnosis Date    Anemia     Anemia     Back pain     Dental infection     GERD (gastroesophageal reflux disease) 2020    Hairy-cell leukemia of spleen     Hypertension     Unspecified chronic bronchitis      No family history on file.  Social History[1]  Past Surgical History:   Procedure Laterality Date     SECTION, CLASSIC      FLUOROSCOPY N/A 2020    Procedure: Spleenic Bleed;  Surgeon: Hiren Cabrera MD;  Location: [facility] CATH LAB;  Service: General;  Laterality: N/A;    INJECTION OF ANESTHETIC AGENT INTO TISSUE PLANE DEFINED BY TRANSVERSUS ABDOMINIS MUSCLE N/A 2020    Procedure: BLOCK, TRANSVERSUS ABDOMINIS PLANE;  Surgeon: Yumi Ferguson MD;  Location: [facility] OR;  Service: General;  Laterality: N/A;    SPLENECTOMY N/A 2020    Procedure: SPLENECTOMY;  Surgeon: Yumi Ferguson MD;  Location: [facility] OR;  Service: General;  Laterality: N/A;    THORACENTESIS Left 2020    Procedure: THORACENTESIS;  Surgeon: Yumi Ferguson MD;  Location: [facility] OR;  Service: General;  Laterality: Left;    TUBAL LIGATION         Labs:  Lab Results   Component Value Date    WBC 7.12 2025    HGB 14.9 2025    HCT 44.0 2025    MCV 84 2025     2025     BMP  Lab Results   Component Value Date     2025    K 4.2 2025     2025    CO2 24 2025    BUN 18 2025    CREATININE 1.1 2025    CALCIUM 8.8 2025    ANIONGAP 7 (L) 2025    ESTGFRAFRICA >60 2022    EGFRNONAA 55 (A) 2022     Lab Results   Component Value  "Date    ALT 21 03/20/2025    AST 19 03/20/2025    ALKPHOS 60 03/20/2025    BILITOT 1.2 (H) 03/20/2025       Lab Results   Component Value Date    IRON 84 07/18/2024    TIBC 377 07/18/2024    FERRITIN 51 07/18/2024     No results found for: "YWNEWLCJ88"  No results found for: "FOLATE"  Lab Results   Component Value Date    TSH 3.010 10/18/2023         Review of Systems   Constitutional:  Positive for fatigue. Negative for activity change, appetite change, chills, diaphoresis, fever and unexpected weight change.   HENT:  Positive for dental problem, rhinorrhea, sinus pressure, sinus pain and sneezing. Negative for congestion, drooling, ear discharge, ear pain, facial swelling, hearing loss, mouth sores, nosebleeds, postnasal drip, sore throat, tinnitus, trouble swallowing and voice change.    Eyes:  Negative for photophobia, pain, discharge, redness, itching and visual disturbance.   Respiratory:  Negative for cough, choking, chest tightness, shortness of breath, wheezing and stridor.    Cardiovascular:  Negative for chest pain, palpitations and leg swelling.   Gastrointestinal:  Negative for abdominal distention, abdominal pain, anal bleeding, blood in stool, constipation, diarrhea, nausea, rectal pain and vomiting.   Endocrine: Negative for cold intolerance, heat intolerance, polydipsia, polyphagia and polyuria.   Genitourinary:  Negative for decreased urine volume, difficulty urinating, dyspareunia, dysuria, enuresis, flank pain, frequency, genital sores, hematuria, menstrual problem, pelvic pain, urgency, vaginal bleeding, vaginal discharge and vaginal pain.   Musculoskeletal:  Negative for arthralgias, back pain, gait problem, joint swelling, myalgias, neck pain and neck stiffness.   Skin:  Negative for color change, pallor and rash.   Allergic/Immunologic: Negative for environmental allergies, food allergies and immunocompromised state.   Neurological:  Positive for weakness. Negative for dizziness, tremors, " seizures, syncope, facial asymmetry, speech difficulty, light-headedness, numbness and headaches.   Hematological:  Negative for adenopathy. Does not bruise/bleed easily.   Psychiatric/Behavioral:  Negative for agitation, behavioral problems, confusion, decreased concentration, dysphoric mood, hallucinations, self-injury, sleep disturbance and suicidal ideas. The patient is not nervous/anxious and is not hyperactive.        Objective:      Physical Exam  Vitals reviewed.   Constitutional:       General: She is not in acute distress.     Appearance: She is well-developed. She is obese. She is not diaphoretic.   HENT:      Head: Normocephalic and atraumatic.      Right Ear: External ear normal.      Left Ear: External ear normal.      Nose: Nose normal.      Right Sinus: No maxillary sinus tenderness or frontal sinus tenderness.      Left Sinus: No maxillary sinus tenderness or frontal sinus tenderness.      Mouth/Throat:      Pharynx: No oropharyngeal exudate.   Eyes:      General: Lids are normal. No scleral icterus.        Right eye: No discharge.         Left eye: No discharge.      Conjunctiva/sclera: Conjunctivae normal.      Right eye: Right conjunctiva is not injected. No hemorrhage.     Left eye: Left conjunctiva is not injected. No hemorrhage.     Pupils: Pupils are equal, round, and reactive to light.   Neck:      Thyroid: No thyromegaly.      Vascular: No JVD.      Trachea: No tracheal deviation.   Cardiovascular:      Rate and Rhythm: Normal rate.   Pulmonary:      Effort: Pulmonary effort is normal. No respiratory distress.      Breath sounds: No stridor.   Chest:      Chest wall: No tenderness.   Abdominal:      General: Bowel sounds are normal. There is no distension.      Palpations: Abdomen is soft. There is no hepatomegaly, splenomegaly or mass.      Tenderness: There is no abdominal tenderness. There is no rebound.   Musculoskeletal:         General: No tenderness. Normal range of motion.       Cervical back: Normal range of motion and neck supple.   Lymphadenopathy:      Cervical: No cervical adenopathy.      Upper Body:      Right upper body: No supraclavicular adenopathy.      Left upper body: No supraclavicular adenopathy.   Skin:     General: Skin is dry.      Findings: No erythema or rash.   Neurological:      Mental Status: She is alert and oriented to person, place, and time.      Cranial Nerves: No cranial nerve deficit.      Coordination: Coordination normal.   Psychiatric:         Behavior: Behavior normal.         Thought Content: Thought content normal.         Judgment: Judgment normal.             Assessment:      1. Hairy cell leukemia of spleen    2. Sinusitis, unspecified chronicity, unspecified location    3. S/P splenectomy           Med Onc Chart Routing      Follow up with physician . Return in 3 months with CBC CMP C-reactive protein LDH   Follow up with [facility]    Infusion scheduling note    Injection scheduling note    Labs   Scheduling:  Preferred lab:  Lab interval:  Patient get flu COVID test today urine pregnancy test   Imaging   CT sinuses   Pharmacy appointment    Other referrals                   Plan:     Flu COVID test today communicate through portal.  Start empirically on Augmentin 875 mg p.o. b.i.d..  If patient is not feeling better in 3-5 days CT sinuses.  Discussed implications answered questions return to clinic in 3 months CBC CMP C-reactive protein LDH; because of diagnosis of hairy cell would recommend CT sinuses.  As baseline would check urine pregnancy test if needed flu COVID test communicate results through portal        Cirilo Whittaker Jr, MD FACP         [1]   Social History  Socioeconomic History    Marital status: Single   Tobacco Use    Smoking status: Former     Current packs/day: 0.25     Types: Cigarettes     Passive exposure: Never    Smokeless tobacco: Never    Tobacco comments:     no longer smoker   Substance and Sexual Activity    Alcohol use: Not  Currently     Comment: occasional    Drug use: No    Sexual activity: Yes     Birth control/protection: Condom   Social History Narrative    Lives in a single family home with her fiance and her daughter.     Her mother and her son are her SDM's. ACP Documents are available in her chart.      Social Drivers of Health     Financial Resource Strain: High Risk (2/14/2024)    Overall Financial Resource Strain (CARDIA)     Difficulty of Paying Living Expenses: Very hard   Food Insecurity: Food Insecurity Present (2/14/2024)    Hunger Vital Sign     Worried About Running Out of Food in the Last Year: Often true     Ran Out of Food in the Last Year: Often true   Transportation Needs: Unmet Transportation Needs (2/14/2024)    PRAPARE - Transportation     Lack of Transportation (Medical): Yes     Lack of Transportation (Non-Medical): Yes   Physical Activity: Insufficiently Active (2/14/2024)    Exercise Vital Sign     Days of Exercise per Week: 4 days     Minutes of Exercise per Session: 30 min   Stress: Stress Concern Present (2/14/2024)    [place] [place] [employer] - Occupational Stress Questionnaire     Feeling of Stress : Very much   Housing Stability: High Risk (2/14/2024)    Housing Stability Vital Sign     Unable to Pay for Housing in the Last Year: Yes     Number of Places Lived in the Last Year: 3     Unstable Housing in the Last Year: Yes

## 2025-03-25 ENCOUNTER — HOSPITAL ENCOUNTER (OUTPATIENT)
Dept: RADIOLOGY | Facility: HOSPITAL | Age: 50
Discharge: HOME | End: 2025-03-25
Attending: INTERNAL MEDICINE
Payer: MEDICAID

## 2025-03-25 ENCOUNTER — TELEPHONE (OUTPATIENT)
Dept: HEMATOLOGY/ONCOLOGY | Facility: CLINIC | Age: 50
End: 2025-03-25
Payer: MEDICAID

## 2025-03-25 ENCOUNTER — RESULTS FOLLOW-UP (OUTPATIENT)
Dept: HEMATOLOGY/ONCOLOGY | Facility: CLINIC | Age: 50
End: 2025-03-25

## 2025-03-25 DIAGNOSIS — J01.00 ACUTE NON-RECURRENT MAXILLARY SINUSITIS: ICD-10-CM

## 2025-03-25 DIAGNOSIS — C91.40 HAIRY CELL LEUKEMIA OF SPLEEN: ICD-10-CM

## 2025-03-25 PROCEDURE — 70486 CT MAXILLOFACIAL W/O DYE: CPT | Mod: 26,,, | Performed by: RADIOLOGY

## 2025-03-25 PROCEDURE — 70486 CT MAXILLOFACIAL W/O DYE: CPT | Mod: TC

## 2025-03-25 NOTE — TELEPHONE ENCOUNTER
Contacted pt to inform her Dr. Whittaker would like to schedule a vv to see how she's doing. Pt said she could be avail Thursday afternoon. VV scheduled for 3/27 at 1:00 pm. Verbalized understanding and confirmed appt. Call ended well

## 2025-03-25 NOTE — TELEPHONE ENCOUNTER
----- Message from Cirilo Whittaker MD sent at 3/25/2025  1:35 PM CDT -----  Should probably do a virtual visit okay to double book so we can see how he is doing

## 2025-03-27 ENCOUNTER — TELEPHONE (OUTPATIENT)
Dept: OTOLARYNGOLOGY | Facility: CLINIC | Age: 50
End: 2025-03-27
Payer: MEDICAID

## 2025-03-27 ENCOUNTER — OFFICE VISIT (OUTPATIENT)
Dept: HEMATOLOGY/ONCOLOGY | Facility: CLINIC | Age: 50
End: 2025-03-27
Payer: MEDICAID

## 2025-03-27 DIAGNOSIS — C91.40 HAIRY CELL LEUKEMIA OF SPLEEN: Primary | ICD-10-CM

## 2025-03-27 DIAGNOSIS — J32.9 RECURRENT SINUSITIS: ICD-10-CM

## 2025-03-27 NOTE — TELEPHONE ENCOUNTER
----- Message from Cirilo Whittaker MD sent at 3/27/2025  2:45 PM CDT -----  Celso Samuels MD sent to Cirilo Whittaker MD; P [facility]; Roel Khalil MD; Fatemeh Alvarez, IAMr. Panfilo,I don't see a CT in her chart, but maybe it hasn't been completed yet.  We would be happy to see her. Fatemeh - can you please schedule this patient to see myself or Dr. Khalil?  ThanksDuncan  Previous Messages ----- Message -----From: Cirilo Whittaker MDSent: 3/27/2025   1:00 PM CDTTo: Celso Samuels MD; Roel Khalil MD; [facility] #[place] this is a patient who has hairy cell leukemia and has had recurrent prolonged sinusitis.  I did a CT of the sinuses I would like to see if you could take a look at her in see if this anything more that can be offered hairy cell does not need to be treated at the present time but does put her at increased risk of recurrent infectionsSORRY FOLKS THIS IS THE CORRECT PATIENT THEN NEEDS TO BE SEEN WITH HAIRY CELL LEUKEMIA

## 2025-03-27 NOTE — PROGRESS NOTES
Subjective:       Patient ID: Jaswant Yang is a 49 y.o. female.    Chief Complaint: Results and Leukemia    HPI:  49-year-old female with recurrent sinusitis.  History of hairy cell leukemia patient does not need to be treated for hairy cell but I am concerned about her recurrent sinusitis seen in virtual follow-up today states that the Augmentin that I started her on is not really improved    Past Medical History:   Diagnosis Date    Anemia     Anemia     Back pain     Dental infection     GERD (gastroesophageal reflux disease) 2020    Hairy-cell leukemia of spleen     Hypertension     Unspecified chronic bronchitis      No family history on file.  Social History[1]  Past Surgical History:   Procedure Laterality Date     SECTION, CLASSIC      FLUOROSCOPY N/A 2020    Procedure: Spleenic Bleed;  Surgeon: Hiren Cabrera MD;  Location: [facility] CATH LAB;  Service: General;  Laterality: N/A;    INJECTION OF ANESTHETIC AGENT INTO TISSUE PLANE DEFINED BY TRANSVERSUS ABDOMINIS MUSCLE N/A 2020    Procedure: BLOCK, TRANSVERSUS ABDOMINIS PLANE;  Surgeon: Yumi Ferguson MD;  Location: [facility] OR;  Service: General;  Laterality: N/A;    SPLENECTOMY N/A 2020    Procedure: SPLENECTOMY;  Surgeon: Yumi Ferguson MD;  Location: [facility] OR;  Service: General;  Laterality: N/A;    THORACENTESIS Left 2020    Procedure: THORACENTESIS;  Surgeon: Yumi Ferguson MD;  Location: [facility] OR;  Service: General;  Laterality: Left;    TUBAL LIGATION         Labs:  Lab Results   Component Value Date    WBC 7.12 2025    HGB 14.9 2025    HCT 44.0 2025    MCV 84 2025     2025     BMP  Lab Results   Component Value Date     2025    K 4.2 2025     2025    CO2 24 2025    BUN 18 2025    CREATININE 1.1 2025    CALCIUM 8.8 2025    ANIONGAP 7 (L) 2025    ESTGFRAFRICA >60 2022    EGFRNONAA 55 (A) 2022     Lab  "Results   Component Value Date    ALT 21 03/20/2025    AST 19 03/20/2025    ALKPHOS 60 03/20/2025    BILITOT 1.2 (H) 03/20/2025       Lab Results   Component Value Date    IRON 84 07/18/2024    TIBC 377 07/18/2024    FERRITIN 51 07/18/2024     No results found for: "[id number]"  No results found for: "FOLATE"  Lab Results   Component Value Date    TSH 3.010 10/18/2023         Review of Systems   Constitutional:  Negative for activity change, appetite change, chills, diaphoresis, fatigue, fever and unexpected weight change.   HENT:  Positive for rhinorrhea, sinus pressure and sinus pain. Negative for congestion, dental problem, drooling, ear discharge, ear pain, facial swelling, hearing loss, mouth sores, nosebleeds, postnasal drip, sneezing, sore throat, tinnitus, trouble swallowing and voice change.    Eyes:  Negative for photophobia, pain, discharge, redness, itching and visual disturbance.   Respiratory:  Negative for cough, choking, chest tightness, shortness of breath, wheezing and stridor.    Cardiovascular:  Negative for chest pain, palpitations and leg swelling.   Gastrointestinal:  Negative for abdominal distention, abdominal pain, anal bleeding, blood in stool, constipation, diarrhea, nausea, rectal pain and vomiting.   Endocrine: Negative for cold intolerance, heat intolerance, polydipsia, polyphagia and polyuria.   Genitourinary:  Negative for decreased urine volume, difficulty urinating, dyspareunia, dysuria, enuresis, flank pain, frequency, genital sores, hematuria, menstrual problem, pelvic pain, urgency, vaginal bleeding, vaginal discharge and vaginal pain.   Musculoskeletal:  Negative for arthralgias, back pain, gait problem, joint swelling, myalgias, neck pain and neck stiffness.   Skin:  Negative for color change, pallor and rash.   Allergic/Immunologic: Negative for environmental allergies, food allergies and immunocompromised state.   Neurological:  Negative for dizziness, tremors, seizures, " syncope, facial asymmetry, speech difficulty, weakness, light-headedness, numbness and headaches.   Hematological:  Negative for adenopathy. Does not bruise/bleed easily.   Psychiatric/Behavioral:  Negative for agitation, behavioral problems, confusion, decreased concentration, dysphoric mood, hallucinations, self-injury, sleep disturbance and suicidal ideas. The patient is not nervous/anxious and is not hyperactive.        Objective:      Physical Exam  Constitutional:       Appearance: Normal appearance. She is obese.   Neurological:      Mental Status: She is alert.             Assessment:      1. Hairy cell leukemia of spleen    2. Recurrent sinusitis           Med Onc Chart Routing      Follow up with physician . Return to clinic to see me with CBC CMP LDH in 30-60 days   Follow up with [facility]    Infusion scheduling note    Injection scheduling note    Labs    Imaging    Pharmacy appointment    Other referrals         Urgent referral to ENT              Plan:     The patient location is:  Home  The chief complaint leading to consultation is:  Leukemia    Visit type: audiovisual    Face to Face time with patient: 30 minutes of total time spent on the encounter, which includes face to face time and non-face to face time preparing to see the patient (eg, review of tests), Obtaining and/or reviewing separately obtained history, Documenting clinical information in the electronic or other health record, Independently interpreting results (not separately reported) and communicating results to the patient/family/caregiver, or Care coordination (not separately reported).         Each patient to whom he or she provides medical services by telemedicine is:  (1) informed of the relationship between the physician and patient and the respective role of any other health care provider with respect to management of the patient; and (2) notified that he or she may decline to receive medical services by telemedicine and may withdraw  from such care at any time.    Notes:   Reviewed previously results of CT sinuses with her.  Augmentin has not really helped.  I have placed urgent consult with ENT to be seen and evaluated to see if any other treatment recommendations unable ask variant hairy cell leukemia does not need to be treated although I am concerned about her recurrent sinusitis variant discussed implications with her sent message to ENT for review in have nurse navigation help coordinate as soon as possible      Cirilo Whittaker Jr, MD FACP         [1]   Social History  Socioeconomic History    Marital status: Single   Tobacco Use    Smoking status: Former     Current packs/day: 0.25     Types: Cigarettes     Passive exposure: Never    Smokeless tobacco: Never    Tobacco comments:     no longer smoker   Substance and Sexual Activity    Alcohol use: Not Currently     Comment: occasional    Drug use: No    Sexual activity: Yes     Birth control/protection: Condom   Social History Narrative    Lives in a single family home with her fiance and her daughter.     Her mother and her son are her SDM's. ACP Documents are available in her chart.      Social Drivers of Health     Financial Resource Strain: High Risk (3/27/2025)    Overall Financial Resource Strain (CARDIA)     Difficulty of Paying Living Expenses: Very hard   Food Insecurity: Food Insecurity Present (3/27/2025)    Hunger Vital Sign     Worried About Running Out of Food in the Last Year: Often true     Ran Out of Food in the Last Year: Often true   Transportation Needs: Unmet Transportation Needs (3/27/2025)    PRAPARE - Transportation     Lack of Transportation (Medical): Yes     Lack of Transportation (Non-Medical): Yes   Physical Activity: Insufficiently Active (3/27/2025)    Exercise Vital Sign     Days of Exercise per Week: 3 days     Minutes of Exercise per Session: 10 min   Stress: Stress Concern Present (3/27/2025)    [place] [employer] - Occupational Stress  Questionnaire     Feeling of Stress : Very much   Housing Stability: High Risk (3/27/2025)    Housing Stability Vital Sign     Unable to Pay for Housing in the Last Year: Yes     Number of Times Moved in the Last Year: 4     Homeless in the Last Year: Yes

## 2025-04-01 ENCOUNTER — ON-DEMAND VIRTUAL (OUTPATIENT)
Dept: URGENT CARE | Facility: CLINIC | Age: 50
End: 2025-04-01
Payer: MEDICAID

## 2025-04-01 DIAGNOSIS — J01.90 ACUTE NON-RECURRENT SINUSITIS, UNSPECIFIED LOCATION: Primary | ICD-10-CM

## 2025-04-01 RX ORDER — AZELASTINE 1 MG/ML
1 SPRAY, METERED NASAL 2 TIMES DAILY
Qty: 30 ML | Refills: 3 | Status: SHIPPED | OUTPATIENT
Start: 2025-04-01 | End: 2026-04-01

## 2025-04-01 RX ORDER — DESLORATADINE 5 MG/1
5 TABLET ORAL DAILY
Qty: 30 TABLET | Refills: 0 | Status: SHIPPED | OUTPATIENT
Start: 2025-04-01 | End: 2025-05-01

## 2025-04-01 RX ORDER — AZITHROMYCIN 250 MG/1
TABLET, FILM COATED ORAL
Qty: 6 TABLET | Refills: 0 | Status: SHIPPED | OUTPATIENT
Start: 2025-04-01 | End: 2025-04-06

## 2025-04-01 RX ORDER — PREDNISONE 20 MG/1
20 TABLET ORAL DAILY
Qty: 4 TABLET | Refills: 0 | Status: SHIPPED | OUTPATIENT
Start: 2025-04-01 | End: 2025-04-05

## 2025-04-01 NOTE — PROGRESS NOTES
Subjective:      Patient ID: Jaswant Yang is a 49 y.o. female.    Vitals:  vitals were not taken for this visit.     Chief Complaint: Sinus Problem      Visit Type: TELE AUDIOVISUAL    Patient Location: Home     Present with the patient at the time of consultation: TELEMED PRESENT WITH PATIENT: None    Past Medical History:   Diagnosis Date    Anemia     Anemia     Back pain     Dental infection     GERD (gastroesophageal reflux disease) 2020    Hairy-cell leukemia of spleen     Hypertension     Unspecified chronic bronchitis      Past Surgical History:   Procedure Laterality Date     SECTION, CLASSIC      FLUOROSCOPY N/A 2020    Procedure: Spleenic Bleed;  Surgeon: Hiren Cabrera MD;  Location: [facility] CATH LAB;  Service: General;  Laterality: N/A;    INJECTION OF ANESTHETIC AGENT INTO TISSUE PLANE DEFINED BY TRANSVERSUS ABDOMINIS MUSCLE N/A 2020    Procedure: BLOCK, TRANSVERSUS ABDOMINIS PLANE;  Surgeon: Yumi Ferguson MD;  Location: [facility] OR;  Service: General;  Laterality: N/A;    SPLENECTOMY N/A 2020    Procedure: SPLENECTOMY;  Surgeon: Yumi Ferguson MD;  Location: [facility] OR;  Service: General;  Laterality: N/A;    THORACENTESIS Left 2020    Procedure: THORACENTESIS;  Surgeon: Yumi Ferguson MD;  Location: [facility] OR;  Service: General;  Laterality: Left;    TUBAL LIGATION       Review of patient's allergies indicates:   Allergen Reactions    Fentanyl Rash     Severe full body diffuse rash requiring systemic steroids    Vancomycin analogues Rash     Severe full body diffuse rash requiring systemic steroids    Rituximab Other (See Comments)     Sweating, abdominal pain, elevated blood pressure    Penicillin Hives    Tramadol Hives    Vancomycin Rash     Kidney failure     Medications Ordered Prior to Encounter[1]  No family history on file.    Medications Ordered                [id number] - [place], [place] - [address] [facility] [address] [facility] [address]   [address] [address]    Telephone: [phone]   Fax: [phone]   Hours: Not open 24 hours                         E-Prescribed (4 of 4)              azelastine (ASTELIN) 137 mcg (0.1 %) nasal spray    Si spray (137 mcg total) by Nasal route 2 (two) times daily.       Start: 25     Quantity: 30 mL Refills: 3                         azithromycin (Z-MAURILIO) 250 MG tablet    Sig: Take 2 tablets by mouth on day 1; Take 1 tablet by mouth on days 2-5       Start: 25     Quantity: 6 tablet Refills: 0                         desloratadine (CLARINEX) 5 mg tablet    Sig: Take 1 tablet (5 mg total) by mouth once daily.       Start: 25     Quantity: 30 tablet Refills: 0                         predniSONE (DELTASONE) 20 MG tablet    Sig: Take 1 tablet (20 mg total) by mouth once daily. for 4 days       Start: 25     Quantity: 4 tablet Refills: 0                           Ohs Peq Odvv Intake    2025  5:27 PM CDT - Filed by Patient   What is your current physical address in the event of a medical emergency? [id number] [address]   Are you able to take your vital signs? No   Please attach any relevant images or files    Is your employer contracted with [facility]? No         50 y/o female with c/o congestion, facial pain, post nasal drip, headaches and toothache x2 weeks.         Constitution: Negative for sweating and fever.   HENT:  Positive for facial swelling, congestion, postnasal drip, sinus pain, sinus pressure and voice change.    Respiratory:  Negative for cough.         Objective:   The physical exam was conducted virtually.  Physical Exam   Constitutional: She is oriented to person, place, and time. She is cooperative. She does not appear ill. No distress. awake  HENT:   Head: Normocephalic and atraumatic.   Abdominal: Normal appearance.   Neurological: She is alert and oriented to person, place, and time.   Psychiatric: Her behavior is  normal. Judgment normal.       Assessment:     1. Acute non-recurrent sinusitis, unspecified location        Plan:     Acute non-recurrent sinusitis, unspecified location  -     azithromycin (Z-MAURILIO) 250 MG tablet; Take 2 tablets by mouth on day 1; Take 1 tablet by mouth on days 2-5  Dispense: 6 tablet; Refill: 0  -     predniSONE (DELTASONE) 20 MG tablet; Take 1 tablet (20 mg total) by mouth once daily. for 4 days  Dispense: 4 tablet; Refill: 0  -     azelastine (ASTELIN) 137 mcg (0.1 %) nasal spray; 1 spray (137 mcg total) by Nasal route 2 (two) times daily.  Dispense: 30 mL; Refill: 3  -     desloratadine (CLARINEX) 5 mg tablet; Take 1 tablet (5 mg total) by mouth once daily.  Dispense: 30 tablet; Refill: 0      Follow-up with Primary Care as discussed for further refills.     Patient encouraged to monitor symptoms closely and instructed to follow-up for new or worsening symptoms. Further, in-person, evaluation may be necessary for continued treatment. Please follow up with your primary care doctor or specialist as needed. Verbally discussed plan. Patient confirms understanding and is in agreement with treatment and plan.      You must understand that you've received a Virtual Care evaluation only and that you may be released before all your medical problems are known or treated. You, the patient, will arrange for follow up care as instructed.                         [1]   Current Outpatient Medications on File Prior to Visit   Medication Sig Dispense Refill    albuterol (PROVENTIL/VENTOLIN HFA) 90 mcg/actuation inhaler Inhale 1-2 puffs into the lungs every 6 (six) hours as needed for Wheezing. Rescue 1 Inhaler 0    ALPRAZolam (XANAX) 0.5 MG tablet TAKE 1/2 TO 1 TABLET BY MOUTH TWICE DAILY AS NEEDED FOR ANXIETY. 30 tablet 5    betamethasone dipropionate (DIPROLENE) 0.05 % lotion Apply topically 2 (two) times daily. (Patient not taking: Reported on 3/20/2025) 60 mL 1    cetirizine (ZYRTEC) 10 MG tablet Take 10 mg  by mouth daily as needed. (Patient not taking: Reported on 3/20/2025)      chlorhexidine (PERIDEX) 0.12 % solution SMARTSIG:By Mouth      clotrimazole-betamethasone 1-0.05% (LOTRISONE) cream Apply topically 2 (two) times daily. 45 g 2    cyclobenzaprine (FLEXERIL) 10 MG tablet TAKE 1/2 TO 1 TABLET BY MOUTH TWICE DAILY AS NEEDED FOR MUSCLE SPASMS. MAY CAUSE DROWSINESS 60 tablet 1    gabapentin (NEURONTIN) 600 MG tablet Take 1 tablet (600 mg total) by mouth every evening. 30 tablet 2    HYDROcodone-acetaminophen (NORCO) 5-325 mg per tablet Take 1 tablet by mouth every 4 (four) hours as needed for Pain. 5 tablet 0    HYDROcodone-acetaminophen (NORCO) 5-325 mg per tablet Take 1 tablet by mouth every 6 (six) hours as needed for Pain. 12 tablet 0    hydrOXYzine HCL (ATARAX) 25 MG tablet TAKE 1 TABLET(25 MG) BY MOUTH THREE TIMES DAILY AS NEEDED FOR ITCHING 30 tablet 2    ketoconazole (NIZORAL) 2 % cream AAA bid. 60 g 3    losartan (COZAAR) 50 MG tablet Take 1 tablet (50 mg total) by mouth once daily. 90 tablet 1    mupirocin (BACTROBAN) 2 % ointment Apply topically 3 (three) times daily. 22 g 0    pimecrolimus (ELIDEL) 1 % cream Apply topically 2 (two) times daily. As needed for rash on face 30 g 2    promethazine (PHENERGAN) 25 MG tablet Take 1 tablet (25 mg total) by mouth every 6 (six) hours as needed for Nausea. 15 tablet 0    triamcinolone acetonide 0.025% (KENALOG) 0.025 % cream Apply topically 2 (two) times daily. For only up to 1 week per course on face as needed for rash, for up to 2-4 weeks per course for rash on body 454 g 1     No current facility-administered medications on file prior to visit.

## 2025-04-08 ENCOUNTER — TELEPHONE (OUTPATIENT)
Dept: OTOLARYNGOLOGY | Facility: CLINIC | Age: 50
End: 2025-04-08

## 2025-04-08 ENCOUNTER — OFFICE VISIT (OUTPATIENT)
Dept: OTOLARYNGOLOGY | Facility: CLINIC | Age: 50
End: 2025-04-08
Payer: MEDICAID

## 2025-04-08 VITALS — BODY MASS INDEX: 52.69 KG/M2 | HEIGHT: 71 IN

## 2025-04-08 DIAGNOSIS — J34.89 NASAL CAVITY MASS: ICD-10-CM

## 2025-04-08 DIAGNOSIS — J34.89 NASAL CAVITY MASS: Primary | ICD-10-CM

## 2025-04-08 DIAGNOSIS — J34.89 NASAL SEPTAL PERFORATION: Primary | ICD-10-CM

## 2025-04-08 DIAGNOSIS — J34.3 HYPERTROPHY OF INFERIOR NASAL TURBINATE: ICD-10-CM

## 2025-04-08 DIAGNOSIS — J32.4 CHRONIC PANSINUSITIS: ICD-10-CM

## 2025-04-08 DIAGNOSIS — J34.89 NASAL LESION: Primary | ICD-10-CM

## 2025-04-08 DIAGNOSIS — C91.40 HAIRY CELL LEUKEMIA OF SPLEEN: ICD-10-CM

## 2025-04-08 PROCEDURE — 99999 PR PBB SHADOW E&M-EST. PATIENT-LVL III: CPT | Mod: PBBFAC,,, | Performed by: OTOLARYNGOLOGY

## 2025-04-08 PROCEDURE — 1159F MED LIST DOCD IN RCRD: CPT | Mod: CPTII,,, | Performed by: OTOLARYNGOLOGY

## 2025-04-08 PROCEDURE — 31231 NASAL ENDOSCOPY DX: CPT | Mod: PBBFAC | Performed by: OTOLARYNGOLOGY

## 2025-04-08 PROCEDURE — 99213 OFFICE O/P EST LOW 20 MIN: CPT | Mod: PBBFAC | Performed by: OTOLARYNGOLOGY

## 2025-04-08 PROCEDURE — 99205 OFFICE O/P NEW HI 60 MIN: CPT | Mod: 25,S$PBB,, | Performed by: OTOLARYNGOLOGY

## 2025-04-08 PROCEDURE — 4010F ACE/ARB THERAPY RXD/TAKEN: CPT | Mod: CPTII,,, | Performed by: OTOLARYNGOLOGY

## 2025-04-08 PROCEDURE — 3008F BODY MASS INDEX DOCD: CPT | Mod: CPTII,,, | Performed by: OTOLARYNGOLOGY

## 2025-04-08 PROCEDURE — 1160F RVW MEDS BY RX/DR IN RCRD: CPT | Mod: CPTII,,, | Performed by: OTOLARYNGOLOGY

## 2025-04-08 PROCEDURE — 31231 NASAL ENDOSCOPY DX: CPT | Mod: S$PBB,,, | Performed by: OTOLARYNGOLOGY

## 2025-04-08 NOTE — PROGRESS NOTES
Labs scheduled patient cannot go this week due to work schedule so she is scheduled for Tuesday next week.

## 2025-04-08 NOTE — Clinical Note
Dr. Urias,  Dr. Kerr sent this patient over for me to see/evaluate for chronic sinusitis and nasal issues.  She has a subtotal septal perforation and extensive granulomatous appearing tissue occluding her nasal cavity bilaterally.  Was she ever tested for Wegener's (or whatever y'all call it now)?  I'm hoping this is that and not a malignancy, but that is also a consideration.  I told her that I wanted to talk with you prior to scheduling a nasal endoscopy and biopsy in the OR.  Let me know your thoughts. Thanks!  Celso

## 2025-04-08 NOTE — PROGRESS NOTES
"Referring Provider:    No referring provider defined for this encounter.  Subjective:   Patient: Jaswant Yang [id number], :1975   Visit date:2025 11:18 AM    Chief Complaint:  Referral (Referred by Dr. Kerr for recurring sinuitis)    HPI:    Prior notes reviewed by myself.  Clinical documentation obtained by nursing staff reviewed.       49-year-old female with a history of hairy cell leukemia treated by our Oncology Department presents for evaluation of chronic sinusitis and nasal septal perforation.  She had a recent CT scan, results are below.  She has near constant nasal obstruction, rhinorrhea, postnasal drip and occasional bleeding.  She denies any history of prior nasal trauma, intranasal drug use, prior sinus or nasal surgery.  She does have a history of a positive MERRILL and sees Dr. Urias with Rheumatology but she has not been tested for Wegener's granulomatosis.  She has used nasal steroid spray for up to 12 weeks in the past without adequate benefit.        Objective:     Physical Exam:  Vitals:  Ht 5' 11" (1.803 m)   BMI 52.69 kg/m²   General appearance:  Well developed, well nourished    Ears:  Otoscopy of external auditory canals and tympanic membranes was normal, clinical speech reception thresholds grossly intact, no mass/lesion of auricle.    Nose:  early saddle nose deformity, nasal mucosa severely congested, septum with subtotal perforation, and turbinates were hypertrophied 3+.    Mouth:  No mass/lesion of lips, teeth, gums, hard/soft palate, tongue, tonsils, or oropharynx.    Neck & Lymphatics:  No cervical lymphadenopathy, no neck mass/crepitus/ asymmetry, trachea is midline, no thyroid enlargement/tenderness/mass.    PROCEDURE NOTE:  Diagnostic nasal endoscopy  Preprocedure diagnosis:  Chronic sinusitis  Postprocedure diangosis:  Same  Complications:  None  Blood Loss:  None    Procedure in detail:  After verbal consent was obtained, the patient's nasal cavity was anesthesized " using topical Tetracaine and Neosynepherine.  A rigid 30 degree endoscope was placed in first the right, then the left nasal cavity.  The inferior and middle turbinates were examined, and found to be hypertrophied bilaterally.  The middle meatus and maxillary antrum was also examined, and found to  have extensive abnormal tissue involving the middle meatus bilaterally as well as the nasal cavity.  This tissue appears exophytic and possibly granulomatous.  Her nasal cavity is 98% obstructed. The superior meatus as well as the sphenoethmoid recess were also inspected and noted to have similar findings as in the middle meatus. The patient tolerated the procedure well and there were no complications.      [x]  Data Reviewed:    Lab Results   Component Value Date    WBC 7.12 03/20/2025    HGB 14.9 03/20/2025    HCT 44.0 03/20/2025    MCV 84 03/20/2025    EOSINOPHIL 17.0 (H) 03/20/2025         [x]  Independent interpretation of test: Extensive abnormal soft tissue in nasal cavity bilaterally causing near total obstruction, subtotal septal perforation  CT Sinuses without Contrast  Order: [id number]   Status: Final result       Next appt: 05/21/2025 at 12:00 PM in Rheumatology (Miguelina Urias MD)       Dx: Hairy cell leukemia of spleen; Acute ...    Test Result Released: Yes (seen)       Messages: Seen    0 Result Notes       1 Patient Communication       View Follow-Up Encounter  Details    Reading Physician Reading Date Result Priority   Kamar Floresanth RITIKAMarlen   [phone]  3/25/2025 ASAP     Narrative & Impression  EXAMINATION:  CT SINUSES WITHOUT CONTRAST     CLINICAL HISTORY:  Sinusitis, acute, uncomplicated;  Hairy cell leukemia not having achieved remission     TECHNIQUE:  Axial low-dose images, coronal and sagittal reformations were performed through the paranasal sinuses.  Contrast was not administered.     COMPARISON:  CT of the facial bones without contrast from 07/07/2020     FINDINGS:  There is  mild-to-moderate mucosal thickening seen involving the left maxillary sinus.  When compared to the prior study from 2020 the mucosal thickening within the left maxillary sinus has progressed.  There is also new mild mucosal thickening seen involving multiple bilateral ethmoid air cells.  The ostiomeatal units bilaterally are occluded by mucosal thickening.  No fluid levels or secretions are identified.  There is perforation of the cartilaginous portion of the bony nasal septum.  The appearance is similar to the prior exam.  There is also some mucosal thickening seen along the along the floor of either nasal cavity and along the inner aspect of the medial wall of either maxillary sinus.     Impression:     1. Mucosal thickening seen scattered throughout multiple bilateral ethmoid air cells and the left maxillary sinus.  No fluid levels or secretions identified to suggest acute sinusitis.  There is occlusion of the bilateral ostiomeatal units by mucosal thickening.  2. Chronic perforation of the cartilaginous portion of the nasal septum with some mucosal thickening seen along the medial aspect of the interval wall of either maxillary sinus and along the floor of the nasal cavity.        Electronically signed by:Kev Flores DO  Date:                                            03/25/2025  Time:                                           13:14        Exam Ended: 03/25/25 12:29 CDT Last Resulted: 03/25/25 13:14 CDT       Order Details        View Encounter        Lab and Collection Details        Routing        Result History     View All Conversations on this Encounter           Discussed with Dr. Urias and Dr. Whittaker      Assessment & Plan:   Nasal septal perforation    Chronic pansinusitis    Nasal cavity mass  -     Case Request Operating Room: ENDOSCOPY, NOSE, WITH VIDEO IMAGING    Hairy cell leukemia of spleen    Hypertrophy of inferior nasal turbinate  -     Case Request Operating Room: ENDOSCOPY, NOSE, WITH VIDEO  IMAGING         We had a long discussion about her various diagnoses as well as her nasal cavity.  She has a subtotal nasal septal perforation with extensive abnormal tissue in her nasal cavity and lateral nasal wall.  This tissue appears granulomatous in appearance and I suspect that this may be consistent with Wegener's.  I have spoken to her rheumatologist about that who is moving forward with a laboratory workup.  We agreed that she would likely benefit from a tissue diagnosis as well, so we will arrange a nasal endoscopy with biopsy as well as likely turbinate reduction to help with nasal patency and access.    Celso Samuels M.D.  Department of Otolaryngology - Head & Neck Surgery  [address] [place] [address]  P: [phone]  F: [phone]        DISCLAIMER: This note was prepared with [employer] voice recognition transcription software. Garbled syntax, mangled pronouns, and other bizarre constructions may be attributed to that software system. While efforts were made to correct any mistakes made by this voice recognition program, some errors and/or omissions may remain in the note that were missed when the note was originally created.

## 2025-04-10 DIAGNOSIS — I10 ESSENTIAL HYPERTENSION: Chronic | ICD-10-CM

## 2025-04-10 RX ORDER — LOSARTAN POTASSIUM 50 MG/1
TABLET ORAL
Qty: 90 TABLET | Refills: 1 | OUTPATIENT
Start: 2025-04-10

## 2025-04-10 NOTE — TELEPHONE ENCOUNTER
Refill Decision Note   Jaswant Trey  is requesting a refill authorization.  Brief Assessment and Rationale for Refill:  Quick Discontinue     Medication Therapy Plan:  Previously denied by PCP(patient needs an appointment) QDC      Comments:     Note composed:10:28 AM 04/10/2025

## 2025-04-10 NOTE — TELEPHONE ENCOUNTER
No care due was identified.  [facility] Embedded Care Due Messages. Reference number: [id number].   4/10/2025 9:29:15 AM CDT

## 2025-04-15 ENCOUNTER — OFFICE VISIT (OUTPATIENT)
Dept: INTERNAL MEDICINE | Facility: CLINIC | Age: 50
End: 2025-04-15
Payer: MEDICAID

## 2025-04-15 ENCOUNTER — HOSPITAL ENCOUNTER (EMERGENCY)
Facility: HOSPITAL | Age: 50
Discharge: HOME | End: 2025-04-15
Attending: EMERGENCY MEDICINE
Payer: MEDICAID

## 2025-04-15 VITALS — DIASTOLIC BLOOD PRESSURE: 123 MMHG | HEART RATE: 73 BPM | SYSTOLIC BLOOD PRESSURE: 173 MMHG | TEMPERATURE: 98 F

## 2025-04-15 VITALS
DIASTOLIC BLOOD PRESSURE: 113 MMHG | BODY MASS INDEX: 53.47 KG/M2 | OXYGEN SATURATION: 99 % | RESPIRATION RATE: 18 BRPM | WEIGHT: 293 LBS | TEMPERATURE: 98 F | HEART RATE: 64 BPM | SYSTOLIC BLOOD PRESSURE: 177 MMHG

## 2025-04-15 DIAGNOSIS — I10 HYPERTENSION, UNSPECIFIED TYPE: Primary | ICD-10-CM

## 2025-04-15 DIAGNOSIS — I10 ESSENTIAL HYPERTENSION: Chronic | ICD-10-CM

## 2025-04-15 DIAGNOSIS — K08.89 PAIN, DENTAL: ICD-10-CM

## 2025-04-15 DIAGNOSIS — I10 PRIMARY HYPERTENSION: ICD-10-CM

## 2025-04-15 DIAGNOSIS — Z01.818 PREOPERATIVE EXAMINATION: Primary | ICD-10-CM

## 2025-04-15 DIAGNOSIS — Z90.81 S/P SPLENECTOMY: ICD-10-CM

## 2025-04-15 DIAGNOSIS — J34.89 NASAL CAVITY MASS: ICD-10-CM

## 2025-04-15 PROCEDURE — 99283 EMERGENCY DEPT VISIT LOW MDM: CPT | Mod: 27

## 2025-04-15 PROCEDURE — 25000003 PHARM REV CODE 250: Performed by: PHYSICIAN ASSISTANT

## 2025-04-15 PROCEDURE — 99999 PR PBB SHADOW E&M-EST. PATIENT-LVL II: CPT | Mod: PBBFAC,,,

## 2025-04-15 PROCEDURE — 99212 OFFICE O/P EST SF 10 MIN: CPT | Mod: PBBFAC

## 2025-04-15 RX ORDER — DOXYCYCLINE 100 MG/1
100 CAPSULE ORAL 2 TIMES DAILY
Qty: 14 CAPSULE | Refills: 0 | Status: SHIPPED | OUTPATIENT
Start: 2025-04-15 | End: 2025-04-22

## 2025-04-15 RX ORDER — LOSARTAN POTASSIUM 50 MG/1
50 TABLET ORAL ONCE
Status: COMPLETED | OUTPATIENT
Start: 2025-04-15 | End: 2025-04-15

## 2025-04-15 RX ORDER — LOSARTAN POTASSIUM 50 MG/1
50 TABLET ORAL DAILY
Qty: 30 TABLET | Refills: 0 | Status: SHIPPED | OUTPATIENT
Start: 2025-04-15 | End: 2025-04-16 | Stop reason: ALTCHOICE

## 2025-04-15 RX ORDER — ALBUTEROL SULFATE 90 UG/1
2 INHALANT RESPIRATORY (INHALATION) EVERY 6 HOURS PRN
Qty: 59.5 G | Refills: 0 | Status: SHIPPED | OUTPATIENT
Start: 2025-04-15 | End: 2026-04-15

## 2025-04-15 RX ORDER — CLONIDINE HYDROCHLORIDE 0.1 MG/1
0.1 TABLET ORAL
Status: COMPLETED | OUTPATIENT
Start: 2025-04-15 | End: 2025-04-15

## 2025-04-15 RX ADMIN — CLONIDINE HYDROCHLORIDE 0.1 MG: 0.1 TABLET ORAL at 03:04

## 2025-04-15 RX ADMIN — LOSARTAN POTASSIUM 50 MG: 50 TABLET, FILM COATED ORAL at 03:04

## 2025-04-15 NOTE — ED PROVIDER NOTES
History      Chief Complaint   Patient presents with    Hypertension     Pt. Reports that she was at the Dr. And was sent over for hypertension. Pt. Is out of her BP meds. Pt. Reports that she is having mouth pain x 1 week.        Review of patient's allergies indicates:   Allergen Reactions    Fentanyl Rash     Severe full body diffuse rash requiring systemic steroids    Vancomycin analogues Rash     Severe full body diffuse rash requiring systemic steroids    Rituximab Other (See Comments)     Sweating, abdominal pain, elevated blood pressure    Penicillin Hives    Tramadol Hives    Vancomycin Rash     Kidney failure        HPI   HPI    4/15/2025, 3:42 PM   History obtained from the patient      History of Present Illness: Jaswant Yang is a 49 y.o. female patient who presents to the Emergency Department for hypertension at clinic today.  She is out of her losartan.  Denies chest pain, shortness of breath, dizziness, headache, vision change.  She also complains of dental pain to front upper gums.    No further complaints or concerns at this time.           PCP: Vicky Justin MD       Past Medical History:  Past Medical History:   Diagnosis Date    Anemia     Anemia     Back pain     Dental infection     GERD (gastroesophageal reflux disease) 2020    Hairy-cell leukemia of spleen     Hypertension     Unspecified chronic bronchitis          Past Surgical History:  Past Surgical History:   Procedure Laterality Date     SECTION, CLASSIC      FLUOROSCOPY N/A 2020    Procedure: Spleenic Bleed;  Surgeon: Hiren Cabrera MD;  Location: [facility] CATH LAB;  Service: General;  Laterality: N/A;    INJECTION OF ANESTHETIC AGENT INTO TISSUE PLANE DEFINED BY TRANSVERSUS ABDOMINIS MUSCLE N/A 2020    Procedure: BLOCK, TRANSVERSUS ABDOMINIS PLANE;  Surgeon: Yumi Ferguson MD;  Location: [facility] OR;  Service: General;  Laterality: N/A;    SPLENECTOMY N/A 2020    Procedure: SPLENECTOMY;  Surgeon:  Yumi Ferguson MD;  Location: [facility] OR;  Service: General;  Laterality: N/A;    THORACENTESIS Left 5/11/2020    Procedure: THORACENTESIS;  Surgeon: Yumi Ferguson MD;  Location: [facility] OR;  Service: General;  Laterality: Left;    TUBAL LIGATION             Family History:  No family history on file.        Social History:  Social History     Tobacco Use    Smoking status: Former     Current packs/day: 0.25     Types: Cigarettes     Passive exposure: Never    Smokeless tobacco: Never    Tobacco comments:     no longer smoker   Substance and Sexual Activity    Alcohol use: Not Currently     Comment: occasional    Drug use: No    Sexual activity: Yes     Birth control/protection: Condom       ROS     Review of Systems   Constitutional:  Negative for fever.   Eyes:  Negative for visual disturbance.   Respiratory:  Negative for chest tightness and shortness of breath.    Cardiovascular:  Negative for chest pain.   Neurological:  Negative for dizziness and headaches.       Physical Exam      Initial Vitals [04/15/25 1442]   BP Pulse Resp Temp SpO2   (!) 188/113 68 16 98 °F (36.7 °C) 99 %      MAP       --         Physical Exam  Vital signs and nursing notes reviewed.  Constitutional: Patient is in NAD. Awake and alert. Well-developed and well-nourished.  Head: Atraumatic. Normocephalic.  Eyes:  EOM intact. Conjunctivae nl. No scleral icterus.  ENT: Mucous membranes are moist.  Gingival erythema and tenderness.  No abscess  Neck: Supple.  No meningismus  Cardiovascular: Regular rate and rhythm. No murmurs, rubs, or gallops.   Pulmonary/Chest: No respiratory distress. Clear to auscultation bilaterally. No wheezing, rales, or rhonchi.  Abdominal: Soft. Non-distended. No TTP. No rebound, guarding, or rigidity. Good bowel sounds.  Genitourinary: No CVA tenderness  Musculoskeletal: Moves all extremities. No edema.   Skin: Warm and dry.  Neurological: Awake and alert. No acute focal neurological deficits are  appreciated.  Psychiatric: Normal affect. Good eye contact. Appropriate in content.      ED Course          Procedures  ED Vital Signs:  Vitals:    04/15/25 1442 04/15/25 1556 04/15/25 1631 04/15/25 1702   BP: (!) 188/113 (!) 185/102 (!) 177/110 (!) 181/108   Pulse: 68  66 66   Resp: 16  18 18   Temp: 98 °F (36.7 °C)      TempSrc: Oral      SpO2: 99%  100%    Weight: (!) 173.9 kg (383 lb 6.4 oz)       04/15/25 1727   BP: (!) 177/113   Pulse: 64   Resp: 18   Temp:    TempSrc:    SpO2: 99%   Weight:                  Imaging Results:  Imaging Results    None            The Emergency Provider reviewed the vital signs and test results, which are outlined above.    ED Discussion             Medication(s) given in the ER:  Medications   cloNIDine tablet 0.1 mg (0.1 mg Oral Given 4/15/25 1556)   losartan tablet 50 mg (50 mg Oral Given 4/15/25 1556)            Follow-up Information       Your Dentist In 2 days.               Vicky Justin MD In 2 days.    Specialty: Family Medicine  Contact information:  [address]  [address]  [place] [address] [phone]                                    Medication List        START taking these medications      doxycycline 100 MG Cap  Commonly known as: VIBRAMYCIN  Take 1 capsule (100 mg total) by mouth 2 (two) times daily. for 7 days            CHANGE how you take these medications      * albuterol 90 mcg/actuation inhaler  Commonly known as: PROVENTIL/VENTOLIN HFA  Inhale 1-2 puffs into the lungs every 6 (six) hours as needed for Wheezing. Rescue  What changed: Another medication with the same name was added. Make sure you understand how and when to take each.     * albuterol 90 mcg/actuation inhaler  Commonly known as: VENTOLIN HFA  Inhale 2 puffs into the lungs every 6 (six) hours as needed for Wheezing. Rescue  What changed: You were already taking a medication with the same name, and this prescription was added. Make sure you understand how and when to take  each.     * losartan 50 MG tablet  Commonly known as: COZAAR  Take 1 tablet (50 mg total) by mouth once daily.  What changed: Another medication with the same name was added. Make sure you understand how and when to take each.     * losartan 50 MG tablet  Commonly known as: COZAAR  Take 1 tablet (50 mg total) by mouth once daily.  What changed: You were already taking a medication with the same name, and this prescription was added. Make sure you understand how and when to take each.           * This list has 4 medication(s) that are the same as other medications prescribed for you. Read the directions carefully, and ask your doctor or other care provider to review them with you.                STOP taking these medications      hydrOXYzine HCL 25 MG tablet  Commonly known as: ATARAX     promethazine 25 MG tablet  Commonly known as: PHENERGAN            ASK your doctor about these medications      ALPRAZolam 0.5 MG tablet  Commonly known as: XANAX  TAKE 1/2 TO 1 TABLET BY MOUTH TWICE DAILY AS NEEDED FOR ANXIETY.     azelastine 137 mcg (0.1 %) nasal spray  Commonly known as: ASTELIN  1 spray (137 mcg total) by Nasal route 2 (two) times daily.     betamethasone dipropionate 0.05 % lotion  Commonly known as: BETANATE  Apply topically 2 (two) times daily.     chlorhexidine 0.12 % solution  Commonly known as: PERIDEX     clotrimazole-betamethasone 1-0.05% cream  Commonly known as: LOTRISONE  Apply topically 2 (two) times daily.     cyclobenzaprine 10 MG tablet  Commonly known as: FLEXERIL  TAKE 1/2 TO 1 TABLET BY MOUTH TWICE DAILY AS NEEDED FOR MUSCLE SPASMS. MAY CAUSE DROWSINESS     desloratadine 5 mg tablet  Commonly known as: CLARINEX  Take 1 tablet (5 mg total) by mouth once daily.     gabapentin 600 MG tablet  Commonly known as: NEURONTIN  Take 1 tablet (600 mg total) by mouth every evening.     * HYDROcodone-acetaminophen 5-325 mg per tablet  Commonly known as: NORCO  Take 1 tablet by mouth every 4 (four) hours as  needed for Pain.     * HYDROcodone-acetaminophen 5-325 mg per tablet  Commonly known as: NORCO  Take 1 tablet by mouth every 6 (six) hours as needed for Pain.     ketoconazole 2 % cream  Commonly known as: NIZORAL  AAA bid.     mupirocin 2 % ointment  Commonly known as: BACTROBAN  Apply topically 3 (three) times daily.     pimecrolimus 1 % cream  Commonly known as: ELIDEL  Apply topically 2 (two) times daily. As needed for rash on face     triamcinolone acetonide 0.025% 0.025 % cream  Commonly known as: KENALOG  Apply topically 2 (two) times daily. For only up to 1 week per course on face as needed for rash, for up to 2-4 weeks per course for rash on body           * This list has 2 medication(s) that are the same as other medications prescribed for you. Read the directions carefully, and ask your doctor or other care provider to review them with you.                   Where to Get Your Medications        These medications were sent to [id number] [place] [facility] [place] [facility] [address] AT [facility] [address]  [address] [place] [address]      Phone: [phone]   albuterol 90 mcg/actuation inhaler  doxycycline 100 MG Cap  losartan 50 MG tablet             Medical Decision Making        All findings were reviewed with the patient/family in detail.   All remaining questions and concerns were addressed at that time.  Patient/family has been counseled regarding the need for follow-up as well as the indication to return to the emergency room should new or worrisome developments occur.        MDM                 Clinical Impression:        ICD-10-CM ICD-9-CM   1. Hypertension, unspecified type  I10 401.9   2. Essential hypertension  I10 401.9   3. Pain, dental  K08.89 525.9               Jane Lynch, PACARMELA  04/15/25 174

## 2025-04-15 NOTE — PROGRESS NOTES
Preoperative History and Physical                                                             Hospital Medicine      Chief Complaint: Preoperative evaluation    History of Present Illness:      Jaswant Yang is a 49 y.o. female who  has a past medical history of Anemia, Anemia, Back pain, Dental infection, GERD (gastroesophageal reflux disease), Hairy-cell leukemia of spleen (s/p splenectomy in ), Hypertension, and Unspecified chronic bronchitis who presents to the office today for a preoperative consultation at the request of Dr. Samuels who plans on performing Nose endoscopy with video imaging on 2025.        Functional Status:      The patient is able to climb a flight of stairs. The patient is able to ambulate without difficulty. The patient's functional status is not affected by their joint pain. The patient's functional status is affected by shortness of breath, chest pain, dyspnea on exertion and fatigue.      Past Medical History:      Past Medical History:   Diagnosis Date    Anemia     Anemia     Back pain     Dental infection     GERD (gastroesophageal reflux disease) 2020    Hairy-cell leukemia of spleen     Hypertension     Unspecified chronic bronchitis         Past Surgical History:      Past Surgical History:   Procedure Laterality Date     SECTION, CLASSIC      FLUOROSCOPY N/A 2020    Procedure: Spleenic Bleed;  Surgeon: Hiren Cabrera MD;  Location: [facility] CATH LAB;  Service: General;  Laterality: N/A;    INJECTION OF ANESTHETIC AGENT INTO TISSUE PLANE DEFINED BY TRANSVERSUS ABDOMINIS MUSCLE N/A 2020    Procedure: BLOCK, TRANSVERSUS ABDOMINIS PLANE;  Surgeon: Yumi Ferguson MD;  Location: [facility] OR;  Service: General;  Laterality: N/A;    SPLENECTOMY N/A 2020    Procedure: SPLENECTOMY;  Surgeon: Yumi Ferguson MD;  Location: [facility] OR;  Service: General;  Laterality: N/A;    THORACENTESIS Left 2020    Procedure: THORACENTESIS;  Surgeon: Yumi  LEANA Ferguson MD;  Location: [facility] OR;  Service: General;  Laterality: Left;    TUBAL LIGATION          Social History:      Social History     Socioeconomic History    Marital status: Single   Tobacco Use    Smoking status: Former     Current packs/day: 0.25     Types: Cigarettes     Passive exposure: Never    Smokeless tobacco: Never    Tobacco comments:     no longer smoker   Substance and Sexual Activity    Alcohol use: Not Currently     Comment: occasional    Drug use: No    Sexual activity: Yes     Birth control/protection: Condom   Social History Narrative    Lives in a single family home with her fiance and her daughter.     Her mother and her son are her SDM's. ACP Documents are available in her chart.      Social Drivers of Health     Financial Resource Strain: High Risk (3/27/2025)    Overall Financial Resource Strain (CARDIA)     Difficulty of Paying Living Expenses: Very hard   Food Insecurity: Food Insecurity Present (3/27/2025)    Hunger Vital Sign     Worried About Running Out of Food in the Last Year: Often true     Ran Out of Food in the Last Year: Often true   Transportation Needs: Unmet Transportation Needs (3/27/2025)    PRAPARE - Transportation     Lack of Transportation (Medical): Yes     Lack of Transportation (Non-Medical): Yes   Physical Activity: Insufficiently Active (3/27/2025)    Exercise Vital Sign     Days of Exercise per Week: 3 days     Minutes of Exercise per Session: 10 min   Stress: Stress Concern Present (3/27/2025)    [employer] - Occupational Stress Questionnaire     Feeling of Stress : Very much   Housing Stability: High Risk (3/27/2025)    Housing Stability Vital Sign     Unable to Pay for Housing in the Last Year: Yes     Number of Times Moved in the Last Year: 4     Homeless in the Last Year: Yes        Family History:      No family history on file.     Allergies:      Review of patient's allergies indicates:   Allergen Reactions    Fentanyl  Rash     Severe full body diffuse rash requiring systemic steroids    Vancomycin analogues Rash     Severe full body diffuse rash requiring systemic steroids    Rituximab Other (See Comments)     Sweating, abdominal pain, elevated blood pressure    Penicillin Hives    Tramadol Hives    Vancomycin Rash     Kidney failure       Medications:      Current Outpatient Medications   Medication Sig    albuterol (VENTOLIN HFA) 90 mcg/actuation inhaler Inhale 2 puffs into the lungs every 6 (six) hours as needed for Wheezing. Rescue    ALPRAZolam (XANAX) 0.5 MG tablet TAKE 1/2 TO 1 TABLET BY MOUTH TWICE DAILY AS NEEDED FOR ANXIETY.    azelastine (ASTELIN) 137 mcg (0.1 %) nasal spray 1 spray (137 mcg total) by Nasal route 2 (two) times daily.    betamethasone dipropionate (DIPROLENE) 0.05 % lotion Apply topically 2 (two) times daily. (Patient not taking: Reported on 3/20/2025)    chlorhexidine (PERIDEX) 0.12 % solution SMARTSIG:By Mouth    clotrimazole-betamethasone 1-0.05% (LOTRISONE) cream Apply topically 2 (two) times daily.    cyclobenzaprine (FLEXERIL) 10 MG tablet TAKE 1/2 TO 1 TABLET BY MOUTH TWICE DAILY AS NEEDED FOR MUSCLE SPASMS. MAY CAUSE DROWSINESS    desloratadine (CLARINEX) 5 mg tablet Take 1 tablet (5 mg total) by mouth once daily.    doxycycline (VIBRAMYCIN) 100 MG Cap Take 1 capsule (100 mg total) by mouth 2 (two) times daily. for 7 days    gabapentin (NEURONTIN) 600 MG tablet Take 1 tablet (600 mg total) by mouth every evening.    HYDROcodone-acetaminophen (NORCO) 5-325 mg per tablet Take 1 tablet by mouth every 4 (four) hours as needed for Pain.    HYDROcodone-acetaminophen (NORCO) 5-325 mg per tablet Take 1 tablet by mouth every 6 (six) hours as needed for Pain.    ketoconazole (NIZORAL) 2 % cream AAA bid.    losartan (COZAAR) 50 MG tablet Take 1 tablet (50 mg total) by mouth once daily.    losartan (COZAAR) 50 MG tablet Take 1 tablet (50 mg total) by mouth once daily.    mupirocin (BACTROBAN) 2 % ointment  Apply topically 3 (three) times daily.    pimecrolimus (ELIDEL) 1 % cream Apply topically 2 (two) times daily. As needed for rash on face    triamcinolone acetonide 0.025% (KENALOG) 0.025 % cream Apply topically 2 (two) times daily. For only up to 1 week per course on face as needed for rash, for up to 2-4 weeks per course for rash on body     No current facility-administered medications for this visit.       Vitals:      BP:  173/123, HR:  73, RR:  20,  Temp:  97.9, SpO2:  94% room air  (Has been out of medications for over a week)  Repeat BP:  173/116    Review of Systems:        Constitutional: Negative for fever, chills, weight loss, malaise/fatigue and diaphoresis.   HENT: Negative for hearing loss, ear pain, nosebleeds, congestion, sore throat, neck pain, tinnitus and ear discharge.    Eyes: Negative for blurred vision, double vision, photophobia, pain, discharge and redness.   Respiratory: Negative for cough, hemoptysis, sputum production, shortness of breath (intermittent), wheezing and stridor.    Cardiovascular: Negative for chest pain, palpitations, orthopnea, claudication, leg swelling and PND.   Gastrointestinal: Negative for heartburn, nausea, vomiting, abdominal pain, diarrhea, constipation, blood in stool and melena.   Genitourinary: Negative for dysuria, urgency, frequency, hematuria and flank pain.   Musculoskeletal: Negative for myalgias, back pain, joint pain and falls.   Skin: Negative for itching and rash.   Neurological: Negative for dizziness, tingling, tremors, sensory change, speech change, focal weakness, seizures, loss of consciousness, weakness and headaches.   Endo/Heme/Allergies: Negative for environmental allergies and polydipsia. Does not bruise/bleed easily.   Psychiatric/Behavioral: Negative for depression, suicidal ideas, hallucinations, memory loss and substance abuse. The patient is not nervous/anxious and does not have insomnia.    All 14 systems reviewed and negative except as  noted above.    Physical Exam:      Constitutional: Appears Obese. Well-developed, well-nourished and in no acute distress.  Pt is oriented to person, place, and time.   Head: Normocephalic and atraumatic. Mucous membranes moist.  Neck: Neck supple no mass.   Cardiovascular: Normal rate and regular rhythm.  S1 S2 appreciated by ascultation.  Pulmonary/Chest: Effort normal and clear to auscultation bilaterally. No respiratory distress.   Abdomen: Soft. Non-tender and non-distended. Bowel sounds are normal.   Neurological: Pt is alert and oriented to person, place, and time. Moves all extremities.  Skin: Warm and dry. No lesions.  Extremities: No clubbing cyanosis or edema.    Laboratory data:      Reviewed and noted in plan where applicable. Please see chart for full laboratory data.      Lab Results   Component Value Date    INR 1.0 10/07/2020    INR 1.1 05/11/2020       Lab Results   Component Value Date    WBC 7.12 03/20/2025    HGB 14.9 03/20/2025    HCT 44.0 03/20/2025    MCV 84 03/20/2025     03/20/2025         Predictors of intubation difficulty:       Morbid obesity? yes    Anatomically abnormal facies? no   Prominent incisors? no   Receding mandible? no   Short, thick neck? no   Neck range of motion: normal   Dentition: Chipped teeth present (top) and Missing teeth (bottom)    Cardiographics:      ECG: pending      Echocardiogram: not done    Imaging:      Chest x-ray (dated 8/7/24):   EXAMINATION:  XR CHEST PA AND LATERAL     CLINICAL HISTORY:  chest pain;     COMPARISON:  07/12/2020     FINDINGS:  Cardiac silhouette is enlarged but stable.    Mild pulmonary vascular congestion.  The lungs demonstrate no evidence of active disease.  No evidence of pleural effusion or pneumothorax.  Bones appear intact.  Moderate degenerative changes and moderate atherosclerotic disease.     Impression:     Cardiomegaly and mild pulmonary vascular congestion.      Assessment:      49 y.o. female with planned surgery  as above.    Known risk factors for perioperative complications:   Uncontrolled HTN and cardiomegaly/pulmonary vascular congestion on CXR dated 8/7/24    Difficulty with intubation is not anticipated.      Plan:      Preoperative examination  Assessment & Plan:  -Jaswant Yang is a 49 year old female who had a recent CT scan. She has near constant nasal obstruction, rhinorrhea, postnasal drip and occasional bleeding. She denies any history of prior nasal trauma, intranasal drug use, prior sinus or nasal surgery. She does have a history of a positive MERRILL and sees Dr. Urias with Rheumatology but she has not been tested for Wegener's granulomatosis. She has used nasal steroid spray for up to 12 weeks in the past without adequate benefit. She is scheduled for nose endoscopy with video imaging for chronic sinusitis and nasal septal perforation on 4/22/25 by Dr. Samuels.    Known risk factors for perioperative complications:   Uncontrolled HTN and cardiomegaly/pulmonary vascular congestion on CXR dated 8/7/24    Difficulty with intubation is not anticipated.    Cardiac Risk Estimation:     Revised Cardiac Risk Index for Pre-Operative Risk from [email or web address]  on 4/16/2025  ** All calculations should be rechecked by clinician prior to use **    RESULT SUMMARY:  1 points  RCRI Score    6.0 %  Risk of major cardiac event      INPUTS:  High-risk surgery --> 0 = No  History of ischemic heart disease --> 0 = No  History of congestive heart failure --> 1 = Yes  History of cerebrovascular disease --> 0 = No  Pre-operative treatment with insulin --> 0 = No  Pre-operative creatinine >2 mg/dL / 176.8 µmol/L --> 0 = No      1.) Preoperative workup as follows: chest x-ray, ECG, hemoglobin, hematocrit, electrolytes, creatinine, glucose, liver function studies.  2.) Change in medication regimen before surgery:  Hold Losartan morning of surgery .  3.) Prophylaxis for cardiac events with perioperative beta-blockers: not indicated.  4.)  Invasive hemodynamic monitoring perioperatively: at the discretion of anesthesiologist.  5.) Deep vein thrombosis prophylaxis postoperatively: regimen to be chosen by surgical team.  6.) Surveillance for postoperative MI with ECG immediately postoperatively and on postoperati ve days 1 and 2 AND troponin levels 24 hours postoperatively and on day 4 or hospital discharge (whichever comes first): at the discretion of anesthesiologist.  7.) Current medications which may produce withdrawal symptoms if withheld perioperatively: N/A  8.) Other measures: Delay of surgery to improve blood presure control (has an appt with Dr. Ware on 4/16/25 at 2PM).       Nasal cavity mass  Assessment & Plan:  -She is scheduled for nose endoscopy with video imaging for chronic sinusitis and nasal septal perforation on 4/22/25 by Dr. Samuels.    Orders:  -     Ambulatory referral/consult to Pre-Admit    Primary hypertension  Assessment & Plan:  -Current medications include Losartan 50 mg po daily however she has not been taking consistently due to not having enough for the past 1.5 weeks. She reports trying to establish with PCP at [facility] but appt is not until 05/01/25. She took her last Losartan today.  -Initial BP:  173/123, repeat BP:  173/116  -Sent to the ED for tx of hypertensive urgency.   -She will need control of her BP prior to scheduled procedure on 4/22/25.  -Contacted Cardiology office -- she an a Cardiology appt scheduled for 4/16/25 at 2PM for HTN and cardiac clearance. Patient informed of appt.       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

## 2025-04-16 ENCOUNTER — HOSPITAL ENCOUNTER (OUTPATIENT)
Dept: CARDIOLOGY | Facility: HOSPITAL | Age: 50
Discharge: HOME | End: 2025-04-16
Attending: INTERNAL MEDICINE
Payer: MEDICAID

## 2025-04-16 ENCOUNTER — TELEPHONE (OUTPATIENT)
Dept: HEMATOLOGY/ONCOLOGY | Facility: CLINIC | Age: 50
End: 2025-04-16
Payer: MEDICAID

## 2025-04-16 ENCOUNTER — OFFICE VISIT (OUTPATIENT)
Dept: CARDIOLOGY | Facility: CLINIC | Age: 50
End: 2025-04-16
Payer: MEDICAID

## 2025-04-16 VITALS
WEIGHT: 293 LBS | BODY MASS INDEX: 53.35 KG/M2 | SYSTOLIC BLOOD PRESSURE: 167 MMHG | DIASTOLIC BLOOD PRESSURE: 109 MMHG | OXYGEN SATURATION: 96 % | HEART RATE: 74 BPM

## 2025-04-16 DIAGNOSIS — I10 ESSENTIAL HYPERTENSION: ICD-10-CM

## 2025-04-16 DIAGNOSIS — Z01.810 PREOP CARDIOVASCULAR EXAM: ICD-10-CM

## 2025-04-16 DIAGNOSIS — E78.2 MIXED HYPERLIPIDEMIA: ICD-10-CM

## 2025-04-16 DIAGNOSIS — Z91.89 AT RISK FOR SLEEP APNEA: ICD-10-CM

## 2025-04-16 DIAGNOSIS — I51.7 CARDIOMEGALY: ICD-10-CM

## 2025-04-16 DIAGNOSIS — R94.31 ABNORMAL ECG: ICD-10-CM

## 2025-04-16 DIAGNOSIS — I51.7 CARDIOMEGALY: Primary | ICD-10-CM

## 2025-04-16 DIAGNOSIS — Z01.810 PREOP CARDIOVASCULAR EXAM: Primary | ICD-10-CM

## 2025-04-16 DIAGNOSIS — E66.01 MORBID OBESITY: ICD-10-CM

## 2025-04-16 PROBLEM — J34.89 NASAL CAVITY MASS: Status: ACTIVE | Noted: 2025-04-16

## 2025-04-16 PROBLEM — Z01.818 PREOPERATIVE EXAMINATION: Status: ACTIVE | Noted: 2025-04-16

## 2025-04-16 PROCEDURE — 3077F SYST BP >= 140 MM HG: CPT | Mod: CPTII,,, | Performed by: INTERNAL MEDICINE

## 2025-04-16 PROCEDURE — 1160F RVW MEDS BY RX/DR IN RCRD: CPT | Mod: CPTII,,, | Performed by: INTERNAL MEDICINE

## 2025-04-16 PROCEDURE — 99999 PR PBB SHADOW E&M-EST. PATIENT-LVL IV: CPT | Mod: PBBFAC,,, | Performed by: INTERNAL MEDICINE

## 2025-04-16 PROCEDURE — 3008F BODY MASS INDEX DOCD: CPT | Mod: CPTII,,, | Performed by: INTERNAL MEDICINE

## 2025-04-16 PROCEDURE — 3080F DIAST BP >= 90 MM HG: CPT | Mod: CPTII,,, | Performed by: INTERNAL MEDICINE

## 2025-04-16 PROCEDURE — 4010F ACE/ARB THERAPY RXD/TAKEN: CPT | Mod: CPTII,,, | Performed by: INTERNAL MEDICINE

## 2025-04-16 PROCEDURE — 99204 OFFICE O/P NEW MOD 45 MIN: CPT | Mod: S$PBB,,, | Performed by: INTERNAL MEDICINE

## 2025-04-16 PROCEDURE — 1159F MED LIST DOCD IN RCRD: CPT | Mod: CPTII,,, | Performed by: INTERNAL MEDICINE

## 2025-04-16 PROCEDURE — 99214 OFFICE O/P EST MOD 30 MIN: CPT | Mod: PBBFAC | Performed by: INTERNAL MEDICINE

## 2025-04-16 RX ORDER — AMLODIPINE BESYLATE 5 MG/1
5 TABLET ORAL DAILY
Qty: 90 TABLET | Refills: 3 | Status: SHIPPED | OUTPATIENT
Start: 2025-04-16 | End: 2026-04-16

## 2025-04-16 RX ORDER — LOSARTAN POTASSIUM 100 MG/1
100 TABLET ORAL DAILY
Qty: 90 TABLET | Refills: 3 | Status: SHIPPED | OUTPATIENT
Start: 2025-04-16 | End: 2026-04-16

## 2025-04-16 NOTE — ASSESSMENT & PLAN NOTE
-Current medications include Losartan 50 mg po daily however she has not been taking consistently due to not having enough for the past 1.5 weeks. She reports trying to establish with PCP at [facility] but appt is not until 05/01/25. She took her last Losartan today.  -Initial BP:  173/123, repeat BP:  173/116  -Sent to the ED for tx of hypertensive urgency.   -She will need control of her BP prior to scheduled procedure on 4/22/25.  -Contacted Cardiology office -- she an a Cardiology appt scheduled for 4/16/25 at 2PM for HTN and cardiac clearance. Patient informed of appt.

## 2025-04-16 NOTE — ASSESSMENT & PLAN NOTE
-Jaswant Yang is a 49 year old female who had a recent CT scan. She has near constant nasal obstruction, rhinorrhea, postnasal drip and occasional bleeding. She denies any history of prior nasal trauma, intranasal drug use, prior sinus or nasal surgery. She does have a history of a positive MERRILL and sees Dr. Urias with Rheumatology but she has not been tested for Wegener's granulomatosis. She has used nasal steroid spray for up to 12 weeks in the past without adequate benefit. She is scheduled for nose endoscopy with video imaging for chronic sinusitis and nasal septal perforation on 4/22/25 by Dr. Samuels.    Known risk factors for perioperative complications: None  HTN     Difficulty with intubation is not anticipated.    Cardiac Risk Estimation:     Revised Cardiac Risk Index for Pre-Operative Risk from [email or web address]  on 4/16/2025  ** All calculations should be rechecked by clinician prior to use **    RESULT SUMMARY:  1 points  RCRI Score    6.0 %  Risk of major cardiac event      INPUTS:  High-risk surgery --> 0 = No  History of ischemic heart disease --> 0 = No  History of congestive heart failure --> 1 = Yes  History of cerebrovascular disease --> 0 = No  Pre-operative treatment with insulin --> 0 = No  Pre-operative creatinine >2 mg/dL / 176.8 µmol/L --> 0 = No      1.) Preoperative workup as follows: chest x-ray, ECG, hemoglobin, hematocrit, electrolytes, creatinine, glucose, liver function studies.  2.) Change in medication regimen before surgery:  Hold Losartan morning of surgery .  3.) Prophylaxis for cardiac events with perioperative beta-blockers: not indicated.  4.) Invasive hemodynamic monitoring perioperatively: at the discretion of anesthesiologist.  5.) Deep vein thrombosis prophylaxis postoperatively: regimen to be chosen by surgical team.  6.) Surveillance for postoperative MI with ECG immediately postoperatively and on postoperati ve days 1 and 2 AND troponin levels 24 hours postoperatively and on  day 4 or hospital discharge (whichever comes first): at the discretion of anesthesiologist.  7.) Current medications which may produce withdrawal symptoms if withheld perioperatively: N/A  8.) Other measures: Delay of surgery to improve blood presure control (has an appt with Dr. Ware on 4/16/25 at 2PM).

## 2025-04-16 NOTE — TELEPHONE ENCOUNTER
SW received pt's call. Pt stated that her BP is high today so she has to come see cardiology for clearance before surgery. Pt requesting food box and gas card. Pt will p/u food box and $25 [employer] gas card after 2 pm appt. No other needs expressed. SW will remain available.

## 2025-04-16 NOTE — ASSESSMENT & PLAN NOTE
-She is scheduled for nose endoscopy with video imaging for chronic sinusitis and nasal septal perforation on 4/22/25 by Dr. Samuels.

## 2025-04-16 NOTE — PROGRESS NOTES
Subjective   Patient ID:  Jaswant Yang is a 49 y.o. female who presents for evaluation of Pre-op Exam      HPI  Pt presents for preop CV eval.  Has HTN, mixed hyperlipidemia, h/o hairy cell leukemia of spleen splenectomy (2020), morbid obesity.  Nonsmoker.  Due for ENT nose surgery -- sinus issues.  Seen in ER this week for HTN, ran out of Losartan and was put back on it.  CXR Aug 2024 reported stable cardiomegaly.  Never has been checked for JACKLYN.  Ecg today 4/16/25 personally reviewed: NSR, low precordial R waves due to obesity.  Denies chest pain sxs.  No CHF sxs.  No syncope.   BP remains high.         Past Medical History:   Diagnosis Date    Anemia     Anemia     Back pain     Dental infection     GERD (gastroesophageal reflux disease) 05/14/2020    Hairy-cell leukemia of spleen     Hypertension     Unspecified chronic bronchitis      Current Outpatient Medications   Medication Instructions    albuterol (VENTOLIN HFA) 90 mcg/actuation inhaler 2 puffs, Inhalation, Every 6 hours PRN, Rescue    ALPRAZolam (XANAX) 0.5 MG tablet TAKE 1/2 TO 1 TABLET BY MOUTH TWICE DAILY AS NEEDED FOR ANXIETY.    amLODIPine (NORVASC) 5 mg, Oral, Daily    azelastine (ASTELIN) 137 mcg, Nasal, 2 times daily    betamethasone dipropionate (DIPROLENE) 0.05 % lotion Topical (Top), 2 times daily    chlorhexidine (PERIDEX) 0.12 % solution SMARTSIG:By Mouth    clotrimazole-betamethasone 1-0.05% (LOTRISONE) cream Topical (Top), 2 times daily    cyclobenzaprine (FLEXERIL) 10 MG tablet TAKE 1/2 TO 1 TABLET BY MOUTH TWICE DAILY AS NEEDED FOR MUSCLE SPASMS. MAY CAUSE DROWSINESS    desloratadine (CLARINEX) 5 mg, Oral, Daily    doxycycline (VIBRAMYCIN) 100 mg, Oral, 2 times daily    gabapentin (NEURONTIN) 600 mg, Oral, Nightly    HYDROcodone-acetaminophen (NORCO) 5-325 mg per tablet 1 tablet, Oral, Every 4 hours PRN    HYDROcodone-acetaminophen (NORCO) 5-325 mg per tablet 1 tablet, Oral, Every 6 hours PRN    ketoconazole (NIZORAL) 2 % cream AAA  bid.    losartan (COZAAR) 100 mg, Oral, Daily    mupirocin (BACTROBAN) 2 % ointment Topical (Top), 3 times daily    pimecrolimus (ELIDEL) 1 % cream Topical (Top), 2 times daily, As needed for rash on face    triamcinolone acetonide 0.025% (KENALOG) 0.025 % cream Topical (Top), 2 times daily, For only up to 1 week per course on face as needed for rash, for up to 2-4 weeks per course for rash on body         Review of Systems   Constitutional: Negative.   HENT:  Positive for congestion.    Eyes: Negative.    Cardiovascular: Negative.    Respiratory: Negative.     Endocrine: Negative.    Hematologic/Lymphatic: Negative.    Skin: Negative.    Musculoskeletal: Negative.    Gastrointestinal: Negative.    Genitourinary: Negative.    Neurological: Negative.    Psychiatric/Behavioral: Negative.     Allergic/Immunologic: Negative.        BP (!) 167/109 (BP Location: Left forearm, Patient Position: Sitting)   Pulse 74   Wt (!) 173.5 kg (382 lb 8 oz)   SpO2 96%   BMI 53.35 kg/m²     Wt Readings from Last 3 Encounters:   04/16/25 (!) 173.5 kg (382 lb 8 oz)   04/15/25 (!) 173.9 kg (383 lb 6.4 oz)   03/20/25 (!) 171.3 kg (377 lb 12.1 oz)     Temp Readings from Last 3 Encounters:   04/15/25 98 °F (36.7 °C) (Oral)   04/15/25 97.9 °F (36.6 °C) (Temporal)   03/20/25 97.1 °F (36.2 °C) (Temporal)     BP Readings from Last 3 Encounters:   04/16/25 (!) 167/109   04/15/25 (!) 177/113   04/15/25 (!) 173/123     Pulse Readings from Last 3 Encounters:   04/16/25 74   04/15/25 64   04/15/25 73            Objective     Physical Exam  Vitals and nursing note reviewed.   Constitutional:       General: She is not in acute distress.     Appearance: Normal appearance. She is well-developed. She is morbidly obese. She is not ill-appearing or diaphoretic.   HENT:      Head: Normocephalic.   Neck:      Thyroid: No thyromegaly.      Vascular: No carotid bruit or JVD.   Cardiovascular:      Rate and Rhythm: Normal rate and regular rhythm.       "Pulses: Normal pulses.           Radial pulses are 2+ on the right side and 2+ on the left side.      Heart sounds: Normal heart sounds, S1 normal and S2 normal. No murmur heard.     No friction rub. No gallop.   Pulmonary:      Effort: Pulmonary effort is normal.      Breath sounds: Normal breath sounds. No wheezing or rales.   Abdominal:      General: Bowel sounds are normal. There is no abdominal bruit.      Palpations: Abdomen is soft.      Tenderness: There is no abdominal tenderness.   Musculoskeletal:      Cervical back: Neck supple.   Lymphadenopathy:      Cervical: No cervical adenopathy.   Skin:     General: Skin is warm.   Neurological:      Mental Status: She is alert and oriented to person, place, and time.   Psychiatric:         Behavior: Behavior normal. Behavior is cooperative.         I have reviewed all pertinent labs and cardiac studies.        Chemistry        Component Value Date/Time     03/20/2025 0852    K 4.2 03/20/2025 0852     03/20/2025 0852    CO2 24 03/20/2025 0852    BUN 18 03/20/2025 0852    CREATININE 1.1 03/20/2025 0852     (H) 03/20/2025 0852        Component Value Date/Time    CALCIUM 8.8 03/20/2025 0852    ALKPHOS 60 03/20/2025 0852    AST 19 03/20/2025 0852    ALT 21 03/20/2025 0852    BILITOT 1.2 (H) 03/20/2025 0852    ESTGFRAFRICA >60 07/28/2022 1339    EGFRNONAA 55 (A) 07/28/2022 1339        Lab Results   Component Value Date    WBC 7.12 03/20/2025    HGB 14.9 03/20/2025    HCT 44.0 03/20/2025    MCV 84 03/20/2025     03/20/2025       Lab Results   Component Value Date    HGBA1C 4.8 10/18/2023       Lab Results   Component Value Date    CHOL 215 (H) 10/14/2024    CHOL 218 (H) 10/18/2023      Lab Results   Component Value Date    HDL 57 10/14/2024    HDL 67 10/18/2023      No results found for: "LDL"   Lab Results   Component Value Date    TRIG 191 (H) 10/14/2024    TRIG 84 10/18/2023      No results found for: "DLDL"       Lab Results   Component " Value Date    CHOLHDL 26.5 10/14/2024          FINDINGS:  Cardiac silhouette is enlarged but stable.    Mild pulmonary vascular congestion.  The lungs demonstrate no evidence of active disease.  No evidence of pleural effusion or pneumothorax.  Bones appear intact.  Moderate degenerative changes and moderate atherosclerotic disease.     Impression:     Cardiomegaly and mild pulmonary vascular congestion.        Electronically signed by:Hiren Cabrera MD  Date:                                            08/07/2024  Time:                                           07:42       Assessment and Plan     1. Preop cardiovascular exam    2. Essential hypertension    3. Mixed hyperlipidemia    4. Cardiomegaly    5. Morbid obesity    6. Abnormal ECG    7. At risk for sleep apnea        Plan:    Postpone ENT surgery next week due to poorly controlled HTN.  Increase Losartan from 50 mg qd to 100 mg qd.  Add Amlodipine 5 mg qd.  Echocardiogram.  Pulmonary consult for possible JACKLYN.  CV risk factor modification advised.  Weight loss needed: consider tx w GLP-1 agonist.  Cardiac diet.  Abnl ecg: likely due to morbid obesity. Monitor.  Long term exercise.      F/u 2 weeks to review HTN control and echo.

## 2025-04-21 ENCOUNTER — TELEPHONE (OUTPATIENT)
Dept: OTOLARYNGOLOGY | Facility: CLINIC | Age: 50
End: 2025-04-21
Payer: MEDICAID

## 2025-04-21 ENCOUNTER — TELEPHONE (OUTPATIENT)
Dept: RHEUMATOLOGY | Facility: CLINIC | Age: 50
End: 2025-04-21
Payer: MEDICAID

## 2025-04-21 NOTE — TELEPHONE ENCOUNTER
Spoke to pt who states she is having to r/s surgery due to high blood pressure. Instructed pt to call us once seen and cleared by cardio. Voiced understanding.

## 2025-04-23 ENCOUNTER — TELEPHONE (OUTPATIENT)
Dept: CARDIOLOGY | Facility: CLINIC | Age: 50
End: 2025-04-23
Payer: MEDICAID

## 2025-04-23 ENCOUNTER — TELEPHONE (OUTPATIENT)
Dept: HEMATOLOGY/ONCOLOGY | Facility: CLINIC | Age: 50
End: 2025-04-23
Payer: MEDICAID

## 2025-04-23 NOTE — TELEPHONE ENCOUNTER
----- Message from Haley sent at 4/23/2025  1:14 PM CDT -----  Contact: Jaswant Mckeon is calling in regards to rather there is any funds so that she can get a ride to appt on 04/24.please call pt back at [phone] [facility]

## 2025-04-23 NOTE — TELEPHONE ENCOUNTER
Called pt back and informed her we do not provide funds for rides and she should contact her insurance to see if they offer anything. Mike perez w/o q/c    ----- Message from Haley sent at 4/23/2025  1:14 PM CDT -----  Contact: Jaswant Mckeon is calling in regards to rather there is any funds so that she can get a ride to appt on 04/24.please call pt back at .[phone] [facility]

## 2025-04-23 NOTE — TELEPHONE ENCOUNTER
Pt inquiring about transportation to cardiology appt on tomorrow. SW advised pt to call cardiology provider office to see if they have a designated [employer] account to assist with transportation as the CC has no funds at this time.

## 2025-05-03 ENCOUNTER — PATIENT MESSAGE (OUTPATIENT)
Dept: CARDIOLOGY | Facility: HOSPITAL | Age: 50
End: 2025-05-03
Payer: MEDICAID

## 2025-05-21 ENCOUNTER — OFFICE VISIT (OUTPATIENT)
Dept: RHEUMATOLOGY | Facility: CLINIC | Age: 50
End: 2025-05-21

## 2025-05-21 DIAGNOSIS — J34.89 NASAL LESION: ICD-10-CM

## 2025-05-21 DIAGNOSIS — R21 FACIAL RASH: ICD-10-CM

## 2025-05-21 DIAGNOSIS — R76.8 POSITIVE DOUBLE STRANDED DNA ANTIBODY TEST: ICD-10-CM

## 2025-05-21 DIAGNOSIS — R76.8 POSITIVE ANA (ANTINUCLEAR ANTIBODY): Primary | ICD-10-CM

## 2025-05-21 NOTE — PROGRESS NOTES
The patient location is: [place]  The chief complaint leading to consultation is: rash    Visit type: audiovisual    Face to Face time with patient: 15 minutes  40 minutes of total time spent on the encounter, which includes face to face time and non-face to face time preparing to see the patient (eg, review of tests), Obtaining and/or reviewing separately obtained history, Documenting clinical information in the electronic or other health record, Independently interpreting results (not separately reported) and communicating results to the patient/family/caregiver, or Care coordination (not separately reported).         Each patient to whom he or she provides medical services by telemedicine is:  (1) informed of the relationship between the physician and patient and the respective role of any other health care provider with respect to management of the patient; and (2) notified that he or she may decline to receive medical services by telemedicine and may withdraw from such care at any time.    Notes:       Subjective:      Patient ID: Jaswant Yang is a 49 y.o. female.    Chief Complaint: rash, MERRILL+    HPI:   Patient with HTN, 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 Presents for Rheumatology follow up for MERRILL+, dsDNA+. Repeat MERRILL was negative. Initially referred from Dermatology. In 07/2024 she broke out in rash over the face, spread to neck and torso. Same rash had occurred another time previously in 04/2024. Saw Dermatology who considered SLE or dermatomyositis etiology given heliotrope rash appearance over the eyelids. The rashes finally resolved with topical steroids. She also went to the ED for a rash in 9/2024 which was thought to be a skin reaction to neosporin and resolved when she stopped using neosporin. Our lab workup and examination with me have not yet led to a diagnosis of a rheumatic disease.    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 She was supposed to get biopsy of this lesion as well as septum surgery but this surgery was delayed due to HTN urgency. I ordered vasculitis labs but she did not have transportation to get to the lab for these. Regarding facial and other rashes, they have not recurred. However she has a sore on her nose she thinks from sun damage that she scratched. Hard to tell if she gets photosensitive rashes because she stays out of sun due to heat intolerance. She had cocaine positive on drug screen. She reports she has taken unknown pills given to her by someone for pain.    Continues to have severe dry mouth. Has hx of ulceration on the hard palate. Endorses many dental issues. Abscesses, teeth falling out, etc. Today she reports a lot of mouth pain. Left upper tooth broke and she thinks there is underlying infection. Medicaid not covering dental so she hasn't been able to address it.     Diagnosed with hairy cell leukemia in 2020, s/p 6/22/20 cladribine; 7/6/20 initiated rituximab, not completed due to transfusion reaction. F/u BMbx stable.    Social Hx: Former very light smoker 2-3 cigarettes weekly.  Family Hx: No family history of autoimmune disease    Rheumatology ROS is otherwise negative.      Objective:   There were no vitals taken for this visit.  Physical Exam  Constitutional:       General: She is not in acute distress.  HENT:      Mouth/Throat:      Comments: Left cheek swollen from current dental problem.            Assessment and Plan:     Problem List Items Addressed This Visit    None  Visit Diagnoses         Positive MERRILL (antinuclear antibody)    -  Primary    Relevant Orders    Hepatitis B Core Antibody, Total    Hepatitis B Surface Antigen    Quantiferon Gold TB    Thiopurine Methyltrans (TPMT) Genotyping    Anti-Neutrophilic Cytoplasmic Antibody    Myeloperoxidase Antibody (MPO)    Proteinase 3 Autoantibodies    MERRILL Screen w/Reflex    C3 Complement    C4  Complement    Anti-DNA Ab, Double-Stranded    Urinalysis    Protein/Creatinine Ratio, Urine    Sedimentation rate    C-Reactive Protein    Comprehensive Metabolic Panel    CBC Auto Differential    Beta-2 Glycoprotein Abs (IgA, IgG, IgM)      Positive double stranded DNA antibody test          Facial rash          Nasal lesion        Relevant Orders    Anti-Neutrophilic Cytoplasmic Antibody    Myeloperoxidase Antibody (MPO)    Proteinase 3 Autoantibodies              Patient with HTN, 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 Presents for Rheumatology evaluation for MERRILL+, dsDNA+ in the setting of facial rash.    MERRILL+ 1:80, repeat is negative.  dsDNA+ 1:10, repeat is 1:20  Negative Sm, RNP, SSA, SSB, myomarker panel, complements.  Normal CPK and aldolase.  No significant proteinuria.  Pre-DMARD labs negative.  APS labs:   Negative DRVVT and cadiolipin   Mild elevation in B2GP IgA of 11. Not clinically significant.      During hairy cell leukemia and treatment she had leukopenia, anemia, renal failure (she reports due to vancomycin use) with 1 session of dialysis. Outside of this, WBC and lymphocyte count, Hb, platelets have been adequate. Renal function recovered.    Reviewed photo from Dermatology visit. 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 However I wouldn't expect that to resolve so quickly with topicals only. She has had several hive-like reactions to medications in the past so I wonder if this is a drug eruption from the many OTC medications (BC powder, etc) she takes for chronic back pain. Has severe dry mouth and dental disease.    No synovitis, joint tenderness, or weakness on exam.    No improvement of previous rash with prednisone for a week. Improved with discontinuation of topical neosporin.        She has no synovitis or weakness on exam, normal muscle enzymes, low inflammation, negative myomarker panel, and negative repeat MERRILL. dsDNA slightly positive but no evidence  of proteinuria. So she does not meet criteria for lupus or dermatomyositis. She might have Sjogren's Disease but would need a minor salivary gland biopsy for confirmation because her serologies are negative. 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 She had positive cocaine test and has taken unknown pills for pain before. Note that cocaine (loraine with levamisole) can cause production of autoimmune antibodies such as MERRILL and ANCA as well as granulomatous vasculitis in the nose.      Plan:  Go to ED for antibiotics and pain relief for current broken tooth with possible infection.  Get labs for lupus and vasculitis. Do not use cocaine or take unknown pills for 1 week prior to lab work.          I spent a total of 40 minutes on the day of the visit.  This includes face to face time and non-face to face time preparing to see the patient (eg, review of tests), obtaining and/or reviewing separately obtained history, documenting clinical information in the electronic or other health record, independently interpreting results and communicating results to the patient/family/caregiver, or care coordinator.

## 2025-05-23 ENCOUNTER — ON-DEMAND VIRTUAL (OUTPATIENT)
Dept: URGENT CARE | Facility: CLINIC | Age: 50
End: 2025-05-23

## 2025-05-23 DIAGNOSIS — J01.90 ACUTE NON-RECURRENT SINUSITIS, UNSPECIFIED LOCATION: Primary | ICD-10-CM

## 2025-05-23 DIAGNOSIS — K08.89 PAIN, DENTAL: ICD-10-CM

## 2025-05-23 RX ORDER — IBUPROFEN 600 MG/1
600 TABLET, FILM COATED ORAL EVERY 8 HOURS PRN
Qty: 15 TABLET | Refills: 0 | Status: SHIPPED | OUTPATIENT
Start: 2025-05-23 | End: 2025-05-28

## 2025-05-23 RX ORDER — DOXYCYCLINE 100 MG/1
100 CAPSULE ORAL 2 TIMES DAILY
Qty: 14 CAPSULE | Refills: 0 | Status: SHIPPED | OUTPATIENT
Start: 2025-05-23 | End: 2025-05-30

## 2025-05-23 NOTE — PROGRESS NOTES
Subjective:      Patient ID: Jaswant Yang is a 49 y.o. female.    Vitals:  vitals were not taken for this visit.     Chief Complaint: Dental Pain and Sinus Problem      Visit Type: TELE AUDIOVISUAL    Patient Location: Home [facility]     Present with the patient at the time of consultation: TELEMED PRESENT WITH PATIENT: None    Past Medical History:   Diagnosis Date    Anemia     Anemia     Back pain     Dental infection     GERD (gastroesophageal reflux disease) 2020    Hairy-cell leukemia of spleen     Hypertension     Unspecified chronic bronchitis      Past Surgical History:   Procedure Laterality Date     SECTION, CLASSIC      FLUOROSCOPY N/A 2020    Procedure: Spleenic Bleed;  Surgeon: Hiren Caberra MD;  Location: [facility] CATH LAB;  Service: General;  Laterality: N/A;    INJECTION OF ANESTHETIC AGENT INTO TISSUE PLANE DEFINED BY TRANSVERSUS ABDOMINIS MUSCLE N/A 2020    Procedure: BLOCK, TRANSVERSUS ABDOMINIS PLANE;  Surgeon: Yumi Ferguson MD;  Location: [facility] OR;  Service: General;  Laterality: N/A;    SPLENECTOMY N/A 2020    Procedure: SPLENECTOMY;  Surgeon: Yumi Ferguson MD;  Location: [facility] OR;  Service: General;  Laterality: N/A;    THORACENTESIS Left 2020    Procedure: THORACENTESIS;  Surgeon: Yumi Ferguson MD;  Location: [facility] OR;  Service: General;  Laterality: Left;    TUBAL LIGATION       Review of patient's allergies indicates:   Allergen Reactions    Fentanyl Rash     Severe full body diffuse rash requiring systemic steroids    Vancomycin analogues Rash     Severe full body diffuse rash requiring systemic steroids    Rituximab Other (See Comments)     Sweating, abdominal pain, elevated blood pressure    Penicillin Hives    Tramadol Hives    Vancomycin Rash     Kidney failure     Medications Ordered Prior to Encounter[1]  No family history on file.    Medications Ordered                [id number] [place] - [id number]  [facility]   [address] [address] [address]    Telephone: [phone]   Fax: [phone]   Hours: Not open 24 hours                         E-Prescribed (2 of 2)              doxycycline (VIBRAMYCIN) 100 MG Cap    Sig: Take 1 capsule (100 mg total) by mouth 2 (two) times daily. for 7 days       Start: 5/23/25     Quantity: 14 capsule Refills: 0                         ibuprofen (ADVIL,MOTRIN) 600 MG tablet    Sig: Take 1 tablet (600 mg total) by mouth every 8 (eight) hours as needed for Pain.       Start: 5/23/25     Quantity: 15 tablet Refills: 0                           Ohs Peq Odvv Intake    5/23/2025  7:38 AM CDT - Filed by Patient   What is your current physical address in the event of a medical emergency? [address] [address]   Are you able to take your vital signs? No   Please attach any relevant images or files    Is your employer contracted with [facility]? No         Pt presents with c/o left top broken tooth pain  x 2 day with gum swelling, also c./o sinus pressure x 1 week, with worsening symptoms.  Tried sinus meds and mucinex OTC, taken ibuprofen 800mg.  No dental visit scheduled  Denies CP, SOB, fever, dizziness, nvd.     Dental Pain   The dental pain is located in She's tooth (teeth) and gingiva. The current episode started in the past 7 days. The pain is moderate. Associated symptoms include facial pain, sinus pressure and thermal sensitivity. Pertinent negatives include no fever. She has tried NSAIDs for the symptoms.   Sinus Problem  This is a new problem. The current episode started in the past 7 days. The problem has been gradually worsening since onset. There has been no fever. Associated symptoms include congestion and sinus pressure. Pertinent negatives include no coughing, headaches, shortness of breath, sneezing or sore throat. Past treatments include sitting up.       Constitution: Negative for fever.   HENT:  Positive  for dental problem, congestion, sinus pain and sinus pressure. Negative for drooling, facial swelling and sore throat.    Cardiovascular:  Negative for chest pain.   Respiratory:  Positive for sputum production. Negative for cough and shortness of breath.    Gastrointestinal:  Negative for nausea, vomiting and diarrhea.   Allergic/Immunologic: Negative for sneezing.   Neurological:  Negative for dizziness and headaches.        Objective:   The physical exam was conducted virtually.  Physical Exam   Constitutional: She is oriented to person, place, and time. No distress.   HENT:   Head: Normocephalic.   Ears:   Right Ear: External ear normal.   Left Ear: External ear normal.   Nose: No rhinorrhea or congestion.   Eyes: Conjunctivae are normal.   Neck: Neck supple.   Pulmonary/Chest: Effort normal. No respiratory distress.   Neurological: She is alert and oriented to person, place, and time.   Skin: Skin is no rash.       Assessment:     1. Acute non-recurrent sinusitis, unspecified location    2. Pain, dental        Plan:   Okay to use dental wax to cover area of broken tooth  Take meds as directed   Soft foods  Warm salt water gargles, oragel okay to use  Follow up with dental as directed       Increase fluids and rest  Pedialyte, powerade, gatorade  Take meds as directed  Warm salt water gargles, tea with honey, chlorseptic spray, throat lozenges   Limit foods that are salty, spicy food, limit carbonated drinks, limit acidic drinks  Tylenol or Motrin as directed for fever or body aches  Continue antihistamine (zyrtec or Claritin), Flonase and saline nasal spray  Okay to take Robitussin DM, Mucinex DM, Dayquil/Nyquil as directed    If increased Shortness of breath or difficulty breathing go to the Urgent Care or ER  If symptoms worsening or not improved f/u in Urgent Care or with PCP      Acute non-recurrent sinusitis, unspecified location  -     doxycycline (VIBRAMYCIN) 100 MG Cap; Take 1 capsule (100 mg total) by  mouth 2 (two) times daily. for 7 days  Dispense: 14 capsule; Refill: 0    Pain, dental  -     ibuprofen (ADVIL,MOTRIN) 600 MG tablet; Take 1 tablet (600 mg total) by mouth every 8 (eight) hours as needed for Pain.  Dispense: 15 tablet; Refill: 0  -     doxycycline (VIBRAMYCIN) 100 MG Cap; Take 1 capsule (100 mg total) by mouth 2 (two) times daily. for 7 days  Dispense: 14 capsule; Refill: 0    We appreciate you trusting us with your medical care. We hope you feel better soon. We will be happy to take care of you for all of your future medical needs.     You must understand that you've received Virtual treatment only and that you may be released before all your medical problems are known or treated. You, the patient, will arrange for follow up care as instructed.     Follow up with your PCP or specialty clinic as directed in the next 1-2 weeks if not improved or as needed. You can call [phone] to schedule an appointment with the appropriate provider.     If your condition worsens we recommend that you receive another evaluation in person, with your primary care provider, urgent care or at the emergency room immediately or contact your primary medical clinics after hours call service to discuss your concerns.                     [1]   Current Outpatient Medications on File Prior to Visit   Medication Sig Dispense Refill    albuterol (VENTOLIN HFA) 90 mcg/actuation inhaler Inhale 2 puffs into the lungs every 6 (six) hours as needed for Wheezing. Rescue 59.5 g 0    ALPRAZolam (XANAX) 0.5 MG tablet TAKE 1/2 TO 1 TABLET BY MOUTH TWICE DAILY AS NEEDED FOR ANXIETY. 30 tablet 5    amLODIPine (NORVASC) 5 MG tablet Take 1 tablet (5 mg total) by mouth once daily. 90 tablet 3    azelastine (ASTELIN) 137 mcg (0.1 %) nasal spray 1 spray (137 mcg total) by Nasal route 2 (two) times daily. 30 mL 3    betamethasone dipropionate (DIPROLENE) 0.05 % lotion Apply topically 2 (two) times daily. 60 mL 1    chlorhexidine (PERIDEX)  0.12 % solution SMARTSIG:By Mouth      clotrimazole-betamethasone 1-0.05% (LOTRISONE) cream Apply topically 2 (two) times daily. 45 g 2    cyclobenzaprine (FLEXERIL) 10 MG tablet TAKE 1/2 TO 1 TABLET BY MOUTH TWICE DAILY AS NEEDED FOR MUSCLE SPASMS. MAY CAUSE DROWSINESS 60 tablet 1    desloratadine (CLARINEX) 5 mg tablet Take 1 tablet (5 mg total) by mouth once daily. 30 tablet 0    gabapentin (NEURONTIN) 600 MG tablet Take 1 tablet (600 mg total) by mouth every evening. 30 tablet 2    HYDROcodone-acetaminophen (NORCO) 5-325 mg per tablet Take 1 tablet by mouth every 4 (four) hours as needed for Pain. 5 tablet 0    HYDROcodone-acetaminophen (NORCO) 5-325 mg per tablet Take 1 tablet by mouth every 6 (six) hours as needed for Pain. 12 tablet 0    ketoconazole (NIZORAL) 2 % cream AAA bid. 60 g 3    losartan (COZAAR) 100 MG tablet Take 1 tablet (100 mg total) by mouth once daily. 90 tablet 3    mupirocin (BACTROBAN) 2 % ointment Apply topically 3 (three) times daily. 22 g 0    pimecrolimus (ELIDEL) 1 % cream Apply topically 2 (two) times daily. As needed for rash on face 30 g 2    triamcinolone acetonide 0.025% (KENALOG) 0.025 % cream Apply topically 2 (two) times daily. For only up to 1 week per course on face as needed for rash, for up to 2-4 weeks per course for rash on body 454 g 1     No current facility-administered medications on file prior to visit.

## 2025-05-23 NOTE — PATIENT INSTRUCTIONS

## 2025-06-02 ENCOUNTER — TELEPHONE (OUTPATIENT)
Dept: HEMATOLOGY/ONCOLOGY | Facility: CLINIC | Age: 50
End: 2025-06-02

## 2025-06-19 ENCOUNTER — TELEPHONE (OUTPATIENT)
Dept: HEMATOLOGY/ONCOLOGY | Facility: CLINIC | Age: 50
End: 2025-06-19
Payer: MEDICAID

## 2025-06-19 NOTE — TELEPHONE ENCOUNTER
11:05 am- SW returned pt's call. No answer, left vm. [facility] checked LLS website. Pt is not eligible to apply again until 7/9/2025.    11:30 am-Pt called back. SW informed of the above. Pt seeking rental assistance for deposit ans 1st months rent. Provided resource:   Office of   [place]   [email or web address]  Office-of-Social-Services  [address] [place] [address] Phone: [phone] Fax: [phone] Hours Monday - Friday 8 am - 5 pm

## 2025-07-15 ENCOUNTER — TELEPHONE (OUTPATIENT)
Dept: HEMATOLOGY/ONCOLOGY | Facility: CLINIC | Age: 50
End: 2025-07-15
Payer: MEDICAID

## 2025-07-15 NOTE — TELEPHONE ENCOUNTER
Pt informed that the urgent need LLS mitzy is stil closed but she can check website- lls.org as well for daily status updates. SW will remain available.

## 2025-07-28 DIAGNOSIS — M79.12 MYALGIA OF AUXILIARY MUSCLES, HEAD AND NECK: ICD-10-CM

## 2025-07-28 DIAGNOSIS — L98.9 DISEASE OF SKIN AND SUBCUTANEOUS TISSUE: ICD-10-CM

## 2025-07-28 DIAGNOSIS — L30.4 INTERTRIGO: ICD-10-CM

## 2025-07-28 RX ORDER — TRIAMCINOLONE ACETONIDE 0.25 MG/G
CREAM TOPICAL 2 TIMES DAILY
Qty: 454 G | Refills: 1 | Status: SHIPPED | OUTPATIENT
Start: 2025-07-28

## 2025-07-28 RX ORDER — CYCLOBENZAPRINE HCL 10 MG
TABLET ORAL
Qty: 60 TABLET | Refills: 1 | OUTPATIENT
Start: 2025-07-28

## 2025-07-28 RX ORDER — KETOCONAZOLE 20 MG/G
CREAM TOPICAL
Qty: 60 G | Refills: 3 | Status: SHIPPED | OUTPATIENT
Start: 2025-07-28

## 2025-07-28 RX ORDER — CYCLOBENZAPRINE HCL 10 MG
TABLET ORAL
Qty: 60 TABLET | Refills: 1 | Status: SHIPPED | OUTPATIENT
Start: 2025-07-28

## 2025-07-28 NOTE — TELEPHONE ENCOUNTER
Spoke to pt she stated she reached out to pharmacy because on her portal its showing she has one refill left and it says the same on my end as well. Pharmacy told pt to reach out to provider.

## 2025-07-29 ENCOUNTER — HOSPITAL ENCOUNTER (OUTPATIENT)
Dept: PREADMISSION TESTING | Facility: HOSPITAL | Age: 50
Discharge: HOME | End: 2025-07-29
Attending: INTERNAL MEDICINE | Admitting: INTERNAL MEDICINE
Payer: MEDICAID

## 2025-08-08 ENCOUNTER — LAB VISIT (OUTPATIENT)
Dept: LAB | Facility: HOSPITAL | Age: 50
End: 2025-08-08
Attending: INTERNAL MEDICINE
Payer: MEDICAID

## 2025-08-08 ENCOUNTER — TELEPHONE (OUTPATIENT)
Dept: HEMATOLOGY/ONCOLOGY | Facility: CLINIC | Age: 50
End: 2025-08-08
Payer: MEDICAID

## 2025-08-08 DIAGNOSIS — Z90.81 S/P SPLENECTOMY: ICD-10-CM

## 2025-08-08 DIAGNOSIS — C91.40 HAIRY CELL LEUKEMIA OF SPLEEN: ICD-10-CM

## 2025-08-08 DIAGNOSIS — J32.9 RECURRENT SINUSITIS: ICD-10-CM

## 2025-08-08 DIAGNOSIS — J32.9 SINUSITIS, UNSPECIFIED CHRONICITY, UNSPECIFIED LOCATION: ICD-10-CM

## 2025-08-08 LAB
ABSOLUTE EOSINOPHIL (OHS): 0.92 K/UL
ABSOLUTE MONOCYTE (OHS): 0.68 K/UL (ref 0.3–1)
ABSOLUTE NEUTROPHIL COUNT (OHS): 2.75 K/UL (ref 1.8–7.7)
ALBUMIN SERPL BCP-MCNC: 4.3 G/DL (ref 3.5–5.2)
ALP SERPL-CCNC: 64 UNIT/L (ref 40–150)
ALT SERPL W/O P-5'-P-CCNC: 18 UNIT/L (ref 10–44)
ANION GAP (OHS): 7 MMOL/L (ref 8–16)
AST SERPL-CCNC: 17 UNIT/L (ref 11–45)
BASOPHILS # BLD AUTO: 0.03 K/UL
BASOPHILS NFR BLD AUTO: 0.4 %
BILIRUB SERPL-MCNC: 0.9 MG/DL (ref 0.1–1)
BUN SERPL-MCNC: 17 MG/DL (ref 6–20)
CALCIUM SERPL-MCNC: 9.3 MG/DL (ref 8.7–10.5)
CHLORIDE SERPL-SCNC: 107 MMOL/L (ref 95–110)
CO2 SERPL-SCNC: 26 MMOL/L (ref 23–29)
CREAT SERPL-MCNC: 1.2 MG/DL (ref 0.5–1.4)
CRP SERPL-MCNC: <0.3 MG/L
ERYTHROCYTE [DISTWIDTH] IN BLOOD BY AUTOMATED COUNT: 15.9 % (ref 11.5–14.5)
GFR SERPLBLD CREATININE-BSD FMLA CKD-EPI: 56 ML/MIN/1.73/M2
GLUCOSE SERPL-MCNC: 98 MG/DL (ref 70–110)
HCT VFR BLD AUTO: 47.2 % (ref 37–48.5)
HGB BLD-MCNC: 15.6 GM/DL (ref 12–16)
IMM GRANULOCYTES # BLD AUTO: 0.01 K/UL (ref 0–0.04)
IMM GRANULOCYTES NFR BLD AUTO: 0.1 % (ref 0–0.5)
LDH SERPL-CCNC: 247 U/L (ref 110–260)
LYMPHOCYTES # BLD AUTO: 3.17 K/UL (ref 1–4.8)
MCH RBC QN AUTO: 28 PG (ref 27–31)
MCHC RBC AUTO-ENTMCNC: 33.1 G/DL (ref 32–36)
MCV RBC AUTO: 85 FL (ref 82–98)
NUCLEATED RBC (/100WBC) (OHS): 0 /100 WBC
PLATELET # BLD AUTO: 336 K/UL (ref 150–450)
PMV BLD AUTO: 9.5 FL (ref 9.2–12.9)
POTASSIUM SERPL-SCNC: 4 MMOL/L (ref 3.5–5.1)
PROT SERPL-MCNC: 7.3 GM/DL (ref 6–8.4)
RBC # BLD AUTO: 5.57 M/UL (ref 4–5.4)
RELATIVE EOSINOPHIL (OHS): 12.2 %
RELATIVE LYMPHOCYTE (OHS): 41.9 % (ref 18–48)
RELATIVE MONOCYTE (OHS): 9 % (ref 4–15)
RELATIVE NEUTROPHIL (OHS): 36.4 % (ref 38–73)
SODIUM SERPL-SCNC: 140 MMOL/L (ref 136–145)
WBC # BLD AUTO: 7.56 K/UL (ref 3.9–12.7)

## 2025-08-08 PROCEDURE — 86140 C-REACTIVE PROTEIN: CPT

## 2025-08-08 PROCEDURE — 85025 COMPLETE CBC W/AUTO DIFF WBC: CPT

## 2025-08-08 PROCEDURE — 83615 LACTATE (LD) (LDH) ENZYME: CPT

## 2025-08-08 PROCEDURE — 36415 COLL VENOUS BLD VENIPUNCTURE: CPT

## 2025-08-08 PROCEDURE — 82040 ASSAY OF SERUM ALBUMIN: CPT

## 2025-08-08 NOTE — TELEPHONE ENCOUNTER
Lyft ride scheduled per pt request. Pt will need to call  dept at [phone] or [phone] for return ride home.  also provided pt with a food box, $25 [employer] gas card, and [employer]raw[employer] Ensure samples. SW will remain available.     scheduled on Aug 11, 08:30 AM CDT   will arrive around 08:30 AM CDT.  Leonardo    Full name  Jaswant Yang  Mobile phone number  [phone]  Ride type  Lyft Standard (4 seats)  Route    Pickup  [address] [facility]  Drop-off  [id number] [facility] [address] [facility] [address] [place]  Addresses may vary from the original request due to minor pickup and drop-off changes. For example, an address may vary if a rider is picked up across the street.      Note to   Room #224  Tip added  $0.00  Request details    Coordinator name  Renee Yanchance  Date and time  8/8/25, 11:06 AM CDT  Scheduled ride ID  [id number]  Internal note  No note added

## 2025-08-11 ENCOUNTER — OFFICE VISIT (OUTPATIENT)
Dept: HEMATOLOGY/ONCOLOGY | Facility: CLINIC | Age: 50
End: 2025-08-11
Payer: MEDICAID

## 2025-08-11 VITALS
BODY MASS INDEX: 41.02 KG/M2 | OXYGEN SATURATION: 98 % | WEIGHT: 293 LBS | TEMPERATURE: 98 F | HEART RATE: 89 BPM | RESPIRATION RATE: 20 BRPM | HEIGHT: 71 IN | SYSTOLIC BLOOD PRESSURE: 157 MMHG | DIASTOLIC BLOOD PRESSURE: 102 MMHG

## 2025-08-11 DIAGNOSIS — C91.40 HAIRY CELL LEUKEMIA OF SPLEEN: Primary | ICD-10-CM

## 2025-08-11 DIAGNOSIS — E66.01 MORBID OBESITY: ICD-10-CM

## 2025-08-11 PROCEDURE — 99214 OFFICE O/P EST MOD 30 MIN: CPT | Mod: PBBFAC | Performed by: INTERNAL MEDICINE

## 2025-08-11 PROCEDURE — 99214 OFFICE O/P EST MOD 30 MIN: CPT | Mod: S$PBB,,, | Performed by: INTERNAL MEDICINE

## 2025-08-11 PROCEDURE — 4010F ACE/ARB THERAPY RXD/TAKEN: CPT | Mod: CPTII,,, | Performed by: INTERNAL MEDICINE

## 2025-08-11 PROCEDURE — 1159F MED LIST DOCD IN RCRD: CPT | Mod: CPTII,,, | Performed by: INTERNAL MEDICINE

## 2025-08-11 PROCEDURE — 3080F DIAST BP >= 90 MM HG: CPT | Mod: CPTII,,, | Performed by: INTERNAL MEDICINE

## 2025-08-11 PROCEDURE — 3077F SYST BP >= 140 MM HG: CPT | Mod: CPTII,,, | Performed by: INTERNAL MEDICINE

## 2025-08-11 PROCEDURE — 99999 PR PBB SHADOW E&M-EST. PATIENT-LVL IV: CPT | Mod: PBBFAC,,, | Performed by: INTERNAL MEDICINE

## 2025-08-11 PROCEDURE — 3008F BODY MASS INDEX DOCD: CPT | Mod: CPTII,,, | Performed by: INTERNAL MEDICINE

## 2025-08-14 ENCOUNTER — TELEPHONE (OUTPATIENT)
Dept: HEMATOLOGY/ONCOLOGY | Facility: CLINIC | Age: 50
End: 2025-08-14
Payer: MEDICAID

## 2025-08-22 ENCOUNTER — OFFICE VISIT (OUTPATIENT)
Dept: PULMONOLOGY | Facility: CLINIC | Age: 50
End: 2025-08-22
Payer: MEDICAID

## 2025-08-22 VITALS
WEIGHT: 293 LBS | HEART RATE: 95 BPM | RESPIRATION RATE: 17 BRPM | OXYGEN SATURATION: 99 % | HEIGHT: 71 IN | BODY MASS INDEX: 41.02 KG/M2

## 2025-08-22 DIAGNOSIS — Z82.5 FAMILY HX-ASTHMA: ICD-10-CM

## 2025-08-22 DIAGNOSIS — G47.19 EXCESSIVE DAYTIME SLEEPINESS: Primary | ICD-10-CM

## 2025-08-22 DIAGNOSIS — R06.81 WITNESSED APNEIC SPELLS: ICD-10-CM

## 2025-08-22 DIAGNOSIS — R06.02 SOBOE (SHORTNESS OF BREATH ON EXERTION): ICD-10-CM

## 2025-08-22 DIAGNOSIS — R06.83 SNORING: ICD-10-CM

## 2025-08-22 DIAGNOSIS — J45.991 ASTHMA, COUGH VARIANT: ICD-10-CM

## 2025-08-22 DIAGNOSIS — C91.41 HAIRY CELL LEUKEMIA, IN REMISSION: ICD-10-CM

## 2025-08-22 PROCEDURE — 99999 PR PBB SHADOW E&M-EST. PATIENT-LVL IV: CPT | Mod: PBBFAC,,, | Performed by: INTERNAL MEDICINE

## 2025-08-22 PROCEDURE — 99214 OFFICE O/P EST MOD 30 MIN: CPT | Mod: PBBFAC | Performed by: INTERNAL MEDICINE

## 2025-08-22 RX ORDER — ALBUTEROL SULFATE 90 UG/1
2 INHALANT RESPIRATORY (INHALATION) EVERY 6 HOURS PRN
Qty: 59.5 G | Refills: 0 | Status: SHIPPED | OUTPATIENT
Start: 2025-08-22 | End: 2026-08-22

## 2025-09-04 ENCOUNTER — TELEPHONE (OUTPATIENT)
Dept: HEMATOLOGY/ONCOLOGY | Facility: CLINIC | Age: 50
End: 2025-09-04
Payer: MEDICAID

## (undated) DEVICE — DRAIN SIL RND HUBLSS 19F TRCR

## (undated) DEVICE — SUT 1 36IN PDS II VIO MONO

## (undated) DEVICE — MANIFOLD 4 PORT

## (undated) DEVICE — KIT INTRODUCER STIFFEN MICRO

## (undated) DEVICE — GUIDE WIRE WHOLEY EXCHANGE 300

## (undated) DEVICE — CATH MOTARJEME 5FR 65CM

## (undated) DEVICE — WARMER DRAPE STERILE LF

## (undated) DEVICE — ELECTRODE REM PLYHSV RETURN 9

## (undated) DEVICE — GAUZE SPONGE 4X4 12PLY

## (undated) DEVICE — SUT VICRYL 2-0 CT-2 VCP269H

## (undated) DEVICE — SOL 9P NACL IRR PIC IL

## (undated) DEVICE — SUT VICRYL PLUS 3-0 SH 18IN

## (undated) DEVICE — SEE MEDLINE ITEM 157187

## (undated) DEVICE — Device

## (undated) DEVICE — PAD ABD 8X10 STERILE

## (undated) DEVICE — CATH GLIDE HYDROPHILIC C2 5FR

## (undated) DEVICE — SUT VICRYL 3-0 27 SH

## (undated) DEVICE — SEE MEDLINE ITEM 157027

## (undated) DEVICE — SEE MEDLINE ITEM 157117

## (undated) DEVICE — SUT SILK 3-0 STRANDS 30IN

## (undated) DEVICE — GUIDEWIRE FATHOM .016 X 180

## (undated) DEVICE — CONTRAST OMNIPAQUE 240 150ML

## (undated) DEVICE — SPONGE SURGIFOAM GELATIN SZ50

## (undated) DEVICE — PACK CATH LAB CUSTOM BR

## (undated) DEVICE — KIT PRECISION ACCESS 5FR ECHO

## (undated) DEVICE — SEE MEDLINE ITEM 152622

## (undated) DEVICE — HEMOSTAT SURGICEL 2X3IN

## (undated) DEVICE — SUT SILK 3-0 SH 18IN BLACK

## (undated) DEVICE — SPONGE LAP 18X18 PREWASHED

## (undated) DEVICE — SUT 2/0 30IN SILK BLK BRAI

## (undated) DEVICE — TAPE SURG MEDIPORE 6X72IN

## (undated) DEVICE — GLOVE SURGICAL LATEX SZ 7

## (undated) DEVICE — SUT SILK 0 SUTUPAK SA86H

## (undated) DEVICE — ELECTRODE BLD EXT 6.50 ST DISP

## (undated) DEVICE — POSITIONER HEAD DONUT 9IN FOAM

## (undated) DEVICE — EVACUATOR PENCIL SMOKE NEPTUNE

## (undated) DEVICE — SUT SILK 2-0 STRANDS 30IN

## (undated) DEVICE — SUPPORT ULNA NERVE PROTECTOR

## (undated) DEVICE — SEE MEDLINE ITEM 146345

## (undated) DEVICE — WIRE GUIDE TEFLON 3CM .035 145

## (undated) DEVICE — SUT 1 48IN PDS II VIO MONO

## (undated) DEVICE — APPLICATOR CHLORAPREP ORN 26ML

## (undated) DEVICE — COVER OVERHEAD SURG LT BLUE

## (undated) DEVICE — SEE MEDLINE ITEM 157148

## (undated) DEVICE — CATH ANGIO PROGREAT 2.8X150CM

## (undated) DEVICE — DEVICE CLOSURE MYNX GRIP 5FR

## (undated) DEVICE — EVACUATOR WOUND BULB 100CC